# Patient Record
Sex: FEMALE | Race: WHITE | NOT HISPANIC OR LATINO | Employment: OTHER | URBAN - METROPOLITAN AREA
[De-identification: names, ages, dates, MRNs, and addresses within clinical notes are randomized per-mention and may not be internally consistent; named-entity substitution may affect disease eponyms.]

---

## 2017-05-18 ENCOUNTER — GENERIC CONVERSION - ENCOUNTER (OUTPATIENT)
Dept: OTHER | Facility: OTHER | Age: 67
End: 2017-05-18

## 2017-05-19 ENCOUNTER — TRANSCRIBE ORDERS (OUTPATIENT)
Dept: ADMINISTRATIVE | Facility: HOSPITAL | Age: 67
End: 2017-05-19

## 2017-05-19 ENCOUNTER — HOSPITAL ENCOUNTER (OUTPATIENT)
Dept: RADIOLOGY | Facility: HOSPITAL | Age: 67
Discharge: HOME | End: 2017-05-19
Attending: INTERNAL MEDICINE
Payer: MEDICARE

## 2017-05-19 ENCOUNTER — ALLSCRIPTS OFFICE VISIT (OUTPATIENT)
Dept: OTHER | Facility: OTHER | Age: 67
End: 2017-05-19

## 2017-05-19 DIAGNOSIS — J45.20 MILD INTERMITTENT ASTHMA, UNCOMPLICATED: ICD-10-CM

## 2017-05-19 PROCEDURE — 71020 HB CHEST X-RAY 2VW FRONTAL&LATL: CPT

## 2017-05-22 ENCOUNTER — GENERIC CONVERSION - ENCOUNTER (OUTPATIENT)
Dept: OTHER | Facility: OTHER | Age: 67
End: 2017-05-22

## 2017-05-26 ENCOUNTER — TRANSCRIBE ORDERS (OUTPATIENT)
Dept: ADMINISTRATIVE | Facility: HOSPITAL | Age: 67
End: 2017-05-26

## 2017-05-26 DIAGNOSIS — R06.02 SOB (SHORTNESS OF BREATH): Primary | ICD-10-CM

## 2017-06-01 ENCOUNTER — GENERIC CONVERSION - ENCOUNTER (OUTPATIENT)
Dept: OTHER | Facility: OTHER | Age: 67
End: 2017-06-01

## 2017-06-06 ENCOUNTER — GENERIC CONVERSION - ENCOUNTER (OUTPATIENT)
Dept: OTHER | Facility: OTHER | Age: 67
End: 2017-06-06

## 2017-06-06 ENCOUNTER — HOSPITAL ENCOUNTER (OUTPATIENT)
Dept: PULMONOLOGY | Facility: HOSPITAL | Age: 67
Discharge: HOME | End: 2017-06-06
Attending: INTERNAL MEDICINE
Payer: MEDICARE

## 2017-06-06 DIAGNOSIS — R06.02 SOB (SHORTNESS OF BREATH): ICD-10-CM

## 2017-06-06 PROCEDURE — 94070 EVALUATION OF WHEEZING: CPT

## 2017-06-06 PROCEDURE — 94726 PLETHYSMOGRAPHY LUNG VOLUMES: CPT

## 2017-06-06 PROCEDURE — 94729 DIFFUSING CAPACITY: CPT

## 2017-06-06 PROCEDURE — 94760 N-INVAS EAR/PLS OXIMETRY 1: CPT

## 2017-06-06 RX ORDER — SODIUM CHLORIDE FOR INHALATION 0.9 %
3 VIAL, NEBULIZER (ML) INHALATION ONCE AS NEEDED
Status: DISCONTINUED | OUTPATIENT
Start: 2017-06-06 | End: 2017-06-10 | Stop reason: HOSPADM

## 2017-06-06 RX ORDER — ALBUTEROL SULFATE 2.5 MG/3ML
2.5 SOLUTION RESPIRATORY (INHALATION) ONCE AS NEEDED
Status: DISCONTINUED | OUTPATIENT
Start: 2017-06-06 | End: 2017-06-10 | Stop reason: HOSPADM

## 2017-06-22 ENCOUNTER — ALLSCRIPTS OFFICE VISIT (OUTPATIENT)
Dept: OTHER | Facility: OTHER | Age: 67
End: 2017-06-22

## 2017-11-16 ENCOUNTER — TRANSCRIBE ORDERS (OUTPATIENT)
Dept: ADMINISTRATIVE | Facility: HOSPITAL | Age: 67
End: 2017-11-16

## 2017-11-16 ENCOUNTER — TRANSCRIBE ORDERS (OUTPATIENT)
Dept: LAB | Facility: HOSPITAL | Age: 67
End: 2017-11-16

## 2017-11-16 DIAGNOSIS — M25.511 RIGHT SHOULDER PAIN, UNSPECIFIED CHRONICITY: Primary | ICD-10-CM

## 2017-11-25 ENCOUNTER — TRANSCRIBE ORDERS (OUTPATIENT)
Dept: ADMINISTRATIVE | Facility: HOSPITAL | Age: 67
End: 2017-11-25

## 2017-11-26 ENCOUNTER — HOSPITAL ENCOUNTER (OUTPATIENT)
Dept: MRI IMAGING | Facility: HOSPITAL | Age: 67
Discharge: HOME | End: 2017-11-26
Payer: MEDICARE

## 2017-11-26 DIAGNOSIS — M25.511 RIGHT SHOULDER PAIN, UNSPECIFIED CHRONICITY: ICD-10-CM

## 2017-11-26 PROCEDURE — 73221 MRI JOINT UPR EXTREM W/O DYE: CPT

## 2018-01-14 VITALS
RESPIRATION RATE: 12 BRPM | DIASTOLIC BLOOD PRESSURE: 84 MMHG | BODY MASS INDEX: 39.44 KG/M2 | TEMPERATURE: 97.8 F | SYSTOLIC BLOOD PRESSURE: 136 MMHG | HEIGHT: 64 IN | WEIGHT: 231 LBS | HEART RATE: 78 BPM | OXYGEN SATURATION: 75 %

## 2018-01-14 VITALS
WEIGHT: 233 LBS | TEMPERATURE: 97.7 F | HEART RATE: 81 BPM | DIASTOLIC BLOOD PRESSURE: 86 MMHG | SYSTOLIC BLOOD PRESSURE: 142 MMHG | HEIGHT: 64 IN | BODY MASS INDEX: 39.78 KG/M2 | OXYGEN SATURATION: 94 % | RESPIRATION RATE: 12 BRPM

## 2018-01-15 NOTE — RESULT NOTES
Message  patient called and notified of chest x-rays results        Signatures   Electronically signed by : LILY Guzmán ; May 22 2017  2:29PM EST                       (Author)

## 2018-01-17 NOTE — MISCELLANEOUS
Message  Feels like the Janelle James is not working for her  She feels like her breathing is worse and she is unable to sleep at night  She is asked to stop the Janelle James  She will continue with her rescue inhaler  She will get her PFT done on tuesday  we will regroup after her PFTs done  If she does not feel better she is instructed to call the office or go to the emergency room        Plan  Mild intermittent reactive airway disease without complication    · Ventolin  (90 Base) MCG/ACT Inhalation Aerosol Solution; INHALE 1  PUFF EVERY 4 HOURS AS NEEDED    Signatures   Electronically signed by : LILY Maldonado ; Jun 1 2017  2:07PM EST                       (Author)

## 2018-06-11 ENCOUNTER — HOSPITAL ENCOUNTER (EMERGENCY)
Facility: HOSPITAL | Age: 68
Discharge: HOME | End: 2018-06-11
Attending: EMERGENCY MEDICINE | Admitting: EMERGENCY MEDICINE
Payer: MEDICARE

## 2018-06-11 ENCOUNTER — APPOINTMENT (EMERGENCY)
Dept: RADIOLOGY | Facility: HOSPITAL | Age: 68
End: 2018-06-11
Payer: MEDICARE

## 2018-06-11 VITALS
BODY MASS INDEX: 39.48 KG/M2 | HEART RATE: 69 BPM | SYSTOLIC BLOOD PRESSURE: 218 MMHG | RESPIRATION RATE: 20 BRPM | WEIGHT: 230 LBS | DIASTOLIC BLOOD PRESSURE: 85 MMHG | OXYGEN SATURATION: 96 % | TEMPERATURE: 97.9 F

## 2018-06-11 DIAGNOSIS — N20.0 KIDNEY STONE ON LEFT SIDE: Primary | ICD-10-CM

## 2018-06-11 LAB
ALBUMIN SERPL BCP-MCNC: 4.1 G/DL (ref 3.5–5)
ALP SERPL-CCNC: 57 U/L (ref 46–116)
ALT SERPL W P-5'-P-CCNC: 41 U/L (ref 12–78)
ANION GAP SERPL CALCULATED.3IONS-SCNC: 11 MMOL/L (ref 4–13)
AST SERPL W P-5'-P-CCNC: 17 U/L (ref 5–45)
BACTERIA UR QL AUTO: ABNORMAL /HPF
BASOPHILS # BLD AUTO: 0.05 THOUSANDS/ΜL (ref 0–0.1)
BASOPHILS NFR BLD AUTO: 1 % (ref 0–1)
BILIRUB SERPL-MCNC: 0.4 MG/DL (ref 0.2–1)
BILIRUB UR QL STRIP: ABNORMAL
BUN SERPL-MCNC: 22 MG/DL (ref 5–25)
CALCIUM SERPL-MCNC: 9.2 MG/DL (ref 8.3–10.1)
CAOX CRY URNS QL MICRO: ABNORMAL /HPF
CHLORIDE SERPL-SCNC: 102 MMOL/L (ref 100–108)
CLARITY UR: ABNORMAL
CO2 SERPL-SCNC: 28 MMOL/L (ref 21–32)
COLOR UR: YELLOW
CREAT SERPL-MCNC: 0.82 MG/DL (ref 0.6–1.3)
EOSINOPHIL # BLD AUTO: 0.21 THOUSAND/ΜL (ref 0–0.61)
EOSINOPHIL NFR BLD AUTO: 2 % (ref 0–6)
ERYTHROCYTE [DISTWIDTH] IN BLOOD BY AUTOMATED COUNT: 13.1 % (ref 11.6–15.1)
FINE GRAN CASTS URNS QL MICRO: ABNORMAL /LPF
GFR SERPL CREATININE-BSD FRML MDRD: 74 ML/MIN/1.73SQ M
GLUCOSE SERPL-MCNC: 161 MG/DL (ref 65–140)
GLUCOSE UR STRIP-MCNC: NEGATIVE MG/DL
HCT VFR BLD AUTO: 42.7 % (ref 34.8–46.1)
HGB BLD-MCNC: 13.5 G/DL (ref 11.5–15.4)
HGB UR QL STRIP.AUTO: ABNORMAL
IMM GRANULOCYTES # BLD AUTO: 0.04 THOUSAND/UL (ref 0–0.2)
IMM GRANULOCYTES NFR BLD AUTO: 0 % (ref 0–2)
KETONES UR STRIP-MCNC: ABNORMAL MG/DL
LEUKOCYTE ESTERASE UR QL STRIP: NEGATIVE
LYMPHOCYTES # BLD AUTO: 2.13 THOUSANDS/ΜL (ref 0.6–4.47)
LYMPHOCYTES NFR BLD AUTO: 23 % (ref 14–44)
MCH RBC QN AUTO: 28.9 PG (ref 26.8–34.3)
MCHC RBC AUTO-ENTMCNC: 31.6 G/DL (ref 31.4–37.4)
MCV RBC AUTO: 91 FL (ref 82–98)
MONOCYTES # BLD AUTO: 0.87 THOUSAND/ΜL (ref 0.17–1.22)
MONOCYTES NFR BLD AUTO: 10 % (ref 4–12)
MUCOUS THREADS UR QL AUTO: ABNORMAL
NEUTROPHILS # BLD AUTO: 5.79 THOUSANDS/ΜL (ref 1.85–7.62)
NEUTS SEG NFR BLD AUTO: 64 % (ref 43–75)
NITRITE UR QL STRIP: NEGATIVE
NON-SQ EPI CELLS URNS QL MICRO: ABNORMAL /HPF
NRBC BLD AUTO-RTO: 0 /100 WBCS
PH UR STRIP.AUTO: 5 [PH] (ref 5–9)
PLATELET # BLD AUTO: 314 THOUSANDS/UL (ref 149–390)
PMV BLD AUTO: 10.7 FL (ref 8.9–12.7)
POTASSIUM SERPL-SCNC: 3.9 MMOL/L (ref 3.5–5.3)
PROT SERPL-MCNC: 7.3 G/DL (ref 6.4–8.2)
PROT UR STRIP-MCNC: ABNORMAL MG/DL
RBC # BLD AUTO: 4.67 MILLION/UL (ref 3.81–5.12)
RBC #/AREA URNS AUTO: ABNORMAL /HPF
SODIUM SERPL-SCNC: 141 MMOL/L (ref 136–145)
SP GR UR STRIP.AUTO: >=1.03 (ref 1–1.03)
UROBILINOGEN UR QL STRIP.AUTO: 1 E.U./DL
WBC # BLD AUTO: 9.09 THOUSAND/UL (ref 4.31–10.16)
WBC #/AREA URNS AUTO: ABNORMAL /HPF

## 2018-06-11 PROCEDURE — 74177 CT ABD & PELVIS W/CONTRAST: CPT

## 2018-06-11 PROCEDURE — 85025 COMPLETE CBC W/AUTO DIFF WBC: CPT | Performed by: EMERGENCY MEDICINE

## 2018-06-11 PROCEDURE — 96376 TX/PRO/DX INJ SAME DRUG ADON: CPT

## 2018-06-11 PROCEDURE — 99284 EMERGENCY DEPT VISIT MOD MDM: CPT

## 2018-06-11 PROCEDURE — 96375 TX/PRO/DX INJ NEW DRUG ADDON: CPT

## 2018-06-11 PROCEDURE — 80053 COMPREHEN METABOLIC PANEL: CPT | Performed by: EMERGENCY MEDICINE

## 2018-06-11 PROCEDURE — 96361 HYDRATE IV INFUSION ADD-ON: CPT

## 2018-06-11 PROCEDURE — 81001 URINALYSIS AUTO W/SCOPE: CPT | Performed by: EMERGENCY MEDICINE

## 2018-06-11 PROCEDURE — 36415 COLL VENOUS BLD VENIPUNCTURE: CPT | Performed by: EMERGENCY MEDICINE

## 2018-06-11 PROCEDURE — 96374 THER/PROPH/DIAG INJ IV PUSH: CPT

## 2018-06-11 RX ORDER — MORPHINE SULFATE 4 MG/ML
4 INJECTION, SOLUTION INTRAMUSCULAR; INTRAVENOUS ONCE
Status: COMPLETED | OUTPATIENT
Start: 2018-06-11 | End: 2018-06-11

## 2018-06-11 RX ORDER — AMIODARONE HYDROCHLORIDE 100 MG/1
1 TABLET ORAL DAILY
COMMUNITY
End: 2019-05-21 | Stop reason: ALTCHOICE

## 2018-06-11 RX ORDER — ONDANSETRON 2 MG/ML
4 INJECTION INTRAMUSCULAR; INTRAVENOUS ONCE
Status: COMPLETED | OUTPATIENT
Start: 2018-06-11 | End: 2018-06-11

## 2018-06-11 RX ORDER — ONDANSETRON 4 MG/1
4 TABLET, ORALLY DISINTEGRATING ORAL EVERY 8 HOURS PRN
Qty: 10 TABLET | Refills: 0 | Status: SHIPPED | OUTPATIENT
Start: 2018-06-11 | End: 2019-08-08 | Stop reason: ALTCHOICE

## 2018-06-11 RX ORDER — KETOROLAC TROMETHAMINE 30 MG/ML
30 INJECTION, SOLUTION INTRAMUSCULAR; INTRAVENOUS ONCE
Status: COMPLETED | OUTPATIENT
Start: 2018-06-11 | End: 2018-06-11

## 2018-06-11 RX ORDER — TAMSULOSIN HYDROCHLORIDE 0.4 MG/1
0.4 CAPSULE ORAL
Qty: 10 CAPSULE | Refills: 0 | Status: SHIPPED | OUTPATIENT
Start: 2018-06-11 | End: 2019-02-12

## 2018-06-11 RX ORDER — VITAMIN E 268 MG
400 CAPSULE ORAL 2 TIMES DAILY
COMMUNITY

## 2018-06-11 RX ORDER — NAPROXEN 500 MG/1
500 TABLET ORAL 2 TIMES DAILY WITH MEALS
Qty: 10 TABLET | Refills: 0 | Status: SHIPPED | OUTPATIENT
Start: 2018-06-11 | End: 2019-02-12

## 2018-06-11 RX ORDER — ASPIRIN 325 MG
325 TABLET ORAL DAILY
COMMUNITY
End: 2019-10-10 | Stop reason: ALTCHOICE

## 2018-06-11 RX ORDER — CHOLECALCIFEROL (VITAMIN D3) 25 MCG
5000 CAPSULE ORAL DAILY
COMMUNITY

## 2018-06-11 RX ORDER — OXYCODONE HYDROCHLORIDE AND ACETAMINOPHEN 5; 325 MG/1; MG/1
1 TABLET ORAL EVERY 6 HOURS PRN
Qty: 10 TABLET | Refills: 0 | Status: SHIPPED | OUTPATIENT
Start: 2018-06-11 | End: 2018-06-14

## 2018-06-11 RX ORDER — OLMESARTAN MEDOXOMIL 20 MG/1
1 TABLET ORAL DAILY
COMMUNITY
End: 2019-07-16

## 2018-06-11 RX ORDER — ALBUTEROL SULFATE 90 UG/1
1 AEROSOL, METERED RESPIRATORY (INHALATION) EVERY 4 HOURS PRN
COMMUNITY
Start: 2017-06-01 | End: 2019-05-21 | Stop reason: SDUPTHER

## 2018-06-11 RX ADMIN — KETOROLAC TROMETHAMINE 30 MG: 30 INJECTION, SOLUTION INTRAMUSCULAR at 12:13

## 2018-06-11 RX ADMIN — MORPHINE SULFATE 4 MG: 4 INJECTION INTRAVENOUS at 14:22

## 2018-06-11 RX ADMIN — SODIUM CHLORIDE 1000 ML: 0.9 INJECTION, SOLUTION INTRAVENOUS at 12:12

## 2018-06-11 RX ADMIN — MORPHINE SULFATE 4 MG: 4 INJECTION INTRAVENOUS at 12:19

## 2018-06-11 RX ADMIN — IOHEXOL 50 ML: 240 INJECTION, SOLUTION INTRATHECAL; INTRAVASCULAR; INTRAVENOUS; ORAL at 12:26

## 2018-06-11 RX ADMIN — ONDANSETRON 4 MG: 2 INJECTION INTRAMUSCULAR; INTRAVENOUS at 12:13

## 2018-06-11 RX ADMIN — IOHEXOL 100 ML: 350 INJECTION, SOLUTION INTRAVENOUS at 13:59

## 2018-06-11 NOTE — DISCHARGE INSTRUCTIONS
Kidney Stones   WHAT YOU NEED TO KNOW:   Kidney stones form in the urinary system when the water and waste in your urine are out of balance  When this happens, certain types of waste crystals separate from the urine  The crystals build up and form kidney stones  You may have 1 or more kidney stones  DISCHARGE INSTRUCTIONS:   Return to the emergency department if:   · You have vomiting that is not relieved by medicine  Contact your healthcare provider if:   · You have a fever  · You have trouble passing urine  · You see blood in your urine  · You have severe pain  · You have any questions or concerns about your condition or care  Medicines:   · NSAIDs , such as ibuprofen, help decrease swelling, pain, and fever  This medicine is available with or without a doctor's order  NSAIDs can cause stomach bleeding or kidney problems in certain people  If you take blood thinner medicine, always ask your healthcare provider if NSAIDs are safe for you  Always read the medicine label and follow directions  · Prescription medicine  may be given  Ask how to take this medicine safely  · Medicines  to balance your electrolytes may be needed  · Take your medicine as directed  Contact your healthcare provider if you think your medicine is not helping or if you have side effects  Tell him or her if you are allergic to any medicine  Keep a list of the medicines, vitamins, and herbs you take  Include the amounts, and when and why you take them  Bring the list or the pill bottles to follow-up visits  Carry your medicine list with you in case of an emergency  Follow up with your healthcare provider as directed: You may need to return for more tests  Write down your questions so you remember to ask them during your visits  Self-care:   · Drink plenty of liquids  Your healthcare provider may tell you to drink at least 8 to 12 (eight-ounce) cups of liquids each day   This helps flush out the kidney stones when you urinate  Water is the best liquid to drink  · Strain your urine every time you go to the bathroom  Urinate through a strainer or a piece of thin cloth to catch the stones  Take the stones to your healthcare provider so they can be sent to the lab for tests  This will help your healthcare providers plan the best treatment for you  · Eat a variety of healthy foods  Healthy foods include fruits, vegetables, whole-grain breads, low-fat dairy products, beans, and fish  You may need to limit how much sodium (salt) or protein you eat  Ask for information about the best foods for you  · Stay active  Your stones may pass more easily by if you stay active  Ask about the best activities for you  After you pass your kidney stones:  Once you have passed your kidney stones, your healthcare provider may  order a 24-hour urine test  Results from a 24-hour urine test will help your healthcare provider plan ways to prevent more stones from forming  If you are told to do a 24-hour test, your healthcare provider will give you more instructions  © 2017 [facility] Information is for End User's use only and may not be sold, redistributed or otherwise used for commercial purposes  All illustrations and images included in CareNotes® are the copyrighted property of A D [employer] M [employer]  or Jose Guadalupe Cantrell  The above information is an  only  It is not intended as medical advice for individual conditions or treatments  Talk to your doctor, nurse or pharmacist before following any medical regimen to see if it is safe and effective for you

## 2018-06-11 NOTE — ED PROVIDER NOTES
History  Chief Complaint   Patient presents with    Abdominal Pain     Pt reports LLQ pain since last night, burning pain  Pt denies vomiting/diarrhea  LLQ PAIN X 1 DAY  NO N/V/D  216/100        Abdominal Pain   Pain location:  LLQ  Pain quality: burning and sharp    Pain severity:  Moderate  Duration:  1 day  Timing:  Constant  Progression:  Worsening  Chronicity:  New  Relieved by:  None tried  Worsened by:  Nothing  Ineffective treatments:  None tried  Associated symptoms: no constipation, no diarrhea, no dysuria, no nausea, no vaginal bleeding and no vomiting        Prior to Admission Medications   Prescriptions Last Dose Informant Patient Reported? Taking? Cholecalciferol (VITAMIN D-3) 1000 units CAPS 6/11/2018 at Unknown time  Yes Yes   Sig: Take 5,000 Units by mouth daily   albuterol (VENTOLIN HFA) 90 mcg/act inhaler   Yes No   Sig: Inhale 1 puff every 4 (four) hours as needed   amiodarone 100 mg tablet 6/11/2018 at Unknown time  Yes Yes   Sig: Take 1 tablet by mouth daily   aspirin 325 mg tablet 6/11/2018 at Unknown time  Yes Yes   Sig: Take 325 mg by mouth daily   olmesartan (BENICAR) 20 mg tablet 6/11/2018 at Unknown time  Yes Yes   Sig: Take 1 tablet by mouth daily   vitamin E, tocopherol, 400 units capsule 6/11/2018 at Unknown time  Yes Yes   Sig: Take 400 Units by mouth 2 (two) times a day      Facility-Administered Medications: None       Past Medical History:   Diagnosis Date    Diverticulitis     Hypertension        Past Surgical History:   Procedure Laterality Date    CERVICAL FUSION         History reviewed  No pertinent family history  I have reviewed and agree with the history as documented  Social History   Substance Use Topics    Smoking status: Former Smoker     Quit date: 2006    Smokeless tobacco: Never Used    Alcohol use Yes      Comment: occasional        Review of Systems   Gastrointestinal: Positive for abdominal pain   Negative for constipation, diarrhea, nausea and vomiting  Genitourinary: Negative for dysuria and vaginal bleeding  All other systems reviewed and are negative  Physical Exam  Physical Exam   Constitutional: She is oriented to person, place, and time  She appears well-developed and well-nourished  No distress  HENT:   Head: Normocephalic and atraumatic  Eyes: Conjunctivae and EOM are normal  Pupils are equal, round, and reactive to light  Neck: Normal range of motion  Cardiovascular: Normal rate and regular rhythm  Pulmonary/Chest: Effort normal and breath sounds normal    Abdominal: Soft  There is tenderness  Musculoskeletal: Normal range of motion  She exhibits no edema  Neurological: She is alert and oriented to person, place, and time  Skin: Skin is warm and dry  No rash noted  She is not diaphoretic  Nursing note and vitals reviewed        Vital Signs  ED Triage Vitals   Temperature Pulse Respirations Blood Pressure SpO2   06/11/18 1142 06/11/18 1142 06/11/18 1142 06/11/18 1142 06/11/18 1421   97 9 °F (36 6 °C) 83 21 (!) 216/100 96 %      Temp Source Heart Rate Source Patient Position - Orthostatic VS BP Location FiO2 (%)   06/11/18 1142 06/11/18 1142 06/11/18 1142 06/11/18 1142 --   Oral Monitor Lying Left arm       Pain Score       06/11/18 1142       9           Vitals:    06/11/18 1142 06/11/18 1421   BP: (!) 216/100 (!) 218/85   Pulse: 83 69   Patient Position - Orthostatic VS: Lying Sitting       Visual Acuity      ED Medications  Medications   sodium chloride 0 9 % bolus 1,000 mL (1,000 mL Intravenous New Bag 6/11/18 1212)   ketorolac (TORADOL) injection 30 mg (30 mg Intravenous Given 6/11/18 1213)   morphine (PF) 4 mg/mL injection 4 mg (4 mg Intravenous Given 6/11/18 1219)   ondansetron (ZOFRAN) injection 4 mg (4 mg Intravenous Given 6/11/18 1213)   iohexol (OMNIPAQUE) 240 MG/ML solution 50 mL (50 mL Oral Given 6/11/18 1226)   iohexol (OMNIPAQUE) 350 MG/ML injection (MULTI-DOSE) 100 mL (100 mL Intravenous Given 6/11/18 1359)   morphine (PF) 4 mg/mL injection 4 mg (4 mg Intravenous Given 6/11/18 1422)       Diagnostic Studies  Results Reviewed     Procedure Component Value Units Date/Time    Comprehensive metabolic panel [[id number]]  (Abnormal) Collected:  06/11/18 1214    Lab Status:  Final result Specimen:  Blood from Arm, Left Updated:  06/11/18 1245     Sodium 141 mmol/L      Potassium 3 9 mmol/L      Chloride 102 mmol/L      CO2 28 mmol/L      Anion Gap 11 mmol/L      BUN 22 mg/dL      Creatinine 0 82 mg/dL      Glucose 161 (H) mg/dL      Calcium 9 2 mg/dL      AST 17 U/L      ALT 41 U/L      Alkaline Phosphatase 57 U/L      Total Protein 7 3 g/dL      Albumin 4 1 g/dL      Total Bilirubin 0 40 mg/dL      eGFR 74 ml/min/1 73sq m     Narrative:         National Kidney Disease Education Program recommendations are as follows:  GFR calculation is accurate only with a steady state creatinine  Chronic Kidney disease less than 60 ml/min/1 73 sq  meters  Kidney failure less than 15 ml/min/1 73 sq  meters      Urine Microscopic [[id number]]  (Abnormal) Collected:  06/11/18 1214    Lab Status:  Final result Specimen:  Urine from Urine, Clean Catch Updated:  06/11/18 1237     RBC, UA 10-20 (A) /hpf      WBC, UA 1-2 (A) /hpf      Epithelial Cells Occasional /hpf      Bacteria, UA None Seen /hpf      Fine granular casts 0-1 /lpf      Ca Oxalate Josie, UA Moderate (A) /hpf      MUCOUS THREADS Occasional (A)    Urinalysis with reflex to microscopic [[id number]]  (Abnormal) Collected:  06/11/18 1214    Lab Status:  Final result Specimen:  Urine from Urine, Clean Catch Updated:  06/11/18 1229     Color, UA Yellow     Clarity, UA Slightly Cloudy     Specific Gravity, UA >=1 030     pH, UA 5 0     Leukocytes, UA Negative     Nitrite, UA Negative     Protein,  (2+) (A) mg/dl      Glucose, UA Negative mg/dl      Ketones, UA Trace (A) mg/dl      Urobilinogen, UA 1 0 E U /dl      Bilirubin, UA Interference- unable to analyze (A)     Blood, UA Large (A)    CBC and differential [[id number]] Collected:  06/11/18 1214    Lab Status:  Final result Specimen:  Blood from Arm, Left Updated:  06/11/18 1228     WBC 9 09 Thousand/uL      RBC 4 67 Million/uL      Hemoglobin 13 5 g/dL      Hematocrit 42 7 %      MCV 91 fL      MCH 28 9 pg      MCHC 31 6 g/dL      RDW 13 1 %      MPV 10 7 fL      Platelets 960 Thousands/uL      nRBC 0 /100 WBCs      Neutrophils Relative 64 %      Immat GRANS % 0 %      Lymphocytes Relative 23 %      Monocytes Relative 10 %      Eosinophils Relative 2 %      Basophils Relative 1 %      Neutrophils Absolute 5 79 Thousands/µL      Immature Grans Absolute 0 04 Thousand/uL      Lymphocytes Absolute 2 13 Thousands/µL      Monocytes Absolute 0 87 Thousand/µL      Eosinophils Absolute 0 21 Thousand/µL      Basophils Absolute 0 05 Thousands/µL                  CT abdomen pelvis with contrast   Final Result by Lorraine Candelaria MD (06/11 3581)         1  Cholelithiasis  No evidence of acute cholecystitis  2   Mild left hydronephrosis and proximal left hydroureter due to a 4 mm calculus in the proximal left ureter  3   Low-density within the endometrium  Recommend elective pelvic ultrasound  4   Bilateral adrenal nodules  If there is no history of cancer, these are presumed to be benign and consider 12 month follow-up with CT or MRI  If there is a history of cancer, consider PET CT or CT or MRI with adrenal adenoma protocol        Workstation performed: [id number]                    Procedures  Procedures       Phone Contacts  ED Phone Contact    ED Course                               MDM  CritCare Time    Disposition  Final diagnoses:   Kidney stone on left side     Time reflects when diagnosis was documented in both MDM as applicable and the Disposition within this note     Time User Action Codes Description Comment    6/11/2018  3:29 PM Jared Perkins Add [N20 0] Kidney stone on left side       ED Disposition     ED Disposition Condition Comment    Discharge  Avery Prasad discharge to home/self care  Condition at discharge: Stable        Follow-up Information     Follow up With Specialties Details Why Nilesh Cerda MD Urology Schedule an appointment as soon as possible for a visit in 3 days  Trish [address] [address]  [address]  [phone]            Patient's Medications   Discharge Prescriptions    NAPROXEN (NAPROSYN) 500 MG TABLET    Take 1 tablet (500 mg total) by mouth 2 (two) times a day with meals for 5 days       Start Date: 6/11/2018 End Date: 6/16/2018       Order Dose: 500 mg       Quantity: 10 tablet    Refills: 0    ONDANSETRON (ZOFRAN-ODT) 4 MG DISINTEGRATING TABLET    Take 1 tablet (4 mg total) by mouth every 8 (eight) hours as needed for nausea or vomiting for up to 10 doses       Start Date: 6/11/2018 End Date: --       Order Dose: 4 mg       Quantity: 10 tablet    Refills: 0    OXYCODONE-ACETAMINOPHEN (PERCOCET) 5-325 MG PER TABLET    Take 1 tablet by mouth every 6 (six) hours as needed for moderate pain for up to 3 days Max Daily Amount: 4 tablets       Start Date: 6/11/2018 End Date: 6/14/2018       Order Dose: 1 tablet       Quantity: 10 tablet    Refills: 0    TAMSULOSIN (FLOMAX) 0 4 MG    Take 1 capsule (0 4 mg total) by mouth daily with dinner       Start Date: 6/11/2018 End Date: --       Order Dose: 0 4 mg       Quantity: 10 capsule    Refills: 0     No discharge procedures on file      ED Provider  Electronically Signed by           Jaspreet Daley MD  06/11/18 0671

## 2018-07-05 ENCOUNTER — TRANSCRIBE ORDERS (OUTPATIENT)
Dept: ADMINISTRATIVE | Facility: HOSPITAL | Age: 68
End: 2018-07-05

## 2018-07-05 ENCOUNTER — HOSPITAL ENCOUNTER (OUTPATIENT)
Dept: RADIOLOGY | Facility: HOSPITAL | Age: 68
Discharge: HOME | End: 2018-07-05
Attending: SPECIALIST
Payer: MEDICARE

## 2018-07-05 DIAGNOSIS — N20.0 CALCIUM OXALATE RENAL STONES: Primary | ICD-10-CM

## 2018-07-05 DIAGNOSIS — N20.0 KIDNEY STONE: ICD-10-CM

## 2018-07-05 DIAGNOSIS — N20.0 URIC ACID NEPHROLITHIASIS: ICD-10-CM

## 2018-07-05 DIAGNOSIS — N20.0 CALCIUM OXALATE RENAL STONES: ICD-10-CM

## 2018-07-05 PROCEDURE — 74018 RADEX ABDOMEN 1 VIEW: CPT

## 2018-07-18 ENCOUNTER — HOSPITAL ENCOUNTER (OUTPATIENT)
Dept: RADIOLOGY | Facility: HOSPITAL | Age: 68
Discharge: HOME | End: 2018-07-18
Attending: SPECIALIST
Payer: MEDICARE

## 2018-07-18 DIAGNOSIS — N20.0 URIC ACID NEPHROLITHIASIS: ICD-10-CM

## 2018-07-18 PROCEDURE — 76770 US EXAM ABDO BACK WALL COMP: CPT

## 2018-07-18 PROCEDURE — 74018 RADEX ABDOMEN 1 VIEW: CPT

## 2019-02-12 ENCOUNTER — HOSPITAL ENCOUNTER (INPATIENT)
Facility: HOSPITAL | Age: 69
LOS: 4 days | Discharge: HOME | DRG: 194 | End: 2019-02-16
Attending: EMERGENCY MEDICINE | Admitting: STUDENT IN AN ORGANIZED HEALTH CARE EDUCATION/TRAINING PROGRAM
Payer: MEDICARE

## 2019-02-12 ENCOUNTER — APPOINTMENT (EMERGENCY)
Dept: RADIOLOGY | Facility: HOSPITAL | Age: 69
DRG: 194 | End: 2019-02-12
Payer: MEDICARE

## 2019-02-12 DIAGNOSIS — J18.9 PNEUMONIA OF BOTH LUNGS DUE TO INFECTIOUS ORGANISM, UNSPECIFIED PART OF LUNG: Primary | ICD-10-CM

## 2019-02-12 DIAGNOSIS — J45.901 ASTHMA WITH ACUTE EXACERBATION: ICD-10-CM

## 2019-02-12 DIAGNOSIS — J10.1 INFLUENZA A: ICD-10-CM

## 2019-02-12 PROBLEM — I49.9 ABNORMAL HEART RHYTHM: Status: ACTIVE | Noted: 2019-02-12

## 2019-02-12 PROBLEM — I31.3 PERICARDIAL EFFUSION: Status: ACTIVE | Noted: 2019-02-12

## 2019-02-12 PROBLEM — I63.9 CVA (CEREBRAL VASCULAR ACCIDENT) (HCC): Status: ACTIVE | Noted: 2019-02-12

## 2019-02-12 PROBLEM — J45.909 ASTHMA: Status: ACTIVE | Noted: 2019-02-12

## 2019-02-12 PROBLEM — I31.39 PERICARDIAL EFFUSION: Status: ACTIVE | Noted: 2019-02-12

## 2019-02-12 PROBLEM — I10 HTN (HYPERTENSION): Status: ACTIVE | Noted: 2019-02-12

## 2019-02-12 PROBLEM — D35.00 ADRENAL ADENOMA: Status: ACTIVE | Noted: 2019-02-12

## 2019-02-12 LAB
ALBUMIN SERPL BCP-MCNC: 3.3 G/DL (ref 3.5–5)
ALP SERPL-CCNC: 72 U/L (ref 46–116)
ALT SERPL W P-5'-P-CCNC: 44 U/L (ref 12–78)
ANION GAP SERPL CALCULATED.3IONS-SCNC: 9 MMOL/L (ref 4–13)
APTT PPP: 29 SECONDS (ref 26–38)
AST SERPL W P-5'-P-CCNC: 41 U/L (ref 5–45)
BASOPHILS # BLD AUTO: 0.03 THOUSANDS/ΜL (ref 0–0.1)
BASOPHILS NFR BLD AUTO: 1 % (ref 0–1)
BILIRUB SERPL-MCNC: 0.4 MG/DL (ref 0.2–1)
BUN SERPL-MCNC: 15 MG/DL (ref 5–25)
CALCIUM SERPL-MCNC: 9.2 MG/DL (ref 8.3–10.1)
CHLORIDE SERPL-SCNC: 103 MMOL/L (ref 100–108)
CO2 SERPL-SCNC: 28 MMOL/L (ref 21–32)
CREAT SERPL-MCNC: 0.61 MG/DL (ref 0.6–1.3)
DEPRECATED D DIMER PPP: 1400 NG/ML (FEU) (ref 190–520)
EOSINOPHIL # BLD AUTO: 0 THOUSAND/ΜL (ref 0–0.61)
EOSINOPHIL NFR BLD AUTO: 0 % (ref 0–6)
ERYTHROCYTE [DISTWIDTH] IN BLOOD BY AUTOMATED COUNT: 13.3 % (ref 11.6–15.1)
FLUAV AG SPEC QL IA: POSITIVE
FLUBV AG SPEC QL IA: NEGATIVE
GFR SERPL CREATININE-BSD FRML MDRD: 93 ML/MIN/1.73SQ M
GLUCOSE SERPL-MCNC: 157 MG/DL (ref 65–140)
HCT VFR BLD AUTO: 42 % (ref 34.8–46.1)
HGB BLD-MCNC: 12.9 G/DL (ref 11.5–15.4)
IMM GRANULOCYTES # BLD AUTO: 0.02 THOUSAND/UL (ref 0–0.2)
IMM GRANULOCYTES NFR BLD AUTO: 0 % (ref 0–2)
INR PPP: 1.02 (ref 0.86–1.16)
LACTATE SERPL-SCNC: 1.1 MMOL/L (ref 0.5–2)
LYMPHOCYTES # BLD AUTO: 0.58 THOUSANDS/ΜL (ref 0.6–4.47)
LYMPHOCYTES NFR BLD AUTO: 9 % (ref 14–44)
MCH RBC QN AUTO: 28.7 PG (ref 26.8–34.3)
MCHC RBC AUTO-ENTMCNC: 30.7 G/DL (ref 31.4–37.4)
MCV RBC AUTO: 94 FL (ref 82–98)
MONOCYTES # BLD AUTO: 0.5 THOUSAND/ΜL (ref 0.17–1.22)
MONOCYTES NFR BLD AUTO: 8 % (ref 4–12)
NEUTROPHILS # BLD AUTO: 5.23 THOUSANDS/ΜL (ref 1.85–7.62)
NEUTS SEG NFR BLD AUTO: 82 % (ref 43–75)
NRBC BLD AUTO-RTO: 0 /100 WBCS
NT-PROBNP SERPL-MCNC: 1025 PG/ML
PLATELET # BLD AUTO: 241 THOUSANDS/UL (ref 149–390)
PMV BLD AUTO: 11.1 FL (ref 8.9–12.7)
POTASSIUM SERPL-SCNC: 4.5 MMOL/L (ref 3.5–5.3)
PROT SERPL-MCNC: 7 G/DL (ref 6.4–8.2)
PROTHROMBIN TIME: 10.7 SECONDS (ref 9.4–11.7)
RBC # BLD AUTO: 4.49 MILLION/UL (ref 3.81–5.12)
SODIUM SERPL-SCNC: 140 MMOL/L (ref 136–145)
TROPONIN I SERPL-MCNC: <0.02 NG/ML
WBC # BLD AUTO: 6.36 THOUSAND/UL (ref 4.31–10.16)

## 2019-02-12 PROCEDURE — 80053 COMPREHEN METABOLIC PANEL: CPT | Performed by: EMERGENCY MEDICINE

## 2019-02-12 PROCEDURE — 84484 ASSAY OF TROPONIN QUANT: CPT | Performed by: EMERGENCY MEDICINE

## 2019-02-12 PROCEDURE — 94640 AIRWAY INHALATION TREATMENT: CPT

## 2019-02-12 PROCEDURE — 85379 FIBRIN DEGRADATION QUANT: CPT | Performed by: EMERGENCY MEDICINE

## 2019-02-12 PROCEDURE — 83605 ASSAY OF LACTIC ACID: CPT | Performed by: EMERGENCY MEDICINE

## 2019-02-12 PROCEDURE — 71275 CT ANGIOGRAPHY CHEST: CPT

## 2019-02-12 PROCEDURE — 94668 MNPJ CHEST WALL SBSQ: CPT

## 2019-02-12 PROCEDURE — 85610 PROTHROMBIN TIME: CPT | Performed by: EMERGENCY MEDICINE

## 2019-02-12 PROCEDURE — 87147 CULTURE TYPE IMMUNOLOGIC: CPT | Performed by: EMERGENCY MEDICINE

## 2019-02-12 PROCEDURE — 93005 ELECTROCARDIOGRAM TRACING: CPT

## 2019-02-12 PROCEDURE — 1124F ACP DISCUSS-NO DSCNMKR DOCD: CPT | Performed by: FAMILY MEDICINE

## 2019-02-12 PROCEDURE — 87449 NOS EACH ORGANISM AG IA: CPT | Performed by: STUDENT IN AN ORGANIZED HEALTH CARE EDUCATION/TRAINING PROGRAM

## 2019-02-12 PROCEDURE — 83880 ASSAY OF NATRIURETIC PEPTIDE: CPT | Performed by: EMERGENCY MEDICINE

## 2019-02-12 PROCEDURE — 85730 THROMBOPLASTIN TIME PARTIAL: CPT | Performed by: EMERGENCY MEDICINE

## 2019-02-12 PROCEDURE — 87040 BLOOD CULTURE FOR BACTERIA: CPT | Performed by: EMERGENCY MEDICINE

## 2019-02-12 PROCEDURE — 99285 EMERGENCY DEPT VISIT HI MDM: CPT

## 2019-02-12 PROCEDURE — 36415 COLL VENOUS BLD VENIPUNCTURE: CPT | Performed by: EMERGENCY MEDICINE

## 2019-02-12 PROCEDURE — 87081 CULTURE SCREEN ONLY: CPT | Performed by: STUDENT IN AN ORGANIZED HEALTH CARE EDUCATION/TRAINING PROGRAM

## 2019-02-12 PROCEDURE — 87400 INFLUENZA A/B EACH AG IA: CPT | Performed by: STUDENT IN AN ORGANIZED HEALTH CARE EDUCATION/TRAINING PROGRAM

## 2019-02-12 PROCEDURE — 94760 N-INVAS EAR/PLS OXIMETRY 1: CPT

## 2019-02-12 PROCEDURE — 87633 RESP VIRUS 12-25 TARGETS: CPT | Performed by: STUDENT IN AN ORGANIZED HEALTH CARE EDUCATION/TRAINING PROGRAM

## 2019-02-12 PROCEDURE — 99222 1ST HOSP IP/OBS MODERATE 55: CPT | Performed by: STUDENT IN AN ORGANIZED HEALTH CARE EDUCATION/TRAINING PROGRAM

## 2019-02-12 PROCEDURE — 71046 X-RAY EXAM CHEST 2 VIEWS: CPT

## 2019-02-12 PROCEDURE — 94664 DEMO&/EVAL PT USE INHALER: CPT

## 2019-02-12 PROCEDURE — 85025 COMPLETE CBC W/AUTO DIFF WBC: CPT | Performed by: EMERGENCY MEDICINE

## 2019-02-12 RX ORDER — HEPARIN SODIUM 5000 [USP'U]/ML
5000 INJECTION, SOLUTION INTRAVENOUS; SUBCUTANEOUS EVERY 8 HOURS SCHEDULED
Status: DISCONTINUED | OUTPATIENT
Start: 2019-02-12 | End: 2019-02-16 | Stop reason: HOSPADM

## 2019-02-12 RX ORDER — IBUPROFEN 200 MG
200 TABLET ORAL ONCE AS NEEDED
Status: COMPLETED | OUTPATIENT
Start: 2019-02-12 | End: 2019-02-12

## 2019-02-12 RX ORDER — AZITHROMYCIN 250 MG/1
500 TABLET, FILM COATED ORAL EVERY 24 HOURS
Status: DISCONTINUED | OUTPATIENT
Start: 2019-02-13 | End: 2019-02-15

## 2019-02-12 RX ORDER — CEFTRIAXONE 1 G/50ML
1000 INJECTION, SOLUTION INTRAVENOUS ONCE
Status: COMPLETED | OUTPATIENT
Start: 2019-02-12 | End: 2019-02-12

## 2019-02-12 RX ORDER — OSELTAMIVIR PHOSPHATE 75 MG/1
75 CAPSULE ORAL EVERY 12 HOURS SCHEDULED
Status: DISCONTINUED | OUTPATIENT
Start: 2019-02-12 | End: 2019-02-16 | Stop reason: HOSPADM

## 2019-02-12 RX ORDER — ASPIRIN 325 MG
325 TABLET ORAL DAILY
Status: DISCONTINUED | OUTPATIENT
Start: 2019-02-13 | End: 2019-02-16 | Stop reason: HOSPADM

## 2019-02-12 RX ORDER — LOSARTAN POTASSIUM 50 MG/1
50 TABLET ORAL DAILY
Status: DISCONTINUED | OUTPATIENT
Start: 2019-02-13 | End: 2019-02-12

## 2019-02-12 RX ORDER — OLMESARTAN MEDOXOMIL 20 MG/1
20 TABLET ORAL DAILY
Status: DISCONTINUED | OUTPATIENT
Start: 2019-02-13 | End: 2019-02-16 | Stop reason: HOSPADM

## 2019-02-12 RX ORDER — ALBUTEROL SULFATE 90 UG/1
2 AEROSOL, METERED RESPIRATORY (INHALATION) EVERY 4 HOURS PRN
Status: DISCONTINUED | OUTPATIENT
Start: 2019-02-12 | End: 2019-02-16 | Stop reason: HOSPADM

## 2019-02-12 RX ORDER — IPRATROPIUM BROMIDE AND ALBUTEROL SULFATE 2.5; .5 MG/3ML; MG/3ML
3 SOLUTION RESPIRATORY (INHALATION)
Status: DISCONTINUED | OUTPATIENT
Start: 2019-02-12 | End: 2019-02-16 | Stop reason: HOSPADM

## 2019-02-12 RX ORDER — AMIODARONE HYDROCHLORIDE 200 MG/1
100 TABLET ORAL DAILY
Status: DISCONTINUED | OUTPATIENT
Start: 2019-02-13 | End: 2019-02-16 | Stop reason: HOSPADM

## 2019-02-12 RX ORDER — CEFTRIAXONE 1 G/50ML
1000 INJECTION, SOLUTION INTRAVENOUS EVERY 24 HOURS
Status: DISCONTINUED | OUTPATIENT
Start: 2019-02-13 | End: 2019-02-15

## 2019-02-12 RX ORDER — IBUPROFEN 200 MG
400 TABLET ORAL
COMMUNITY
End: 2019-02-16 | Stop reason: HOSPADM

## 2019-02-12 RX ORDER — IPRATROPIUM BROMIDE AND ALBUTEROL SULFATE 2.5; .5 MG/3ML; MG/3ML
3 SOLUTION RESPIRATORY (INHALATION) ONCE
Status: COMPLETED | OUTPATIENT
Start: 2019-02-12 | End: 2019-02-12

## 2019-02-12 RX ADMIN — IPRATROPIUM BROMIDE AND ALBUTEROL SULFATE 3 ML: 2.5; .5 SOLUTION RESPIRATORY (INHALATION) at 18:12

## 2019-02-12 RX ADMIN — AZITHROMYCIN MONOHYDRATE 500 MG: 500 INJECTION, POWDER, LYOPHILIZED, FOR SOLUTION INTRAVENOUS at 21:02

## 2019-02-12 RX ADMIN — IOHEXOL 85 ML: 350 INJECTION, SOLUTION INTRAVENOUS at 19:36

## 2019-02-12 RX ADMIN — OSELTAMIVIR PHOSPHATE 75 MG: 75 CAPSULE ORAL at 21:34

## 2019-02-12 RX ADMIN — CEFTRIAXONE 1000 MG: 1 INJECTION, SOLUTION INTRAVENOUS at 20:38

## 2019-02-12 RX ADMIN — IPRATROPIUM BROMIDE AND ALBUTEROL SULFATE 3 ML: 2.5; .5 SOLUTION RESPIRATORY (INHALATION) at 22:58

## 2019-02-12 RX ADMIN — HEPARIN SODIUM 5000 UNITS: 5000 INJECTION, SOLUTION INTRAVENOUS; SUBCUTANEOUS at 23:29

## 2019-02-12 RX ADMIN — IBUPROFEN 200 MG: 200 TABLET, FILM COATED ORAL at 23:28
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
## 2019-02-13 ENCOUNTER — APPOINTMENT (INPATIENT)
Dept: NON INVASIVE DIAGNOSTICS | Facility: HOSPITAL | Age: 69
DRG: 194 | End: 2019-02-13
Payer: MEDICARE

## 2019-02-13 PROBLEM — J10.1 INFLUENZA A: Status: ACTIVE | Noted: 2019-02-13

## 2019-02-13 LAB
ANION GAP SERPL CALCULATED.3IONS-SCNC: 9 MMOL/L (ref 4–13)
ATRIAL RATE: 82 BPM
BASOPHILS # BLD AUTO: 0.03 THOUSANDS/ΜL (ref 0–0.1)
BASOPHILS NFR BLD AUTO: 1 % (ref 0–1)
BUN SERPL-MCNC: 17 MG/DL (ref 5–25)
CALCIUM SERPL-MCNC: 9.2 MG/DL (ref 8.3–10.1)
CHLORIDE SERPL-SCNC: 104 MMOL/L (ref 100–108)
CO2 SERPL-SCNC: 29 MMOL/L (ref 21–32)
CREAT SERPL-MCNC: 0.68 MG/DL (ref 0.6–1.3)
EOSINOPHIL # BLD AUTO: 0 THOUSAND/ΜL (ref 0–0.61)
EOSINOPHIL NFR BLD AUTO: 0 % (ref 0–6)
ERYTHROCYTE [DISTWIDTH] IN BLOOD BY AUTOMATED COUNT: 13.3 % (ref 11.6–15.1)
GFR SERPL CREATININE-BSD FRML MDRD: 90 ML/MIN/1.73SQ M
GLUCOSE SERPL-MCNC: 129 MG/DL (ref 65–140)
HCT VFR BLD AUTO: 39.3 % (ref 34.8–46.1)
HGB BLD-MCNC: 12 G/DL (ref 11.5–15.4)
IMM GRANULOCYTES # BLD AUTO: 0.01 THOUSAND/UL (ref 0–0.2)
IMM GRANULOCYTES NFR BLD AUTO: 0 % (ref 0–2)
L PNEUMO1 AG UR QL IA.RAPID: NEGATIVE
LYMPHOCYTES # BLD AUTO: 1.11 THOUSANDS/ΜL (ref 0.6–4.47)
LYMPHOCYTES NFR BLD AUTO: 21 % (ref 14–44)
MCH RBC QN AUTO: 28.8 PG (ref 26.8–34.3)
MCHC RBC AUTO-ENTMCNC: 30.5 G/DL (ref 31.4–37.4)
MCV RBC AUTO: 94 FL (ref 82–98)
MONOCYTES # BLD AUTO: 0.63 THOUSAND/ΜL (ref 0.17–1.22)
MONOCYTES NFR BLD AUTO: 12 % (ref 4–12)
NEUTROPHILS # BLD AUTO: 3.56 THOUSANDS/ΜL (ref 1.85–7.62)
NEUTS SEG NFR BLD AUTO: 66 % (ref 43–75)
NRBC BLD AUTO-RTO: 0 /100 WBCS
P AXIS: 37 DEGREES
PLATELET # BLD AUTO: 227 THOUSANDS/UL (ref 149–390)
PMV BLD AUTO: 10.8 FL (ref 8.9–12.7)
POTASSIUM SERPL-SCNC: 3.7 MMOL/L (ref 3.5–5.3)
PR INTERVAL: 118 MS
QRS AXIS: -12 DEGREES
QRSD INTERVAL: 86 MS
QT INTERVAL: 416 MS
QTC INTERVAL: 486 MS
RBC # BLD AUTO: 4.17 MILLION/UL (ref 3.81–5.12)
S PNEUM AG UR QL: NEGATIVE
SODIUM SERPL-SCNC: 142 MMOL/L (ref 136–145)
T WAVE AXIS: 268 DEGREES
VENTRICULAR RATE: 82 BPM
WBC # BLD AUTO: 5.34 THOUSAND/UL (ref 4.31–10.16)

## 2019-02-13 PROCEDURE — 94640 AIRWAY INHALATION TREATMENT: CPT

## 2019-02-13 PROCEDURE — 93010 ELECTROCARDIOGRAM REPORT: CPT | Performed by: INTERNAL MEDICINE

## 2019-02-13 PROCEDURE — 94760 N-INVAS EAR/PLS OXIMETRY 1: CPT

## 2019-02-13 PROCEDURE — 93306 TTE W/DOPPLER COMPLETE: CPT | Performed by: INTERNAL MEDICINE

## 2019-02-13 PROCEDURE — 99232 SBSQ HOSP IP/OBS MODERATE 35: CPT | Performed by: FAMILY MEDICINE

## 2019-02-13 PROCEDURE — 94669 MECHANICAL CHEST WALL OSCILL: CPT

## 2019-02-13 PROCEDURE — 94668 MNPJ CHEST WALL SBSQ: CPT

## 2019-02-13 PROCEDURE — 85025 COMPLETE CBC W/AUTO DIFF WBC: CPT | Performed by: STUDENT IN AN ORGANIZED HEALTH CARE EDUCATION/TRAINING PROGRAM

## 2019-02-13 PROCEDURE — 93306 TTE W/DOPPLER COMPLETE: CPT

## 2019-02-13 PROCEDURE — 80048 BASIC METABOLIC PNL TOTAL CA: CPT | Performed by: STUDENT IN AN ORGANIZED HEALTH CARE EDUCATION/TRAINING PROGRAM

## 2019-02-13 RX ADMIN — CEFTRIAXONE 1000 MG: 1 INJECTION, SOLUTION INTRAVENOUS at 20:02

## 2019-02-13 RX ADMIN — IPRATROPIUM BROMIDE AND ALBUTEROL SULFATE 3 ML: 2.5; .5 SOLUTION RESPIRATORY (INHALATION) at 07:05

## 2019-02-13 RX ADMIN — HEPARIN SODIUM 5000 UNITS: 5000 INJECTION, SOLUTION INTRAVENOUS; SUBCUTANEOUS at 21:51

## 2019-02-13 RX ADMIN — ASPIRIN 325 MG: 325 TABLET ORAL at 09:14

## 2019-02-13 RX ADMIN — HEPARIN SODIUM 5000 UNITS: 5000 INJECTION, SOLUTION INTRAVENOUS; SUBCUTANEOUS at 14:14

## 2019-02-13 RX ADMIN — OSELTAMIVIR PHOSPHATE 75 MG: 75 CAPSULE ORAL at 20:02

## 2019-02-13 RX ADMIN — HEPARIN SODIUM 5000 UNITS: 5000 INJECTION, SOLUTION INTRAVENOUS; SUBCUTANEOUS at 06:13

## 2019-02-13 RX ADMIN — IPRATROPIUM BROMIDE AND ALBUTEROL SULFATE 3 ML: 2.5; .5 SOLUTION RESPIRATORY (INHALATION) at 02:15

## 2019-02-13 RX ADMIN — AZITHROMYCIN 500 MG: 250 TABLET, FILM COATED ORAL at 20:02

## 2019-02-13 RX ADMIN — IPRATROPIUM BROMIDE AND ALBUTEROL SULFATE 3 ML: 2.5; .5 SOLUTION RESPIRATORY (INHALATION) at 14:01

## 2019-02-13 RX ADMIN — IPRATROPIUM BROMIDE AND ALBUTEROL SULFATE 3 ML: 2.5; .5 SOLUTION RESPIRATORY (INHALATION) at 20:41

## 2019-02-13 RX ADMIN — AMIODARONE HYDROCHLORIDE 100 MG: 200 TABLET ORAL at 09:14

## 2019-02-13 RX ADMIN — OLMESARTAN MEDOXOMIL 20 MG: 20 TABLET, FILM COATED ORAL at 14:56

## 2019-02-13 RX ADMIN — OSELTAMIVIR PHOSPHATE 75 MG: 75 CAPSULE ORAL at 09:14

## 2019-02-13 NOTE — H&P
History and Physical - [facility] Internal Medicine    Patient Information: Galileo Borja 76 y o  female MRN: [id number]  Unit/Bed#: ED 08 Encounter: [id number]  Admitting Physician: Jazmine Bobby MD  PCP: Alvaro Guerrier MD  Date of Admission:  02/12/19    Chief Complaint:     Sore throat, cough, shortness of breath   of Present Illness:    Galileo Borja is a 76 y o  female with a PMH of HTN, CVA and Asthma who presents with cough, sore throat and shortness of breath  She states that she was in her usual state of health until 2 days ago when she noted a sore throat  The following day she had a headache, myalgias and productive cough  This progressed to shortness of breath  Today her shortness of breath significantly worsened which prompted her to come to the ED where she was found to have multifocal pneumonia  She was given duonebs and placed on O2 via NC  Currently she reports feeling slightly better however still reports feeling short of breath  She denies any chest pain  She denies any recent hospitalizations or pneumonia  No other complaints or concerns  Review of Systems:    Review of Systems   Constitutional: Negative for fever  HENT: Positive for sore throat  Respiratory: Positive for cough and shortness of breath  Cardiovascular: Negative for chest pain and leg swelling  Gastrointestinal: Negative for abdominal pain  Genitourinary: Negative for hematuria  Musculoskeletal: Positive for arthralgias  Neurological: Negative for syncope  Psychiatric/Behavioral: Negative for confusion  Past Medical and Surgical History:     Past Medical History:   Diagnosis Date    Asthma     Diverticulitis     Hypertension     Stroke [facility])        Past Surgical History:   Procedure Laterality Date    CERVICAL FUSION         Meds/Allergies:    PTA meds:   Prior to Admission Medications   Prescriptions Last Dose Informant Patient Reported? Taking?    Cholecalciferol (VITAMIN D-3) 1000 units CAPS   Yes Yes   Sig: Take 5,000 Units by mouth daily   albuterol (VENTOLIN HFA) 90 mcg/act inhaler   Yes Yes   Sig: Inhale 1 puff every 4 (four) hours as needed   amiodarone 100 mg tablet   Yes Yes   Sig: Take 1 tablet by mouth daily   aspirin 325 mg tablet   Yes Yes   Sig: Take 325 mg by mouth daily   olmesartan (BENICAR) 20 mg tablet   Yes Yes   Sig: Take 1 tablet by mouth daily   ondansetron (ZOFRAN-ODT) 4 mg disintegrating tablet   No Yes   Sig: Take 1 tablet (4 mg total) by mouth every 8 (eight) hours as needed for nausea or vomiting for up to 10 doses   vitamin E, tocopherol, 400 units capsule   Yes Yes   Sig: Take 400 Units by mouth 2 (two) times a day      Facility-Administered Medications: None       Allergies: Allergies   Allergen Reactions    Lisinopril Other (See Comments)     Dizziness    Doxycycline Rash     History:     Marital Status: /Civil Union   Social History     Substance and Sexual Activity   Alcohol Use Yes    Comment: occasional     Social History     Tobacco Use   Smoking Status Former Smoker    Last attempt to quit: 2006    Years since quittin 1   Smokeless Tobacco Never Used     Social History     Substance and Sexual Activity   Drug Use No       Family History:    History reviewed  No pertinent family history        Physical Exam:     Vitals:   Blood Pressure: 152/69 (19)  Pulse: 78 (19)  Temperature: 99 1 °F (37 3 °C) (19)  Respirations: 20 (19)  Height: 5' 4" (162 6 cm) (19)  Weight - Scale: 104 kg (229 lb 4 5 oz) (19)  SpO2: 96 % (19)    Physical Exam:   General: in no acute distress  HEENT: atraumatic, normocephalic  Skin: no jaundice  CVS: RRR, no murmurs appreciated  Lungs: rhonchi with scattered wheezing bilaterally  Abdomen: soft, nondistended, bowel sounds normal, nontender upon palpation  Extremities: no edema, no calf swelling or tenderness  Neuro: alert and oriented x3  Psych: calm, cooperative      Lab Results: I have personally reviewed pertinent reports  Results from last 7 days   Lab Units 02/12/19  1825   WBC Thousand/uL 6 36   HEMOGLOBIN g/dL 12 9   HEMATOCRIT % 42 0   PLATELETS Thousands/uL 241   NEUTROS PCT % 82*   LYMPHS PCT % 9*   MONOS PCT % 8   EOS PCT % 0     Results from last 7 days   Lab Units 02/12/19  1825   POTASSIUM mmol/L 4 5   CHLORIDE mmol/L 103   CO2 mmol/L 28   BUN mg/dL 15   CREATININE mg/dL 0 61   CALCIUM mg/dL 9 2   ALK PHOS U/L 72   ALT U/L 44   AST U/L 41     Results from last 7 days   Lab Units 02/12/19  1825   INR  1 02       Imaging:     Xr Chest 2 Views    Result Date: 2/12/2019  Narrative: CHEST INDICATION: Shortness of breath, chest pain COMPARISON:  None EXAM PERFORMED/VIEWS:  XR CHEST PA & LATERAL FINDINGS: Cardiomediastinal silhouette appears unremarkable  Multifocal lung opacities suggesting multifocal pneumonia  No pneumothorax or pleural effusion  Osseous structures appear within normal limits for patient age  Impression: Multifocal lung opacities suggesting multifocal pneumonia  Please refer to CT  Workstation performed: [id number]     Cta Ed Chest Pe Study    Result Date: 2/12/2019  Narrative: CTA - CHEST WITH IV CONTRAST - PULMONARY ANGIOGRAM INDICATION:   Chest pain, shortness of breath  COMPARISON: None  TECHNIQUE: CTA examination of the chest was performed using angiographic technique according to a protocol specifically tailored to evaluate for pulmonary embolism  Axial, sagittal, and coronal 2D reformatted images were created from the source data and  submitted for interpretation  In addition, coronal 3D MIP postprocessing was performed on the acquisition scanner  Radiation dose length product (DLP) for this visit:  767 mGy-cm     This examination, like all CT scans performed in the [facility], was performed utilizing techniques to minimize radiation dose exposure, including the use of iterative reconstruction and automated exposure control  IV Contrast:  85 mL of iohexol (OMNIPAQUE)  FINDINGS: PULMONARY ARTERIAL TREE:  No pulmonary embolus is seen  LUNGS:  Patchy lung opacities identified in the bilateral upper lobe, right middle lobe and right lower lobe and lingular and a nodular 1 cm opacity in the left upper lobe  These findings could relate to multifocal pneumonia  However, posttreatment follow-up to radiographic resolution with unenhanced chest CT is recommended in 2 months to ensure resolution and exclude other etiologies  PLEURA:  Unremarkable  HEART/GREAT VESSELS:  Small pericardial effusion  MEDIASTINUM AND FELIPE: Mediastinal and bilateral hilar adenopathy could be reactive to the pneumonia  CHEST WALL AND LOWER NECK:   Unremarkable  VISUALIZED STRUCTURES IN THE UPPER ABDOMEN:  Bilateral adrenal gland thickening /adrenal nodules  possibly adenomas OSSEOUS STRUCTURES:  No acute fracture or destructive osseous lesion  Impression: Multifocal lung opacities could represent multifocal pneumonia  However, posttreatment follow-up to radiographic resolution with unenhanced chest CT is recommended in 2 months to ensure resolution and exclude other etiologies  Mediastinal and hilar adenopathy could be reactive to the pneumonia  Small pericardial effusion  Bilateral adrenal lesions, possibly adenomas  Workstation performed: [id number]         Assessment/Plan    * Multifocal pneumonia  Assessment & Plan  CT notes multifocal pneumonia  No hx of recent hospitalization within 90 days so will treat as CAP  She was given a dose of Ceftriaxone and Azithromycin in the ED  Will resume for now, check urine strep/legionella, test for Influenza and start on Duonebs and Albuterol prn     Asthma  Assessment & Plan  Reports following with a Pulmonologist several years ago  Denies any hx of repeated exacerbations  Will resume management outlined above    Can consider outpt follow up with Pulmonology     Pericardial effusion  Assessment & Plan  Will order TTE for further assessment     CVA (cere[facility] vascular accident[facility])  Assessment & Plan  Continue Asprin     Adrenal adenoma  Assessment & Plan  Discussed finding with pt and family  Will require follow up imaging     History Abnormal heart rhythm  Assessment & Plan  Pt has been on Amiodarone for several years but denies any formal dx of Atrial Fibrillation  She was at one point being followed by a Cardiologist years ago and reports a hx of "abnormal heart rhythm"  Will monitor on telemetry and resume Amiodarone  CT also picked up a small pericardial effusion therefore will order TTE  Can consider inpatient Cardiology evaluation if any abnormality noted but certainly she should follow up upon discharge    HTN (hypertension)  Assessment & Plan  Continue [facility]      Hospital Problem List:     Principal Problem:    Multifocal pneumonia  Active Problems:    Asthma    Pericardial effusion    HTN (hypertension)    History Abnormal heart rhythm    Adrenal adenoma    CVA (cerebral vascular accident) ([facility] [address] )        VTE Prophylaxis: Heparin   Code Status: No Order    Anticipated Length of Stay:  Patient will be admitted on an Inpatient basis with an anticipated length of stay of atleast 2 midnights  Total Time for Visit, including Counseling / Coordination of Care: 30 minutes  Greater than 50% of this total time spent on direct patient counseling and coordination of care

## 2019-02-13 NOTE — ASSESSMENT & PLAN NOTE
CT showed multifocal pneumonia - likely viral     IV Ceftriaxone and Azithromycin was discontinued  urine strep/legionella neg     blood culture 1/2 growing coag-negative staph, likely contamination   procalcitonin level X 2 negative     She will need repeat CT chest in 2 months to make sure pneumonia has resolved

## 2019-02-13 NOTE — SOCIAL WORK
Met with pt to screen  Pt is independent, resides with   Explained role of cm, pt denies any d/c needs

## 2019-02-13 NOTE — ASSESSMENT & PLAN NOTE
Pt has been on Amiodarone for several years but denies any formal diagnosis of Atrial Fibrillation  She was following up with a Cardiologist years ago and reports history of "abnormal heart rhythm"      Continue Amiodarone  She will need follow up with Cardiology after discharge

## 2019-02-13 NOTE — PLAN OF CARE
Problem: Potential for Falls  Goal: Patient will remain free of falls  Description  INTERVENTIONS:  - Assess patient frequently for physical needs  -  Identify cognitive and physical deficits and behaviors that affect risk of falls  -  [place] fall precautions as indicated by assessment   - Educate patient/family on patient safety including physical limitations  - Instruct patient to call for assistance with activity based on assessment  - Modify environment to reduce risk of injury  - Consider OT/PT consult to assist with strengthening/mobility  Outcome: Progressing     Problem: INFECTION - ADULT  Goal: Absence or prevention of progression during hospitalization  Description  INTERVENTIONS:  - Assess and monitor for signs and symptoms of infection  - Monitor lab/diagnostic results  - Administer medications as ordered  - Instruct and encourage patient and family to use good hand hygiene technique  - Identify and instruct in appropriate isolation precautions for identified infection/condition   Outcome: Progressing     Problem: DISCHARGE PLANNING  Goal: Discharge to home or other facility with appropriate resources  Description  INTERVENTIONS:  - Identify barriers to discharge w/patient and caregiver  - Arrange for needed discharge resources and transportation as appropriate  - Identify discharge learning needs (meds)  - Refer to Case Management Department for coordinating discharge planning if the patient needs post-hospital services based on physician/advanced practitioner order or complex needs related to functional status, cognitive ability, or social support system   Outcome: Progressing     Problem: Knowledge Deficit  Goal: Patient/family/caregiver demonstrates understanding of disease process, treatment plan, medications, and discharge instructions  Description  Complete learning assessment and assess knowledge base    Interventions:  - Provide teaching at level of understanding  - Provide teaching via preferred learning methods  Outcome: Progressing     Problem: CARDIOVASCULAR - ADULT  Goal: Maintains optimal cardiac output and hemodynamic stability  Description  INTERVENTIONS:  - Monitor I/O, vital signs and rhythm  - Monitor for S/S and trends of decreased cardiac output i e  bleeding, hypotension  - Administer and titrate ordered vasoactive medications to optimize hemodynamic stability  - Assess quality of pulses, skin color and temperature  - Assess for signs of decreased coronary artery perfusion - ex   Angina  - Instruct patient to report change in severity of symptoms  Outcome: Progressing  Goal: Absence of cardiac dysrhythmias or at baseline rhythm  Description  INTERVENTIONS:  - Continuous cardiac monitoring, monitor vital signs, obtain 12 lead EKG if indicated  - Administer antiarrhythmic and heart rate control medications as ordered  - Monitor electrolytes and administer replacement therapy as ordered  Outcome: Progressing     Problem: PAIN - ADULT  Goal: Verbalizes/displays adequate comfort level or baseline comfort level  Description  Interventions:  - Encourage patient to monitor pain and request assistance  - Assess pain using appropriate pain scale 0-10  - Administer analgesics based on type and severity of pain and evaluate response  - Implement non-pharmacological measures as appropriate and evaluate response  - Consider cultural and social influences on pain and pain management  - Notify physician/advanced practitioner if interventions unsuccessful or patient reports new pain   Outcome: Progressing     Problem: RESPIRATORY - ADULT  Goal: Achieves optimal ventilation and oxygenation  Description  INTERVENTIONS:  - Assess for changes in respiratory status  - Assess for changes in mentation and behavior  - Position to facilitate oxygenation and minimize respiratory effort  - Oxygen administration by appropriate delivery method based on oxygen saturation (per order) or ABGs  - Initiate smoking cessation education as indicated  - Encourage broncho-pulmonary hygiene including cough, deep breathe, Incentive Spirometry  - Assess the need for suctioning and aspirate as needed  - Assess and instruct to report SOB or any respiratory difficulty  - Respiratory Therapy support as indicated  - Nebs Q6  - OPEP   Outcome: Progressing

## 2019-02-13 NOTE — ASSESSMENT & PLAN NOTE
TTE showed EF of 45% with grade 1 diastolic dysfunction    Small pericardial effusion was noted without any evidence of tamponade  Patient advised to follow up with Cardiology after discharge

## 2019-02-13 NOTE — PLAN OF CARE
Problem: Potential for Falls  Goal: Patient will remain free of falls  Description  INTERVENTIONS:  - Assess patient frequently for physical needs  -  Identify cognitive and physical deficits and behaviors that affect risk of falls  -  [place] fall precautions as indicated by assessment   - Educate patient/family on patient safety including physical limitations  - Instruct patient to call for assistance with activity based on assessment  - Modify environment to reduce risk of injury  - Consider OT/PT consult to assist with strengthening/mobility  Outcome: Progressing     Problem: INFECTION - ADULT  Goal: Absence or prevention of progression during hospitalization  Description  INTERVENTIONS:  - Assess and monitor for signs and symptoms of infection  - Monitor lab/diagnostic results  - Administer medications as ordered  - Instruct and encourage patient and family to use good hand hygiene technique  - Identify and instruct in appropriate isolation precautions for identified infection/condition   Outcome: Progressing     Problem: DISCHARGE PLANNING  Goal: Discharge to home or other facility with appropriate resources  Description  INTERVENTIONS:  - Identify barriers to discharge w/patient and caregiver  - Arrange for needed discharge resources and transportation as appropriate  - Identify discharge learning needs (meds)  - Refer to Case Management Department for coordinating discharge planning if the patient needs post-hospital services based on physician/advanced practitioner order or complex needs related to functional status, cognitive ability, or social support system   Outcome: Progressing     Problem: Knowledge Deficit  Goal: Patient/family/caregiver demonstrates understanding of disease process, treatment plan, medications, and discharge instructions  Description  Complete learning assessment and assess knowledge base    Interventions:  - Provide teaching at level of understanding  - Provide teaching via preferred learning methods  Outcome: Progressing     Problem: CARDIOVASCULAR - ADULT  Goal: Maintains optimal cardiac output and hemodynamic stability  Description  INTERVENTIONS:  - Monitor I/O, vital signs and rhythm  - Monitor for S/S and trends of decreased cardiac output i e  bleeding, hypotension  - Administer and titrate ordered vasoactive medications to optimize hemodynamic stability  - Assess quality of pulses, skin color and temperature  - Assess for signs of decreased coronary artery perfusion - ex   Angina  - Instruct patient to report change in severity of symptoms  Outcome: Progressing  Goal: Absence of cardiac dysrhythmias or at baseline rhythm  Description  INTERVENTIONS:  - Continuous cardiac monitoring, monitor vital signs, obtain 12 lead EKG if indicated  - Administer antiarrhythmic and heart rate control medications as ordered  - Monitor electrolytes and administer replacement therapy as ordered  Outcome: Progressing

## 2019-02-13 NOTE — ASSESSMENT & PLAN NOTE
Denies any hx of repeated exacerbations  She was on IV Solu-Medrol here and transitioned to prednisone taper on discharge  Continue with nebulizer treatments  Nebulizer machine provided on discharge    She Can follow-up outpt with Pulmonology
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
## 2019-02-14 LAB
ANION GAP SERPL CALCULATED.3IONS-SCNC: 5 MMOL/L (ref 4–13)
BUN SERPL-MCNC: 11 MG/DL (ref 5–25)
CALCIUM SERPL-MCNC: 9.4 MG/DL (ref 8.3–10.1)
CHLORIDE SERPL-SCNC: 104 MMOL/L (ref 100–108)
CO2 SERPL-SCNC: 33 MMOL/L (ref 21–32)
CREAT SERPL-MCNC: 0.53 MG/DL (ref 0.6–1.3)
ERYTHROCYTE [DISTWIDTH] IN BLOOD BY AUTOMATED COUNT: 13.1 % (ref 11.6–15.1)
GFR SERPL CREATININE-BSD FRML MDRD: 98 ML/MIN/1.73SQ M
GLUCOSE SERPL-MCNC: 135 MG/DL (ref 65–140)
HCT VFR BLD AUTO: 38.2 % (ref 34.8–46.1)
HGB BLD-MCNC: 11.7 G/DL (ref 11.5–15.4)
MCH RBC QN AUTO: 28.5 PG (ref 26.8–34.3)
MCHC RBC AUTO-ENTMCNC: 30.6 G/DL (ref 31.4–37.4)
MCV RBC AUTO: 93 FL (ref 82–98)
MRSA NOSE QL CULT: NORMAL
PLATELET # BLD AUTO: 246 THOUSANDS/UL (ref 149–390)
PMV BLD AUTO: 10.7 FL (ref 8.9–12.7)
POTASSIUM SERPL-SCNC: 3.4 MMOL/L (ref 3.5–5.3)
PROCALCITONIN SERPL-MCNC: <0.05 NG/ML
RBC # BLD AUTO: 4.1 MILLION/UL (ref 3.81–5.12)
SODIUM SERPL-SCNC: 142 MMOL/L (ref 136–145)
WBC # BLD AUTO: 4.19 THOUSAND/UL (ref 4.31–10.16)

## 2019-02-14 PROCEDURE — 80048 BASIC METABOLIC PNL TOTAL CA: CPT | Performed by: FAMILY MEDICINE

## 2019-02-14 PROCEDURE — 94760 N-INVAS EAR/PLS OXIMETRY 1: CPT

## 2019-02-14 PROCEDURE — 84145 PROCALCITONIN (PCT): CPT | Performed by: FAMILY MEDICINE

## 2019-02-14 PROCEDURE — 85027 COMPLETE CBC AUTOMATED: CPT | Performed by: FAMILY MEDICINE

## 2019-02-14 PROCEDURE — 94640 AIRWAY INHALATION TREATMENT: CPT

## 2019-02-14 PROCEDURE — 99232 SBSQ HOSP IP/OBS MODERATE 35: CPT | Performed by: FAMILY MEDICINE

## 2019-02-14 PROCEDURE — 94668 MNPJ CHEST WALL SBSQ: CPT

## 2019-02-14 RX ORDER — POTASSIUM CHLORIDE 20 MEQ/1
40 TABLET, EXTENDED RELEASE ORAL ONCE
Status: COMPLETED | OUTPATIENT
Start: 2019-02-14 | End: 2019-02-14

## 2019-02-14 RX ORDER — ACETAMINOPHEN 325 MG/1
650 TABLET ORAL EVERY 6 HOURS PRN
Status: DISCONTINUED | OUTPATIENT
Start: 2019-02-14 | End: 2019-02-16 | Stop reason: HOSPADM

## 2019-02-14 RX ORDER — METHYLPREDNISOLONE SODIUM SUCCINATE 40 MG/ML
40 INJECTION, POWDER, LYOPHILIZED, FOR SOLUTION INTRAMUSCULAR; INTRAVENOUS EVERY 12 HOURS SCHEDULED
Status: DISCONTINUED | OUTPATIENT
Start: 2019-02-14 | End: 2019-02-15

## 2019-02-14 RX ADMIN — CEFTRIAXONE 1000 MG: 1 INJECTION, SOLUTION INTRAVENOUS at 19:56

## 2019-02-14 RX ADMIN — METHYLPREDNISOLONE SODIUM SUCCINATE 40 MG: 40 INJECTION, POWDER, FOR SOLUTION INTRAMUSCULAR; INTRAVENOUS at 20:00

## 2019-02-14 RX ADMIN — HEPARIN SODIUM 5000 UNITS: 5000 INJECTION, SOLUTION INTRAVENOUS; SUBCUTANEOUS at 06:47

## 2019-02-14 RX ADMIN — METHYLPREDNISOLONE SODIUM SUCCINATE 40 MG: 40 INJECTION, POWDER, FOR SOLUTION INTRAMUSCULAR; INTRAVENOUS at 14:54

## 2019-02-14 RX ADMIN — OSELTAMIVIR PHOSPHATE 75 MG: 75 CAPSULE ORAL at 08:37

## 2019-02-14 RX ADMIN — IPRATROPIUM BROMIDE AND ALBUTEROL SULFATE 3 ML: 2.5; .5 SOLUTION RESPIRATORY (INHALATION) at 14:24

## 2019-02-14 RX ADMIN — POTASSIUM CHLORIDE 40 MEQ: 1500 TABLET, EXTENDED RELEASE ORAL at 10:58

## 2019-02-14 RX ADMIN — AMIODARONE HYDROCHLORIDE 100 MG: 200 TABLET ORAL at 08:37

## 2019-02-14 RX ADMIN — ASPIRIN 325 MG: 325 TABLET ORAL at 08:37

## 2019-02-14 RX ADMIN — HEPARIN SODIUM 5000 UNITS: 5000 INJECTION, SOLUTION INTRAVENOUS; SUBCUTANEOUS at 21:01

## 2019-02-14 RX ADMIN — AZITHROMYCIN 500 MG: 250 TABLET, FILM COATED ORAL at 19:56

## 2019-02-14 RX ADMIN — OSELTAMIVIR PHOSPHATE 75 MG: 75 CAPSULE ORAL at 20:00

## 2019-02-14 RX ADMIN — HEPARIN SODIUM 5000 UNITS: 5000 INJECTION, SOLUTION INTRAVENOUS; SUBCUTANEOUS at 15:00

## 2019-02-14 RX ADMIN — ACETAMINOPHEN 650 MG: 325 TABLET, FILM COATED ORAL at 21:19

## 2019-02-14 RX ADMIN — OLMESARTAN MEDOXOMIL 20 MG: 20 TABLET, FILM COATED ORAL at 08:37

## 2019-02-14 RX ADMIN — IPRATROPIUM BROMIDE AND ALBUTEROL SULFATE 3 ML: 2.5; .5 SOLUTION RESPIRATORY (INHALATION) at 07:16

## 2019-02-14 RX ADMIN — IPRATROPIUM BROMIDE AND ALBUTEROL SULFATE 3 ML: 2.5; .5 SOLUTION RESPIRATORY (INHALATION) at 19:12

## 2019-02-14 RX ADMIN — IPRATROPIUM BROMIDE AND ALBUTEROL SULFATE 3 ML: 2.5; .5 SOLUTION RESPIRATORY (INHALATION) at 02:29

## 2019-02-14 NOTE — PLAN OF CARE
Problem: Potential for Falls  Goal: Patient will remain free of falls  Description  INTERVENTIONS:  - Assess patient frequently for physical needs  -  Identify cognitive and physical deficits and behaviors that affect risk of falls  -  [place] fall precautions as indicated by assessment   - Educate patient/family on patient safety including physical limitations  - Instruct patient to call for assistance with activity based on assessment  - Modify environment to reduce risk of injury  - Consider OT/PT consult to assist with strengthening/mobility  Outcome: Progressing     Problem: INFECTION - ADULT  Goal: Absence or prevention of progression during hospitalization  Description  INTERVENTIONS:  - Assess and monitor for signs and symptoms of infection  - Monitor lab/diagnostic results  - Administer medications as ordered  - Instruct and encourage patient and family to use good hand hygiene technique  - Identify and instruct in appropriate isolation precautions for identified infection/condition   Outcome: Progressing     Problem: DISCHARGE PLANNING  Goal: Discharge to home or other facility with appropriate resources  Description  INTERVENTIONS:  - Identify barriers to discharge w/patient and caregiver  - Arrange for needed discharge resources and transportation as appropriate  - Identify discharge learning needs (meds)  - Refer to Case Management Department for coordinating discharge planning if the patient needs post-hospital services based on physician/advanced practitioner order or complex needs related to functional status, cognitive ability, or social support system   Outcome: Progressing     Problem: Knowledge Deficit  Goal: Patient/family/caregiver demonstrates understanding of disease process, treatment plan, medications, and discharge instructions  Description  Complete learning assessment and assess knowledge base    Interventions:  - Provide teaching at level of understanding  - Provide teaching via preferred learning methods  Outcome: Progressing     Problem: CARDIOVASCULAR - ADULT  Goal: Maintains optimal cardiac output and hemodynamic stability  Description  INTERVENTIONS:  - Monitor I/O, vital signs and rhythm  - Monitor for S/S and trends of decreased cardiac output i e  bleeding, hypotension  - Administer and titrate ordered vasoactive medications to optimize hemodynamic stability  - Assess quality of pulses, skin color and temperature  - Assess for signs of decreased coronary artery perfusion - ex   Angina  - Instruct patient to report change in severity of symptoms  Outcome: Progressing  Goal: Absence of cardiac dysrhythmias or at baseline rhythm  Description  INTERVENTIONS:  - Continuous cardiac monitoring, monitor vital signs, obtain 12 lead EKG if indicated  - Administer antiarrhythmic and heart rate control medications as ordered  - Monitor electrolytes and administer replacement therapy as ordered  Outcome: Progressing     Problem: PAIN - ADULT  Goal: Verbalizes/displays adequate comfort level or baseline comfort level  Description  Interventions:  - Encourage patient to monitor pain and request assistance  - Assess pain using appropriate pain scale 0-10  - Administer analgesics based on type and severity of pain and evaluate response  - Implement non-pharmacological measures as appropriate and evaluate response  - Consider cultural and social influences on pain and pain management  - Notify physician/advanced practitioner if interventions unsuccessful or patient reports new pain   Outcome: Progressing     Problem: RESPIRATORY - ADULT  Goal: Achieves optimal ventilation and oxygenation  Description  INTERVENTIONS:  - Assess for changes in respiratory status  - Assess for changes in mentation and behavior  - Position to facilitate oxygenation and minimize respiratory effort  - Oxygen administration by appropriate delivery method based on oxygen saturation (per order) or ABGs  - Initiate smoking cessation education as indicated  - Encourage broncho-pulmonary hygiene including cough, deep breathe, Incentive Spirometry  - Assess the need for suctioning and aspirate as needed  - Assess and instruct to report SOB or any respiratory difficulty  - Respiratory Therapy support as indicated  - Nebs Q6  - OPEP   Outcome: Progressing

## 2019-02-14 NOTE — PROGRESS NOTES
[facility] Progress Note  Patient: Lisset Luna 76 y o  female   MRN: [id number]  PCP: Shai Silva MD  Unit/Bed#: [address] [place] [address] Encounter: [id number]  Date Of Visit: 02/13/19    Problem List:    Principal Problem:    Multifocal pneumonia  Active Problems:    Influenza A    Essential hypertension    Asthma    History Abnormal heart rhythm    Adrenal adenoma    Pericardial effusion    CVA (cerebral vascular accident) [facility])      Assessment & Plan:    * Multifocal pneumonia  Assessment & Plan  CT showed multifocal pneumonia  No hx of recent hospitalization within 90 days so being treated at [facility]  She was given a dose of Ceftriaxone and Azithromycin which is being continued  urine strep/legionella neg  Follow-up on pending blood cultures  Check procalcitonin level in a m  Check sputum culture    Influenza A  Assessment & Plan  Started on Tamiflu    CVA (cerebral vascular accident) [facility])  Assessment & Plan  Continue Asprin  Pericardial effusion  Assessment & Plan  TTE showed EF of 45% with grade 1 diastolic dysfunction  Small pericardial effusion was noted without any evidence of tamponade    Adrenal adenoma  Assessment & Plan  Patient aware of findings  She will follow up with her PMD regarding this    History Abnormal heart rhythm  Assessment & Plan  Pt has been on Amiodarone for several years but denies any formal diagnosis of Atrial Fibrillation  She was following up with a Cardiologist years ago and reports history of "abnormal heart rhythm"  Continue Amiodarone  CT also picked up a small pericardial effusion   Echo showed EF of 45% with grade 1 diastolic dysfunction  Small pericardial effusion was noted without any evidence of tamponade  She will need follow up with Cardiology after discharge    Asthma  Assessment & Plan  Reports following with a Pulmonologist several years ago  Denies any hx of repeated exacerbations  Continue with nebulizer treatments    Can consider outpt follow up with Pulmonology     Essential hypertension  Assessment & Plan  On Benicar        VTE Pharmacologic Prophylaxis:   Pharmacologic: Heparin  Mechanical VTE Prophylaxis in Place: No    Patient Centered Rounds: I have performed bedside rounds with nursing staff today  Discussions with Specialists or Other Care Team Provider: Yes    Education and Discussions with Family / Patient:Yes    Time Spent for Care: 30 minutes  More than 50% of total time spent on counseling and coordination of care as described above  Current Length of Stay: 1 day(s)    Current Patient Status: Inpatient     Discharge Plan: home    Code Status: Level 1 - Full Code    Certification Statement: The patient will continue to require additional inpatient hospital stay due to Influenza a infection, pneumonia      Subjective:   States she still feels like "crap" but better compared to yesterday    Objective:     Vitals:   Temp (24hrs), Av 6 °F (37 °C), Min:98 4 °F (36 9 °C), Max:98 8 °F (37 1 °C)    Temp:  [98 4 °F (36 9 °C)-98 8 °F (37 1 °C)] 98 8 °F (37 1 °C)  HR:  [73-81] 73  Resp:  [18-20] 18  BP: (137-162)/(65-74) 160/70  SpO2:  [95 %-99 %] 96 %  Body mass index is 37 03 kg/m²  Input and Output Summary (last 24 hours): Intake/Output Summary (Last 24 hours) at 2019 2111  Last data filed at 2019 1433  Gross per 24 hour   Intake    Output 800 ml   Net -800 ml       Physical Exam:     Physical Exam   Constitutional: She is oriented to person, place, and time  She appears well-developed and well-nourished  No distress  HENT:   Head: Normocephalic and atraumatic  Eyes: EOM are normal  Right eye exhibits no discharge  Left eye exhibits no discharge  No scleral icterus  Neck: Neck supple  Cardiovascular: Normal rate and regular rhythm  Pulmonary/Chest: Effort normal  No respiratory distress  She has no wheezes  She has no rales  Decreased breath sounds bilaterally  Rhonchi noted   Abdominal: Soft  Bowel sounds are normal  She exhibits no distension  There is no tenderness  Musculoskeletal: She exhibits no edema  Neurological: She is alert and oriented to person, place, and time  No cranial nerve deficit  Skin: Skin is dry  She is not diaphoretic  Additional Data:     Labs:    Results from last 7 days   Lab Units 02/13/19  0616   WBC Thousand/uL 5 34   HEMOGLOBIN g/dL 12 0   HEMATOCRIT % 39 3   PLATELETS Thousands/uL 227   NEUTROS PCT % 66   LYMPHS PCT % 21   MONOS PCT % 12   EOS PCT % 0     Results from last 7 days   Lab Units 02/13/19  0616 02/12/19  1825   POTASSIUM mmol/L 3 7 4 5   CHLORIDE mmol/L 104 103   CO2 mmol/L 29 28   BUN mg/dL 17 15   CREATININE mg/dL 0 68 0 61   CALCIUM mg/dL 9 2 9 2   ALK PHOS U/L  --  72   ALT U/L  --  44   AST U/L  --  41     Results from last 7 days   Lab Units 02/12/19  1825   INR  1 02       * I Have Reviewed All Lab Data Listed Above  * Additional Pertinent Lab Tests Reviewed: All Labs For Current Hospital Admission Reviewed    Imaging:  Xr Chest 2 Views    Result Date: 2/12/2019  Narrative: CHEST INDICATION: Shortness of breath, chest pain COMPARISON:  None EXAM PERFORMED/VIEWS:  XR CHEST PA & LATERAL FINDINGS: Cardiomediastinal silhouette appears unremarkable  Multifocal lung opacities suggesting multifocal pneumonia  No pneumothorax or pleural effusion  Osseous structures appear within normal limits for patient age  Impression: Multifocal lung opacities suggesting multifocal pneumonia  Please refer to CT  Workstation performed: [id number]     Cta Ed Chest Pe Study    Result Date: 2/12/2019  Narrative: CTA - CHEST WITH IV CONTRAST - PULMONARY ANGIOGRAM INDICATION:   Chest pain, shortness of breath  COMPARISON: None  TECHNIQUE: CTA examination of the chest was performed using angiographic technique according to a protocol specifically tailored to evaluate for pulmonary embolism    Axial, sagittal, and coronal 2D reformatted images were created from the source data and  submitted for interpretation  In addition, coronal 3D MIP postprocessing was performed on the acquisition scanner  Radiation dose length product (DLP) for this visit:  767 mGy-cm   This examination, like all CT scans performed in the [facility], was performed utilizing techniques to minimize radiation dose exposure, including the use of iterative reconstruction and automated exposure control  IV Contrast:  85 mL of iohexol (OMNIPAQUE)  FINDINGS: PULMONARY ARTERIAL TREE:  No pulmonary embolus is seen  LUNGS:  Patchy lung opacities identified in the bilateral upper lobe, right middle lobe and right lower lobe and lingular and a nodular 1 cm opacity in the left upper lobe  These findings could relate to multifocal pneumonia  However, posttreatment follow-up to radiographic resolution with unenhanced chest CT is recommended in 2 months to ensure resolution and exclude other etiologies  PLEURA:  Unremarkable  HEART/GREAT VESSELS:  Small pericardial effusion  MEDIASTINUM AND FELIPE: Mediastinal and bilateral hilar adenopathy could be reactive to the pneumonia  CHEST WALL AND LOWER NECK:   Unremarkable  VISUALIZED STRUCTURES IN THE UPPER ABDOMEN:  Bilateral adrenal gland thickening /adrenal nodules  possibly adenomas OSSEOUS STRUCTURES:  No acute fracture or destructive osseous lesion  Impression: Multifocal lung opacities could represent multifocal pneumonia  However, posttreatment follow-up to radiographic resolution with unenhanced chest CT is recommended in 2 months to ensure resolution and exclude other etiologies  Mediastinal and hilar adenopathy could be reactive to the pneumonia  Small pericardial effusion  Bilateral adrenal lesions, possibly adenomas   Workstation performed: [id number]     Imaging Reports Reviewed by myself    Cultures:   Blood Culture: No results found for: BLOODCX  Urine Culture: No results found for: URINECX  Sputum Culture: No components found for: SPUTUMCX  Wound Culture: No results found for: WOUNDCULT    Last 24 Hours Medication List:     Current Facility-Administered Medications:  albuterol 2 puff Inhalation Q4H PRN Thierno Anaya MD    amiodarone 100 mg Oral Daily Thierno Anaya MD    aspirin 325 mg Oral Daily Thierno Anaya MD    azithromycin 500 mg Oral Q24H Thierno Anaya MD    cefTRIAXone 1,000 mg Intravenous Q24H Thierno Anaya MD Last Rate: 1,000 mg (02/13/19 2002)   heparin (porcine) 5,000 Units Subcutaneous Q8H Alexis Hare MD    ipratropium-albuterol 3 mL Nebulization Alexus Jimenez MD    olmesartan 20 mg Oral Daily Thierno Anaya MD    oseltamivir 75 mg Oral Q12H Alexis Hare MD         Today, Patient Was Seen By: Oral Knox DO    ** Please Note: "This note has been constructed using a voice recognition system  Therefore there may be syntax, spelling, and/or grammatical errors   Please call if you have any questions  "**

## 2019-02-15 PROBLEM — J45.901 ASTHMA WITH ACUTE EXACERBATION: Status: ACTIVE | Noted: 2019-02-12

## 2019-02-15 LAB
ADENOVIRUS: NOT DETECTED
ANION GAP SERPL CALCULATED.3IONS-SCNC: 4 MMOL/L (ref 4–13)
BUN SERPL-MCNC: 15 MG/DL (ref 5–25)
C PNEUM DNA SPEC QL NAA+PROBE: NOT DETECTED
CALCIUM SERPL-MCNC: 9.8 MG/DL (ref 8.3–10.1)
CHLORIDE SERPL-SCNC: 105 MMOL/L (ref 100–108)
CO2 SERPL-SCNC: 34 MMOL/L (ref 21–32)
CREAT SERPL-MCNC: 0.64 MG/DL (ref 0.6–1.3)
FLUAV H1 RNA SPEC QL NAA+PROBE: NOT DETECTED
FLUAV H3 RNA SPEC QL NAA+PROBE: DETECTED
FLUAV RNA SPEC QL NAA+PROBE: NOT DETECTED
FLUBV RNA SPEC QL NAA+PROBE: NOT DETECTED
GFR SERPL CREATININE-BSD FRML MDRD: 92 ML/MIN/1.73SQ M
GLUCOSE SERPL-MCNC: 189 MG/DL (ref 65–140)
HBOV DNA SPEC QL NAA+PROBE: NOT DETECTED
HCOV 229E RNA SPEC QL NAA+PROBE: NOT DETECTED
HCOV HKU1 RNA SPEC QL NAA+PROBE: NOT DETECTED
HCOV NL63 RNA SPEC QL NAA+PROBE: NOT DETECTED
HCOV OC43 RNA SPEC QL NAA+PROBE: NOT DETECTED
HPIV1 RNA SPEC QL NAA+PROBE: NOT DETECTED
HPIV2 RNA SPEC QL NAA+PROBE: NOT DETECTED
HPIV3 RNA SPEC QL NAA+PROBE: NOT DETECTED
HPIV4 RNA SPEC QL NAA+PROBE: NOT DETECTED
M PNEUMO DNA SPEC QL NAA+PROBE: NOT DETECTED
MAGNESIUM SERPL-MCNC: 1.7 MG/DL (ref 1.6–2.6)
METAPNEUMOVIRUS: NOT DETECTED
POTASSIUM SERPL-SCNC: 4.2 MMOL/L (ref 3.5–5.3)
PROCALCITONIN SERPL-MCNC: <0.05 NG/ML
RHINOVIRUS RNA SPEC QL NAA+PROBE: NOT DETECTED
RSV A RNA SPEC QL NAA+PROBE: NOT DETECTED
RSV B RNA SPEC QL NAA+PROBE: NOT DETECTED
SODIUM SERPL-SCNC: 143 MMOL/L (ref 136–145)

## 2019-02-15 PROCEDURE — 83735 ASSAY OF MAGNESIUM: CPT | Performed by: FAMILY MEDICINE

## 2019-02-15 PROCEDURE — 84145 PROCALCITONIN (PCT): CPT | Performed by: FAMILY MEDICINE

## 2019-02-15 PROCEDURE — 80048 BASIC METABOLIC PNL TOTAL CA: CPT | Performed by: FAMILY MEDICINE

## 2019-02-15 PROCEDURE — 94640 AIRWAY INHALATION TREATMENT: CPT

## 2019-02-15 PROCEDURE — 99232 SBSQ HOSP IP/OBS MODERATE 35: CPT | Performed by: FAMILY MEDICINE

## 2019-02-15 PROCEDURE — 94760 N-INVAS EAR/PLS OXIMETRY 1: CPT

## 2019-02-15 PROCEDURE — 94668 MNPJ CHEST WALL SBSQ: CPT

## 2019-02-15 RX ORDER — METHYLPREDNISOLONE SODIUM SUCCINATE 40 MG/ML
20 INJECTION, POWDER, LYOPHILIZED, FOR SOLUTION INTRAMUSCULAR; INTRAVENOUS EVERY 12 HOURS SCHEDULED
Status: DISCONTINUED | OUTPATIENT
Start: 2019-02-15 | End: 2019-02-16 | Stop reason: HOSPADM

## 2019-02-15 RX ADMIN — OSELTAMIVIR PHOSPHATE 75 MG: 75 CAPSULE ORAL at 21:17

## 2019-02-15 RX ADMIN — VANCOMYCIN HYDROCHLORIDE 1500 MG: 10 INJECTION, POWDER, LYOPHILIZED, FOR SOLUTION INTRAVENOUS at 21:17

## 2019-02-15 RX ADMIN — IPRATROPIUM BROMIDE AND ALBUTEROL SULFATE 3 ML: 2.5; .5 SOLUTION RESPIRATORY (INHALATION) at 02:45

## 2019-02-15 RX ADMIN — IPRATROPIUM BROMIDE AND ALBUTEROL SULFATE 3 ML: 2.5; .5 SOLUTION RESPIRATORY (INHALATION) at 20:28

## 2019-02-15 RX ADMIN — ALBUTEROL SULFATE 2 PUFF: 90 AEROSOL, METERED RESPIRATORY (INHALATION) at 09:16

## 2019-02-15 RX ADMIN — ASPIRIN 325 MG: 325 TABLET ORAL at 09:15

## 2019-02-15 RX ADMIN — IPRATROPIUM BROMIDE AND ALBUTEROL SULFATE 3 ML: 2.5; .5 SOLUTION RESPIRATORY (INHALATION) at 08:52

## 2019-02-15 RX ADMIN — METHYLPREDNISOLONE SODIUM SUCCINATE 40 MG: 40 INJECTION, POWDER, FOR SOLUTION INTRAMUSCULAR; INTRAVENOUS at 09:19

## 2019-02-15 RX ADMIN — ACETAMINOPHEN 650 MG: 325 TABLET, FILM COATED ORAL at 21:18

## 2019-02-15 RX ADMIN — HEPARIN SODIUM 5000 UNITS: 5000 INJECTION, SOLUTION INTRAVENOUS; SUBCUTANEOUS at 21:18

## 2019-02-15 RX ADMIN — OLMESARTAN MEDOXOMIL 20 MG: 20 TABLET, FILM COATED ORAL at 10:17

## 2019-02-15 RX ADMIN — METHYLPREDNISOLONE SODIUM SUCCINATE 20 MG: 40 INJECTION, POWDER, FOR SOLUTION INTRAMUSCULAR; INTRAVENOUS at 21:17

## 2019-02-15 RX ADMIN — HEPARIN SODIUM 5000 UNITS: 5000 INJECTION, SOLUTION INTRAVENOUS; SUBCUTANEOUS at 05:49

## 2019-02-15 RX ADMIN — HEPARIN SODIUM 5000 UNITS: 5000 INJECTION, SOLUTION INTRAVENOUS; SUBCUTANEOUS at 15:36

## 2019-02-15 RX ADMIN — AMIODARONE HYDROCHLORIDE 100 MG: 200 TABLET ORAL at 09:16

## 2019-02-15 RX ADMIN — OSELTAMIVIR PHOSPHATE 75 MG: 75 CAPSULE ORAL at 09:15

## 2019-02-15 RX ADMIN — IPRATROPIUM BROMIDE AND ALBUTEROL SULFATE 3 ML: 2.5; .5 SOLUTION RESPIRATORY (INHALATION) at 13:51

## 2019-02-15 NOTE — PLAN OF CARE
Problem: Potential for Falls  Goal: Patient will remain free of falls  Description  INTERVENTIONS:  - Assess patient frequently for physical needs  -  Identify cognitive and physical deficits and behaviors that affect risk of falls  -  Rand fall precautions as indicated by assessment   - Educate patient/family on patient safety including physical limitations  - Instruct patient to call for assistance with activity based on assessment  - Modify environment to reduce risk of injury  - Consider OT/PT consult to assist with strengthening/mobility  Outcome: Progressing     Problem: INFECTION - ADULT  Goal: Absence or prevention of progression during hospitalization  Description  INTERVENTIONS:  - Assess and monitor for signs and symptoms of infection  - Monitor lab/diagnostic results  - Administer medications as ordered  - Instruct and encourage patient and family to use good hand hygiene technique  - Identify and instruct in appropriate isolation precautions for identified infection/condition   Outcome: Progressing     Problem: DISCHARGE PLANNING  Goal: Discharge to home or other facility with appropriate resources  Description  INTERVENTIONS:  - Identify barriers to discharge w/patient and caregiver  - Arrange for needed discharge resources and transportation as appropriate  - Identify discharge learning needs (meds)  - Refer to Case Management Department for coordinating discharge planning if the patient needs post-hospital services based on physician/advanced practitioner order or complex needs related to functional status, cognitive ability, or social support system   Outcome: Progressing     Problem: Knowledge Deficit  Goal: Patient/family/caregiver demonstrates understanding of disease process, treatment plan, medications, and discharge instructions  Description  Complete learning assessment and assess knowledge base    Interventions:  - Provide teaching at level of understanding  - Provide teaching via preferred learning methods  Outcome: Progressing     Problem: CARDIOVASCULAR - ADULT  Goal: Maintains optimal cardiac output and hemodynamic stability  Description  INTERVENTIONS:  - Monitor I/O, vital signs and rhythm  - Monitor for S/S and trends of decreased cardiac output i e  bleeding, hypotension  - Administer and titrate ordered vasoactive medications to optimize hemodynamic stability  - Assess quality of pulses, skin color and temperature  - Assess for signs of decreased coronary artery perfusion - ex   Angina  - Instruct patient to report change in severity of symptoms  Outcome: Progressing  Goal: Absence of cardiac dysrhythmias or at baseline rhythm  Description  INTERVENTIONS:  - Continuous cardiac monitoring, monitor vital signs, obtain 12 lead EKG if indicated  - Administer antiarrhythmic and heart rate control medications as ordered  - Monitor electrolytes and administer replacement therapy as ordered  Outcome: Progressing     Problem: PAIN - ADULT  Goal: Verbalizes/displays adequate comfort level or baseline comfort level  Description  Interventions:  - Encourage patient to monitor pain and request assistance  - Assess pain using appropriate pain scale 0-10  - Administer analgesics based on type and severity of pain and evaluate response  - Implement non-pharmacological measures as appropriate and evaluate response  - Consider cultural and social influences on pain and pain management  - Notify physician/advanced practitioner if interventions unsuccessful or patient reports new pain   Outcome: Progressing     Problem: RESPIRATORY - ADULT  Goal: Achieves optimal ventilation and oxygenation  Description  INTERVENTIONS:  - Assess for changes in respiratory status  - Assess for changes in mentation and behavior  - Position to facilitate oxygenation and minimize respiratory effort  - Oxygen administration by appropriate delivery method based on oxygen saturation (per order) or ABGs  - Initiate smoking cessation education as indicated  - Encourage broncho-pulmonary hygiene including cough, deep breathe, Incentive Spirometry  - Assess the need for suctioning and aspirate as needed  - Assess and instruct to report SOB or any respiratory difficulty  - Respiratory Therapy support as indicated  - Nebs Q6  - OPEP   Outcome: Progressing

## 2019-02-15 NOTE — PROGRESS NOTES
[facility] Progress Note  Patient: Kwadwo Baird 76 y o  female   MRN: [id number]  PCP: Mckenna Lovett MD  Unit/Bed#: [address] Encounter: [id number]  Date Of Visit: 02/15/19    Problem List:    Principal Problem:    Multifocal pneumonia  Active Problems:    Influenza A    Essential hypertension    Asthma with acute exacerbation    History Abnormal heart rhythm    Adrenal adenoma    Pericardial effusion    CVA (cerebral vascular accident) [facility])      Assessment & Plan:    * Multifocal pneumonia  Assessment & Plan  CT showed multifocal pneumonia  No hx of recent hospitalization within 90 days so being treated at [facility]  Continue IV Ceftriaxone and Azithromycin   urine strep/legionella neg  Follow-up on pending blood cultures  Initial procalcitonin level negative  Recheck procalcitonin level in a m  Check sputum culture    Influenza A  Assessment & Plan  Continue Tamiflu    CVA (cerebral vascular accident) [facility])  Assessment & Plan  Continue Asprin    Pericardial effusion  Assessment & Plan  TTE showed EF of 45% with grade 1 diastolic dysfunction  Small pericardial effusion was noted without any evidence of tamponade  Adrenal adenoma  Assessment & Plan  Patient aware of findings  She will follow up with her PMD regarding this  History Abnormal heart rhythm  Assessment & Plan  Pt has been on Amiodarone for several years but denies any formal diagnosis of Atrial Fibrillation  She was following up with a Cardiologist years ago and reports history of "abnormal heart rhythm"  Continue Amiodarone  Echo showed EF of 45% with grade 1 diastolic dysfunction  Small pericardial effusion was noted without any evidence of tamponade  She will need follow up with Cardiology after discharge    Asthma with acute exacerbation  Assessment & Plan  Reports following with a Pulmonologist several years ago  Denies any hx of repeated exacerbations  Continue with nebulizer treatments    Started on IV Solu-Medrol  She  Can follow-up outpt with Pulmonology     Essential hypertension  Assessment & Plan  On Benicar  VTE Pharmacologic Prophylaxis:   Pharmacologic: Heparin  Mechanical VTE Prophylaxis in Place: No    Patient Centered Rounds: I have performed bedside rounds with nursing staff today  Discussions with Specialists or Other Care Team Provider: Yes    Education and Discussions with Family / Patient:Yes    Time Spent for Care: 30 minutes  More than 50% of total time spent on counseling and coordination of care as described above  Current Length of Stay: 3 day(s)    Current Patient Status: Inpatient     Discharge Plan: home    Code Status: Level 1 - Full Code    Certification Statement: The patient will continue to require additional inpatient hospital stay due to Influenza a infection, pneumonia      Subjective:   States breathing is improving  Objective:     Vitals:   Temp (24hrs), Av 6 °F (37 °C), Min:98 3 °F (36 8 °C), Max:98 9 °F (37 2 °C)    Temp:  [98 3 °F (36 8 °C)-98 9 °F (37 2 °C)] 98 3 °F (36 8 °C)  HR:  [77-80] 80  Resp:  [18] 18  BP: (142-158)/(60-63) 158/60  SpO2:  [83 %-96 %] 93 %  Body mass index is 37 03 kg/m²  Input and Output Summary (last 24 hours): Intake/Output Summary (Last 24 hours) at 2/15/2019 0034  Last data filed at 2019  Gross per 24 hour   Intake 50 ml   Output 300 ml   Net -250 ml       Physical Exam:     Physical Exam   Constitutional: She is oriented to person, place, and time  She appears well-developed and well-nourished  No distress  HENT:   Head: Normocephalic and atraumatic  Mouth/Throat: Oropharynx is clear and moist    Eyes: EOM are normal  Right eye exhibits no discharge  Left eye exhibits no discharge  No scleral icterus  Neck: Neck supple  Cardiovascular: Normal rate and regular rhythm  Pulmonary/Chest: Effort normal  No respiratory distress  She has wheezes  She has no rales     Decreased breath sounds bilaterally  Rhonchi noted   Abdominal: Soft  Bowel sounds are normal  She exhibits no distension  There is no tenderness  Musculoskeletal: She exhibits no edema  Neurological: She is alert and oriented to person, place, and time  No cranial nerve deficit  Skin: Skin is dry  She is not diaphoretic  Psychiatric: She has a normal mood and affect  Additional Data:     Labs:    Results from last 7 days   Lab Units 02/14/19  0651 02/13/19  0616   WBC Thousand/uL 4 19* 5 34   HEMOGLOBIN g/dL 11 7 12 0   HEMATOCRIT % 38 2 39 3   PLATELETS Thousands/uL 246 227   NEUTROS PCT %  --  66   LYMPHS PCT %  --  21   MONOS PCT %  --  12   EOS PCT %  --  0     Results from last 7 days   Lab Units 02/14/19  0651  02/12/19  1825   POTASSIUM mmol/L 3 4*   < > 4 5   CHLORIDE mmol/L 104   < > 103   CO2 mmol/L 33*   < > 28   BUN mg/dL 11   < > 15   CREATININE mg/dL 0 53*   < > 0 61   CALCIUM mg/dL 9 4   < > 9 2   ALK PHOS U/L  --   --  72   ALT U/L  --   --  44   AST U/L  --   --  41    < > = values in this interval not displayed  Results from last 7 days   Lab Units 02/12/19  1825   INR  1 02       * I Have Reviewed All Lab Data Listed Above  * Additional Pertinent Lab Tests Reviewed: All Labs For Current Hospital Admission Reviewed    Imaging:  Xr Chest 2 Views    Result Date: 2/12/2019  Narrative: CHEST INDICATION: Shortness of breath, chest pain COMPARISON:  None EXAM PERFORMED/VIEWS:  XR CHEST PA & LATERAL FINDINGS: Cardiomediastinal silhouette appears unremarkable  Multifocal lung opacities suggesting multifocal pneumonia  No pneumothorax or pleural effusion  Osseous structures appear within normal limits for patient age  Impression: Multifocal lung opacities suggesting multifocal pneumonia  Please refer to CT  Workstation performed: [id number]     Cta Ed Chest Pe Study    Result Date: 2/12/2019  Narrative: CTA - CHEST WITH IV CONTRAST - PULMONARY ANGIOGRAM INDICATION:   Chest pain, shortness of breath  COMPARISON: None  TECHNIQUE: CTA examination of the chest was performed using angiographic technique according to a protocol specifically tailored to evaluate for pulmonary embolism  Axial, sagittal, and coronal 2D reformatted images were created from the source data and  submitted for interpretation  In addition, coronal 3D MIP postprocessing was performed on the acquisition scanner  Radiation dose length product (DLP) for this visit:  767 mGy-cm   This examination, like all CT scans performed in the [facility], was performed utilizing techniques to minimize radiation dose exposure, including the use of iterative reconstruction and automated exposure control  IV Contrast:  85 mL of iohexol (OMNIPAQUE)  FINDINGS: PULMONARY ARTERIAL TREE:  No pulmonary embolus is seen  LUNGS:  Patchy lung opacities identified in the bilateral upper lobe, right middle lobe and right lower lobe and lingular and a nodular 1 cm opacity in the left upper lobe  These findings could relate to multifocal pneumonia  However, posttreatment follow-up to radiographic resolution with unenhanced chest CT is recommended in 2 months to ensure resolution and exclude other etiologies  PLEURA:  Unremarkable  HEART/GREAT VESSELS:  Small pericardial effusion  MEDIASTINUM AND FELIPE: Mediastinal and bilateral hilar adenopathy could be reactive to the pneumonia  CHEST WALL AND LOWER NECK:   Unremarkable  VISUALIZED STRUCTURES IN THE UPPER ABDOMEN:  Bilateral adrenal gland thickening /adrenal nodules  possibly adenomas OSSEOUS STRUCTURES:  No acute fracture or destructive osseous lesion  Impression: Multifocal lung opacities could represent multifocal pneumonia  However, posttreatment follow-up to radiographic resolution with unenhanced chest CT is recommended in 2 months to ensure resolution and exclude other etiologies  Mediastinal and hilar adenopathy could be reactive to the pneumonia  Small pericardial effusion   Bilateral adrenal lesions, possibly adenomas  Workstation performed: [id number]     Imaging Reports Reviewed by myself    Cultures:   Blood Culture:   Lab Results   Component Value Date    BLOODCX No Growth at 48 hrs  02/12/2019     Urine Culture: No results found for: URINECX  Sputum Culture: No components found for: SPUTUMCX  Wound Culture: No results found for: WOUNDCULT    Last 24 Hours Medication List:     Current Facility-Administered Medications:  acetaminophen 650 mg Oral Q6H PRN STEFFANY John    albuterol 2 puff Inhalation Q4H PRN Corey Avalos MD    amiodarone 100 mg Oral Daily Corey Avalos MD    aspirin 325 mg Oral Daily Corey Avalos MD    azithromycin 500 mg Oral Q24H Corey Avalos MD    cefTRIAXone 1,000 mg Intravenous Q24H Corey Avalos MD Last Rate: Stopped (02/14/19 2030)   heparin (porcine) 5,000 Units Subcutaneous Q8H Deirdre Galeas MD    ipratropium-albuterol 3 mL Nebulization Mayela Canada MD    methylPREDNISolone sodium succinate 40 mg Intravenous Q12H [address] Jovita Foster DO    olmesartan 20 mg Oral Daily Corey Avalos MD    oseltamivir 75 mg Oral Q12H Deirdre Galeas MD         Today, Patient Was Seen By: Brent Hardwick DO    ** Please Note: "This note has been constructed using a voice recognition system  Therefore there may be syntax, spelling, and/or grammatical errors   Please call if you have any questions  "**

## 2019-02-16 VITALS
RESPIRATION RATE: 17 BRPM | OXYGEN SATURATION: 96 % | BODY MASS INDEX: 37.55 KG/M2 | HEART RATE: 78 BPM | DIASTOLIC BLOOD PRESSURE: 89 MMHG | SYSTOLIC BLOOD PRESSURE: 180 MMHG | WEIGHT: 225.38 LBS | TEMPERATURE: 98.2 F | HEIGHT: 65 IN

## 2019-02-16 PROBLEM — J18.9 MULTIFOCAL PNEUMONIA: Status: RESOLVED | Noted: 2019-02-12 | Resolved: 2019-02-16

## 2019-02-16 LAB
BACTERIA BLD CULT: ABNORMAL
GRAM STN SPEC: ABNORMAL

## 2019-02-16 PROCEDURE — 94640 AIRWAY INHALATION TREATMENT: CPT

## 2019-02-16 PROCEDURE — 94760 N-INVAS EAR/PLS OXIMETRY 1: CPT

## 2019-02-16 PROCEDURE — 99239 HOSP IP/OBS DSCHRG MGMT >30: CPT | Performed by: FAMILY MEDICINE

## 2019-02-16 PROCEDURE — 94668 MNPJ CHEST WALL SBSQ: CPT

## 2019-02-16 RX ORDER — ALBUTEROL SULFATE 2.5 MG/3ML
2.5 SOLUTION RESPIRATORY (INHALATION) 4 TIMES DAILY
Qty: 360 ML | Refills: 0 | Status: SHIPPED | OUTPATIENT
Start: 2019-02-16 | End: 2019-03-18

## 2019-02-16 RX ORDER — PREDNISONE 10 MG/1
TABLET ORAL
Qty: 30 TABLET | Refills: 0 | Status: SHIPPED | OUTPATIENT
Start: 2019-02-16 | End: 2019-05-21 | Stop reason: ALTCHOICE

## 2019-02-16 RX ORDER — OSELTAMIVIR PHOSPHATE 75 MG/1
75 CAPSULE ORAL EVERY 12 HOURS SCHEDULED
Qty: 2 CAPSULE | Refills: 0 | Status: SHIPPED | OUTPATIENT
Start: 2019-02-16 | End: 2019-02-17

## 2019-02-16 RX ADMIN — METHYLPREDNISOLONE SODIUM SUCCINATE 20 MG: 40 INJECTION, POWDER, FOR SOLUTION INTRAMUSCULAR; INTRAVENOUS at 10:18

## 2019-02-16 RX ADMIN — OSELTAMIVIR PHOSPHATE 75 MG: 75 CAPSULE ORAL at 10:24

## 2019-02-16 RX ADMIN — OLMESARTAN MEDOXOMIL 20 MG: 20 TABLET, FILM COATED ORAL at 13:18

## 2019-02-16 RX ADMIN — IPRATROPIUM BROMIDE AND ALBUTEROL SULFATE 3 ML: 2.5; .5 SOLUTION RESPIRATORY (INHALATION) at 07:58

## 2019-02-16 RX ADMIN — Medication 400 MG: at 13:18

## 2019-02-16 RX ADMIN — AMIODARONE HYDROCHLORIDE 100 MG: 200 TABLET ORAL at 10:18

## 2019-02-16 RX ADMIN — ASPIRIN 325 MG: 325 TABLET ORAL at 10:17

## 2019-02-16 RX ADMIN — IPRATROPIUM BROMIDE AND ALBUTEROL SULFATE 3 ML: 2.5; .5 SOLUTION RESPIRATORY (INHALATION) at 02:09

## 2019-02-16 RX ADMIN — HEPARIN SODIUM 5000 UNITS: 5000 INJECTION, SOLUTION INTRAVENOUS; SUBCUTANEOUS at 06:01

## 2019-02-16 NOTE — PLAN OF CARE
Problem: Potential for Falls  Goal: Patient will remain free of falls  Description  INTERVENTIONS:  - Assess patient frequently for physical needs  -  Identify cognitive and physical deficits and behaviors that affect risk of falls  -  [place] fall precautions as indicated by assessment   - Educate patient/family on patient safety including physical limitations  - Instruct patient to call for assistance with activity based on assessment  - Modify environment to reduce risk of injury  - Consider OT/PT consult to assist with strengthening/mobility  Outcome: Progressing     Problem: INFECTION - ADULT  Goal: Absence or prevention of progression during hospitalization  Description  INTERVENTIONS:  - Assess and monitor for signs and symptoms of infection  - Monitor lab/diagnostic results  - Administer medications as ordered  - Instruct and encourage patient and family to use good hand hygiene technique  - Identify and instruct in appropriate isolation precautions for identified infection/condition   Outcome: Progressing     Problem: DISCHARGE PLANNING  Goal: Discharge to home or other facility with appropriate resources  Description  INTERVENTIONS:  - Identify barriers to discharge w/patient and caregiver  - Arrange for needed discharge resources and transportation as appropriate  - Identify discharge learning needs (meds)  - Refer to Case Management Department for coordinating discharge planning if the patient needs post-hospital services based on physician/advanced practitioner order or complex needs related to functional status, cognitive ability, or social support system   Outcome: Progressing     Problem: Knowledge Deficit  Goal: Patient/family/caregiver demonstrates understanding of disease process, treatment plan, medications, and discharge instructions  Description  Complete learning assessment and assess knowledge base    Interventions:  - Provide teaching at level of understanding  - Provide teaching via preferred learning methods  Outcome: Progressing     Problem: PAIN - ADULT  Goal: Verbalizes/displays adequate comfort level or baseline comfort level  Description  Interventions:  - Encourage patient to monitor pain and request assistance  - Assess pain using appropriate pain scale 0-10  - Administer analgesics based on type and severity of pain and evaluate response  - Implement non-pharmacological measures as appropriate and evaluate response  - Consider cultural and social influences on pain and pain management  - Notify physician/advanced practitioner if interventions unsuccessful or patient reports new pain   Outcome: Progressing     Problem: RESPIRATORY - ADULT  Goal: Achieves optimal ventilation and oxygenation  Description  INTERVENTIONS:  - Assess for changes in respiratory status  - Assess for changes in mentation and behavior  - Position to facilitate oxygenation and minimize respiratory effort  - Oxygen administration by appropriate delivery method based on oxygen saturation (per order) or ABGs  - Initiate smoking cessation education as indicated  - Encourage broncho-pulmonary hygiene including cough, deep breathe, Incentive Spirometry  - Assess the need for suctioning and aspirate as needed  - Assess and instruct to report SOB or any respiratory difficulty  - Respiratory Therapy support as indicated  - Nebs Q6  - OPEP   Outcome: Progressing     Problem: CARDIOVASCULAR - ADULT  Goal: Maintains optimal cardiac output and hemodynamic stability  Description  INTERVENTIONS:  - Monitor I/O, vital signs and rhythm  - Monitor for S/S and trends of decreased cardiac output i e  bleeding, hypotension  - Administer and titrate ordered vasoactive medications to optimize hemodynamic stability  - Assess quality of pulses, skin color and temperature  - Assess for signs of decreased coronary artery perfusion - ex   Angina  - Instruct patient to report change in severity of symptoms  Outcome: Completed  Goal: Absence of cardiac dysrhythmias or at baseline rhythm  Description  INTERVENTIONS:  - Continuous cardiac monitoring, monitor vital signs, obtain 12 lead EKG if indicated  - Administer antiarrhythmic and heart rate control medications as ordered  - Monitor electrolytes and administer replacement therapy as ordered  Outcome: Completed

## 2019-02-16 NOTE — SOCIAL WORK
Per PCP, pt stable for DC to home today  Will need a neb machine  Charly's referral placed and delivered to pt room  No further needs noted

## 2019-02-16 NOTE — NURSING NOTE
IV removed and all belongings accounted for  After visit summary and prescriptions reviewed and given to patient  Nebulizer machine given to patient for home neb treatments  Patient left unit by wheelchair at 1430, accompanied by  and PCA

## 2019-02-16 NOTE — DISCHARGE INSTRUCTIONS
You will need chest CT in 2 months to make sure pneumonia has resolved - this can be set up by Dr Maribel Olivera or Dr Snider Bones

## 2019-02-16 NOTE — PROGRESS NOTES
[address] Internal Medicine Progress Note  Patient: Marilu Degroot 76 y o  female   MRN: [id number]  PCP: Nick Don MD  Unit/Bed#: 3 [address]01 Encounter: [id number]  Date Of Visit: 02/15/19    Problem List:    Principal Problem:    Multifocal pneumonia  Active Problems:    Influenza A    Essential hypertension    Asthma with acute exacerbation    History Abnormal heart rhythm    Adrenal adenoma    Pericardial effusion    CVA (cerebral vascular accident) [facility])      Assessment & Plan:    * Multifocal pneumonia  Assessment & Plan  CT showed multifocal pneumonia - likely viral     Discontinued IV Ceftriaxone and Azithromycin   urine strep/legionella neg   blood culture growing Gram-positive cocci in clusters - likely contamination but start vancomycin till final culture result available  procalcitonin level X 2 negative  Check sputum culture    Influenza A  Assessment & Plan  Continue Tamiflu  CVA (cerebral vascular accident) [facility])  [id number]  Pericardial effusion  Assessment & Plan  TTE showed EF of 45% with grade 1 diastolic dysfunction  Small pericardial effusion was noted without any evidence of tamponade    Adrenal adenoma  Assessment & Plan  Patient aware of findings  She will follow up with her PMD regarding this    History Abnormal heart rhythm  Assessment & Plan  Pt has been on Amiodarone for several years but denies any formal diagnosis of Atrial Fibrillation  She was following up with a Cardiologist years ago and reports history of "abnormal heart rhythm"  Continue Amiodarone  Echo showed EF of 45% with grade 1 diastolic dysfunction  Small pericardial effusion was noted without any evidence of tamponade  She will need follow up with Cardiology after discharge    Asthma with acute exacerbation  Assessment & Plan  Reports following with a Pulmonologist several years ago  Denies any hx of repeated exacerbations  Symptoms improving    Decreased dose of IV Solu-Medrol  Continue with nebulizer treatments  She  Can follow-up outpt with Pulmonology     Essential hypertension  Assessment & Plan  Continue Benicar  VTE Pharmacologic Prophylaxis:   Pharmacologic: Heparin  Mechanical VTE Prophylaxis in Place: No    Patient Centered Rounds: I have performed bedside rounds with nursing staff today  Discussions with Specialists or Other Care Team Provider: Yes    Education and Discussions with Family / Patient:Yes    Time Spent for Care: 35 min  More than 50% of total time spent on counseling and coordination of care as described above  Current Length of Stay: 3 day(s)    Current Patient Status: Inpatient     Discharge Plan: home    Code Status: Level 1 - Full Code    Certification Statement: The patient will continue to require additional inpatient hospital stay due to Influenza a infection, bacteremia      Subjective:   Patient states he is feeling much better compared to day of admission    Objective:     Vitals:   Temp (24hrs), Av 5 °F (36 9 °C), Min:98 3 °F (36 8 °C), Max:98 8 °F (37 1 °C)    Temp:  [98 3 °F (36 8 °C)-98 8 °F (37 1 °C)] 98 3 °F (36 8 °C)  HR:  [71-80] 71  Resp:  [18] 18  BP: (154-168)/(60-76) 154/70  SpO2:  [93 %-97 %] 93 %  Body mass index is 37 07 kg/m²  Input and Output Summary (last 24 hours): Intake/Output Summary (Last 24 hours) at 2/15/2019 2026  Last data filed at 2/15/2019 0501  Gross per 24 hour   Intake 240 ml   Output    Net 240 ml       Physical Exam:     Physical Exam   Constitutional: She is oriented to person, place, and time  She appears well-developed and well-nourished  No distress  HENT:   Head: Normocephalic and atraumatic  Mouth/Throat: Oropharynx is clear and moist    Eyes: EOM are normal  Right eye exhibits no discharge  Left eye exhibits no discharge  No scleral icterus  Neck: Neck supple  Cardiovascular: Normal rate and regular rhythm  Pulmonary/Chest: Effort normal  No respiratory distress  She has wheezes (Scattered)  She has no rales  Improving air entry bilaterally   Abdominal: Soft  Bowel sounds are normal  She exhibits no distension  There is no tenderness  Musculoskeletal: She exhibits no edema  Neurological: She is alert and oriented to person, place, and time  No cranial nerve deficit  Skin: Skin is dry  She is not diaphoretic  Psychiatric: She has a normal mood and affect  Additional Data:     Labs:    Results from last 7 days   Lab Units 02/14/19  0651 02/13/19  0616   WBC Thousand/uL 4 19* 5 34   HEMOGLOBIN g/dL 11 7 12 0   HEMATOCRIT % 38 2 39 3   PLATELETS Thousands/uL 246 227   NEUTROS PCT %  --  66   LYMPHS PCT %  --  21   MONOS PCT %  --  12   EOS PCT %  --  0     Results from last 7 days   Lab Units 02/15/19  0555  02/12/19  1825   POTASSIUM mmol/L 4 2   < > 4 5   CHLORIDE mmol/L 105   < > 103   CO2 mmol/L 34*   < > 28   BUN mg/dL 15   < > 15   CREATININE mg/dL 0 64   < > 0 61   CALCIUM mg/dL 9 8   < > 9 2   ALK PHOS U/L  --   --  72   ALT U/L  --   --  44   AST U/L  --   --  41    < > = values in this interval not displayed  Results from last 7 days   Lab Units 02/12/19  1825   INR  1 02       * I Have Reviewed All Lab Data Listed Above  * Additional Pertinent Lab Tests Reviewed: All Labs For Current Hospital Admission Reviewed    Imaging:  Xr Chest 2 Views    Result Date: 2/12/2019  Narrative: CHEST INDICATION: Shortness of breath, chest pain COMPARISON:  None EXAM PERFORMED/VIEWS:  XR CHEST PA & LATERAL FINDINGS: Cardiomediastinal silhouette appears unremarkable  Multifocal lung opacities suggesting multifocal pneumonia  No pneumothorax or pleural effusion  Osseous structures appear within normal limits for patient age  Impression: Multifocal lung opacities suggesting multifocal pneumonia  Please refer to CT   Workstation performed: [id number]     Colan Bull Ed Chest Pe Study    Result Date: 2/12/2019  Narrative: CTA - CHEST WITH IV CONTRAST - PULMONARY ANGIOGRAM INDICATION:   Chest pain, shortness of breath  COMPARISON: None  TECHNIQUE: CTA examination of the chest was performed using angiographic technique according to a protocol specifically tailored to evaluate for pulmonary embolism  Axial, sagittal, and coronal 2D reformatted images were created from the source data and  submitted for interpretation  In addition, coronal 3D MIP postprocessing was performed on the acquisition scanner  Radiation dose length product (DLP) for this visit:  767 mGy-cm   This examination, like all CT scans performed in the [facility], was performed utilizing techniques to minimize radiation dose exposure, including the use of iterative reconstruction and automated exposure control  IV Contrast:  85 mL of iohexol (OMNIPAQUE)  FINDINGS: PULMONARY ARTERIAL TREE:  No pulmonary embolus is seen  LUNGS:  Patchy lung opacities identified in the bilateral upper lobe, right middle lobe and right lower lobe and lingular and a nodular 1 cm opacity in the left upper lobe  These findings could relate to multifocal pneumonia  However, posttreatment follow-up to radiographic resolution with unenhanced chest CT is recommended in 2 months to ensure resolution and exclude other etiologies  PLEURA:  Unremarkable  HEART/GREAT VESSELS:  Small pericardial effusion  MEDIASTINUM AND FELIPE: Mediastinal and bilateral hilar adenopathy could be reactive to the pneumonia  CHEST WALL AND LOWER NECK:   Unremarkable  VISUALIZED STRUCTURES IN THE UPPER ABDOMEN:  Bilateral adrenal gland thickening /adrenal nodules  possibly adenomas OSSEOUS STRUCTURES:  No acute fracture or destructive osseous lesion  Impression: Multifocal lung opacities could represent multifocal pneumonia  However, posttreatment follow-up to radiographic resolution with unenhanced chest CT is recommended in 2 months to ensure resolution and exclude other etiologies   Mediastinal and hilar adenopathy could be reactive to the pneumonia  Small pericardial effusion  Bilateral adrenal lesions, possibly adenomas  Workstation performed: [id number]     Imaging Reports Reviewed by myself    Cultures:   Blood Culture:   Lab Results   Component Value Date    BLOODCX No Growth at 48 hrs  02/12/2019     Urine Culture: No results found for: URINECX  Sputum Culture: No components found for: SPUTUMCX  Wound Culture: No results found for: WOUNDCULT    Last 24 Hours Medication List:     Current Facility-Administered Medications:  acetaminophen 650 mg Oral Q6H PRN STEFFANY Dewitt   albuterol 2 puff Inhalation Q4H PRN Aracelis Mohan MD   amiodarone 100 mg Oral Daily Aracelis Mohan MD   aspirin 325 mg Oral Daily Aracelis Mohan MD   heparin (porcine) 5,000 Units Subcutaneous Q8H [address] Aracelis Mohan MD   ipratropium-albuterol 3 mL Nebulization Q6H Aracelis Mohan MD   methylPREDNISolone sodium succinate 20 mg Intravenous Q12H [address] Jovita Foster DO   olmesartan 20 mg Oral Daily Aracelis Mohan MD   oseltamivir 75 mg Oral Q12H [address] Aracelis Mohan MD   vancomycin 15 mg/kg Intravenous Q12H Jovita Foster DO        Today, Patient Was Seen By: Nehemiah Aceves DO    ** Please Note: "This note has been constructed using a voice recognition system  Therefore there may be syntax, spelling, and/or grammatical errors   Please call if you have any questions  "**

## 2019-02-17 LAB — BACTERIA BLD CULT: NORMAL

## 2019-02-17 NOTE — PROGRESS NOTES
02/16/19 [address]  of Residence [place]    Patient Information   Mental Status Alert   Primary Caregiver Self   Activities of Daily Living Prior to Admission   Functional Status Independent   Assistive Device No device needed   Living Arrangement House   Ambulation Independent     Pt previously assessed as independent with needs, no DCP at that time  Per PCP, pt will need neb machine prior to DC  Referral out to Yaneth Izaguirre and neb delivered to pt room  Per Pt, no further DCP needs noted

## 2019-02-22 ENCOUNTER — OFFICE VISIT (OUTPATIENT)
Dept: PULMONOLOGY | Facility: MEDICAL CENTER | Age: 69
End: 2019-02-22
Payer: MEDICARE

## 2019-02-22 VITALS
WEIGHT: 224 LBS | TEMPERATURE: 97.7 F | SYSTOLIC BLOOD PRESSURE: 158 MMHG | HEART RATE: 74 BPM | RESPIRATION RATE: 12 BRPM | BODY MASS INDEX: 37.32 KG/M2 | HEIGHT: 65 IN | DIASTOLIC BLOOD PRESSURE: 72 MMHG | OXYGEN SATURATION: 96 %

## 2019-02-22 DIAGNOSIS — J12.9 VIRAL PNEUMONIA: ICD-10-CM

## 2019-02-22 DIAGNOSIS — R09.82 POST-NASAL DRIP: ICD-10-CM

## 2019-02-22 DIAGNOSIS — R06.02 SHORTNESS OF BREATH: Primary | ICD-10-CM

## 2019-02-22 PROCEDURE — 99213 OFFICE O/P EST LOW 20 MIN: CPT | Performed by: INTERNAL MEDICINE

## 2019-02-22 PROCEDURE — 94010 BREATHING CAPACITY TEST: CPT | Performed by: INTERNAL MEDICINE

## 2019-02-22 RX ORDER — AZELASTINE 1 MG/ML
1 SPRAY, METERED NASAL 2 TIMES DAILY
Qty: 1 BOTTLE | Refills: 0 | Status: SHIPPED | OUTPATIENT
Start: 2019-02-22 | End: 2019-05-21 | Stop reason: ALTCHOICE

## 2019-02-22 RX ORDER — BLOOD SUGAR DIAGNOSTIC
STRIP MISCELLANEOUS
COMMUNITY
Start: 2019-01-14 | End: 2021-10-21 | Stop reason: ALTCHOICE

## 2019-02-22 RX ORDER — IBUPROFEN 200 MG
TABLET ORAL AS NEEDED
COMMUNITY
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
## 2019-02-28 ENCOUNTER — OFFICE VISIT (OUTPATIENT)
Dept: CARDIOLOGY CLINIC | Facility: CLINIC | Age: 69
End: 2019-02-28
Payer: MEDICARE

## 2019-02-28 VITALS
WEIGHT: 220 LBS | DIASTOLIC BLOOD PRESSURE: 68 MMHG | SYSTOLIC BLOOD PRESSURE: 130 MMHG | HEART RATE: 72 BPM | BODY MASS INDEX: 37.56 KG/M2 | OXYGEN SATURATION: 97 % | HEIGHT: 64 IN

## 2019-02-28 DIAGNOSIS — I10 ESSENTIAL HYPERTENSION: ICD-10-CM

## 2019-02-28 DIAGNOSIS — I50.22 CHRONIC SYSTOLIC CONGESTIVE HEART FAILURE (HCC): Primary | ICD-10-CM

## 2019-02-28 DIAGNOSIS — I63.9 CEREBROVASCULAR ACCIDENT (CVA), UNSPECIFIED MECHANISM (HCC): ICD-10-CM

## 2019-02-28 DIAGNOSIS — I31.3 PERICARDIAL EFFUSION: ICD-10-CM

## 2019-02-28 PROCEDURE — 99205 OFFICE O/P NEW HI 60 MIN: CPT | Performed by: INTERNAL MEDICINE
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
## 2019-03-04 ENCOUNTER — TELEPHONE (OUTPATIENT)
Dept: INPATIENT UNIT | Facility: HOSPITAL | Age: 69
End: 2019-03-04

## 2019-03-04 NOTE — TELEPHONE ENCOUNTER
Spoke with patient  She denied shortness of breath or cough  Denied no decrease in energy level, states 90% recovered  Stated she followed up with PCP, pulmonology and cardiology  States she has outstanding tests CT of chest and echo  We discussed symptoms to watch out for, when to report symptoms or when to see the doctor immediately or to call 911

## 2019-03-11 ENCOUNTER — TELEPHONE (OUTPATIENT)
Dept: INPATIENT UNIT | Facility: HOSPITAL | Age: 69
End: 2019-03-11

## 2019-03-11 NOTE — TELEPHONE ENCOUNTER
Patient is in GREEN zone of Pneumonia zone tool  We reviewed yellow and red zone symptoms and what to do  Patient is headed for vacation next week therefore will not be available for her 4th week phone call

## 2019-05-15 ENCOUNTER — HOSPITAL ENCOUNTER (OUTPATIENT)
Dept: RADIOLOGY | Facility: HOSPITAL | Age: 69
Discharge: HOME | End: 2019-05-15
Attending: INTERNAL MEDICINE
Payer: MEDICARE

## 2019-05-15 DIAGNOSIS — J12.9 VIRAL PNEUMONIA: ICD-10-CM

## 2019-05-15 PROCEDURE — 71260 CT THORAX DX C+: CPT

## 2019-05-15 RX ADMIN — IOHEXOL 85 ML: 350 INJECTION, SOLUTION INTRAVENOUS at 10:01

## 2019-05-21 ENCOUNTER — OFFICE VISIT (OUTPATIENT)
Dept: PULMONOLOGY | Facility: MEDICAL CENTER | Age: 69
End: 2019-05-21
Payer: MEDICARE

## 2019-05-21 VITALS
SYSTOLIC BLOOD PRESSURE: 144 MMHG | HEART RATE: 78 BPM | BODY MASS INDEX: 39.78 KG/M2 | DIASTOLIC BLOOD PRESSURE: 82 MMHG | OXYGEN SATURATION: 95 % | WEIGHT: 233 LBS | HEIGHT: 64 IN | TEMPERATURE: 97.8 F | RESPIRATION RATE: 12 BRPM

## 2019-05-21 DIAGNOSIS — J45.20 MILD INTERMITTENT ASTHMA WITHOUT COMPLICATION: ICD-10-CM

## 2019-05-21 DIAGNOSIS — G47.10 HYPERSOMNIA: ICD-10-CM

## 2019-05-21 DIAGNOSIS — R06.00 DYSPNEA ON EXERTION: Primary | ICD-10-CM

## 2019-05-21 DIAGNOSIS — E66.2 MORBID OBESITY WITH ALVEOLAR HYPOVENTILATION (HCC): ICD-10-CM

## 2019-05-21 PROBLEM — J10.1 INFLUENZA A: Status: RESOLVED | Noted: 2019-02-13 | Resolved: 2019-05-21

## 2019-05-21 PROBLEM — R06.09 DYSPNEA ON EXERTION: Status: ACTIVE | Noted: 2019-05-21

## 2019-05-21 PROBLEM — J12.9 VIRAL PNEUMONIA: Status: RESOLVED | Noted: 2019-02-22 | Resolved: 2019-05-21

## 2019-05-21 PROBLEM — J45.901 ASTHMA WITH ACUTE EXACERBATION: Status: RESOLVED | Noted: 2019-02-12 | Resolved: 2019-05-21

## 2019-05-21 PROCEDURE — 94010 BREATHING CAPACITY TEST: CPT | Performed by: INTERNAL MEDICINE

## 2019-05-21 PROCEDURE — 99214 OFFICE O/P EST MOD 30 MIN: CPT | Performed by: INTERNAL MEDICINE

## 2019-05-21 RX ORDER — ALBUTEROL SULFATE 90 UG/1
2 AEROSOL, METERED RESPIRATORY (INHALATION) EVERY 4 HOURS PRN
Qty: 1 INHALER | Refills: 7 | Status: SHIPPED | OUTPATIENT
Start: 2019-05-21 | End: 2020-02-13 | Stop reason: SDUPTHER

## 2019-05-23 ENCOUNTER — HOSPITAL ENCOUNTER (OUTPATIENT)
Dept: SLEEP CENTER | Facility: CLINIC | Age: 69
Discharge: HOME | End: 2019-05-23
Payer: MEDICARE

## 2019-05-23 DIAGNOSIS — G47.33 OSA (OBSTRUCTIVE SLEEP APNEA): ICD-10-CM

## 2019-05-23 DIAGNOSIS — G47.10 HYPERSOMNIA: ICD-10-CM

## 2019-05-23 DIAGNOSIS — E66.2 MORBID OBESITY WITH ALVEOLAR HYPOVENTILATION (HCC): ICD-10-CM

## 2019-05-23 PROCEDURE — 95810 POLYSOM 6/> YRS 4/> PARAM: CPT | Performed by: INTERNAL MEDICINE

## 2019-05-23 PROCEDURE — 95810 POLYSOM 6/> YRS 4/> PARAM: CPT

## 2019-05-24 ENCOUNTER — TRANSCRIBE ORDERS (OUTPATIENT)
Dept: SLEEP CENTER | Facility: CLINIC | Age: 69
End: 2019-05-24

## 2019-05-24 DIAGNOSIS — G47.33 OSA (OBSTRUCTIVE SLEEP APNEA): Primary | ICD-10-CM

## 2019-05-28 ENCOUNTER — HOSPITAL ENCOUNTER (OUTPATIENT)
Dept: NON INVASIVE DIAGNOSTICS | Facility: HOSPITAL | Age: 69
Discharge: HOME | End: 2019-05-28
Attending: INTERNAL MEDICINE
Payer: MEDICARE

## 2019-05-28 DIAGNOSIS — I50.22 CHRONIC SYSTOLIC CONGESTIVE HEART FAILURE (HCC): ICD-10-CM

## 2019-05-28 PROCEDURE — 93306 TTE W/DOPPLER COMPLETE: CPT

## 2019-05-29 PROCEDURE — 93306 TTE W/DOPPLER COMPLETE: CPT | Performed by: INTERNAL MEDICINE

## 2019-05-30 ENCOUNTER — TELEPHONE (OUTPATIENT)
Dept: SLEEP CENTER | Facility: CLINIC | Age: 69
End: 2019-05-30

## 2019-06-03 ENCOUNTER — HOSPITAL ENCOUNTER (OUTPATIENT)
Dept: SLEEP CENTER | Facility: HOSPITAL | Age: 69
Discharge: HOME | End: 2019-06-03
Attending: INTERNAL MEDICINE
Payer: MEDICARE

## 2019-06-03 DIAGNOSIS — G47.33 OSA (OBSTRUCTIVE SLEEP APNEA): ICD-10-CM

## 2019-06-03 PROCEDURE — 95811 POLYSOM 6/>YRS CPAP 4/> PARM: CPT

## 2019-06-03 PROCEDURE — 95811 POLYSOM 6/>YRS CPAP 4/> PARM: CPT | Performed by: INTERNAL MEDICINE

## 2019-06-05 ENCOUNTER — OFFICE VISIT (OUTPATIENT)
Dept: PULMONOLOGY | Facility: MEDICAL CENTER | Age: 69
End: 2019-06-05
Payer: MEDICARE

## 2019-06-05 VITALS
HEIGHT: 64 IN | WEIGHT: 232 LBS | TEMPERATURE: 97.8 F | HEART RATE: 78 BPM | RESPIRATION RATE: 12 BRPM | BODY MASS INDEX: 39.61 KG/M2 | SYSTOLIC BLOOD PRESSURE: 140 MMHG | OXYGEN SATURATION: 95 % | DIASTOLIC BLOOD PRESSURE: 80 MMHG

## 2019-06-05 DIAGNOSIS — G47.33 OSA (OBSTRUCTIVE SLEEP APNEA): Primary | ICD-10-CM

## 2019-06-05 DIAGNOSIS — I42.0 DILATED CARDIOMYOPATHY (HCC): ICD-10-CM

## 2019-06-05 DIAGNOSIS — E66.2 MORBID (SEVERE) OBESITY WITH ALVEOLAR HYPOVENTILATION (HCC): ICD-10-CM

## 2019-06-05 DIAGNOSIS — J45.20 MILD INTERMITTENT ASTHMA WITHOUT COMPLICATION: ICD-10-CM

## 2019-06-05 PROCEDURE — 99214 OFFICE O/P EST MOD 30 MIN: CPT | Performed by: INTERNAL MEDICINE

## 2019-06-06 ENCOUNTER — OFFICE VISIT (OUTPATIENT)
Dept: CARDIOLOGY CLINIC | Facility: CLINIC | Age: 69
End: 2019-06-06
Payer: MEDICARE

## 2019-06-06 VITALS
OXYGEN SATURATION: 96 % | SYSTOLIC BLOOD PRESSURE: 136 MMHG | WEIGHT: 231 LBS | HEIGHT: 64 IN | HEART RATE: 85 BPM | BODY MASS INDEX: 39.44 KG/M2 | DIASTOLIC BLOOD PRESSURE: 82 MMHG

## 2019-06-06 DIAGNOSIS — D35.00 ADRENAL ADENOMA, UNSPECIFIED LATERALITY: ICD-10-CM

## 2019-06-06 DIAGNOSIS — I50.22 CHRONIC SYSTOLIC CONGESTIVE HEART FAILURE (HCC): Primary | ICD-10-CM

## 2019-06-06 PROCEDURE — 99214 OFFICE O/P EST MOD 30 MIN: CPT | Performed by: INTERNAL MEDICINE

## 2019-06-06 RX ORDER — CARVEDILOL 6.25 MG/1
6.25 TABLET ORAL 2 TIMES DAILY WITH MEALS
Qty: 60 TABLET | Refills: 5 | Status: SHIPPED | OUTPATIENT
Start: 2019-06-06 | End: 2019-07-16 | Stop reason: SINTOL

## 2019-06-11 ENCOUNTER — TRANSCRIBE ORDERS (OUTPATIENT)
Dept: ADMINISTRATIVE | Facility: HOSPITAL | Age: 69
End: 2019-06-11

## 2019-06-11 ENCOUNTER — APPOINTMENT (OUTPATIENT)
Dept: LAB | Facility: HOSPITAL | Age: 69
End: 2019-06-11
Attending: INTERNAL MEDICINE
Payer: MEDICARE

## 2019-06-11 DIAGNOSIS — I50.22 CHRONIC SYSTOLIC CONGESTIVE HEART FAILURE (HCC): Primary | ICD-10-CM

## 2019-06-11 DIAGNOSIS — I50.22 CHRONIC SYSTOLIC CONGESTIVE HEART FAILURE (HCC): ICD-10-CM

## 2019-06-11 LAB
ANION GAP SERPL CALCULATED.3IONS-SCNC: 7 MMOL/L (ref 4–13)
BUN SERPL-MCNC: 16 MG/DL (ref 5–25)
CALCIUM SERPL-MCNC: 8.1 MG/DL (ref 8.3–10.1)
CHLORIDE SERPL-SCNC: 107 MMOL/L (ref 100–108)
CHOLEST SERPL-MCNC: 156 MG/DL (ref 50–200)
CO2 SERPL-SCNC: 28 MMOL/L (ref 21–32)
CREAT SERPL-MCNC: 0.54 MG/DL (ref 0.6–1.3)
ERYTHROCYTE [DISTWIDTH] IN BLOOD BY AUTOMATED COUNT: 12.8 % (ref 11.6–15.1)
GFR SERPL CREATININE-BSD FRML MDRD: 97 ML/MIN/1.73SQ M
GLUCOSE P FAST SERPL-MCNC: 142 MG/DL (ref 65–99)
HCT VFR BLD AUTO: 38.6 % (ref 34.8–46.1)
HDLC SERPL-MCNC: 50 MG/DL (ref 40–60)
HGB BLD-MCNC: 12.2 G/DL (ref 11.5–15.4)
LDLC SERPL CALC-MCNC: 81 MG/DL (ref 0–100)
MCH RBC QN AUTO: 29.4 PG (ref 26.8–34.3)
MCHC RBC AUTO-ENTMCNC: 31.6 G/DL (ref 31.4–37.4)
MCV RBC AUTO: 93 FL (ref 82–98)
NONHDLC SERPL-MCNC: 106 MG/DL
PLATELET # BLD AUTO: 271 THOUSANDS/UL (ref 149–390)
PMV BLD AUTO: 11.1 FL (ref 8.9–12.7)
POTASSIUM SERPL-SCNC: 3.9 MMOL/L (ref 3.5–5.3)
RBC # BLD AUTO: 4.15 MILLION/UL (ref 3.81–5.12)
SODIUM SERPL-SCNC: 142 MMOL/L (ref 136–145)
TRIGL SERPL-MCNC: 127 MG/DL
TSH SERPL DL<=0.05 MIU/L-ACNC: 2.38 UIU/ML (ref 0.36–3.74)
WBC # BLD AUTO: 7.69 THOUSAND/UL (ref 4.31–10.16)

## 2019-06-11 PROCEDURE — 80048 BASIC METABOLIC PNL TOTAL CA: CPT | Performed by: INTERNAL MEDICINE

## 2019-06-11 PROCEDURE — 36415 COLL VENOUS BLD VENIPUNCTURE: CPT | Performed by: INTERNAL MEDICINE

## 2019-06-11 PROCEDURE — 80061 LIPID PANEL: CPT | Performed by: INTERNAL MEDICINE

## 2019-06-11 PROCEDURE — 84443 ASSAY THYROID STIM HORMONE: CPT | Performed by: INTERNAL MEDICINE

## 2019-06-11 PROCEDURE — 85027 COMPLETE CBC AUTOMATED: CPT | Performed by: INTERNAL MEDICINE

## 2019-06-12 ENCOUNTER — DOCUMENTATION (OUTPATIENT)
Dept: SLEEP CENTER | Facility: CLINIC | Age: 69
End: 2019-06-12

## 2019-06-13 ENCOUNTER — HOSPITAL ENCOUNTER (OUTPATIENT)
Dept: NON INVASIVE DIAGNOSTICS | Facility: HOSPITAL | Age: 69
Discharge: HOME | End: 2019-06-13
Attending: INTERNAL MEDICINE
Payer: MEDICARE

## 2019-06-13 ENCOUNTER — TELEPHONE (OUTPATIENT)
Dept: CARDIOLOGY CLINIC | Facility: CLINIC | Age: 69
End: 2019-06-13

## 2019-06-13 VITALS
DIASTOLIC BLOOD PRESSURE: 74 MMHG | RESPIRATION RATE: 18 BRPM | SYSTOLIC BLOOD PRESSURE: 169 MMHG | OXYGEN SATURATION: 98 % | HEART RATE: 66 BPM

## 2019-06-13 DIAGNOSIS — I50.22 CHRONIC SYSTOLIC CONGESTIVE HEART FAILURE (HCC): ICD-10-CM

## 2019-06-13 PROCEDURE — 99152 MOD SED SAME PHYS/QHP 5/>YRS: CPT | Performed by: INTERNAL MEDICINE

## 2019-06-13 PROCEDURE — 93458 L HRT ARTERY/VENTRICLE ANGIO: CPT

## 2019-06-13 PROCEDURE — C1894 INTRO/SHEATH, NON-LASER: HCPCS

## 2019-06-13 PROCEDURE — C1769 GUIDE WIRE: HCPCS

## 2019-06-13 PROCEDURE — 93458 L HRT ARTERY/VENTRICLE ANGIO: CPT | Performed by: INTERNAL MEDICINE

## 2019-06-13 RX ORDER — LIDOCAINE HYDROCHLORIDE 10 MG/ML
INJECTION, SOLUTION INFILTRATION; PERINEURAL CODE/TRAUMA/SEDATION MEDICATION
Status: COMPLETED | OUTPATIENT
Start: 2019-06-13 | End: 2019-06-13

## 2019-06-13 RX ORDER — ONDANSETRON 2 MG/ML
4 INJECTION INTRAMUSCULAR; INTRAVENOUS EVERY 6 HOURS PRN
Status: DISCONTINUED | OUTPATIENT
Start: 2019-06-13 | End: 2019-06-14 | Stop reason: HOSPADM

## 2019-06-13 RX ORDER — ASPIRIN 81 MG/1
162 TABLET, CHEWABLE ORAL DAILY
Status: DISCONTINUED | OUTPATIENT
Start: 2019-06-13 | End: 2019-06-14 | Stop reason: HOSPADM

## 2019-06-13 RX ORDER — FUROSEMIDE 10 MG/ML
10 INJECTION INTRAMUSCULAR; INTRAVENOUS ONCE
Status: COMPLETED | OUTPATIENT
Start: 2019-06-13 | End: 2019-06-13

## 2019-06-13 RX ORDER — SODIUM CHLORIDE 9 MG/ML
75 INJECTION, SOLUTION INTRAVENOUS CONTINUOUS
Status: DISPENSED | OUTPATIENT
Start: 2019-06-13 | End: 2019-06-13

## 2019-06-13 RX ORDER — SODIUM CHLORIDE 9 MG/ML
INJECTION, SOLUTION INTRAVENOUS
Status: COMPLETED | OUTPATIENT
Start: 2019-06-13 | End: 2019-06-13

## 2019-06-13 RX ORDER — MIDAZOLAM HYDROCHLORIDE 1 MG/ML
INJECTION INTRAMUSCULAR; INTRAVENOUS CODE/TRAUMA/SEDATION MEDICATION
Status: COMPLETED | OUTPATIENT
Start: 2019-06-13 | End: 2019-06-13

## 2019-06-13 RX ORDER — FENTANYL CITRATE 50 UG/ML
INJECTION, SOLUTION INTRAMUSCULAR; INTRAVENOUS CODE/TRAUMA/SEDATION MEDICATION
Status: COMPLETED | OUTPATIENT
Start: 2019-06-13 | End: 2019-06-13

## 2019-06-13 RX ADMIN — FUROSEMIDE 10 MG: 10 INJECTION, SOLUTION INTRAMUSCULAR; INTRAVENOUS at 11:21

## 2019-06-13 RX ADMIN — MIDAZOLAM HYDROCHLORIDE 1 MG: 1 INJECTION, SOLUTION INTRAMUSCULAR; INTRAVENOUS at 10:50

## 2019-06-13 RX ADMIN — IOHEXOL 90 ML: 350 INJECTION, SOLUTION INTRAVENOUS at 11:15

## 2019-06-13 RX ADMIN — FENTANYL CITRATE 25 MCG: 50 INJECTION, SOLUTION INTRAMUSCULAR; INTRAVENOUS at 10:50

## 2019-06-13 RX ADMIN — MIDAZOLAM HYDROCHLORIDE 1 MG: 1 INJECTION, SOLUTION INTRAMUSCULAR; INTRAVENOUS at 10:45

## 2019-06-13 RX ADMIN — ASPIRIN 81 MG 162 MG: 81 TABLET ORAL at 13:13

## 2019-06-13 RX ADMIN — SODIUM CHLORIDE 50 ML/HR: 0.9 INJECTION, SOLUTION INTRAVENOUS at 10:32

## 2019-06-13 RX ADMIN — FENTANYL CITRATE 25 MCG: 50 INJECTION, SOLUTION INTRAMUSCULAR; INTRAVENOUS at 10:45

## 2019-06-13 RX ADMIN — LIDOCAINE HYDROCHLORIDE 4 ML: 10 INJECTION, SOLUTION INFILTRATION; PERINEURAL at 10:56

## 2019-06-13 RX ADMIN — LIDOCAINE HYDROCHLORIDE 1 ML: 10 INJECTION, SOLUTION INFILTRATION; PERINEURAL at 10:46

## 2019-06-17 ENCOUNTER — HOSPITAL ENCOUNTER (OUTPATIENT)
Dept: RADIOLOGY | Facility: HOSPITAL | Age: 69
Discharge: HOME | End: 2019-06-17
Attending: INTERNAL MEDICINE
Payer: MEDICARE

## 2019-06-17 DIAGNOSIS — D35.00 ADRENAL ADENOMA, UNSPECIFIED LATERALITY: ICD-10-CM

## 2019-06-17 PROCEDURE — 74170 CT ABD WO CNTRST FLWD CNTRST: CPT

## 2019-06-17 RX ADMIN — IOHEXOL 100 ML: 350 INJECTION, SOLUTION INTRAVENOUS at 14:14

## 2019-06-18 ENCOUNTER — TELEPHONE (OUTPATIENT)
Dept: CARDIOLOGY CLINIC | Facility: CLINIC | Age: 69
End: 2019-06-18

## 2019-06-21 ENCOUNTER — TELEPHONE (OUTPATIENT)
Dept: CARDIOLOGY CLINIC | Facility: CLINIC | Age: 69
End: 2019-06-21

## 2019-06-24 ENCOUNTER — TELEPHONE (OUTPATIENT)
Dept: CARDIOLOGY CLINIC | Facility: CLINIC | Age: 69
End: 2019-06-24

## 2019-07-03 ENCOUNTER — HOSPITAL ENCOUNTER (OUTPATIENT)
Dept: NON INVASIVE DIAGNOSTICS | Facility: HOSPITAL | Age: 69
Discharge: HOME | End: 2019-07-03
Attending: INTERNAL MEDICINE
Payer: MEDICARE

## 2019-07-03 DIAGNOSIS — I50.22 CHRONIC SYSTOLIC CONGESTIVE HEART FAILURE (HCC): ICD-10-CM

## 2019-07-03 PROCEDURE — 93225 XTRNL ECG REC<48 HRS REC: CPT

## 2019-07-03 PROCEDURE — 93226 XTRNL ECG REC<48 HR SCAN A/R: CPT

## 2019-07-11 PROCEDURE — 93227 XTRNL ECG REC<48 HR R&I: CPT | Performed by: INTERNAL MEDICINE

## 2019-07-16 ENCOUNTER — OFFICE VISIT (OUTPATIENT)
Dept: CARDIOLOGY CLINIC | Facility: CLINIC | Age: 69
End: 2019-07-16
Payer: MEDICARE

## 2019-07-16 VITALS
BODY MASS INDEX: 38.93 KG/M2 | DIASTOLIC BLOOD PRESSURE: 88 MMHG | HEIGHT: 64 IN | WEIGHT: 228 LBS | OXYGEN SATURATION: 96 % | SYSTOLIC BLOOD PRESSURE: 154 MMHG | HEART RATE: 75 BPM

## 2019-07-16 DIAGNOSIS — R06.00 DYSPNEA ON EXERTION: ICD-10-CM

## 2019-07-16 DIAGNOSIS — I42.0 DILATED CARDIOMYOPATHY (HCC): ICD-10-CM

## 2019-07-16 DIAGNOSIS — D35.00 ADRENAL ADENOMA, UNSPECIFIED LATERALITY: ICD-10-CM

## 2019-07-16 DIAGNOSIS — I50.22 CHRONIC SYSTOLIC (CONGESTIVE) HEART FAILURE (HCC): Primary | ICD-10-CM

## 2019-07-16 DIAGNOSIS — I10 ESSENTIAL HYPERTENSION: ICD-10-CM

## 2019-07-16 DIAGNOSIS — I31.3 PERICARDIAL EFFUSION: ICD-10-CM

## 2019-07-16 PROCEDURE — 93000 ELECTROCARDIOGRAM COMPLETE: CPT | Performed by: INTERNAL MEDICINE

## 2019-07-16 PROCEDURE — 99214 OFFICE O/P EST MOD 30 MIN: CPT | Performed by: INTERNAL MEDICINE

## 2019-07-16 RX ORDER — OLMESARTAN MEDOXOMIL 40 MG/1
40 TABLET ORAL DAILY
Qty: 90 TABLET | Refills: 2 | Status: SHIPPED | OUTPATIENT
Start: 2019-07-16 | End: 2019-07-16 | Stop reason: ALTCHOICE

## 2019-07-16 RX ORDER — CHOLECALCIFEROL (VITAMIN D3) 125 MCG
3 CAPSULE ORAL DAILY
COMMUNITY
End: 2022-04-21

## 2019-07-16 RX ORDER — OLMESARTAN MEDOXOMIL 40 MG/1
40 TABLET, FILM COATED ORAL DAILY
Qty: 90 TABLET | Refills: 2 | Status: SHIPPED | OUTPATIENT
Start: 2019-07-16 | End: 2019-09-09 | Stop reason: ALTCHOICE

## 2019-07-16 RX ORDER — OLMESARTAN MEDOXOMIL 40 MG/1
40 TABLET ORAL DAILY
Qty: 90 TABLET | Refills: 2 | Status: SHIPPED | OUTPATIENT
Start: 2019-07-16 | End: 2019-07-16
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
## 2019-08-01 ENCOUNTER — HOSPITAL ENCOUNTER (OUTPATIENT)
Dept: RADIOLOGY | Facility: HOSPITAL | Age: 69
Discharge: HOME | End: 2019-08-01
Payer: MEDICARE

## 2019-08-01 DIAGNOSIS — I50.22 CHRONIC SYSTOLIC (CONGESTIVE) HEART FAILURE (HCC): ICD-10-CM

## 2019-08-01 PROCEDURE — 75561 CARDIAC MRI FOR MORPH W/DYE: CPT

## 2019-08-01 PROCEDURE — A9585 GADOBUTROL INJECTION: HCPCS | Performed by: NEUROLOGICAL SURGERY

## 2019-08-01 RX ADMIN — GADOBUTROL 20 ML: 604.72 INJECTION INTRAVENOUS at 14:54

## 2019-08-08 ENCOUNTER — OFFICE VISIT (OUTPATIENT)
Dept: CARDIOLOGY CLINIC | Facility: CLINIC | Age: 69
End: 2019-08-08
Payer: MEDICARE

## 2019-08-08 VITALS
DIASTOLIC BLOOD PRESSURE: 78 MMHG | OXYGEN SATURATION: 96 % | SYSTOLIC BLOOD PRESSURE: 156 MMHG | HEIGHT: 64 IN | WEIGHT: 233 LBS | HEART RATE: 86 BPM | BODY MASS INDEX: 39.78 KG/M2

## 2019-08-08 DIAGNOSIS — I31.3 PERICARDIAL EFFUSION: ICD-10-CM

## 2019-08-08 DIAGNOSIS — I50.22 CHRONIC SYSTOLIC (CONGESTIVE) HEART FAILURE (HCC): Primary | ICD-10-CM

## 2019-08-08 DIAGNOSIS — I63.9 CEREBROVASCULAR ACCIDENT (CVA), UNSPECIFIED MECHANISM (HCC): ICD-10-CM

## 2019-08-08 DIAGNOSIS — E66.2 MORBID (SEVERE) OBESITY WITH ALVEOLAR HYPOVENTILATION (HCC): ICD-10-CM

## 2019-08-08 DIAGNOSIS — I10 ESSENTIAL HYPERTENSION: ICD-10-CM

## 2019-08-08 PROCEDURE — 99214 OFFICE O/P EST MOD 30 MIN: CPT | Performed by: INTERNAL MEDICINE

## 2019-08-08 RX ORDER — TORSEMIDE 10 MG/1
10 TABLET ORAL DAILY
Qty: 30 TABLET | Refills: 2 | Status: SHIPPED | OUTPATIENT
Start: 2019-08-08 | End: 2019-12-18 | Stop reason: SDUPTHER

## 2019-08-08 RX ORDER — SPIRONOLACTONE 25 MG/1
25 TABLET ORAL DAILY
Qty: 30 TABLET | Refills: 5 | Status: SHIPPED | OUTPATIENT
Start: 2019-08-08 | End: 2020-01-03 | Stop reason: SDUPTHER
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
## 2019-08-12 NOTE — PROGRESS NOTES
Subjective:      Patient ID: Mahesh Gill is a 71 y o  female  Chief Complaint   Patient presents with   [facility] Establish Care     needs PCP  edgar       Np, here to establish  In February woke and had very loud breathing,  Went to the hospital and diagnosed with flu, pneumonia, and CHF  Now seeing Dr Anthony Christy and Dr Jeanine Sahni chronically  Was diagnosed with viral cardiomyopathy and LUCIA  She does feel better but will still get winded with exertion; however, her recovery time is improving as time goes on  She has a history of multiple disc herniations and can't really go far without pain so does not exercise much  Dr Antione Agustin started 2 diuretics at last visit early August and she is feeling better  She has dropped 7 pounds in water weight since starting these 8/9  Takes bp at home and numbers are up and down but generally a little high  Has not had her monitor checked against bp cuff in the doctor's office  The following portions of the patient's history were reviewed and updated as appropriate: allergies, current medications, past family history, past medical history, past social history, past surgical history and problem list     Review of Systems   Constitutional: Negative  Respiratory: Positive for shortness of breath  Negative for apnea, cough, chest tightness and wheezing  Cardiovascular: Negative            Current Outpatient Medications   Medication Sig Dispense Refill    albuterol (VENTOLIN HFA) 90 mcg/act inhaler Inhale 2 puffs every 4 (four) hours as needed for wheezing or shortness of breath 1 Inhaler 7    aspirin 325 mg tablet Take 325 mg by mouth daily      BENICAR 40 MG tablet Take 1 tablet (40 mg total) by mouth daily 90 tablet 2    Cholecalciferol (VITAMIN D-3) 1000 units CAPS Take 5,000 Units by mouth daily      ibuprofen (ADVIL) 200 mg tablet Take by mouth      Melatonin 5 MG TABS Take 3 tablets by mouth daily       spironolactone (ALDACTONE) 25 mg tablet Take 1 tablet (25 mg total) by mouth daily 30 tablet 5    torsemide (DEMADEX) 10 mg tablet Take 1 tablet (10 mg total) by mouth daily 30 tablet 2    vitamin E, tocopherol, 400 units capsule Take 400 Units by mouth 2 (two) times a day      ACCU-CHEK LASHON PLUS test strip        Current Facility-Administered Medications   Medication Dose Route Frequency Provider Last Rate Last Dose    cyanocobalamin injection 1,000 mcg  1,000 mcg Intramuscular Once Zuhair Harris MD           Objective:    /60   Pulse 70   Temp (!) 97 2 °F (36 2 °C)   Resp 20   Ht 5' 4" (1 626 m)   Wt 103 kg (226 lb)   BMI 38 79 kg/m²        Physical Exam   Constitutional: She appears well-developed and well-nourished  Eyes: Conjunctivae are normal    Neck: Neck supple  No JVD present  No thyromegaly present  Cardiovascular: Normal rate, regular rhythm, normal heart sounds and intact distal pulses  Exam reveals no gallop and no friction rub  No murmur heard  Pulmonary/Chest: Effort normal and breath sounds normal  She has no wheezes  She has no rales  Abdominal: Soft  Bowel sounds are normal  She exhibits no distension  There is no tenderness  Musculoskeletal: She exhibits no edema  Skin: Skin is warm and dry  No rash noted  Assessment/Plan:    Essential hypertension  BP readings are controlled here  Will continue medications as ordered  These are managed by her cardiologist   Hugo Jarquin on low salt diet, exercise, weight loss  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)  Discussed need for exercise, weight loss  LUCIA (obstructive sleep apnea)  She continues to follow with pulmonology and continues to use her CPAP machine  Dilated cardiomyopathy ([address] )  She is symptomatically improved since the addition of torsemide and spironolactone  She will continue to follow with her cardiologist     BMI Counseling: Body mass index is 38 79 kg/m²  Discussed the patient's BMI with her  The BMI is above average   BMI counseling and education was provided to the patient  Nutrition recommendations include reducing portion sizes and decreasing overall calorie intake  Exercise recommendations include moderate aerobic physical activity for 150 minutes/week  Diagnoses and all orders for this visit:    Essential hypertension    Dilated cardiomyopathy (HCC)    LUCIA (obstructive sleep apnea)    Colon cancer screening  -     Cologuard; Future    B12 deficiency  -     cyanocobalamin injection 1,000 mcg    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)    BMI 38 0-38 9,adult          Return in about 3 months (around 11/15/2019) for 1/2 hour AWV and follow up         Cam Lyle MD

## 2019-08-14 ENCOUNTER — OFFICE VISIT (OUTPATIENT)
Dept: PULMONOLOGY | Facility: MEDICAL CENTER | Age: 69
End: 2019-08-14
Payer: MEDICARE

## 2019-08-14 VITALS
SYSTOLIC BLOOD PRESSURE: 122 MMHG | WEIGHT: 228 LBS | HEIGHT: 64 IN | HEART RATE: 72 BPM | RESPIRATION RATE: 12 BRPM | TEMPERATURE: 97.5 F | DIASTOLIC BLOOD PRESSURE: 64 MMHG | BODY MASS INDEX: 38.93 KG/M2 | OXYGEN SATURATION: 94 %

## 2019-08-14 DIAGNOSIS — E66.2 MORBID (SEVERE) OBESITY WITH ALVEOLAR HYPOVENTILATION (HCC): ICD-10-CM

## 2019-08-14 DIAGNOSIS — G47.33 OSA (OBSTRUCTIVE SLEEP APNEA): Primary | ICD-10-CM

## 2019-08-14 DIAGNOSIS — I42.0 DILATED CARDIOMYOPATHY (HCC): ICD-10-CM

## 2019-08-14 PROCEDURE — 99214 OFFICE O/P EST MOD 30 MIN: CPT | Performed by: INTERNAL MEDICINE

## 2019-08-14 NOTE — ASSESSMENT & PLAN NOTE
I reviewed compliance data from period of July 14 to August 12, 2019 for her CPAP machine  Maryruth Pallas has moderate LUCIA and is on auto CPAP with pressure range of 7-20 cm water  Her medical supply company is [employer]  She does use an Airfit Full-face mask with silicon liner and would prefer some other type of liner other than the silicon which she says sometimes bothers her face  She sometimes also uses nasal pillows  I did show her an air Touch F 20 full-face mask with a foam liner and she liked this,  While in office her  did order a Air Touch F20 foam Full-face mask from [employer] and this will be delivered tomorrow  She just had a recent mass delivered her and will not be eligible for this new mask for few months so she ordered her own  If she tolerates this mask and it continues to do well with her CPAP she will call our office and then I can order a nocturnal pulse oximetry test on her to see if she has any hypoxemia with using the CPAP  On prior diagnostic study showed hypoxemia likely due to alveolar hypoventilation from obesity and also from her LUCIA  Compliance data from July to August 12, 2019 showed overall AHI of to be 1 1 which is excellent  Her average pressure was about Davis cm per water  Will continue with auto CPAP with pressure range of 7-20 cm water

## 2019-08-14 NOTE — ASSESSMENT & PLAN NOTE
Recent MRI done of her heart showed left ventricular ejection fraction be 41% which is better than last echo which showed EF of 35%  There is suggested she may have viral cardiomyopathy with these results  Her cardiologist Dr Felix Begum did add Aldactone 25 mg daily and torsemide 10 mg per day    Her Benicar dose was increased to 40 mg per day
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
## 2019-08-14 NOTE — ASSESSMENT & PLAN NOTE
I did encourage her to lose weight  Also once [facility] on the best mask interface for her CPAP unit she will contact our office and I will order nocturnal pulse oximetry recording through [employer] her medical supply company

## 2019-08-15 ENCOUNTER — OFFICE VISIT (OUTPATIENT)
Dept: FAMILY MEDICINE CLINIC | Facility: CLINIC | Age: 69
End: 2019-08-15
Payer: MEDICARE

## 2019-08-15 VITALS
HEART RATE: 70 BPM | DIASTOLIC BLOOD PRESSURE: 60 MMHG | TEMPERATURE: 97.2 F | SYSTOLIC BLOOD PRESSURE: 138 MMHG | HEIGHT: 64 IN | RESPIRATION RATE: 20 BRPM | WEIGHT: 226 LBS | BODY MASS INDEX: 38.58 KG/M2

## 2019-08-15 DIAGNOSIS — I10 ESSENTIAL HYPERTENSION: Primary | ICD-10-CM

## 2019-08-15 DIAGNOSIS — G47.33 OSA (OBSTRUCTIVE SLEEP APNEA): ICD-10-CM

## 2019-08-15 DIAGNOSIS — I42.0 DILATED CARDIOMYOPATHY (HCC): ICD-10-CM

## 2019-08-15 DIAGNOSIS — Z12.11 COLON CANCER SCREENING: ICD-10-CM

## 2019-08-15 DIAGNOSIS — E53.8 B12 DEFICIENCY: ICD-10-CM

## 2019-08-15 DIAGNOSIS — E66.2 MORBID (SEVERE) OBESITY WITH ALVEOLAR HYPOVENTILATION (HCC): ICD-10-CM

## 2019-08-15 PROBLEM — E55.9 VITAMIN D DEFICIENCY: Status: ACTIVE | Noted: 2019-08-15

## 2019-08-15 PROBLEM — M51.26 LUMBAR HERNIATED DISC: Status: ACTIVE | Noted: 2019-08-15

## 2019-08-15 PROCEDURE — 96372 THER/PROPH/DIAG INJ SC/IM: CPT

## 2019-08-15 PROCEDURE — 99203 OFFICE O/P NEW LOW 30 MIN: CPT | Performed by: INTERNAL MEDICINE

## 2019-08-15 RX ORDER — CYANOCOBALAMIN 1000 UG/ML
1000 INJECTION INTRAMUSCULAR; SUBCUTANEOUS ONCE
Status: COMPLETED | OUTPATIENT
Start: 2019-08-15 | End: 2019-08-15

## 2019-08-15 RX ADMIN — CYANOCOBALAMIN 1000 MCG: 1000 INJECTION INTRAMUSCULAR; SUBCUTANEOUS at 15:59

## 2019-08-15 NOTE — ASSESSMENT & PLAN NOTE
BP readings are controlled here  Will continue medications as ordered  These are managed by her cardiologist   Antonio Bean on low salt diet, exercise, weight loss

## 2019-08-15 NOTE — ASSESSMENT & PLAN NOTE
She is symptomatically improved since the addition of torsemide and spironolactone   She will continue to follow with her cardiologist

## 2019-09-03 ENCOUNTER — TRANSCRIBE ORDERS (OUTPATIENT)
Dept: ADMINISTRATIVE | Facility: HOSPITAL | Age: 69
End: 2019-09-03

## 2019-09-03 ENCOUNTER — APPOINTMENT (OUTPATIENT)
Dept: LAB | Facility: HOSPITAL | Age: 69
End: 2019-09-03
Attending: INTERNAL MEDICINE
Payer: MEDICARE

## 2019-09-03 ENCOUNTER — TELEPHONE (OUTPATIENT)
Dept: CARDIOLOGY CLINIC | Facility: CLINIC | Age: 69
End: 2019-09-03

## 2019-09-03 DIAGNOSIS — I50.22 CHRONIC SYSTOLIC (CONGESTIVE) HEART FAILURE (HCC): ICD-10-CM

## 2019-09-03 LAB
ANION GAP SERPL CALCULATED.3IONS-SCNC: 7 MMOL/L (ref 4–13)
BUN SERPL-MCNC: 29 MG/DL (ref 5–25)
CALCIUM SERPL-MCNC: 9.8 MG/DL (ref 8.3–10.1)
CHLORIDE SERPL-SCNC: 104 MMOL/L (ref 100–108)
CO2 SERPL-SCNC: 30 MMOL/L (ref 21–32)
CREAT SERPL-MCNC: 0.74 MG/DL (ref 0.6–1.3)
GFR SERPL CREATININE-BSD FRML MDRD: 83 ML/MIN/1.73SQ M
GLUCOSE P FAST SERPL-MCNC: 150 MG/DL (ref 65–99)
POTASSIUM SERPL-SCNC: 5 MMOL/L (ref 3.5–5.3)
SODIUM SERPL-SCNC: 141 MMOL/L (ref 136–145)

## 2019-09-03 PROCEDURE — 36415 COLL VENOUS BLD VENIPUNCTURE: CPT

## 2019-09-03 PROCEDURE — 80048 BASIC METABOLIC PNL TOTAL CA: CPT

## 2019-09-03 NOTE — TELEPHONE ENCOUNTER
----- Message from Rossy Harrison DO sent at 9/3/2019  3:35 PM EDT -----  Can you please let the patient know blood work was normal

## 2019-09-09 ENCOUNTER — OFFICE VISIT (OUTPATIENT)
Dept: CARDIOLOGY CLINIC | Facility: CLINIC | Age: 69
End: 2019-09-09
Payer: MEDICARE

## 2019-09-09 VITALS
WEIGHT: 225 LBS | HEART RATE: 70 BPM | SYSTOLIC BLOOD PRESSURE: 138 MMHG | OXYGEN SATURATION: 97 % | BODY MASS INDEX: 38.41 KG/M2 | DIASTOLIC BLOOD PRESSURE: 62 MMHG | HEIGHT: 64 IN

## 2019-09-09 DIAGNOSIS — I50.22 CHRONIC SYSTOLIC CONGESTIVE HEART FAILURE (HCC): Primary | ICD-10-CM

## 2019-09-09 DIAGNOSIS — I10 ESSENTIAL HYPERTENSION: ICD-10-CM

## 2019-09-09 DIAGNOSIS — I42.0 DILATED CARDIOMYOPATHY (HCC): ICD-10-CM

## 2019-09-09 DIAGNOSIS — I31.3 PERICARDIAL EFFUSION: ICD-10-CM

## 2019-09-09 DIAGNOSIS — I63.9 CEREBROVASCULAR ACCIDENT (CVA), UNSPECIFIED MECHANISM (HCC): ICD-10-CM

## 2019-09-09 PROCEDURE — 99214 OFFICE O/P EST MOD 30 MIN: CPT | Performed by: INTERNAL MEDICINE

## 2019-09-09 PROCEDURE — 1124F ACP DISCUSS-NO DSCNMKR DOCD: CPT | Performed by: INTERNAL MEDICINE
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
## 2019-09-12 ENCOUNTER — TELEPHONE (OUTPATIENT)
Dept: PULMONOLOGY | Facility: MEDICAL CENTER | Age: 69
End: 2019-09-12

## 2019-09-12 NOTE — TELEPHONE ENCOUNTER
Please write a script for a new mask   airtouch F20 medium     Also would like a script sent to pharmacy for a pulse ox

## 2019-10-09 ENCOUNTER — TRANSCRIBE ORDERS (OUTPATIENT)
Dept: ADMINISTRATIVE | Facility: HOSPITAL | Age: 69
End: 2019-10-09

## 2019-10-09 ENCOUNTER — APPOINTMENT (OUTPATIENT)
Dept: LAB | Facility: HOSPITAL | Age: 69
End: 2019-10-09
Attending: INTERNAL MEDICINE
Payer: MEDICARE

## 2019-10-09 ENCOUNTER — TELEPHONE (OUTPATIENT)
Dept: CARDIOLOGY CLINIC | Facility: CLINIC | Age: 69
End: 2019-10-09

## 2019-10-09 DIAGNOSIS — I50.22 CHRONIC SYSTOLIC CONGESTIVE HEART FAILURE (HCC): ICD-10-CM

## 2019-10-09 LAB
ANION GAP SERPL CALCULATED.3IONS-SCNC: 8 MMOL/L (ref 4–13)
BUN SERPL-MCNC: 21 MG/DL (ref 5–25)
CALCIUM SERPL-MCNC: 9.8 MG/DL (ref 8.3–10.1)
CHLORIDE SERPL-SCNC: 105 MMOL/L (ref 100–108)
CO2 SERPL-SCNC: 26 MMOL/L (ref 21–32)
CREAT SERPL-MCNC: 0.61 MG/DL (ref 0.6–1.3)
GFR SERPL CREATININE-BSD FRML MDRD: 93 ML/MIN/1.73SQ M
GLUCOSE P FAST SERPL-MCNC: 180 MG/DL (ref 65–99)
POTASSIUM SERPL-SCNC: 4.1 MMOL/L (ref 3.5–5.3)
SODIUM SERPL-SCNC: 139 MMOL/L (ref 136–145)

## 2019-10-09 PROCEDURE — 36415 COLL VENOUS BLD VENIPUNCTURE: CPT

## 2019-10-09 PROCEDURE — 80048 BASIC METABOLIC PNL TOTAL CA: CPT

## 2019-10-09 NOTE — TELEPHONE ENCOUNTER
----- Message from Samina Celis DO sent at 10/9/2019 11:56 AM EDT -----  Can you please let the patient know her blood work came back normal (Except her sugar was a little high)

## 2019-10-10 ENCOUNTER — OFFICE VISIT (OUTPATIENT)
Dept: CARDIOLOGY CLINIC | Facility: CLINIC | Age: 69
End: 2019-10-10
Payer: MEDICARE

## 2019-10-10 VITALS
HEIGHT: 64 IN | SYSTOLIC BLOOD PRESSURE: 122 MMHG | DIASTOLIC BLOOD PRESSURE: 72 MMHG | OXYGEN SATURATION: 96 % | HEART RATE: 72 BPM | BODY MASS INDEX: 39.01 KG/M2 | WEIGHT: 228.5 LBS

## 2019-10-10 DIAGNOSIS — I42.0 DILATED CARDIOMYOPATHY (HCC): ICD-10-CM

## 2019-10-10 DIAGNOSIS — I63.9 CEREBROVASCULAR ACCIDENT (CVA), UNSPECIFIED MECHANISM (HCC): ICD-10-CM

## 2019-10-10 DIAGNOSIS — I10 ESSENTIAL HYPERTENSION: ICD-10-CM

## 2019-10-10 DIAGNOSIS — G47.33 OSA (OBSTRUCTIVE SLEEP APNEA): ICD-10-CM

## 2019-10-10 DIAGNOSIS — I50.22 CHRONIC SYSTOLIC CONGESTIVE HEART FAILURE (HCC): Primary | ICD-10-CM

## 2019-10-10 PROCEDURE — 99214 OFFICE O/P EST MOD 30 MIN: CPT | Performed by: INTERNAL MEDICINE

## 2019-10-10 RX ORDER — ASPIRIN 81 MG/1
81 TABLET ORAL DAILY
Qty: 30 TABLET | Refills: 5
Start: 2019-10-10 | End: 2019-11-21 | Stop reason: SDUPTHER
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
## 2019-10-23 ENCOUNTER — TELEPHONE (OUTPATIENT)
Dept: PULMONOLOGY | Facility: MEDICAL CENTER | Age: 69
End: 2019-10-23

## 2019-10-23 DIAGNOSIS — G47.33 OSA (OBSTRUCTIVE SLEEP APNEA): Primary | ICD-10-CM
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
## 2019-11-21 ENCOUNTER — HOSPITAL ENCOUNTER (OUTPATIENT)
Dept: RADIOLOGY | Facility: HOSPITAL | Age: 69
Discharge: HOME | End: 2019-11-21
Attending: INTERNAL MEDICINE
Payer: MEDICARE

## 2019-11-21 ENCOUNTER — TELEPHONE (OUTPATIENT)
Dept: FAMILY MEDICINE CLINIC | Facility: CLINIC | Age: 69
End: 2019-11-21

## 2019-11-21 ENCOUNTER — TRANSCRIBE ORDERS (OUTPATIENT)
Dept: ADMINISTRATIVE | Facility: HOSPITAL | Age: 69
End: 2019-11-21

## 2019-11-21 ENCOUNTER — OFFICE VISIT (OUTPATIENT)
Dept: FAMILY MEDICINE CLINIC | Facility: CLINIC | Age: 69
End: 2019-11-21
Payer: MEDICARE

## 2019-11-21 VITALS
TEMPERATURE: 97.6 F | OXYGEN SATURATION: 95 % | BODY MASS INDEX: 40.12 KG/M2 | RESPIRATION RATE: 16 BRPM | SYSTOLIC BLOOD PRESSURE: 100 MMHG | WEIGHT: 235 LBS | HEIGHT: 64 IN | DIASTOLIC BLOOD PRESSURE: 60 MMHG | HEART RATE: 80 BPM

## 2019-11-21 DIAGNOSIS — M79.645 PAIN OF FINGER OF LEFT HAND: ICD-10-CM

## 2019-11-21 DIAGNOSIS — Z00.00 MEDICARE ANNUAL WELLNESS VISIT, SUBSEQUENT: Primary | ICD-10-CM

## 2019-11-21 DIAGNOSIS — M54.9 CHRONIC BACK PAIN, UNSPECIFIED BACK LOCATION, UNSPECIFIED BACK PAIN LATERALITY: ICD-10-CM

## 2019-11-21 DIAGNOSIS — G89.29 CHRONIC PAIN OF LEFT KNEE: ICD-10-CM

## 2019-11-21 DIAGNOSIS — G89.29 CHRONIC BACK PAIN, UNSPECIFIED BACK LOCATION, UNSPECIFIED BACK PAIN LATERALITY: ICD-10-CM

## 2019-11-21 DIAGNOSIS — I10 ESSENTIAL HYPERTENSION: ICD-10-CM

## 2019-11-21 DIAGNOSIS — I50.22 CHRONIC SYSTOLIC CONGESTIVE HEART FAILURE (HCC): ICD-10-CM

## 2019-11-21 DIAGNOSIS — M25.562 CHRONIC PAIN OF LEFT KNEE: ICD-10-CM

## 2019-11-21 DIAGNOSIS — J45.20 MILD INTERMITTENT ASTHMA WITHOUT COMPLICATION: ICD-10-CM

## 2019-11-21 DIAGNOSIS — I63.9 CEREBROVASCULAR ACCIDENT (CVA), UNSPECIFIED MECHANISM (HCC): ICD-10-CM

## 2019-11-21 PROBLEM — G45.9 TIA (TRANSIENT ISCHEMIC ATTACK): Status: ACTIVE | Noted: 2019-11-21

## 2019-11-21 PROCEDURE — G0439 PPPS, SUBSEQ VISIT: HCPCS | Performed by: INTERNAL MEDICINE

## 2019-11-21 PROCEDURE — 99214 OFFICE O/P EST MOD 30 MIN: CPT | Performed by: INTERNAL MEDICINE

## 2019-11-21 PROCEDURE — 73562 X-RAY EXAM OF KNEE 3: CPT

## 2019-11-21 PROCEDURE — 72072 X-RAY EXAM THORAC SPINE 3VWS: CPT

## 2019-11-21 PROCEDURE — 72110 X-RAY EXAM L-2 SPINE 4/>VWS: CPT

## 2019-11-21 RX ORDER — ASPIRIN 81 MG/1
324 TABLET ORAL DAILY
Qty: 30 TABLET | Refills: 5
Start: 2019-11-21 | End: 2021-02-25

## 2019-11-21 NOTE — TELEPHONE ENCOUNTER
DR GIBBONS [facility]    Patient is calling because she cannot get in to see Dr Neetu Milian until Jan or Feb      Please advise

## 2019-11-21 NOTE — TELEPHONE ENCOUNTER
Please call Dr Hawkins Serve office to see if she can be seen sooner  Had TIA by history and has MRI pending
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
## 2019-11-21 NOTE — ASSESSMENT & PLAN NOTE
Wt Readings from Last 3 Encounters:   11/21/19 107 kg (235 lb)   10/10/19 104 kg (228 lb 8 oz)   09/09/19 102 kg (225 lb)         She follows regularly with cardiology and they manage

## 2019-11-21 NOTE — ASSESSMENT & PLAN NOTE
Has history of CVA and new symptoms consistent with TIA  Will increase ASA to 325 mg daily  Advised to go to ER for any recurrent symptoms  Will check MRI and referred for neurology evaluation

## 2019-11-21 NOTE — PATIENT INSTRUCTIONS
Medicare Preventive Visit Patient Instructions  Thank you for completing your Welcome to Medicare Visit or Medicare Annual Wellness Visit today  Your next wellness visit will be due in one year (11/21/2020)  The screening/preventive services that you may require over the next 5-10 years are detailed below  Some tests may not apply to you based off risk factors and/or age  Screening tests ordered at today's visit but not completed yet may show as past due  Also, please note that scanned in results may not display below  Preventive Screenings:  Service Recommendations Previous Testing/Comments   Colorectal Cancer Screening  * Colonoscopy    * Fecal Occult Blood Test (FOBT)/Fecal Immunochemical Test (FIT)  * Fecal DNA/Cologuard Test  * Flexible Sigmoidoscopy Age: 54-65 years old   Colonoscopy: every 10 years (may be performed more frequently if at higher risk)  OR  FOBT/FIT: every 1 year  OR  Cologuard: every 3 years  OR  Sigmoidoscopy: every 5 years  Screening may be recommended earlier than age 48 if at higher risk for colorectal cancer  Also, an individualized decision between you and your healthcare provider will decide whether screening between the ages of 74-80 would be appropriate  Colonoscopy: Not on file  FOBT/FIT: Not on file  Cologuard: 10/14/2019  Sigmoidoscopy: Not on file    Screening Current     Breast Cancer Screening Age: 36 years old  Frequency: every 1-2 years  Not required if history of left and right mastectomy Mammogram: Not on file       Cervical Cancer Screening Between the ages of 21-29, pap smear recommended once every 3 years  Between the ages of 33-67, can perform pap smear with HPV co-testing every 5 years     Recommendations may differ for women with a history of total hysterectomy, cervical cancer, or abnormal pap smears in past  Pap Smear: Not on file    Screening Not Indicated   Hepatitis C Screening Once for adults born between 1945 and 1965  More frequently in patients at high risk for Hepatitis C Hep C Antibody: Not on file       Diabetes Screening 1-2 times per year if you're at risk for diabetes or have pre-diabetes Fasting glucose: 180 mg/dL   A1C: No results in last 5 years    Screening Current   Cholesterol Screening Once every 5 years if you don't have a lipid disorder  May order more often based on risk factors  Lipid panel: 06/11/2019    Screening Current     Other Preventive Screenings Covered by Medicare:  1  Abdominal Aortic Aneurysm (AAA) Screening: covered once if your at risk  You're considered to be at risk if you have a family history of AAA  2  Lung Cancer Screening: covers low dose CT scan once per year if you meet all of the following conditions: (1) Age 50-69; (2) No signs or symptoms of lung cancer; (3) Current smoker or have quit smoking within the last 15 years; (4) You have a tobacco smoking history of at least 30 pack years (packs per day multiplied by number of years you smoked); (5) You get a written order from a healthcare provider  3  Glaucoma Screening: covered annually if you're considered high risk: (1) You have diabetes OR (2) Family history of glaucoma OR (3)  aged 48 and older OR (3)  American aged 72 and older  3  Osteoporosis Screening: covered every 2 years if you meet one of the following conditions: (1) You're estrogen deficient and at risk for osteoporosis based off medical history and other findings; (2) Have a vertebral abnormality; (3) On glucocorticoid therapy for more than 3 months; (4) Have primary hyperparathyroidism; (5) On osteoporosis medications and need to assess response to drug therapy  · Last bone density test (DXA Scan): Not on file  5  HIV Screening: covered annually if you're between the age of 12-76  Also covered annually if you are younger than 13 and older than 72 with risk factors for HIV infection  For pregnant patients, it is covered up to 3 times per pregnancy      Immunizations:  Immunization Recommendations   Influenza Vaccine Annual influenza vaccination during flu season is recommended for all persons aged >= 6 months who do not have contraindications   Pneumococcal Vaccine (Prevnar and Pneumovax)  * Prevnar = PCV13  * Pneumovax = PPSV23   Adults 25-60 years old: 1-3 doses may be recommended based on certain risk factors  Adults 72 years old: Prevnar (PCV13) vaccine recommended followed by Pneumovax (PPSV23) vaccine  If already received PPSV23 since turning 65, then PCV13 recommended at least one year after PPSV23 dose  Hepatitis B Vaccine 3 dose series if at intermediate or high risk (ex: diabetes, end stage renal disease, liver disease)   Tetanus (Td) Vaccine - COST NOT COVERED BY MEDICARE PART B Following completion of primary series, a booster dose should be given every 10 years to maintain immunity against tetanus  Td may also be given as tetanus wound prophylaxis  Tdap Vaccine - COST NOT COVERED BY MEDICARE PART B Recommended at least once for all adults  For pregnant patients, recommended with each pregnancy  Shingles Vaccine (Shingrix) - COST NOT COVERED BY MEDICARE PART B  2 shot series recommended in those aged 48 and above     Health Maintenance Due:      Topic Date Due    Hepatitis C Screening  1950    MAMMOGRAM  1950    CRC Screening: Cologuard  10/14/2022     Immunizations Due:      Topic Date Due    DTaP,Tdap,and Td Vaccines (1 - Tdap) 03/15/1961    Pneumococcal Vaccine: 65+ Years (1 of 2 - PCV13) 03/15/2015     Advance Directives   What are advance directives? Advance directives are legal documents that state your wishes and plans for medical care  These plans are made ahead of time in case you lose your ability to make decisions for yourself  Advance directives can apply to any medical decision, such as the treatments you want, and if you want to donate organs  What are the types of advance directives?   There are many types of advance directives, and each state has rules about how to use them  You may choose a combination of any of the following:  · Living will: This is a written record of the treatment you want  You can also choose which treatments you do not want, which to limit, and which to stop at a certain time  This includes surgery, medicine, IV fluid, and tube feedings  · Durable power of  for healthcare [facility]): This is a written record that states who you want to make healthcare choices for you when you are unable to make them for yourself  This person, called a proxy, is usually a family member or a friend  You may choose more than 1 proxy  · Do not resuscitate (DNR) order:  A DNR order is used in case your heart stops beating or you stop breathing  It is a request not to have certain forms of treatment, such as CPR  A DNR order may be included in other types of advance directives  · Medical directive: This covers the care that you want if you are in a coma, near death, or unable to make decisions for yourself  You can list the treatments you want for each condition  Treatment may include pain medicine, surgery, blood transfusions, dialysis, IV or tube feedings, and a ventilator (breathing machine)  · Values history: This document has questions about your views, beliefs, and how you feel and think about life  This information can help others choose the care that you would choose  Why are advance directives important? An advance directive helps you control your care  Although spoken wishes may be used, it is better to have your wishes written down  Spoken wishes can be misunderstood, or not followed  Treatments may be given even if you do not want them  An advance directive may make it easier for your family to make difficult choices about your care  Urinary Incontinence   Urinary incontinence (UI)  is when you lose control of your bladder  UI develops because your bladder cannot store or empty urine properly   The 3 most common types of UI are stress incontinence, urge incontinence, or both  Medicines:   · May be given to help strengthen your bladder control  Report any side effects of medication to your healthcare provider  Do pelvic muscle exercises often:  Your pelvic muscles help you stop urinating  Squeeze these muscles tight for 5 seconds, then relax for 5 seconds  Gradually work up to squeezing for 10 seconds  Do 3 sets of 15 repetitions a day, or as directed  This will help strengthen your pelvic muscles and improve bladder control  Train your bladder:  Go to the bathroom at set times, such as every 2 hours, even if you do not feel the urge to go  You can also try to hold your urine when you feel the urge to go  For example, hold your urine for 5 minutes when you feel the urge to go  As that becomes easier, hold your urine for 10 minutes  Self-care:   · Keep a UI record  Write down how often you leak urine and how much you leak  Make a note of what you were doing when you leaked urine  · Drink liquids as directed  You may need to limit the amount of liquid you drink to help control your urine leakage  Do not drink any liquid right before you go to bed  Limit or do not have drinks that contain caffeine or alcohol  · Prevent constipation  Eat a variety of high-fiber foods  Good examples are high-fiber cereals, beans, vegetables, and whole-grain breads  Walking is the best way to trigger your intestines to have a bowel movement  · Exercise regularly and maintain a healthy weight  Weight loss and exercise will decrease pressure on your bladder and help you control your leakage  · Use a catheter as directed  to help empty your bladder  A catheter is a tiny, plastic tube that is put into your bladder to drain your urine  · Go to behavior therapy as directed  Behavior therapy may be used to help you learn to control your urge to urinate      Weight Management   Why it is important to manage your weight:  Being overweight increases your risk of health conditions such as heart disease, high blood pressure, type 2 diabetes, and certain types of cancer  It can also increase your risk for osteoarthritis, sleep apnea, and other respiratory problems  Aim for a slow, steady weight loss  Even a small amount of weight loss can lower your risk of health problems  How to lose weight safely:  A safe and healthy way to lose weight is to eat fewer calories and get regular exercise  You can lose up about 1 pound a week by decreasing the number of calories you eat by 500 calories each day  Healthy meal plan for weight management:  A healthy meal plan includes a variety of foods, contains fewer calories, and helps you stay healthy  A healthy meal plan includes the following:  · Eat whole-grain foods more often  A healthy meal plan should contain fiber  Fiber is the part of grains, fruits, and vegetables that is not broken down by your body  Whole-grain foods are healthy and provide extra fiber in your diet  Some examples of whole-grain foods are whole-wheat breads and pastas, oatmeal, brown rice, and bulgur  · Eat a variety of vegetables every day  Include dark, leafy greens such as spinach, kale, felecia greens, and mustard greens  Eat yellow and orange vegetables such as carrots, sweet potatoes, and winter squash  · Eat a variety of fruits every day  Choose fresh or canned fruit (canned in its own juice or light syrup) instead of juice  Fruit juice has very little or no fiber  · Eat low-fat dairy foods  Drink fat-free (skim) milk or 1% milk  Eat fat-free yogurt and low-fat cottage cheese  Try low-fat cheeses such as mozzarella and other reduced-fat cheeses  · Choose meat and other protein foods that are low in fat  Choose beans or other legumes such as split peas or lentils  Choose fish, skinless poultry (chicken or turkey), or lean cuts of red meat (beef or pork)  Before you cook meat or poultry, cut off any visible fat     · Use less fat and oil  Try baking foods instead of frying them  Add less fat, such as margarine, sour cream, regular salad dressing and mayonnaise to foods  Eat fewer high-fat foods  Some examples of high-fat foods include french fries, doughnuts, ice cream, and cakes  · Eat fewer sweets  Limit foods and drinks that are high in sugar  This includes candy, cookies, regular soda, and sweetened drinks  Exercise:  Exercise at least 30 minutes per day on most days of the week  Some examples of exercise include walking, biking, dancing, and swimming  You can also fit in more physical activity by taking the stairs instead of the elevator or parking farther away from stores  Ask your healthcare provider about the best exercise plan for you  © Copyright [employer] 2018 Information is for End User's use only and may not be sold, redistributed or otherwise used for commercial purposes   All illustrations and images included in CareNotes® are the copyrighted property of [employer]  or [address] [place] [address]

## 2019-11-22 ENCOUNTER — TELEPHONE (OUTPATIENT)
Dept: CARDIOLOGY CLINIC | Facility: CLINIC | Age: 69
End: 2019-11-22

## 2019-11-22 ENCOUNTER — TELEPHONE (OUTPATIENT)
Dept: NEUROLOGY | Facility: CLINIC | Age: 69
End: 2019-11-22

## 2019-11-22 NOTE — TELEPHONE ENCOUNTER
----- Message from Mayelin Hanks sent at 11/22/2019  9:35 AM EST -----  Regarding: RE:Your Recent Visit  Contact: [phone]  Dr Prerna Castanon had an appointment with Dr Quique Mares yesterday  At that time I related to her an experience I had two weeks ago  While attempting to have a conversation with my , all of a sudden my words came out garbled, what I can only refer to as work salad  I showed no signs of a stroke and it passed in 10 minutes and I was fine  Dr Quique Mares says it may have been a  TIA  She is sending me to a neurologist and for a brain MRI and wants me back on a full strength aspirin  Is that ok? Thank you,  Mayelin Hanks  ----- Message -----  From: Ida Ureña DO  Sent: 9/13/2019  6:46 PM EDT  To: Mayelin Hanks  Subject: RE:Your Recent Visit  Your message made my day  Thank you       ----- Message -----     From: Mayelin Hanks     Sent: 9/12/2019 11:29 AM EDT       To: Ida Ureña DO  Subject: RE:Your Recent Visit    Dear Dr Marco Antonio Cox,    I have started taking the [facility] and I am happy to report No side effect, I have taken 5 doses so far  I have discontinued the Benicar  My blood pressure seems to be a bit lower  Also, I just wanted to say that I appreciate your care and I feel very comfortable with you as my cardiologist     Thank you,  Ashly Huffman  ----- Message -----  From: Ida Ureña DO  Sent: 9/9/2019  7:01 PM EDT  To: Mayelin Hanks  Subject: Your Recent Visit  Mayelin Hanks, you have a new After Visit Summary in [address]  Please click on visits and then your most recent appointment, to view your After Visit Summary  If you are experiencing any technical issues please call our patient service desk at [phone] ([phone]) option [phone]

## 2019-11-22 NOTE — TELEPHONE ENCOUNTER
Called office   First available they offered me was clarence office not with dr Shlomo Quevedo on 1/28 @8am  [facility] lpn

## 2019-11-25 ENCOUNTER — TELEPHONE (OUTPATIENT)
Dept: FAMILY MEDICINE CLINIC | Facility: CLINIC | Age: 69
End: 2019-11-25

## 2019-11-25 NOTE — TELEPHONE ENCOUNTER
Brandt Najera called Dr Blayne Bello office to make an apt and they do not have anything until March  Dr Nickolas Saenz told Brandt Najera to call office if she could not get in within two weeks  BRAIN MRI scheduled for Dec 9th FYI      Can we see if we can get patient in sooner or refer to another neurologist     Thank you

## 2019-12-09 ENCOUNTER — HOSPITAL ENCOUNTER (OUTPATIENT)
Dept: RADIOLOGY | Facility: HOSPITAL | Age: 69
Discharge: HOME | End: 2019-12-09
Attending: INTERNAL MEDICINE
Payer: MEDICARE

## 2019-12-09 DIAGNOSIS — I63.9 CEREBROVASCULAR ACCIDENT (CVA), UNSPECIFIED MECHANISM (HCC): ICD-10-CM

## 2019-12-09 PROCEDURE — 70551 MRI BRAIN STEM W/O DYE: CPT

## 2019-12-09 NOTE — PROGRESS NOTES
Assessment/Plan:    1  Need for vaccination  -     influenza vaccine, [id number], high-dose, PF 0 5 mL (FLUZONE HIGH-DOSE)    2  Chronic back pain, unspecified back location, unspecified back pain laterality  -     Ambulatory referral to Orthopedic Surgery; Future    3  TIA (transient ischemic attack)  Assessment & Plan:  She will continue her aspirin and was given an alternate neurology number to follow up with  Advised ER for any recurrent neurologic symptoms  4  Chronic low back pain without sciatica, unspecified back pain laterality  Assessment & Plan:  She would like to see an orthopedist   She refuses PT  Patient Instructions   Please call Dr Breezy Graff at [phone] to see if he can get you in any sooner  No follow-ups on file  Subjective:      Patient ID: Gurwinder Olmedo is a 71 y o  female  Chief Complaint   Patient presents with    Follow-up     wants to discus MRI/XRAYS jlopezcma        She is here to follow up on MRI and xrays  She has appointment with Dr Jayne Patiño (neuro) 2/7 @ 9 am  She has had no further neurologic symptoms  Her MRI showed her previous lacunar pontine stroke but no new changes  Xray of back and L knee showed arthritic changes  She does not feel her knee is the problem, she is upset about her back pain and does not want to see PT because she feels it never helps her  She wants to see an orthpedist   Has to take 2 advil every night for sleep but states they do not help her  The following portions of the patient's history were reviewed and updated as appropriate: allergies, current medications, past family history, past medical history, past social history, past surgical history and problem list     Review of Systems   Constitutional: Negative  Respiratory: Negative  Cardiovascular: Negative  Musculoskeletal: Positive for back pain  Neurological: Negative            Current Outpatient Medications   Medication Sig Dispense Refill    ACCU-CHEK LASHON PLUS test strip       albuterol (VENTOLIN HFA) 90 mcg/act inhaler Inhale 2 puffs every 4 (four) hours as needed for wheezing or shortness of breath 1 Inhaler 7    aspirin (ECOTRIN LOW STRENGTH) 81 mg EC tablet Take 4 tablets (324 mg total) by mouth daily 30 tablet 5    Cholecalciferol (VITAMIN D-3) 1000 units CAPS Take 5,000 Units by mouth daily      ibuprofen (ADVIL) 200 mg tablet Take by mouth      Melatonin 5 MG TABS Take 3 tablets by mouth daily       sacubitril-valsartan (ENTRESTO) 49-51 MG TABS Take 1 tablet by mouth 2 (two) times a day 180 tablet 3    spironolactone (ALDACTONE) 25 mg tablet Take 1 tablet (25 mg total) by mouth daily 30 tablet 5    torsemide (DEMADEX) 10 mg tablet Take 1 tablet (10 mg total) by mouth daily 30 tablet 2    vitamin E, tocopherol, 400 units capsule Take 400 Units by mouth 2 (two) times a day       No current facility-administered medications for this visit  Objective:    /70   Pulse 71   Temp (!) 97 1 °F (36 2 °C)   Resp 16   Ht 5' 4" (1 626 m)   Wt 106 kg (233 lb)   SpO2 96%   BMI 39 99 kg/m²        Physical Exam   Constitutional: She appears well-developed and well-nourished  Eyes: Conjunctivae are normal    Cardiovascular: Normal rate, regular rhythm, normal heart sounds and intact distal pulses  Pulmonary/Chest: Effort normal and breath sounds normal    Musculoskeletal: She exhibits no edema                Marisol Norman MD

## 2019-12-11 ENCOUNTER — OFFICE VISIT (OUTPATIENT)
Dept: FAMILY MEDICINE CLINIC | Facility: CLINIC | Age: 69
End: 2019-12-11
Payer: MEDICARE

## 2019-12-11 ENCOUNTER — TELEPHONE (OUTPATIENT)
Dept: FAMILY MEDICINE CLINIC | Facility: CLINIC | Age: 69
End: 2019-12-11

## 2019-12-11 VITALS
SYSTOLIC BLOOD PRESSURE: 140 MMHG | HEART RATE: 71 BPM | BODY MASS INDEX: 39.78 KG/M2 | HEIGHT: 64 IN | DIASTOLIC BLOOD PRESSURE: 70 MMHG | WEIGHT: 233 LBS | TEMPERATURE: 97.1 F | RESPIRATION RATE: 16 BRPM | OXYGEN SATURATION: 96 %

## 2019-12-11 DIAGNOSIS — G89.29 CHRONIC LOW BACK PAIN WITHOUT SCIATICA, UNSPECIFIED BACK PAIN LATERALITY: ICD-10-CM

## 2019-12-11 DIAGNOSIS — G45.9 TIA (TRANSIENT ISCHEMIC ATTACK): ICD-10-CM

## 2019-12-11 DIAGNOSIS — Z23 NEED FOR VACCINATION: Primary | ICD-10-CM

## 2019-12-11 DIAGNOSIS — G89.29 CHRONIC BACK PAIN, UNSPECIFIED BACK LOCATION, UNSPECIFIED BACK PAIN LATERALITY: ICD-10-CM

## 2019-12-11 DIAGNOSIS — M54.9 CHRONIC BACK PAIN, UNSPECIFIED BACK LOCATION, UNSPECIFIED BACK PAIN LATERALITY: ICD-10-CM

## 2019-12-11 DIAGNOSIS — M54.50 CHRONIC LOW BACK PAIN WITHOUT SCIATICA, UNSPECIFIED BACK PAIN LATERALITY: ICD-10-CM

## 2019-12-11 PROCEDURE — 99213 OFFICE O/P EST LOW 20 MIN: CPT | Performed by: INTERNAL MEDICINE

## 2019-12-11 PROCEDURE — 90662 IIV NO PRSV INCREASED AG IM: CPT

## 2019-12-11 PROCEDURE — G0008 ADMIN INFLUENZA VIRUS VAC: HCPCS

## 2019-12-17 ENCOUNTER — OFFICE VISIT (OUTPATIENT)
Dept: OBGYN CLINIC | Facility: CLINIC | Age: 69
End: 2019-12-17
Payer: MEDICARE

## 2019-12-17 ENCOUNTER — TELEPHONE (OUTPATIENT)
Dept: OBGYN CLINIC | Facility: CLINIC | Age: 69
End: 2019-12-17

## 2019-12-17 VITALS — HEIGHT: 65 IN | BODY MASS INDEX: 38.49 KG/M2 | WEIGHT: 231 LBS

## 2019-12-17 DIAGNOSIS — M54.50 CHRONIC LOW BACK PAIN WITHOUT SCIATICA, UNSPECIFIED BACK PAIN LATERALITY: ICD-10-CM

## 2019-12-17 DIAGNOSIS — G89.29 CHRONIC THORACIC BACK PAIN, UNSPECIFIED BACK PAIN LATERALITY: ICD-10-CM

## 2019-12-17 DIAGNOSIS — M54.6 CHRONIC THORACIC BACK PAIN, UNSPECIFIED BACK PAIN LATERALITY: ICD-10-CM

## 2019-12-17 DIAGNOSIS — M47.816 SPONDYLOSIS OF LUMBAR REGION WITHOUT MYELOPATHY OR RADICULOPATHY: Primary | ICD-10-CM

## 2019-12-17 DIAGNOSIS — G89.29 CHRONIC LOW BACK PAIN WITHOUT SCIATICA, UNSPECIFIED BACK PAIN LATERALITY: ICD-10-CM

## 2019-12-17 PROCEDURE — 99204 OFFICE O/P NEW MOD 45 MIN: CPT | Performed by: ORTHOPAEDIC SURGERY

## 2019-12-17 RX ORDER — CYCLOBENZAPRINE HCL 10 MG
10 TABLET ORAL 3 TIMES DAILY PRN
Qty: 20 TABLET | Refills: 0 | Status: CANCELLED | OUTPATIENT
Start: 2019-12-17

## 2019-12-17 NOTE — TELEPHONE ENCOUNTER
Patient called, and was advised that we are unable to prescribe a muscle relaxer due to her heart condition  I advised to continue otc tylenol heat/ice the area  She would like to know what else she can do, she has been using heat/ice for awhile and no relief

## 2019-12-18 DIAGNOSIS — G89.29 CHRONIC LOW BACK PAIN WITHOUT SCIATICA, UNSPECIFIED BACK PAIN LATERALITY: Primary | ICD-10-CM

## 2019-12-18 DIAGNOSIS — I50.22 CHRONIC SYSTOLIC (CONGESTIVE) HEART FAILURE (HCC): ICD-10-CM

## 2019-12-18 DIAGNOSIS — M54.50 CHRONIC LOW BACK PAIN WITHOUT SCIATICA, UNSPECIFIED BACK PAIN LATERALITY: Primary | ICD-10-CM

## 2019-12-18 RX ORDER — TORSEMIDE 10 MG/1
10 TABLET ORAL DAILY
Qty: 90 TABLET | Refills: 1 | Status: SHIPPED | OUTPATIENT
Start: 2019-12-18 | End: 2021-02-25

## 2019-12-18 RX ORDER — LIDOCAINE 50 MG/G
1 PATCH TOPICAL DAILY
Qty: 15 PATCH | Refills: 1 | Status: SHIPPED | OUTPATIENT
Start: 2019-12-18 | End: 2020-05-11

## 2019-12-18 NOTE — TELEPHONE ENCOUNTER
PT is the best and safest treatment for her  She may continue tylenol and ice  We could try a topcial anti inflammatory cream if she would like but that can sometimes be denied by insurance   Let me know and ill send it

## 2019-12-18 NOTE — TELEPHONE ENCOUNTER
Spoke to patient and advised  She states that shes "screwed" then, since she has tried otc options and nothing has helped

## 2019-12-19 ENCOUNTER — TELEPHONE (OUTPATIENT)
Dept: OBGYN CLINIC | Facility: CLINIC | Age: 69
End: 2019-12-19

## 2019-12-19 DIAGNOSIS — G89.29 CHRONIC LOW BACK PAIN WITHOUT SCIATICA, UNSPECIFIED BACK PAIN LATERALITY: Primary | ICD-10-CM

## 2019-12-19 DIAGNOSIS — M54.50 CHRONIC LOW BACK PAIN WITHOUT SCIATICA, UNSPECIFIED BACK PAIN LATERALITY: Primary | ICD-10-CM

## 2019-12-19 RX ORDER — CYCLOBENZAPRINE HCL 10 MG
10 TABLET ORAL
Qty: 20 TABLET | Refills: 0 | Status: SHIPPED | OUTPATIENT
Start: 2019-12-19 | End: 2020-06-08 | Stop reason: ALTCHOICE

## 2019-12-19 NOTE — TELEPHONE ENCOUNTER
Called patient and advised  She states that she has been taking advil  I told her to stop the advil

## 2019-12-19 NOTE — TELEPHONE ENCOUNTER
----- Message from Shakira Bedolla DO sent at 12/19/2019 12:19 PM EST -----  Can you please call patient and let her know that I called in a muscle relaxer to her pharmacy  Dr Carlos Manuel Ibrahim said it was okay  He said that she should not take anti-inflammatories like Motrin or Advil though

## 2019-12-23 ENCOUNTER — EVALUATION (OUTPATIENT)
Dept: PHYSICAL THERAPY | Facility: CLINIC | Age: 69
End: 2019-12-23
Payer: MEDICARE

## 2019-12-23 DIAGNOSIS — G89.29 CHRONIC LOW BACK PAIN WITHOUT SCIATICA, UNSPECIFIED BACK PAIN LATERALITY: ICD-10-CM

## 2019-12-23 DIAGNOSIS — M47.816 SPONDYLOSIS OF LUMBAR REGION WITHOUT MYELOPATHY OR RADICULOPATHY: Primary | ICD-10-CM

## 2019-12-23 DIAGNOSIS — M54.6 CHRONIC THORACIC BACK PAIN, UNSPECIFIED BACK PAIN LATERALITY: ICD-10-CM

## 2019-12-23 DIAGNOSIS — G89.29 CHRONIC THORACIC BACK PAIN, UNSPECIFIED BACK PAIN LATERALITY: ICD-10-CM

## 2019-12-23 DIAGNOSIS — M54.50 CHRONIC LOW BACK PAIN WITHOUT SCIATICA, UNSPECIFIED BACK PAIN LATERALITY: ICD-10-CM

## 2019-12-23 PROCEDURE — 97162 PT EVAL MOD COMPLEX 30 MIN: CPT | Performed by: PHYSICAL THERAPIST

## 2019-12-23 PROCEDURE — G8979 MOBILITY GOAL STATUS: HCPCS | Performed by: PHYSICAL THERAPIST

## 2019-12-23 PROCEDURE — G8978 MOBILITY CURRENT STATUS: HCPCS | Performed by: PHYSICAL THERAPIST
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
## 2020-01-03 ENCOUNTER — OFFICE VISIT (OUTPATIENT)
Dept: CARDIOLOGY CLINIC | Facility: CLINIC | Age: 70
End: 2020-01-03
Payer: MEDICARE

## 2020-01-03 VITALS
SYSTOLIC BLOOD PRESSURE: 142 MMHG | BODY MASS INDEX: 38.27 KG/M2 | DIASTOLIC BLOOD PRESSURE: 70 MMHG | WEIGHT: 230 LBS | OXYGEN SATURATION: 97 % | HEART RATE: 80 BPM

## 2020-01-03 DIAGNOSIS — I63.9 CEREBROVASCULAR ACCIDENT (CVA), UNSPECIFIED MECHANISM (HCC): ICD-10-CM

## 2020-01-03 DIAGNOSIS — D35.00 ADRENAL ADENOMA, UNSPECIFIED LATERALITY: ICD-10-CM

## 2020-01-03 DIAGNOSIS — I10 ESSENTIAL HYPERTENSION: ICD-10-CM

## 2020-01-03 DIAGNOSIS — I50.22 CHRONIC SYSTOLIC CONGESTIVE HEART FAILURE (HCC): ICD-10-CM

## 2020-01-03 DIAGNOSIS — I50.22 CHRONIC SYSTOLIC (CONGESTIVE) HEART FAILURE (HCC): Primary | ICD-10-CM

## 2020-01-03 DIAGNOSIS — G47.33 OSA (OBSTRUCTIVE SLEEP APNEA): ICD-10-CM

## 2020-01-03 DIAGNOSIS — R06.00 DYSPNEA ON EXERTION: ICD-10-CM

## 2020-01-03 DIAGNOSIS — I31.3 PERICARDIAL EFFUSION: ICD-10-CM

## 2020-01-03 DIAGNOSIS — I42.0 DILATED CARDIOMYOPATHY (HCC): ICD-10-CM

## 2020-01-03 DIAGNOSIS — E66.2 MORBID (SEVERE) OBESITY WITH ALVEOLAR HYPOVENTILATION (HCC): ICD-10-CM

## 2020-01-03 PROCEDURE — 99214 OFFICE O/P EST MOD 30 MIN: CPT | Performed by: INTERNAL MEDICINE

## 2020-01-03 PROCEDURE — 93000 ELECTROCARDIOGRAM COMPLETE: CPT | Performed by: INTERNAL MEDICINE

## 2020-01-03 RX ORDER — SPIRONOLACTONE 25 MG/1
25 TABLET ORAL DAILY
Qty: 90 TABLET | Refills: 4 | Status: SHIPPED | OUTPATIENT
Start: 2020-01-03 | End: 2020-09-04 | Stop reason: SDUPTHER
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
## 2020-01-06 ENCOUNTER — APPOINTMENT (OUTPATIENT)
Dept: PHYSICAL THERAPY | Facility: CLINIC | Age: 70
End: 2020-01-06
Payer: MEDICARE

## 2020-01-06 ENCOUNTER — TELEPHONE (OUTPATIENT)
Dept: PHYSICAL THERAPY | Facility: CLINIC | Age: 70
End: 2020-01-06

## 2020-01-10 ENCOUNTER — TELEPHONE (OUTPATIENT)
Dept: PHYSICAL THERAPY | Facility: CLINIC | Age: 70
End: 2020-01-10

## 2020-01-10 ENCOUNTER — APPOINTMENT (OUTPATIENT)
Dept: PHYSICAL THERAPY | Facility: CLINIC | Age: 70
End: 2020-01-10
Payer: MEDICARE

## 2020-01-13 ENCOUNTER — OFFICE VISIT (OUTPATIENT)
Dept: PHYSICAL THERAPY | Facility: CLINIC | Age: 70
End: 2020-01-13
Payer: MEDICARE

## 2020-01-13 DIAGNOSIS — G89.29 CHRONIC THORACIC BACK PAIN, UNSPECIFIED BACK PAIN LATERALITY: ICD-10-CM

## 2020-01-13 DIAGNOSIS — M54.6 CHRONIC THORACIC BACK PAIN, UNSPECIFIED BACK PAIN LATERALITY: ICD-10-CM

## 2020-01-13 DIAGNOSIS — G89.29 CHRONIC LOW BACK PAIN WITHOUT SCIATICA, UNSPECIFIED BACK PAIN LATERALITY: ICD-10-CM

## 2020-01-13 DIAGNOSIS — M54.50 CHRONIC LOW BACK PAIN WITHOUT SCIATICA, UNSPECIFIED BACK PAIN LATERALITY: ICD-10-CM

## 2020-01-13 DIAGNOSIS — M47.816 SPONDYLOSIS OF LUMBAR REGION WITHOUT MYELOPATHY OR RADICULOPATHY: Primary | ICD-10-CM

## 2020-01-13 PROCEDURE — 97014 ELECTRIC STIMULATION THERAPY: CPT | Performed by: PHYSICAL THERAPIST

## 2020-01-13 PROCEDURE — 97110 THERAPEUTIC EXERCISES: CPT | Performed by: PHYSICAL THERAPIST
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
## 2020-01-15 ENCOUNTER — OFFICE VISIT (OUTPATIENT)
Dept: PHYSICAL THERAPY | Facility: CLINIC | Age: 70
End: 2020-01-15
Payer: MEDICARE

## 2020-01-15 ENCOUNTER — HOSPITAL ENCOUNTER (OUTPATIENT)
Dept: NON INVASIVE DIAGNOSTICS | Facility: HOSPITAL | Age: 70
Discharge: HOME | End: 2020-01-15
Attending: INTERNAL MEDICINE
Payer: MEDICARE

## 2020-01-15 DIAGNOSIS — I50.22 CHRONIC SYSTOLIC (CONGESTIVE) HEART FAILURE (HCC): ICD-10-CM

## 2020-01-15 DIAGNOSIS — M54.6 CHRONIC THORACIC BACK PAIN, UNSPECIFIED BACK PAIN LATERALITY: ICD-10-CM

## 2020-01-15 DIAGNOSIS — G89.29 CHRONIC THORACIC BACK PAIN, UNSPECIFIED BACK PAIN LATERALITY: ICD-10-CM

## 2020-01-15 DIAGNOSIS — M47.816 SPONDYLOSIS OF LUMBAR REGION WITHOUT MYELOPATHY OR RADICULOPATHY: Primary | ICD-10-CM

## 2020-01-15 DIAGNOSIS — M54.50 CHRONIC LOW BACK PAIN WITHOUT SCIATICA, UNSPECIFIED BACK PAIN LATERALITY: ICD-10-CM

## 2020-01-15 DIAGNOSIS — G89.29 CHRONIC LOW BACK PAIN WITHOUT SCIATICA, UNSPECIFIED BACK PAIN LATERALITY: ICD-10-CM

## 2020-01-15 PROCEDURE — 93306 TTE W/DOPPLER COMPLETE: CPT

## 2020-01-15 PROCEDURE — 97112 NEUROMUSCULAR REEDUCATION: CPT | Performed by: PHYSICAL THERAPIST

## 2020-01-15 PROCEDURE — 93306 TTE W/DOPPLER COMPLETE: CPT | Performed by: INTERNAL MEDICINE

## 2020-01-15 PROCEDURE — 97110 THERAPEUTIC EXERCISES: CPT | Performed by: PHYSICAL THERAPIST
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
## 2020-01-20 ENCOUNTER — OFFICE VISIT (OUTPATIENT)
Dept: PHYSICAL THERAPY | Facility: CLINIC | Age: 70
End: 2020-01-20
Payer: MEDICARE

## 2020-01-20 ENCOUNTER — TELEPHONE (OUTPATIENT)
Dept: CARDIOLOGY CLINIC | Facility: CLINIC | Age: 70
End: 2020-01-20

## 2020-01-20 DIAGNOSIS — M54.6 CHRONIC THORACIC BACK PAIN, UNSPECIFIED BACK PAIN LATERALITY: ICD-10-CM

## 2020-01-20 DIAGNOSIS — G89.29 CHRONIC LOW BACK PAIN WITHOUT SCIATICA, UNSPECIFIED BACK PAIN LATERALITY: ICD-10-CM

## 2020-01-20 DIAGNOSIS — M54.50 CHRONIC LOW BACK PAIN WITHOUT SCIATICA, UNSPECIFIED BACK PAIN LATERALITY: ICD-10-CM

## 2020-01-20 DIAGNOSIS — G89.29 CHRONIC THORACIC BACK PAIN, UNSPECIFIED BACK PAIN LATERALITY: ICD-10-CM

## 2020-01-20 DIAGNOSIS — M47.816 SPONDYLOSIS OF LUMBAR REGION WITHOUT MYELOPATHY OR RADICULOPATHY: Primary | ICD-10-CM

## 2020-01-20 PROCEDURE — 97110 THERAPEUTIC EXERCISES: CPT | Performed by: PHYSICAL THERAPIST

## 2020-01-20 PROCEDURE — 97112 NEUROMUSCULAR REEDUCATION: CPT | Performed by: PHYSICAL THERAPIST
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
## 2020-01-20 NOTE — TELEPHONE ENCOUNTER
----- Message from Mira Schumacher DO sent at 1/20/2020  9:35 AM EST -----  Can you please let the patient know echocardiogram was unchanged from last one  Ejection fraction is 40%

## 2020-01-21 NOTE — TELEPHONE ENCOUNTER
Spoke with patient, she is concerned regarding an infection in her heart  She'd like a call from you directly

## 2020-01-23 ENCOUNTER — EVALUATION (OUTPATIENT)
Dept: PHYSICAL THERAPY | Facility: CLINIC | Age: 70
End: 2020-01-23
Payer: MEDICARE

## 2020-01-23 DIAGNOSIS — M54.50 CHRONIC LOW BACK PAIN WITHOUT SCIATICA, UNSPECIFIED BACK PAIN LATERALITY: ICD-10-CM

## 2020-01-23 DIAGNOSIS — G89.29 CHRONIC THORACIC BACK PAIN, UNSPECIFIED BACK PAIN LATERALITY: ICD-10-CM

## 2020-01-23 DIAGNOSIS — G89.29 CHRONIC LOW BACK PAIN WITHOUT SCIATICA, UNSPECIFIED BACK PAIN LATERALITY: ICD-10-CM

## 2020-01-23 DIAGNOSIS — M47.816 SPONDYLOSIS OF LUMBAR REGION WITHOUT MYELOPATHY OR RADICULOPATHY: Primary | ICD-10-CM

## 2020-01-23 DIAGNOSIS — M54.6 CHRONIC THORACIC BACK PAIN, UNSPECIFIED BACK PAIN LATERALITY: ICD-10-CM

## 2020-01-23 PROCEDURE — 97110 THERAPEUTIC EXERCISES: CPT | Performed by: PHYSICAL THERAPIST

## 2020-01-23 PROCEDURE — 97112 NEUROMUSCULAR REEDUCATION: CPT | Performed by: PHYSICAL THERAPIST
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
## 2020-01-27 ENCOUNTER — APPOINTMENT (OUTPATIENT)
Dept: PHYSICAL THERAPY | Facility: CLINIC | Age: 70
End: 2020-01-27
Payer: MEDICARE

## 2020-01-28 ENCOUNTER — OFFICE VISIT (OUTPATIENT)
Dept: OBGYN CLINIC | Facility: CLINIC | Age: 70
End: 2020-01-28
Payer: MEDICARE

## 2020-01-28 ENCOUNTER — APPOINTMENT (OUTPATIENT)
Dept: PHYSICAL THERAPY | Facility: CLINIC | Age: 70
End: 2020-01-28
Payer: MEDICARE

## 2020-01-28 VITALS
BODY MASS INDEX: 38.49 KG/M2 | SYSTOLIC BLOOD PRESSURE: 144 MMHG | DIASTOLIC BLOOD PRESSURE: 67 MMHG | HEART RATE: 75 BPM | WEIGHT: 231 LBS | HEIGHT: 65 IN

## 2020-01-28 DIAGNOSIS — M65.342 TRIGGER RING FINGER OF LEFT HAND: ICD-10-CM

## 2020-01-28 DIAGNOSIS — M47.26 OSTEOARTHRITIS OF SPINE WITH RADICULOPATHY, LUMBAR REGION: Primary | ICD-10-CM

## 2020-01-28 DIAGNOSIS — M17.12 PRIMARY OSTEOARTHRITIS OF LEFT KNEE: ICD-10-CM

## 2020-01-28 PROCEDURE — 20550 NJX 1 TENDON SHEATH/LIGAMENT: CPT | Performed by: ORTHOPAEDIC SURGERY

## 2020-01-28 PROCEDURE — 20610 DRAIN/INJ JOINT/BURSA W/O US: CPT | Performed by: ORTHOPAEDIC SURGERY

## 2020-01-28 PROCEDURE — 99215 OFFICE O/P EST HI 40 MIN: CPT | Performed by: ORTHOPAEDIC SURGERY

## 2020-01-28 RX ORDER — LIDOCAINE HYDROCHLORIDE 10 MG/ML
4 INJECTION, SOLUTION INFILTRATION; PERINEURAL
Status: COMPLETED | OUTPATIENT
Start: 2020-01-28 | End: 2020-01-28

## 2020-01-28 RX ORDER — TRIAMCINOLONE ACETONIDE 40 MG/ML
20 INJECTION, SUSPENSION INTRA-ARTICULAR; INTRAMUSCULAR
Status: COMPLETED | OUTPATIENT
Start: 2020-01-28 | End: 2020-01-28

## 2020-01-28 RX ORDER — LIDOCAINE HYDROCHLORIDE 10 MG/ML
0.5 INJECTION, SOLUTION INFILTRATION; PERINEURAL
Status: COMPLETED | OUTPATIENT
Start: 2020-01-28 | End: 2020-01-28

## 2020-01-28 RX ORDER — DEXAMETHASONE SODIUM PHOSPHATE 100 MG/10ML
40 INJECTION INTRAMUSCULAR; INTRAVENOUS
Status: COMPLETED | OUTPATIENT
Start: 2020-01-28 | End: 2020-01-28

## 2020-01-28 RX ADMIN — DEXAMETHASONE SODIUM PHOSPHATE 40 MG: 100 INJECTION INTRAMUSCULAR; INTRAVENOUS at 16:55

## 2020-01-28 RX ADMIN — LIDOCAINE HYDROCHLORIDE 0.5 ML: 10 INJECTION, SOLUTION INFILTRATION; PERINEURAL at 16:55

## 2020-01-28 RX ADMIN — TRIAMCINOLONE ACETONIDE 20 MG: 40 INJECTION, SUSPENSION INTRA-ARTICULAR; INTRAMUSCULAR at 16:55

## 2020-01-28 RX ADMIN — LIDOCAINE HYDROCHLORIDE 4 ML: 10 INJECTION, SOLUTION INFILTRATION; PERINEURAL at 16:55
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
## 2020-01-29 ENCOUNTER — APPOINTMENT (OUTPATIENT)
Dept: PHYSICAL THERAPY | Facility: CLINIC | Age: 70
End: 2020-01-29
Payer: MEDICARE

## 2020-02-05 ENCOUNTER — TELEPHONE (OUTPATIENT)
Dept: NEUROLOGY | Facility: CLINIC | Age: 70
End: 2020-02-05

## 2020-02-13 ENCOUNTER — OFFICE VISIT (OUTPATIENT)
Dept: PULMONOLOGY | Facility: MEDICAL CENTER | Age: 70
End: 2020-02-13
Payer: MEDICARE

## 2020-02-13 VITALS
BODY MASS INDEX: 38.44 KG/M2 | SYSTOLIC BLOOD PRESSURE: 138 MMHG | TEMPERATURE: 97.1 F | HEIGHT: 65 IN | DIASTOLIC BLOOD PRESSURE: 82 MMHG | OXYGEN SATURATION: 96 % | HEART RATE: 80 BPM | RESPIRATION RATE: 12 BRPM

## 2020-02-13 DIAGNOSIS — J45.20 MILD INTERMITTENT ASTHMA WITHOUT COMPLICATION: ICD-10-CM

## 2020-02-13 DIAGNOSIS — G47.33 OSA (OBSTRUCTIVE SLEEP APNEA): Primary | ICD-10-CM

## 2020-02-13 DIAGNOSIS — E66.2 MORBID (SEVERE) OBESITY WITH ALVEOLAR HYPOVENTILATION (HCC): ICD-10-CM

## 2020-02-13 PROCEDURE — 3075F SYST BP GE 130 - 139MM HG: CPT | Performed by: INTERNAL MEDICINE

## 2020-02-13 PROCEDURE — 1036F TOBACCO NON-USER: CPT | Performed by: INTERNAL MEDICINE

## 2020-02-13 PROCEDURE — 3079F DIAST BP 80-89 MM HG: CPT | Performed by: INTERNAL MEDICINE

## 2020-02-13 PROCEDURE — 99214 OFFICE O/P EST MOD 30 MIN: CPT | Performed by: INTERNAL MEDICINE

## 2020-02-13 PROCEDURE — 1160F RVW MEDS BY RX/DR IN RCRD: CPT | Performed by: INTERNAL MEDICINE

## 2020-02-13 RX ORDER — ALBUTEROL SULFATE 90 UG/1
2 AEROSOL, METERED RESPIRATORY (INHALATION) EVERY 4 HOURS PRN
Qty: 1 INHALER | Refills: 7 | Status: SHIPPED | OUTPATIENT
Start: 2020-02-13 | End: 2020-03-11 | Stop reason: SDUPTHER
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
## 2020-02-15 ENCOUNTER — HOSPITAL ENCOUNTER (OUTPATIENT)
Dept: RADIOLOGY | Facility: HOSPITAL | Age: 70
Discharge: HOME | End: 2020-02-15
Attending: ORTHOPAEDIC SURGERY
Payer: MEDICARE

## 2020-02-15 DIAGNOSIS — M47.26 OSTEOARTHRITIS OF SPINE WITH RADICULOPATHY, LUMBAR REGION: ICD-10-CM

## 2020-02-15 PROCEDURE — 72148 MRI LUMBAR SPINE W/O DYE: CPT

## 2020-02-17 NOTE — ASSESSMENT & PLAN NOTE
I did encourage her to lose weight  I told she could lose weight she likely would not need oxygen at bedtime with her CPAP machine  Prior spirometry in May 2018 showed moderate restrictive impairment likely due to her obesity    She is only needing to use her albuterol inhaler occasionally

## 2020-02-17 NOTE — ASSESSMENT & PLAN NOTE
Moderate obstructive sleep apnea with benefited to using CPAP therapy  I did review compliance data for the past 1 month  She used to CPAP every night and average almost 4 hours per night she is on auto CPAP with pressure range of 7-20 cm water  Average CPAP pressure is 19 4 maximal 19 7 cm  This results did and AHI of 0 5 which is satisfactory  She does use 2 L of oxygen with her CPAP at night  This likely due to alveolar hypoventilation from obesity on superimposed on her CPAP  She is using the air touch at 20 foam fullface mask and does like this  I encouraged her to use his CPAP as much as possible  I will increase the minimal expiratory CPAP pressure on auto CPAP unchanged from 7 to 20 to 14 to 2o cm H2O    I did encourage her to lose weight  She will continue with 2 L of oxygen with her CPAP machine  She states she has her on nocturnal pulse oximetry recording and with the T L attached her CPAP machine O2 saturations have always been over 90%    No nocturnal dyspnea    Her medical supply company is [employer]

## 2020-02-18 ENCOUNTER — OFFICE VISIT (OUTPATIENT)
Dept: OBGYN CLINIC | Facility: CLINIC | Age: 70
End: 2020-02-18
Payer: MEDICARE

## 2020-02-18 VITALS
DIASTOLIC BLOOD PRESSURE: 79 MMHG | HEIGHT: 65 IN | BODY MASS INDEX: 38.32 KG/M2 | WEIGHT: 230 LBS | SYSTOLIC BLOOD PRESSURE: 154 MMHG | HEART RATE: 74 BPM

## 2020-02-18 DIAGNOSIS — M54.16 LEFT LUMBAR RADICULOPATHY: Primary | ICD-10-CM

## 2020-02-18 PROCEDURE — 1160F RVW MEDS BY RX/DR IN RCRD: CPT | Performed by: ORTHOPAEDIC SURGERY

## 2020-02-18 PROCEDURE — 3077F SYST BP >= 140 MM HG: CPT | Performed by: ORTHOPAEDIC SURGERY

## 2020-02-18 PROCEDURE — 3078F DIAST BP <80 MM HG: CPT | Performed by: ORTHOPAEDIC SURGERY

## 2020-02-18 PROCEDURE — 3008F BODY MASS INDEX DOCD: CPT | Performed by: ORTHOPAEDIC SURGERY

## 2020-02-18 PROCEDURE — 1036F TOBACCO NON-USER: CPT | Performed by: ORTHOPAEDIC SURGERY

## 2020-02-18 PROCEDURE — 99213 OFFICE O/P EST LOW 20 MIN: CPT | Performed by: ORTHOPAEDIC SURGERY
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
## 2020-02-19 ENCOUNTER — TELEPHONE (OUTPATIENT)
Dept: CARDIOLOGY CLINIC | Facility: CLINIC | Age: 70
End: 2020-02-19

## 2020-02-19 NOTE — TELEPHONE ENCOUNTER
I made her an appt for April to come in (6m f/u), but she is asking if the echo she had done shows fluid around her heart  We did call her with the echo results but she came up with another question

## 2020-02-24 ENCOUNTER — CONSULT (OUTPATIENT)
Dept: PAIN MEDICINE | Facility: CLINIC | Age: 70
End: 2020-02-24
Payer: MEDICARE

## 2020-02-24 VITALS
HEIGHT: 65 IN | BODY MASS INDEX: 38.99 KG/M2 | HEART RATE: 80 BPM | DIASTOLIC BLOOD PRESSURE: 66 MMHG | SYSTOLIC BLOOD PRESSURE: 151 MMHG | WEIGHT: 234 LBS

## 2020-02-24 DIAGNOSIS — M96.1 LUMBAR POST-LAMINECTOMY SYNDROME: ICD-10-CM

## 2020-02-24 DIAGNOSIS — M54.42 CHRONIC BILATERAL LOW BACK PAIN WITH BILATERAL SCIATICA: ICD-10-CM

## 2020-02-24 DIAGNOSIS — M54.41 CHRONIC BILATERAL LOW BACK PAIN WITH BILATERAL SCIATICA: ICD-10-CM

## 2020-02-24 DIAGNOSIS — G89.4 CHRONIC PAIN SYNDROME: Primary | ICD-10-CM

## 2020-02-24 DIAGNOSIS — M54.16 LUMBAR RADICULOPATHY: ICD-10-CM

## 2020-02-24 DIAGNOSIS — G89.29 CHRONIC BILATERAL LOW BACK PAIN WITH BILATERAL SCIATICA: ICD-10-CM

## 2020-02-24 PROCEDURE — 1160F RVW MEDS BY RX/DR IN RCRD: CPT | Performed by: ANESTHESIOLOGY

## 2020-02-24 PROCEDURE — 3078F DIAST BP <80 MM HG: CPT | Performed by: ANESTHESIOLOGY

## 2020-02-24 PROCEDURE — 3008F BODY MASS INDEX DOCD: CPT | Performed by: ANESTHESIOLOGY

## 2020-02-24 PROCEDURE — 1036F TOBACCO NON-USER: CPT | Performed by: ANESTHESIOLOGY

## 2020-02-24 PROCEDURE — 99204 OFFICE O/P NEW MOD 45 MIN: CPT | Performed by: ANESTHESIOLOGY

## 2020-02-24 PROCEDURE — 3077F SYST BP >= 140 MM HG: CPT | Performed by: ANESTHESIOLOGY
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
## 2020-02-25 PROBLEM — M48.062 NEUROGENIC CLAUDICATION DUE TO LUMBAR SPINAL STENOSIS: Status: ACTIVE | Noted: 2020-02-25

## 2020-02-25 PROBLEM — M54.16 LUMBAR RADICULOPATHY: Status: ACTIVE | Noted: 2020-02-25

## 2020-02-25 PROBLEM — M96.1 POSTLAMINECTOMY SYNDROME, LUMBAR REGION: Status: ACTIVE | Noted: 2020-02-25

## 2020-02-25 PROBLEM — M96.1 LUMBAR POST-LAMINECTOMY SYNDROME: Status: ACTIVE | Noted: 2020-02-25

## 2020-02-25 PROBLEM — M54.50 LOW BACK PAIN: Status: ACTIVE | Noted: 2020-02-25

## 2020-02-25 PROBLEM — G89.4 CHRONIC PAIN SYNDROME: Status: ACTIVE | Noted: 2020-02-25

## 2020-02-25 NOTE — PROGRESS NOTES
2/25/20: Pt has not returned to PT and has been following up with ortho and pain management   Formal DC not performed as pt self-DC

## 2020-02-29 ENCOUNTER — OFFICE VISIT (OUTPATIENT)
Dept: URGENT CARE | Facility: CLINIC | Age: 70
End: 2020-02-29
Payer: MEDICARE

## 2020-02-29 VITALS
DIASTOLIC BLOOD PRESSURE: 96 MMHG | TEMPERATURE: 97.8 F | SYSTOLIC BLOOD PRESSURE: 149 MMHG | WEIGHT: 233 LBS | HEIGHT: 65 IN | OXYGEN SATURATION: 98 % | RESPIRATION RATE: 18 BRPM | HEART RATE: 76 BPM | BODY MASS INDEX: 38.82 KG/M2

## 2020-02-29 DIAGNOSIS — B02.9 VARICELLA ZOSTER: Primary | ICD-10-CM

## 2020-02-29 DIAGNOSIS — B01.9 VARICELLA ZOSTER: Primary | ICD-10-CM

## 2020-02-29 PROCEDURE — 3077F SYST BP >= 140 MM HG: CPT | Performed by: PHYSICIAN ASSISTANT

## 2020-02-29 PROCEDURE — 99213 OFFICE O/P EST LOW 20 MIN: CPT | Performed by: PHYSICIAN ASSISTANT

## 2020-02-29 PROCEDURE — 3008F BODY MASS INDEX DOCD: CPT | Performed by: PHYSICIAN ASSISTANT

## 2020-02-29 PROCEDURE — 3080F DIAST BP >= 90 MM HG: CPT | Performed by: PHYSICIAN ASSISTANT

## 2020-02-29 PROCEDURE — 1160F RVW MEDS BY RX/DR IN RCRD: CPT | Performed by: PHYSICIAN ASSISTANT

## 2020-02-29 PROCEDURE — 1036F TOBACCO NON-USER: CPT | Performed by: PHYSICIAN ASSISTANT

## 2020-02-29 RX ORDER — ACYCLOVIR 800 MG/1
800 TABLET ORAL
Qty: 35 TABLET | Refills: 0 | Status: SHIPPED | OUTPATIENT
Start: 2020-02-29 | End: 2020-03-11
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
## 2020-02-29 NOTE — PATIENT INSTRUCTIONS
Pharmacy for acyclovir  Paper prescription administered for tramadol  Use as directed  Follow-up with an optometrist     Follow up with PCP in 3-5 days  Proceed to  ER if symptoms worsen  Shingles   AMBULATORY CARE:   Shingles  is a painful rash  Shingles is caused by the same virus that causes chickenpox (varicella-zoster virus)  After you get chickenpox, the virus stays in your body for several years without causing any symptoms  Shingles occurs when the virus becomes active again  Once active, the virus will travel along a nerve to your skin and cause a rash  Common signs and symptoms include the following:  Shingles often starts with pain in the back, chest, neck, or face  A rash then develops in the same area  The rash is usually found on only one side of the body  The rash may feel itchy or painful  It starts as red dots that become blisters filled with fluid  The blisters usually grow bigger, become filled with pus, and then crust over after a few days  You may also have any of the following:  · Fatigue and muscle weakness    · Pain when your skin is lightly touched    · Headache    · Fever    · Eye pain when exposed to light  Seek care immediately if:   · You have painful, red, warm skin around the blisters, or the blisters drain pus  · Your neck is stiff or you have trouble moving it  · You have trouble moving your arms, legs, or face  · You have a seizure  · You have weakness in an arm or leg  · You become confused, or have difficulty speaking  · You have dizziness, a severe headache, or hearing or vision loss  Contact your healthcare provider if:   · You feel weak or have a headache  · You have a cough, chills, or a fever  · You have abdominal pain or nausea, or you are vomiting  · Your rash becomes more itchy or painful  · Your rash spreads to other parts of your body  · Your pain worsens and does not go away even after you take medicine      · You have questions or concerns about your condition or care  Medicines:   · Antiviral medicine  helps decrease symptoms and healing time  They may also decrease your risk of developing nerve pain  You will need to start taking them within 3 days of the start of symptoms to prevent nerve pain  · Pain medicine  may be prescribed or suggested by your healthcare provider  You may need NSAIDs, acetaminophen, or opioid medicine depending on how much pain you are in  Do not wait until the pain is severe before you take more pain medicine  · Topical anesthetics  are used to numb the skin and decrease pain  They can be a cream, gel, spray, or patch  · Anticonvulsants  decrease nerve pain and may help you sleep at night  · Antidepressants  may be used to decrease nerve pain  Follow up with your healthcare provider as directed:  Write down your questions so you remember to ask them during your visits  Self-care:  Keep your rash clean and dry  Cover your rash with a bandage or clothing  Do not use bandages that stick to your skin  The sticky part may irritate your skin and make your rash last longer  Prevent the spread of shingles: The virus can be passed to a person who has never had chickenpox  This person may get chickenpox, but not shingles  You may pass the virus to others as long as you have a rash  The virus is spread by direct contact with the fluid from the blisters  Usually, you cannot spread the virus once the blisters dry up  Prevent shingles or another shingles outbreak:  A vaccine may be given to help prevent shingles  Ask for more information about this vaccine  © 2017 [employer] Information is for End User's use only and may not be sold, redistributed or otherwise used for commercial purposes  All illustrations and images included in CareNotes® are the copyrighted property of [employer]  or [employer]  The above information is an  only   It is not intended as medical advice for individual conditions or treatments  Talk to your doctor, nurse or pharmacist before following any medical regimen to see if it is safe and effective for you

## 2020-03-02 ENCOUNTER — TELEPHONE (OUTPATIENT)
Dept: PAIN MEDICINE | Facility: CLINIC | Age: 70
End: 2020-03-02

## 2020-03-02 ENCOUNTER — OFFICE VISIT (OUTPATIENT)
Dept: FAMILY MEDICINE CLINIC | Facility: CLINIC | Age: 70
End: 2020-03-02
Payer: MEDICARE

## 2020-03-02 VITALS
DIASTOLIC BLOOD PRESSURE: 70 MMHG | HEIGHT: 65 IN | SYSTOLIC BLOOD PRESSURE: 150 MMHG | HEART RATE: 72 BPM | WEIGHT: 236 LBS | BODY MASS INDEX: 39.32 KG/M2 | TEMPERATURE: 97.9 F | RESPIRATION RATE: 20 BRPM

## 2020-03-02 DIAGNOSIS — R73.01 IMPAIRED FASTING GLUCOSE: ICD-10-CM

## 2020-03-02 DIAGNOSIS — B02.9 HERPES ZOSTER WITHOUT COMPLICATION: Primary | ICD-10-CM

## 2020-03-02 DIAGNOSIS — I10 ESSENTIAL HYPERTENSION: ICD-10-CM

## 2020-03-02 DIAGNOSIS — I42.0 DILATED CARDIOMYOPATHY (HCC): ICD-10-CM

## 2020-03-02 PROCEDURE — 3077F SYST BP >= 140 MM HG: CPT | Performed by: NURSE PRACTITIONER

## 2020-03-02 PROCEDURE — 3008F BODY MASS INDEX DOCD: CPT | Performed by: NURSE PRACTITIONER

## 2020-03-02 PROCEDURE — 3078F DIAST BP <80 MM HG: CPT | Performed by: NURSE PRACTITIONER

## 2020-03-02 PROCEDURE — 1160F RVW MEDS BY RX/DR IN RCRD: CPT | Performed by: NURSE PRACTITIONER

## 2020-03-02 PROCEDURE — 99214 OFFICE O/P EST MOD 30 MIN: CPT | Performed by: NURSE PRACTITIONER

## 2020-03-02 PROCEDURE — 1036F TOBACCO NON-USER: CPT | Performed by: NURSE PRACTITIONER

## 2020-03-02 RX ORDER — GABAPENTIN 300 MG/1
300 CAPSULE ORAL DAILY
Qty: 30 CAPSULE | Refills: 0 | Status: SHIPPED | OUTPATIENT
Start: 2020-03-02 | End: 2020-03-11 | Stop reason: SDUPTHER

## 2020-03-02 NOTE — PROGRESS NOTES
Subjective:      Patient ID: Hyacinth Galvez is a 71 y o  female  Chief Complaint   Patient presents with    Rash     on face  prcma         Vitals:  /70   Pulse 72   Temp 97 9 °F (36 6 °C)   Resp 20   Ht 5' 5" (1 651 m)   Wt 107 kg (236 lb)   BMI 39 27 kg/m²     HPI  Patient is here to follow up on her shingles  Went to urgent care on 2020 for it and started on acyclovir and stated that went to eye doctor this morning some body new and ordered bacitracin appt and tobradex for pain relief and to prevent any secondary infection and stated that currently her eye exam was normal   Patient stated that was having blurry vision this morning as her eye was swollen but other than that no issues in eyes  Stated that having lot of pain in right side of face and tried tramadol which did not help and stated that usually can handle lot of pain but wants something to take at night time  Denies any weakness of any extremities  Complaint with her medications and stated that her FBS been running angel and advised to get her HbA1c checked as per cardiologist                The following portions of the patient's history were reviewed and updated as appropriate: allergies, current medications, past family history, past medical history, past social history, past surgical history and problem list       Review of Systems   Constitutional: Negative  Eyes: Positive for pain, discharge and redness  Negative for photophobia, itching and visual disturbance  Respiratory: Negative  Cardiovascular: Negative  Gastrointestinal: Negative  Genitourinary: Negative  Musculoskeletal: Negative  Skin: Positive for rash  Neurological: Negative  Psychiatric/Behavioral: Negative            Objective:    Social History     Tobacco Use   Smoking Status Former Smoker    Packs/day: 1 50    Years: 30 00    Pack years: 37 1    Last attempt to quit:     Years since quittin 1   Smokeless Tobacco Never Used       Allergies: Allergies   Allergen Reactions    Carvedilol Chest Pain and Hypertension    Lisinopril Other (See Comments)     Dizziness, cough    Olmesartan Medoxomil-Hctz      Category: Adverse Reaction; [facility] - [id number]: dizzy    Doxycycline Rash         Current Outpatient Medications   Medication Sig Dispense Refill    ACCU-CHEK LASHON PLUS test strip       acyclovir (ZOVIRAX) 800 mg tablet Take 1 tablet (800 mg total) by mouth 5 (five) times a day for 7 days 35 tablet 0    albuterol (VENTOLIN HFA) 90 mcg/act inhaler Inhale 2 puffs every 4 (four) hours as needed for wheezing or shortness of breath 1 Inhaler 7    aspirin (ECOTRIN LOW STRENGTH) 81 mg EC tablet Take 4 tablets (324 mg total) by mouth daily 30 tablet 5    Cholecalciferol (VITAMIN D-3) 1000 units CAPS Take 5,000 Units by mouth daily      cyclobenzaprine (FLEXERIL) 10 mg tablet Take 1 tablet (10 mg total) by mouth daily at bedtime 20 tablet 0    ibuprofen (ADVIL) 200 mg tablet Take by mouth      lidocaine (LIDODERM) 5 % Apply 1 patch topically daily Remove & Discard patch within 12 hours or as directed by MD 15 patch 1    Melatonin 5 MG TABS Take 3 tablets by mouth daily       sacubitril-valsartan (ENTRESTO) 49-51 MG TABS Take 1 tablet by mouth 2 (two) times a day 180 tablet 3    spironolactone (ALDACTONE) 25 mg tablet Take 1 tablet (25 mg total) by mouth daily 90 tablet 4    torsemide (DEMADEX) 10 mg tablet Take 1 tablet (10 mg total) by mouth daily 90 tablet 1    vitamin E, tocopherol, 400 units capsule Take 400 Units by mouth 2 (two) times a day      gabapentin (NEURONTIN) 300 mg capsule Take 1 capsule (300 mg total) by mouth daily 30 capsule 0     No current facility-administered medications for this visit  Physical Exam   Constitutional: She is oriented to person, place, and time  She appears well-developed and well-nourished     Eyes: Lids are normal  Right eye exhibits discharge (scant yellowish discharge noted at the corner of eye)  Right conjunctiva is injected  Left conjunctiva is not injected  Cardiovascular: Normal rate, regular rhythm and normal heart sounds  Pulmonary/Chest: Effort normal and breath sounds normal    Neurological: She is alert and oriented to person, place, and time  Skin: Skin is warm and dry  Psychiatric: She has a normal mood and affect   Her behavior is normal  Judgment and thought content normal                    STEFFANY Fu

## 2020-03-02 NOTE — PATIENT INSTRUCTIONS
Do not drive and operate any machinery after taking gabapentin  Supportive care discussed and advised  Advised to RTO for any worsening and no improvement  Follow up for no improvement and worsening of conditions  Patient advised and educated when to see immediate medical care  Gabapentin (By mouth)   Gabapentin (matthew-a-PEN-tin)  Treats seizures and pain caused by shingles  Brand Name(s): ACTIVE-PAC with Gabapentin, Convenience Kj, Cyclo/Veena 10/300 Pack, FusePaq Fanatrex, Veena-V, Gralise, [address] Pack, Neurontin, SmartRx Veena Kit   There may be other brand names for this medicine  When This Medicine Should Not Be Used: This medicine is not right for everyone  Do not use it if you had an allergic reaction to gabapentin  How to Use This Medicine:   Capsule, Liquid, Tablet  · Take your medicine as directed  Your dose may need to be changed several times to find what works best for you  If you have epilepsy, do not allow more than 12 hours to pass between doses  · Capsule: Swallow the capsule whole with plenty of water  Do not open, crush, or chew it  · Gralise® tablet: Swallow the tablet whole   Do not crush, break, or chew it  · Neurontin® tablet: If you break a tablet into 2 pieces, use the second half as your next dose  If you don't use it within 28 days, throw it away  · Measure the oral liquid medicine with a marked measuring spoon, oral syringe, or medicine cup  · This medicine should come with a Medication Guide  Ask your pharmacist for a copy if you do not have one  · Missed dose: Take a dose as soon as you remember  If it is almost time for your next dose, wait until then and take a regular dose  Do not take extra medicine to make up for a missed dose  · Store the medicine in a closed container at room temperature, away from heat, moisture, and direct light  Store the Neurontin® oral liquid in the refrigerator  Do not freeze    Drugs and Foods to Avoid:   Ask your doctor or pharmacist before using any other medicine, including over-the-counter medicines, vitamins, and herbal products  · Some medicines can affect how gabapentin works  Tell your doctor if you also use any of the following:   ¨ Hydrocodone  ¨ Morphine  · If you take an antacid, wait at least 2 hours before you take gabapentin  · Tell your doctor if you use anything else that makes you sleepy  Some examples are allergy medicine, narcotic pain medicine, and alcohol  Warnings While Using This Medicine:   · Tell your doctor if you are pregnant or breastfeeding, or if you have kidney problems or are receiving dialysis  Tell your doctor if you have a history of depression or mental health problems  · This medicine may increase depression or thoughts of suicide  Tell your doctor right away if you start to feel more depressed or think about hurting yourself  · This medicine may cause a serious allergic reaction called multiorgan hypersensitivity, which can damage organs and be life-threatening  · Do not stop using this medicine suddenly  Your doctor will need to slowly decrease your dose before you stop it completely  If you take this medicine to prevent seizures, your seizures may return or occur more often if you stop this medicine suddenly  · This medicine may make you dizzy or drowsy  Do not drive or do anything else that could be dangerous until you know how this medicine affects you  · Tell any doctor or dentist who treats you that you are using this medicine  This medicine may affect certain medical test results  · Your doctor will check your progress and the effects of this medicine at regular visits  Keep all appointments  · Keep all medicine out of the reach of children  Never share your medicine with anyone    Possible Side Effects While Using This Medicine:   Call your doctor right away if you notice any of these side effects:  · Allergic reaction: Itching or hives, swelling in your face or hands, swelling or tingling in your mouth or throat, chest tightness, trouble breathing  · Behavior problems, aggression, restlessness, trouble concentrating, moodiness (especially in children)  · Blistering, peeling, red skin rash  · Change in how much or how often you urinate, bloody or cloudy urine,  · Chest pain, fast heartbeat, trouble breathing  · Dark urine or pale stools, nausea, vomiting, loss of appetite, stomach pain, yellow skin or eyes  · Fever, rash, swollen or tender glands in the neck, armpit, or groin  · Problems with coordination, shakiness, unsteadiness  · Rapid weight gain, swelling in your hands, ankles, or feet  · Unusual moods or behaviors, thoughts of hurting yourself, feeling depressed  If you notice these less serious side effects, talk with your doctor:   · Dizziness, drowsiness, sleepiness, tiredness  If you notice other side effects that you think are caused by this medicine, tell your doctor  Call your doctor for medical advice about side effects  You may report side effects to FDA at 2-092-FDA-0476  © 2017 [address] [employer] Information is for End User's use only and may not be sold, redistributed or otherwise used for commercial purposes  The above information is an  only  It is not intended as medical advice for individual conditions or treatments  Talk to your doctor, nurse or pharmacist before following any medical regimen to see if it is safe and effective for you

## 2020-03-04 ENCOUNTER — TELEPHONE (OUTPATIENT)
Dept: FAMILY MEDICINE CLINIC | Facility: CLINIC | Age: 70
End: 2020-03-04

## 2020-03-04 DIAGNOSIS — B02.9 HERPES ZOSTER WITHOUT COMPLICATION: ICD-10-CM

## 2020-03-04 RX ORDER — GABAPENTIN 300 MG/1
300 CAPSULE ORAL DAILY
Qty: 30 CAPSULE | Refills: 0 | Status: CANCELLED | OUTPATIENT
Start: 2020-03-04

## 2020-03-04 NOTE — TELEPHONE ENCOUNTER
Patient stated that pain was severe and increased gabapentin to 2 times a day yesterday but dose lasts only for 3 hours and advised to increase to one tablet every 3 times a day  Will send refill on 03/10/2020 and then follow up with Dr Bart Greer on 3/23/2020 and will do further refills   STEFFANY Evangelista

## 2020-03-04 NOTE — TELEPHONE ENCOUNTER
Patient called and stated that Tasneem Hernandez prescribed her GABAPENTIN anf to take one per day  She said she was told she could take more  Patient is still in a ton of pain    Can more GABAPENTIN be sent to   [facility]    Please call patient   [phone]

## 2020-03-04 NOTE — TELEPHONE ENCOUNTER
Pt has been taking 2 per day    I tried to send this message to clinical to get the protocols to run and it would not    [facility]

## 2020-03-06 ENCOUNTER — TELEPHONE (OUTPATIENT)
Dept: FAMILY MEDICINE CLINIC | Facility: CLINIC | Age: 70
End: 2020-03-06

## 2020-03-06 NOTE — TELEPHONE ENCOUNTER
Sherrie Sotomayor saw Alexandre Asencio for shingles and on her last day of medication  She stated her rash is getting better and she does not have any new lesions  She still feels lousy and in pain  She is f/u with Alexandre Asencio to see what she thinks she should do next      Please call patient back

## 2020-03-06 NOTE — TELEPHONE ENCOUNTER
Patient called and stated that finished the acyclovir today and no new lesions developing and old lesions are drying  Denies any vision changes, hearing issues, dizziness, fever, and weakness of any extremities  Stated that feeling fatigue and having periods of worse pain at rash area and forntal head  Currently taking gabapentin 300 mg TID and advised that is pain is unmanageable can increase gabapentin but can make you more tired and stated that does not want to increase gabapentin at this time and has tramadol for prn use  Advised to go to ER for any worsening and fever, will follow up with PCP on 3/11   STEFFANY Yu

## 2020-03-10 NOTE — PROGRESS NOTES
Assessment/Plan:    1  Essential hypertension  Assessment & Plan:  Well controlled on current medications and will continue  2  Chronic systolic congestive heart failure (HCC)  Assessment & Plan:  Wt Readings from Last 3 Encounters:   03/02/20 107 kg (236 lb)   02/29/20 106 kg (233 lb)   02/24/20 106 kg (234 lb)         She appears well compensated and continues to follow with cardiology  3  LUCIA (obstructive sleep apnea)  Assessment & Plan:  Continues to use her CPAP  4  Herpes zoster with other complication  -     traMADol (ULTRAM) 50 mg tablet; Take 1 tablet (50 mg total) by mouth every 6 (six) hours as needed for moderate pain  -     gabapentin (NEURONTIN) 300 mg capsule; Take 1 capsule (300 mg total) by mouth 3 (three) times a day    5  Mild intermittent asthma without complication  Assessment & Plan:  She continues to use her rescue inhaler infrequently  Orders:  -     VENTOLIN  (90 Base) MCG/ACT inhaler; Inhale 2 puffs every 4 (four) hours as needed for wheezing or shortness of breath    6  Chronic rhinitis  -     fluticasone (FLONASE) 50 mcg/act nasal spray; 1 spray into each nostril daily          There are no Patient Instructions on file for this visit  Return in about 4 months (around 7/11/2020)  Subjective:      Patient ID: Good Lantigua is a 71 y o  female  Chief Complaint   Patient presents with    Follow-up     shingles  vfrma       Here for follow up of chronic conditions as well as follow up from recent zoster around R eye  She continues to have a lot of pain and itching  She does have another follow up appointment with ophtho and feels her pain is adequately controlled with the gabapentin and occasional tramadol  She otherwise is well, follows regularly with cardiology  Weights have been stable  There is no chest pain, dyspnea, edema  Her nose has been running, clear discharge  No other URI symptoms        The following portions of the patient's history were reviewed and updated as appropriate: allergies, current medications, past family history, past medical history, past social history, past surgical history and problem list     Review of Systems   Constitutional: Negative  HENT: Positive for rhinorrhea  Respiratory: Negative  Cardiovascular: Negative  Skin: Positive for rash  Current Outpatient Medications   Medication Sig Dispense Refill    ACCU-CHEK LASHON PLUS test strip       aspirin (ECOTRIN LOW STRENGTH) 81 mg EC tablet Take 4 tablets (324 mg total) by mouth daily 30 tablet 5    Cholecalciferol (VITAMIN D-3) 1000 units CAPS Take 5,000 Units by mouth daily      cyclobenzaprine (FLEXERIL) 10 mg tablet Take 1 tablet (10 mg total) by mouth daily at bedtime 20 tablet 0    gabapentin (NEURONTIN) 300 mg capsule Take 1 capsule (300 mg total) by mouth 3 (three) times a day 90 capsule 1    ibuprofen (ADVIL) 200 mg tablet Take by mouth      lidocaine (LIDODERM) 5 % Apply 1 patch topically daily Remove & Discard patch within 12 hours or as directed by MD 15 patch 1    Melatonin 5 MG TABS Take 3 tablets by mouth daily       sacubitril-valsartan (ENTRESTO) 49-51 MG TABS Take 1 tablet by mouth 2 (two) times a day 180 tablet 3    spironolactone (ALDACTONE) 25 mg tablet Take 1 tablet (25 mg total) by mouth daily 90 tablet 4    torsemide (DEMADEX) 10 mg tablet Take 1 tablet (10 mg total) by mouth daily 90 tablet 1    VENTOLIN  (90 Base) MCG/ACT inhaler Inhale 2 puffs every 4 (four) hours as needed for wheezing or shortness of breath 1 Inhaler 7    vitamin E, tocopherol, 400 units capsule Take 400 Units by mouth 2 (two) times a day      fluticasone (FLONASE) 50 mcg/act nasal spray 1 spray into each nostril daily 1 Bottle 5    traMADol (ULTRAM) 50 mg tablet Take 1 tablet (50 mg total) by mouth every 6 (six) hours as needed for moderate pain 30 tablet 0     No current facility-administered medications for this visit          Objective:    BP 122/68   Pulse 78   Temp 98 1 °F (36 7 °C)   Resp 18   SpO2 95%        Physical Exam   Constitutional: She appears well-developed and well-nourished  Eyes: Conjunctivae are normal    Neck: Neck supple  No JVD present  No thyromegaly present  Cardiovascular: Normal rate, regular rhythm, normal heart sounds and intact distal pulses  Exam reveals no gallop and no friction rub  No murmur heard  Pulmonary/Chest: Effort normal and breath sounds normal  She has no wheezes  She has no rales  Abdominal: Soft  Bowel sounds are normal  She exhibits no distension  There is no tenderness  Musculoskeletal: She exhibits no edema     Skin:   Right forehead and upper eyelid with few crusting lesions and scarring              Sanchez Cobian MD

## 2020-03-11 ENCOUNTER — OFFICE VISIT (OUTPATIENT)
Dept: FAMILY MEDICINE CLINIC | Facility: CLINIC | Age: 70
End: 2020-03-11
Payer: MEDICARE

## 2020-03-11 VITALS
HEART RATE: 78 BPM | RESPIRATION RATE: 18 BRPM | DIASTOLIC BLOOD PRESSURE: 68 MMHG | TEMPERATURE: 98.1 F | OXYGEN SATURATION: 95 % | SYSTOLIC BLOOD PRESSURE: 122 MMHG

## 2020-03-11 DIAGNOSIS — I50.22 CHRONIC SYSTOLIC CONGESTIVE HEART FAILURE (HCC): ICD-10-CM

## 2020-03-11 DIAGNOSIS — G47.33 OSA (OBSTRUCTIVE SLEEP APNEA): ICD-10-CM

## 2020-03-11 DIAGNOSIS — B02.8 HERPES ZOSTER WITH OTHER COMPLICATION: ICD-10-CM

## 2020-03-11 DIAGNOSIS — I10 ESSENTIAL HYPERTENSION: Primary | ICD-10-CM

## 2020-03-11 DIAGNOSIS — J31.0 CHRONIC RHINITIS: ICD-10-CM

## 2020-03-11 DIAGNOSIS — J45.20 MILD INTERMITTENT ASTHMA WITHOUT COMPLICATION: ICD-10-CM

## 2020-03-11 PROCEDURE — 99214 OFFICE O/P EST MOD 30 MIN: CPT | Performed by: INTERNAL MEDICINE

## 2020-03-11 PROCEDURE — 1036F TOBACCO NON-USER: CPT | Performed by: INTERNAL MEDICINE

## 2020-03-11 PROCEDURE — 3078F DIAST BP <80 MM HG: CPT | Performed by: INTERNAL MEDICINE

## 2020-03-11 PROCEDURE — 1160F RVW MEDS BY RX/DR IN RCRD: CPT | Performed by: INTERNAL MEDICINE

## 2020-03-11 PROCEDURE — 3074F SYST BP LT 130 MM HG: CPT | Performed by: INTERNAL MEDICINE

## 2020-03-11 RX ORDER — TRAMADOL HYDROCHLORIDE 50 MG/1
50 TABLET ORAL EVERY 6 HOURS PRN
COMMUNITY
Start: 2020-02-29 | End: 2020-03-11 | Stop reason: SDUPTHER

## 2020-03-11 RX ORDER — GABAPENTIN 300 MG/1
300 CAPSULE ORAL 3 TIMES DAILY
Qty: 90 CAPSULE | Refills: 1 | Status: SHIPPED | OUTPATIENT
Start: 2020-03-11 | End: 2020-08-06 | Stop reason: ALTCHOICE

## 2020-03-11 RX ORDER — FLUTICASONE PROPIONATE 50 MCG
1 SPRAY, SUSPENSION (ML) NASAL DAILY
Qty: 1 BOTTLE | Refills: 5 | Status: SHIPPED | OUTPATIENT
Start: 2020-03-11 | End: 2020-08-06 | Stop reason: ALTCHOICE

## 2020-03-11 RX ORDER — GABAPENTIN 300 MG/1
300 CAPSULE ORAL DAILY
Qty: 30 CAPSULE | Refills: 0 | Status: SHIPPED | OUTPATIENT
Start: 2020-03-11 | End: 2020-03-11 | Stop reason: SDUPTHER

## 2020-03-11 RX ORDER — TRAMADOL HYDROCHLORIDE 50 MG/1
50 TABLET ORAL EVERY 6 HOURS PRN
Qty: 30 TABLET | Refills: 0 | Status: SHIPPED | OUTPATIENT
Start: 2020-03-11 | End: 2020-06-08 | Stop reason: ALTCHOICE

## 2020-03-11 NOTE — ASSESSMENT & PLAN NOTE
Wt Readings from Last 3 Encounters:   03/02/20 107 kg (236 lb)   02/29/20 106 kg (233 lb)   02/24/20 106 kg (234 lb)         She appears well compensated and continues to follow with cardiology

## 2020-05-11 DIAGNOSIS — G89.29 CHRONIC LOW BACK PAIN WITHOUT SCIATICA, UNSPECIFIED BACK PAIN LATERALITY: ICD-10-CM

## 2020-05-11 DIAGNOSIS — M54.50 CHRONIC LOW BACK PAIN WITHOUT SCIATICA, UNSPECIFIED BACK PAIN LATERALITY: ICD-10-CM

## 2020-05-11 RX ORDER — LIDOCAINE 50 MG/G
1 PATCH TOPICAL DAILY
Qty: 15 PATCH | Refills: 1 | Status: SHIPPED | OUTPATIENT
Start: 2020-05-11 | End: 2022-04-21 | Stop reason: SDUPTHER

## 2020-06-08 ENCOUNTER — TELEPHONE (OUTPATIENT)
Dept: CARDIOLOGY CLINIC | Facility: CLINIC | Age: 70
End: 2020-06-08

## 2020-06-08 ENCOUNTER — OFFICE VISIT (OUTPATIENT)
Dept: CARDIOLOGY CLINIC | Facility: CLINIC | Age: 70
End: 2020-06-08
Payer: MEDICARE

## 2020-06-08 VITALS
HEART RATE: 81 BPM | OXYGEN SATURATION: 98 % | SYSTOLIC BLOOD PRESSURE: 124 MMHG | TEMPERATURE: 98.1 F | WEIGHT: 237 LBS | DIASTOLIC BLOOD PRESSURE: 80 MMHG | HEIGHT: 65 IN | BODY MASS INDEX: 39.49 KG/M2

## 2020-06-08 DIAGNOSIS — E66.2 MORBID (SEVERE) OBESITY WITH ALVEOLAR HYPOVENTILATION (HCC): ICD-10-CM

## 2020-06-08 DIAGNOSIS — R05.9 COUGH: ICD-10-CM

## 2020-06-08 DIAGNOSIS — I10 ESSENTIAL HYPERTENSION: ICD-10-CM

## 2020-06-08 DIAGNOSIS — I50.22 CHRONIC SYSTOLIC CONGESTIVE HEART FAILURE (HCC): Primary | ICD-10-CM

## 2020-06-08 PROCEDURE — 99214 OFFICE O/P EST MOD 30 MIN: CPT | Performed by: INTERNAL MEDICINE

## 2020-06-08 PROCEDURE — 3008F BODY MASS INDEX DOCD: CPT | Performed by: INTERNAL MEDICINE

## 2020-06-08 PROCEDURE — 1036F TOBACCO NON-USER: CPT | Performed by: INTERNAL MEDICINE

## 2020-06-08 PROCEDURE — 1160F RVW MEDS BY RX/DR IN RCRD: CPT | Performed by: INTERNAL MEDICINE

## 2020-06-08 PROCEDURE — 3074F SYST BP LT 130 MM HG: CPT | Performed by: INTERNAL MEDICINE

## 2020-06-08 PROCEDURE — 3079F DIAST BP 80-89 MM HG: CPT | Performed by: INTERNAL MEDICINE

## 2020-06-16 ENCOUNTER — OFFICE VISIT (OUTPATIENT)
Dept: OBGYN CLINIC | Facility: CLINIC | Age: 70
End: 2020-06-16
Payer: MEDICARE

## 2020-06-16 VITALS
SYSTOLIC BLOOD PRESSURE: 138 MMHG | HEART RATE: 77 BPM | DIASTOLIC BLOOD PRESSURE: 69 MMHG | BODY MASS INDEX: 39.49 KG/M2 | WEIGHT: 237 LBS | TEMPERATURE: 97.8 F | HEIGHT: 65 IN

## 2020-06-16 DIAGNOSIS — M65.341 TRIGGER RING FINGER OF RIGHT HAND: ICD-10-CM

## 2020-06-16 DIAGNOSIS — M17.12 PRIMARY OSTEOARTHRITIS OF LEFT KNEE: Primary | ICD-10-CM

## 2020-06-16 PROCEDURE — 1036F TOBACCO NON-USER: CPT | Performed by: ORTHOPAEDIC SURGERY

## 2020-06-16 PROCEDURE — 99214 OFFICE O/P EST MOD 30 MIN: CPT | Performed by: ORTHOPAEDIC SURGERY

## 2020-06-16 PROCEDURE — 20550 NJX 1 TENDON SHEATH/LIGAMENT: CPT | Performed by: ORTHOPAEDIC SURGERY

## 2020-06-16 PROCEDURE — 3075F SYST BP GE 130 - 139MM HG: CPT | Performed by: ORTHOPAEDIC SURGERY

## 2020-06-16 PROCEDURE — 3078F DIAST BP <80 MM HG: CPT | Performed by: ORTHOPAEDIC SURGERY

## 2020-06-16 PROCEDURE — 1160F RVW MEDS BY RX/DR IN RCRD: CPT | Performed by: ORTHOPAEDIC SURGERY

## 2020-06-16 PROCEDURE — 3008F BODY MASS INDEX DOCD: CPT | Performed by: ORTHOPAEDIC SURGERY

## 2020-06-16 PROCEDURE — 20610 DRAIN/INJ JOINT/BURSA W/O US: CPT | Performed by: ORTHOPAEDIC SURGERY

## 2020-06-16 RX ADMIN — TRIAMCINOLONE ACETONIDE 20 MG: 40 INJECTION, SUSPENSION INTRA-ARTICULAR; INTRAMUSCULAR at 16:31

## 2020-06-16 RX ADMIN — DEXAMETHASONE SODIUM PHOSPHATE 40 MG: 100 INJECTION INTRAMUSCULAR; INTRAVENOUS at 16:31

## 2020-06-16 RX ADMIN — LIDOCAINE HYDROCHLORIDE 0.5 ML: 10 INJECTION, SOLUTION INFILTRATION; PERINEURAL at 16:31

## 2020-06-16 RX ADMIN — LIDOCAINE HYDROCHLORIDE 4 ML: 10 INJECTION, SOLUTION INFILTRATION; PERINEURAL at 16:31

## 2020-06-17 ENCOUNTER — TELEPHONE (OUTPATIENT)
Dept: OTOLARYNGOLOGY | Facility: CLINIC | Age: 70
End: 2020-06-17

## 2020-06-17 RX ORDER — DEXAMETHASONE SODIUM PHOSPHATE 100 MG/10ML
40 INJECTION INTRAMUSCULAR; INTRAVENOUS
Status: COMPLETED | OUTPATIENT
Start: 2020-06-16 | End: 2020-06-16

## 2020-06-17 RX ORDER — LIDOCAINE HYDROCHLORIDE 10 MG/ML
0.5 INJECTION, SOLUTION INFILTRATION; PERINEURAL
Status: COMPLETED | OUTPATIENT
Start: 2020-06-16 | End: 2020-06-16

## 2020-06-17 RX ORDER — TRIAMCINOLONE ACETONIDE 40 MG/ML
20 INJECTION, SUSPENSION INTRA-ARTICULAR; INTRAMUSCULAR
Status: COMPLETED | OUTPATIENT
Start: 2020-06-16 | End: 2020-06-16

## 2020-06-17 RX ORDER — LIDOCAINE HYDROCHLORIDE 10 MG/ML
4 INJECTION, SOLUTION INFILTRATION; PERINEURAL
Status: COMPLETED | OUTPATIENT
Start: 2020-06-16 | End: 2020-06-16

## 2020-06-18 ENCOUNTER — OFFICE VISIT (OUTPATIENT)
Dept: OTOLARYNGOLOGY | Facility: CLINIC | Age: 70
End: 2020-06-18
Payer: MEDICARE

## 2020-06-18 VITALS — HEIGHT: 65 IN | TEMPERATURE: 97.8 F | BODY MASS INDEX: 39.49 KG/M2 | WEIGHT: 237 LBS

## 2020-06-18 DIAGNOSIS — K21.9 LARYNGOPHARYNGEAL REFLUX (LPR): ICD-10-CM

## 2020-06-18 DIAGNOSIS — J31.0 NONALLERGIC RHINITIS: Primary | ICD-10-CM

## 2020-06-18 DIAGNOSIS — R05.9 COUGH: ICD-10-CM

## 2020-06-18 PROCEDURE — 3008F BODY MASS INDEX DOCD: CPT | Performed by: OTOLARYNGOLOGY

## 2020-06-18 PROCEDURE — 99203 OFFICE O/P NEW LOW 30 MIN: CPT | Performed by: OTOLARYNGOLOGY

## 2020-06-18 PROCEDURE — 3075F SYST BP GE 130 - 139MM HG: CPT | Performed by: OTOLARYNGOLOGY

## 2020-06-18 PROCEDURE — 1036F TOBACCO NON-USER: CPT | Performed by: OTOLARYNGOLOGY

## 2020-06-18 PROCEDURE — 31575 DIAGNOSTIC LARYNGOSCOPY: CPT | Performed by: OTOLARYNGOLOGY

## 2020-06-18 PROCEDURE — 3078F DIAST BP <80 MM HG: CPT | Performed by: OTOLARYNGOLOGY

## 2020-06-18 PROCEDURE — 1160F RVW MEDS BY RX/DR IN RCRD: CPT | Performed by: OTOLARYNGOLOGY

## 2020-06-18 RX ORDER — FAMOTIDINE 40 MG/1
40 TABLET, FILM COATED ORAL
Qty: 45 TABLET | Refills: 1 | Status: SHIPPED | OUTPATIENT
Start: 2020-06-18 | End: 2021-10-21 | Stop reason: ALTCHOICE

## 2020-06-18 RX ORDER — IPRATROPIUM BROMIDE 21 UG/1
2 SPRAY, METERED NASAL EVERY 12 HOURS
Qty: 30 ML | Refills: 3 | Status: SHIPPED | OUTPATIENT
Start: 2020-06-18 | End: 2021-07-08

## 2020-06-18 RX ORDER — FAMOTIDINE 40 MG/1
40 TABLET, FILM COATED ORAL DAILY
Qty: 45 TABLET | Refills: 1 | Status: SHIPPED | OUTPATIENT
Start: 2020-06-18 | End: 2020-06-18

## 2020-07-06 ENCOUNTER — APPOINTMENT (OUTPATIENT)
Dept: LAB | Facility: HOSPITAL | Age: 70
End: 2020-07-06
Payer: MEDICARE

## 2020-07-06 ENCOUNTER — TRANSCRIBE ORDERS (OUTPATIENT)
Dept: ADMINISTRATIVE | Facility: HOSPITAL | Age: 70
End: 2020-07-06

## 2020-07-06 DIAGNOSIS — B35.1 DERMATOPHYTOSIS OF NAIL: Primary | ICD-10-CM

## 2020-07-06 DIAGNOSIS — B35.1 DERMATOPHYTOSIS OF NAIL: ICD-10-CM

## 2020-07-06 LAB
ALBUMIN SERPL BCP-MCNC: 3.5 G/DL (ref 3.5–5)
ALP SERPL-CCNC: 46 U/L (ref 46–116)
ALT SERPL W P-5'-P-CCNC: 33 U/L (ref 12–78)
AST SERPL W P-5'-P-CCNC: 13 U/L (ref 5–45)
BILIRUB DIRECT SERPL-MCNC: 0.07 MG/DL (ref 0–0.2)
BILIRUB SERPL-MCNC: 0.2 MG/DL (ref 0.2–1)
PROT SERPL-MCNC: 6.7 G/DL (ref 6.4–8.2)

## 2020-07-06 PROCEDURE — 36415 COLL VENOUS BLD VENIPUNCTURE: CPT

## 2020-07-06 PROCEDURE — 80076 HEPATIC FUNCTION PANEL: CPT

## 2020-07-18 NOTE — DISCHARGE SUMMARY
Discharge Summary - [facility]    Patient Information: Cecil Horn 76 y o  female MRN: [id number]  Unit/Bed#: [address] [place] [address] Encounter: [id number]    Discharging Physician / Practitioner: Abdelrahman Blackwell DO  PCP: Naomie Zamorano MD  Admission Date: 2/12/2019  Discharge Date: 02/16/19    Reason for Admission: Shortness of Breath (Patient complains of SOB that started yesterday afternoon)      Discharge Diagnoses:     Principal Problem (Resolved):    Multifocal pneumonia  Active Problems:    Influenza A    Asthma with acute exacerbation    Essential hypertension    History Abnormal heart rhythm    Adrenal adenoma    Pericardial effusion    CVA (cerebral vascular accident) ([facility] [address] )        * Multifocal pneumoniaresolved as of 2/16/2019  Assessment & Plan  CT showed multifocal pneumonia - likely viral     IV Ceftriaxone and Azithromycin was discontinued  urine strep/legionella neg     blood culture 1/2 growing coag-negative staph, likely contamination   procalcitonin level X 2 negative  She will need repeat CT chest in 2 months to make sure pneumonia has resolved    Influenza A  Assessment & Plan  Continue Tamiflu to finish 5 day course    Asthma with acute exacerbation  Assessment & Plan  Denies any hx of repeated exacerbations  She was on IV Solu-Medrol here and transitioned to prednisone taper on discharge  Continue with nebulizer treatments  Nebulizer machine provided on discharge  She Can follow-up outpt with Pulmonology     CVA (cerebral vascular accident) [facility])  Assessment & Plan  Continue Asprin    Pericardial effusion  Assessment & Plan  TTE showed EF of 45% with grade 1 diastolic dysfunction  Small pericardial effusion was noted without any evidence of tamponade  Patient advised to follow up with Cardiology after discharge    Adrenal adenoma  Assessment & Plan  Patient aware of findings  She will follow up with her PMD regarding this      History Abnormal heart rhythm  Assessment & Plan  Pt has been on Amiodarone for several years but denies any formal diagnosis of Atrial Fibrillation  She was following up with a Cardiologist years ago and reports history of "abnormal heart rhythm"  Continue Amiodarone  She will need follow up with Cardiology after discharge    Essential hypertension  [address] Stay:  IP CONSULT TO CASE MANAGEMENT    Procedures Performed:     · none    Significant Findings:     · See hospital course and above    Imaging while in hospital:    Xr Chest 2 Views    Result Date: 2/12/2019  Narrative: CHEST INDICATION: Shortness of breath, chest pain COMPARISON:  None EXAM PERFORMED/VIEWS:  XR CHEST PA & LATERAL FINDINGS: Cardiomediastinal silhouette appears unremarkable  Multifocal lung opacities suggesting multifocal pneumonia  No pneumothorax or pleural effusion  Osseous structures appear within normal limits for patient age  Impression: Multifocal lung opacities suggesting multifocal pneumonia  Please refer to CT  Workstation performed: [id number]     Cta Ed Chest Pe Study    Result Date: 2/12/2019  Narrative: CTA - CHEST WITH IV CONTRAST - PULMONARY ANGIOGRAM INDICATION:   Chest pain, shortness of breath  COMPARISON: None  TECHNIQUE: CTA examination of the chest was performed using angiographic technique according to a protocol specifically tailored to evaluate for pulmonary embolism  Axial, sagittal, and coronal 2D reformatted images were created from the source data and  submitted for interpretation  In addition, coronal 3D MIP postprocessing was performed on the acquisition scanner  Radiation dose length product (DLP) for this visit:  767 mGy-cm   This examination, like all CT scans performed in the [facility], was performed utilizing techniques to minimize radiation dose exposure, including the use of iterative reconstruction and automated exposure control   IV Contrast:  85 mL of iohexol (OMNIPAQUE)  FINDINGS: PULMONARY ARTERIAL TREE:  No pulmonary embolus is seen  LUNGS:  Patchy lung opacities identified in the bilateral upper lobe, right middle lobe and right lower lobe and lingular and a nodular 1 cm opacity in the left upper lobe  These findings could relate to multifocal pneumonia  However, posttreatment follow-up to radiographic resolution with unenhanced chest CT is recommended in 2 months to ensure resolution and exclude other etiologies  PLEURA:  Unremarkable  HEART/GREAT VESSELS:  Small pericardial effusion  MEDIASTINUM AND FELIPE: Mediastinal and bilateral hilar adenopathy could be reactive to the pneumonia  CHEST WALL AND LOWER NECK:   Unremarkable  VISUALIZED STRUCTURES IN THE UPPER ABDOMEN:  Bilateral adrenal gland thickening /adrenal nodules  possibly adenomas OSSEOUS STRUCTURES:  No acute fracture or destructive osseous lesion  Impression: Multifocal lung opacities could represent multifocal pneumonia  However, posttreatment follow-up to radiographic resolution with unenhanced chest CT is recommended in 2 months to ensure resolution and exclude other etiologies  Mediastinal and hilar adenopathy could be reactive to the pneumonia  Small pericardial effusion  Bilateral adrenal lesions, possibly adenomas  Workstation performed: [id number]       Incidental Findings:   · Bilateral adrenal lesions  · Small pericardial effusion  · MEdiastinal and hilar adenopathy     Test Results Pending at Discharge (will require follow up):   · As per After Visit Summary     Outpatient Tests Requested:  · none    Complications:  See hospital course and above    Hospital Course:     Arminda Og is a 76 y o  female patient who originally presented to the hospital on 2/12/2019 due to cough, shortness of breath, sore throat, myalgias  In the ER she was found to have multifocal pneumonia and placed on oxygen    She was started on IV antibiotics for multifocal pneumonia and was found to have influenza A infection for which she was started on Tamiflu  She was also started on IV steroids for asthma exacerbation  Noted to have small pericardial effusion  She will follow up with Pulmonary and Cardiology after discharge  By day of discharge she was feeling significantly better and not requiring any oxygen    Please see above list of diagnoses and related plan for additional information  Condition at Discharge: stable     Discharge Day Visit / Exam:     Subjective:  States she feels 99 9% better and wants to go home    Vitals: Blood Pressure: (!) 180/89 (02/16/19 0730)  Pulse: 78 (02/16/19 0730)  Temperature: 98 2 °F (36 8 °C) (02/16/19 0730)  Temp Source: Oral (02/16/19 0730)  Respirations: 17 (02/16/19 0730)  Height: 5' 5 34" (166 cm) (02/12/19 2147)  Weight - Scale: 102 kg (225 lb 6 oz) (02/16/19 0600)  SpO2: 96 % (02/16/19 0758)  Exam:   Physical Exam   Constitutional: She is oriented to person, place, and time  She appears well-developed and well-nourished  No distress  HENT:   Head: Normocephalic and atraumatic  Mouth/Throat: Oropharynx is clear and moist    Eyes: EOM are normal  Right eye exhibits no discharge  Left eye exhibits no discharge  No scleral icterus  Neck: Neck supple  No tracheal deviation present  Cardiovascular: Normal rate and regular rhythm  Pulmonary/Chest: Effort normal and breath sounds normal  No respiratory distress  She has no wheezes  She has no rales  Abdominal: Soft  Bowel sounds are normal  She exhibits no distension  There is no tenderness  Musculoskeletal: She exhibits no edema  Neurological: She is alert and oriented to person, place, and time  No cranial nerve deficit  Skin: Skin is dry  She is not diaphoretic  Psychiatric: She has a normal mood and affect  Discharge instructions/Information to patient and family:(Discharge Medications and Follow up):   See after visit summary for information provided to patient and family  Provisions for Follow-Up Care:  See after visit summary for information related to follow-up care and any pertinent home health orders  Disposition: Home    Planned Readmission:  No     Discharge Statement:  I spent > 30 minutes discharging the patient  This time was spent on the day of discharge  I had direct contact with the patient on the day of discharge  Greater than 50% of the total time was spent examining patient, answering all patient questions, arranging and discussing plan of care with patient as well as directly providing post-discharge instructions  Additional time then spent on discharge activities  Discharge Medications:  See after visit summary for reconciled discharge medications provided to patient and family  ** Please Note: "This note has been constructed using a voice recognition system  Therefore there may be syntax, spelling, and/or grammatical errors   Please call if you have any questions  "** Principal Discharge DX:	Postpartum state  Goal:	Happy mother and baby  Assessment and plan of treatment:	Please follow-up with your OB doctor within 4 weeks. You can resume a regular diet at home and you should continue your prenatal vitamins as directed. Please place nothing in the vagina for 6 weeks (no tampons, no sex, no douching, no tub baths, no swimming pools, etc). If you have severe headaches and/or vision changes, heavy bleeding, chest pain, please call your provider or go to the nearest ED. Please call your OB with any signs of symptoms of infection including fever > 100.4 degrees, severe pain, malodorous vaginal discharge or heavy bleeding requiring more than 1-2 pads/hour. You can take Motrin 600mg orally every 6 hours for pain as needed.

## 2020-08-05 ENCOUNTER — TELEPHONE (OUTPATIENT)
Dept: OTOLARYNGOLOGY | Facility: CLINIC | Age: 70
End: 2020-08-05

## 2020-08-05 NOTE — TELEPHONE ENCOUNTER
COVID Pre-Visit Screening     1  Is this a family member screening? no  2  Have you traveled outside of your state in the past 2 weeks? no  3  Do you presently have a fever or flu-like symptoms? no  4  Do you have symptoms of an upper respiratory infection like runny nose, sore throat, or cough? no  5  Are you suffering from new headache that you have not had in the past?  no  6  Do you have/have you experienced any new shortness of breath recently? no  7  Do you have any new diarrhea, nausea or vomiting? no  8  Have you been in contact with anyone who has been sick or diagnosed with COVID-19?no  9  Do you have any new loss of taste or smell? no  10  Are you able to wear a mask without a valve for the entire visit?  yes

## 2020-08-06 ENCOUNTER — OFFICE VISIT (OUTPATIENT)
Dept: OTOLARYNGOLOGY | Facility: CLINIC | Age: 70
End: 2020-08-06
Payer: MEDICARE

## 2020-08-06 VITALS — BODY MASS INDEX: 39.49 KG/M2 | WEIGHT: 237 LBS | HEIGHT: 65 IN | TEMPERATURE: 97.4 F

## 2020-08-06 DIAGNOSIS — R05.3 CHRONIC COUGH: Primary | ICD-10-CM

## 2020-08-06 PROCEDURE — 3008F BODY MASS INDEX DOCD: CPT | Performed by: OTOLARYNGOLOGY

## 2020-08-06 PROCEDURE — 3078F DIAST BP <80 MM HG: CPT | Performed by: OTOLARYNGOLOGY

## 2020-08-06 PROCEDURE — 1160F RVW MEDS BY RX/DR IN RCRD: CPT | Performed by: OTOLARYNGOLOGY

## 2020-08-06 PROCEDURE — 1036F TOBACCO NON-USER: CPT | Performed by: OTOLARYNGOLOGY

## 2020-08-06 PROCEDURE — 3075F SYST BP GE 130 - 139MM HG: CPT | Performed by: OTOLARYNGOLOGY

## 2020-08-06 PROCEDURE — 99212 OFFICE O/P EST SF 10 MIN: CPT | Performed by: OTOLARYNGOLOGY

## 2020-08-06 RX ORDER — TERBINAFINE HYDROCHLORIDE 250 MG/1
250 TABLET ORAL DAILY
COMMUNITY
Start: 2020-07-09 | End: 2021-02-25

## 2020-08-06 NOTE — PROGRESS NOTES
Otolaryngology-- Head and Neck Surgery Follow up visit      CC: cough      Time interval of problem since last visit:  6 weeks    Pertinent interval elements of the history:  She returns after 6 weeks  She reports a persistent cough  She does not has an increase in post prandial coughing  Coughing is increased when lying supine  She does sleep on an incline and wears CPAP at night  She has an autopap machine (7-20 cm water)  She cleans her machine regularly  Cough is a dry cough  She used ATrovent BID with no improvement  She was also taking the Pepcid at bedtime with no improvement      Review of any relevant imaging:      Interval Review of systems:  General: no weight change, normal energy  CV: no palpitations or chest pain  Pulm: no shortness of breath    Interval Social History:  Social History     Socioeconomic History    Marital status: /Civil Union     Spouse name: Not on file    Number of children: Not on file    Years of education: Not on file    Highest education level: Not on file   Occupational History    Not on file   Social Needs    Financial resource strain: Not on file    Food insecurity     Worry: Not on file     Inability: Not on file    Transportation needs     Medical: Not on file     Non-medical: Not on file   Tobacco Use    Smoking status: Former Smoker     Packs/day: 1 50     Years: 30 00     Pack years: 45 00     Last attempt to quit:      Years since quittin 6    Smokeless tobacco: Never Used   Substance and Sexual Activity    Alcohol use: Yes     Frequency: 2-4 times a month     Drinks per session: 1 or 2     Binge frequency: Never     Comment: occasional    Drug use: No    Sexual activity: Yes     Birth control/protection: Post-menopausal   Lifestyle    Physical activity     Days per week: Not on file     Minutes per session: Not on file    Stress: Not on file   Relationships    Social connections     Talks on phone: Not on file     Gets together: Not on file     Attends Nondenominational service: Not on file     Active member of club or organization: Not on file     Attends meetings of clubs or organizations: Not on file     Relationship status: Not on file    Intimate partner violence     Fear of current or ex partner: Not on file     Emotionally abused: Not on file     Physically abused: Not on file     Forced sexual activity: Not on file   Other Topics Concern    Not on file   Social History Narrative    Exposure to secondhand smoke    Denied: History of pets/animals    Denied: History of travel history        Interval Physical Examination:  Temp (!) 97 4 °F (36 3 °C) (Temporal)   Ht 5' 5" (1 651 m)   Wt 108 kg (237 lb)   BMI 39 44 kg/m²   NAD  Not examined  Procedure:      Assessment:  1  Chronic cough         Plan:  1  She reports 20% improvement in her cough on Atrovent and Pepcid  I recommended Pepcid BID and also referred her for follow up with our laryngologist, Dr Stephan Rodriguez, at our office in [place]  She will see me as needed  This visit required 10 minutes, of which more than 50% was spent counseling and coordinating care

## 2020-08-10 ENCOUNTER — TRANSCRIBE ORDERS (OUTPATIENT)
Dept: ADMINISTRATIVE | Facility: HOSPITAL | Age: 70
End: 2020-08-10

## 2020-08-10 ENCOUNTER — APPOINTMENT (OUTPATIENT)
Dept: LAB | Facility: HOSPITAL | Age: 70
End: 2020-08-10
Attending: INTERNAL MEDICINE
Payer: MEDICARE

## 2020-08-10 DIAGNOSIS — I50.22 CHRONIC SYSTOLIC CONGESTIVE HEART FAILURE (HCC): ICD-10-CM

## 2020-08-10 DIAGNOSIS — Z79.899 ENCOUNTER FOR LONG-TERM (CURRENT) USE OF OTHER MEDICATIONS: Primary | ICD-10-CM

## 2020-08-10 DIAGNOSIS — R73.01 IMPAIRED FASTING GLUCOSE: Primary | ICD-10-CM

## 2020-08-10 DIAGNOSIS — Z79.899 ENCOUNTER FOR LONG-TERM (CURRENT) USE OF OTHER MEDICATIONS: ICD-10-CM

## 2020-08-10 DIAGNOSIS — R73.01 IMPAIRED FASTING GLUCOSE: ICD-10-CM

## 2020-08-10 DIAGNOSIS — I10 ESSENTIAL HYPERTENSION: ICD-10-CM

## 2020-08-10 LAB
ALBUMIN SERPL BCP-MCNC: 3.7 G/DL (ref 3.5–5)
ALP SERPL-CCNC: 50 U/L (ref 46–116)
ALT SERPL W P-5'-P-CCNC: 26 U/L (ref 12–78)
ANION GAP SERPL CALCULATED.3IONS-SCNC: 4 MMOL/L (ref 4–13)
AST SERPL W P-5'-P-CCNC: 14 U/L (ref 5–45)
BASOPHILS # BLD AUTO: 0.05 THOUSANDS/ΜL (ref 0–0.1)
BASOPHILS NFR BLD AUTO: 1 % (ref 0–1)
BILIRUB DIRECT SERPL-MCNC: 0.1 MG/DL (ref 0–0.2)
BILIRUB SERPL-MCNC: 0.4 MG/DL (ref 0.2–1)
BUN SERPL-MCNC: 22 MG/DL (ref 5–25)
CALCIUM SERPL-MCNC: 9.2 MG/DL (ref 8.3–10.1)
CHLORIDE SERPL-SCNC: 103 MMOL/L (ref 100–108)
CHOLEST SERPL-MCNC: 191 MG/DL (ref 50–200)
CO2 SERPL-SCNC: 31 MMOL/L (ref 21–32)
CREAT SERPL-MCNC: 0.76 MG/DL (ref 0.6–1.3)
EOSINOPHIL # BLD AUTO: 0.33 THOUSAND/ΜL (ref 0–0.61)
EOSINOPHIL NFR BLD AUTO: 4 % (ref 0–6)
ERYTHROCYTE [DISTWIDTH] IN BLOOD BY AUTOMATED COUNT: 12.7 % (ref 11.6–15.1)
EST. AVERAGE GLUCOSE BLD GHB EST-MCNC: 157 MG/DL
GFR SERPL CREATININE-BSD FRML MDRD: 80 ML/MIN/1.73SQ M
GLUCOSE P FAST SERPL-MCNC: 164 MG/DL (ref 65–99)
HBA1C MFR BLD: 7.1 %
HCT VFR BLD AUTO: 38.7 % (ref 34.8–46.1)
HDLC SERPL-MCNC: 46 MG/DL
HGB BLD-MCNC: 12.5 G/DL (ref 11.5–15.4)
IMM GRANULOCYTES # BLD AUTO: 0.04 THOUSAND/UL (ref 0–0.2)
IMM GRANULOCYTES NFR BLD AUTO: 0 % (ref 0–2)
LDLC SERPL CALC-MCNC: 102 MG/DL (ref 0–100)
LYMPHOCYTES # BLD AUTO: 2.58 THOUSANDS/ΜL (ref 0.6–4.47)
LYMPHOCYTES NFR BLD AUTO: 28 % (ref 14–44)
MCH RBC QN AUTO: 30.6 PG (ref 26.8–34.3)
MCHC RBC AUTO-ENTMCNC: 32.3 G/DL (ref 31.4–37.4)
MCV RBC AUTO: 95 FL (ref 82–98)
MONOCYTES # BLD AUTO: 0.87 THOUSAND/ΜL (ref 0.17–1.22)
MONOCYTES NFR BLD AUTO: 10 % (ref 4–12)
NEUTROPHILS # BLD AUTO: 5.23 THOUSANDS/ΜL (ref 1.85–7.62)
NEUTS SEG NFR BLD AUTO: 57 % (ref 43–75)
NONHDLC SERPL-MCNC: 145 MG/DL
NRBC BLD AUTO-RTO: 0 /100 WBCS
PLATELET # BLD AUTO: 321 THOUSANDS/UL (ref 149–390)
PMV BLD AUTO: 10.6 FL (ref 8.9–12.7)
POTASSIUM SERPL-SCNC: 4.2 MMOL/L (ref 3.5–5.3)
PROT SERPL-MCNC: 6.7 G/DL (ref 6.4–8.2)
RBC # BLD AUTO: 4.09 MILLION/UL (ref 3.81–5.12)
SODIUM SERPL-SCNC: 138 MMOL/L (ref 136–145)
TRIGL SERPL-MCNC: 214 MG/DL
WBC # BLD AUTO: 9.1 THOUSAND/UL (ref 4.31–10.16)

## 2020-08-10 PROCEDURE — 36415 COLL VENOUS BLD VENIPUNCTURE: CPT

## 2020-08-10 PROCEDURE — 80053 COMPREHEN METABOLIC PANEL: CPT

## 2020-08-10 PROCEDURE — 80061 LIPID PANEL: CPT

## 2020-08-10 PROCEDURE — 85025 COMPLETE CBC W/AUTO DIFF WBC: CPT

## 2020-08-10 PROCEDURE — 82248 BILIRUBIN DIRECT: CPT

## 2020-08-10 PROCEDURE — 83036 HEMOGLOBIN GLYCOSYLATED A1C: CPT

## 2020-08-12 ENCOUNTER — TELEPHONE (OUTPATIENT)
Dept: PULMONOLOGY | Facility: MEDICAL CENTER | Age: 70
End: 2020-08-12

## 2020-08-12 NOTE — PROGRESS NOTES
Assessment/Plan:    1  Essential hypertension  Assessment & Plan: This is well controlled on current medications  Orders:  -     CBC and differential; Future; Expected date: 02/17/2021  -     Comprehensive metabolic panel; Future; Expected date: 02/17/2021  -     Lipid panel; Future; Expected date: 02/17/2021  -     HEMOGLOBIN A1C W/ EAG ESTIMATION; Future; Expected date: 02/17/2021    2  Type 2 diabetes mellitus without complication, without long-term current use of insulin [facility])  Assessment & Plan:    Lab Results   Component Value Date    HGBA1C 7 1 (H) 08/10/2020     This is a new diagnosis  She refuses medication at this time and prefers to work on diet, exercise, weight loss  Defers diabetic education  Discussed need for good foot and eye care and she will have eye exam results forwarded to us next week  Orders:  -     CBC and differential; Future; Expected date: 02/17/2021  -     Comprehensive metabolic panel; Future; Expected date: 02/17/2021  -     Lipid panel; Future; Expected date: 02/17/2021  -     HEMOGLOBIN A1C W/ EAG ESTIMATION; Future; Expected date: 02/17/2021    3  Dilated cardiomyopathy [facility])  Assessment & Plan:  Managed by cardiology  Continue current diuretics  4  Cough  Assessment & Plan:  She saw Dr Vic Leon for this and this is improved with tessalon perles  There are no Patient Instructions on file for this visit  No follow-ups on file  Subjective:      Patient ID: Saray Dyer is a 79 y o  female  Chief Complaint   Patient presents with    Follow-up     6 month [id number]       Here for review of bloodwork and follow up  She is feeling well  Her bloodwork is now in diabetic range  We discussed possible medications vs  Dietary and weight loss efforts, referral to diabetic education, need for exercise  Also discussed need for eye exam and she has optho appointment next week  Denies chest pain, dyspnea, cough, fever         The following portions of the patient's history were reviewed and updated as appropriate: allergies, current medications, past family history, past medical history, past social history, past surgical history and problem list     Review of Systems   Constitutional: Negative  Respiratory: Negative  Cardiovascular: Negative  Endocrine: Negative  Current Outpatient Medications   Medication Sig Dispense Refill    ACCU-CHEK LASHON PLUS test strip       aspirin (ECOTRIN LOW STRENGTH) 81 mg EC tablet Take 4 tablets (324 mg total) by mouth daily 30 tablet 5    benzonatate (TESSALON PERLES) 100 mg capsule Take 1 capsule (100 mg total) by mouth 2 (two) times a day as needed for cough (coughing) 30 capsule 3    Cholecalciferol (VITAMIN D-3) 1000 units CAPS Take 5,000 Units by mouth daily      famotidine (PEPCID) 40 MG tablet Take 1 tablet (40 mg total) by mouth daily at bedtime 45 tablet 1    ibuprofen (ADVIL) 200 mg tablet Take by mouth      ipratropium (ATROVENT) 0 03 % nasal spray 2 sprays into each nostril every 12 (twelve) hours 30 mL 3    lidocaine (LIDODERM) 5 % APPLY 1 PATCH TOPICALLY DAILY REMOVE & DISCARD PATCH WITHIN 12 HOURS OR AS DIRECTED BY MD 15 patch 1    Melatonin 5 MG TABS Take 3 tablets by mouth daily       sacubitril-valsartan (ENTRESTO) 49-51 MG TABS Take 1 tablet by mouth 2 (two) times a day 180 tablet 3    spironolactone (ALDACTONE) 25 mg tablet Take 1 tablet (25 mg total) by mouth daily 90 tablet 4    terbinafine (LamISIL) 250 mg tablet Take 250 mg by mouth daily      torsemide (DEMADEX) 10 mg tablet Take 1 tablet (10 mg total) by mouth daily 90 tablet 1    VENTOLIN  (90 Base) MCG/ACT inhaler Inhale 2 puffs every 4 (four) hours as needed for wheezing or shortness of breath 1 Inhaler 7    vitamin E, tocopherol, 400 units capsule Take 400 Units by mouth 2 (two) times a day       No current facility-administered medications for this visit          Objective:    /60   Pulse 71   Temp 97 5 °F (36 4 °C)   Resp 16   Ht 5' 5" (1 651 m)   Wt 108 kg (239 lb)   SpO2 96%   BMI 39 77 kg/m²        Physical Exam  Constitutional:       Appearance: She is well-developed  She is obese  Eyes:      Conjunctiva/sclera: Conjunctivae normal    Neck:      Musculoskeletal: Neck supple  Thyroid: No thyromegaly  Vascular: No JVD  Cardiovascular:      Rate and Rhythm: Normal rate and regular rhythm  Pulses: no weak pulses          Dorsalis pedis pulses are 2+ on the right side and 2+ on the left side  Heart sounds: Normal heart sounds  No murmur  No friction rub  No gallop  Pulmonary:      Effort: Pulmonary effort is normal       Breath sounds: Normal breath sounds  No wheezing or rales  Abdominal:      General: Bowel sounds are normal  There is no distension  Palpations: Abdomen is soft  Tenderness: There is no abdominal tenderness  Feet:      Right foot:      Skin integrity: No ulcer, skin breakdown, erythema, warmth, callus or dry skin  Left foot:      Skin integrity: No ulcer, skin breakdown, erythema, warmth, callus or dry skin  Patient's shoes and socks removed  Right Foot/Ankle   Right Foot Inspection  Skin Exam: skin normal and skin intact no dry skin, no warmth, no callus, no erythema, no maceration, no abnormal color, no pre-ulcer, no ulcer and no callus                          Toe Exam: ROM and strength within normal limits  Sensory       Monofilament testing: intact  Vascular  Capillary refills: < 3 seconds  The right DP pulse is 2+  Left Foot/Ankle  Left Foot Inspection  Skin Exam: skin normal and skin intactno dry skin, no warmth, no erythema, no maceration, normal color, no pre-ulcer, no ulcer and no callus                         Toe Exam: ROM and strength within normal limits                   Sensory       Monofilament: intact  Vascular  Capillary refills: < 3 seconds  The left DP pulse is 2+  Assign Risk Category:  No deformity present;  No loss of protective sensation;  No weak pulses       Risk: 0           Willow Blackwlel MD

## 2020-08-13 ENCOUNTER — OFFICE VISIT (OUTPATIENT)
Dept: PULMONOLOGY | Facility: MEDICAL CENTER | Age: 70
End: 2020-08-13
Payer: MEDICARE

## 2020-08-13 VITALS
HEART RATE: 60 BPM | WEIGHT: 238 LBS | HEIGHT: 65 IN | DIASTOLIC BLOOD PRESSURE: 64 MMHG | OXYGEN SATURATION: 97 % | RESPIRATION RATE: 16 BRPM | BODY MASS INDEX: 39.65 KG/M2 | SYSTOLIC BLOOD PRESSURE: 138 MMHG | TEMPERATURE: 98.2 F

## 2020-08-13 DIAGNOSIS — J45.20 MILD INTERMITTENT ASTHMA WITHOUT COMPLICATION: ICD-10-CM

## 2020-08-13 DIAGNOSIS — E66.09 CLASS 2 OBESITY DUE TO EXCESS CALORIES WITHOUT SERIOUS COMORBIDITY WITH BODY MASS INDEX (BMI) OF 39.0 TO 39.9 IN ADULT: ICD-10-CM

## 2020-08-13 DIAGNOSIS — R05.9 COUGH: ICD-10-CM

## 2020-08-13 DIAGNOSIS — G47.33 OSA (OBSTRUCTIVE SLEEP APNEA): Primary | ICD-10-CM

## 2020-08-13 PROCEDURE — 1036F TOBACCO NON-USER: CPT | Performed by: INTERNAL MEDICINE

## 2020-08-13 PROCEDURE — 3075F SYST BP GE 130 - 139MM HG: CPT | Performed by: INTERNAL MEDICINE

## 2020-08-13 PROCEDURE — 3078F DIAST BP <80 MM HG: CPT | Performed by: INTERNAL MEDICINE

## 2020-08-13 PROCEDURE — 99214 OFFICE O/P EST MOD 30 MIN: CPT | Performed by: INTERNAL MEDICINE

## 2020-08-13 PROCEDURE — 1160F RVW MEDS BY RX/DR IN RCRD: CPT | Performed by: INTERNAL MEDICINE

## 2020-08-13 RX ORDER — BENZONATATE 100 MG/1
100 CAPSULE ORAL 2 TIMES DAILY PRN
Qty: 20 CAPSULE | Refills: 3 | Status: SHIPPED | OUTPATIENT
Start: 2020-08-13 | End: 2020-08-13

## 2020-08-13 RX ORDER — BENZONATATE 100 MG/1
100 CAPSULE ORAL 2 TIMES DAILY PRN
Qty: 30 CAPSULE | Refills: 3 | Status: SHIPPED | OUTPATIENT
Start: 2020-08-13 | End: 2020-11-04 | Stop reason: SDUPTHER
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
## 2020-08-13 NOTE — PATIENT INSTRUCTIONS
Take Tessalon 1-2 pills at night as needed for coughing  Try takign 1 pill and if it does not relieve your symptoms, take 2  If above does not help, we can try prednisone 30 mg daily for 5 days  Call me if the cough does not get better  You can try Wrist-worn pulse oximeter by [facility] at night

## 2020-08-16 PROBLEM — R05.9 COUGH: Status: ACTIVE | Noted: 2020-08-16

## 2020-08-16 PROBLEM — E66.812 CLASS 2 OBESITY DUE TO EXCESS CALORIES WITHOUT SERIOUS COMORBIDITY WITH BODY MASS INDEX (BMI) OF 39.0 TO 39.9 IN ADULT: Status: ACTIVE | Noted: 2020-08-16

## 2020-08-16 PROBLEM — E66.09 CLASS 2 OBESITY DUE TO EXCESS CALORIES WITHOUT SERIOUS COMORBIDITY WITH BODY MASS INDEX (BMI) OF 39.0 TO 39.9 IN ADULT: Status: ACTIVE | Noted: 2020-08-16

## 2020-08-16 NOTE — ASSESSMENT & PLAN NOTE
Moderate obstructive sleep apnea with good compliance to CPAP therapy  She is not have any excessive daytime somnolence  She is on auto CPAPset at range of 7-20 cm H2O  I did review compliance data for the past 1 month and she has been compliant with using the CPAP and resultant AHI was satisfactory at 1 0  Her average CPAP pressure was 19 cm water  She also uses oxygen 2 liters/minute at bedtime with the CPAP she has some alveolar hypoventilation from obesity  She will continue with her CPAP auto range of 7-20 cm water  Did encourage her to lose weight    She does have a pulse oximeter with recording capability and her average room air O2 saturation has been 93%      Her medical supply company is [employer]

## 2020-08-16 NOTE — ASSESSMENT & PLAN NOTE
I did encourage her to lose weight  Were last spirometry done May 2018 showed moderate restrictive pair which I think is due to her being overweight  [facility] imaging has shown no evidence of interstitial lung disease

## 2020-08-16 NOTE — ASSESSMENT & PLAN NOTE
She does have mild intermittent asthma  She is not have any wheezing and is not needing to use her Ventolin inhaler much  I suspect her cough is not due to her asthma

## 2020-08-16 NOTE — ASSESSMENT & PLAN NOTE
His has had a chronic cough since November of last year  Not having any improvement with nasal sprays  Also no improvement Pepcid  Cough is better than November  I did give her prescription for benzonatate 100 mg she take 1-2 tablets twice a day as needed  She does have a follow-up appoint with ENT  If the benzonatate does not help her cough I did tell her to call my office we can consider trial prednisone 30 mg daily for 5 days or more

## 2020-08-17 ENCOUNTER — OFFICE VISIT (OUTPATIENT)
Dept: FAMILY MEDICINE CLINIC | Facility: CLINIC | Age: 70
End: 2020-08-17
Payer: MEDICARE

## 2020-08-17 VITALS
TEMPERATURE: 97.5 F | WEIGHT: 239 LBS | RESPIRATION RATE: 16 BRPM | SYSTOLIC BLOOD PRESSURE: 140 MMHG | DIASTOLIC BLOOD PRESSURE: 60 MMHG | HEART RATE: 71 BPM | BODY MASS INDEX: 39.82 KG/M2 | HEIGHT: 65 IN | OXYGEN SATURATION: 96 %

## 2020-08-17 DIAGNOSIS — E11.9 TYPE 2 DIABETES MELLITUS WITHOUT COMPLICATION, WITHOUT LONG-TERM CURRENT USE OF INSULIN (HCC): ICD-10-CM

## 2020-08-17 DIAGNOSIS — I10 ESSENTIAL HYPERTENSION: Primary | ICD-10-CM

## 2020-08-17 DIAGNOSIS — R05.9 COUGH: ICD-10-CM

## 2020-08-17 DIAGNOSIS — I42.0 DILATED CARDIOMYOPATHY (HCC): ICD-10-CM

## 2020-08-17 PROCEDURE — 3051F HG A1C>EQUAL 7.0%<8.0%: CPT | Performed by: INTERNAL MEDICINE

## 2020-08-17 PROCEDURE — 1160F RVW MEDS BY RX/DR IN RCRD: CPT | Performed by: INTERNAL MEDICINE

## 2020-08-17 PROCEDURE — 3008F BODY MASS INDEX DOCD: CPT | Performed by: INTERNAL MEDICINE

## 2020-08-17 PROCEDURE — 3078F DIAST BP <80 MM HG: CPT | Performed by: INTERNAL MEDICINE

## 2020-08-17 PROCEDURE — 99214 OFFICE O/P EST MOD 30 MIN: CPT | Performed by: INTERNAL MEDICINE

## 2020-08-17 PROCEDURE — 3077F SYST BP >= 140 MM HG: CPT | Performed by: INTERNAL MEDICINE

## 2020-08-17 PROCEDURE — 1036F TOBACCO NON-USER: CPT | Performed by: INTERNAL MEDICINE

## 2020-08-17 NOTE — ASSESSMENT & PLAN NOTE
Lab Results   Component Value Date    HGBA1C 7 1 (H) 08/10/2020     This is a new diagnosis  She refuses medication at this time and prefers to work on diet, exercise, weight loss  Defers diabetic education  Discussed need for good foot and eye care and she will have eye exam results forwarded to us next week

## 2020-08-20 LAB
LEFT EYE DIABETIC RETINOPATHY: NORMAL
RIGHT EYE DIABETIC RETINOPATHY: NORMAL

## 2020-09-04 ENCOUNTER — OFFICE VISIT (OUTPATIENT)
Dept: CARDIOLOGY CLINIC | Facility: CLINIC | Age: 70
End: 2020-09-04
Payer: MEDICARE

## 2020-09-04 VITALS
OXYGEN SATURATION: 96 % | WEIGHT: 236 LBS | SYSTOLIC BLOOD PRESSURE: 144 MMHG | DIASTOLIC BLOOD PRESSURE: 68 MMHG | HEIGHT: 65 IN | BODY MASS INDEX: 39.32 KG/M2 | TEMPERATURE: 98.1 F | HEART RATE: 65 BPM

## 2020-09-04 DIAGNOSIS — R06.00 DYSPNEA ON EXERTION: ICD-10-CM

## 2020-09-04 DIAGNOSIS — I50.22 CHRONIC SYSTOLIC CONGESTIVE HEART FAILURE (HCC): Primary | ICD-10-CM

## 2020-09-04 DIAGNOSIS — I31.3 PERICARDIAL EFFUSION: ICD-10-CM

## 2020-09-04 DIAGNOSIS — I42.0 DILATED CARDIOMYOPATHY (HCC): ICD-10-CM

## 2020-09-04 DIAGNOSIS — I63.9 CEREBROVASCULAR ACCIDENT (CVA), UNSPECIFIED MECHANISM (HCC): ICD-10-CM

## 2020-09-04 DIAGNOSIS — I10 ESSENTIAL HYPERTENSION: ICD-10-CM

## 2020-09-04 DIAGNOSIS — G47.33 OSA (OBSTRUCTIVE SLEEP APNEA): ICD-10-CM

## 2020-09-04 DIAGNOSIS — I50.22 CHRONIC SYSTOLIC (CONGESTIVE) HEART FAILURE (HCC): ICD-10-CM

## 2020-09-04 PROCEDURE — 93000 ELECTROCARDIOGRAM COMPLETE: CPT | Performed by: INTERNAL MEDICINE

## 2020-09-04 PROCEDURE — 99214 OFFICE O/P EST MOD 30 MIN: CPT | Performed by: INTERNAL MEDICINE

## 2020-09-04 RX ORDER — SPIRONOLACTONE 25 MG/1
25 TABLET ORAL DAILY
Qty: 90 TABLET | Refills: 4 | Status: SHIPPED | OUTPATIENT
Start: 2020-09-04 | End: 2021-11-29
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
## 2020-10-30 DIAGNOSIS — R05.9 COUGH: ICD-10-CM

## 2020-11-04 DIAGNOSIS — R05.9 COUGH: ICD-10-CM

## 2020-12-23 RX ORDER — BENZONATATE 100 MG/1
100 CAPSULE ORAL 2 TIMES DAILY PRN
Qty: 30 CAPSULE | Refills: 0 | Status: SHIPPED | OUTPATIENT
Start: 2020-12-23 | End: 2021-10-21 | Stop reason: SDUPTHER

## 2021-01-05 RX ORDER — BENZONATATE 100 MG/1
100 CAPSULE ORAL 2 TIMES DAILY PRN
Qty: 30 CAPSULE | Refills: 0 | OUTPATIENT
Start: 2021-01-05

## 2021-01-14 ENCOUNTER — LAB (OUTPATIENT)
Dept: LAB | Facility: HOSPITAL | Age: 71
End: 2021-01-14
Payer: MEDICARE

## 2021-01-14 ENCOUNTER — OFFICE VISIT (OUTPATIENT)
Dept: CARDIOLOGY CLINIC | Facility: CLINIC | Age: 71
End: 2021-01-14
Payer: MEDICARE

## 2021-01-14 ENCOUNTER — TRANSCRIBE ORDERS (OUTPATIENT)
Dept: ADMINISTRATIVE | Facility: HOSPITAL | Age: 71
End: 2021-01-14

## 2021-01-14 VITALS
HEIGHT: 65 IN | BODY MASS INDEX: 39.27 KG/M2 | HEART RATE: 71 BPM | OXYGEN SATURATION: 94 % | SYSTOLIC BLOOD PRESSURE: 136 MMHG | DIASTOLIC BLOOD PRESSURE: 80 MMHG | TEMPERATURE: 97.1 F

## 2021-01-14 DIAGNOSIS — Z79.890 NEED FOR PROPHYLACTIC HORMONE REPLACEMENT THERAPY (POSTMENOPAUSAL): Primary | ICD-10-CM

## 2021-01-14 DIAGNOSIS — I63.9 CEREBROVASCULAR ACCIDENT (CVA), UNSPECIFIED MECHANISM (HCC): ICD-10-CM

## 2021-01-14 DIAGNOSIS — I42.0 DILATED CARDIOMYOPATHY (HCC): ICD-10-CM

## 2021-01-14 DIAGNOSIS — I50.22 CHRONIC SYSTOLIC CONGESTIVE HEART FAILURE (HCC): ICD-10-CM

## 2021-01-14 DIAGNOSIS — E66.2 MORBID (SEVERE) OBESITY WITH ALVEOLAR HYPOVENTILATION (HCC): ICD-10-CM

## 2021-01-14 DIAGNOSIS — Z79.890 NEED FOR PROPHYLACTIC HORMONE REPLACEMENT THERAPY (POSTMENOPAUSAL): ICD-10-CM

## 2021-01-14 DIAGNOSIS — I10 ESSENTIAL HYPERTENSION: Primary | ICD-10-CM

## 2021-01-14 LAB
ALBUMIN SERPL BCP-MCNC: 3.7 G/DL (ref 3.5–5)
ALP SERPL-CCNC: 43 U/L (ref 46–116)
ALT SERPL W P-5'-P-CCNC: 42 U/L (ref 12–78)
AST SERPL W P-5'-P-CCNC: 18 U/L (ref 5–45)
BILIRUB DIRECT SERPL-MCNC: 0.07 MG/DL (ref 0–0.2)
BILIRUB SERPL-MCNC: 0.2 MG/DL (ref 0.2–1)
PROT SERPL-MCNC: 6.9 G/DL (ref 6.4–8.2)

## 2021-01-14 PROCEDURE — 80076 HEPATIC FUNCTION PANEL: CPT

## 2021-01-14 PROCEDURE — 93000 ELECTROCARDIOGRAM COMPLETE: CPT | Performed by: INTERNAL MEDICINE

## 2021-01-14 PROCEDURE — 36415 COLL VENOUS BLD VENIPUNCTURE: CPT

## 2021-01-14 PROCEDURE — 99214 OFFICE O/P EST MOD 30 MIN: CPT | Performed by: INTERNAL MEDICINE

## 2021-01-14 RX ORDER — FLUCONAZOLE 150 MG/1
TABLET ORAL
COMMUNITY
Start: 2021-01-11 | End: 2021-10-21 | Stop reason: ALTCHOICE
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
## 2021-01-22 ENCOUNTER — HOSPITAL ENCOUNTER (OUTPATIENT)
Dept: NON INVASIVE DIAGNOSTICS | Facility: HOSPITAL | Age: 71
Discharge: HOME | End: 2021-01-22
Attending: INTERNAL MEDICINE
Payer: MEDICARE

## 2021-01-22 DIAGNOSIS — I42.0 DILATED CARDIOMYOPATHY (HCC): ICD-10-CM

## 2021-01-22 PROCEDURE — 93306 TTE W/DOPPLER COMPLETE: CPT

## 2021-01-22 PROCEDURE — 93306 TTE W/DOPPLER COMPLETE: CPT | Performed by: INTERNAL MEDICINE

## 2021-01-26 ENCOUNTER — APPOINTMENT (OUTPATIENT)
Dept: RADIOLOGY | Facility: CLINIC | Age: 71
End: 2021-01-26
Payer: MEDICARE

## 2021-01-26 ENCOUNTER — OFFICE VISIT (OUTPATIENT)
Dept: OBGYN CLINIC | Facility: CLINIC | Age: 71
End: 2021-01-26
Payer: MEDICARE

## 2021-01-26 ENCOUNTER — TELEPHONE (OUTPATIENT)
Dept: CARDIOLOGY CLINIC | Facility: CLINIC | Age: 71
End: 2021-01-26

## 2021-01-26 VITALS
HEART RATE: 78 BPM | WEIGHT: 235 LBS | HEIGHT: 65 IN | DIASTOLIC BLOOD PRESSURE: 80 MMHG | SYSTOLIC BLOOD PRESSURE: 160 MMHG | BODY MASS INDEX: 39.15 KG/M2

## 2021-01-26 DIAGNOSIS — M65.342 TRIGGER RING FINGER OF LEFT HAND: ICD-10-CM

## 2021-01-26 DIAGNOSIS — M17.11 PRIMARY OSTEOARTHRITIS OF RIGHT KNEE: ICD-10-CM

## 2021-01-26 DIAGNOSIS — M25.561 ACUTE PAIN OF RIGHT KNEE: ICD-10-CM

## 2021-01-26 DIAGNOSIS — M25.511 RIGHT SHOULDER PAIN, UNSPECIFIED CHRONICITY: ICD-10-CM

## 2021-01-26 DIAGNOSIS — M19.011 OSTEOARTHRITIS OF RIGHT ACROMIOCLAVICULAR JOINT: Primary | ICD-10-CM

## 2021-01-26 DIAGNOSIS — M17.12 PRIMARY OSTEOARTHRITIS OF LEFT KNEE: ICD-10-CM

## 2021-01-26 PROCEDURE — 73562 X-RAY EXAM OF KNEE 3: CPT

## 2021-01-26 PROCEDURE — 99214 OFFICE O/P EST MOD 30 MIN: CPT | Performed by: ORTHOPAEDIC SURGERY

## 2021-01-26 PROCEDURE — 20550 NJX 1 TENDON SHEATH/LIGAMENT: CPT | Performed by: ORTHOPAEDIC SURGERY

## 2021-01-26 PROCEDURE — 73030 X-RAY EXAM OF SHOULDER: CPT

## 2021-01-26 RX ORDER — TRIAMCINOLONE ACETONIDE 40 MG/ML
20 INJECTION, SUSPENSION INTRA-ARTICULAR; INTRAMUSCULAR
Status: COMPLETED | OUTPATIENT
Start: 2021-01-26 | End: 2021-01-26

## 2021-01-26 RX ORDER — LIDOCAINE HYDROCHLORIDE 10 MG/ML
0.5 INJECTION, SOLUTION INFILTRATION; PERINEURAL
Status: COMPLETED | OUTPATIENT
Start: 2021-01-26 | End: 2021-01-26

## 2021-01-26 RX ADMIN — LIDOCAINE HYDROCHLORIDE 0.5 ML: 10 INJECTION, SOLUTION INFILTRATION; PERINEURAL at 12:14

## 2021-01-26 RX ADMIN — TRIAMCINOLONE ACETONIDE 20 MG: 40 INJECTION, SUSPENSION INTRA-ARTICULAR; INTRAMUSCULAR at 12:14
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
## 2021-01-26 NOTE — TELEPHONE ENCOUNTER
----- Message from Andre Benitez DO sent at 1/25/2021 11:37 PM EST -----  Can you please let the patient know echo was unchanged from before - Ejection fraction was in the range of 40-45%  No changes to medications at this time

## 2021-01-28 ENCOUNTER — OFFICE VISIT (OUTPATIENT)
Dept: OBGYN CLINIC | Facility: CLINIC | Age: 71
End: 2021-01-28
Payer: MEDICARE

## 2021-01-28 VITALS — HEART RATE: 70 BPM | SYSTOLIC BLOOD PRESSURE: 112 MMHG | DIASTOLIC BLOOD PRESSURE: 73 MMHG

## 2021-01-28 DIAGNOSIS — M17.11 PRIMARY OSTEOARTHRITIS OF RIGHT KNEE: ICD-10-CM

## 2021-01-28 DIAGNOSIS — M17.12 PRIMARY OSTEOARTHRITIS OF LEFT KNEE: Primary | ICD-10-CM

## 2021-01-28 PROCEDURE — 20610 DRAIN/INJ JOINT/BURSA W/O US: CPT | Performed by: ORTHOPAEDIC SURGERY

## 2021-01-28 PROCEDURE — 99213 OFFICE O/P EST LOW 20 MIN: CPT | Performed by: ORTHOPAEDIC SURGERY

## 2021-01-28 RX ORDER — DEXAMETHASONE SODIUM PHOSPHATE 100 MG/10ML
40 INJECTION INTRAMUSCULAR; INTRAVENOUS
Status: COMPLETED | OUTPATIENT
Start: 2021-01-28 | End: 2021-01-28

## 2021-01-28 RX ORDER — LIDOCAINE HYDROCHLORIDE 10 MG/ML
4 INJECTION, SOLUTION INFILTRATION; PERINEURAL
Status: COMPLETED | OUTPATIENT
Start: 2021-01-28 | End: 2021-01-28

## 2021-01-28 RX ADMIN — LIDOCAINE HYDROCHLORIDE 4 ML: 10 INJECTION, SOLUTION INFILTRATION; PERINEURAL at 14:56

## 2021-01-28 RX ADMIN — DEXAMETHASONE SODIUM PHOSPHATE 40 MG: 100 INJECTION INTRAMUSCULAR; INTRAVENOUS at 14:56
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
## 2021-02-01 ENCOUNTER — TELEPHONE (OUTPATIENT)
Dept: OBGYN CLINIC | Facility: HOSPITAL | Age: 71
End: 2021-02-01

## 2021-02-01 NOTE — TELEPHONE ENCOUNTER
DR Jade Baca  RE: US guided    Patient called to reschedule her appt with Dr Jade Baca on 02 02   Patient states appt was for "us guided injection"     Can clinical team assist with rescheduling US guided injection

## 2021-02-09 ENCOUNTER — OFFICE VISIT (OUTPATIENT)
Dept: OBGYN CLINIC | Facility: CLINIC | Age: 71
End: 2021-02-09
Payer: MEDICARE

## 2021-02-09 DIAGNOSIS — M19.011 OSTEOARTHRITIS OF RIGHT ACROMIOCLAVICULAR JOINT: Primary | ICD-10-CM

## 2021-02-09 PROCEDURE — 99214 OFFICE O/P EST MOD 30 MIN: CPT | Performed by: ORTHOPAEDIC SURGERY

## 2021-02-09 PROCEDURE — 20606 DRAIN/INJ JOINT/BURSA W/US: CPT | Performed by: ORTHOPAEDIC SURGERY

## 2021-02-09 RX ORDER — TRIAMCINOLONE ACETONIDE 40 MG/ML
40 INJECTION, SUSPENSION INTRA-ARTICULAR; INTRAMUSCULAR
Status: COMPLETED | OUTPATIENT
Start: 2021-02-09 | End: 2021-02-09

## 2021-02-09 RX ORDER — LIDOCAINE HYDROCHLORIDE 10 MG/ML
1 INJECTION, SOLUTION INFILTRATION; PERINEURAL
Status: COMPLETED | OUTPATIENT
Start: 2021-02-09 | End: 2021-02-09

## 2021-02-09 RX ADMIN — TRIAMCINOLONE ACETONIDE 40 MG: 40 INJECTION, SUSPENSION INTRA-ARTICULAR; INTRAMUSCULAR at 13:16

## 2021-02-09 RX ADMIN — LIDOCAINE HYDROCHLORIDE 1 ML: 10 INJECTION, SOLUTION INFILTRATION; PERINEURAL at 13:16
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
## 2021-02-11 ENCOUNTER — OFFICE VISIT (OUTPATIENT)
Dept: PULMONOLOGY | Facility: MEDICAL CENTER | Age: 71
End: 2021-02-11
Payer: MEDICARE

## 2021-02-11 VITALS
DIASTOLIC BLOOD PRESSURE: 82 MMHG | RESPIRATION RATE: 12 BRPM | HEIGHT: 65 IN | WEIGHT: 235 LBS | SYSTOLIC BLOOD PRESSURE: 142 MMHG | BODY MASS INDEX: 39.15 KG/M2 | TEMPERATURE: 97 F | OXYGEN SATURATION: 96 % | HEART RATE: 89 BPM

## 2021-02-11 DIAGNOSIS — J45.20 MILD INTERMITTENT ASTHMA WITHOUT COMPLICATION: ICD-10-CM

## 2021-02-11 DIAGNOSIS — G47.33 OSA (OBSTRUCTIVE SLEEP APNEA): Primary | ICD-10-CM

## 2021-02-11 DIAGNOSIS — E66.2 MORBID (SEVERE) OBESITY WITH ALVEOLAR HYPOVENTILATION (HCC): ICD-10-CM

## 2021-02-11 PROCEDURE — 99214 OFFICE O/P EST MOD 30 MIN: CPT | Performed by: INTERNAL MEDICINE
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
## 2021-02-11 NOTE — PATIENT INSTRUCTIONS
[id number]   Option 7    Check pulse oximeter recording one night without oxygen    Back line  [phone]

## 2021-02-13 DIAGNOSIS — Z23 ENCOUNTER FOR IMMUNIZATION: ICD-10-CM

## 2021-02-14 NOTE — ASSESSMENT & PLAN NOTE
Moderate LUCIA with good compliance to CPAP therapy  I reviewed compliance data from past 1 month  She set on auto CPAP with pressure range of 7-20 cm water  She uses an air fit F20 foam full-face mask  Her average AHI was 1 1 she used to CPAP for about 4 hours per night  Average CPAP pressure was 19 cm water  Medical supply company is [employer]  I advised her to continue use CPAP as much as possible  She has not been using her oxygen on regular basis at night with her CPAP machine  She is post use 2 liters/minute of oxygen nighttime  She and her  do have a pulse oximeter that is capable of recording oxygen I told to check her oxygen level a couple nights with recorder and know if her oxygen saturation dropped below 90% and how long    She is having significant drops in her oxygen level at bedtime below 90% she should use  2 L of oxygen with her CPAP machine

## 2021-02-14 NOTE — ASSESSMENT & PLAN NOTE
She does have oxygen she is post use at nighttime with her CPAP machine she has some mild hypoxemia related to alveolar hypoventilation due to obesity even with CPAP use  I did encourage her to lose weight

## 2021-02-23 ENCOUNTER — TRANSCRIBE ORDERS (OUTPATIENT)
Dept: ADMINISTRATIVE | Facility: HOSPITAL | Age: 71
End: 2021-02-23

## 2021-02-23 ENCOUNTER — LAB (OUTPATIENT)
Dept: LAB | Facility: HOSPITAL | Age: 71
End: 2021-02-23
Attending: INTERNAL MEDICINE
Payer: MEDICARE

## 2021-02-23 DIAGNOSIS — I10 ESSENTIAL HYPERTENSION: ICD-10-CM

## 2021-02-23 DIAGNOSIS — E11.69 TYPE 2 DIABETES MELLITUS WITH OTHER SPECIFIED COMPLICATION, WITHOUT LONG-TERM CURRENT USE OF INSULIN (HCC): Primary | ICD-10-CM

## 2021-02-23 DIAGNOSIS — E11.9 TYPE 2 DIABETES MELLITUS WITHOUT COMPLICATION, WITHOUT LONG-TERM CURRENT USE OF INSULIN (HCC): ICD-10-CM

## 2021-02-23 LAB
ALBUMIN SERPL BCP-MCNC: 3.6 G/DL (ref 3.5–5)
ALP SERPL-CCNC: 42 U/L (ref 46–116)
ALT SERPL W P-5'-P-CCNC: 26 U/L (ref 12–78)
ANION GAP SERPL CALCULATED.3IONS-SCNC: 8 MMOL/L (ref 4–13)
AST SERPL W P-5'-P-CCNC: 12 U/L (ref 5–45)
BASOPHILS # BLD AUTO: 0.05 THOUSANDS/ΜL (ref 0–0.1)
BASOPHILS NFR BLD AUTO: 1 % (ref 0–1)
BILIRUB SERPL-MCNC: 0.3 MG/DL (ref 0.2–1)
BUN SERPL-MCNC: 18 MG/DL (ref 5–25)
CALCIUM SERPL-MCNC: 9.9 MG/DL (ref 8.3–10.1)
CHLORIDE SERPL-SCNC: 104 MMOL/L (ref 100–108)
CHOLEST SERPL-MCNC: 184 MG/DL (ref 50–200)
CO2 SERPL-SCNC: 27 MMOL/L (ref 21–32)
CREAT SERPL-MCNC: 0.68 MG/DL (ref 0.6–1.3)
EOSINOPHIL # BLD AUTO: 0.16 THOUSAND/ΜL (ref 0–0.61)
EOSINOPHIL NFR BLD AUTO: 2 % (ref 0–6)
ERYTHROCYTE [DISTWIDTH] IN BLOOD BY AUTOMATED COUNT: 12.7 % (ref 11.6–15.1)
EST. AVERAGE GLUCOSE BLD GHB EST-MCNC: 157 MG/DL
GFR SERPL CREATININE-BSD FRML MDRD: 89 ML/MIN/1.73SQ M
GLUCOSE P FAST SERPL-MCNC: 172 MG/DL (ref 65–99)
HBA1C MFR BLD: 7.1 %
HCT VFR BLD AUTO: 41 % (ref 34.8–46.1)
HDLC SERPL-MCNC: 63 MG/DL
HGB BLD-MCNC: 12.4 G/DL (ref 11.5–15.4)
IMM GRANULOCYTES # BLD AUTO: 0.03 THOUSAND/UL (ref 0–0.2)
IMM GRANULOCYTES NFR BLD AUTO: 0 % (ref 0–2)
LDLC SERPL CALC-MCNC: 102 MG/DL (ref 0–100)
LYMPHOCYTES # BLD AUTO: 2.23 THOUSANDS/ΜL (ref 0.6–4.47)
LYMPHOCYTES NFR BLD AUTO: 26 % (ref 14–44)
MCH RBC QN AUTO: 28.7 PG (ref 26.8–34.3)
MCHC RBC AUTO-ENTMCNC: 30.2 G/DL (ref 31.4–37.4)
MCV RBC AUTO: 95 FL (ref 82–98)
MONOCYTES # BLD AUTO: 0.74 THOUSAND/ΜL (ref 0.17–1.22)
MONOCYTES NFR BLD AUTO: 9 % (ref 4–12)
NEUTROPHILS # BLD AUTO: 5.47 THOUSANDS/ΜL (ref 1.85–7.62)
NEUTS SEG NFR BLD AUTO: 62 % (ref 43–75)
NONHDLC SERPL-MCNC: 121 MG/DL
NRBC BLD AUTO-RTO: 0 /100 WBCS
PLATELET # BLD AUTO: 359 THOUSANDS/UL (ref 149–390)
PMV BLD AUTO: 10.2 FL (ref 8.9–12.7)
POTASSIUM SERPL-SCNC: 4.5 MMOL/L (ref 3.5–5.3)
PROT SERPL-MCNC: 6.9 G/DL (ref 6.4–8.2)
RBC # BLD AUTO: 4.32 MILLION/UL (ref 3.81–5.12)
SODIUM SERPL-SCNC: 139 MMOL/L (ref 136–145)
TRIGL SERPL-MCNC: 96 MG/DL
WBC # BLD AUTO: 8.68 THOUSAND/UL (ref 4.31–10.16)

## 2021-02-23 PROCEDURE — 80053 COMPREHEN METABOLIC PANEL: CPT

## 2021-02-23 PROCEDURE — 83036 HEMOGLOBIN GLYCOSYLATED A1C: CPT

## 2021-02-23 PROCEDURE — 36415 COLL VENOUS BLD VENIPUNCTURE: CPT

## 2021-02-23 PROCEDURE — 85025 COMPLETE CBC W/AUTO DIFF WBC: CPT

## 2021-02-23 PROCEDURE — 80061 LIPID PANEL: CPT

## 2021-02-24 NOTE — PROGRESS NOTES
Assessment/Plan:    1  Type 2 diabetes mellitus without complication, without long-term current use of insulin (HCC)  Assessment & Plan:    Lab Results   Component Value Date    HGBA1C 7 1 (H) 02/23/2021     Glucose control is stable and she will continue with current treatment, I e  diet, exercise  Advised on need for weight loss and exercise  Discussed need for good foot and eye care  Eye exam is up to date  Orders:  -     CBC and differential; Future; Expected date: 06/25/2021  -     Comprehensive metabolic panel; Future; Expected date: 06/25/2021  -     Lipid panel; Future; Expected date: 06/25/2021  -     HEMOGLOBIN A1C W/ EAG ESTIMATION; Future; Expected date: 06/25/2021    2  Mild intermittent asthma without complication  Assessment & Plan:  She feels her breathing is stable and continues to follow with pulmonology, Dr Kalin Fatima  Continue inhalers as needed  3  Chronic systolic congestive heart failure (HCC)  Assessment & Plan:  Wt Readings from Last 3 Encounters:   02/25/21 107 kg (236 lb)   02/11/21 107 kg (235 lb)   01/26/21 107 kg (235 lb)       She denies current symptoms and appears to be euvolemic  Continue entresto and other medications per cardiology  4  Essential hypertension  Assessment & Plan:  Well controlled and will continue current medications as ordered  Orders:  -     CBC and differential; Future; Expected date: 06/25/2021  -     Comprehensive metabolic panel; Future; Expected date: 06/25/2021  -     Lipid panel; Future; Expected date: 06/25/2021  -     HEMOGLOBIN A1C W/ EAG ESTIMATION; Future; Expected date: 06/25/2021    5  Medicare annual wellness visit, subsequent    6  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)  Assessment & Plan:  Advised on need for exercise and weight loss               Patient Instructions       Medicare Preventive Visit Patient Instructions  Thank you for completing your Welcome to Medicare Visit or Medicare Annual Wellness Visit today  Your next wellness visit will be due in one year (2/25/2022)  The screening/preventive services that you may require over the next 5-10 years are detailed below  Some tests may not apply to you based off risk factors and/or age  Screening tests ordered at today's visit but not completed yet may show as past due  Also, please note that scanned in results may not display below  Preventive Screenings:  Service Recommendations Previous Testing/Comments   Colorectal Cancer Screening  * Colonoscopy    * Fecal Occult Blood Test (FOBT)/Fecal Immunochemical Test (FIT)  * Fecal DNA/Cologuard Test  * Flexible Sigmoidoscopy Age: 54-65 years old   Colonoscopy: every 10 years (may be performed more frequently if at higher risk)  OR  FOBT/FIT: every 1 year  OR  Cologuard: every 3 years  OR  Sigmoidoscopy: every 5 years  Screening may be recommended earlier than age 48 if at higher risk for colorectal cancer  Also, an individualized decision between you and your healthcare provider will decide whether screening between the ages of 74-80 would be appropriate  Colonoscopy: Not on file  FOBT/FIT: Not on file  Cologuard: 10/14/2019  Sigmoidoscopy: Not on file    Screening Current     Breast Cancer Screening Age: 36 years old  Frequency: every 1-2 years  Not required if history of left and right mastectomy Mammogram: Not on file       Cervical Cancer Screening Between the ages of 21-29, pap smear recommended once every 3 years  Between the ages of 33-67, can perform pap smear with HPV co-testing every 5 years     Recommendations may differ for women with a history of total hysterectomy, cervical cancer, or abnormal pap smears in past  Pap Smear: Not on file    Screening Not Indicated   Hepatitis C Screening Once for adults born between [facility]  More frequently in patients at high risk for Hepatitis C Hep C Antibody: Not on file       Diabetes Screening 1-2 times per year if you're at risk for diabetes or have pre-diabetes Fasting glucose: 172 mg/dL   A1C: 7 1 %    Screening Not Indicated  History Diabetes   Cholesterol Screening Once every 5 years if you don't have a lipid disorder  May order more often based on risk factors  Lipid panel: 02/23/2021    Screening Current     Other Preventive Screenings Covered by Medicare:  1  Abdominal Aortic Aneurysm (AAA) Screening: covered once if your at risk  You're considered to be at risk if you have a family history of AAA  2  Lung Cancer Screening: covers low dose CT scan once per year if you meet all of the following conditions: (1) Age 50-69; (2) No signs or symptoms of lung cancer; (3) Current smoker or have quit smoking within the last 15 years; (4) You have a tobacco smoking history of at least 30 pack years (packs per day multiplied by number of years you smoked); (5) You get a written order from a healthcare provider  3  Glaucoma Screening: covered annually if you're considered high risk: (1) You have diabetes OR (2) Family history of glaucoma OR (3)  aged 48 and older OR (3)  American aged 72 and older  3  Osteoporosis Screening: covered every 2 years if you meet one of the following conditions: (1) You're estrogen deficient and at risk for osteoporosis based off medical history and other findings; (2) Have a vertebral abnormality; (3) On glucocorticoid therapy for more than 3 months; (4) Have primary hyperparathyroidism; (5) On osteoporosis medications and need to assess response to drug therapy  · Last bone density test (DXA Scan): Not on file  5  HIV Screening: covered annually if you're between the age of 12-76  Also covered annually if you are younger than 13 and older than 72 with risk factors for HIV infection  For pregnant patients, it is covered up to 3 times per pregnancy      Immunizations:  Immunization Recommendations   Influenza Vaccine Annual influenza vaccination during flu season is recommended for all persons aged >= 6 months who do not have contraindications   Pneumococcal Vaccine (Prevnar and Pneumovax)  * Prevnar = PCV13  * Pneumovax = PPSV23   Adults 25-60 years old: 1-3 doses may be recommended based on certain risk factors  Adults 72 years old: Prevnar (PCV13) vaccine recommended followed by Pneumovax (PPSV23) vaccine  If already received PPSV23 since turning 65, then PCV13 recommended at least one year after PPSV23 dose  Hepatitis B Vaccine 3 dose series if at intermediate or high risk (ex: diabetes, end stage renal disease, liver disease)   Tetanus (Td) Vaccine - COST NOT COVERED BY MEDICARE PART B Following completion of primary series, a booster dose should be given every 10 years to maintain immunity against tetanus  Td may also be given as tetanus wound prophylaxis  Tdap Vaccine - COST NOT COVERED BY MEDICARE PART B Recommended at least once for all adults  For pregnant patients, recommended with each pregnancy  Shingles Vaccine (Shingrix) - COST NOT COVERED BY MEDICARE PART B  2 shot series recommended in those aged 48 and above     Health Maintenance Due:      Topic Date Due    Hepatitis C Screening  1950    MAMMOGRAM  03/11/2021 (Originally 1950)    Colorectal Cancer Screening  10/14/2022     Immunizations Due:      Topic Date Due    DTaP,Tdap,and Td Vaccines (1 - Tdap) 03/15/1971    Pneumococcal Vaccine: 65+ Years (1 of 1 - PPSV23) 03/15/2015    Influenza Vaccine (1) 09/01/2020     Advance Directives   What are advance directives? Advance directives are legal documents that state your wishes and plans for medical care  These plans are made ahead of time in case you lose your ability to make decisions for yourself  Advance directives can apply to any medical decision, such as the treatments you want, and if you want to donate organs  What are the types of advance directives? There are many types of advance directives, and each state has rules about how to use them   You may choose a combination of any of the following:  · Living will: This is a written record of the treatment you want  You can also choose which treatments you do not want, which to limit, and which to stop at a certain time  This includes surgery, medicine, IV fluid, and tube feedings  · Durable power of  for healthcare [facility]): This is a written record that states who you want to make healthcare choices for you when you are unable to make them for yourself  This person, called a proxy, is usually a family member or a friend  You may choose more than 1 proxy  · Do not resuscitate (DNR) order:  A DNR order is used in case your heart stops beating or you stop breathing  It is a request not to have certain forms of treatment, such as CPR  A DNR order may be included in other types of advance directives  · Medical directive: This covers the care that you want if you are in a coma, near death, or unable to make decisions for yourself  You can list the treatments you want for each condition  Treatment may include pain medicine, surgery, blood transfusions, dialysis, IV or tube feedings, and a ventilator (breathing machine)  · Values history: This document has questions about your views, beliefs, and how you feel and think about life  This information can help others choose the care that you would choose  Why are advance directives important? An advance directive helps you control your care  Although spoken wishes may be used, it is better to have your wishes written down  Spoken wishes can be misunderstood, or not followed  Treatments may be given even if you do not want them  An advance directive may make it easier for your family to make difficult choices about your care  Weight Management   Why it is important to manage your weight:  Being overweight increases your risk of health conditions such as heart disease, high blood pressure, type 2 diabetes, and certain types of cancer   It can also increase your risk for osteoarthritis, sleep apnea, and other respiratory problems  Aim for a slow, steady weight loss  Even a small amount of weight loss can lower your risk of health problems  How to lose weight safely:  A safe and healthy way to lose weight is to eat fewer calories and get regular exercise  You can lose up about 1 pound a week by decreasing the number of calories you eat by 500 calories each day  Healthy meal plan for weight management:  A healthy meal plan includes a variety of foods, contains fewer calories, and helps you stay healthy  A healthy meal plan includes the following:  · Eat whole-grain foods more often  A healthy meal plan should contain fiber  Fiber is the part of grains, fruits, and vegetables that is not broken down by your body  Whole-grain foods are healthy and provide extra fiber in your diet  Some examples of whole-grain foods are whole-wheat breads and pastas, oatmeal, brown rice, and bulgur  · Eat a variety of vegetables every day  Include dark, leafy greens such as spinach, kale, felecia greens, and mustard greens  Eat yellow and orange vegetables such as carrots, sweet potatoes, and winter squash  · Eat a variety of fruits every day  Choose fresh or canned fruit (canned in its own juice or light syrup) instead of juice  Fruit juice has very little or no fiber  · Eat low-fat dairy foods  Drink fat-free (skim) milk or 1% milk  Eat fat-free yogurt and low-fat cottage cheese  Try low-fat cheeses such as mozzarella and other reduced-fat cheeses  · Choose meat and other protein foods that are low in fat  Choose beans or other legumes such as split peas or lentils  Choose fish, skinless poultry (chicken or turkey), or lean cuts of red meat (beef or pork)  Before you cook meat or poultry, cut off any visible fat  · Use less fat and oil  Try baking foods instead of frying them  Add less fat, such as margarine, sour cream, regular salad dressing and mayonnaise to foods   Eat fewer high-fat foods  Some examples of high-fat foods include french fries, doughnuts, ice cream, and cakes  · Eat fewer sweets  Limit foods and drinks that are high in sugar  This includes candy, cookies, regular soda, and sweetened drinks  Exercise:  Exercise at least 30 minutes per day on most days of the week  Some examples of exercise include walking, biking, dancing, and swimming  You can also fit in more physical activity by taking the stairs instead of the elevator or parking farther away from stores  Ask your healthcare provider about the best exercise plan for you  © Copyright [employer] 2018 Information is for End User's use only and may not be sold, redistributed or otherwise used for commercial purposes  All illustrations and images included in CareNotes® are the copyrighted property of [employer]  or [employer]           Return in about 6 months (around 8/25/2021)  Subjective:      Patient ID: Jena Dial is a 79 y o  female  Chief Complaint   Patient presents with    Follow-up     6 month follow up ss,lpn       Here for AWV and follow up  She is feeling well  Continues to follow with her multiple specialists  Denies chest pain, dyspnea, cough, fever  There are no hypoglycemic episodes  Discussed need for exercise and weight loss  Eye exam is up to date and denies foot issues  The following portions of the patient's history were reviewed and updated as appropriate: allergies, current medications, past family history, past medical history, past social history, past surgical history and problem list     Review of Systems   Constitutional: Negative  Respiratory: Negative  Cardiovascular: Negative  Endocrine: Negative            Current Outpatient Medications   Medication Sig Dispense Refill    ACCU-CHEK LASHON PLUS test strip       aspirin 325 mg tablet Take 325 mg by mouth daily      benzonatate (TESSALON PERLES) 100 mg capsule Take 1 capsule (100 mg total) by mouth 2 (two) times a day as needed for cough (coughing) 30 capsule 0    Cholecalciferol (VITAMIN D-3) 1000 units CAPS Take 5,000 Units by mouth daily      famotidine (PEPCID) 40 MG tablet Take 1 tablet (40 mg total) by mouth daily at bedtime 45 tablet 1    fluconazole (DIFLUCAN) 150 mg tablet       ibuprofen (ADVIL) 200 mg tablet Take by mouth      ipratropium (ATROVENT) 0 03 % nasal spray 2 sprays into each nostril every 12 (twelve) hours 30 mL 3    lidocaine (LIDODERM) 5 % APPLY 1 PATCH TOPICALLY DAILY REMOVE & DISCARD PATCH WITHIN 12 HOURS OR AS DIRECTED BY MD 15 patch 1    Melatonin 5 MG TABS Take 3 tablets by mouth daily       sacubitril-valsartan (ENTRESTO)  MG TABS Take 1 tablet by mouth 2 (two) times a day 180 tablet 2    spironolactone (ALDACTONE) 25 mg tablet Take 1 tablet (25 mg total) by mouth daily 90 tablet 4    VENTOLIN  (90 Base) MCG/ACT inhaler Inhale 2 puffs every 4 (four) hours as needed for wheezing or shortness of breath 1 Inhaler 7    vitamin E, tocopherol, 400 units capsule Take 400 Units by mouth 2 (two) times a day       No current facility-administered medications for this visit  Objective:    /60   Pulse 76   Temp (!) 97 °F (36 1 °C)   Resp 16   Ht 5' 5" (1 651 m)   Wt 107 kg (236 lb)   BMI 39 27 kg/m²        Physical Exam  Constitutional:       Appearance: She is well-developed  She is obese  Eyes:      Conjunctiva/sclera: Conjunctivae normal    Neck:      Musculoskeletal: Neck supple  Thyroid: No thyromegaly  Vascular: No hepatojugular reflux or JVD  Cardiovascular:      Rate and Rhythm: Normal rate and regular rhythm  Heart sounds: Normal heart sounds  No murmur  No friction rub  No gallop  Pulmonary:      Effort: Pulmonary effort is normal       Breath sounds: Normal breath sounds  No wheezing or rales  Abdominal:      General: Bowel sounds are normal  There is no distension  Palpations: Abdomen is soft  Tenderness: There is no abdominal tenderness  Neurological:      Mental Status: She is alert                  Bartolo Trujillo MD

## 2021-02-25 ENCOUNTER — OFFICE VISIT (OUTPATIENT)
Dept: FAMILY MEDICINE CLINIC | Facility: CLINIC | Age: 71
End: 2021-02-25
Payer: MEDICARE

## 2021-02-25 VITALS
DIASTOLIC BLOOD PRESSURE: 60 MMHG | SYSTOLIC BLOOD PRESSURE: 132 MMHG | BODY MASS INDEX: 39.32 KG/M2 | RESPIRATION RATE: 16 BRPM | HEIGHT: 65 IN | TEMPERATURE: 97 F | WEIGHT: 236 LBS | HEART RATE: 76 BPM

## 2021-02-25 DIAGNOSIS — E66.2 MORBID (SEVERE) OBESITY WITH ALVEOLAR HYPOVENTILATION (HCC): ICD-10-CM

## 2021-02-25 DIAGNOSIS — I50.22 CHRONIC SYSTOLIC CONGESTIVE HEART FAILURE (HCC): ICD-10-CM

## 2021-02-25 DIAGNOSIS — Z00.00 MEDICARE ANNUAL WELLNESS VISIT, SUBSEQUENT: ICD-10-CM

## 2021-02-25 DIAGNOSIS — I10 ESSENTIAL HYPERTENSION: ICD-10-CM

## 2021-02-25 DIAGNOSIS — E11.9 TYPE 2 DIABETES MELLITUS WITHOUT COMPLICATION, WITHOUT LONG-TERM CURRENT USE OF INSULIN (HCC): Primary | ICD-10-CM

## 2021-02-25 DIAGNOSIS — J45.20 MILD INTERMITTENT ASTHMA WITHOUT COMPLICATION: ICD-10-CM

## 2021-02-25 PROCEDURE — 1123F ACP DISCUSS/DSCN MKR DOCD: CPT | Performed by: INTERNAL MEDICINE

## 2021-02-25 PROCEDURE — G0439 PPPS, SUBSEQ VISIT: HCPCS | Performed by: INTERNAL MEDICINE

## 2021-02-25 PROCEDURE — 99214 OFFICE O/P EST MOD 30 MIN: CPT | Performed by: INTERNAL MEDICINE

## 2021-02-25 RX ORDER — ASPIRIN 325 MG
325 TABLET ORAL DAILY
COMMUNITY

## 2021-02-25 NOTE — ASSESSMENT & PLAN NOTE
Lab Results   Component Value Date    HGBA1C 7 1 (H) 02/23/2021     Glucose control is stable and she will continue with current treatment, I e  diet, exercise  Advised on need for weight loss and exercise  Discussed need for good foot and eye care  Eye exam is up to date

## 2021-02-25 NOTE — ASSESSMENT & PLAN NOTE
She feels her breathing is stable and continues to follow with pulmonology, Dr Espino Sick  Continue inhalers as needed
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
## 2021-02-25 NOTE — PATIENT INSTRUCTIONS
Medicare Preventive Visit Patient Instructions  Thank you for completing your Welcome to Medicare Visit or Medicare Annual Wellness Visit today  Your next wellness visit will be due in one year (2/25/2022)  The screening/preventive services that you may require over the next 5-10 years are detailed below  Some tests may not apply to you based off risk factors and/or age  Screening tests ordered at today's visit but not completed yet may show as past due  Also, please note that scanned in results may not display below  Preventive Screenings:  Service Recommendations Previous Testing/Comments   Colorectal Cancer Screening  * Colonoscopy    * Fecal Occult Blood Test (FOBT)/Fecal Immunochemical Test (FIT)  * Fecal DNA/Cologuard Test  * Flexible Sigmoidoscopy Age: 54-65 years old   Colonoscopy: every 10 years (may be performed more frequently if at higher risk)  OR  FOBT/FIT: every 1 year  OR  Cologuard: every 3 years  OR  Sigmoidoscopy: every 5 years  Screening may be recommended earlier than age 48 if at higher risk for colorectal cancer  Also, an individualized decision between you and your healthcare provider will decide whether screening between the ages of 74-80 would be appropriate  Colonoscopy: Not on file  FOBT/FIT: Not on file  Cologuard: 10/14/2019  Sigmoidoscopy: Not on file    Screening Current     Breast Cancer Screening Age: 36 years old  Frequency: every 1-2 years  Not required if history of left and right mastectomy Mammogram: Not on file       Cervical Cancer Screening Between the ages of 21-29, pap smear recommended once every 3 years  Between the ages of 33-67, can perform pap smear with HPV co-testing every 5 years     Recommendations may differ for women with a history of total hysterectomy, cervical cancer, or abnormal pap smears in past  Pap Smear: Not on file    Screening Not Indicated   Hepatitis C Screening Once for adults born between 1945 and 1965  More frequently in patients at high risk for Hepatitis C Hep C Antibody: Not on file       Diabetes Screening 1-2 times per year if you're at risk for diabetes or have pre-diabetes Fasting glucose: 172 mg/dL   A1C: 7 1 %    Screening Not Indicated  History Diabetes   Cholesterol Screening Once every 5 years if you don't have a lipid disorder  May order more often based on risk factors  Lipid panel: 02/23/2021    Screening Current     Other Preventive Screenings Covered by Medicare:  1  Abdominal Aortic Aneurysm (AAA) Screening: covered once if your at risk  You're considered to be at risk if you have a family history of AAA  2  Lung Cancer Screening: covers low dose CT scan once per year if you meet all of the following conditions: (1) Age 50-69; (2) No signs or symptoms of lung cancer; (3) Current smoker or have quit smoking within the last 15 years; (4) You have a tobacco smoking history of at least 30 pack years (packs per day multiplied by number of years you smoked); (5) You get a written order from a healthcare provider  3  Glaucoma Screening: covered annually if you're considered high risk: (1) You have diabetes OR (2) Family history of glaucoma OR (3)  aged 48 and older OR (3)  American aged 72 and older  3  Osteoporosis Screening: covered every 2 years if you meet one of the following conditions: (1) You're estrogen deficient and at risk for osteoporosis based off medical history and other findings; (2) Have a vertebral abnormality; (3) On glucocorticoid therapy for more than 3 months; (4) Have primary hyperparathyroidism; (5) On osteoporosis medications and need to assess response to drug therapy  · Last bone density test (DXA Scan): Not on file  5  HIV Screening: covered annually if you're between the age of 12-76  Also covered annually if you are younger than 13 and older than 72 with risk factors for HIV infection   For pregnant patients, it is covered up to 3 times per pregnancy  Immunizations:  Immunization Recommendations   Influenza Vaccine Annual influenza vaccination during flu season is recommended for all persons aged >= 6 months who do not have contraindications   Pneumococcal Vaccine (Prevnar and Pneumovax)  * Prevnar = PCV13  * Pneumovax = PPSV23   Adults 25-60 years old: 1-3 doses may be recommended based on certain risk factors  Adults 72 years old: Prevnar (PCV13) vaccine recommended followed by Pneumovax (PPSV23) vaccine  If already received PPSV23 since turning 65, then PCV13 recommended at least one year after PPSV23 dose  Hepatitis B Vaccine 3 dose series if at intermediate or high risk (ex: diabetes, end stage renal disease, liver disease)   Tetanus (Td) Vaccine - COST NOT COVERED BY MEDICARE PART B Following completion of primary series, a booster dose should be given every 10 years to maintain immunity against tetanus  Td may also be given as tetanus wound prophylaxis  Tdap Vaccine - COST NOT COVERED BY MEDICARE PART B Recommended at least once for all adults  For pregnant patients, recommended with each pregnancy  Shingles Vaccine (Shingrix) - COST NOT COVERED BY MEDICARE PART B  2 shot series recommended in those aged 48 and above     Health Maintenance Due:      Topic Date Due    Hepatitis C Screening  1950    MAMMOGRAM  03/11/2021 (Originally 1950)    Colorectal Cancer Screening  10/14/2022     Immunizations Due:      Topic Date Due    DTaP,Tdap,and Td Vaccines (1 - Tdap) 03/15/1971    Pneumococcal Vaccine: 65+ Years (1 of 1 - PPSV23) 03/15/2015    Influenza Vaccine (1) 09/01/2020     Advance Directives   What are advance directives? Advance directives are legal documents that state your wishes and plans for medical care  These plans are made ahead of time in case you lose your ability to make decisions for yourself   Advance directives can apply to any medical decision, such as the treatments you want, and if you want to donate organs  What are the types of advance directives? There are many types of advance directives, and each state has rules about how to use them  You may choose a combination of any of the following:  · Living will: This is a written record of the treatment you want  You can also choose which treatments you do not want, which to limit, and which to stop at a certain time  This includes surgery, medicine, IV fluid, and tube feedings  · Durable power of  for healthcare [facility]): This is a written record that states who you want to make healthcare choices for you when you are unable to make them for yourself  This person, called a proxy, is usually a family member or a friend  You may choose more than 1 proxy  · Do not resuscitate (DNR) order:  A DNR order is used in case your heart stops beating or you stop breathing  It is a request not to have certain forms of treatment, such as CPR  A DNR order may be included in other types of advance directives  · Medical directive: This covers the care that you want if you are in a coma, near death, or unable to make decisions for yourself  You can list the treatments you want for each condition  Treatment may include pain medicine, surgery, blood transfusions, dialysis, IV or tube feedings, and a ventilator (breathing machine)  · Values history: This document has questions about your views, beliefs, and how you feel and think about life  This information can help others choose the care that you would choose  Why are advance directives important? An advance directive helps you control your care  Although spoken wishes may be used, it is better to have your wishes written down  Spoken wishes can be misunderstood, or not followed  Treatments may be given even if you do not want them  An advance directive may make it easier for your family to make difficult choices about your care     Weight Management   Why it is important to manage your weight:  Being overweight increases your risk of health conditions such as heart disease, high blood pressure, type 2 diabetes, and certain types of cancer  It can also increase your risk for osteoarthritis, sleep apnea, and other respiratory problems  Aim for a slow, steady weight loss  Even a small amount of weight loss can lower your risk of health problems  How to lose weight safely:  A safe and healthy way to lose weight is to eat fewer calories and get regular exercise  You can lose up about 1 pound a week by decreasing the number of calories you eat by 500 calories each day  Healthy meal plan for weight management:  A healthy meal plan includes a variety of foods, contains fewer calories, and helps you stay healthy  A healthy meal plan includes the following:  · Eat whole-grain foods more often  A healthy meal plan should contain fiber  Fiber is the part of grains, fruits, and vegetables that is not broken down by your body  Whole-grain foods are healthy and provide extra fiber in your diet  Some examples of whole-grain foods are whole-wheat breads and pastas, oatmeal, brown rice, and bulgur  · Eat a variety of vegetables every day  Include dark, leafy greens such as spinach, kale, felecia greens, and mustard greens  Eat yellow and orange vegetables such as carrots, sweet potatoes, and winter squash  · Eat a variety of fruits every day  Choose fresh or canned fruit (canned in its own juice or light syrup) instead of juice  Fruit juice has very little or no fiber  · Eat low-fat dairy foods  Drink fat-free (skim) milk or 1% milk  Eat fat-free yogurt and low-fat cottage cheese  Try low-fat cheeses such as mozzarella and other reduced-fat cheeses  · Choose meat and other protein foods that are low in fat  Choose beans or other legumes such as split peas or lentils  Choose fish, skinless poultry (chicken or turkey), or lean cuts of red meat (beef or pork)  Before you cook meat or poultry, cut off any visible fat     · Use less fat and oil  Try baking foods instead of frying them  Add less fat, such as margarine, sour cream, regular salad dressing and mayonnaise to foods  Eat fewer high-fat foods  Some examples of high-fat foods include french fries, doughnuts, ice cream, and cakes  · Eat fewer sweets  Limit foods and drinks that are high in sugar  This includes candy, cookies, regular soda, and sweetened drinks  Exercise:  Exercise at least 30 minutes per day on most days of the week  Some examples of exercise include walking, biking, dancing, and swimming  You can also fit in more physical activity by taking the stairs instead of the elevator or parking farther away from stores  Ask your healthcare provider about the best exercise plan for you  © Copyright [employer] 2018 Information is for End User's use only and may not be sold, redistributed or otherwise used for commercial purposes   All illustrations and images included in CareNotes® are the copyrighted property of [employer] , Inc  or [address] [place] [address]

## 2021-02-25 NOTE — ASSESSMENT & PLAN NOTE
Wt Readings from Last 3 Encounters:   02/25/21 107 kg (236 lb)   02/11/21 107 kg (235 lb)   01/26/21 107 kg (235 lb)       She denies current symptoms and appears to be euvolemic  Continue entresto and other medications per cardiology

## 2021-02-26 ENCOUNTER — TELEPHONE (OUTPATIENT)
Dept: ADMINISTRATIVE | Facility: OTHER | Age: 71
End: 2021-02-26

## 2021-02-26 NOTE — TELEPHONE ENCOUNTER
Upon review of the In Basket request and the patient's chart, initial outreach has been made via fax, please see Contacts section for details       Thank you  Josh Smith

## 2021-02-26 NOTE — LETTER
Diabetic Eye Exam Form    Date Requested: 21  Patient: Margareth Garcia  Patient : 1950   Referring Provider: Kevon Oropeza MD    Dilated Retinal Exam, Optomap-Iris Exam, or Fundus Photography Done         Yes ([place] one above)         No     Date of Diabetic Eye Exam ______________________________  Left Eye      Exam did show retinopathy    Exam did not show retinopathy         Mild       Moderate       None       Proliferative       Severe     Right Eye     Exam did show retinopathy    Exam did not show retinopathy         Mild       Moderate       None       Proliferative       Severe     Comments __________________________________________________________    Practice Providing Exam ______________________________________________    Exam Performed By (print name) _______________________________________      Provider Signature ___________________________________________________      These reports are needed for  compliance    Please fax this completed form and a copy of the Diabetic Eye Exam report to our office located at [place] [address] as soon as possible to [phone] [facility]: Phone [phone]    We thank you for your assistance in treating our mutual patient

## 2021-02-26 NOTE — TELEPHONE ENCOUNTER
----- Message from Fady Hargrove MD sent at 2/25/2021  1:27 PM EST -----  Please call Dr Sukumar Owens in [place] for eye exam done last fall

## 2021-03-02 NOTE — TELEPHONE ENCOUNTER
Upon review of the In Basket request we were able to locate, review, and update the patient chart as requested for Diabetic Eye Exam     Any additional questions or concerns should be emailed to the Practice Liaisons via [email or web address] email, please do not reply via In Basket      Thank you  Anuj Oreilly

## 2021-03-31 ENCOUNTER — IMMUNIZATIONS (OUTPATIENT)
Dept: FAMILY MEDICINE CLINIC | Facility: HOSPITAL | Age: 71
End: 2021-03-31

## 2021-03-31 DIAGNOSIS — Z23 ENCOUNTER FOR IMMUNIZATION: Primary | ICD-10-CM

## 2021-03-31 PROCEDURE — 91300 SARS-COV-2 / COVID-19 MRNA VACCINE (PFIZER-BIONTECH) 30 MCG: CPT

## 2021-03-31 PROCEDURE — 0001A SARS-COV-2 / COVID-19 MRNA VACCINE (PFIZER-BIONTECH) 30 MCG: CPT

## 2021-04-15 DIAGNOSIS — I50.22 CHRONIC SYSTOLIC CONGESTIVE HEART FAILURE (HCC): ICD-10-CM

## 2021-04-19 RX ORDER — SACUBITRIL AND VALSARTAN 97; 103 MG/1; MG/1
TABLET, FILM COATED ORAL
Qty: 180 TABLET | Refills: 3 | Status: SHIPPED | OUTPATIENT
Start: 2021-04-19 | End: 2022-02-08

## 2021-04-22 ENCOUNTER — IMMUNIZATIONS (OUTPATIENT)
Dept: FAMILY MEDICINE CLINIC | Facility: HOSPITAL | Age: 71
End: 2021-04-22

## 2021-04-22 DIAGNOSIS — Z23 ENCOUNTER FOR IMMUNIZATION: Primary | ICD-10-CM

## 2021-04-22 PROCEDURE — 0002A SARS-COV-2 / COVID-19 MRNA VACCINE (PFIZER-BIONTECH) 30 MCG: CPT

## 2021-04-22 PROCEDURE — 91300 SARS-COV-2 / COVID-19 MRNA VACCINE (PFIZER-BIONTECH) 30 MCG: CPT

## 2021-06-17 ENCOUNTER — OFFICE VISIT (OUTPATIENT)
Dept: CARDIOLOGY CLINIC | Facility: CLINIC | Age: 71
End: 2021-06-17
Payer: MEDICARE

## 2021-06-17 VITALS
TEMPERATURE: 96.3 F | BODY MASS INDEX: 38.27 KG/M2 | HEIGHT: 65 IN | SYSTOLIC BLOOD PRESSURE: 128 MMHG | OXYGEN SATURATION: 95 % | HEART RATE: 75 BPM | WEIGHT: 229.7 LBS | DIASTOLIC BLOOD PRESSURE: 68 MMHG | RESPIRATION RATE: 16 BRPM

## 2021-06-17 DIAGNOSIS — I50.22 CHRONIC SYSTOLIC CONGESTIVE HEART FAILURE (HCC): ICD-10-CM

## 2021-06-17 DIAGNOSIS — E66.2 MORBID (SEVERE) OBESITY WITH ALVEOLAR HYPOVENTILATION (HCC): ICD-10-CM

## 2021-06-17 DIAGNOSIS — I42.0 DILATED CARDIOMYOPATHY (HCC): ICD-10-CM

## 2021-06-17 DIAGNOSIS — I10 ESSENTIAL HYPERTENSION: Primary | ICD-10-CM

## 2021-06-17 DIAGNOSIS — G47.33 OSA (OBSTRUCTIVE SLEEP APNEA): ICD-10-CM

## 2021-06-17 PROCEDURE — 99214 OFFICE O/P EST MOD 30 MIN: CPT | Performed by: INTERNAL MEDICINE

## 2021-06-17 PROCEDURE — 93000 ELECTROCARDIOGRAM COMPLETE: CPT | Performed by: INTERNAL MEDICINE

## 2021-06-17 NOTE — PROGRESS NOTES
Cardiology Followup    Lucia Guerrero  [id number]  1950  [address] [facility] [address]  [facility] [address] [address]    Consult for: CHF    HPI: Lucia Guerrero is a 70y o  year old female who is here for followup of CHF  since her last visit, she denies any chest pain  She has shortness of breath as she goes up stairs  She denies any lower extremity edema, orthopnea or paroxysmal nocturnal dyspnea  She has chronic back pain and shoulder pain  Home systolic blood pressure has been ranging from 125-145 mm Hg with average SBP < 130 mmHg  Past Cardiac History: In August 2019, She had a cardiac MRI done for the evaluation of cardiomyopathy  MRI showed EF of 41% with a thin strip of intramyocardial fibrosis consistent with non-ischemic cardiomyopathy  In January 2019, she had an echocardiogram done during hospitalization for influenza  Echocardiogram showed EF of 45% with small pericardial effusion and mild pulmonary hypertension  She has no history of CHF or CAD  History of CVA in 2014 with left arm weakness  Was treated with TPA  2 years prior to that she had workup by Dr Silvino Estrada including catheterization which was normal   Patient had been on amiodarone since her CVA but denies history of atrial fibrillation  Amiodarone was stopped in 2019  Repeat 2D echocardiogram was done in June which showed an ejection fraction of 35%  She has a family history of CHF with 2 brothers who have pacemakers (one after a MI) both parents had CHF as well  Cardiac catheterization showed nonobstructive CAD  EF was 30-35%  Holter monitor was done which showed 5 episodes of VT with longest lasting 12 beats at 111 bpm   She had no symptoms at the time       The following portions of the patient's history were reviewed and updated as appropriate: allergies, current medications, past family history, past medical history, past social history, past surgical history and problem list        Current Outpatient Medications:     aspirin 325 mg tablet, Take 325 mg by mouth daily, Disp: , Rfl:     benzonatate (TESSALON PERLES) 100 mg capsule, Take 1 capsule (100 mg total) by mouth 2 (two) times a day as needed for cough (coughing), Disp: 30 capsule, Rfl: 0    Cholecalciferol (VITAMIN D-3) 1000 units CAPS, Take 5,000 Units by mouth daily, Disp: , Rfl:     Entresto  MG TABS, TAKE 1 TABLET BY MOUTH  TWICE DAILY, Disp: 180 tablet, Rfl: 3    ibuprofen (ADVIL) 200 mg tablet, Take by mouth, Disp: , Rfl:     ipratropium (ATROVENT) 0 03 % nasal spray, 2 sprays into each nostril every 12 (twelve) hours, Disp: 30 mL, Rfl: 3    lidocaine (LIDODERM) 5 %, APPLY 1 PATCH TOPICALLY DAILY REMOVE & DISCARD PATCH WITHIN 12 HOURS OR AS DIRECTED BY MD, Disp: 15 patch, Rfl: 1    Melatonin 5 MG TABS, Take 3 tablets by mouth daily , Disp: , Rfl:     spironolactone (ALDACTONE) 25 mg tablet, Take 1 tablet (25 mg total) by mouth daily, Disp: 90 tablet, Rfl: 4    VENTOLIN  (90 Base) MCG/ACT inhaler, Inhale 2 puffs every 4 (four) hours as needed for wheezing or shortness of breath, Disp: 1 Inhaler, Rfl: 7    vitamin E, tocopherol, 400 units capsule, Take 400 Units by mouth 2 (two) times a day, Disp: , Rfl:     ACCU-CHEK LASHON PLUS test strip, , Disp: , Rfl:     famotidine (PEPCID) 40 MG tablet, Take 1 tablet (40 mg total) by mouth daily at bedtime (Patient not taking: Reported on 6/17/2021), Disp: 45 tablet, Rfl: 1    fluconazole (DIFLUCAN) 150 mg tablet, , Disp: , Rfl:   Allergies   Allergen Reactions    Other Anaphylaxis     Inhaler breax    Carvedilol Chest Pain and Hypertension    Lisinopril Other (See Comments)     Dizziness, cough    Olmesartan Medoxomil-Hctz      Category: Adverse Reaction; Annotation - [id number]: dizzy    Doxycycline Rash         Review of Systems:  Review of Systems   Constitutional: Positive for fatigue  Respiratory: Positive for cough and shortness of breath  Musculoskeletal: Positive for arthralgias, back pain, gait problem and myalgias  All other systems reviewed and are negative  Physical Exam:  Vitals:    06/17/21 1314   BP: 128/68   BP Location: Right arm   Patient Position: Sitting   Cuff Size: Large   Pulse: 75   Resp: 16   Temp: (!) 96 3 °F (35 7 °C)   TempSrc: Temporal   SpO2: 95%   Weight: 104 kg (229 lb 11 2 oz)   Height: 5' 5" (1 651 m)     Physical Exam  Constitutional:       General: She is not in acute distress  Appearance: She is well-developed  She is not diaphoretic  HENT:      Head: Normocephalic and atraumatic  Eyes:      Conjunctiva/sclera: Conjunctivae normal       Pupils: Pupils are equal, round, and reactive to light  Neck:      Thyroid: No thyromegaly  Vascular: No JVD  Cardiovascular:      Rate and Rhythm: Normal rate and regular rhythm  Heart sounds: Normal heart sounds  No murmur heard  No friction rub  No gallop  Pulmonary:      Effort: Pulmonary effort is normal       Breath sounds: Normal breath sounds  Musculoskeletal:      Cervical back: Neck supple  Skin:     General: Skin is warm and dry  Findings: No erythema or rash  Neurological:      General: No focal deficit present  Mental Status: She is alert and oriented to person, place, and time  Cranial Nerves: No cranial nerve deficit  Psychiatric:         Mood and Affect: Mood normal          Behavior: Behavior normal          Thought Content:  Thought content normal          Judgment: Judgment normal          Labs:  Lab Results   Component Value Date    K 4 5 02/23/2021     02/23/2021    CO2 27 02/23/2021    BUN 18 02/23/2021    CREATININE 0 68 02/23/2021    CALCIUM 9 9 02/23/2021     Lab Results   Component Value Date    WBC 8 68 02/23/2021    HGB 12 4 02/23/2021    HCT 41 0 02/23/2021    MCV 95 02/23/2021     02/23/2021     Lab Results   Component Value Date TRIG 96 02/23/2021    HDL 63 02/23/2021     EKG (independently reviewed): NSR with LVH and nonspecific ST abnormalities    Discussion/Summary:  1  Essential hypertension    2  Dilated cardiomyopathy ([facility] [address] )    3  Chronic systolic congestive heart failure ([facility] [address] )    4  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)    5  LUCIA (obstructive sleep apnea)      - patient's EF was 41% on cardiac MRI with similar EF on most recent echocardiogram   Study consistent with a nonischemic cardiomyopathy  She had nonobstructive CAD on cath  Holter monitor did not show frequent PVCs but episodes of VT was seen  TSH was normal  She does not drink    - Continue Entresto and spironolactone  She cannot tolerate beta blocker (has tried metoprolol and carvedilol)  Tolerating Entresto   mg daily  - most recent renal function was normal   Potassium level was also normal   - discussed diet and weight loss  - torsemide 10 mg  She may use as needed  Monitor daily weights and if increase in > 3 pounds, she should take dose  - will repeat 2D echocardiogram      - Followup with pulmonologist for CPAP adjustment as needed

## 2021-07-08 DIAGNOSIS — J45.20 MILD INTERMITTENT ASTHMA WITHOUT COMPLICATION: ICD-10-CM

## 2021-07-08 DIAGNOSIS — J31.0 NONALLERGIC RHINITIS: ICD-10-CM

## 2021-07-08 RX ORDER — IPRATROPIUM BROMIDE 21 UG/1
2 SPRAY, METERED NASAL EVERY 12 HOURS
Qty: 30 ML | Refills: 3 | Status: SHIPPED | OUTPATIENT
Start: 2021-07-08 | End: 2021-10-21 | Stop reason: SDUPTHER

## 2021-07-28 DIAGNOSIS — Z12.31 ENCOUNTER FOR SCREENING MAMMOGRAM FOR MALIGNANT NEOPLASM OF BREAST: Primary | ICD-10-CM

## 2021-10-19 ENCOUNTER — APPOINTMENT (OUTPATIENT)
Dept: LAB | Facility: HOSPITAL | Age: 71
End: 2021-10-19
Attending: INTERNAL MEDICINE
Payer: MEDICARE

## 2021-10-19 DIAGNOSIS — I10 ESSENTIAL HYPERTENSION: ICD-10-CM

## 2021-10-19 DIAGNOSIS — E11.9 TYPE 2 DIABETES MELLITUS WITHOUT COMPLICATION, WITHOUT LONG-TERM CURRENT USE OF INSULIN (HCC): ICD-10-CM

## 2021-10-19 LAB
ALBUMIN SERPL BCP-MCNC: 3.7 G/DL (ref 3.5–5)
ALP SERPL-CCNC: 48 U/L (ref 46–116)
ALT SERPL W P-5'-P-CCNC: 33 U/L (ref 12–78)
ANION GAP SERPL CALCULATED.3IONS-SCNC: 9 MMOL/L (ref 4–13)
AST SERPL W P-5'-P-CCNC: 11 U/L (ref 5–45)
BASOPHILS # BLD AUTO: 0.04 THOUSANDS/ΜL (ref 0–0.1)
BASOPHILS NFR BLD AUTO: 1 % (ref 0–1)
BILIRUB SERPL-MCNC: 0.33 MG/DL (ref 0.2–1)
BUN SERPL-MCNC: 19 MG/DL (ref 5–25)
CALCIUM SERPL-MCNC: 9.2 MG/DL (ref 8.3–10.1)
CHLORIDE SERPL-SCNC: 107 MMOL/L (ref 100–108)
CHOLEST SERPL-MCNC: 208 MG/DL (ref 50–200)
CO2 SERPL-SCNC: 27 MMOL/L (ref 21–32)
CREAT SERPL-MCNC: 0.63 MG/DL (ref 0.6–1.3)
EOSINOPHIL # BLD AUTO: 0.2 THOUSAND/ΜL (ref 0–0.61)
EOSINOPHIL NFR BLD AUTO: 3 % (ref 0–6)
ERYTHROCYTE [DISTWIDTH] IN BLOOD BY AUTOMATED COUNT: 12.6 % (ref 11.6–15.1)
GFR SERPL CREATININE-BSD FRML MDRD: 91 ML/MIN/1.73SQ M
GLUCOSE P FAST SERPL-MCNC: 172 MG/DL (ref 65–99)
HCT VFR BLD AUTO: 37.5 % (ref 34.8–46.1)
HDLC SERPL-MCNC: 55 MG/DL
HGB BLD-MCNC: 11.6 G/DL (ref 11.5–15.4)
IMM GRANULOCYTES # BLD AUTO: 0.02 THOUSAND/UL (ref 0–0.2)
IMM GRANULOCYTES NFR BLD AUTO: 0 % (ref 0–2)
LDLC SERPL CALC-MCNC: 111 MG/DL (ref 0–100)
LYMPHOCYTES # BLD AUTO: 2.21 THOUSANDS/ΜL (ref 0.6–4.47)
LYMPHOCYTES NFR BLD AUTO: 34 % (ref 14–44)
MCH RBC QN AUTO: 29.4 PG (ref 26.8–34.3)
MCHC RBC AUTO-ENTMCNC: 30.9 G/DL (ref 31.4–37.4)
MCV RBC AUTO: 95 FL (ref 82–98)
MONOCYTES # BLD AUTO: 0.58 THOUSAND/ΜL (ref 0.17–1.22)
MONOCYTES NFR BLD AUTO: 9 % (ref 4–12)
NEUTROPHILS # BLD AUTO: 3.41 THOUSANDS/ΜL (ref 1.85–7.62)
NEUTS SEG NFR BLD AUTO: 53 % (ref 43–75)
NONHDLC SERPL-MCNC: 153 MG/DL
NRBC BLD AUTO-RTO: 0 /100 WBCS
PLATELET # BLD AUTO: 299 THOUSANDS/UL (ref 149–390)
PMV BLD AUTO: 10.4 FL (ref 8.9–12.7)
POTASSIUM SERPL-SCNC: 4.2 MMOL/L (ref 3.5–5.3)
PROT SERPL-MCNC: 6.7 G/DL (ref 6.4–8.2)
RBC # BLD AUTO: 3.95 MILLION/UL (ref 3.81–5.12)
SODIUM SERPL-SCNC: 143 MMOL/L (ref 136–145)
TRIGL SERPL-MCNC: 208 MG/DL
WBC # BLD AUTO: 6.46 THOUSAND/UL (ref 4.31–10.16)

## 2021-10-19 PROCEDURE — 85025 COMPLETE CBC W/AUTO DIFF WBC: CPT

## 2021-10-19 PROCEDURE — 80053 COMPREHEN METABOLIC PANEL: CPT

## 2021-10-19 PROCEDURE — 80061 LIPID PANEL: CPT

## 2021-10-21 ENCOUNTER — OFFICE VISIT (OUTPATIENT)
Dept: FAMILY MEDICINE CLINIC | Facility: CLINIC | Age: 71
End: 2021-10-21
Payer: MEDICARE

## 2021-10-21 VITALS
OXYGEN SATURATION: 93 % | SYSTOLIC BLOOD PRESSURE: 120 MMHG | HEIGHT: 65 IN | WEIGHT: 230 LBS | RESPIRATION RATE: 16 BRPM | BODY MASS INDEX: 38.32 KG/M2 | DIASTOLIC BLOOD PRESSURE: 60 MMHG | TEMPERATURE: 97.6 F | HEART RATE: 78 BPM

## 2021-10-21 DIAGNOSIS — I10 ESSENTIAL HYPERTENSION: ICD-10-CM

## 2021-10-21 DIAGNOSIS — J31.0 NONALLERGIC RHINITIS: ICD-10-CM

## 2021-10-21 DIAGNOSIS — E11.9 TYPE 2 DIABETES MELLITUS WITHOUT COMPLICATION, WITHOUT LONG-TERM CURRENT USE OF INSULIN (HCC): Primary | ICD-10-CM

## 2021-10-21 DIAGNOSIS — R05.9 COUGH: ICD-10-CM

## 2021-10-21 DIAGNOSIS — E66.2 MORBID (SEVERE) OBESITY WITH ALVEOLAR HYPOVENTILATION (HCC): ICD-10-CM

## 2021-10-21 DIAGNOSIS — I50.22 CHRONIC SYSTOLIC CONGESTIVE HEART FAILURE (HCC): ICD-10-CM

## 2021-10-21 PROCEDURE — 99214 OFFICE O/P EST MOD 30 MIN: CPT | Performed by: INTERNAL MEDICINE

## 2021-10-21 RX ORDER — BENZONATATE 100 MG/1
100 CAPSULE ORAL 2 TIMES DAILY PRN
Qty: 90 CAPSULE | Refills: 0 | Status: SHIPPED | OUTPATIENT
Start: 2021-10-21 | End: 2021-12-01

## 2021-10-21 RX ORDER — IPRATROPIUM BROMIDE 21 UG/1
2 SPRAY, METERED NASAL EVERY 12 HOURS
Qty: 30 ML | Refills: 0 | Status: SHIPPED | OUTPATIENT
Start: 2021-10-21 | End: 2021-11-22

## 2021-11-28 DIAGNOSIS — I50.22 CHRONIC SYSTOLIC (CONGESTIVE) HEART FAILURE (HCC): ICD-10-CM

## 2021-11-28 DIAGNOSIS — R05.9 COUGH: ICD-10-CM

## 2021-11-29 RX ORDER — SPIRONOLACTONE 25 MG/1
TABLET ORAL
Qty: 90 TABLET | Refills: 3 | Status: SHIPPED | OUTPATIENT
Start: 2021-11-29

## 2021-12-01 RX ORDER — BENZONATATE 100 MG/1
100 CAPSULE ORAL 3 TIMES DAILY PRN
Qty: 270 CAPSULE | Refills: 0 | Status: SHIPPED | OUTPATIENT
Start: 2021-12-01

## 2021-12-10 ENCOUNTER — APPOINTMENT (OUTPATIENT)
Dept: RADIOLOGY | Facility: CLINIC | Age: 71
End: 2021-12-10
Payer: MEDICARE

## 2021-12-10 ENCOUNTER — OFFICE VISIT (OUTPATIENT)
Dept: OBGYN CLINIC | Facility: CLINIC | Age: 71
End: 2021-12-10
Payer: MEDICARE

## 2021-12-10 VITALS
HEIGHT: 65 IN | HEART RATE: 68 BPM | WEIGHT: 230 LBS | BODY MASS INDEX: 38.32 KG/M2 | DIASTOLIC BLOOD PRESSURE: 73 MMHG | TEMPERATURE: 97.8 F | SYSTOLIC BLOOD PRESSURE: 153 MMHG

## 2021-12-10 DIAGNOSIS — M19.012 OSTEOARTHRITIS OF LEFT ACROMIOCLAVICULAR JOINT: ICD-10-CM

## 2021-12-10 DIAGNOSIS — M65.342 TRIGGER RING FINGER OF LEFT HAND: Primary | ICD-10-CM

## 2021-12-10 DIAGNOSIS — M25.511 CHRONIC PAIN IN RIGHT SHOULDER: ICD-10-CM

## 2021-12-10 DIAGNOSIS — M19.011 OSTEOARTHRITIS OF RIGHT ACROMIOCLAVICULAR JOINT: ICD-10-CM

## 2021-12-10 DIAGNOSIS — G89.29 CHRONIC LEFT SHOULDER PAIN: ICD-10-CM

## 2021-12-10 DIAGNOSIS — M25.512 CHRONIC LEFT SHOULDER PAIN: ICD-10-CM

## 2021-12-10 DIAGNOSIS — G89.29 CHRONIC PAIN IN RIGHT SHOULDER: ICD-10-CM

## 2021-12-10 PROCEDURE — 20550 NJX 1 TENDON SHEATH/LIGAMENT: CPT | Performed by: ORTHOPAEDIC SURGERY

## 2021-12-10 PROCEDURE — 99214 OFFICE O/P EST MOD 30 MIN: CPT | Performed by: ORTHOPAEDIC SURGERY

## 2021-12-10 PROCEDURE — 73030 X-RAY EXAM OF SHOULDER: CPT

## 2021-12-10 RX ORDER — LIDOCAINE HYDROCHLORIDE 10 MG/ML
0.5 INJECTION, SOLUTION INFILTRATION; PERINEURAL
Status: COMPLETED | OUTPATIENT
Start: 2021-12-10 | End: 2021-12-10

## 2021-12-10 RX ORDER — TRIAMCINOLONE ACETONIDE 40 MG/ML
20 INJECTION, SUSPENSION INTRA-ARTICULAR; INTRAMUSCULAR
Status: COMPLETED | OUTPATIENT
Start: 2021-12-10 | End: 2021-12-10

## 2021-12-10 RX ORDER — SENNOSIDES 8.6 MG
650 CAPSULE ORAL EVERY 8 HOURS PRN
COMMUNITY
End: 2022-04-21

## 2021-12-10 RX ADMIN — LIDOCAINE HYDROCHLORIDE 0.5 ML: 10 INJECTION, SOLUTION INFILTRATION; PERINEURAL at 13:53

## 2021-12-10 RX ADMIN — TRIAMCINOLONE ACETONIDE 20 MG: 40 INJECTION, SUSPENSION INTRA-ARTICULAR; INTRAMUSCULAR at 13:53

## 2021-12-11 ENCOUNTER — IMMUNIZATIONS (OUTPATIENT)
Dept: FAMILY MEDICINE CLINIC | Facility: HOSPITAL | Age: 71
End: 2021-12-11

## 2021-12-11 DIAGNOSIS — Z23 ENCOUNTER FOR IMMUNIZATION: Primary | ICD-10-CM

## 2021-12-11 PROCEDURE — 91300 COVID-19 PFIZER VACC 0.3 ML: CPT

## 2021-12-11 PROCEDURE — 0001A COVID-19 PFIZER VACC 0.3 ML: CPT

## 2021-12-14 ENCOUNTER — OFFICE VISIT (OUTPATIENT)
Dept: CARDIOLOGY CLINIC | Facility: CLINIC | Age: 71
End: 2021-12-14
Payer: MEDICARE

## 2021-12-14 VITALS
RESPIRATION RATE: 16 BRPM | TEMPERATURE: 97.7 F | DIASTOLIC BLOOD PRESSURE: 66 MMHG | WEIGHT: 228.4 LBS | SYSTOLIC BLOOD PRESSURE: 130 MMHG | BODY MASS INDEX: 38.05 KG/M2 | OXYGEN SATURATION: 96 % | HEART RATE: 74 BPM | HEIGHT: 65 IN

## 2021-12-14 DIAGNOSIS — I10 ESSENTIAL HYPERTENSION: ICD-10-CM

## 2021-12-14 DIAGNOSIS — I31.3 PERICARDIAL EFFUSION: ICD-10-CM

## 2021-12-14 DIAGNOSIS — G47.33 OSA (OBSTRUCTIVE SLEEP APNEA): ICD-10-CM

## 2021-12-14 DIAGNOSIS — I42.0 DILATED CARDIOMYOPATHY (HCC): ICD-10-CM

## 2021-12-14 DIAGNOSIS — I50.22 CHRONIC SYSTOLIC (CONGESTIVE) HEART FAILURE (HCC): Primary | ICD-10-CM

## 2021-12-14 DIAGNOSIS — E66.2 MORBID (SEVERE) OBESITY WITH ALVEOLAR HYPOVENTILATION (HCC): ICD-10-CM

## 2021-12-14 PROCEDURE — 99214 OFFICE O/P EST MOD 30 MIN: CPT | Performed by: INTERNAL MEDICINE

## 2022-01-14 ENCOUNTER — HOSPITAL ENCOUNTER (OUTPATIENT)
Dept: RADIOLOGY | Facility: HOSPITAL | Age: 72
Discharge: HOME | End: 2022-01-14
Attending: ORTHOPAEDIC SURGERY
Payer: MEDICARE

## 2022-01-14 DIAGNOSIS — M19.011 OSTEOARTHRITIS OF RIGHT ACROMIOCLAVICULAR JOINT: ICD-10-CM

## 2022-01-14 DIAGNOSIS — G89.29 CHRONIC LEFT SHOULDER PAIN: ICD-10-CM

## 2022-01-14 DIAGNOSIS — M25.511 CHRONIC PAIN IN RIGHT SHOULDER: ICD-10-CM

## 2022-01-14 DIAGNOSIS — M25.512 CHRONIC LEFT SHOULDER PAIN: ICD-10-CM

## 2022-01-14 DIAGNOSIS — G89.29 CHRONIC PAIN IN RIGHT SHOULDER: ICD-10-CM

## 2022-01-14 DIAGNOSIS — M19.012 OSTEOARTHRITIS OF LEFT ACROMIOCLAVICULAR JOINT: ICD-10-CM

## 2022-01-14 PROCEDURE — 73221 MRI JOINT UPR EXTREM W/O DYE: CPT

## 2022-01-14 PROCEDURE — G1004 CDSM NDSC: HCPCS

## 2022-01-18 ENCOUNTER — OFFICE VISIT (OUTPATIENT)
Dept: OBGYN CLINIC | Facility: CLINIC | Age: 72
End: 2022-01-18
Payer: MEDICARE

## 2022-01-18 VITALS
DIASTOLIC BLOOD PRESSURE: 77 MMHG | TEMPERATURE: 97.7 F | SYSTOLIC BLOOD PRESSURE: 157 MMHG | WEIGHT: 228 LBS | BODY MASS INDEX: 37.99 KG/M2 | HEART RATE: 76 BPM | HEIGHT: 65 IN

## 2022-01-18 DIAGNOSIS — M19.012 OSTEOARTHRITIS OF BOTH ACROMIOCLAVICULAR JOINTS: ICD-10-CM

## 2022-01-18 DIAGNOSIS — M75.122 NONTRAUMATIC COMPLETE TEAR OF LEFT ROTATOR CUFF: ICD-10-CM

## 2022-01-18 DIAGNOSIS — M19.012 OSTEOARTHRITIS OF BOTH GLENOHUMERAL JOINTS: ICD-10-CM

## 2022-01-18 DIAGNOSIS — M19.011 OSTEOARTHRITIS OF BOTH GLENOHUMERAL JOINTS: ICD-10-CM

## 2022-01-18 DIAGNOSIS — M75.121 NONTRAUMATIC COMPLETE TEAR OF RIGHT ROTATOR CUFF: Primary | ICD-10-CM

## 2022-01-18 DIAGNOSIS — M19.011 OSTEOARTHRITIS OF BOTH ACROMIOCLAVICULAR JOINTS: ICD-10-CM

## 2022-01-18 PROCEDURE — 99214 OFFICE O/P EST MOD 30 MIN: CPT | Performed by: ORTHOPAEDIC SURGERY
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
## 2022-01-26 ENCOUNTER — OFFICE VISIT (OUTPATIENT)
Dept: PULMONOLOGY | Facility: MEDICAL CENTER | Age: 72
End: 2022-01-26
Payer: MEDICARE

## 2022-01-26 VITALS
TEMPERATURE: 98.4 F | HEART RATE: 76 BPM | SYSTOLIC BLOOD PRESSURE: 132 MMHG | WEIGHT: 225 LBS | OXYGEN SATURATION: 96 % | HEIGHT: 65 IN | DIASTOLIC BLOOD PRESSURE: 66 MMHG | RESPIRATION RATE: 12 BRPM | BODY MASS INDEX: 37.49 KG/M2

## 2022-01-26 DIAGNOSIS — G47.33 OSA (OBSTRUCTIVE SLEEP APNEA): Primary | ICD-10-CM

## 2022-01-26 DIAGNOSIS — E66.2 MORBID (SEVERE) OBESITY WITH ALVEOLAR HYPOVENTILATION (HCC): ICD-10-CM

## 2022-01-26 PROCEDURE — 99214 OFFICE O/P EST MOD 30 MIN: CPT | Performed by: INTERNAL MEDICINE

## 2022-01-26 NOTE — PATIENT INSTRUCTIONS
[phone]    Call us when she will have oxygen test done at home by [employer]    I ordered new Resmed AirFit F30 face mask from [employer]
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
## 2022-01-27 NOTE — ASSESSMENT & PLAN NOTE
She does use oxygen 2 liters/minute at bedtime with her CPAP  I did order nocturnal pulse oximetry to be done with her using CPAP and 2 L of oxygen    She bought her own oxygen concentrator which he uses at night

## 2022-01-27 NOTE — ASSESSMENT & PLAN NOTE
Moderate LUCIA with good compliance to CPAP therapy  l   She is on auto range or pressure of 7-20 cm water  I would like to compliance data for past 1 month she use it on regular basis for 4 5 hours per night  There is minimal mask leak  Resultant AHI was 0 8  She has a fullface mask but did not care for the when she had at present  I showed her the AirFit F 30 fullface mass which she liked  I told she would need small size one    She has a Resmed CPAP machine  I told her to continue with auto CPAP at pressure range of 7 to 20 cm water pressure    Her average CPAP pressure was 19 and this resulted in AHI of 0 8 which is good

## 2022-01-28 ENCOUNTER — OFFICE VISIT (OUTPATIENT)
Dept: OBGYN CLINIC | Facility: CLINIC | Age: 72
End: 2022-01-28
Payer: MEDICARE

## 2022-01-28 VITALS
HEART RATE: 71 BPM | HEIGHT: 65 IN | DIASTOLIC BLOOD PRESSURE: 63 MMHG | BODY MASS INDEX: 37.49 KG/M2 | WEIGHT: 225 LBS | SYSTOLIC BLOOD PRESSURE: 171 MMHG

## 2022-01-28 DIAGNOSIS — M75.41 IMPINGEMENT SYNDROME OF RIGHT SHOULDER: ICD-10-CM

## 2022-01-28 DIAGNOSIS — M25.512 CHRONIC PAIN OF BOTH SHOULDERS: ICD-10-CM

## 2022-01-28 DIAGNOSIS — M75.102 NONTRAUMATIC TEAR OF LEFT SUPRASPINATUS TENDON: Primary | ICD-10-CM

## 2022-01-28 DIAGNOSIS — M25.511 CHRONIC PAIN OF BOTH SHOULDERS: ICD-10-CM

## 2022-01-28 DIAGNOSIS — G89.29 CHRONIC PAIN OF BOTH SHOULDERS: ICD-10-CM

## 2022-01-28 DIAGNOSIS — M75.101 NONTRAUMATIC TEAR OF RIGHT SUPRASPINATUS TENDON: ICD-10-CM

## 2022-01-28 DIAGNOSIS — M75.42 IMPINGEMENT SYNDROME OF LEFT SHOULDER: ICD-10-CM

## 2022-01-28 PROCEDURE — 99214 OFFICE O/P EST MOD 30 MIN: CPT | Performed by: ORTHOPAEDIC SURGERY
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
## 2022-02-04 DIAGNOSIS — I50.22 CHRONIC SYSTOLIC CONGESTIVE HEART FAILURE (HCC): ICD-10-CM

## 2022-02-08 RX ORDER — SACUBITRIL AND VALSARTAN 97; 103 MG/1; MG/1
TABLET, FILM COATED ORAL
Qty: 180 TABLET | Refills: 3 | Status: SHIPPED | OUTPATIENT
Start: 2022-02-08

## 2022-02-17 ENCOUNTER — EVALUATION (OUTPATIENT)
Dept: PHYSICAL THERAPY | Facility: REHABILITATION | Age: 72
End: 2022-02-17
Payer: MEDICARE

## 2022-02-17 DIAGNOSIS — M25.511 CHRONIC PAIN OF BOTH SHOULDERS: Primary | ICD-10-CM

## 2022-02-17 DIAGNOSIS — M75.102 NONTRAUMATIC TEAR OF LEFT SUPRASPINATUS TENDON: ICD-10-CM

## 2022-02-17 DIAGNOSIS — M25.512 CHRONIC PAIN OF BOTH SHOULDERS: Primary | ICD-10-CM

## 2022-02-17 DIAGNOSIS — M75.41 IMPINGEMENT SYNDROME OF RIGHT SHOULDER: ICD-10-CM

## 2022-02-17 DIAGNOSIS — G89.29 CHRONIC PAIN OF BOTH SHOULDERS: Primary | ICD-10-CM

## 2022-02-17 DIAGNOSIS — M75.101 NONTRAUMATIC TEAR OF RIGHT SUPRASPINATUS TENDON: ICD-10-CM

## 2022-02-17 DIAGNOSIS — M75.42 IMPINGEMENT SYNDROME OF LEFT SHOULDER: ICD-10-CM

## 2022-02-17 PROCEDURE — 97112 NEUROMUSCULAR REEDUCATION: CPT | Performed by: PHYSICAL THERAPIST

## 2022-02-17 PROCEDURE — 97162 PT EVAL MOD COMPLEX 30 MIN: CPT | Performed by: PHYSICAL THERAPIST

## 2022-02-17 NOTE — PROGRESS NOTES
PT Evaluation     Today's date: 2022  Patient name: Cecil Horn  : 1950  MRN: [id number]  Referring provider: Ghanshyam Feliciano*  Dx:   Encounter Diagnosis     ICD-10-CM    1  Chronic pain of both shoulders  M25 511     G89 29     M25 512    2  Nontraumatic tear of left supraspinatus tendon  M75 102    3  Nontraumatic tear of right supraspinatus tendon  M75 101    4  Impingement syndrome of left shoulder  M75 42    5  Impingement syndrome of right shoulder  M75 41        Start Time: 1405  Stop Time: 1500  Total time in clinic (min): 55 minutes    Assessment  Assessment details: Cecil Horn is a 70y o  year old female who presents to [facility] with Chronic pain of both shoulders   Nontraumatic tear of left supraspinatus tendon  Nontraumatic tear of right supraspinatus tendon  Impingement syndrome of left shoulder  Impingement syndrome of right shoulder  Patient demonstrates overall adequate bilateral upper extremity/shoulder range of motion  She demonstrates BUE strength deficits L>R, bilateral cervical range of motion deficits and tenderness/tightness to cervical musculature  These impairments are limiting her from functional activities such as washing her hair, reaching and lifting  Marguerite Johnson presents with the impairments as listed above and would benefit from Physical Therapy to address these impairments, improved quality of life and to maximize function  Impairments: abnormal or restricted ROM, activity intolerance, impaired physical strength, lacks appropriate home exercise program, pain with function and poor posture   Understanding of Dx/Px/POC: good   Prognosis: good    Goals  ST  Patient will report <5/10 pain with shoulder motion  2  Patient will demonstrate improvement in bilateral shoulder strength by at least 1-2 grades  LT  Patient will be independent with HEP upon discharge    2  Patient will report no pain while sleeping at night to improve rest and overall quality of life upon discharge  3  Patient will be able to reach overhead for at least 2 minutes to assist in washing her hair with minimal limitation  Plan  Plan details: Thank you for opportunity to participate in the care of Montserrat Delong  Patient would benefit from: PT eval and skilled physical therapy  Planned modality interventions: cryotherapy, unattended electrical stimulation, TENS and thermotherapy: hydrocollator packs  Planned therapy interventions: activity modification, flexibility, home exercise program, therapeutic exercise, therapeutic activities, stretching, strengthening, postural training, patient education, neuromuscular re-education, manual therapy and joint mobilization  Frequency: 2x week  Duration in visits: 10  Plan of Care beginning date: 2022  Treatment plan discussed with: patient        Subjective Evaluation    History of Present Illness  Mechanism of injury: Patient is a 70 y o  presenting to physical therapy with complaints of bilateral shoulder pain beginning at least 8 years ago  As per Dr Morris Hendrix note: right shoulder large full-thickness tear of the supraspinatus tendon and left shoulder small full-thickness tear of the supraspinatus and bilateral impingement syndrome  She tried PT in the past for this pain and feels PT worsened her pain  She had short term relief of pain with use of TENS and heat  Her right shoulder is worse than the left  She reports not having much pain during activity but it is a delayed onset       Functional limitations: blow drying hair, washing hair  N/T/Radicular pain: None, pain can radiate to the elbow  Sleeping: side sleeping has to change positions every 2 hours  PT goals: "to be pain free"      Next F/U with MD 3/18/2022    Pain  Current pain ratin  At best pain ratin  At worst pain ratin  Location: lateral upper arm and tops of shoulders  Quality: dull ache and burning  Aggravating factors: overhead activity and lifting    Social Support  Lives with: spouse    Employment status: not working (retired)  Hand dominance: right    Treatments  Previous treatment: injection treatment, massage, physical therapy and medication  Current treatment: physical therapy  Patient Goals  Patient goals for therapy: decreased pain, increased motion, increased strength and independence with ADLs/IADLs          Objective     Postural Observations  Seated posture: poor  Standing posture: poor        Palpation   Left   Hypertonic in the upper trapezius  Tenderness of the infraspinatus, rhomboids, supraspinatus and upper trapezius  Trigger point to rhomboids  Right   Hypertonic in the upper trapezius  Tenderness of the infraspinatus, rhomboids, supraspinatus and upper trapezius  Trigger point to rhomboids  Additional Palpation Details  Significant tenderness to light touch palpation of bilateral upper traps  Significant hypertonicity to bilateral upper traps   Kyphotic posture     Active Range of Motion   Cervical/Thoracic Spine       Cervical    Flexion: 45 degrees   Extension: 30 degrees      Left lateral flexion: 20 degrees     with pain  Right lateral flexion: 25 degrees     with pain  Left Shoulder   Flexion: 160 degrees WFL  Abduction: 160 degrees WFL  External rotation 45°: 70 degrees   External rotation BTH: T2 WFL  Internal rotation BTB: T12 WFL    Right Shoulder   Flexion: 160 degrees WFL  Abduction: 160 degrees WFL  External rotation 45°: 70 degrees   External rotation BTH: T2 WFL  Internal rotation BTB: T12 WFL    Strength/Myotome Testing     Left Shoulder     Planes of Motion   Flexion: 4-   Abduction: 4-   External rotation at 0°: 3+ (pain)   Internal rotation at 0°: 3+     Isolated Muscles   Biceps: 4-   Triceps: 4-     Right Shoulder     Planes of Motion   Flexion: 4   Abduction: 4   External rotation at 0°: 4   Internal rotation at 0°: 4     Isolated Muscles   Biceps: 4   Triceps: 4     Tests     Left Shoulder   Positive empty can  Right Shoulder   Positive empty can                Precautions: Stroke 2015, chronic low back pain, HTN, Vertigo, (poor tolerance to supine, if supine put wedge under legs)        Daily Treatment Diary:    Manuals 2/17/2022                                               Neuromuscular Re-education            Pulleys flx/scap            Scap retractions* 10x5"           RTC IR/ER iso nv           Ant/post deltoid iso - bilateral* 10x5"                       TB ER/IR step outs                                                                                    Therapeutic Exercise            UBE                         TB low rows  nv           TB mid rows nv           TB ER/IR            TB bilateral ER w retraction            TB horizontal abduction                                    SL ER            SL abduction            SL flexion            Therapeutic Activity            Overhead press            Standing Scaption            Ball on wall OH                        * = on HEP

## 2022-02-22 ENCOUNTER — OFFICE VISIT (OUTPATIENT)
Dept: PHYSICAL THERAPY | Facility: REHABILITATION | Age: 72
End: 2022-02-22
Payer: MEDICARE

## 2022-02-22 DIAGNOSIS — M75.101 NONTRAUMATIC TEAR OF RIGHT SUPRASPINATUS TENDON: ICD-10-CM

## 2022-02-22 DIAGNOSIS — M75.42 IMPINGEMENT SYNDROME OF LEFT SHOULDER: ICD-10-CM

## 2022-02-22 DIAGNOSIS — M25.512 CHRONIC PAIN OF BOTH SHOULDERS: Primary | ICD-10-CM

## 2022-02-22 DIAGNOSIS — M25.511 CHRONIC PAIN OF BOTH SHOULDERS: Primary | ICD-10-CM

## 2022-02-22 DIAGNOSIS — M75.102 NONTRAUMATIC TEAR OF LEFT SUPRASPINATUS TENDON: ICD-10-CM

## 2022-02-22 DIAGNOSIS — M75.41 IMPINGEMENT SYNDROME OF RIGHT SHOULDER: ICD-10-CM

## 2022-02-22 DIAGNOSIS — G89.29 CHRONIC PAIN OF BOTH SHOULDERS: Primary | ICD-10-CM

## 2022-02-22 PROCEDURE — 97110 THERAPEUTIC EXERCISES: CPT

## 2022-02-22 PROCEDURE — 97112 NEUROMUSCULAR REEDUCATION: CPT

## 2022-02-22 NOTE — PROGRESS NOTES
Daily Note     Today's date: 2022  Patient name: Saray Dyer  : 1950  MRN: [id number]  Referring provider: Vicente Epps*  Dx:   Encounter Diagnosis     ICD-10-CM    1  Chronic pain of both shoulders  M25 511     G89 29     M25 512    2  Nontraumatic tear of left supraspinatus tendon  M75 102    3  Nontraumatic tear of right supraspinatus tendon  M75 101    4  Impingement syndrome of left shoulder  M75 42    5  Impingement syndrome of right shoulder  M75 41        Start Time: 1615  Stop Time: 1700  Total time in clinic (min): 45 minutes    Subjective: Patient reporting shoulder soreness at start of session, reporting right worse than left  Patient reporting pain radiates down right arm, pointing to right bicep area  She states pain is not constant, it occurs after she uses her arm  Objective: See treatment diary below       Assessment: Tolerated treatment well  Patient demonstrated fatigue post treatment, exhibited good technique with therapeutic exercises and would benefit from continued PT Initiated POC this session with fair tolerance  Patient was seated for all exercise performed secondary to back pain  Cues required for "gentle pain free ranges" with completion of isometrics  Patient had reports of left shoulder pain with completion of TB TE        Plan: Continue per plan of care  Progress treatment as tolerated         Precautions: Stroke 2015, chronic low back pain, HTN, Vertigo, (poor tolerance to supine, if supine put wedge under legs)        Daily Treatment Diary:    Manuals 2022                                              Neuromuscular Re-education            Pulleys flx/scap            Scap retractions* 10x5" 20x5''          RTC IR/ER iso nv 10x5'' ea          Ant/post deltoid iso - bilateral* 10x5" 10x5'' ea                      TB ER/IR step outs                                                                                    Therapeutic Exercise UBE                         TB low rows  nv PTB 2x10 discomfort left           TB mid rows nv OTB x10 pain  PTB x10          TB ER/IR            TB bilateral ER w retraction            TB horizontal abduction                                    SL ER            SL abduction            SL flexion            Therapeutic Activity            Overhead press            Standing Scaption            Ball on wall OH                        * = on HEP

## 2022-02-25 ENCOUNTER — APPOINTMENT (OUTPATIENT)
Dept: PHYSICAL THERAPY | Facility: REHABILITATION | Age: 72
End: 2022-02-25
Payer: MEDICARE

## 2022-03-02 ENCOUNTER — OFFICE VISIT (OUTPATIENT)
Dept: PHYSICAL THERAPY | Facility: REHABILITATION | Age: 72
End: 2022-03-02
Payer: MEDICARE

## 2022-03-02 DIAGNOSIS — M25.511 CHRONIC PAIN OF BOTH SHOULDERS: Primary | ICD-10-CM

## 2022-03-02 DIAGNOSIS — G89.29 CHRONIC PAIN OF BOTH SHOULDERS: Primary | ICD-10-CM

## 2022-03-02 DIAGNOSIS — M75.101 NONTRAUMATIC TEAR OF RIGHT SUPRASPINATUS TENDON: ICD-10-CM

## 2022-03-02 DIAGNOSIS — M25.512 CHRONIC PAIN OF BOTH SHOULDERS: Primary | ICD-10-CM

## 2022-03-02 DIAGNOSIS — M75.41 IMPINGEMENT SYNDROME OF RIGHT SHOULDER: ICD-10-CM

## 2022-03-02 DIAGNOSIS — M75.102 NONTRAUMATIC TEAR OF LEFT SUPRASPINATUS TENDON: ICD-10-CM

## 2022-03-02 DIAGNOSIS — M75.42 IMPINGEMENT SYNDROME OF LEFT SHOULDER: ICD-10-CM

## 2022-03-02 PROCEDURE — 97112 NEUROMUSCULAR REEDUCATION: CPT | Performed by: PHYSICAL THERAPIST

## 2022-03-02 PROCEDURE — 97140 MANUAL THERAPY 1/> REGIONS: CPT | Performed by: PHYSICAL THERAPIST

## 2022-03-02 PROCEDURE — 97110 THERAPEUTIC EXERCISES: CPT | Performed by: PHYSICAL THERAPIST

## 2022-03-02 NOTE — PROGRESS NOTES
Daily Note     Today's date: 3/2/2022  Patient name: Marilu Degroot  : 1950  MRN: [id number]  Referring provider: Erlin Amaro*  Dx:   Encounter Diagnosis     ICD-10-CM    1  Chronic pain of both shoulders  M25 511     G89 29     M25 512    2  Nontraumatic tear of left supraspinatus tendon  M75 102    3  Nontraumatic tear of right supraspinatus tendon  M75 101    4  Impingement syndrome of left shoulder  M75 42    5  Impingement syndrome of right shoulder  M75 41        Start Time: 1402  Stop Time: 1455  Total time in clinic (min): 53 minutes    Subjective: Patient reports her shoulders feel "ok" at start of session  She reports they felt ok after last visit  Objective: See treatment diary below      Assessment: Tolerated treatment well  All TE completed seated secondary to LBP  Patient denies pain but report "joint soreness" throughout session, especially with TB strengthening  Pain management techniques utilized this visit include gentle superficial soft tissue mobilization, MPH and TENs to bilateral upper trap with patient reporting relief  Reviewed set up of TENs for home use  Patient demonstrated fatigue post treatment, exhibited good technique with therapeutic exercises and would benefit from continued PT  Plan: Continue per plan of care        Precautions: Stroke 2015, chronic low back pain, HTN, Vertigo, (poor tolerance to supine, if supine put wedge under legs)        Daily Treatment Diary:    Manuals 2022 2/22 3/2         Gentle superficial upper trap STM   8'                                 Neuromuscular Re-education            Pulleys flx/scap            Scap retractions* 10x5" 20x5'' 2x10x5"         RTC IR/ER iso nv 10x5'' ea 2x10x5" ER  10x5" IR         Ant/post deltoid iso - bilateral* 10x5" 10x5'' ea 2x10x5" ant  10x5" post                     TB ER/IR step outs                                                                                    Therapeutic Exercise UBE                         TB low rows  nv PTB 2x10 discomfort left  PTB 10         TB mid rows nv OTB x10 pain  PTB x10 PTB 2x10         TB ER/IR            TB bilateral ER w retraction            TB horizontal abduction                                    SL ER            SL abduction            SL flexion            Therapeutic Activity            Overhead press            Standing Scaption            Ball on wall OH            Modalities            MHP 10' w TENs                                   * = on HEP

## 2022-03-04 ENCOUNTER — OFFICE VISIT (OUTPATIENT)
Dept: PHYSICAL THERAPY | Facility: REHABILITATION | Age: 72
End: 2022-03-04
Payer: MEDICARE

## 2022-03-04 DIAGNOSIS — M25.512 CHRONIC PAIN OF BOTH SHOULDERS: Primary | ICD-10-CM

## 2022-03-04 DIAGNOSIS — M75.101 NONTRAUMATIC TEAR OF RIGHT SUPRASPINATUS TENDON: ICD-10-CM

## 2022-03-04 DIAGNOSIS — G89.29 CHRONIC PAIN OF BOTH SHOULDERS: Primary | ICD-10-CM

## 2022-03-04 DIAGNOSIS — M75.42 IMPINGEMENT SYNDROME OF LEFT SHOULDER: ICD-10-CM

## 2022-03-04 DIAGNOSIS — M75.41 IMPINGEMENT SYNDROME OF RIGHT SHOULDER: ICD-10-CM

## 2022-03-04 DIAGNOSIS — M75.102 NONTRAUMATIC TEAR OF LEFT SUPRASPINATUS TENDON: ICD-10-CM

## 2022-03-04 DIAGNOSIS — M25.511 CHRONIC PAIN OF BOTH SHOULDERS: Primary | ICD-10-CM

## 2022-03-04 PROCEDURE — 97112 NEUROMUSCULAR REEDUCATION: CPT | Performed by: PHYSICAL THERAPIST

## 2022-03-04 PROCEDURE — 97110 THERAPEUTIC EXERCISES: CPT | Performed by: PHYSICAL THERAPIST

## 2022-03-04 PROCEDURE — 97140 MANUAL THERAPY 1/> REGIONS: CPT | Performed by: PHYSICAL THERAPIST

## 2022-03-04 NOTE — PROGRESS NOTES
Daily Note     Today's date: 3/4/2022  Patient name: Magali Castaneda  : 1950  MRN: [id number]  Referring provider: [place]*  Dx:   Encounter Diagnosis     ICD-10-CM    1  Chronic pain of both shoulders  M25 511     G89 29     M25 512    2  Nontraumatic tear of left supraspinatus tendon  M75 102    3  Nontraumatic tear of right supraspinatus tendon  M75 101    4  Impingement syndrome of left shoulder  M75 42    5  Impingement syndrome of right shoulder  M75 41        Start Time: 1100  Stop Time: 1150  Total time in clinic (min): 50 minutes    Subjective: Patient reports her neck is very sore stating "it's always that way " She reports use of TENs majority of the day for pain relief  Objective: See treatment diary below      Assessment: Tolerated treatment well  Trialed upper trap stretch this visit with poor tolerance and complaints of aggravation of neck pain  Kept all other TE the same due to complaints of soreness  Patient with positive response to manual therapy and MHP/TENs for pain management  Patient demonstrated fatigue post treatment, exhibited good technique with therapeutic exercises and would benefit from continued PT  Plan: Continue per plan of care        Precautions: Stroke 2015, chronic low back pain, HTN, Vertigo, (poor tolerance to supine, if supine put wedge under legs)        Daily Treatment Diary:    Manuals 2022 2/22 3/2 3        Gentle superficial upper trap STM   8' 8'                                Neuromuscular Re-education            Pulleys flx/scap            Scap retractions* 10x5" 20x5'' 2x10x5" 2x10x5"        RTC IR/ER iso nv 10x5'' ea 2x10x5" ER  10x5" IR 2x10x5"        Ant/post deltoid iso - bilateral* 10x5" 10x5'' ea 2x10x5" ant  10x5" post 2x10x5"                    TB ER/IR step outs                                                                                    Therapeutic Exercise            UBE                         TB low rows  nv PTB 2x10 discomfort left  PTB 10 PTB 2x10        TB mid rows nv OTB x10 pain  PTB x10 PTB 2x10 PTB 2x10        TB ER/IR            TB bilateral ER w retraction            TB horizontal abduction                                    SL ER            SL abduction            SL flexion            Therapeutic Activity            Overhead press            Standing Scaption            Ball on wall OH            Modalities            MHP 10' w TENs   10' w TENs                                * = on HEP

## 2022-03-07 ENCOUNTER — OFFICE VISIT (OUTPATIENT)
Dept: PHYSICAL THERAPY | Facility: REHABILITATION | Age: 72
End: 2022-03-07
Payer: MEDICARE

## 2022-03-07 DIAGNOSIS — G89.29 CHRONIC PAIN OF BOTH SHOULDERS: Primary | ICD-10-CM

## 2022-03-07 DIAGNOSIS — M75.102 NONTRAUMATIC TEAR OF LEFT SUPRASPINATUS TENDON: ICD-10-CM

## 2022-03-07 DIAGNOSIS — M25.512 CHRONIC PAIN OF BOTH SHOULDERS: Primary | ICD-10-CM

## 2022-03-07 DIAGNOSIS — M25.511 CHRONIC PAIN OF BOTH SHOULDERS: Primary | ICD-10-CM

## 2022-03-07 DIAGNOSIS — M75.41 IMPINGEMENT SYNDROME OF RIGHT SHOULDER: ICD-10-CM

## 2022-03-07 DIAGNOSIS — M75.42 IMPINGEMENT SYNDROME OF LEFT SHOULDER: ICD-10-CM

## 2022-03-07 DIAGNOSIS — M75.101 NONTRAUMATIC TEAR OF RIGHT SUPRASPINATUS TENDON: ICD-10-CM

## 2022-03-07 PROCEDURE — 97110 THERAPEUTIC EXERCISES: CPT

## 2022-03-07 PROCEDURE — 97140 MANUAL THERAPY 1/> REGIONS: CPT

## 2022-03-07 PROCEDURE — 97112 NEUROMUSCULAR REEDUCATION: CPT

## 2022-03-07 NOTE — PROGRESS NOTES
Daily Note     Today's date: 3/7/2022  Patient name: Kwadwo Baird  : 1950  MRN: [id number]  Referring provider: Mckenzie Caceres*  Dx:   Encounter Diagnosis     ICD-10-CM    1  Chronic pain of both shoulders  M25 511     G89 29     M25 512    2  Nontraumatic tear of left supraspinatus tendon  M75 102    3  Nontraumatic tear of right supraspinatus tendon  M75 101    4  Impingement syndrome of left shoulder  M75 42    5  Impingement syndrome of right shoulder  M75 41        Start Time: 1400  Stop Time: 1443  Total time in clinic (min): 43 minutes    Subjective: Patient reports shoulders as "better" at start of session, reporting less "soreness" as well as decreased bilateral arm discomfort throughout the day  She reports neck discomfort "still about the same "      Objective: See treatment diary below      Assessment: Tolerated treatment well  Patient demonstrated fatigue post treatment, exhibited good technique with therapeutic exercises and would benefit from continued PT Some increased shoulder discomfort noted with completion of anterior isometric, cues required for "gentle" contractions  Tolerated all other TE performed today well  Patient reporting relief with manuals today with relief noted at end of session  Plan: Continue per plan of care  Progress treatment as tolerated         Precautions: Stroke 2015, chronic low back pain, HTN, Vertigo, (poor tolerance to supine, if supine put wedge under legs)        Daily Treatment Diary:     Manuals 2022 2/22 3/2 3/4 3/7       Gentle superficial upper trap STM   8' 8' 8'                               Neuromuscular Re-education            Pulleys flx/scap            Scap retractions* 10x5" 20x5'' 2x10x5" 2x10x5" 2x10x5''       RTC IR/ER iso nv 10x5'' ea 2x10x5" ER  10x5" IR 2x10x5" 2x10x5''        Ant/post deltoid iso - bilateral* 10x5" 10x5'' ea 2x10x5" ant  10x5" post 2x10x5" 2x10x5''                   TB ER/IR step outs Therapeutic Exercise            UBE                         TB low rows  nv PTB 2x10 discomfort left  PTB 10 PTB 2x10 PTB 2x10       TB mid rows nv OTB x10 pain  PTB x10 PTB 2x10 PTB 2x10 PTB 2x10       TB ER/IR            TB bilateral ER w retraction            TB horizontal abduction                                    SL ER            SL abduction            SL flexion            Therapeutic Activity            Overhead press            Standing Scaption            Ball on wall OH            Modalities            MHP 10' w TENs   10' w TENs                                * = on HEP

## 2022-03-09 ENCOUNTER — OFFICE VISIT (OUTPATIENT)
Dept: PHYSICAL THERAPY | Facility: REHABILITATION | Age: 72
End: 2022-03-09
Payer: MEDICARE

## 2022-03-09 DIAGNOSIS — M75.101 NONTRAUMATIC TEAR OF RIGHT SUPRASPINATUS TENDON: ICD-10-CM

## 2022-03-09 DIAGNOSIS — G89.29 CHRONIC PAIN OF BOTH SHOULDERS: Primary | ICD-10-CM

## 2022-03-09 DIAGNOSIS — M75.41 IMPINGEMENT SYNDROME OF RIGHT SHOULDER: ICD-10-CM

## 2022-03-09 DIAGNOSIS — M75.102 NONTRAUMATIC TEAR OF LEFT SUPRASPINATUS TENDON: ICD-10-CM

## 2022-03-09 DIAGNOSIS — M25.512 CHRONIC PAIN OF BOTH SHOULDERS: Primary | ICD-10-CM

## 2022-03-09 DIAGNOSIS — M25.511 CHRONIC PAIN OF BOTH SHOULDERS: Primary | ICD-10-CM

## 2022-03-09 DIAGNOSIS — M75.42 IMPINGEMENT SYNDROME OF LEFT SHOULDER: ICD-10-CM

## 2022-03-09 PROCEDURE — 97110 THERAPEUTIC EXERCISES: CPT

## 2022-03-09 PROCEDURE — 97140 MANUAL THERAPY 1/> REGIONS: CPT

## 2022-03-09 PROCEDURE — 97112 NEUROMUSCULAR REEDUCATION: CPT

## 2022-03-09 NOTE — PROGRESS NOTES
Daily Note     Today's date: 3/9/2022  Patient name: Dawn Morales  : 1950  MRN: [id number]  Referring provider: Cruz Fuller*  Dx:   Encounter Diagnosis     ICD-10-CM    1  Chronic pain of both shoulders  M25 511     G89 29     M25 512    2  Nontraumatic tear of left supraspinatus tendon  M75 102    3  Nontraumatic tear of right supraspinatus tendon  M75 101    4  Impingement syndrome of left shoulder  M75 42    5  Impingement syndrome of right shoulder  M75 41        Start Time: 1400  Stop Time: 1448  Total time in clinic (min): 48 minutes     Subjective: Patient reports neck as "about the same", reporting improvements with both shoulders stating "the shoulders are better " Patient reports no complaints after previous session  Objective: See treatment diary below      Assessment: Tolerated treatment well  Patient demonstrated fatigue post treatment, exhibited good technique with therapeutic exercises and would benefit from continued PT Improved tolerance to manuals this session with therapist able to apply more pressure with no discomfort reported by patient, patient reporting relief  Trialed seated scaption with patient tolerating well, cues required for pain free ranges  Patient reporting "joint pain" with completion of TB scapular retraction with external rotation, did not complete additional reps  Plan: Continue per plan of care  Progress treatment as tolerated         Precautions: Stroke 2015, chronic low back pain, HTN, Vertigo, (poor tolerance to supine, if supine put wedge under legs)        Daily Treatment Diary:     Manuals 2022 2/22 3/2 3/4 3/7 3/9    Gentle superficial upper trap STM   8' 8' 8' 10'                        Neuromuscular Re-education          Pulleys flx/scap          Scap retractions* 10x5" 20x5'' 2x10x5" 2x10x5" 2x10x5'' 3x10x5''    RTC IR/ER iso nv 10x5'' ea 2x10x5" ER  10x5" IR 2x10x5" 2x10x5''  2x10x5'' inc nv    Ant/post deltoid iso - bilateral* 10x5" 10x5'' ea 2x10x5" ant  10x5" post 2x10x5" 2x10x5'' 2x10x5'' inc nv              TB ER/IR step outs                                                                      Therapeutic Exercise          UBE                     TB low rows  nv PTB 2x10 discomfort left  PTB 10 PTB 2x10 PTB 2x10 PTB 3x10    TB mid rows nv OTB x10 pain  PTB x10 PTB 2x10 PTB 2x10 PTB 2x10 PTB 3x10    TB ER/IR          TB bilateral ER w retraction      PTB x10 hard pain    TB horizontal abduction                              SL ER          SL abduction          SL flexion          Therapeutic Activity          Overhead press          Standing Scaption      seated 2x10 pain free range    Ball on wall OH          Modalities          MHP 10' w TENs   10' w TENs                          * = on HEP

## 2022-03-14 ENCOUNTER — OFFICE VISIT (OUTPATIENT)
Dept: PHYSICAL THERAPY | Facility: REHABILITATION | Age: 72
End: 2022-03-14
Payer: MEDICARE

## 2022-03-14 DIAGNOSIS — M75.42 IMPINGEMENT SYNDROME OF LEFT SHOULDER: ICD-10-CM

## 2022-03-14 DIAGNOSIS — M75.41 IMPINGEMENT SYNDROME OF RIGHT SHOULDER: ICD-10-CM

## 2022-03-14 DIAGNOSIS — G89.29 CHRONIC PAIN OF BOTH SHOULDERS: Primary | ICD-10-CM

## 2022-03-14 DIAGNOSIS — M25.512 CHRONIC PAIN OF BOTH SHOULDERS: Primary | ICD-10-CM

## 2022-03-14 DIAGNOSIS — M75.101 NONTRAUMATIC TEAR OF RIGHT SUPRASPINATUS TENDON: ICD-10-CM

## 2022-03-14 DIAGNOSIS — M25.511 CHRONIC PAIN OF BOTH SHOULDERS: Primary | ICD-10-CM

## 2022-03-14 DIAGNOSIS — M75.102 NONTRAUMATIC TEAR OF LEFT SUPRASPINATUS TENDON: ICD-10-CM

## 2022-03-14 PROCEDURE — 97110 THERAPEUTIC EXERCISES: CPT | Performed by: PHYSICAL THERAPIST

## 2022-03-14 PROCEDURE — 97112 NEUROMUSCULAR REEDUCATION: CPT | Performed by: PHYSICAL THERAPIST

## 2022-03-14 PROCEDURE — 97140 MANUAL THERAPY 1/> REGIONS: CPT | Performed by: PHYSICAL THERAPIST

## 2022-03-14 NOTE — PROGRESS NOTES
Daily Note     Today's date: 3/14/2022  Patient name: Sonia Menezes  : 1950  MRN: [id number]  Referring provider: Ascencion Bowman*  Dx:   Encounter Diagnosis     ICD-10-CM    1  Chronic pain of both shoulders  M25 511     G89 29     M25 512    2  Nontraumatic tear of left supraspinatus tendon  M75 102    3  Nontraumatic tear of right supraspinatus tendon  M75 101    4  Impingement syndrome of left shoulder  M75 42    5  Impingement syndrome of right shoulder  M75 41        Start Time: 1400  Stop Time: 1448  Total time in clinic (min): 48 minutes    Subjective: Patient reports she is doing "awful" today reporting her neck is in a lot of pain beginning this morning when she woke up  Objective: See treatment diary below      Assessment: Tolerated treatment well  Patient has positive response to MHP and STM with patient reporting her neck feels a little better post-treatment  Patient demonstrated fatigue post treatment, exhibited good technique with therapeutic exercises and would benefit from continued PT  Plan: Continue per plan of care        Precautions: Stroke 2015, chronic low back pain, HTN, Vertigo, (poor tolerance to supine, if supine put wedge under legs)        Daily Treatment Diary:     Manuals 2022 2/22 3/2 3/4 3/7 3/9 3/14   Gentle superficial upper trap STM   8' 8' 8' 10' 10'                       Neuromuscular Re-education          Pulleys flx/scap          Scap retractions* 10x5" 20x5'' 2x10x5" 2x10x5" 2x10x5'' 3x10x5'' 3x10x5"   RTC IR/ER iso nv 10x5'' ea 2x10x5" ER  10x5" IR 2x10x5" 2x10x5''  2x10x5'' inc nv 3x8x5" ea   Ant/post deltoid iso - bilateral* 10x5" 10x5'' ea 2x10x5" ant  10x5" post 2x10x5" 2x10x5'' 2x10x5'' inc nv 3x10x5" ant              TB ER/IR step outs                                                                      Therapeutic Exercise          UBE                     TB low rows  nv PTB 2x10 discomfort left  PTB 10 PTB 2x10 PTB 2x10 PTB 3x10 PTB 3x10   TB mid rows nv OTB x10 pain  PTB x10 PTB 2x10 PTB 2x10 PTB 2x10 PTB 3x10 OTB 3x10   TB ER/IR          TB bilateral ER w retraction      PTB x10 hard pain    TB horizontal abduction                              SL ER          SL abduction          SL flexion          Therapeutic Activity          Overhead press          Standing Scaption      seated 2x10 pain free range Seated 2x10   Ball on [facility]          Modalities          MHP 10' w TENs   10' w TENs   10'                        * = on HEP

## 2022-03-16 ENCOUNTER — OFFICE VISIT (OUTPATIENT)
Dept: PHYSICAL THERAPY | Facility: REHABILITATION | Age: 72
End: 2022-03-16
Payer: MEDICARE

## 2022-03-16 DIAGNOSIS — M25.512 CHRONIC PAIN OF BOTH SHOULDERS: Primary | ICD-10-CM

## 2022-03-16 DIAGNOSIS — M75.42 IMPINGEMENT SYNDROME OF LEFT SHOULDER: ICD-10-CM

## 2022-03-16 DIAGNOSIS — M25.511 CHRONIC PAIN OF BOTH SHOULDERS: Primary | ICD-10-CM

## 2022-03-16 DIAGNOSIS — M75.41 IMPINGEMENT SYNDROME OF RIGHT SHOULDER: ICD-10-CM

## 2022-03-16 DIAGNOSIS — G89.29 CHRONIC PAIN OF BOTH SHOULDERS: Primary | ICD-10-CM

## 2022-03-16 DIAGNOSIS — M75.102 NONTRAUMATIC TEAR OF LEFT SUPRASPINATUS TENDON: ICD-10-CM

## 2022-03-16 DIAGNOSIS — M75.101 NONTRAUMATIC TEAR OF RIGHT SUPRASPINATUS TENDON: ICD-10-CM

## 2022-03-16 PROCEDURE — 97110 THERAPEUTIC EXERCISES: CPT | Performed by: PHYSICAL THERAPIST

## 2022-03-16 PROCEDURE — 97112 NEUROMUSCULAR REEDUCATION: CPT | Performed by: PHYSICAL THERAPIST

## 2022-03-16 NOTE — PROGRESS NOTES
PT Re-Evaluation  and PT Discharge    Today's date: 3/16/2022  Patient name: Montserrat Delong  : 1950  MRN: [id number]  Referring provider: Elina Hardin*  Dx:   Encounter Diagnosis     ICD-10-CM    1  Chronic pain of both shoulders  M25 511     G89 29     M25 512    2  Nontraumatic tear of left supraspinatus tendon  M75 102    3  Nontraumatic tear of right supraspinatus tendon  M75 101    4  Impingement syndrome of left shoulder  M75 42    5  Impingement syndrome of right shoulder  M75 41        Start Time: 1400  Stop Time: 1440  Total time in clinic (min): 40 minutes    Assessment  Assessment details: Hernan Ballesteros has made the following improvements since beginning PT: decreased pain, increased strength and increased tolerance to activities  Hernan Ballesteros and PT mutually agree to transition to HEP at this time secondary to gains made in P  She has been given updated HEP with verbalized understanding and compliance  Hernan Ballesteros is encouraged to contact PT with any questions or concerns in the future  Impairments: abnormal or restricted ROM, activity intolerance, impaired physical strength, lacks appropriate home exercise program, pain with function and poor posture   Understanding of Dx/Px/POC: good   Prognosis: good    Goals  ST  Patient will report <5/10 pain with shoulder motion  met  2  Patient will demonstrate improvement in bilateral shoulder strength by at least 1-2 grades  met    LT  Patient will be independent with HEP upon discharge  met  2  Patient will report no pain while sleeping at night to improve rest and overall quality of life upon discharge  Progressed 20% pain reduction  3  Patient will be able to reach overhead for at least 2 minutes to assist in washing her hair with minimal limitation  Progressed       Plan  Plan details: Plan to discharge with comprehensive HEP for continued and maintained gains made in PT        Patient would benefit from: PT eval and skilled physical therapy  Planned modality interventions: cryotherapy, unattended electrical stimulation, TENS and thermotherapy: hydrocollator packs  Planned therapy interventions: activity modification, flexibility, home exercise program, therapeutic exercise, therapeutic activities, stretching, strengthening, postural training, patient education, neuromuscular re-education, manual therapy and joint mobilization  Treatment plan discussed with: patient        Subjective Evaluation    History of Present Illness  Mechanism of injury: RE performed on 22  Calixto Roper has been seen for total of 8 visits for OP PT for bilateral chronic shoulder pain  Patient rates overall improvement since beginning PT 80%  Patient's global rating of change is " Quite a bit better (5) " Patient reports improvements with with washing her hair, blow drying her hair and 20% reduction in pain sleeping at night  Patient reports most difficulty with doing these activities despite improvements  PT goals: "to be pain free"      Next F/U with MD 3/18/2022    Pain  Current pain ratin  At best pain ratin  At worst pain ratin  Location: lateral upper arm and tops of shoulders  Quality: dull ache and burning  Aggravating factors: overhead activity and lifting    Social Support  Lives with: spouse    Employment status: not working (retired)  Hand dominance: right    Treatments  Previous treatment: injection treatment, massage, physical therapy and medication  Current treatment: physical therapy  Patient Goals  Patient goals for therapy: decreased pain, increased motion, increased strength and independence with ADLs/IADLs          Objective     Postural Observations  Seated posture: poor  Standing posture: poor        Palpation   Left   No palpable tenderness to the supraspinatus  Hypertonic in the upper trapezius  Tenderness of the infraspinatus, rhomboids and upper trapezius  Trigger point to rhomboids       Right   No palpable tenderness to the supraspinatus  Hypertonic in the upper trapezius  Tenderness of the infraspinatus, rhomboids and upper trapezius  Trigger point to rhomboids  Additional Palpation Details  Significant tenderness to light touch palpation of bilateral upper traps  Significant hypertonicity to bilateral upper traps   Kyphotic posture     Active Range of Motion   Cervical/Thoracic Spine       Cervical    Flexion: 45 degrees   Extension: 30 degrees      Left lateral flexion: 20 degrees     with pain  Right lateral flexion: 25 degrees     with pain  Left Shoulder   Flexion: 160 degrees WFL  Abduction: 160 degrees WFL  External rotation 45°: 70 degrees   External rotation BTH: T2 WFL  Internal rotation BTB: T12 WFL    Right Shoulder   Flexion: 160 degrees WFL  Abduction: 160 degrees WFL  External rotation 45°: 70 degrees   External rotation BTH: T2 WFL  Internal rotation BTB: T12 WFL    Strength/Myotome Testing     Left Shoulder     Planes of Motion   Flexion: 4   Abduction: 4   External rotation at 0°: 4 (no pain RE)   Internal rotation at 0°: 4     Isolated Muscles   Biceps: 4-   Triceps: 4-     Right Shoulder     Planes of Motion   Flexion: 4   Abduction: 4   External rotation at 0°: 4   Internal rotation at 0°: 4     Isolated Muscles   Biceps: 4   Triceps: 4     Tests     Left Shoulder   Negative empty can  Right Shoulder   Negative empty can                Precautions: Stroke 2015, chronic low back pain, HTN, Vertigo, (poor tolerance to supine, if supine put wedge under legs)        Daily Treatment Diary:     Manuals 3/16  3/2 3/4 3/7 3/9 3/14   Gentle superficial upper trap STM   8' 8' 8' 10' 10'                       Neuromuscular Re-education          Pulleys flx/scap          Scap retractions* 3x12x5"  2x10x5" 2x10x5" 2x10x5'' 3x10x5'' 3x10x5"   RTC IR/ER iso 3x8x5" ea  2x10x5" ER  10x5" IR 2x10x5" 2x10x5''  2x10x5'' inc nv 3x8x5" ea   Ant/post deltoid iso - bilateral* 3x10x5" ant  2x10x5" ant  10x5" post 2x10x5" 2x10x5'' 2x10x5'' inc nv 3x10x5" ant              TB ER/IR step outs                                                                      Therapeutic Exercise          UBE                     TB low rows  PTB 3x10  PTB 10 PTB 2x10 PTB 2x10 PTB 3x10 PTB 3x10   TB mid rows OTB 3x10  PTB 2x10 PTB 2x10 PTB 2x10 PTB 3x10 OTB 3x10   TB ER/IR          TB bilateral ER w retraction      PTB x10 hard pain    TB horizontal abduction                              SL ER          SL abduction          SL flexion          Therapeutic Activity          Overhead press          Standing Scaption Seated 3x12     seated 2x10 pain free range Seated 2x10   Ball on wall OH          Modalities          MHP np   10' w TENs   10'                        * = on HEP

## 2022-03-18 ENCOUNTER — OFFICE VISIT (OUTPATIENT)
Dept: OBGYN CLINIC | Facility: CLINIC | Age: 72
End: 2022-03-18
Payer: MEDICARE

## 2022-03-18 VITALS
DIASTOLIC BLOOD PRESSURE: 68 MMHG | HEART RATE: 71 BPM | WEIGHT: 225 LBS | SYSTOLIC BLOOD PRESSURE: 173 MMHG | HEIGHT: 65 IN | BODY MASS INDEX: 37.49 KG/M2

## 2022-03-18 DIAGNOSIS — M75.102 NONTRAUMATIC TEAR OF LEFT SUPRASPINATUS TENDON: Primary | ICD-10-CM

## 2022-03-18 DIAGNOSIS — M75.101 NONTRAUMATIC TEAR OF RIGHT SUPRASPINATUS TENDON: ICD-10-CM

## 2022-03-18 PROCEDURE — 99213 OFFICE O/P EST LOW 20 MIN: CPT | Performed by: ORTHOPAEDIC SURGERY
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
## 2022-04-18 NOTE — PROGRESS NOTES
Assessment/Plan:    1  Medicare annual wellness visit, subsequent    2  Type 2 diabetes mellitus without complication, without long-term current use of insulin ([facility] )  Assessment & Plan:  Labs were reviewed and control is adequate, recommended dietary and exercise efforts and will continue off medications  Discussed need for good foot and eye care  Discussed the benefit of weight loss on her glucose control  Lab Results   Component Value Date    HGBA1C 7 2 (H) 04/19/2022       Orders:  -     CBC and differential; Future; Expected date: 10/21/2022  -     Comprehensive metabolic panel; Future; Expected date: 10/21/2022  -     Lipid panel; Future; Expected date: 10/21/2022  -     HEMOGLOBIN A1C W/ EAG ESTIMATION; Future; Expected date: 10/21/2022    3  Essential hypertension  Assessment & Plan:  Well controlled and will continue medications as ordered  Orders:  -     CBC and differential; Future; Expected date: 10/21/2022  -     Comprehensive metabolic panel; Future; Expected date: 10/21/2022  -     Lipid panel; Future; Expected date: 10/21/2022  -     HEMOGLOBIN A1C W/ EAG ESTIMATION; Future; Expected date: 10/21/2022    4  Dilated cardiomyopathy ([facility] )  Assessment & Plan:  Continue medications per cardiology  5  Chronic low back pain without sciatica, unspecified back pain laterality  Comments:  Lidocaine renewal sent to the office  Continue home exercises from PT  Orders:  -     Cologuard; Future  -     lidocaine (LIDODERM) 5 %; Apply 1 patch topically daily Remove & Discard patch within 12 hours or as directed by MD            Patient Instructions     Medicare Preventive Visit Patient Instructions  Thank you for completing your Welcome to Medicare Visit or Medicare Annual Wellness Visit today  Your next wellness visit will be due in one year (4/22/2023)  The screening/preventive services that you may require over the next 5-10 years are detailed below   Some tests may not apply to you based off risk factors and/or age  Screening tests ordered at today's visit but not completed yet may show as past due  Also, please note that scanned in results may not display below  Preventive Screenings:  Service Recommendations Previous Testing/Comments   Colorectal Cancer Screening  * Colonoscopy    * Fecal Occult Blood Test (FOBT)/Fecal Immunochemical Test (FIT)  * Fecal DNA/Cologuard Test  * Flexible Sigmoidoscopy Age: 54-65 years old   Colonoscopy: every 10 years (may be performed more frequently if at higher risk)  OR  FOBT/FIT: every 1 year  OR  Cologuard: every 3 years  OR  Sigmoidoscopy: every 5 years  Screening may be recommended earlier than age 48 if at higher risk for colorectal cancer  Also, an individualized decision between you and your healthcare provider will decide whether screening between the ages of 74-80 would be appropriate  Colonoscopy: Not on file  FOBT/FIT: Not on file  Cologuard: 10/14/2019  Sigmoidoscopy: Not on file          Breast Cancer Screening Age: 36 years old  Frequency: every 1-2 years  Not required if history of left and right mastectomy Mammogram: 09/01/2010        Cervical Cancer Screening Between the ages of 21-29, pap smear recommended once every 3 years  Between the ages of 33-67, can perform pap smear with HPV co-testing every 5 years  Recommendations may differ for women with a history of total hysterectomy, cervical cancer, or abnormal pap smears in past  Pap Smear: Not on file    Screening Not Indicated   Hepatitis C Screening Once for adults born between 1945 and 1965  More frequently in patients at high risk for Hepatitis C Hep C Antibody: Not on file        Diabetes Screening 1-2 times per year if you're at risk for diabetes or have pre-diabetes Fasting glucose: 196 mg/dL   A1C: 7 2 %    Screening Not Indicated  History Diabetes   Cholesterol Screening Once every 5 years if you don't have a lipid disorder  May order more often based on risk factors   Lipid panel: 04/19/2022    Screening Current     Other Preventive Screenings Covered by Medicare:  Abdominal Aortic Aneurysm (AAA) Screening: covered once if your at risk  You're considered to be at risk if you have a family history of AAA  Lung Cancer Screening: covers low dose CT scan once per year if you meet all of the following conditions: (1) Age 50-69; (2) No signs or symptoms of lung cancer; (3) Current smoker or have quit smoking within the last 15 years; (4) You have a tobacco smoking history of at least 30 pack years (packs per day multiplied by number of years you smoked); (5) You get a written order from a healthcare provider  Glaucoma Screening: covered annually if you're considered high risk: (1) You have diabetes OR (2) Family history of glaucoma OR (3)  aged 48 and older OR (3)  American aged 72 and older  Osteoporosis Screening: covered every 2 years if you meet one of the following conditions: (1) You're estrogen deficient and at risk for osteoporosis based off medical history and other findings; (2) Have a vertebral abnormality; (3) On glucocorticoid therapy for more than 3 months; (4) Have primary hyperparathyroidism; (5) On osteoporosis medications and need to assess response to drug therapy  Last bone density test (DXA Scan): Not on file  HIV Screening: covered annually if you're between the age of 12-76  Also covered annually if you are younger than 13 and older than 72 with risk factors for HIV infection  For pregnant patients, it is covered up to 3 times per pregnancy      Immunizations:  Immunization Recommendations   Influenza Vaccine Annual influenza vaccination during flu season is recommended for all persons aged >= 6 months who do not have contraindications   Pneumococcal Vaccine (Prevnar and Pneumovax)  * Prevnar = PCV13  * Pneumovax = PPSV23   Adults 25-60 years old: 1-3 doses may be recommended based on certain risk factors  Adults 72 years old: Prevnar (PCV13) vaccine recommended followed by Pneumovax (PPSV23) vaccine  If already received PPSV23 since turning 65, then PCV13 recommended at least one year after PPSV23 dose  Hepatitis B Vaccine 3 dose series if at intermediate or high risk (ex: diabetes, end stage renal disease, liver disease)   Tetanus (Td) Vaccine - COST NOT COVERED BY MEDICARE PART B Following completion of primary series, a booster dose should be given every 10 years to maintain immunity against tetanus  Td may also be given as tetanus wound prophylaxis  Tdap Vaccine - COST NOT COVERED BY MEDICARE PART B Recommended at least once for all adults  For pregnant patients, recommended with each pregnancy  Shingles Vaccine (Shingrix) - COST NOT COVERED BY MEDICARE PART B  2 shot series recommended in those aged 48 and above     Health Maintenance Due:      Topic Date Due    Hepatitis C Screening  Never done    Breast Cancer Screening: Mammogram  09/01/2011    Colorectal Cancer Screening  10/14/2022     Immunizations Due:      Topic Date Due    Pneumococcal Vaccine: 65+ Years (1 of 2 - PPSV23) Never done    DTaP,Tdap,and Td Vaccines (1 - Tdap) Never done    Influenza Vaccine (1) 09/01/2021     Advance Directives   What are advance directives? Advance directives are legal documents that state your wishes and plans for medical care  These plans are made ahead of time in case you lose your ability to make decisions for yourself  Advance directives can apply to any medical decision, such as the treatments you want, and if you want to donate organs  What are the types of advance directives? There are many types of advance directives, and each state has rules about how to use them  You may choose a combination of any of the following:  Living will: This is a written record of the treatment you want  You can also choose which treatments you do not want, which to limit, and which to stop at a certain time   This includes surgery, medicine, IV fluid, and tube feedings  Durable power of  for healthcare [facility]): This is a written record that states who you want to make healthcare choices for you when you are unable to make them for yourself  This person, called a proxy, is usually a family member or a friend  You may choose more than 1 proxy  Do not resuscitate (DNR) order:  A DNR order is used in case your heart stops beating or you stop breathing  It is a request not to have certain forms of treatment, such as CPR  A DNR order may be included in other types of advance directives  Medical directive: This covers the care that you want if you are in a coma, near death, or unable to make decisions for yourself  You can list the treatments you want for each condition  Treatment may include pain medicine, surgery, blood transfusions, dialysis, IV or tube feedings, and a ventilator (breathing machine)  Values history: This document has questions about your views, beliefs, and how you feel and think about life  This information can help others choose the care that you would choose  Why are advance directives important? An advance directive helps you control your care  Although spoken wishes may be used, it is better to have your wishes written down  Spoken wishes can be misunderstood, or not followed  Treatments may be given even if you do not want them  An advance directive may make it easier for your family to make difficult choices about your care  Urinary Incontinence   Urinary incontinence (UI)  is when you lose control of your bladder  UI develops because your bladder cannot store or empty urine properly  The 3 most common types of UI are stress incontinence, urge incontinence, or both  Medicines:   May be given to help strengthen your bladder control  Report any side effects of medication to your healthcare provider  Do pelvic muscle exercises often:  Your pelvic muscles help you stop urinating   Squeeze these muscles tight for 5 seconds, then relax for 5 seconds  Gradually work up to squeezing for 10 seconds  Do 3 sets of 15 repetitions a day, or as directed  This will help strengthen your pelvic muscles and improve bladder control  Train your bladder:  Go to the bathroom at set times, such as every 2 hours, even if you do not feel the urge to go  You can also try to hold your urine when you feel the urge to go  For example, hold your urine for 5 minutes when you feel the urge to go  As that becomes easier, hold your urine for 10 minutes  Self-care:   Keep a UI record  Write down how often you leak urine and how much you leak  Make a note of what you were doing when you leaked urine  Drink liquids as directed  You may need to limit the amount of liquid you drink to help control your urine leakage  Do not drink any liquid right before you go to bed  Limit or do not have drinks that contain caffeine or alcohol  Prevent constipation  Eat a variety of high-fiber foods  Good examples are high-fiber cereals, beans, vegetables, and whole-grain breads  Walking is the best way to trigger your intestines to have a bowel movement  Exercise regularly and maintain a healthy weight  Weight loss and exercise will decrease pressure on your bladder and help you control your leakage  Use a catheter as directed  to help empty your bladder  A catheter is a tiny, plastic tube that is put into your bladder to drain your urine  Go to behavior therapy as directed  Behavior therapy may be used to help you learn to control your urge to urinate  Weight Management   Why it is important to manage your weight:  Being overweight increases your risk of health conditions such as heart disease, high blood pressure, type 2 diabetes, and certain types of cancer  It can also increase your risk for osteoarthritis, sleep apnea, and other respiratory problems  Aim for a slow, steady weight loss  Even a small amount of weight loss can lower your risk of health problems    How to lose weight safely:  A safe and healthy way to lose weight is to eat fewer calories and get regular exercise  You can lose up about 1 pound a week by decreasing the number of calories you eat by 500 calories each day  Healthy meal plan for weight management:  A healthy meal plan includes a variety of foods, contains fewer calories, and helps you stay healthy  A healthy meal plan includes the following:  Eat whole-grain foods more often  A healthy meal plan should contain fiber  Fiber is the part of grains, fruits, and vegetables that is not broken down by your body  Whole-grain foods are healthy and provide extra fiber in your diet  Some examples of whole-grain foods are whole-wheat breads and pastas, oatmeal, brown rice, and bulgur  Eat a variety of vegetables every day  Include dark, leafy greens such as spinach, kale, felecia greens, and mustard greens  Eat yellow and orange vegetables such as carrots, sweet potatoes, and winter squash  Eat a variety of fruits every day  Choose fresh or canned fruit (canned in its own juice or light syrup) instead of juice  Fruit juice has very little or no fiber  Eat low-fat dairy foods  Drink fat-free (skim) milk or 1% milk  Eat fat-free yogurt and low-fat cottage cheese  Try low-fat cheeses such as mozzarella and other reduced-fat cheeses  Choose meat and other protein foods that are low in fat  Choose beans or other legumes such as split peas or lentils  Choose fish, skinless poultry (chicken or turkey), or lean cuts of red meat (beef or pork)  Before you cook meat or poultry, cut off any visible fat  Use less fat and oil  Try baking foods instead of frying them  Add less fat, such as margarine, sour cream, regular salad dressing and mayonnaise to foods  Eat fewer high-fat foods  Some examples of high-fat foods include french fries, doughnuts, ice cream, and cakes  Eat fewer sweets  Limit foods and drinks that are high in sugar   This includes candy, cookies, regular soda, and sweetened drinks  Exercise:  Exercise at least 30 minutes per day on most days of the week  Some examples of exercise include walking, biking, dancing, and swimming  You can also fit in more physical activity by taking the stairs instead of the elevator or parking farther away from stores  Ask your healthcare provider about the best exercise plan for you  © Copyright [employer] 2018 Information is for End User's use only and may not be sold, redistributed or otherwise used for commercial purposes  All illustrations and images included in CareNotes® are the copyrighted property of A [employer]  or [employer] [address]      Return in about 6 months (around 10/21/2022)  Subjective:      Patient ID: Jane Varghese is a 67 y o  female  Chief Complaint   Patient presents with   [facility] [place] [facility] Wellness Visit     nm lpn       Here for follow up and AWV  She feels well  Denies chest pain or dyspnea  Continues to follow with cardiology and denies new cardiac complaints  Is trying to watch her diet  Denies hypoglycemia  Has upcoming eye exam and denies foot issues  Refuses mammogram         The following portions of the patient's history were reviewed and updated as appropriate: allergies, current medications, past family history, past medical history, past social history, past surgical history and problem list     Review of Systems   Constitutional: Negative  Respiratory: Negative  Cardiovascular: Negative  Endocrine: Negative            Current Outpatient Medications   Medication Sig Dispense Refill    aspirin 325 mg tablet Take 325 mg by mouth daily      benzonatate (TESSALON PERLES) 100 mg capsule Take 1 capsule (100 mg total) by mouth 3 (three) times a day as needed for cough 270 capsule 0    Cholecalciferol (VITAMIN D-3) 1000 units CAPS Take 5,000 Units by mouth daily      Entresto  MG TABS TAKE 1 TABLET BY MOUTH  TWICE DAILY 180 tablet 3    ibuprofen (ADVIL) 200 mg tablet Take by mouth if needed        ipratropium (ATROVENT) 0 03 % nasal spray 2 sprays into each nostril 3 (three) times a day as needed for rhinitis 90 mL 3    lidocaine (LIDODERM) 5 % Apply 1 patch topically daily Remove & Discard patch within 12 hours or as directed by MD 90 patch 3    Melatonin 10 MG TABS Take by mouth 2 (two) times a day      spironolactone (ALDACTONE) 25 mg tablet TAKE 1 TABLET BY MOUTH  DAILY 90 tablet 3    Ventolin  (90 Base) MCG/ACT inhaler INHALE 2 PUFFS EVERY 4 (FOUR) HOURS AS NEEDED FOR WHEEZING OR SHORTNESS OF BREATH  18 g 7    vitamin E, tocopherol, 400 units capsule Take 400 Units by mouth 2 (two) times a day       No current facility-administered medications for this visit  Objective:    /78   Pulse 71   Temp 97 6 °F (36 4 °C)   Resp 17   Ht 5' 5" (1 651 m)   Wt 104 kg (229 lb 12 8 oz)   SpO2 98%   BMI 38 24 kg/m²        Physical Exam  Constitutional:       Appearance: She is well-developed  She is obese  Eyes:      Conjunctiva/sclera: Conjunctivae normal    Neck:      Thyroid: No thyromegaly  Vascular: No JVD  Cardiovascular:      Rate and Rhythm: Normal rate and regular rhythm  Pulses: no weak pulses          Dorsalis pedis pulses are 2+ on the right side and 2+ on the left side  Heart sounds: Normal heart sounds  No murmur heard  No friction rub  No gallop  Pulmonary:      Effort: Pulmonary effort is normal       Breath sounds: Normal breath sounds  No wheezing or rales  Abdominal:      General: Bowel sounds are normal  There is no distension  Palpations: Abdomen is soft  Tenderness: There is no abdominal tenderness  Musculoskeletal:      Cervical back: Neck supple  Feet:      Right foot:      Skin integrity: No ulcer, skin breakdown, erythema, warmth, callus or dry skin  Left foot:      Skin integrity: No ulcer, skin breakdown, erythema, warmth, callus or dry skin     Neurological: Mental Status: She is alert  Patient's shoes and socks removed  Right Foot/Ankle   Right Foot Inspection  Skin Exam: skin normal and skin intact  No dry skin, no warmth, no callus, no erythema, no maceration, no abnormal color, no pre-ulcer, no ulcer and no callus  Toe Exam: ROM and strength within normal limits  Sensory   Monofilament testing: intact    Vascular  Capillary refills: < 3 seconds  The right DP pulse is 2+  Left Foot/Ankle  Left Foot Inspection  Skin Exam: skin normal and skin intact  No dry skin, no warmth, no erythema, no maceration, normal color, no pre-ulcer, no ulcer and no callus  Toe Exam: ROM and strength within normal limits  Sensory   Monofilament testing: intact    Vascular  Capillary refills: < 3 seconds  The left DP pulse is 2+  Assign Risk Category  No deformity present  No loss of protective sensation  No weak pulses  Risk: 0           Morales Brito MD  Answers for HPI/ROS submitted by the patient on 4/20/2022  How would you rate your overall health?: fair  Compared to last year, how is your physical health?: slightly worse  In general, how satisfied are you with your life?: dissatisfied  Compared to last year, how is your eyesight?: same  Compared to last year, how is your hearing?: same  Compared to last year, how is your emotional/mental health?: same  How often is anger a problem for you?: sometimes  How often do you feel unusually tired/fatigued?: sometimes  In the past 7 days, how much pain have you experienced?: a lot  If you answered "some" or "a lot", please rate the severity of your pain on a scale of 1 to 10 (1 being the least severe pain and 10 being the most intense pain)  : 8/10  In the past 6 months, have you lost or gained 10 pounds without trying?: No  Additional Comments: Pinched nerve in spine causes pain in walking  One or more falls in the last year: No  In the past 6 months, have you accidentally leaked urine?: Yes  Do you have trouble with the stairs inside or outside your home?: No  Does your home have working smoke alarms?: Yes  Does your home have a carbon monoxide monitor?: Yes  Which safety hazards (if any) have you experienced in your home? Please select all that apply : none  How would you describe your current diet? Please select all that apply : Limited junk food  In addition to prescription medications, are you taking any over-the-counter supplements?: Yes  If yes, what supplements are you taking?: Vitamin D3, E, Melatonin, Aspirin, NSAIDs, Lidocane    All are listed on chart  Can you manage your medications?: Yes  Are you currently taking any opioid medications?: No  Can you walk and transfer into and out of your bed and chair?: Yes  Can you dress and groom yourself?: Yes  Can you bathe or shower yourself?: Yes  Can you feed yourself?: Yes  Can you do your laundry/ housekeeping?: Yes  Can you manage your money, pay your bills, and track your expenses?: Yes  Can you make your own meals?: Yes  Can you do your own shopping?: Yes  Please list your DME (Durable Medical Equipment) supplier, if you use one : CPAP, [employer] for supplies  Within the last 12 months, have you had any hospitalizations or Emergency Department visits?: No  Do you have a living will?: No  Do you have a Durable POA (Power of ) for healthcare decisions?: No  Do you have an Advanced Directive for end of life decisions?: No  How often have you used an illegal drug (including marijuana) or a prescription medication for non-medical reasons in the past year?: never  What is the typical number of drinks you consume in a day?: 0  What is the typical number of drinks you consume in a week?: 0  How often did you have a drink containing alcohol in the past year?: 2 to 4 times a month  How many drinks did you have on a typical day  when you were drinking in the past year?: 0  How often did you have 6 or more drinks on one occasion in the past year?: never

## 2022-04-19 ENCOUNTER — APPOINTMENT (OUTPATIENT)
Dept: LAB | Facility: CLINIC | Age: 72
End: 2022-04-19
Payer: MEDICARE

## 2022-04-19 DIAGNOSIS — E11.9 TYPE 2 DIABETES MELLITUS WITHOUT COMPLICATION, WITHOUT LONG-TERM CURRENT USE OF INSULIN (HCC): ICD-10-CM

## 2022-04-19 DIAGNOSIS — I10 ESSENTIAL HYPERTENSION: ICD-10-CM

## 2022-04-19 LAB
ALBUMIN SERPL BCP-MCNC: 3.7 G/DL (ref 3.5–5)
ALP SERPL-CCNC: 45 U/L (ref 46–116)
ALT SERPL W P-5'-P-CCNC: 26 U/L (ref 12–78)
ANION GAP SERPL CALCULATED.3IONS-SCNC: 5 MMOL/L (ref 4–13)
AST SERPL W P-5'-P-CCNC: 12 U/L (ref 5–45)
BASOPHILS # BLD AUTO: 0.05 THOUSANDS/ΜL (ref 0–0.1)
BASOPHILS NFR BLD AUTO: 1 % (ref 0–1)
BILIRUB SERPL-MCNC: 0.56 MG/DL (ref 0.2–1)
BUN SERPL-MCNC: 24 MG/DL (ref 5–25)
CALCIUM SERPL-MCNC: 9.7 MG/DL (ref 8.3–10.1)
CHLORIDE SERPL-SCNC: 107 MMOL/L (ref 100–108)
CHOLEST SERPL-MCNC: 199 MG/DL
CO2 SERPL-SCNC: 25 MMOL/L (ref 21–32)
CREAT SERPL-MCNC: 0.69 MG/DL (ref 0.6–1.3)
EOSINOPHIL # BLD AUTO: 0.19 THOUSAND/ΜL (ref 0–0.61)
EOSINOPHIL NFR BLD AUTO: 3 % (ref 0–6)
ERYTHROCYTE [DISTWIDTH] IN BLOOD BY AUTOMATED COUNT: 12.6 % (ref 11.6–15.1)
EST. AVERAGE GLUCOSE BLD GHB EST-MCNC: 160 MG/DL
GFR SERPL CREATININE-BSD FRML MDRD: 87 ML/MIN/1.73SQ M
GLUCOSE P FAST SERPL-MCNC: 196 MG/DL (ref 65–99)
HBA1C MFR BLD: 7.2 %
HCT VFR BLD AUTO: 39.5 % (ref 34.8–46.1)
HDLC SERPL-MCNC: 47 MG/DL
HGB BLD-MCNC: 12.5 G/DL (ref 11.5–15.4)
IMM GRANULOCYTES # BLD AUTO: 0.02 THOUSAND/UL (ref 0–0.2)
IMM GRANULOCYTES NFR BLD AUTO: 0 % (ref 0–2)
LDLC SERPL CALC-MCNC: 100 MG/DL (ref 0–100)
LYMPHOCYTES # BLD AUTO: 2.39 THOUSANDS/ΜL (ref 0.6–4.47)
LYMPHOCYTES NFR BLD AUTO: 34 % (ref 14–44)
MCH RBC QN AUTO: 29.1 PG (ref 26.8–34.3)
MCHC RBC AUTO-ENTMCNC: 31.6 G/DL (ref 31.4–37.4)
MCV RBC AUTO: 92 FL (ref 82–98)
MONOCYTES # BLD AUTO: 0.64 THOUSAND/ΜL (ref 0.17–1.22)
MONOCYTES NFR BLD AUTO: 9 % (ref 4–12)
NEUTROPHILS # BLD AUTO: 3.8 THOUSANDS/ΜL (ref 1.85–7.62)
NEUTS SEG NFR BLD AUTO: 53 % (ref 43–75)
NONHDLC SERPL-MCNC: 152 MG/DL
NRBC BLD AUTO-RTO: 0 /100 WBCS
PLATELET # BLD AUTO: 315 THOUSANDS/UL (ref 149–390)
PMV BLD AUTO: 10.9 FL (ref 8.9–12.7)
POTASSIUM SERPL-SCNC: 4 MMOL/L (ref 3.5–5.3)
PROT SERPL-MCNC: 6.7 G/DL (ref 6.4–8.2)
RBC # BLD AUTO: 4.29 MILLION/UL (ref 3.81–5.12)
SODIUM SERPL-SCNC: 137 MMOL/L (ref 136–145)
TRIGL SERPL-MCNC: 259 MG/DL
WBC # BLD AUTO: 7.09 THOUSAND/UL (ref 4.31–10.16)

## 2022-04-19 PROCEDURE — 80053 COMPREHEN METABOLIC PANEL: CPT

## 2022-04-19 PROCEDURE — 80061 LIPID PANEL: CPT

## 2022-04-19 PROCEDURE — 83036 HEMOGLOBIN GLYCOSYLATED A1C: CPT

## 2022-04-19 PROCEDURE — 36415 COLL VENOUS BLD VENIPUNCTURE: CPT

## 2022-04-19 PROCEDURE — 85025 COMPLETE CBC W/AUTO DIFF WBC: CPT

## 2022-04-21 ENCOUNTER — OFFICE VISIT (OUTPATIENT)
Dept: FAMILY MEDICINE CLINIC | Facility: CLINIC | Age: 72
End: 2022-04-21
Payer: MEDICARE

## 2022-04-21 VITALS
TEMPERATURE: 97.6 F | OXYGEN SATURATION: 98 % | RESPIRATION RATE: 17 BRPM | WEIGHT: 229.8 LBS | DIASTOLIC BLOOD PRESSURE: 78 MMHG | HEIGHT: 65 IN | HEART RATE: 71 BPM | SYSTOLIC BLOOD PRESSURE: 148 MMHG | BODY MASS INDEX: 38.29 KG/M2

## 2022-04-21 DIAGNOSIS — M54.50 CHRONIC LOW BACK PAIN WITHOUT SCIATICA, UNSPECIFIED BACK PAIN LATERALITY: ICD-10-CM

## 2022-04-21 DIAGNOSIS — E11.9 TYPE 2 DIABETES MELLITUS WITHOUT COMPLICATION, WITHOUT LONG-TERM CURRENT USE OF INSULIN (HCC): ICD-10-CM

## 2022-04-21 DIAGNOSIS — I10 ESSENTIAL HYPERTENSION: ICD-10-CM

## 2022-04-21 DIAGNOSIS — I42.0 DILATED CARDIOMYOPATHY (HCC): ICD-10-CM

## 2022-04-21 DIAGNOSIS — Z00.00 MEDICARE ANNUAL WELLNESS VISIT, SUBSEQUENT: Primary | ICD-10-CM

## 2022-04-21 DIAGNOSIS — G89.29 CHRONIC LOW BACK PAIN WITHOUT SCIATICA, UNSPECIFIED BACK PAIN LATERALITY: ICD-10-CM

## 2022-04-21 PROBLEM — M96.1 LUMBAR POST-LAMINECTOMY SYNDROME: Status: RESOLVED | Noted: 2020-02-25 | Resolved: 2022-04-21

## 2022-04-21 PROBLEM — G47.10 HYPERSOMNIA: Status: RESOLVED | Noted: 2019-05-21 | Resolved: 2022-04-21

## 2022-04-21 PROBLEM — R05.9 COUGH: Status: RESOLVED | Noted: 2020-08-16 | Resolved: 2022-04-21

## 2022-04-21 PROCEDURE — 99214 OFFICE O/P EST MOD 30 MIN: CPT | Performed by: INTERNAL MEDICINE

## 2022-04-21 PROCEDURE — G0438 PPPS, INITIAL VISIT: HCPCS | Performed by: INTERNAL MEDICINE

## 2022-04-21 PROCEDURE — 1123F ACP DISCUSS/DSCN MKR DOCD: CPT | Performed by: INTERNAL MEDICINE

## 2022-04-21 RX ORDER — LIDOCAINE 50 MG/G
1 PATCH TOPICAL DAILY
Qty: 90 PATCH | Refills: 3 | Status: SHIPPED | OUTPATIENT
Start: 2022-04-21

## 2022-04-21 RX ORDER — PHENOL 1.4 %
AEROSOL, SPRAY (ML) MUCOUS MEMBRANE 2 TIMES DAILY
COMMUNITY

## 2022-04-21 NOTE — ASSESSMENT & PLAN NOTE
Labs were reviewed and control is adequate, recommended dietary and exercise efforts and will continue off medications  Discussed need for good foot and eye care  Discussed the benefit of weight loss on her glucose control      Lab Results   Component Value Date    HGBA1C 7 2 (H) 04/19/2022
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
## 2022-04-21 NOTE — PATIENT INSTRUCTIONS
Medicare Preventive Visit Patient Instructions  Thank you for completing your Welcome to Medicare Visit or Medicare Annual Wellness Visit today  Your next wellness visit will be due in one year (4/22/2023)  The screening/preventive services that you may require over the next 5-10 years are detailed below  Some tests may not apply to you based off risk factors and/or age  Screening tests ordered at today's visit but not completed yet may show as past due  Also, please note that scanned in results may not display below  Preventive Screenings:  Service Recommendations Previous Testing/Comments   Colorectal Cancer Screening  * Colonoscopy    * Fecal Occult Blood Test (FOBT)/Fecal Immunochemical Test (FIT)  * Fecal DNA/Cologuard Test  * Flexible Sigmoidoscopy Age: 54-65 years old   Colonoscopy: every 10 years (may be performed more frequently if at higher risk)  OR  FOBT/FIT: every 1 year  OR  Cologuard: every 3 years  OR  Sigmoidoscopy: every 5 years  Screening may be recommended earlier than age 48 if at higher risk for colorectal cancer  Also, an individualized decision between you and your healthcare provider will decide whether screening between the ages of 74-80 would be appropriate  Colonoscopy: Not on file  FOBT/FIT: Not on file  Cologuard: 10/14/2019  Sigmoidoscopy: Not on file          Breast Cancer Screening Age: 36 years old  Frequency: every 1-2 years  Not required if history of left and right mastectomy Mammogram: 09/01/2010        Cervical Cancer Screening Between the ages of 21-29, pap smear recommended once every 3 years  Between the ages of 33-67, can perform pap smear with HPV co-testing every 5 years     Recommendations may differ for women with a history of total hysterectomy, cervical cancer, or abnormal pap smears in past  Pap Smear: Not on file    Screening Not Indicated   Hepatitis C Screening Once for adults born between [facility]  More frequently in patients at high risk for Hepatitis C Hep C Antibody: Not on file        Diabetes Screening 1-2 times per year if you're at risk for diabetes or have pre-diabetes Fasting glucose: 196 mg/dL   A1C: 7 2 %    Screening Not Indicated  History Diabetes   Cholesterol Screening Once every 5 years if you don't have a lipid disorder  May order more often based on risk factors  Lipid panel: 04/19/2022    Screening Current     Other Preventive Screenings Covered by Medicare:  1  Abdominal Aortic Aneurysm (AAA) Screening: covered once if your at risk  You're considered to be at risk if you have a family history of AAA  2  Lung Cancer Screening: covers low dose CT scan once per year if you meet all of the following conditions: (1) Age 50-69; (2) No signs or symptoms of lung cancer; (3) Current smoker or have quit smoking within the last 15 years; (4) You have a tobacco smoking history of at least 30 pack years (packs per day multiplied by number of years you smoked); (5) You get a written order from a healthcare provider  3  Glaucoma Screening: covered annually if you're considered high risk: (1) You have diabetes OR (2) Family history of glaucoma OR (3)  aged 48 and older OR (3)  American aged 72 and older  3  Osteoporosis Screening: covered every 2 years if you meet one of the following conditions: (1) You're estrogen deficient and at risk for osteoporosis based off medical history and other findings; (2) Have a vertebral abnormality; (3) On glucocorticoid therapy for more than 3 months; (4) Have primary hyperparathyroidism; (5) On osteoporosis medications and need to assess response to drug therapy  · Last bone density test (DXA Scan): Not on file  5  HIV Screening: covered annually if you're between the age of 12-76  Also covered annually if you are younger than 13 and older than 72 with risk factors for HIV infection  For pregnant patients, it is covered up to 3 times per pregnancy      Immunizations:  Immunization Recommendations Influenza Vaccine Annual influenza vaccination during flu season is recommended for all persons aged >= 6 months who do not have contraindications   Pneumococcal Vaccine (Prevnar and Pneumovax)  * Prevnar = PCV13  * Pneumovax = PPSV23   Adults 25-60 years old: 1-3 doses may be recommended based on certain risk factors  Adults 72 years old: Prevnar (PCV13) vaccine recommended followed by Pneumovax (PPSV23) vaccine  If already received PPSV23 since turning 65, then PCV13 recommended at least one year after PPSV23 dose  Hepatitis B Vaccine 3 dose series if at intermediate or high risk (ex: diabetes, end stage renal disease, liver disease)   Tetanus (Td) Vaccine - COST NOT COVERED BY MEDICARE PART B Following completion of primary series, a booster dose should be given every 10 years to maintain immunity against tetanus  Td may also be given as tetanus wound prophylaxis  Tdap Vaccine - COST NOT COVERED BY MEDICARE PART B Recommended at least once for all adults  For pregnant patients, recommended with each pregnancy  Shingles Vaccine (Shingrix) - COST NOT COVERED BY MEDICARE PART B  2 shot series recommended in those aged 48 and above     Health Maintenance Due:      Topic Date Due    Hepatitis C Screening  Never done    Breast Cancer Screening: Mammogram  09/01/2011    Colorectal Cancer Screening  10/14/2022     Immunizations Due:      Topic Date Due    Pneumococcal Vaccine: 65+ Years (1 of 2 - PPSV23) Never done    DTaP,Tdap,and Td Vaccines (1 - Tdap) Never done    Influenza Vaccine (1) 09/01/2021     Advance Directives   What are advance directives? Advance directives are legal documents that state your wishes and plans for medical care  These plans are made ahead of time in case you lose your ability to make decisions for yourself  Advance directives can apply to any medical decision, such as the treatments you want, and if you want to donate organs  What are the types of advance directives?   There are many types of advance directives, and each state has rules about how to use them  You may choose a combination of any of the following:  · Living will: This is a written record of the treatment you want  You can also choose which treatments you do not want, which to limit, and which to stop at a certain time  This includes surgery, medicine, IV fluid, and tube feedings  · Durable power of  for healthcare [facility]): This is a written record that states who you want to make healthcare choices for you when you are unable to make them for yourself  This person, called a proxy, is usually a family member or a friend  You may choose more than 1 proxy  · Do not resuscitate (DNR) order:  A DNR order is used in case your heart stops beating or you stop breathing  It is a request not to have certain forms of treatment, such as CPR  A DNR order may be included in other types of advance directives  · Medical directive: This covers the care that you want if you are in a coma, near death, or unable to make decisions for yourself  You can list the treatments you want for each condition  Treatment may include pain medicine, surgery, blood transfusions, dialysis, IV or tube feedings, and a ventilator (breathing machine)  · Values history: This document has questions about your views, beliefs, and how you feel and think about life  This information can help others choose the care that you would choose  Why are advance directives important? An advance directive helps you control your care  Although spoken wishes may be used, it is better to have your wishes written down  Spoken wishes can be misunderstood, or not followed  Treatments may be given even if you do not want them  An advance directive may make it easier for your family to make difficult choices about your care  Urinary Incontinence   Urinary incontinence (UI)  is when you lose control of your bladder   UI develops because your bladder cannot store or empty urine properly  The 3 most common types of UI are stress incontinence, urge incontinence, or both  Medicines:   · May be given to help strengthen your bladder control  Report any side effects of medication to your healthcare provider  Do pelvic muscle exercises often:  Your pelvic muscles help you stop urinating  Squeeze these muscles tight for 5 seconds, then relax for 5 seconds  Gradually work up to squeezing for 10 seconds  Do 3 sets of 15 repetitions a day, or as directed  This will help strengthen your pelvic muscles and improve bladder control  Train your bladder:  Go to the bathroom at set times, such as every 2 hours, even if you do not feel the urge to go  You can also try to hold your urine when you feel the urge to go  For example, hold your urine for 5 minutes when you feel the urge to go  As that becomes easier, hold your urine for 10 minutes  Self-care:   · Keep a UI record  Write down how often you leak urine and how much you leak  Make a note of what you were doing when you leaked urine  · Drink liquids as directed  You may need to limit the amount of liquid you drink to help control your urine leakage  Do not drink any liquid right before you go to bed  Limit or do not have drinks that contain caffeine or alcohol  · Prevent constipation  Eat a variety of high-fiber foods  Good examples are high-fiber cereals, beans, vegetables, and whole-grain breads  Walking is the best way to trigger your intestines to have a bowel movement  · Exercise regularly and maintain a healthy weight  Weight loss and exercise will decrease pressure on your bladder and help you control your leakage  · Use a catheter as directed  to help empty your bladder  A catheter is a tiny, plastic tube that is put into your bladder to drain your urine  · Go to behavior therapy as directed  Behavior therapy may be used to help you learn to control your urge to urinate      Weight Management   Why it is important to manage your weight:  Being overweight increases your risk of health conditions such as heart disease, high blood pressure, type 2 diabetes, and certain types of cancer  It can also increase your risk for osteoarthritis, sleep apnea, and other respiratory problems  Aim for a slow, steady weight loss  Even a small amount of weight loss can lower your risk of health problems  How to lose weight safely:  A safe and healthy way to lose weight is to eat fewer calories and get regular exercise  You can lose up about 1 pound a week by decreasing the number of calories you eat by 500 calories each day  Healthy meal plan for weight management:  A healthy meal plan includes a variety of foods, contains fewer calories, and helps you stay healthy  A healthy meal plan includes the following:  · Eat whole-grain foods more often  A healthy meal plan should contain fiber  Fiber is the part of grains, fruits, and vegetables that is not broken down by your body  Whole-grain foods are healthy and provide extra fiber in your diet  Some examples of whole-grain foods are whole-wheat breads and pastas, oatmeal, brown rice, and bulgur  · Eat a variety of vegetables every day  Include dark, leafy greens such as spinach, kale, felecia greens, and mustard greens  Eat yellow and orange vegetables such as carrots, sweet potatoes, and winter squash  · Eat a variety of fruits every day  Choose fresh or canned fruit (canned in its own juice or light syrup) instead of juice  Fruit juice has very little or no fiber  · Eat low-fat dairy foods  Drink fat-free (skim) milk or 1% milk  Eat fat-free yogurt and low-fat cottage cheese  Try low-fat cheeses such as mozzarella and other reduced-fat cheeses  · Choose meat and other protein foods that are low in fat  Choose beans or other legumes such as split peas or lentils  Choose fish, skinless poultry (chicken or turkey), or lean cuts of red meat (beef or pork)   Before you cook meat or poultry, cut off any visible fat  · Use less fat and oil  Try baking foods instead of frying them  Add less fat, such as margarine, sour cream, regular salad dressing and mayonnaise to foods  Eat fewer high-fat foods  Some examples of high-fat foods include french fries, doughnuts, ice cream, and cakes  · Eat fewer sweets  Limit foods and drinks that are high in sugar  This includes candy, cookies, regular soda, and sweetened drinks  Exercise:  Exercise at least 30 minutes per day on most days of the week  Some examples of exercise include walking, biking, dancing, and swimming  You can also fit in more physical activity by taking the stairs instead of the elevator or parking farther away from stores  Ask your healthcare provider about the best exercise plan for you  © Copyright [employer] 2018 Information is for End User's use only and may not be sold, redistributed or otherwise used for commercial purposes   All illustrations and images included in CareNotes® are the copyrighted property of [employer]  or [address] [place] [address]

## 2022-04-29 DIAGNOSIS — J45.20 MILD INTERMITTENT ASTHMA WITHOUT COMPLICATION: ICD-10-CM

## 2022-05-02 RX ORDER — ALBUTEROL SULFATE 90 UG/1
2 AEROSOL, METERED RESPIRATORY (INHALATION) EVERY 6 HOURS PRN
Qty: 18 G | Refills: 3 | Status: SHIPPED | OUTPATIENT
Start: 2022-05-02
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
## 2022-05-23 NOTE — ASSESSMENT & PLAN NOTE
She will continue her aspirin and was given an alternate neurology number to follow up with  Advised ER for any recurrent neurologic symptoms  [Normal] : mucosa is normal [Midline] : trachea located in midline position

## 2022-06-30 DIAGNOSIS — J31.0 NONALLERGIC RHINITIS: ICD-10-CM

## 2022-07-01 RX ORDER — IPRATROPIUM BROMIDE 21 UG/1
SPRAY, METERED NASAL
Qty: 120 ML | Refills: 3 | Status: SHIPPED | OUTPATIENT
Start: 2022-07-01

## 2022-07-14 ENCOUNTER — OFFICE VISIT (OUTPATIENT)
Dept: CARDIOLOGY CLINIC | Facility: CLINIC | Age: 72
End: 2022-07-14
Payer: MEDICARE

## 2022-07-14 VITALS
TEMPERATURE: 98 F | SYSTOLIC BLOOD PRESSURE: 130 MMHG | DIASTOLIC BLOOD PRESSURE: 60 MMHG | OXYGEN SATURATION: 96 % | WEIGHT: 233.1 LBS | HEIGHT: 65 IN | BODY MASS INDEX: 38.84 KG/M2 | HEART RATE: 78 BPM

## 2022-07-14 DIAGNOSIS — I10 ESSENTIAL HYPERTENSION: ICD-10-CM

## 2022-07-14 DIAGNOSIS — G45.9 TIA (TRANSIENT ISCHEMIC ATTACK): ICD-10-CM

## 2022-07-14 DIAGNOSIS — E66.2 MORBID (SEVERE) OBESITY WITH ALVEOLAR HYPOVENTILATION (HCC): ICD-10-CM

## 2022-07-14 DIAGNOSIS — I50.22 CHRONIC SYSTOLIC CONGESTIVE HEART FAILURE (HCC): Primary | ICD-10-CM

## 2022-07-14 DIAGNOSIS — G47.33 OSA (OBSTRUCTIVE SLEEP APNEA): ICD-10-CM

## 2022-07-14 PROCEDURE — 99214 OFFICE O/P EST MOD 30 MIN: CPT | Performed by: INTERNAL MEDICINE

## 2022-07-14 NOTE — PROGRESS NOTES
Cardiology Followup    Dawn Morales  [id number]  1950  [facility] [address]  [facility] [address] [id number]    Consult for: CHF    HPI: Dawn Morales is a 67y o  year old female who is here for followup of CHF  Since her last visit, she denies any chest pain, lower extremity edema, orthopnea or paroxysmal nocturnal dyspnea  She has some chronic dyspnea as she goes up steps but does feel an improvement after she uses her diuretic  Home systolic blood pressure has been ranging from 125-145 mm Hg with average SBP < 130 mmHg  Past Cardiac History: In August 2019, She had a cardiac MRI done for the evaluation of cardiomyopathy  MRI showed EF of 41% with a thin strip of intramyocardial fibrosis consistent with non-ischemic cardiomyopathy  In January 2019, she had an echocardiogram done during hospitalization for influenza  Echocardiogram showed EF of 45% with small pericardial effusion and mild pulmonary hypertension  She has no history of CHF or CAD  History of CVA in 2014 with left arm weakness  Was treated with TPA  2 years prior to that she had workup by Dr Ruby Christianson including catheterization which was normal   Patient had been on amiodarone since her CVA but denies history of atrial fibrillation  Amiodarone was stopped in 2019  Repeat 2D echocardiogram was done in June which showed an ejection fraction of 35%  She has a family history of CHF with 2 brothers who have pacemakers (one after a MI) both parents had CHF as well  Cardiac catheterization showed nonobstructive CAD  EF was 30-35%  Holter monitor was done which showed 5 episodes of VT with longest lasting 12 beats at 111 bpm   She had no symptoms at the time       The following portions of the patient's history were reviewed and updated as appropriate: allergies, current medications, past family history, past medical history, past social history, past surgical history and problem list        Current Outpatient Medications:     albuterol (PROVENTIL HFA,VENTOLIN HFA) 90 mcg/act inhaler, Inhale 2 puffs every 6 (six) hours as needed for wheezing, Disp: 18 g, Rfl: 3    aspirin 325 mg tablet, Take 325 mg by mouth daily, Disp: , Rfl:     benzonatate (TESSALON PERLES) 100 mg capsule, Take 1 capsule (100 mg total) by mouth 3 (three) times a day as needed for cough, Disp: 270 capsule, Rfl: 0    Cholecalciferol (VITAMIN D-3) 1000 units CAPS, Take 5,000 Units by mouth daily, Disp: , Rfl:     Entresto  MG TABS, TAKE 1 TABLET BY MOUTH  TWICE DAILY, Disp: 180 tablet, Rfl: 3    ibuprofen (MOTRIN) 200 mg tablet, Take by mouth if needed  , Disp: , Rfl:     ipratropium (ATROVENT) 0 03 % nasal spray, USE 2 SPRAYS IN BOTH  NOSTRILS 3 TIMES DAILY AS  NEEDED FOR RHINITIS, Disp: 120 mL, Rfl: 3    lidocaine (LIDODERM) 5 %, Apply 1 patch topically daily Remove & Discard patch within 12 hours or as directed by MD, Disp: 90 patch, Rfl: 3    Melatonin 10 MG TABS, Take by mouth 2 (two) times a day, Disp: , Rfl:     spironolactone (ALDACTONE) 25 mg tablet, TAKE 1 TABLET BY MOUTH  DAILY, Disp: 90 tablet, Rfl: 3    vitamin E, tocopherol, 400 units capsule, Take 400 Units by mouth 2 (two) times a day, Disp: , Rfl:   Allergies   Allergen Reactions    Other Anaphylaxis     Inhaler breax    Carvedilol Chest Pain and Hypertension    Lisinopril Other (See Comments)     Dizziness, cough    Olmesartan Medoxomil-Hctz      Category: Adverse Reaction; Annotation - [id number]: dizzy    Doxycycline Rash         Review of Systems:  Review of Systems   Respiratory: Positive for shortness of breath  Cardiovascular: Negative for chest pain, palpitations and leg swelling  Musculoskeletal: Positive for arthralgias, back pain and myalgias  All other systems reviewed and are negative        Physical Exam:  Vitals:    07/14/22 1517   BP: 130/60   BP Location: Left arm Patient Position: Sitting   Cuff Size: Standard   Pulse: 78   Temp: 98 °F (36 7 °C)   SpO2: 96%   Weight: 106 kg (233 lb 1 6 oz)   Height: 5' 5" (1 651 m)     Physical Exam  Constitutional:       General: She is not in acute distress  Appearance: She is well-developed  She is not diaphoretic  HENT:      Head: Normocephalic and atraumatic  Eyes:      Conjunctiva/sclera: Conjunctivae normal       Pupils: Pupils are equal, round, and reactive to light  Neck:      Thyroid: No thyromegaly  Vascular: No JVD  Cardiovascular:      Rate and Rhythm: Normal rate and regular rhythm  Heart sounds: Normal heart sounds  No murmur heard  No friction rub  No gallop  Pulmonary:      Effort: Pulmonary effort is normal       Breath sounds: Normal breath sounds  Musculoskeletal:      Cervical back: Neck supple  Skin:     General: Skin is warm and dry  Findings: No erythema or rash  Neurological:      General: No focal deficit present  Mental Status: She is alert and oriented to person, place, and time  Cranial Nerves: No cranial nerve deficit  Psychiatric:         Mood and Affect: Mood normal          Behavior: Behavior normal          Thought Content: Thought content normal          Judgment: Judgment normal          Labs:  Lab Results   Component Value Date    K 4 0 04/19/2022     04/19/2022    CO2 25 04/19/2022    BUN 24 04/19/2022    CREATININE 0 69 04/19/2022    CALCIUM 9 7 04/19/2022     Lab Results   Component Value Date    WBC 7 09 04/19/2022    HGB 12 5 04/19/2022    HCT 39 5 04/19/2022    MCV 92 04/19/2022     04/19/2022     Lab Results   Component Value Date    TRIG 259 (H) 04/19/2022    HDL 47 (L) 04/19/2022     EKG (independently reviewed): NSR with LVH and nonspecific ST abnormalities    Discussion/Summary:  1  Chronic systolic congestive heart failure ([address] )    2  Essential hypertension    3  TIA (transient ischemic attack)    4   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)    5  LUCIA (obstructive sleep apnea)      - patient's EF was 41% on cardiac MRI with similar EF on most recent echocardiogram   Study consistent with a nonischemic cardiomyopathy  She had nonobstructive CAD on cath  Holter monitor did not show frequent PVCs but episodes of VT was seen  TSH was normal  She does not drink    - Continue Entresto and spironolactone  She cannot tolerate beta blocker (has tried metoprolol and carvedilol)  Tolerating Entresto   mg daily  - most recent renal function was normal   Potassium level was also normal   - discussed diet and weight loss  - torsemide 10 mg  She may use as needed  Monitor daily weights and if increase in > 3 pounds, she should take dose  - Followup with pulmonologist for CPAP adjustment as needed  -   last echocardiogram was done in January 2021 which showed EF of 40-45%  - discussed the addition of Jardiance or Jennye Cones  Patient does not wish to use any additional medications at this time

## 2022-07-15 PROCEDURE — 93000 ELECTROCARDIOGRAM COMPLETE: CPT | Performed by: INTERNAL MEDICINE

## 2022-07-22 LAB
LEFT EYE DIABETIC RETINOPATHY: NORMAL
RIGHT EYE DIABETIC RETINOPATHY: NORMAL

## 2022-08-22 ENCOUNTER — APPOINTMENT (OUTPATIENT)
Dept: RADIOLOGY | Facility: CLINIC | Age: 72
End: 2022-08-22
Payer: MEDICARE

## 2022-08-22 ENCOUNTER — OFFICE VISIT (OUTPATIENT)
Dept: OBGYN CLINIC | Facility: CLINIC | Age: 72
End: 2022-08-22
Payer: MEDICARE

## 2022-08-22 VITALS
SYSTOLIC BLOOD PRESSURE: 148 MMHG | HEART RATE: 102 BPM | BODY MASS INDEX: 38.69 KG/M2 | WEIGHT: 232.2 LBS | DIASTOLIC BLOOD PRESSURE: 80 MMHG | HEIGHT: 65 IN

## 2022-08-22 DIAGNOSIS — M25.562 LEFT KNEE PAIN, UNSPECIFIED CHRONICITY: ICD-10-CM

## 2022-08-22 DIAGNOSIS — M70.62 TROCHANTERIC BURSITIS OF BOTH HIPS: ICD-10-CM

## 2022-08-22 DIAGNOSIS — M17.11 PRIMARY OSTEOARTHRITIS OF RIGHT KNEE: ICD-10-CM

## 2022-08-22 DIAGNOSIS — M70.61 TROCHANTERIC BURSITIS OF BOTH HIPS: ICD-10-CM

## 2022-08-22 DIAGNOSIS — M17.12 PRIMARY LOCALIZED OSTEOARTHRITIS OF LEFT KNEE: Primary | ICD-10-CM

## 2022-08-22 PROCEDURE — 20610 DRAIN/INJ JOINT/BURSA W/O US: CPT | Performed by: ORTHOPAEDIC SURGERY

## 2022-08-22 PROCEDURE — 99213 OFFICE O/P EST LOW 20 MIN: CPT | Performed by: ORTHOPAEDIC SURGERY

## 2022-08-22 PROCEDURE — 73562 X-RAY EXAM OF KNEE 3: CPT

## 2022-08-22 RX ORDER — LIDOCAINE HYDROCHLORIDE 10 MG/ML
4 INJECTION, SOLUTION INFILTRATION; PERINEURAL
Status: COMPLETED | OUTPATIENT
Start: 2022-08-22 | End: 2022-08-22

## 2022-08-22 RX ORDER — DEXAMETHASONE SODIUM PHOSPHATE 100 MG/10ML
40 INJECTION INTRAMUSCULAR; INTRAVENOUS
Status: COMPLETED | OUTPATIENT
Start: 2022-08-22 | End: 2022-08-22

## 2022-08-22 RX ADMIN — DEXAMETHASONE SODIUM PHOSPHATE 40 MG: 100 INJECTION INTRAMUSCULAR; INTRAVENOUS at 16:35

## 2022-08-22 RX ADMIN — LIDOCAINE HYDROCHLORIDE 4 ML: 10 INJECTION, SOLUTION INFILTRATION; PERINEURAL at 16:35
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
## 2022-09-13 ENCOUNTER — APPOINTMENT (OUTPATIENT)
Dept: RADIOLOGY | Facility: CLINIC | Age: 72
End: 2022-09-13
Payer: MEDICARE

## 2022-09-13 ENCOUNTER — OFFICE VISIT (OUTPATIENT)
Dept: OBGYN CLINIC | Facility: CLINIC | Age: 72
End: 2022-09-13
Payer: MEDICARE

## 2022-09-13 VITALS
SYSTOLIC BLOOD PRESSURE: 135 MMHG | DIASTOLIC BLOOD PRESSURE: 80 MMHG | WEIGHT: 232 LBS | HEART RATE: 74 BPM | HEIGHT: 65 IN | BODY MASS INDEX: 38.65 KG/M2

## 2022-09-13 DIAGNOSIS — M70.61 TROCHANTERIC BURSITIS OF RIGHT HIP: ICD-10-CM

## 2022-09-13 DIAGNOSIS — M17.11 PRIMARY OSTEOARTHRITIS OF RIGHT KNEE: ICD-10-CM

## 2022-09-13 DIAGNOSIS — M25.551 PAIN IN RIGHT HIP: Primary | ICD-10-CM

## 2022-09-13 DIAGNOSIS — M25.551 PAIN IN RIGHT HIP: ICD-10-CM

## 2022-09-13 DIAGNOSIS — M70.62 TROCHANTERIC BURSITIS OF LEFT HIP: ICD-10-CM

## 2022-09-13 PROCEDURE — 72170 X-RAY EXAM OF PELVIS: CPT

## 2022-09-13 PROCEDURE — 20610 DRAIN/INJ JOINT/BURSA W/O US: CPT | Performed by: ORTHOPAEDIC SURGERY

## 2022-09-13 PROCEDURE — 99214 OFFICE O/P EST MOD 30 MIN: CPT | Performed by: ORTHOPAEDIC SURGERY

## 2022-09-13 RX ORDER — LIDOCAINE HYDROCHLORIDE 10 MG/ML
2 INJECTION, SOLUTION INFILTRATION; PERINEURAL
Status: COMPLETED | OUTPATIENT
Start: 2022-09-13 | End: 2022-09-13

## 2022-09-13 RX ORDER — TRIAMCINOLONE ACETONIDE 40 MG/ML
80 INJECTION, SUSPENSION INTRA-ARTICULAR; INTRAMUSCULAR
Status: COMPLETED | OUTPATIENT
Start: 2022-09-13 | End: 2022-09-13

## 2022-09-13 RX ORDER — BUPIVACAINE HYDROCHLORIDE 5 MG/ML
2 INJECTION, SOLUTION EPIDURAL; INTRACAUDAL
Status: COMPLETED | OUTPATIENT
Start: 2022-09-13 | End: 2022-09-13

## 2022-09-13 RX ORDER — TRIAMCINOLONE ACETONIDE 40 MG/ML
40 INJECTION, SUSPENSION INTRA-ARTICULAR; INTRAMUSCULAR
Status: COMPLETED | OUTPATIENT
Start: 2022-09-13 | End: 2022-09-13

## 2022-09-13 RX ADMIN — TRIAMCINOLONE ACETONIDE 40 MG: 40 INJECTION, SUSPENSION INTRA-ARTICULAR; INTRAMUSCULAR at 14:51

## 2022-09-13 RX ADMIN — BUPIVACAINE HYDROCHLORIDE 2 ML: 5 INJECTION, SOLUTION EPIDURAL; INTRACAUDAL at 14:51

## 2022-09-13 RX ADMIN — LIDOCAINE HYDROCHLORIDE 2 ML: 10 INJECTION, SOLUTION INFILTRATION; PERINEURAL at 14:51

## 2022-09-13 RX ADMIN — TRIAMCINOLONE ACETONIDE 80 MG: 40 INJECTION, SUSPENSION INTRA-ARTICULAR; INTRAMUSCULAR at 14:51
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
## 2022-09-23 DIAGNOSIS — I50.22 CHRONIC SYSTOLIC (CONGESTIVE) HEART FAILURE (HCC): ICD-10-CM

## 2022-09-23 RX ORDER — SPIRONOLACTONE 25 MG/1
TABLET ORAL
Qty: 90 TABLET | Refills: 3 | Status: SHIPPED | OUTPATIENT
Start: 2022-09-23

## 2022-09-27 ENCOUNTER — PROCEDURE VISIT (OUTPATIENT)
Dept: OBGYN CLINIC | Facility: CLINIC | Age: 72
End: 2022-09-27
Payer: MEDICARE

## 2022-09-27 DIAGNOSIS — M17.11 PRIMARY OSTEOARTHRITIS OF RIGHT KNEE: Primary | ICD-10-CM

## 2022-09-27 PROCEDURE — 20610 DRAIN/INJ JOINT/BURSA W/O US: CPT | Performed by: ORTHOPAEDIC SURGERY

## 2022-09-27 RX ORDER — HYALURONATE SODIUM 10 MG/ML
20 SYRINGE (ML) INTRAARTICULAR
Status: COMPLETED | OUTPATIENT
Start: 2022-09-27 | End: 2022-09-27

## 2022-09-27 RX ADMIN — Medication 20 MG: at 16:11
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
## 2022-10-04 ENCOUNTER — PROCEDURE VISIT (OUTPATIENT)
Dept: OBGYN CLINIC | Facility: CLINIC | Age: 72
End: 2022-10-04
Payer: MEDICARE

## 2022-10-04 DIAGNOSIS — M17.11 PRIMARY OSTEOARTHRITIS OF RIGHT KNEE: Primary | ICD-10-CM

## 2022-10-04 PROCEDURE — 20610 DRAIN/INJ JOINT/BURSA W/O US: CPT | Performed by: ORTHOPAEDIC SURGERY

## 2022-10-04 RX ORDER — HYALURONATE SODIUM 10 MG/ML
20 SYRINGE (ML) INTRAARTICULAR
Status: COMPLETED | OUTPATIENT
Start: 2022-10-04 | End: 2022-10-04

## 2022-10-04 RX ADMIN — Medication 20 MG: at 17:56
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
## 2022-10-06 ENCOUNTER — OFFICE VISIT (OUTPATIENT)
Dept: PAIN MEDICINE | Facility: CLINIC | Age: 72
End: 2022-10-06
Payer: MEDICARE

## 2022-10-06 ENCOUNTER — TELEPHONE (OUTPATIENT)
Dept: PAIN MEDICINE | Facility: CLINIC | Age: 72
End: 2022-10-06

## 2022-10-06 VITALS
HEIGHT: 65 IN | SYSTOLIC BLOOD PRESSURE: 140 MMHG | WEIGHT: 228 LBS | BODY MASS INDEX: 37.99 KG/M2 | DIASTOLIC BLOOD PRESSURE: 60 MMHG | HEART RATE: 78 BPM | TEMPERATURE: 98.2 F | RESPIRATION RATE: 18 BRPM

## 2022-10-06 DIAGNOSIS — M54.50 CHRONIC RIGHT-SIDED LOW BACK PAIN WITHOUT SCIATICA: ICD-10-CM

## 2022-10-06 DIAGNOSIS — M47.816 LUMBAR SPONDYLOSIS: ICD-10-CM

## 2022-10-06 DIAGNOSIS — G89.29 CHRONIC RIGHT-SIDED LOW BACK PAIN WITHOUT SCIATICA: ICD-10-CM

## 2022-10-06 DIAGNOSIS — G89.4 CHRONIC PAIN SYNDROME: Primary | ICD-10-CM

## 2022-10-06 DIAGNOSIS — M96.1 LUMBAR POST-LAMINECTOMY SYNDROME: ICD-10-CM

## 2022-10-06 PROCEDURE — 99214 OFFICE O/P EST MOD 30 MIN: CPT | Performed by: ANESTHESIOLOGY

## 2022-10-06 PROCEDURE — 99204 OFFICE O/P NEW MOD 45 MIN: CPT | Performed by: ANESTHESIOLOGY

## 2022-10-06 NOTE — TELEPHONE ENCOUNTER
Schedule patient for 10/27/22 & 11/10/22  No as needed pain meds for 6 hrs before and 6/8 hrs after procedure  Pain level must be 5 or greater  Need to arrange transportation  Proper clothing for procedure  If ill or placed on antibiotics please call to reschedule
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
## 2022-10-11 ENCOUNTER — PROCEDURE VISIT (OUTPATIENT)
Dept: OBGYN CLINIC | Facility: CLINIC | Age: 72
End: 2022-10-11
Payer: MEDICARE

## 2022-10-11 VITALS — TEMPERATURE: 98.2 F

## 2022-10-11 DIAGNOSIS — M17.11 PRIMARY OSTEOARTHRITIS OF RIGHT KNEE: Primary | ICD-10-CM

## 2022-10-11 PROCEDURE — 20610 DRAIN/INJ JOINT/BURSA W/O US: CPT | Performed by: ORTHOPAEDIC SURGERY

## 2022-10-11 RX ORDER — HYALURONATE SODIUM 10 MG/ML
20 SYRINGE (ML) INTRAARTICULAR
Status: COMPLETED | OUTPATIENT
Start: 2022-10-11 | End: 2022-10-11

## 2022-10-11 RX ADMIN — Medication 20 MG: at 15:57
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
## 2022-10-27 ENCOUNTER — APPOINTMENT (OUTPATIENT)
Dept: RADIOLOGY | Facility: HOSPITAL | Age: 72
End: 2022-10-27
Payer: MEDICARE

## 2022-10-27 ENCOUNTER — HOSPITAL ENCOUNTER (OUTPATIENT)
Facility: AMBULARY SURGERY CENTER | Age: 72
Setting detail: OUTPATIENT SURGERY
Discharge: HOME | End: 2022-10-27
Attending: ANESTHESIOLOGY | Admitting: ANESTHESIOLOGY
Payer: MEDICARE

## 2022-10-27 VITALS
TEMPERATURE: 97.4 F | DIASTOLIC BLOOD PRESSURE: 81 MMHG | RESPIRATION RATE: 18 BRPM | SYSTOLIC BLOOD PRESSURE: 190 MMHG | OXYGEN SATURATION: 96 % | HEART RATE: 80 BPM

## 2022-10-27 PROCEDURE — 64494 INJ PARAVERT F JNT L/S 2 LEV: CPT | Performed by: ANESTHESIOLOGY

## 2022-10-27 PROCEDURE — 64493 INJ PARAVERT F JNT L/S 1 LEV: CPT | Performed by: ANESTHESIOLOGY

## 2022-10-27 RX ORDER — LIDOCAINE HYDROCHLORIDE 10 MG/ML
INJECTION, SOLUTION INFILTRATION; PERINEURAL AS NEEDED
Status: DISCONTINUED | OUTPATIENT
Start: 2022-10-27 | End: 2022-10-27 | Stop reason: HOSPADM

## 2022-10-27 RX ORDER — LIDOCAINE WITH 8.4% SOD BICARB 0.9%(10ML)
SYRINGE (ML) INJECTION AS NEEDED
Status: DISCONTINUED | OUTPATIENT
Start: 2022-10-27 | End: 2022-10-27 | Stop reason: HOSPADM

## 2022-10-27 NOTE — DISCHARGE INSTRUCTIONS

## 2022-10-27 NOTE — OP NOTE
ATTENDING PHYSICIAN:  Saray Barrett MD     PRE-PROCEDURE DIAGNOSIS:  Lumbar facet arthropathy  POST-PROCEDURE DIAGNOSIS:  Lumbar facet arthropathy  PROCEDURE:  Bilateral lumbar diagnostic medial branch blocks at the L4, L5, and S1 levels  ESTIMATED BLOOD LOSS:  Minimal     COMPLICATIONS:  None  ANESTHESIA:  Local     LOCATION:  [facility]  CONSENT: Today's procedure and its risks, benefits, as well as alternatives were explained to the patient  Risks include but are not limited to bleeding, infection, weakness, nerve irritation or damage, hematoma formation, abscess formation, failure the pain to improve, or potential worsening of the pain  The patient verbalized understanding and wished to proceed with the procedure  Written informed consent was thereby obtained  DESCRIPTION OF PROCEDURE: After written informed consent was obtained, the patient was taken to the fluoroscopy suite and placed in the prone position  Anatomical landmarks were identified with the aid of fluoroscopy in the PA and oblique views  The patient's lumbar region was prepped with antiseptic solution and draped in the usual sterile fashion  Strict aseptic technique was utilized  The skin and subcutaneous tissues at each needle entry site was infiltrated with a small amount of 1% preservative-free lidocaine using a 25-gauge 1-1/2-inch needle  A 25-gauge 3-1/2-inch needle was then incrementally advanced under fluoroscopic guidance in multiple views at each level such that the needle tip was advanced to contact os at the junction of the superior articulating process and the superomedial border of the transverse process at each of the cephalad levels as well as to contact os at the junction of the superior articulating process and the superomedial border of the sacral ala at the S1 level   After negative aspiration was confirmed, 0 5 mL of preservative-free 1% Lidocaine was injected into the cephalad needles and 1 mL of preservative-free 1% Lidocaine was injected into the needles at the S1 level  All needles were removed intact and hemostasis was maintained throughout  The patient tolerated the procedure well and no paresthesias or other complications were reported during or following this procedure  The antiseptic was wiped clean and Band-Aids were placed as appropriate  The patient was transferred to the recovery area and was observed for an appropriate period of time during which the patient remained hemodynamically stable and neurovascularly intact as the patient was prior to the procedure  The patient was subsequently discharged home in stable condition with supervision  The patient was instructed to call the office in a few days with an update  Discharge instructions were provided  I was present for and participated in all key and critical portions of this procedure      Nita Morrissey  10/27/2022  2:57 PM

## 2022-11-09 ENCOUNTER — RA CDI HCC (OUTPATIENT)
Dept: OTHER | Facility: HOSPITAL | Age: 72
End: 2022-11-09

## 2022-11-10 ENCOUNTER — HOSPITAL ENCOUNTER (OUTPATIENT)
Facility: AMBULARY SURGERY CENTER | Age: 72
Setting detail: OUTPATIENT SURGERY
Discharge: HOME | End: 2022-11-10
Attending: ANESTHESIOLOGY | Admitting: ANESTHESIOLOGY

## 2022-11-10 ENCOUNTER — APPOINTMENT (OUTPATIENT)
Dept: RADIOLOGY | Facility: HOSPITAL | Age: 72
End: 2022-11-10

## 2022-11-10 VITALS
HEART RATE: 76 BPM | TEMPERATURE: 96.7 F | SYSTOLIC BLOOD PRESSURE: 171 MMHG | OXYGEN SATURATION: 98 % | DIASTOLIC BLOOD PRESSURE: 76 MMHG | RESPIRATION RATE: 18 BRPM

## 2022-11-10 RX ORDER — LIDOCAINE HYDROCHLORIDE 10 MG/ML
INJECTION, SOLUTION INFILTRATION; PERINEURAL AS NEEDED
Status: DISCONTINUED | OUTPATIENT
Start: 2022-11-10 | End: 2022-11-10 | Stop reason: HOSPADM

## 2022-11-10 RX ORDER — BUPIVACAINE HYDROCHLORIDE 2.5 MG/ML
INJECTION, SOLUTION EPIDURAL; INFILTRATION; INTRACAUDAL AS NEEDED
Status: DISCONTINUED | OUTPATIENT
Start: 2022-11-10 | End: 2022-11-10 | Stop reason: HOSPADM

## 2022-11-10 NOTE — DISCHARGE INSTRUCTIONS
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
## 2022-11-15 ENCOUNTER — APPOINTMENT (OUTPATIENT)
Dept: LAB | Facility: CLINIC | Age: 72
End: 2022-11-15

## 2022-11-15 DIAGNOSIS — I10 ESSENTIAL HYPERTENSION: ICD-10-CM

## 2022-11-15 DIAGNOSIS — E11.9 TYPE 2 DIABETES MELLITUS WITHOUT COMPLICATION, WITHOUT LONG-TERM CURRENT USE OF INSULIN (HCC): ICD-10-CM

## 2022-11-15 LAB
ALBUMIN SERPL BCP-MCNC: 3.6 G/DL (ref 3.5–5)
ALP SERPL-CCNC: 50 U/L (ref 46–116)
ALT SERPL W P-5'-P-CCNC: 33 U/L (ref 12–78)
ANION GAP SERPL CALCULATED.3IONS-SCNC: 6 MMOL/L (ref 4–13)
AST SERPL W P-5'-P-CCNC: 15 U/L (ref 5–45)
BASOPHILS # BLD AUTO: 0.05 THOUSANDS/ÂΜL (ref 0–0.1)
BASOPHILS NFR BLD AUTO: 1 % (ref 0–1)
BILIRUB SERPL-MCNC: 0.35 MG/DL (ref 0.2–1)
BUN SERPL-MCNC: 22 MG/DL (ref 5–25)
CALCIUM SERPL-MCNC: 10.2 MG/DL (ref 8.3–10.1)
CHLORIDE SERPL-SCNC: 109 MMOL/L (ref 96–108)
CHOLEST SERPL-MCNC: 211 MG/DL
CO2 SERPL-SCNC: 26 MMOL/L (ref 21–32)
CREAT SERPL-MCNC: 0.7 MG/DL (ref 0.6–1.3)
EOSINOPHIL # BLD AUTO: 0.18 THOUSAND/ÂΜL (ref 0–0.61)
EOSINOPHIL NFR BLD AUTO: 2 % (ref 0–6)
ERYTHROCYTE [DISTWIDTH] IN BLOOD BY AUTOMATED COUNT: 13.1 % (ref 11.6–15.1)
GFR SERPL CREATININE-BSD FRML MDRD: 86 ML/MIN/1.73SQ M
GLUCOSE P FAST SERPL-MCNC: 191 MG/DL (ref 65–99)
HCT VFR BLD AUTO: 37.7 % (ref 34.8–46.1)
HDLC SERPL-MCNC: 58 MG/DL
HGB BLD-MCNC: 12 G/DL (ref 11.5–15.4)
IMM GRANULOCYTES # BLD AUTO: 0.08 THOUSAND/UL (ref 0–0.2)
IMM GRANULOCYTES NFR BLD AUTO: 1 % (ref 0–2)
LDLC SERPL CALC-MCNC: 123 MG/DL (ref 0–100)
LYMPHOCYTES # BLD AUTO: 2.45 THOUSANDS/ÂΜL (ref 0.6–4.47)
LYMPHOCYTES NFR BLD AUTO: 28 % (ref 14–44)
MCH RBC QN AUTO: 30 PG (ref 26.8–34.3)
MCHC RBC AUTO-ENTMCNC: 31.8 G/DL (ref 31.4–37.4)
MCV RBC AUTO: 94 FL (ref 82–98)
MONOCYTES # BLD AUTO: 0.78 THOUSAND/ÂΜL (ref 0.17–1.22)
MONOCYTES NFR BLD AUTO: 9 % (ref 4–12)
NEUTROPHILS # BLD AUTO: 5.16 THOUSANDS/ÂΜL (ref 1.85–7.62)
NEUTS SEG NFR BLD AUTO: 59 % (ref 43–75)
NONHDLC SERPL-MCNC: 153 MG/DL
NRBC BLD AUTO-RTO: 0 /100 WBCS
PLATELET # BLD AUTO: 339 THOUSANDS/UL (ref 149–390)
PMV BLD AUTO: 10.4 FL (ref 8.9–12.7)
POTASSIUM SERPL-SCNC: 4.6 MMOL/L (ref 3.5–5.3)
PROT SERPL-MCNC: 6.5 G/DL (ref 6.4–8.4)
RBC # BLD AUTO: 4 MILLION/UL (ref 3.81–5.12)
SODIUM SERPL-SCNC: 141 MMOL/L (ref 135–147)
TRIGL SERPL-MCNC: 150 MG/DL
WBC # BLD AUTO: 8.7 THOUSAND/UL (ref 4.31–10.16)

## 2022-11-16 ENCOUNTER — TELEPHONE (OUTPATIENT)
Dept: PAIN MEDICINE | Facility: CLINIC | Age: 72
End: 2022-11-16

## 2022-11-16 LAB
EST. AVERAGE GLUCOSE BLD GHB EST-MCNC: 157 MG/DL
HBA1C MFR BLD: 7.1 %

## 2022-11-16 NOTE — TELEPHONE ENCOUNTER
----- Message from Marina Thompson MD sent at 11/15/2022  2:07 PM EST -----  Ok to schedule RFA   ----- Message -----  From: Lissa Westbrook  Sent: 11/15/2022   1:38 PM EST  To: Marina Thompson MD    Pain diary for MBB #2 was scanned into the chart under the media tab

## 2022-11-16 NOTE — TELEPHONE ENCOUNTER
Schedule patient for left RFA 12/1/22 & right  RFA 1/5/23      Need to arrange transportation  Proper clothing for procedure  If ill or placed on antibiotics please call to reschedule

## 2022-11-17 ENCOUNTER — OFFICE VISIT (OUTPATIENT)
Dept: FAMILY MEDICINE CLINIC | Facility: CLINIC | Age: 72
End: 2022-11-17

## 2022-11-17 VITALS
HEIGHT: 65 IN | TEMPERATURE: 97.6 F | SYSTOLIC BLOOD PRESSURE: 134 MMHG | RESPIRATION RATE: 18 BRPM | OXYGEN SATURATION: 98 % | HEART RATE: 80 BPM | WEIGHT: 232 LBS | DIASTOLIC BLOOD PRESSURE: 70 MMHG | BODY MASS INDEX: 38.65 KG/M2

## 2022-11-17 DIAGNOSIS — E11.9 TYPE 2 DIABETES MELLITUS WITHOUT COMPLICATION, WITHOUT LONG-TERM CURRENT USE OF INSULIN (HCC): Primary | ICD-10-CM

## 2022-11-17 DIAGNOSIS — Z12.11 COLON CANCER SCREENING: ICD-10-CM

## 2022-11-17 DIAGNOSIS — I10 ESSENTIAL HYPERTENSION: ICD-10-CM

## 2022-11-17 DIAGNOSIS — Z12.31 ENCOUNTER FOR SCREENING MAMMOGRAM FOR MALIGNANT NEOPLASM OF BREAST: ICD-10-CM

## 2022-11-17 DIAGNOSIS — R42 VERTIGO: ICD-10-CM

## 2022-11-17 DIAGNOSIS — Z12.39 SCREENING BREAST EXAMINATION: ICD-10-CM

## 2022-11-17 RX ORDER — TORSEMIDE 10 MG/1
10 TABLET ORAL DAILY PRN
Qty: 90 TABLET | Refills: 1 | Status: SHIPPED | OUTPATIENT
Start: 2022-11-17

## 2022-11-17 NOTE — PROGRESS NOTES
Name: Dawn Morales      : 1950      MRN: [id number]  Encounter Provider: Jeri Rod MD  Encounter Date: 2022   Encounter department: [address]     1  Type 2 diabetes mellitus without complication, without long-term current use of insulin ([facility] )  Assessment & Plan:    Lab Results   Component Value Date    HGBA1C 7 1 (H) 11/15/2022   Well controlled and improved from previous  Continue dietary issues  Discussed need for good foot and eye care  Orders:  -     CBC and differential; Future; Expected date: 2023  -     Comprehensive metabolic panel; Future; Expected date: 2023  -     Lipid panel; Future; Expected date: 2023  -     HEMOGLOBIN A1C W/ EAG ESTIMATION; Future; Expected date: 2023    2  Essential hypertension  Assessment & Plan:  Well controlled and will continue current medications as ordered  Orders:  -     torsemide (DEMADEX) 10 mg tablet; Take 1 tablet (10 mg total) by mouth daily as needed (swelling)  -     CBC and differential; Future; Expected date: 2023  -     Comprehensive metabolic panel; Future; Expected date: 2023  -     Lipid panel; Future; Expected date: 2023  -     HEMOGLOBIN A1C W/ EAG ESTIMATION; Future; Expected date: 2023    3  Vertigo  Comments: Will refer for [facility]  Neuro exam within normal   Orders:  -     Ambulatory Referral to Physical Therapy; Future    4  Colon cancer screening  -     Cologuard    5  Screening breast examination  -     Mammo screening bilateral w 3d & cad; Future; Expected date: 2022    6  Encounter for screening mammogram for malignant neoplasm of breast  -     Mammo screening bilateral w 3d & cad; Future; Expected date: 2022           Subjective      Here for a follow up visit  She and her  have been working on [employer]  She has been having a lot of back pain and has ablations scheduled for November and January    Since she last had exam by the pain specialist, she has numbness one great toe and 2 lateral on the other feet  She has been on gabapentin in the past and does not want any medication for this at this time  Denies chest pain, dyspnea, hypoglycemic episodes  Review of Systems   Constitutional: Negative  Respiratory: Negative  Cardiovascular: Negative  Endocrine: Negative  Musculoskeletal: Positive for back pain  Neurological: Positive for dizziness  Current Outpatient Medications on File Prior to Visit   Medication Sig   • albuterol (PROVENTIL HFA,VENTOLIN HFA) 90 mcg/act inhaler Inhale 2 puffs every 6 (six) hours as needed for wheezing   • aspirin 325 mg tablet Take 325 mg by mouth daily   • benzonatate (TESSALON PERLES) 100 mg capsule Take 1 capsule (100 mg total) by mouth 3 (three) times a day as needed for cough   • Cholecalciferol (VITAMIN D-3) 1000 units CAPS Take 5,000 Units by mouth daily   • Entresto  MG TABS TAKE 1 TABLET BY MOUTH  TWICE DAILY   • ibuprofen (MOTRIN) 200 mg tablet Take by mouth if needed     • ipratropium (ATROVENT) 0 03 % nasal spray USE 2 SPRAYS IN BOTH  NOSTRILS 3 TIMES DAILY AS  NEEDED FOR RHINITIS   • lidocaine (LIDODERM) 5 % Apply 1 patch topically daily Remove & Discard patch within 12 hours or as directed by MD   • Melatonin 10 MG TABS Take by mouth 2 (two) times a day   • Multiple Minerals-Vitamins (CALCIUM CITRATE PLUS/MAGNESIUM PO) in the morning   • Multiple Vitamins-Minerals (PRESERVISION AREDS 2+MULTI VIT PO) in the morning   • spironolactone (ALDACTONE) 25 mg tablet TAKE 1 TABLET BY MOUTH  DAILY   • vitamin E, tocopherol, 400 units capsule Take 400 Units by mouth 2 (two) times a day       Objective     /70   Pulse 80   Temp 97 6 °F (36 4 °C)   Resp 18   Ht 5' 5" (1 651 m)   Wt 105 kg (232 lb)   SpO2 98%   BMI 38 61 kg/m²     Physical Exam  Constitutional:       Appearance: She is well-developed and well-nourished  She is obese     Eyes: Conjunctiva/sclera: Conjunctivae normal    Neck:      Thyroid: No thyromegaly  Vascular: No JVD  Cardiovascular:      Rate and Rhythm: Normal rate and regular rhythm  Pulses: Intact distal pulses  Heart sounds: Normal heart sounds  No murmur heard  No friction rub  No gallop  Pulmonary:      Effort: Pulmonary effort is normal       Breath sounds: Normal breath sounds  No wheezing or rales  Abdominal:      General: Bowel sounds are normal  There is no distension  Palpations: Abdomen is soft  Tenderness: There is no abdominal tenderness  Musculoskeletal:         General: No edema  Cervical back: Neck supple  Neurological:      General: No focal deficit present  Mental Status: She is alert and oriented to person, place, and time  Motor: No weakness        Coordination: Coordination normal       Gait: Gait normal       Deep Tendon Reflexes: Reflexes normal        Luac Nevarez MD

## 2022-11-18 ENCOUNTER — TELEPHONE (OUTPATIENT)
Dept: ADMINISTRATIVE | Facility: OTHER | Age: 72
End: 2022-11-18

## 2022-11-18 NOTE — TELEPHONE ENCOUNTER
----- Message from Kwaku Howe MD sent at 11/17/2022  2:33 PM EST -----  11/17/22 2:33 PM    Hello, our patient No patient name on file  has had Diabetic Eye Exam completed/performed  Please assist in updating the patient chart by making an External outreach to Dr Layton Pinto facility located in [place], [place]  The date of service is September 2022      Thank you,  Marisol Chatman PG [facility]

## 2022-11-18 NOTE — LETTER
Diabetic Eye Exam Form    Date Requested: 22  Patient: Warden Bingham  Patient : 1950   Referring Provider: Rene Trinidad MD      DIABETIC Eye Exam Date _______________________________      Type of Exam MUST be documented for Diabetic Eye Exams  Please CHECK ONE  Retinal Exam       Dilated Retinal Exam       OCT       Optomap-Iris Exam      Fundus Photography       Left Eye - Please check Retinopathy or No Retinopathy        Exam did show retinopathy    Exam did not show retinopathy       Right Eye - Please check Retinopathy or No Retinopathy       Exam did show retinopathy    Exam did not show retinopathy       Comments __________________________________________________________    Practice Providing Exam ______________________________________________    Exam Performed By (print name) _______________________________________      Provider Signature ___________________________________________________      These reports are needed for  compliance  Please fax this completed form and a copy of the Diabetic Eye Exam report to our office located at [place] [address] as soon as possible via [phone] attention Madisyn: Phone [phone]  We thank you for your assistance in treating our mutual patient

## 2022-11-18 NOTE — LETTER
Diabetic Eye Exam Form    Date Requested: 22  Patient: Mayelin Hanks  Patient : 1950   Referring Provider: Julissa Jordan MD      DIABETIC Eye Exam Date _______________________________      Type of Exam MUST be documented for Diabetic Eye Exams  Please CHECK ONE  Retinal Exam       Dilated Retinal Exam       OCT       Optomap-Iris Exam      Fundus Photography       Left Eye - Please check Retinopathy or No Retinopathy        Exam did show retinopathy    Exam did not show retinopathy       Right Eye - Please check Retinopathy or No Retinopathy       Exam did show retinopathy    Exam did not show retinopathy       Comments __________________________________________________________    Practice Providing Exam ______________________________________________    Exam Performed By (print name) _______________________________________      Provider Signature ___________________________________________________      These reports are needed for  compliance  Please fax this completed form and a copy of the Diabetic Eye Exam report to our office located at [place] [address] as soon as possible via [phone] attention Madisyn: Phone [phone]  We thank you for your assistance in treating our mutual patient     (sent to Dr Leonardo Jurado, OD)

## 2022-11-21 NOTE — TELEPHONE ENCOUNTER
Upon review of the In Basket request and the patient's chart, initial outreach has been made via fax to facility  , please see Contacts section for details       Thank you  Roberto Lin MA

## 2022-11-25 ENCOUNTER — TELEPHONE (OUTPATIENT)
Dept: OBGYN CLINIC | Facility: HOSPITAL | Age: 72
End: 2022-11-25

## 2022-11-25 NOTE — TELEPHONE ENCOUNTER
Caller: Patient     Doctor/Office: Inderjit    Callback: [phone]    Location of pain: right hip     Pain scale: 9-10    Duration of pain: A week    Patient is having L4, L5, S1 RFA done on 12/1  She didn't know if that would interfere with any treatment that might be suggested for the hip

## 2022-11-25 NOTE — TELEPHONE ENCOUNTER
As a follow-up, a second attempt has been made for outreach via telephone call to facility  Please see Contacts section for details      Thank you  Vilma Joiner MA

## 2022-11-28 ENCOUNTER — TELEPHONE (OUTPATIENT)
Dept: OBGYN CLINIC | Facility: CLINIC | Age: 72
End: 2022-11-28

## 2022-11-28 NOTE — TELEPHONE ENCOUNTER
Sw patient and she states the pain in her hip has calmed down  She would not be due for another Hip injection until January patient is understanding of this  If after a week of her procedure with Dr Laura Edwards in January, if she is still having pain she can scheudle a fu to receive another Hip injection

## 2022-11-28 NOTE — TELEPHONE ENCOUNTER
Spoke with patient regarding her scheudling request for a repeat CSI in R hip, she would not be eligible until January   She will wait a week after her procedure with Dr Derek Womack and if she is still having pain she can come in for an injection

## 2022-11-29 NOTE — TELEPHONE ENCOUNTER
As a final attempt, a third outreach has been made via telephone call to facility  Please see Contacts section for details  This encounter will be closed and completed by end of day  Should we receive the requested information because of previous outreach attempts, the requested patient's chart will be updated appropriately       Thank you  Chelsea Roberts MA

## 2022-11-30 NOTE — PRE-PROCEDURE INSTRUCTIONS
Pre-Surgery Instructions:   Medication Instructions   • albuterol (PROVENTIL HFA,VENTOLIN HFA) 90 mcg/act inhaler Uses PRN- OK to take day of surgery   • aspirin 325 mg tablet Hold day of surgery  • benzonatate (TESSALON PERLES) 100 mg capsule Hold day of surgery  • Cholecalciferol (VITAMIN D-3) 1000 units CAPS Hold day of surgery  • Entresto  MG TABS Take day of surgery  • ibuprofen (MOTRIN) 200 mg tablet Hold day of surgery  • ipratropium (ATROVENT) 0 03 % nasal spray Uses PRN- OK to take day of surgery   • lidocaine (LIDODERM) 5 % Uses PRN- OK to take day of surgery   • Melatonin 10 MG TABS Hold day of surgery  • Multiple Vitamins-Minerals (PRESERVISION AREDS 2+MULTI VIT PO) Hold day of surgery  • spironolactone (ALDACTONE) 25 mg tablet Hold day of surgery  • torsemide (DEMADEX) 10 mg tablet Hold day of surgery  • vitamin E, tocopherol, 400 units capsule Hold day of surgery  Pre op instructions reviewed with pt via phone  Instructed to take entresto a m of surgery    Estiven Lucio [phone]  My Surgical Experience    The following information was developed to assist you to prepare for your operation  What do I need to do before coming to the hospital?  • Arrange for a responsible person to drive you to and from the hospital   • Arrange care for your children at home  Children are not allowed in the recovery areas of the hospital  • Plan to wear clothing that is easy to put on and take off  If you are having shoulder surgery, wear a shirt that buttons or zippers in the front  Bathing  o Shower the evening before and the morning of your surgery with an antibacterial soap   Please refer to the Pre Op Showering Instructions for Surgery Patients Sheet   o Remove nail polish and all body piercing jewelry  o Do not shave any body part for at least 24 hours before surgery-this includes face, arms, legs and upper body  Food  o Nothing to eat or drink after midnight the night before your surgery  This includes candy and chewing gum  o Exception: If your surgery is after 12:00pm (noon), you may have clear liquids such as 7-Up®, ginger ale, apple or cranberry juice, Jell-O®, water, or clear broth until 8:00 am  o Do not drink milk or juice with pulp on the morning before surgery  o Do not drink alcohol 24 hours before surgery  Medicine  o Follow instructions you received from your surgeon about which medicines you may take on the day of surgery  o If instructed to take medicine on the morning of surgery, take pills with just a small sip of water  Call your prescribing doctor for specific information on what to do if you take insulin    What should I bring to the hospital?    Bring:  • Crutches or a walker, if you have them, for foot or knee surgery  • A list of the daily medicines, vitamins, minerals, herbals and nutritional supplements you take  Include the dosages of medicines and the time you take them each day  • Glasses, dentures or hearing aids  • Minimal clothing; you will be wearing hospital sleepwear  • Photo ID; required to verify your identity  • If you have a Living Will or Power of , bring a copy of the documents  • If you have an ostomy, bring an extra pouch and any supplies you use    Do not bring  • Medicines or inhalers  • Money, valuables or jewelry    What other information should I know about the day of surgery? • Notify your surgeons if you develop a cold, sore throat, cough, fever, rash or any other illness  • Report to the Ambulatory Surgical/Same Day Surgery Unit  • You will be instructed to stop at Registration only if you have not been pre-registered  • Inform your  fi they do not stay that they will be asked by the staff to leave a phone number where they can be reached  • Be available to be reached before surgery   In the event the operating room schedule changes, you may be asked to come in earlier or later than expected    *It is important to tell your doctor and others involved in your health care if you are taking or have been taking any non-prescription drugs, vitamins, minerals, herbals or other nutritional supplements   Any of these may interact with some food or medicines and cause a reaction

## 2022-12-01 ENCOUNTER — APPOINTMENT (OUTPATIENT)
Dept: RADIOLOGY | Facility: HOSPITAL | Age: 72
End: 2022-12-01

## 2022-12-01 ENCOUNTER — HOSPITAL ENCOUNTER (OUTPATIENT)
Facility: AMBULARY SURGERY CENTER | Age: 72
Setting detail: OUTPATIENT SURGERY
Discharge: HOME | End: 2022-12-01
Attending: ANESTHESIOLOGY | Admitting: ANESTHESIOLOGY

## 2022-12-01 VITALS
TEMPERATURE: 96.7 F | HEART RATE: 82 BPM | RESPIRATION RATE: 18 BRPM | DIASTOLIC BLOOD PRESSURE: 74 MMHG | SYSTOLIC BLOOD PRESSURE: 153 MMHG | OXYGEN SATURATION: 98 %

## 2022-12-01 RX ORDER — LIDOCAINE HYDROCHLORIDE 10 MG/ML
INJECTION, SOLUTION EPIDURAL; INFILTRATION; INTRACAUDAL; PERINEURAL AS NEEDED
Status: DISCONTINUED | OUTPATIENT
Start: 2022-12-01 | End: 2022-12-01 | Stop reason: HOSPADM

## 2022-12-01 RX ORDER — LIDOCAINE HYDROCHLORIDE 20 MG/ML
INJECTION, SOLUTION EPIDURAL; INFILTRATION; INTRACAUDAL; PERINEURAL AS NEEDED
Status: DISCONTINUED | OUTPATIENT
Start: 2022-12-01 | End: 2022-12-01 | Stop reason: HOSPADM

## 2022-12-01 NOTE — DISCHARGE INSTRUCTIONS
Medial Branch Radiofrequency Ablation     WHAT YOU NEED TO KNOW:   Medial branch radiofrequency ablation (RFA) is a procedure used to treat facet joint pain in your neck, mid back, or lower back  Facet joints are found at the back of each vertebra  A needle electrode is used to send electrical currents to the nerves in your facet joint  The electrical currents create heat that damages the nerve so it cannot send pain signals  ACTIVITY  Do not drive or operate machinery today  No strenuous activity today - bending, lifting, etc   You may shower today, but do not sit in a tub of water  Resume normal activities tomorrow as tolerated  CARE OF THE INJECTION SITE  If you have soreness or pain, apply ice to the area today (20 minutes on/20 minutes off)  Starting tomorrow, you may use warm, moist heat or ice if needed  [facility] if you have any of the following: redness, drainage, swelling, or fever above 100°F     SPECIAL INSTRUCTIONS  Our office will call you tomorrow for a progress report and make an appointment for a follow up visit in 4 weeks  If you feel a sunburn-like sensation in the area of your procedure, call our office  MEDICATIONS  Continue to take all routine medications  Our office may have instructed you to hold some medications  As no general anesthesia was used in today's procedure, you should not experience any side effects related to anesthesia  If you have a problem specifically related to your procedure, please call our office at [phone]  Problems not related to your procedure should be directed to your primary care physician

## 2022-12-01 NOTE — OP NOTE
ATTENDING PHYSICIAN:   Ca Daily MD      PREPROCEDURE DIAGNOSIS:  Left lumbar facet arthropathy  POSTPROCEDURE DIAGNOSIS:  Left lumbar facet arthropathy  PROCEDURE: Left L4, L5, and S1 lumbar facet nerve radiofrequency ablation under fluoroscopic guidance  ANESTHESIA:  Local     ESTIMATED BLOOD LOSS:  Minimal     COMPLICATIONS:  None  LOCATION:   [facility]  HISTORY OF PRESENTING ILLNESS:   We feel radiofrequency denervation of the L4, L5, and S1 facets is medically necessary, given the patient has disabling low back pain, which we suspect is facet mediated in the absence of nerve root compression or radicular pain  The patient has had two positive confirmatory diagnostic medial branch blocks  The patient has also failed three months of conservative therapy, including nonopioid medications, manipulation, physical therapy and home exercise program   The patient has not been treated with radiofrequency denervation at this anatomic location within the past six months  CONSENT:  Today's procedure, its potential benefits as well as its risks and potential side effects were reviewed  Discussed risks of the procedure including bleeding, infection, nerve irritation or damage, reactions to the medications, failure of the pain to improve, and potential worsening of the pain were explained in detail to the patient who verbalized understanding and who wished to proceed  Written informed consent was thereby obtained  DESCRIPTION OF THE PROCEDURE: After written informed consent was obtained, the patient was taken to the fluoroscopy suite and placed in the prone position  Anatomical landmarks were identified by way of palpation with fluoroscopy in the PA and oblique views  The patient's lumbar region was then prepped and draped in the usual sterile fashion using Chlorhexidine    The skin and subcutaneous tissues were subsequently infiltrated with approximately 1 ml of 1% preservative free Lidocaine at each of the four intended needle entry sites using a 25 gauge 1-1/2 inch needle  Via fluoroscopy in the AP and oblique views, an active tip needle was then incrementally advanced under fluoroscopic guidance at each level  At each of these levels, the needle tip was made to contact os at the superior medial border of the junction of the transverse process of the lumbar levels and to contact the os at the medial aspect of the groove formed by the sacral ala in the superior articular process of S1  After proper needle placement was confirmed with fluoroscopic guidance at each level, sensory and motor stimulation was performed at 2 Hz and 50 Hz  At all levels, there was negative extension into the lower extremities  Following this portion of the procedure, a 1 ml injectate of 2% preservative free Lidocaine was instilled at all of the levels  After a period of approximately 90 seconds, each level was lesioned at 90 degrees Celsius  Following the initial lesioning, each needle tip was repositioned x 2 under fluoroscopic guidance in a clockwise and counterclockwise fashion  Following each reposition, a total of two additional lesions at each side were performed for 90 seconds at 90 degrees Celsius  All needles were removed with the tips intact  The patient tolerated the procedure and hemostasis was maintained  There were no apparent paresthesias or complications  This skin was wiped clean and a Band-Aid was placed as appropriate  The patient was monitored for an appropriate period of time following the procedure and remained hemodynamically stable and neurovascularly intact  The patient was ultimately discharged to home with supervision in good condition and instructed to call the office to report the response  I was present for and participated in all key and critical portions of this procedure      Sruthi Barrios MD  12/1/2022  1:56 PM

## 2022-12-01 NOTE — TELEPHONE ENCOUNTER
Upon review of the In Basket request we were able to locate, review, and update the patient chart as requested for Diabetic Eye Exam     Any additional questions or concerns should be emailed to the Practice Liaisons via the appropriate education email address, please do not reply via In Basket      Thank you  Dinesh Maravilla MA

## 2022-12-01 NOTE — INTERVAL H&P NOTE
H&P reviewed  After examining the patient I find no changes in the patients condition since the H&P had been written      Vitals:    12/01/22 1248   BP: 153/74   Pulse: 82   Resp: 18   Temp: (!) 96 7 °F (35 9 °C)   SpO2: 98%

## 2022-12-02 ENCOUNTER — TELEPHONE (OUTPATIENT)
Dept: RADIOLOGY | Facility: CLINIC | Age: 72
End: 2022-12-02

## 2022-12-02 NOTE — TELEPHONE ENCOUNTER
S/w pt sp/p Lt Lumbar RFA  Pt stated needle sites look good, denies S/S of infect, denies fever, denies soreness and denies sun burn like sensation  Advised pt if they have any further pain to take OTC/prescribed meds and/or use ice/heat and that it can take 4-6wks to see full effect  Confirmed nxt appt w/ pt  Pt verbalized understanding  Pt has Rt Lumbar RFA on 1/4/23 w/ KW  --> Pt asking to be added to cx list if RFA can be done sooner   Thank you

## 2022-12-05 ENCOUNTER — HOSPITAL ENCOUNTER (OUTPATIENT)
Facility: AMBULARY SURGERY CENTER | Age: 72
Setting detail: OUTPATIENT SURGERY
Discharge: HOME | End: 2022-12-05
Attending: OPHTHALMOLOGY | Admitting: OPHTHALMOLOGY

## 2022-12-05 ENCOUNTER — ANESTHESIA EVENT (OUTPATIENT)
Dept: PERIOP | Facility: AMBULARY SURGERY CENTER | Age: 72
End: 2022-12-05

## 2022-12-05 ENCOUNTER — ANESTHESIA (OUTPATIENT)
Dept: PERIOP | Facility: AMBULARY SURGERY CENTER | Age: 72
End: 2022-12-05

## 2022-12-05 VITALS
RESPIRATION RATE: 16 BRPM | HEART RATE: 74 BPM | OXYGEN SATURATION: 98 % | HEIGHT: 65 IN | TEMPERATURE: 96.9 F | WEIGHT: 232 LBS | SYSTOLIC BLOOD PRESSURE: 153 MMHG | BODY MASS INDEX: 38.65 KG/M2 | DIASTOLIC BLOOD PRESSURE: 69 MMHG

## 2022-12-05 DIAGNOSIS — H25.11 AGE-RELATED NUCLEAR CATARACT OF RIGHT EYE: Primary | ICD-10-CM

## 2022-12-05 DEVICE — ACRYSOF(R) IQ ASPHERIC NATURAL IOL, SINGLE-PIECE ACRYLIC FOLDABLE PCL, UV WITH BLUE LIGHTFILTER, 13.0MM LENGTH, 6.0MM ANTERIORASYMMETRIC BICONVEX OPTIC, PLANAR HAPTICS.
Type: IMPLANTABLE DEVICE | Site: EYE | Status: FUNCTIONAL
Brand: ACRYSOF®

## 2022-12-05 RX ORDER — KETOROLAC TROMETHAMINE 5 MG/ML
1 SOLUTION OPHTHALMIC 4 TIMES DAILY
Qty: 5 ML | Refills: 0
Start: 2022-12-05

## 2022-12-05 RX ORDER — TETRACAINE HYDROCHLORIDE 5 MG/ML
SOLUTION OPHTHALMIC AS NEEDED
Status: DISCONTINUED | OUTPATIENT
Start: 2022-12-05 | End: 2022-12-05 | Stop reason: HOSPADM

## 2022-12-05 RX ORDER — GATIFLOXACIN 5 MG/ML
SOLUTION/ DROPS OPHTHALMIC AS NEEDED
Status: DISCONTINUED | OUTPATIENT
Start: 2022-12-05 | End: 2022-12-05 | Stop reason: HOSPADM

## 2022-12-05 RX ORDER — MIDAZOLAM HYDROCHLORIDE 2 MG/2ML
INJECTION, SOLUTION INTRAMUSCULAR; INTRAVENOUS AS NEEDED
Status: DISCONTINUED | OUTPATIENT
Start: 2022-12-05 | End: 2022-12-05

## 2022-12-05 RX ORDER — LIDOCAINE HYDROCHLORIDE 20 MG/ML
1 JELLY TOPICAL
Status: COMPLETED | OUTPATIENT
Start: 2022-12-05 | End: 2022-12-05

## 2022-12-05 RX ORDER — GATIFLOXACIN 5 MG/ML
1 SOLUTION/ DROPS OPHTHALMIC 2 TIMES DAILY
Qty: 3 ML | Refills: 0
Start: 2022-12-05

## 2022-12-05 RX ORDER — TETRACAINE HYDROCHLORIDE 5 MG/ML
1 SOLUTION OPHTHALMIC ONCE
Status: COMPLETED | OUTPATIENT
Start: 2022-12-05 | End: 2022-12-05

## 2022-12-05 RX ORDER — PHENYLEPHRINE HCL 2.5 %
1 DROPS OPHTHALMIC (EYE)
Status: COMPLETED | OUTPATIENT
Start: 2022-12-05 | End: 2022-12-05

## 2022-12-05 RX ORDER — LIDOCAINE HYDROCHLORIDE 10 MG/ML
INJECTION, SOLUTION EPIDURAL; INFILTRATION; INTRACAUDAL; PERINEURAL AS NEEDED
Status: DISCONTINUED | OUTPATIENT
Start: 2022-12-05 | End: 2022-12-05 | Stop reason: HOSPADM

## 2022-12-05 RX ORDER — CYCLOPENTOLATE HYDROCHLORIDE 10 MG/ML
1 SOLUTION/ DROPS OPHTHALMIC
Status: COMPLETED | OUTPATIENT
Start: 2022-12-05 | End: 2022-12-05

## 2022-12-05 RX ORDER — KETOROLAC TROMETHAMINE 5 MG/ML
1 SOLUTION OPHTHALMIC
Status: COMPLETED | OUTPATIENT
Start: 2022-12-05 | End: 2022-12-05

## 2022-12-05 RX ORDER — BALANCED SALT SOLUTION 6.4; .75; .48; .3; 3.9; 1.7 MG/ML; MG/ML; MG/ML; MG/ML; MG/ML; MG/ML
SOLUTION OPHTHALMIC AS NEEDED
Status: DISCONTINUED | OUTPATIENT
Start: 2022-12-05 | End: 2022-12-05 | Stop reason: HOSPADM

## 2022-12-05 RX ADMIN — KETOROLAC TROMETHAMINE 1 DROP: 5 SOLUTION OPHTHALMIC at 10:35

## 2022-12-05 RX ADMIN — KETOROLAC TROMETHAMINE 1 DROP: 5 SOLUTION OPHTHALMIC at 10:05

## 2022-12-05 RX ADMIN — PHENYLEPHRINE HYDROCHLORIDE 1 DROP: 25 SOLUTION/ DROPS OPHTHALMIC at 10:35

## 2022-12-05 RX ADMIN — KETOROLAC TROMETHAMINE 1 DROP: 5 SOLUTION OPHTHALMIC at 10:20

## 2022-12-05 RX ADMIN — PHENYLEPHRINE HYDROCHLORIDE 1 DROP: 25 SOLUTION/ DROPS OPHTHALMIC at 10:05

## 2022-12-05 RX ADMIN — CYCLOPENTOLATE HYDROCHLORIDE 1 DROP: 10 SOLUTION/ DROPS OPHTHALMIC at 10:35

## 2022-12-05 RX ADMIN — MIDAZOLAM 2 MG: 1 INJECTION INTRAMUSCULAR; INTRAVENOUS at 10:48

## 2022-12-05 RX ADMIN — PHENYLEPHRINE HYDROCHLORIDE 1 DROP: 25 SOLUTION/ DROPS OPHTHALMIC at 10:20

## 2022-12-05 RX ADMIN — PHENYLEPHRINE HYDROCHLORIDE 1 DROP: 25 SOLUTION/ DROPS OPHTHALMIC at 09:55

## 2022-12-05 RX ADMIN — LIDOCAINE HYDROCHLORIDE 1 APPLICATION: 20 JELLY TOPICAL at 10:05

## 2022-12-05 RX ADMIN — TETRACAINE HYDROCHLORIDE 1 DROP: 5 SOLUTION OPHTHALMIC at 09:55

## 2022-12-05 RX ADMIN — LIDOCAINE HYDROCHLORIDE 1 APPLICATION: 20 JELLY TOPICAL at 10:20

## 2022-12-05 RX ADMIN — CYCLOPENTOLATE HYDROCHLORIDE 1 DROP: 10 SOLUTION/ DROPS OPHTHALMIC at 10:05

## 2022-12-05 RX ADMIN — KETOROLAC TROMETHAMINE 1 DROP: 5 SOLUTION OPHTHALMIC at 09:55

## 2022-12-05 RX ADMIN — CYCLOPENTOLATE HYDROCHLORIDE 1 DROP: 10 SOLUTION/ DROPS OPHTHALMIC at 09:55

## 2022-12-05 RX ADMIN — LIDOCAINE HYDROCHLORIDE 1 APPLICATION: 20 JELLY TOPICAL at 09:55

## 2022-12-05 RX ADMIN — CYCLOPENTOLATE HYDROCHLORIDE 1 DROP: 10 SOLUTION/ DROPS OPHTHALMIC at 10:20

## 2022-12-05 NOTE — ANESTHESIA POSTPROCEDURE EVALUATION
Post-Op Assessment Note    CV Status:  Stable  Pain Score: 0    Pain management: adequate     Mental Status:  Alert and awake   Hydration Status:  Stable   PONV Controlled:  None   Airway Patency:  Patent and adequate      Post Op Vitals Reviewed: Yes      Staff: CRNA         No notable events documented      BP      Temp     Pulse     Resp      SpO2

## 2022-12-05 NOTE — DISCHARGE INSTRUCTIONS
Dr Vianney Schultz Cataract Instructions    Activity:     1  No Driving until instructed   2  Keep shield on until seen tomorrow except when administering drops   3  No heavy lifting   4  No water in eye     Diet:     1  Resume normal diet    Normal Symptoms:     1  Mild Headache   2  Scratchy or picky feeling around eye    Call the office if:     1  You have any questions or concerns   2  If eye pain is not relieved by extra strength tylenol    Office phone number:  [phone]      Next appointment:     1  See Dr Vianney Schultz at his office tomorrow as scheduled                           11:00 am__________    2  Bring blue eye kit with you and eyedrops to the office    A new set of comprehensive instructions will be given and reviewed with you during your office visit tomorrow

## 2022-12-05 NOTE — OP NOTE
OPERATIVE REPORT    PATIENT NAME: Merlin Bucco    :  1950  MRN: [id number]  Pt Location: [place] [address] OR ROOM 01    Surgery Date: 2022    Surgeon(s) and Role:     * Leigh Gooden MD - Primary    Age-related nuclear cataract, right eye [H25 11]    Post-Op Diagnosis Codes:     * Age-related nuclear cataract, right eye [H25 11]    Procedure(s):  EXTRACTION EXTRACAPSULAR CATARACT PHACO INTRAOCULAR LENS (IOL)    Anesthesia Type:   IV Sedation with Anesthesia    Operative Indications:  Age-related nuclear cataract, right eye [H25 11]  Decreased vision to 20/40  With problems reading  Pt requested cataract sx the right eye    Procedure and Technique:    Procedure Details     The patient was brought in the OR in stable condition and placed on the operative table  The right eye was prepped and draped in the usual sterile manner  Attention was directed to the right eye where a lid speculum was placed  A 2 4 mm clear corneal incision was made temperally  1/2 cc of 1% MPF Lidocaine was irrigated into the anterior chamber followed by viscoat  The side port incision was placed superiorly  The capsularrhexis was made and the nucleus was hydrodissected with BSS  The nucleus was then removed with the phaco handpiece followed by removal of the cortical material with the I/A handpiece  The capsular bag was then filled with Provisc  The IOL was folded and placed in to the capsular bag and centered well  The remaining Provisc was removed from the eye with the I/A  The wounds were hydrated with BSS and found to be water tight  The lid speculum was removed and 2 drops of Gatifloxicin were placed over the cornea  A protective eye shield was taped over the eye and the patient went to PACU in stable condition  I will see the patient in the office tomorrow and the expected post op period is a few weeks         Complications: None        Disposition: PACU   Condition: Stable    SIGNATURE: Leigh Gooden MD  DATE:  2022  TIME: 11:21 AM

## 2022-12-05 NOTE — ANESTHESIA PREPROCEDURE EVALUATION
Procedure:  EXTRACTION EXTRACAPSULAR CATARACT PHACO INTRAOCULAR LENS (IOL) (Right: Eye)    Relevant Problems   CARDIO   (+) Chronic systolic congestive heart failure (HCC)   (+) Essential hypertension   (+) History Abnormal heart rhythm      ENDO   (+) Type 2 diabetes mellitus without complication, without long-term current use of insulin (HCC)      MUSCULOSKELETAL   (+) Chronic right-sided low back pain without sciatica   (+) Lumbar spondylosis   (+) Neurogenic claudication due to lumbar spinal stenosis      NEURO/PSYCH   (+) CVA (cerebral vascular accident) ([facility] [address] )   (+) Chronic pain syndrome   (+) Chronic right-sided low back pain without sciatica   (+) Neurogenic claudication due to lumbar spinal stenosis   (+) TIA (transient ischemic attack)      PULMONARY   (+) Mild intermittent asthma without complication   (+) LUCIA (obstructive sleep apnea)        Physical Exam    Airway    Mallampati score: III         Dental   No notable dental hx     Cardiovascular      Pulmonary      Other Findings        Anesthesia Plan  ASA Score- 3     Anesthesia Type- IV sedation with anesthesia with ASA Monitors  Additional Monitors:   Airway Plan:           Plan Factors-Exercise tolerance (METS): >4 METS  Chart reviewed  EKG reviewed  Existing labs reviewed  Patient summary reviewed  Patient is not a current smoker  Induction- intravenous  Postoperative Plan-     Informed Consent- Anesthetic plan and risks discussed with patient  I personally reviewed this patient with the CRNA  Discussed and agreed on the Anesthesia Plan with the CRNA  Komal Elder

## 2023-01-03 NOTE — PRE-PROCEDURE INSTRUCTIONS
Pre-Surgery Instructions:   Medication Instructions   • albuterol (PROVENTIL HFA,VENTOLIN HFA) 90 mcg/act inhaler Uses PRN- OK to take day of surgery   • aspirin 325 mg tablet Hold day of surgery  • benzonatate (TESSALON PERLES) 100 mg capsule Hold day of surgery  • Cholecalciferol (VITAMIN D-3) 1000 units CAPS Hold day of surgery  • Entresto  MG TABS Take day of surgery  • ibuprofen (MOTRIN) 200 mg tablet Hold day of surgery  • ipratropium (ATROVENT) 0 03 % nasal spray Uses PRN- OK to take day of surgery   • lidocaine (LIDODERM) 5 % Hold day of surgery  • Melatonin 10 MG TABS Hold day of surgery  • Multiple Vitamins-Minerals (PRESERVISION AREDS 2+MULTI VIT PO) Hold day of surgery  • spironolactone (ALDACTONE) 25 mg tablet Hold day of surgery  • torsemide (DEMADEX) 10 mg tablet Hold day of surgery  • vitamin E, tocopherol, 400 units capsule Hold day of surgery  Pre op instructions reviewed with pt via phone  Instructed to take entresto a m of surgery    Cori Leon [phone]  My Surgical Experience    The following information was developed to assist you to prepare for your operation  What do I need to do before coming to the hospital?  • Arrange for a responsible person to drive you to and from the hospital   • Arrange care for your children at home  Children are not allowed in the recovery areas of the hospital  • Plan to wear clothing that is easy to put on and take off  If you are having shoulder surgery, wear a shirt that buttons or zippers in the front  Bathing  o Shower the evening before and the morning of your surgery with an antibacterial soap  Please refer to the Pre Op Showering Instructions for Surgery Patients Sheet   o Remove nail polish and all body piercing jewelry  o Do not shave any body part for at least 24 hours before surgery-this includes face, arms, legs and upper body  Food  o Nothing to eat or drink after midnight the night before your surgery  This includes candy and chewing gum  o Exception: If your surgery is after 12:00pm (noon), you may have clear liquids such as 7-Up®, ginger ale, apple or cranberry juice, Jell-O®, water, or clear broth until 8:00 am  o Do not drink milk or juice with pulp on the morning before surgery  o Do not drink alcohol 24 hours before surgery  Medicine  o Follow instructions you received from your surgeon about which medicines you may take on the day of surgery  o If instructed to take medicine on the morning of surgery, take pills with just a small sip of water  Call your prescribing doctor for specific information on what to do if you take insulin    What should I bring to the hospital?    Bring:  • Crutches or a walker, if you have them, for foot or knee surgery  • A list of the daily medicines, vitamins, minerals, herbals and nutritional supplements you take  Include the dosages of medicines and the time you take them each day  • Glasses, dentures or hearing aids  • Minimal clothing; you will be wearing hospital sleepwear  • Photo ID; required to verify your identity  • If you have a Living Will or Power of , bring a copy of the documents  • If you have an ostomy, bring an extra pouch and any supplies you use    Do not bring  • Medicines or inhalers  • Money, valuables or jewelry    What other information should I know about the day of surgery? • Notify your surgeons if you develop a cold, sore throat, cough, fever, rash or any other illness  • Report to the Ambulatory Surgical/Same Day Surgery Unit  • You will be instructed to stop at Registration only if you have not been pre-registered  • Inform your  fi they do not stay that they will be asked by the staff to leave a phone number where they can be reached  • Be available to be reached before surgery   In the event the operating room schedule changes, you may be asked to come in earlier or later than expected    *It is important to tell your doctor and others involved in your health care if you are taking or have been taking any non-prescription drugs, vitamins, minerals, herbals or other nutritional supplements   Any of these may interact with some food or medicines and cause a reaction

## 2023-01-04 ENCOUNTER — TELEPHONE (OUTPATIENT)
Dept: RADIOLOGY | Facility: CLINIC | Age: 73
End: 2023-01-04

## 2023-01-04 ENCOUNTER — HOSPITAL ENCOUNTER (OUTPATIENT)
Facility: AMBULARY SURGERY CENTER | Age: 73
Setting detail: OUTPATIENT SURGERY
Discharge: HOME | End: 2023-01-04
Attending: PHYSICAL MEDICINE & REHABILITATION | Admitting: PHYSICAL MEDICINE & REHABILITATION

## 2023-01-04 ENCOUNTER — APPOINTMENT (OUTPATIENT)
Dept: RADIOLOGY | Facility: HOSPITAL | Age: 73
End: 2023-01-04

## 2023-01-04 VITALS
TEMPERATURE: 97.1 F | HEART RATE: 82 BPM | DIASTOLIC BLOOD PRESSURE: 70 MMHG | RESPIRATION RATE: 18 BRPM | SYSTOLIC BLOOD PRESSURE: 155 MMHG | OXYGEN SATURATION: 98 %

## 2023-01-04 RX ORDER — BUPIVACAINE HYDROCHLORIDE 2.5 MG/ML
INJECTION, SOLUTION EPIDURAL; INFILTRATION; INTRACAUDAL AS NEEDED
Status: DISCONTINUED | OUTPATIENT
Start: 2023-01-04 | End: 2023-01-04 | Stop reason: HOSPADM

## 2023-01-04 RX ORDER — LIDOCAINE HYDROCHLORIDE 10 MG/ML
INJECTION, SOLUTION EPIDURAL; INFILTRATION; INTRACAUDAL; PERINEURAL AS NEEDED
Status: DISCONTINUED | OUTPATIENT
Start: 2023-01-04 | End: 2023-01-04 | Stop reason: HOSPADM

## 2023-01-04 RX ORDER — METHYLPREDNISOLONE ACETATE 40 MG/ML
INJECTION, SUSPENSION INTRA-ARTICULAR; INTRALESIONAL; INTRAMUSCULAR; SOFT TISSUE AS NEEDED
Status: DISCONTINUED | OUTPATIENT
Start: 2023-01-04 | End: 2023-01-04 | Stop reason: HOSPADM

## 2023-01-04 NOTE — DISCHARGE INSTRUCTIONS
Medial Branch Radiofrequency Ablation     WHAT YOU NEED TO KNOW:   Medial branch radiofrequency ablation (RFA) is a procedure used to treat facet joint pain in your neck, mid back, or lower back  Facet joints are found at the back of each vertebra  A needle electrode is used to send electrical currents to the nerves in your facet joint  The electrical currents create heat that damages the nerve so it cannot send pain signals  ACTIVITY  Do not drive or operate machinery today  No strenuous activity today - bending, lifting, etc   You may shower today, but do not sit in a tub of water  Resume normal activities tomorrow as tolerated  CARE OF THE INJECTION SITE  If you have soreness or pain, apply ice to the area today (20 minutes on/20 minutes off)  Starting tomorrow, you may use warm, moist heat or ice if needed  Notify the [facility] if you have any of the following: redness, drainage, swelling, or fever above 100°F     SPECIAL INSTRUCTIONS  Our office will call you tomorrow for a progress report and make an appointment for a follow up visit in 4 weeks  If you feel a sunburn-like sensation in the area of your procedure, call our office  MEDICATIONS  Continue to take all routine medications  Our office may have instructed you to hold some medications  As no general anesthesia was used in today's procedure, you should not experience any side effects related to anesthesia  If you have a problem specifically related to your procedure, please call our office at [phone]  Problems not related to your procedure should be directed to your primary care physician  mammogram

## 2023-01-04 NOTE — OP NOTE
OPERATIVE REPORT  PATIENT NAME: Veronica Conway    :  1950  MRN: [id number]  Pt Location: [facility] MINOR/PAIN ROOM 01    SURGERY DATE: 2023    Surgeon(s) and Role:     * Lisa Easton DO - Primary    Preop Diagnosis:  Lumbar postlaminectomy syndrome [M96 1]  Lumbar spondylosis [M47 816]  Lumbar back pain [M54 50]  Chronic pain syndrome [G89 4]    Post-Op Diagnosis Codes:     * Lumbar postlaminectomy syndrome [M96 1]     * Lumbar spondylosis [M47 816]     * Lumbar back pain [M54 50]     * Chronic pain syndrome [G89 4]    Procedure(s) (LRB):  L4 L5 S1 RADIO FREQUENCY ABLATION (RFA) [id number] (Right)    Indication:  Mechanical low back pain  Preoperative diagnosis:  1  Lumbar spondylosis                                                                            2  Low back pain  Postoperative diagnosis: 1  Lumbar spondylosis                                                                            2  Low back pain     Procedure:                      Fluoroscopically-guided Radiofrequency denervation of the right-sided L4, L5, S1 medial branch/dorsal ramus nerve(s)      EBL:  none  Specimens:  not applicable     After discussing the risks, benefits, and alternatives to the procedure, the patient expressed understanding and wished to proceed  The patient was brought to the fluoroscopy suite and placed in the prone position  Procedural pause conducted to verify:  correct patient identity, procedure to be performed and as applicable, correct side and site, correct patient position, and availability of implants, special equipment and special requirements  Using fluoroscopy, the junction of the transverse process and superior articulating process of the appropriate levels were identified and marked  The skin was sterilely prepped and draped in the usual fashion using Chloraprep skin prep  The skin and subcutaneous tissue were anesthetized with 1% lidocaine     Using fluoroscopic guidance, a 150 mm 18 gauge RFK RF cannula with a 10 mm active tip was advanced to each target  This was confirmed using PA, lateral and oblique fluoroscopic views  Motor testing was performed at 2Hz up to 2 volts, and the measured tissue impedances were satisfactory  With final needle positioning, there was no evidence of radicular stimulation  Prior to lesioning, 1% lidocaine was injected at each site  After waiting 1-2 minutes for local anesthesia to take effect, lesioning was performed at 90 degrees Celsius for 90 seconds  The probes were then rotated approximately 90 degrees while still maintaining contact with bone and a second lesion was performed with the exact same parameters as noted above  After RF treatment, each site received 0 25% bupivacaine mixed with 40 milligrams/milliliter Depo-Medrol  The patient tolerated the procedure well and there were no apparent complications  After appropriate observation, the patient was dismissed from the clinic in good condition under their own power                                                                                                   SIGNATURE: Fran Tate DO  DATE: January 4, 2023  TIME: 1:30 PM

## 2023-01-04 NOTE — TELEPHONE ENCOUNTER
Call pt 1/5 s/p Rt lumbar RFA  Needs ovs   Pt of Dr Chantelle Gonzalez who had RFA done by Dr Joaquina Paris
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
## 2023-01-05 NOTE — TELEPHONE ENCOUNTER
S/w pt  Denies concerns post RFA and is doing well  Scheduled 2/13 f/u ov with MG    Pt states AS discussed starting PT after RFAs  Pt is requesting PT referral for low back pain  Are you willing to place referral or should f/u ov be moved up to address request with MG?   Pt has only seen AS in office

## 2023-01-07 DIAGNOSIS — I50.22 CHRONIC SYSTOLIC CONGESTIVE HEART FAILURE (HCC): ICD-10-CM

## 2023-01-09 ENCOUNTER — ANESTHESIA (OUTPATIENT)
Dept: PERIOP | Facility: AMBULARY SURGERY CENTER | Age: 73
End: 2023-01-09

## 2023-01-09 ENCOUNTER — HOSPITAL ENCOUNTER (OUTPATIENT)
Facility: AMBULARY SURGERY CENTER | Age: 73
Setting detail: OUTPATIENT SURGERY
Discharge: HOME | End: 2023-01-09
Attending: OPHTHALMOLOGY | Admitting: OPHTHALMOLOGY

## 2023-01-09 ENCOUNTER — ANESTHESIA EVENT (OUTPATIENT)
Dept: PERIOP | Facility: AMBULARY SURGERY CENTER | Age: 73
End: 2023-01-09

## 2023-01-09 VITALS
HEIGHT: 65 IN | RESPIRATION RATE: 18 BRPM | OXYGEN SATURATION: 98 % | TEMPERATURE: 96.6 F | SYSTOLIC BLOOD PRESSURE: 179 MMHG | HEART RATE: 82 BPM | BODY MASS INDEX: 38.61 KG/M2 | DIASTOLIC BLOOD PRESSURE: 76 MMHG

## 2023-01-09 DIAGNOSIS — H25.12 AGE-RELATED NUCLEAR CATARACT OF LEFT EYE: Primary | ICD-10-CM

## 2023-01-09 DEVICE — ACRYSOF(R) IQ ASPHERIC NATURAL IOL, SINGLE-PIECE ACRYLIC FOLDABLE PCL, UV WITH BLUE LIGHTFILTER, 13.0MM LENGTH, 6.0MM ANTERIORASYMMETRIC BICONVEX OPTIC, PLANAR HAPTICS.
Type: IMPLANTABLE DEVICE | Site: EYE | Status: FUNCTIONAL
Brand: ACRYSOF®

## 2023-01-09 RX ORDER — CYCLOPENTOLATE HYDROCHLORIDE 10 MG/ML
1 SOLUTION/ DROPS OPHTHALMIC
Status: COMPLETED | OUTPATIENT
Start: 2023-01-09 | End: 2023-01-09

## 2023-01-09 RX ORDER — KETOROLAC TROMETHAMINE 5 MG/ML
1 SOLUTION OPHTHALMIC
Status: COMPLETED | OUTPATIENT
Start: 2023-01-09 | End: 2023-01-09

## 2023-01-09 RX ORDER — TETRACAINE HYDROCHLORIDE 5 MG/ML
1 SOLUTION OPHTHALMIC ONCE
Status: COMPLETED | OUTPATIENT
Start: 2023-01-09 | End: 2023-01-09

## 2023-01-09 RX ORDER — PHENYLEPHRINE HCL 2.5 %
1 DROPS OPHTHALMIC (EYE)
Status: COMPLETED | OUTPATIENT
Start: 2023-01-09 | End: 2023-01-09

## 2023-01-09 RX ORDER — GATIFLOXACIN 5 MG/ML
1 SOLUTION/ DROPS OPHTHALMIC 2 TIMES DAILY
Qty: 3 ML | Refills: 0
Start: 2023-01-09

## 2023-01-09 RX ORDER — LIDOCAINE HYDROCHLORIDE 20 MG/ML
1 JELLY TOPICAL
Status: COMPLETED | OUTPATIENT
Start: 2023-01-09 | End: 2023-01-09

## 2023-01-09 RX ORDER — LIDOCAINE HYDROCHLORIDE 10 MG/ML
INJECTION, SOLUTION EPIDURAL; INFILTRATION; INTRACAUDAL; PERINEURAL AS NEEDED
Status: DISCONTINUED | OUTPATIENT
Start: 2023-01-09 | End: 2023-01-09 | Stop reason: HOSPADM

## 2023-01-09 RX ORDER — TETRACAINE HYDROCHLORIDE 5 MG/ML
SOLUTION OPHTHALMIC AS NEEDED
Status: DISCONTINUED | OUTPATIENT
Start: 2023-01-09 | End: 2023-01-09 | Stop reason: HOSPADM

## 2023-01-09 RX ORDER — MIDAZOLAM HYDROCHLORIDE 2 MG/2ML
INJECTION, SOLUTION INTRAMUSCULAR; INTRAVENOUS AS NEEDED
Status: DISCONTINUED | OUTPATIENT
Start: 2023-01-09 | End: 2023-01-09

## 2023-01-09 RX ORDER — SACUBITRIL AND VALSARTAN 97; 103 MG/1; MG/1
TABLET, FILM COATED ORAL
Qty: 180 TABLET | Refills: 3 | Status: SHIPPED | OUTPATIENT
Start: 2023-01-09

## 2023-01-09 RX ORDER — GATIFLOXACIN 5 MG/ML
SOLUTION/ DROPS OPHTHALMIC AS NEEDED
Status: DISCONTINUED | OUTPATIENT
Start: 2023-01-09 | End: 2023-01-09 | Stop reason: HOSPADM

## 2023-01-09 RX ORDER — BALANCED SALT SOLUTION 6.4; .75; .48; .3; 3.9; 1.7 MG/ML; MG/ML; MG/ML; MG/ML; MG/ML; MG/ML
SOLUTION OPHTHALMIC AS NEEDED
Status: DISCONTINUED | OUTPATIENT
Start: 2023-01-09 | End: 2023-01-09 | Stop reason: HOSPADM

## 2023-01-09 RX ORDER — KETOROLAC TROMETHAMINE 5 MG/ML
1 SOLUTION OPHTHALMIC 4 TIMES DAILY
Qty: 5 ML | Refills: 0
Start: 2023-01-09

## 2023-01-09 RX ADMIN — KETOROLAC TROMETHAMINE 1 DROP: 5 SOLUTION OPHTHALMIC at 08:48

## 2023-01-09 RX ADMIN — LIDOCAINE HYDROCHLORIDE 1 APPLICATION: 20 JELLY TOPICAL at 09:03

## 2023-01-09 RX ADMIN — CYCLOPENTOLATE HYDROCHLORIDE 1 DROP: 10 SOLUTION/ DROPS OPHTHALMIC at 09:03

## 2023-01-09 RX ADMIN — PHENYLEPHRINE HYDROCHLORIDE 1 DROP: 25 SOLUTION/ DROPS OPHTHALMIC at 09:18

## 2023-01-09 RX ADMIN — CYCLOPENTOLATE HYDROCHLORIDE 1 DROP: 10 SOLUTION/ DROPS OPHTHALMIC at 09:18

## 2023-01-09 RX ADMIN — CYCLOPENTOLATE HYDROCHLORIDE 1 DROP: 10 SOLUTION/ DROPS OPHTHALMIC at 09:33

## 2023-01-09 RX ADMIN — PHENYLEPHRINE HYDROCHLORIDE 1 DROP: 25 SOLUTION/ DROPS OPHTHALMIC at 09:03

## 2023-01-09 RX ADMIN — CYCLOPENTOLATE HYDROCHLORIDE 1 DROP: 10 SOLUTION/ DROPS OPHTHALMIC at 08:48

## 2023-01-09 RX ADMIN — LIDOCAINE HYDROCHLORIDE 1 APPLICATION: 20 JELLY TOPICAL at 09:19

## 2023-01-09 RX ADMIN — MIDAZOLAM 2 MG: 1 INJECTION INTRAMUSCULAR; INTRAVENOUS at 09:45

## 2023-01-09 RX ADMIN — KETOROLAC TROMETHAMINE 1 DROP: 5 SOLUTION OPHTHALMIC at 09:33

## 2023-01-09 RX ADMIN — TETRACAINE HYDROCHLORIDE 1 DROP: 5 SOLUTION OPHTHALMIC at 08:48

## 2023-01-09 RX ADMIN — LIDOCAINE HYDROCHLORIDE 1 APPLICATION: 20 JELLY TOPICAL at 08:49

## 2023-01-09 RX ADMIN — KETOROLAC TROMETHAMINE 1 DROP: 5 SOLUTION OPHTHALMIC at 09:03

## 2023-01-09 RX ADMIN — KETOROLAC TROMETHAMINE 1 DROP: 5 SOLUTION OPHTHALMIC at 09:18

## 2023-01-09 RX ADMIN — PHENYLEPHRINE HYDROCHLORIDE 1 DROP: 25 SOLUTION/ DROPS OPHTHALMIC at 09:33

## 2023-01-09 RX ADMIN — PHENYLEPHRINE HYDROCHLORIDE 1 DROP: 25 SOLUTION/ DROPS OPHTHALMIC at 08:48

## 2023-01-09 NOTE — ANESTHESIA POSTPROCEDURE EVALUATION
Post-Op Assessment Note    CV Status:  Stable    Pain management: adequate     Mental Status:  Alert and awake   Hydration Status:  Euvolemic   PONV Controlled:  Controlled   Airway Patency:  Patent      Post Op Vitals Reviewed: Yes      Staff: CRNA         No notable events documented      BP   179/67   Temp     Pulse  68   Resp   14   SpO2   98

## 2023-01-09 NOTE — ANESTHESIA PREPROCEDURE EVALUATION
Procedure:  EXTRACTION EXTRACAPSULAR CATARACT PHACO INTRAOCULAR LENS (IOL) (Left: Eye)    Relevant Problems   CARDIO   (+) Chronic systolic congestive heart failure (HCC)   (+) Essential hypertension   (+) History Abnormal heart rhythm      ENDO   (+) Type 2 diabetes mellitus without complication, without long-term current use of insulin (HCC)      MUSCULOSKELETAL   (+) Chronic right-sided low back pain without sciatica   (+) Lumbar spondylosis   (+) Neurogenic claudication due to lumbar spinal stenosis      NEURO/PSYCH   (+) CVA (cerebral vascular accident) ([facility] [address] )   (+) Chronic pain syndrome   (+) Chronic right-sided low back pain without sciatica   (+) Neurogenic claudication due to lumbar spinal stenosis   (+) TIA (transient ischemic attack)      PULMONARY   (+) Mild intermittent asthma without complication   (+) LUCIA (obstructive sleep apnea)        Physical Exam    Airway    Mallampati score: II  TM Distance: >3 FB  Neck ROM: full     Dental   No notable dental hx     Cardiovascular  Cardiovascular exam normal    Pulmonary  Pulmonary exam normal     Other Findings        Anesthesia Plan  ASA Score- 3     Anesthesia Type- IV sedation with anesthesia with ASA Monitors  Additional Monitors:   Airway Plan:           Plan Factors-Exercise tolerance (METS): >4 METS  Chart reviewed  Imaging results reviewed  Existing labs reviewed  Patient summary reviewed  Patient is not a current smoker  Obstructive sleep apnea risk education given perioperatively  Induction-     Postoperative Plan-     Informed Consent- Anesthetic plan and risks discussed with patient  I personally reviewed this patient with the CRNA  Discussed and agreed on the Anesthesia Plan with the CRNA  Sharon Edmond

## 2023-01-09 NOTE — OP NOTE
OPERATIVE REPORT    PATIENT NAME: Shakira Walker    :  1950  MRN: [id number]  Pt Location: [facility] OR ROOM 01    Surgery Date: 2023    Surgeon(s) and Role:     * Samir Rodgers MD - Primary    Age-related nuclear cataract, left eye [H25 12]    Post-Op Diagnosis Codes:     * Age-related nuclear cataract, left eye [H25 12]    Procedure(s):  EXTRACTION EXTRACAPSULAR CATARACT PHACO INTRAOCULAR LENS (IOL)    Anesthesia Type:   IV Sedation with Anesthesia    Operative Indications:  Age-related nuclear cataract, left eye [H25 12]  Decreased vision to 20/40 at near  With problems reading  Pt requested cataract sx the left eye    Procedure and Technique:    Procedure Details     The patient was brought in the OR in stable condition and placed on the operative table  The left eye was prepped and draped in the usual sterile manner  Attention was directed to the left eye where a lid speculum was placed  A 2 4 mm clear corneal incision was made temperally  1/2 cc of 1% MPF Lidocaine was irrigated into the anterior chamber followed by viscoat  The side port incision was placed superiorly  The capsularrhexis was made and the nucleus was hydrodissected with BSS  The nucleus was then removed with the phaco handpiece followed by removal of the cortical material with the I/A handpiece  The capsular bag was then filled with Provisc  The IOL was folded and placed in to the capsular bag and centered well  The remaining Provisc was removed from the eye with the I/A  The wounds were hydrated with BSS and found to be water tight  The lid speculum was removed and 2 drops of Gatifloxicin were placed over the cornea  A protective eye shield was taped over the eye and the patient went to PACU in stable condition  I will see the patient in the office tomorrow and the expected post op period is a few weeks         Complications: None        Disposition: PACU   Condition: Stable    SIGNATURE: Samir Rodgers MD  DATE:  2023  TIME: 10:12 AM

## 2023-01-09 NOTE — DISCHARGE INSTR - AVS FIRST PAGE
Dr Parul Crowe Cataract Instructions    Activity:     1  No Driving until instructed   2  Keep shield on until seen tomorrow except when administering drops   3  No heavy lifting   4  No water in eye     Diet:     1  Resume normal diet    Normal Symptoms:     1  Mild Headache   2  Scratchy or picky feeling around eye    Call the office if:     1  You have any questions or concerns   2  If eye pain is not relieved by extra strength tylenol    Office phone number:  [phone]      Next appointment:     1  See Dr Parul Crowe at his office tomorrow as scheduled   __________________________________________________________   2  Bring blue eye kit with you and eyedrops to the office    A new set of comprehensive instructions will be given and reviewed with you during your office visit tomorrow

## 2023-01-13 DIAGNOSIS — J45.20 MILD INTERMITTENT ASTHMA WITHOUT COMPLICATION: ICD-10-CM

## 2023-02-04 LAB — COLOGUARD RESULT REPORTABLE: NEGATIVE

## 2023-02-09 ENCOUNTER — OFFICE VISIT (OUTPATIENT)
Dept: CARDIOLOGY CLINIC | Facility: CLINIC | Age: 73
End: 2023-02-09

## 2023-02-09 VITALS
HEART RATE: 79 BPM | HEIGHT: 64 IN | BODY MASS INDEX: 40.19 KG/M2 | WEIGHT: 235.4 LBS | SYSTOLIC BLOOD PRESSURE: 140 MMHG | OXYGEN SATURATION: 98 % | DIASTOLIC BLOOD PRESSURE: 60 MMHG

## 2023-02-09 DIAGNOSIS — I50.22 CHRONIC SYSTOLIC CONGESTIVE HEART FAILURE (HCC): Primary | ICD-10-CM

## 2023-02-09 DIAGNOSIS — G45.9 TIA (TRANSIENT ISCHEMIC ATTACK): ICD-10-CM

## 2023-02-09 DIAGNOSIS — I10 ESSENTIAL HYPERTENSION: ICD-10-CM

## 2023-02-09 DIAGNOSIS — G47.33 OSA (OBSTRUCTIVE SLEEP APNEA): ICD-10-CM

## 2023-02-09 DIAGNOSIS — E66.2 MORBID (SEVERE) OBESITY WITH ALVEOLAR HYPOVENTILATION (HCC): ICD-10-CM

## 2023-02-09 NOTE — PROGRESS NOTES
Cardiology Followup    Mago De Guzman  [id number]  1950  [facility] [address]  [facility] [address] [address]    Consult for: CHF    HPI: Mago De Guzman is a 67y o  year old female who is here for followup of CHF  Since her last visit, she has undergone multiple procedures including bilateral cataract surgery and nerve ablation for back pain  She had procedure done twice  First procedure there was some improvement but she developed severe pain afterwards  She did not have any significant change with second procedure  She denies any chest pain  She has chronic dyspnea as she goes upstairs but denies any lower extremity edema, orthopnea or paroxysmal nocturnal dyspnea  Home systolic blood pressure has been ranging from 125-145 mm Hg with average SBP near 130 mmHg  Past Cardiac History: In August 2019, She had a cardiac MRI done for the evaluation of cardiomyopathy  MRI showed EF of 41% with a thin strip of intramyocardial fibrosis consistent with non-ischemic cardiomyopathy  In January 2019, she had an echocardiogram done during hospitalization for influenza  Echocardiogram showed EF of 45% with small pericardial effusion and mild pulmonary hypertension  She has no history of CHF or CAD  History of CVA in 2014 with left arm weakness  Was treated with TPA  2 years prior to that she had workup by Dr Malcom Talamantes including catheterization which was normal   Patient had been on amiodarone since her CVA but denies history of atrial fibrillation  Amiodarone was stopped in 2019  Repeat 2D echocardiogram was done in June which showed an ejection fraction of 35%  She has a family history of CHF with 2 brothers who have pacemakers (one after a MI) both parents had CHF as well  Cardiac catheterization showed nonobstructive CAD  EF was 30-35%    Holter monitor was done which showed 5 episodes of VT with longest lasting 12 beats at 111 bpm   She had no symptoms at the time       The following portions of the patient's history were reviewed and updated as appropriate: allergies, current medications, past family history, past medical history, past social history, past surgical history and problem list        Current Outpatient Medications:   •  aspirin 325 mg tablet, Take 325 mg by mouth daily, Disp: , Rfl:   •  benzonatate (TESSALON PERLES) 100 mg capsule, Take 1 capsule (100 mg total) by mouth 3 (three) times a day as needed for cough, Disp: 270 capsule, Rfl: 0  •  Cholecalciferol (VITAMIN D-3) 1000 units CAPS, Take 5,000 Units by mouth daily, Disp: , Rfl:   •  Entresto  MG TABS, TAKE 1 TABLET BY MOUTH  TWICE DAILY, Disp: 180 tablet, Rfl: 3  •  gatifloxacin (ZYMAXID) 0 5 %, Administer 1 drop into the left eye 2 (two) times a day, Disp: 3 mL, Rfl: 0  •  ibuprofen (MOTRIN) 200 mg tablet, Take by mouth if needed  , Disp: , Rfl:   •  ipratropium (ATROVENT) 0 03 % nasal spray, USE 2 SPRAYS IN BOTH  NOSTRILS 3 TIMES DAILY AS  NEEDED FOR RHINITIS, Disp: 120 mL, Rfl: 3  •  ketorolac (ACULAR) 0 5 % ophthalmic solution, Administer 1 drop into the left eye 4 (four) times a day, Disp: 5 mL, Rfl: 0  •  lidocaine (LIDODERM) 5 %, Apply 1 patch topically daily Remove & Discard patch within 12 hours or as directed by MD, Disp: 90 patch, Rfl: 3  •  Melatonin 10 MG TABS, Take by mouth daily at bedtime, Disp: , Rfl:   •  Multiple Vitamins-Minerals (PRESERVISION AREDS 2+MULTI VIT PO), 2 (two) times a day, Disp: , Rfl:   •  spironolactone (ALDACTONE) 25 mg tablet, TAKE 1 TABLET BY MOUTH  DAILY, Disp: 90 tablet, Rfl: 3  •  torsemide (DEMADEX) 10 mg tablet, Take 1 tablet (10 mg total) by mouth daily as needed (swelling), Disp: 90 tablet, Rfl: 1  •  Ventolin  (90 Base) MCG/ACT inhaler, INHALE 2 PUFFS EVERY 6 HOURS AS NEEDED FOR WHEEZING, Disp: 18 g, Rfl: 2  •  vitamin E, tocopherol, 400 units capsule, Take 400 Units by mouth daily, Disp: , Rfl:   Allergies   Allergen Reactions   • Breo Ellipta [Fluticasone Furoate-Vilanterol] Anaphylaxis   • Carvedilol Chest Pain and Hypertension   • Lisinopril Other (See Comments)     Dizziness, cough   • Olmesartan Medoxomil-Hctz Other (See Comments)     Category: Adverse Reaction; Annotation - [id number]: dizzy   • Doxycycline Rash         Review of Systems:  Review of Systems   Respiratory: Positive for shortness of breath  Cardiovascular: Negative for chest pain, palpitations and leg swelling  Musculoskeletal: Positive for arthralgias, back pain and myalgias  All other systems reviewed and are negative  Physical Exam:  Vitals:    02/09/23 1301   BP: 140/60   BP Location: Left arm   Patient Position: Sitting   Cuff Size: Standard   Pulse: 79   SpO2: 98%   Weight: 107 kg (235 lb 6 4 oz)   Height: 5' 4" (1 626 m)     Physical Exam  Constitutional:       General: She is not in acute distress  Appearance: She is well-developed  She is not diaphoretic  HENT:      Head: Normocephalic and atraumatic  Eyes:      Conjunctiva/sclera: Conjunctivae normal       Pupils: Pupils are equal, round, and reactive to light  Neck:      Thyroid: No thyromegaly  Vascular: No JVD  Cardiovascular:      Rate and Rhythm: Normal rate and regular rhythm  Heart sounds: Normal heart sounds  No murmur heard  No friction rub  No gallop  Pulmonary:      Effort: Pulmonary effort is normal       Breath sounds: Normal breath sounds  Abdominal:      General: There is no distension  Musculoskeletal:      Cervical back: Neck supple  Right lower leg: No edema  Left lower leg: No edema  Skin:     General: Skin is warm and dry  Findings: No erythema or rash  Neurological:      General: No focal deficit present  Mental Status: She is alert and oriented to person, place, and time  Cranial Nerves: No cranial nerve deficit     Psychiatric:         Mood and Affect: Mood normal  Behavior: Behavior normal          Thought Content: Thought content normal          Judgment: Judgment normal          Labs:  Lab Results   Component Value Date    K 4 6 11/15/2022     (H) 11/15/2022    CO2 26 11/15/2022    BUN 22 11/15/2022    CREATININE 0 70 11/15/2022    CALCIUM 10 2 (H) 11/15/2022     Lab Results   Component Value Date    WBC 8 70 11/15/2022    HGB 12 0 11/15/2022    HCT 37 7 11/15/2022    MCV 94 11/15/2022     11/15/2022     Lab Results   Component Value Date    TRIG 150 11/15/2022    HDL 58 11/15/2022     EKG (independently reviewed): NSR with LVH and nonspecific ST abnormalities    Discussion/Summary:  1  Chronic systolic congestive heart failure ([address] )    2  Essential hypertension    3  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)    4  TIA (transient ischemic attack)    5  LUCIA (obstructive sleep apnea)      - patient's EF was 41% on cardiac MRI with similar EF on most recent echocardiogram   Study consistent with a nonischemic cardiomyopathy  She had nonobstructive CAD on cath  Holter monitor did not show frequent PVCs but episodes of VT was seen  TSH was normal  She does not drink    - Continue Entresto and spironolactone  She cannot tolerate beta blocker (has tried metoprolol and carvedilol)  Tolerating Entresto   mg daily  - most recent renal function was normal   Potassium level was also normal   -She is limited because of back pain  Once symptoms improve, recommend exercise program and weight loss  - torsemide 10 mg  She may continue to use as needed  Monitor daily weights and if increase in > 3 pounds, she should take dose  - Followup with pulmonologist for CPAP adjustment as needed  -   last echocardiogram was done in January 2021 which showed EF of 40-45%  -Would benefit from the addition of Bonna Piano or Howie Stain  Patient does not wish to use any additional medications at this time

## 2023-02-13 ENCOUNTER — OFFICE VISIT (OUTPATIENT)
Dept: PAIN MEDICINE | Facility: CLINIC | Age: 73
End: 2023-02-13

## 2023-02-13 ENCOUNTER — APPOINTMENT (OUTPATIENT)
Dept: RADIOLOGY | Facility: CLINIC | Age: 73
End: 2023-02-13

## 2023-02-13 VITALS
WEIGHT: 236 LBS | DIASTOLIC BLOOD PRESSURE: 71 MMHG | SYSTOLIC BLOOD PRESSURE: 129 MMHG | HEART RATE: 78 BPM | BODY MASS INDEX: 40.51 KG/M2 | TEMPERATURE: 98.2 F

## 2023-02-13 DIAGNOSIS — M54.50 CHRONIC RIGHT-SIDED LOW BACK PAIN WITHOUT SCIATICA: ICD-10-CM

## 2023-02-13 DIAGNOSIS — M47.816 LUMBAR SPONDYLOSIS: ICD-10-CM

## 2023-02-13 DIAGNOSIS — G89.29 CHRONIC RIGHT-SIDED LOW BACK PAIN WITHOUT SCIATICA: ICD-10-CM

## 2023-02-13 DIAGNOSIS — G89.4 CHRONIC PAIN SYNDROME: Primary | ICD-10-CM

## 2023-02-13 DIAGNOSIS — M96.1 LUMBAR POST-LAMINECTOMY SYNDROME: ICD-10-CM

## 2023-02-13 DIAGNOSIS — M79.18 MYOFASCIAL PAIN SYNDROME: Primary | ICD-10-CM

## 2023-02-13 RX ORDER — PREDNISONE 10 MG/1
TABLET ORAL
Qty: 26 TABLET | Refills: 0 | Status: SHIPPED | OUTPATIENT
Start: 2023-02-13 | End: 2023-02-23
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
## 2023-02-17 ENCOUNTER — TELEPHONE (OUTPATIENT)
Dept: PAIN MEDICINE | Facility: CLINIC | Age: 73
End: 2023-02-17

## 2023-02-17 DIAGNOSIS — G89.29 CHRONIC RIGHT-SIDED LOW BACK PAIN WITHOUT SCIATICA: ICD-10-CM

## 2023-02-17 DIAGNOSIS — M54.50 CHRONIC RIGHT-SIDED LOW BACK PAIN WITHOUT SCIATICA: ICD-10-CM

## 2023-02-17 DIAGNOSIS — M47.816 LUMBAR SPONDYLOSIS: Primary | ICD-10-CM

## 2023-02-17 RX ORDER — METHOCARBAMOL 500 MG/1
500 TABLET, FILM COATED ORAL 4 TIMES DAILY
Qty: 60 TABLET | Refills: 0 | Status: SHIPPED | OUTPATIENT
Start: 2023-02-17

## 2023-02-17 NOTE — TELEPHONE ENCOUNTER
S/w pt and advised of same  Pt appreciative of muscle relaxer  Advised to see how this medication affects her before driving or operating machinery as it can cause drowsiness  Pt would like to try PT  Pt will start after she finishes the steroid  Can order please be placed?

## 2023-02-17 NOTE — TELEPHONE ENCOUNTER
----- Message from [facility], Conrad Dhruv  sent at 2/17/2023  1:20 PM EST -----  Straightening of lordosis-  This could cause muscle tightness  You may benefit from a muscle relaxer  You also may benefit from physical therapy regarding flat back syndrome  This is when you back straightens and loses the natural curve this could make standing up straight and walking painful

## 2023-03-06 ENCOUNTER — OFFICE VISIT (OUTPATIENT)
Dept: PULMONOLOGY | Facility: MEDICAL CENTER | Age: 73
End: 2023-03-06

## 2023-03-06 VITALS
HEIGHT: 64 IN | SYSTOLIC BLOOD PRESSURE: 122 MMHG | WEIGHT: 235 LBS | RESPIRATION RATE: 12 BRPM | OXYGEN SATURATION: 96 % | HEART RATE: 91 BPM | DIASTOLIC BLOOD PRESSURE: 82 MMHG | TEMPERATURE: 98.4 F | BODY MASS INDEX: 40.12 KG/M2

## 2023-03-06 DIAGNOSIS — R05.9 COUGH, UNSPECIFIED TYPE: ICD-10-CM

## 2023-03-06 DIAGNOSIS — J45.20 MILD INTERMITTENT ASTHMA WITHOUT COMPLICATION: ICD-10-CM

## 2023-03-06 DIAGNOSIS — G47.33 OSA (OBSTRUCTIVE SLEEP APNEA): Primary | ICD-10-CM

## 2023-03-06 DIAGNOSIS — E66.2 MORBID (SEVERE) OBESITY WITH ALVEOLAR HYPOVENTILATION (HCC): ICD-10-CM

## 2023-03-06 NOTE — PATIENT INSTRUCTIONS
Continue CPAP at home with full facemask    Dr Jacqueline Guevara -better fast and make it last    Can use benzonatate 100 mg 1 capsule when needed up to twice a day or so
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
## 2023-03-07 NOTE — ASSESSMENT & PLAN NOTE
She does use oxygen 2 L/min with her CPAP at nighttime  This is likely due to alveolar hypoventilation from obesity  Spirometry in 2019 showed moderate to severe restrictive impairment with forced vital capacity of 50% of predicted  Did encourage her to try to lose some weight    She bought her own oxygen concentrator and uses 2 L/min of oxygen with her CPAP at night  I will order nocturnal pulse oximetry recording to see if her oxygen saturations are satisfactory with the supplemental oxygen

## 2023-03-07 NOTE — ASSESSMENT & PLAN NOTE
Moderate obstructive sleep apnea with good compliance to auto CPAP therapy  She does use fullface mask  I reviewed compliance data from past month and she used for average just under 4 hours per night  Her auto CPAP is set at 7 to 20 cm of water  She had an average AHI of 1 0 which is good and her average CPAP pressure was 19  DME company is [employer] for she will continue using CPAP  She was uses 2 L of oxygen with her CPAP at night

## 2023-03-07 NOTE — ASSESSMENT & PLAN NOTE
This does have periodic cough and will probably take benzonatate 100 mg which helps    She also has been ongoing she uses periodically

## 2023-03-07 NOTE — ASSESSMENT & PLAN NOTE
No wheezing on examination today  She does use her Ventolin inhaler  Not having any problem with her asthma now      She positive methacholine challenge study in 2017

## 2023-03-13 ENCOUNTER — TELEPHONE (OUTPATIENT)
Dept: PULMONOLOGY | Facility: CLINIC | Age: 73
End: 2023-03-13

## 2023-03-13 NOTE — TELEPHONE ENCOUNTER
Shania Yost from [facility] [phone] option #1 she would like a call back regarding Pulse oximetry order if someone can give her a call back  She said it stated one thing and patient is telling her another and just wanted to confirm

## 2023-03-16 ENCOUNTER — OFFICE VISIT (OUTPATIENT)
Dept: PAIN MEDICINE | Facility: CLINIC | Age: 73
End: 2023-03-16

## 2023-03-16 VITALS
HEART RATE: 91 BPM | DIASTOLIC BLOOD PRESSURE: 61 MMHG | SYSTOLIC BLOOD PRESSURE: 155 MMHG | WEIGHT: 238 LBS | BODY MASS INDEX: 40.85 KG/M2

## 2023-03-16 DIAGNOSIS — M62.838 MUSCLE SPASM: ICD-10-CM

## 2023-03-16 DIAGNOSIS — M51.16 LUMBAR DISC DISEASE WITH RADICULOPATHY: ICD-10-CM

## 2023-03-16 DIAGNOSIS — G89.4 CHRONIC PAIN SYNDROME: Primary | ICD-10-CM

## 2023-03-16 DIAGNOSIS — M96.1 POST LAMINECTOMY SYNDROME: ICD-10-CM

## 2023-03-16 DIAGNOSIS — E11.9 TYPE 2 DIABETES MELLITUS WITHOUT COMPLICATION, WITHOUT LONG-TERM CURRENT USE OF INSULIN (HCC): ICD-10-CM

## 2023-03-16 RX ORDER — TIZANIDINE 4 MG/1
4 TABLET ORAL EVERY 8 HOURS PRN
Qty: 60 TABLET | Refills: 1 | Status: SHIPPED | OUTPATIENT
Start: 2023-03-16
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
## 2023-03-20 ENCOUNTER — HOSPITAL ENCOUNTER (OUTPATIENT)
Dept: RADIOLOGY | Facility: HOSPITAL | Age: 73
Discharge: HOME | End: 2023-03-20
Attending: PHYSICAL MEDICINE & REHABILITATION

## 2023-03-20 DIAGNOSIS — M51.16 LUMBAR DISC DISEASE WITH RADICULOPATHY: ICD-10-CM

## 2023-03-20 DIAGNOSIS — M62.838 MUSCLE SPASM: ICD-10-CM

## 2023-03-20 DIAGNOSIS — M96.1 POST LAMINECTOMY SYNDROME: ICD-10-CM

## 2023-03-20 DIAGNOSIS — G89.4 CHRONIC PAIN SYNDROME: ICD-10-CM

## 2023-03-28 ENCOUNTER — TELEPHONE (OUTPATIENT)
Dept: RADIOLOGY | Facility: CLINIC | Age: 73
End: 2023-03-28

## 2023-03-28 ENCOUNTER — TELEPHONE (OUTPATIENT)
Dept: NEUROSURGERY | Facility: CLINIC | Age: 73
End: 2023-03-28

## 2023-03-28 NOTE — TELEPHONE ENCOUNTER
----- Message from Albina Roberts DO sent at 3/28/2023  1:18 PM EDT -----  Please notify patient of mild to moderate foraminal and central narrowing most notable at L3-L4 and L4-L5 with the scar tissue and continued surgical history of the L5 hemilaminectomy  We will await neurosurgical evaluation  Thank you  ----- Message -----  From: Interface, Radiology Results In  Sent: 3/24/2023  12:23 PM EDT  To: Albina Roberts DO

## 2023-03-28 NOTE — TELEPHONE ENCOUNTER
SPOKE TO PT  ADVISED OF INTAKE REVIEW RESPONSE FROM DR Nalini Adamson  PT EXPRESSED DISSATISFACTION WITH NEEDING TO SEE AP FIRST, GO FOR PT AND EDSI  SHE DEMANDED I PERSONALLY SPEAK WITH DR MORROW TO ASK HIM Kranthi Garcia 4 DOESN'T WANT TO SEE HER AND IF SHE SHOULD GO TO ANOTHER DR Donny Feldman TO EXPLAIN THE PROCESS, PT WAS NOT RECEPTIVE    SG IS AWARE OF CONVERSATION

## 2023-03-28 NOTE — TELEPHONE ENCOUNTER
"S/w pt, advised of the same  Pt verbalized understanding, states she has not scheduled an OV with neurosurgery yet  States she feels as though they \"do not want to see her\" as she was required to have MRI first, then it was recommended that she go to PT or have an injection first  Pt states staff \"wouldn't give her an appt\" (please see telephone task from neurosurgery staff dated 3/28)  Pt asked if there is another neurosurgeon she could be referred to out of network    "

## 2023-04-05 ENCOUNTER — TELEPHONE (OUTPATIENT)
Age: 73
End: 2023-04-05

## 2023-04-05 NOTE — TELEPHONE ENCOUNTER
----- Message from Sprout Route sent at 4/4/2023  1:57 PM EDT -----  Please let the patient know that there was no significant desaturation on her overnight oxygen testing  Thank you

## 2023-05-04 ENCOUNTER — HOSPITAL ENCOUNTER (OUTPATIENT)
Dept: RADIOLOGY | Facility: HOSPITAL | Age: 73
Discharge: HOME | End: 2023-05-04

## 2023-05-04 DIAGNOSIS — M47.816 LUMBAR SPONDYLOSIS: ICD-10-CM

## 2023-05-06 PROBLEM — R05.9 COUGH: Status: RESOLVED | Noted: 2020-08-16 | Resolved: 2023-05-06

## 2023-05-11 ENCOUNTER — TELEPHONE (OUTPATIENT)
Dept: PAIN MEDICINE | Facility: CLINIC | Age: 73
End: 2023-05-11

## 2023-05-11 NOTE — TELEPHONE ENCOUNTER
Caller: Noel Berry    Doctor/Jeannie Sun    Callback#: [phone]        Patient is requesting a transfer of care for the following reason: feels more comfortable with Dr Puja Lucero        Doctor: Aura Rausch     Would you release patient from your care? Doctor: Puja Lucero     Would you take patient on as a patient? Please advise,   Thank you

## 2023-05-11 NOTE — TELEPHONE ENCOUNTER
I called patient back and made her aware she is good to go and scheduled 5/18 with Dr Elmore Macoupin

## 2023-05-17 ENCOUNTER — RA CDI HCC (OUTPATIENT)
Dept: OTHER | Facility: HOSPITAL | Age: 73
End: 2023-05-17

## 2023-05-17 NOTE — PROGRESS NOTES
I11 0   [facility]  coding opportunities          Chart Reviewed number of suggestions sent to Provider: 1     Patients Insurance     Medicare Insurance: [employer]

## 2023-05-18 ENCOUNTER — TELEPHONE (OUTPATIENT)
Dept: CARDIOLOGY CLINIC | Facility: CLINIC | Age: 73
End: 2023-05-18

## 2023-05-18 ENCOUNTER — TELEPHONE (OUTPATIENT)
Dept: PAIN MEDICINE | Facility: CLINIC | Age: 73
End: 2023-05-18

## 2023-05-18 ENCOUNTER — OFFICE VISIT (OUTPATIENT)
Dept: PAIN MEDICINE | Facility: CLINIC | Age: 73
End: 2023-05-18

## 2023-05-18 VITALS
DIASTOLIC BLOOD PRESSURE: 64 MMHG | WEIGHT: 238 LBS | SYSTOLIC BLOOD PRESSURE: 132 MMHG | HEART RATE: 81 BPM | BODY MASS INDEX: 40.85 KG/M2

## 2023-05-18 DIAGNOSIS — M54.16 LUMBAR RADICULOPATHY: ICD-10-CM

## 2023-05-18 DIAGNOSIS — G89.4 CHRONIC PAIN SYNDROME: Primary | ICD-10-CM

## 2023-05-18 DIAGNOSIS — M47.816 LUMBAR FACET ARTHROPATHY: ICD-10-CM

## 2023-05-18 DIAGNOSIS — M47.816 LUMBAR SPONDYLOSIS: ICD-10-CM

## 2023-05-18 NOTE — TELEPHONE ENCOUNTER
PATIENT WILL BE UNDERGOING EPIDORAL SHOTS AND WILL NEED TO BE OFF HER THINNERS 6 DAYS PRIOR TO THE SHOTS  WILL NEED OK (CLEARANCE ) FROM DR WILDE

## 2023-05-18 NOTE — TELEPHONE ENCOUNTER
This patient is being considered for a neuraxial injection for the management of their pain  However, the patient is currently on an anticoagulant per your orders  The [employer] Guideline on neuraxial procedures and anti-coagulation indicates that medication should be held as follows: Aspirin 6 days prior to injection  Please advise if you ok the hold of this medication      Thank you,    Mita Aguilera  Procedure   [facility]

## 2023-05-23 ENCOUNTER — APPOINTMENT (OUTPATIENT)
Dept: LAB | Facility: CLINIC | Age: 73
End: 2023-05-23

## 2023-05-23 DIAGNOSIS — E11.9 TYPE 2 DIABETES MELLITUS WITHOUT COMPLICATION, WITHOUT LONG-TERM CURRENT USE OF INSULIN (HCC): ICD-10-CM

## 2023-05-23 DIAGNOSIS — I10 ESSENTIAL HYPERTENSION: ICD-10-CM

## 2023-05-23 LAB
ALBUMIN SERPL BCP-MCNC: 3.8 G/DL (ref 3.5–5)
ALP SERPL-CCNC: 47 U/L (ref 46–116)
ALT SERPL W P-5'-P-CCNC: 33 U/L (ref 12–78)
ANION GAP SERPL CALCULATED.3IONS-SCNC: 2 MMOL/L (ref 4–13)
AST SERPL W P-5'-P-CCNC: 12 U/L (ref 5–45)
BASOPHILS # BLD AUTO: 0.05 THOUSANDS/ÂΜL (ref 0–0.1)
BASOPHILS NFR BLD AUTO: 1 % (ref 0–1)
BILIRUB SERPL-MCNC: 0.33 MG/DL (ref 0.2–1)
BUN SERPL-MCNC: 17 MG/DL (ref 5–25)
CALCIUM SERPL-MCNC: 9.9 MG/DL (ref 8.3–10.1)
CHLORIDE SERPL-SCNC: 108 MMOL/L (ref 96–108)
CHOLEST SERPL-MCNC: 220 MG/DL
CO2 SERPL-SCNC: 27 MMOL/L (ref 21–32)
CREAT SERPL-MCNC: 0.65 MG/DL (ref 0.6–1.3)
EOSINOPHIL # BLD AUTO: 0.26 THOUSAND/ÂΜL (ref 0–0.61)
EOSINOPHIL NFR BLD AUTO: 3 % (ref 0–6)
ERYTHROCYTE [DISTWIDTH] IN BLOOD BY AUTOMATED COUNT: 13.2 % (ref 11.6–15.1)
EST. AVERAGE GLUCOSE BLD GHB EST-MCNC: 197 MG/DL
GFR SERPL CREATININE-BSD FRML MDRD: 88 ML/MIN/1.73SQ M
GLUCOSE P FAST SERPL-MCNC: 207 MG/DL (ref 65–99)
HBA1C MFR BLD: 8.5 %
HCT VFR BLD AUTO: 39.3 % (ref 34.8–46.1)
HDLC SERPL-MCNC: 54 MG/DL
HGB BLD-MCNC: 12.6 G/DL (ref 11.5–15.4)
IMM GRANULOCYTES # BLD AUTO: 0.02 THOUSAND/UL (ref 0–0.2)
IMM GRANULOCYTES NFR BLD AUTO: 0 % (ref 0–2)
LDLC SERPL CALC-MCNC: 118 MG/DL (ref 0–100)
LYMPHOCYTES # BLD AUTO: 2.64 THOUSANDS/ÂΜL (ref 0.6–4.47)
LYMPHOCYTES NFR BLD AUTO: 30 % (ref 14–44)
MCH RBC QN AUTO: 29.9 PG (ref 26.8–34.3)
MCHC RBC AUTO-ENTMCNC: 32.1 G/DL (ref 31.4–37.4)
MCV RBC AUTO: 93 FL (ref 82–98)
MONOCYTES # BLD AUTO: 0.81 THOUSAND/ÂΜL (ref 0.17–1.22)
MONOCYTES NFR BLD AUTO: 9 % (ref 4–12)
NEUTROPHILS # BLD AUTO: 5.11 THOUSANDS/ÂΜL (ref 1.85–7.62)
NEUTS SEG NFR BLD AUTO: 57 % (ref 43–75)
NONHDLC SERPL-MCNC: 166 MG/DL
NRBC BLD AUTO-RTO: 0 /100 WBCS
PLATELET # BLD AUTO: 344 THOUSANDS/UL (ref 149–390)
PMV BLD AUTO: 11 FL (ref 8.9–12.7)
POTASSIUM SERPL-SCNC: 4.3 MMOL/L (ref 3.5–5.3)
PROT SERPL-MCNC: 6.7 G/DL (ref 6.4–8.4)
RBC # BLD AUTO: 4.22 MILLION/UL (ref 3.81–5.12)
SODIUM SERPL-SCNC: 137 MMOL/L (ref 135–147)
TRIGL SERPL-MCNC: 239 MG/DL
WBC # BLD AUTO: 8.89 THOUSAND/UL (ref 4.31–10.16)

## 2023-05-25 ENCOUNTER — OFFICE VISIT (OUTPATIENT)
Dept: FAMILY MEDICINE CLINIC | Facility: CLINIC | Age: 73
End: 2023-05-25

## 2023-05-25 VITALS
TEMPERATURE: 97.9 F | BODY MASS INDEX: 39.44 KG/M2 | DIASTOLIC BLOOD PRESSURE: 68 MMHG | RESPIRATION RATE: 18 BRPM | HEIGHT: 64 IN | HEART RATE: 75 BPM | SYSTOLIC BLOOD PRESSURE: 142 MMHG | WEIGHT: 231 LBS

## 2023-05-25 DIAGNOSIS — Z00.00 MEDICARE ANNUAL WELLNESS VISIT, SUBSEQUENT: Primary | ICD-10-CM

## 2023-05-25 DIAGNOSIS — I10 ESSENTIAL HYPERTENSION: ICD-10-CM

## 2023-05-25 DIAGNOSIS — E11.9 TYPE 2 DIABETES MELLITUS WITHOUT COMPLICATION, WITHOUT LONG-TERM CURRENT USE OF INSULIN (HCC): ICD-10-CM

## 2023-05-25 DIAGNOSIS — M96.1 POSTLAMINECTOMY SYNDROME, LUMBAR REGION: ICD-10-CM

## 2023-05-25 NOTE — ASSESSMENT & PLAN NOTE
Lab Results   Component Value Date    HGBA1C 8 5 (H) 05/23/2023     Control is poor and has worsened  She is not on any medications  Discussed options, would recommend starting metformin  We could have her work on diet and exercise and recheck in 3 months  She opts to recheck  She and her  will continue to try to lose weight and exercise and will recheck labs in 3 months  Discussed need for good foot and eye care

## 2023-05-25 NOTE — PATIENT INSTRUCTIONS
Medicare Preventive Visit Patient Instructions  Thank you for completing your Welcome to Medicare Visit or Medicare Annual Wellness Visit today  Your next wellness visit will be due in one year (5/25/2024)  The screening/preventive services that you may require over the next 5-10 years are detailed below  Some tests may not apply to you based off risk factors and/or age  Screening tests ordered at today's visit but not completed yet may show as past due  Also, please note that scanned in results may not display below  Preventive Screenings:  Service Recommendations Previous Testing/Comments   Colorectal Cancer Screening  * Colonoscopy    * Fecal Occult Blood Test (FOBT)/Fecal Immunochemical Test (FIT)  * Fecal DNA/Cologuard Test  * Flexible Sigmoidoscopy Age: 39-70 years old   Colonoscopy: every 10 years (may be performed more frequently if at higher risk)  OR  FOBT/FIT: every 1 year  OR  Cologuard: every 3 years  OR  Sigmoidoscopy: every 5 years  Screening may be recommended earlier than age 39 if at higher risk for colorectal cancer  Also, an individualized decision between you and your healthcare provider will decide whether screening between the ages of 74-80 would be appropriate  Colonoscopy: 01/30/2023  FOBT/FIT: Not on file  Cologuard: 01/30/2023  Sigmoidoscopy: Not on file    Screening Current     Breast Cancer Screening Age: 36 years old  Frequency: every 1-2 years  Not required if history of left and right mastectomy Mammogram: 09/01/2010        Cervical Cancer Screening Between the ages of 21-29, pap smear recommended once every 3 years  Between the ages of 33-67, can perform pap smear with HPV co-testing every 5 years     Recommendations may differ for women with a history of total hysterectomy, cervical cancer, or abnormal pap smears in past  Pap Smear: Not on file    Screening Not Indicated   Hepatitis C Screening Once for adults born between 1945 and 1965  More frequently in patients at high risk for Hepatitis C Hep C Antibody: Not on file        Diabetes Screening 1-2 times per year if you're at risk for diabetes or have pre-diabetes Fasting glucose: 207 mg/dL (5/23/2023)  A1C: 8 5 % (5/23/2023)  Screening Not Indicated  History Diabetes   Cholesterol Screening Once every 5 years if you don't have a lipid disorder  May order more often based on risk factors  Lipid panel: 05/23/2023    Screening Current     Other Preventive Screenings Covered by Medicare:  1  Abdominal Aortic Aneurysm (AAA) Screening: covered once if your at risk  You're considered to be at risk if you have a family history of AAA  2  Lung Cancer Screening: covers low dose CT scan once per year if you meet all of the following conditions: (1) Age 50-69; (2) No signs or symptoms of lung cancer; (3) Current smoker or have quit smoking within the last 15 years; (4) You have a tobacco smoking history of at least 20 pack years (packs per day multiplied by number of years you smoked); (5) You get a written order from a healthcare provider  3  Glaucoma Screening: covered annually if you're considered high risk: (1) You have diabetes OR (2) Family history of glaucoma OR (3)  aged 48 and older OR (3)  American aged 72 and older  3  Osteoporosis Screening: covered every 2 years if you meet one of the following conditions: (1) You're estrogen deficient and at risk for osteoporosis based off medical history and other findings; (2) Have a vertebral abnormality; (3) On glucocorticoid therapy for more than 3 months; (4) Have primary hyperparathyroidism; (5) On osteoporosis medications and need to assess response to drug therapy  · Last bone density test (DXA Scan): Not on file  5  HIV Screening: covered annually if you're between the age of 12-76  Also covered annually if you are younger than 13 and older than 72 with risk factors for HIV infection   For pregnant patients, it is covered up to 3 times per pregnancy  Immunizations:  Immunization Recommendations   Influenza Vaccine Annual influenza vaccination during flu season is recommended for all persons aged >= 6 months who do not have contraindications   Pneumococcal Vaccine   * Pneumococcal conjugate vaccine = PCV13 (Prevnar 13), PCV15 (Vaxneuvance), PCV20 (Prevnar 20)  * Pneumococcal polysaccharide vaccine = PPSV23 (Pneumovax) Adults 25-60 years old: 1-3 doses may be recommended based on certain risk factors  Adults 72 years old: 1-2 doses may be recommended based off what pneumonia vaccine you previously received   Hepatitis B Vaccine 3 dose series if at intermediate or high risk (ex: diabetes, end stage renal disease, liver disease)   Tetanus (Td) Vaccine - COST NOT COVERED BY MEDICARE PART B Following completion of primary series, a booster dose should be given every 10 years to maintain immunity against tetanus  Td may also be given as tetanus wound prophylaxis  Tdap Vaccine - COST NOT COVERED BY MEDICARE PART B Recommended at least once for all adults  For pregnant patients, recommended with each pregnancy  Shingles Vaccine (Shingrix) - COST NOT COVERED BY MEDICARE PART B  2 shot series recommended in those aged 48 and above     Health Maintenance Due:      Topic Date Due   • Hepatitis C Screening  Never done   • Breast Cancer Screening: Mammogram  09/01/2011   • Colorectal Cancer Screening  01/30/2026     Immunizations Due:      Topic Date Due   • Pneumococcal Vaccine: 65+ Years (1 - PCV) Never done   • COVID-19 Vaccine (4 - Pfizer series) 02/05/2022     Advance Directives   What are advance directives? Advance directives are legal documents that state your wishes and plans for medical care  These plans are made ahead of time in case you lose your ability to make decisions for yourself  Advance directives can apply to any medical decision, such as the treatments you want, and if you want to donate organs     What are the types of advance directives? There are many types of advance directives, and each state has rules about how to use them  You may choose a combination of any of the following:  · Living will: This is a written record of the treatment you want  You can also choose which treatments you do not want, which to limit, and which to stop at a certain time  This includes surgery, medicine, IV fluid, and tube feedings  · Durable power of  for healthcare [facility]): This is a written record that states who you want to make healthcare choices for you when you are unable to make them for yourself  This person, called a proxy, is usually a family member or a friend  You may choose more than 1 proxy  · Do not resuscitate (DNR) order:  A DNR order is used in case your heart stops beating or you stop breathing  It is a request not to have certain forms of treatment, such as CPR  A DNR order may be included in other types of advance directives  · Medical directive: This covers the care that you want if you are in a coma, near death, or unable to make decisions for yourself  You can list the treatments you want for each condition  Treatment may include pain medicine, surgery, blood transfusions, dialysis, IV or tube feedings, and a ventilator (breathing machine)  · Values history: This document has questions about your views, beliefs, and how you feel and think about life  This information can help others choose the care that you would choose  Why are advance directives important? An advance directive helps you control your care  Although spoken wishes may be used, it is better to have your wishes written down  Spoken wishes can be misunderstood, or not followed  Treatments may be given even if you do not want them  An advance directive may make it easier for your family to make difficult choices about your care  Urinary Incontinence   Urinary incontinence (UI)  is when you lose control of your bladder   UI develops because your bladder cannot store or empty urine properly  The 3 most common types of UI are stress incontinence, urge incontinence, or both  Medicines:   · May be given to help strengthen your bladder control  Report any side effects of medication to your healthcare provider  Do pelvic muscle exercises often:  Your pelvic muscles help you stop urinating  Squeeze these muscles tight for 5 seconds, then relax for 5 seconds  Gradually work up to squeezing for 10 seconds  Do 3 sets of 15 repetitions a day, or as directed  This will help strengthen your pelvic muscles and improve bladder control  Train your bladder:  Go to the bathroom at set times, such as every 2 hours, even if you do not feel the urge to go  You can also try to hold your urine when you feel the urge to go  For example, hold your urine for 5 minutes when you feel the urge to go  As that becomes easier, hold your urine for 10 minutes  Self-care:   · Keep a UI record  Write down how often you leak urine and how much you leak  Make a note of what you were doing when you leaked urine  · Drink liquids as directed  You may need to limit the amount of liquid you drink to help control your urine leakage  Do not drink any liquid right before you go to bed  Limit or do not have drinks that contain caffeine or alcohol  · Prevent constipation  Eat a variety of high-fiber foods  Good examples are high-fiber cereals, beans, vegetables, and whole-grain breads  Walking is the best way to trigger your intestines to have a bowel movement  · Exercise regularly and maintain a healthy weight  Weight loss and exercise will decrease pressure on your bladder and help you control your leakage  · Use a catheter as directed  to help empty your bladder  A catheter is a tiny, plastic tube that is put into your bladder to drain your urine  · Go to behavior therapy as directed  Behavior therapy may be used to help you learn to control your urge to urinate      Weight Management Why it is important to manage your weight:  Being overweight increases your risk of health conditions such as heart disease, high blood pressure, type 2 diabetes, and certain types of cancer  It can also increase your risk for osteoarthritis, sleep apnea, and other respiratory problems  Aim for a slow, steady weight loss  Even a small amount of weight loss can lower your risk of health problems  How to lose weight safely:  A safe and healthy way to lose weight is to eat fewer calories and get regular exercise  You can lose up about 1 pound a week by decreasing the number of calories you eat by 500 calories each day  Healthy meal plan for weight management:  A healthy meal plan includes a variety of foods, contains fewer calories, and helps you stay healthy  A healthy meal plan includes the following:  · Eat whole-grain foods more often  A healthy meal plan should contain fiber  Fiber is the part of grains, fruits, and vegetables that is not broken down by your body  Whole-grain foods are healthy and provide extra fiber in your diet  Some examples of whole-grain foods are whole-wheat breads and pastas, oatmeal, brown rice, and bulgur  · Eat a variety of vegetables every day  Include dark, leafy greens such as spinach, kale, felecia greens, and mustard greens  Eat yellow and orange vegetables such as carrots, sweet potatoes, and winter squash  · Eat a variety of fruits every day  Choose fresh or canned fruit (canned in its own juice or light syrup) instead of juice  Fruit juice has very little or no fiber  · Eat low-fat dairy foods  Drink fat-free (skim) milk or 1% milk  Eat fat-free yogurt and low-fat cottage cheese  Try low-fat cheeses such as mozzarella and other reduced-fat cheeses  · Choose meat and other protein foods that are low in fat  Choose beans or other legumes such as split peas or lentils  Choose fish, skinless poultry (chicken or turkey), or lean cuts of red meat (beef or pork)   Before you cook meat or poultry, cut off any visible fat  · Use less fat and oil  Try baking foods instead of frying them  Add less fat, such as margarine, sour cream, regular salad dressing and mayonnaise to foods  Eat fewer high-fat foods  Some examples of high-fat foods include french fries, doughnuts, ice cream, and cakes  · Eat fewer sweets  Limit foods and drinks that are high in sugar  This includes candy, cookies, regular soda, and sweetened drinks  Exercise:  Exercise at least 30 minutes per day on most days of the week  Some examples of exercise include walking, biking, dancing, and swimming  You can also fit in more physical activity by taking the stairs instead of the elevator or parking farther away from stores  Ask your healthcare provider about the best exercise plan for you  © Copyright [employer] 2018 Information is for End User's use only and may not be sold, redistributed or otherwise used for commercial purposes   All illustrations and images included in CareNotes® are the copyrighted property of [employer]  or [address] [place] [address]
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
## 2023-06-08 ENCOUNTER — EVALUATION (OUTPATIENT)
Dept: PHYSICAL THERAPY | Facility: REHABILITATION | Age: 73
End: 2023-06-08
Payer: MEDICARE

## 2023-06-08 DIAGNOSIS — G89.29 CHRONIC RIGHT-SIDED LOW BACK PAIN WITHOUT SCIATICA: ICD-10-CM

## 2023-06-08 DIAGNOSIS — M47.816 LUMBAR SPONDYLOSIS: ICD-10-CM

## 2023-06-08 DIAGNOSIS — M54.50 CHRONIC RIGHT-SIDED LOW BACK PAIN WITHOUT SCIATICA: ICD-10-CM

## 2023-06-08 PROCEDURE — 97162 PT EVAL MOD COMPLEX 30 MIN: CPT

## 2023-06-08 PROCEDURE — 97110 THERAPEUTIC EXERCISES: CPT
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
## 2023-06-13 ENCOUNTER — OFFICE VISIT (OUTPATIENT)
Dept: PHYSICAL THERAPY | Facility: REHABILITATION | Age: 73
End: 2023-06-13
Payer: MEDICARE

## 2023-06-13 DIAGNOSIS — M47.816 LUMBAR SPONDYLOSIS: Primary | ICD-10-CM

## 2023-06-13 DIAGNOSIS — M54.50 CHRONIC RIGHT-SIDED LOW BACK PAIN WITHOUT SCIATICA: ICD-10-CM

## 2023-06-13 DIAGNOSIS — G89.29 CHRONIC RIGHT-SIDED LOW BACK PAIN WITHOUT SCIATICA: ICD-10-CM

## 2023-06-13 PROCEDURE — 97110 THERAPEUTIC EXERCISES: CPT

## 2023-06-13 PROCEDURE — 97112 NEUROMUSCULAR REEDUCATION: CPT

## 2023-06-13 NOTE — PROGRESS NOTES
"Daily Note     Today's date: 2023  Patient name: Danuta Dietz  : 1950  MRN: [id number]  Referring provider: Ml Dempsey DO  Dx:   Encounter Diagnosis     ICD-10-CM    1  Lumbar spondylosis  M47 816       2  Chronic right-sided low back pain without sciatica  M54 50     G89 29                      Subjective: patient states she is still having a lot of issues with any standing activities      Objective: See treatment diary below      Assessment: Tolerated treatment fair  Patient demonstrated fatigue post treatment, exhibited good technique with therapeutic exercises and would benefit from continued PT Patient with fair tolerance to initiation of treatment  Patient gets mild relief with flexion based exercises      Plan: Continue per plan of care        Precautions:  Extensive low back history      Manuals                                                                 Neuro Re-Ed             bridges             suma Pink 2x10                                                                             Ther Ex             Hip abd 2x10            Hip ext 2x10            PB rollouts 3 ways 5\"x10            PPT 5\"x10            Ball squeezes 5\"x10                                                   Ther Activity             Step up             STS             Gait Training                                       Modalities                                            "

## 2023-06-16 ENCOUNTER — OFFICE VISIT (OUTPATIENT)
Dept: PHYSICAL THERAPY | Facility: REHABILITATION | Age: 73
End: 2023-06-16
Payer: MEDICARE

## 2023-06-16 DIAGNOSIS — G89.29 CHRONIC RIGHT-SIDED LOW BACK PAIN WITHOUT SCIATICA: ICD-10-CM

## 2023-06-16 DIAGNOSIS — M47.816 LUMBAR SPONDYLOSIS: Primary | ICD-10-CM

## 2023-06-16 DIAGNOSIS — M54.50 CHRONIC RIGHT-SIDED LOW BACK PAIN WITHOUT SCIATICA: ICD-10-CM

## 2023-06-16 PROCEDURE — 97112 NEUROMUSCULAR REEDUCATION: CPT

## 2023-06-16 PROCEDURE — 97110 THERAPEUTIC EXERCISES: CPT

## 2023-06-16 NOTE — PROGRESS NOTES
"Daily Note     Today's date: 2023  Patient name: Christel Major  : 1950  MRN: [id number]  Referring provider: Radha Marcum DO  Dx:   Encounter Diagnosis     ICD-10-CM    1  Lumbar spondylosis  M47 816       2  Chronic right-sided low back pain without sciatica  M54 50     G89 29                      Subjective: patient states she is still having issues walking longer distances      Objective: See treatment diary below      Assessment: Tolerated treatment well  Patient demonstrated fatigue post treatment, exhibited good technique with therapeutic exercises and would benefit from continued PT Patient continues to display decreased standing tolerance  Improving tolerance to supine strengthening today      Plan: Continue per plan of care        Precautions:  Extensive low back history      Manuals                                                                Neuro Re-Ed             bridges             clamshells Pink 2x10 Pink 2x10                                                                            Ther Ex             Hip abd 2x10 2x10           Hip ext 2x10 2x10           PB rollouts 3 ways 5\"x10 3 ways 5\"x10           PPT 5\"x10 5\"x10           Ball squeezes 5\"x10 5\"x20                                                  Ther Activity             Step up             STS             Gait Training                                       Modalities                                            "

## 2023-06-19 ENCOUNTER — HOSPITAL ENCOUNTER (OUTPATIENT)
Dept: RADIOLOGY | Facility: CLINIC | Age: 73
Discharge: HOME | End: 2023-06-19
Payer: MEDICARE

## 2023-06-19 VITALS
TEMPERATURE: 98.1 F | DIASTOLIC BLOOD PRESSURE: 72 MMHG | OXYGEN SATURATION: 90 % | RESPIRATION RATE: 18 BRPM | SYSTOLIC BLOOD PRESSURE: 115 MMHG | HEART RATE: 83 BPM

## 2023-06-19 DIAGNOSIS — G89.4 CHRONIC PAIN SYNDROME: ICD-10-CM

## 2023-06-19 DIAGNOSIS — M47.816 LUMBAR FACET ARTHROPATHY: ICD-10-CM

## 2023-06-19 DIAGNOSIS — M47.816 LUMBAR SPONDYLOSIS: ICD-10-CM

## 2023-06-19 DIAGNOSIS — M54.16 LUMBAR RADICULOPATHY: ICD-10-CM

## 2023-06-19 PROCEDURE — 62323 NJX INTERLAMINAR LMBR/SAC: CPT | Performed by: PHYSICAL MEDICINE & REHABILITATION

## 2023-06-19 RX ORDER — METHYLPREDNISOLONE ACETATE 80 MG/ML
80 INJECTION, SUSPENSION INTRA-ARTICULAR; INTRALESIONAL; INTRAMUSCULAR; PARENTERAL; SOFT TISSUE ONCE
Status: COMPLETED | OUTPATIENT
Start: 2023-06-19 | End: 2023-06-19

## 2023-06-19 RX ADMIN — IOHEXOL 1 ML: 300 INJECTION, SOLUTION INTRAVENOUS at 11:31

## 2023-06-19 RX ADMIN — METHYLPREDNISOLONE ACETATE 80 MG: 80 INJECTION, SUSPENSION INTRA-ARTICULAR; INTRALESIONAL; INTRAMUSCULAR; PARENTERAL; SOFT TISSUE at 11:31
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
## 2023-06-19 NOTE — DISCHARGE INSTR - LAB
Epidural Steroid Injection   WHAT YOU NEED TO KNOW:   An epidural steroid injection (ADDI) is a procedure to inject steroid medicine into the epidural space  The epidural space is between your spinal cord and vertebrae  Steroids reduce inflammation and fluid buildup in your spine that may be causing pain  You may be given pain medicine along with the steroids  ACTIVITY  Do not drive or operate machinery today  No strenuous activity today - bending, lifting, etc   You may resume normal activites starting tomorrow - start slowly and as tolerated  You may shower today, but no tub baths or hot tubs  You may have numbness for several hours from the local anesthetic  Please use caution and common sense, especially with weight-bearing activities  CARE OF THE INJECTION SITE  If you have soreness or pain, apply ice to the area today (20 minutes on/20 minutes off)  Starting tomorrow, you may use warm, moist heat or ice if needed  You may have an increase or change in your discomfort for 36-48 hours after your treatment  Apply ice and continue with any pain medication you have been prescribed  Notify the [facility] if you have any of the following: redness, drainage, swelling, headache, stiff neck or fever above 100°F     SPECIAL INSTRUCTIONS  Our office will contact you in approximately 7 days for a progress report  MEDICATIONS  Continue to take all routine medications  Our office may have instructed you to hold some medications  As no general anesthesia was used in today's procedure, you should not experience any side effects related to anesthesia  If you are diabetic, the steroids used in today's injection may temporarily increase your blood sugar levels after the first few days after your injection  Please keep a close eye on your sugars and alert the doctor who manages your diabetes if your sugars are significantly high from your baseline or you are symptomatic       If you have a problem specifically related to your procedure, please call our office at [phone]  Problems not related to your procedure should be directed to your primary care physician

## 2023-06-22 ENCOUNTER — OFFICE VISIT (OUTPATIENT)
Dept: PHYSICAL THERAPY | Facility: REHABILITATION | Age: 73
End: 2023-06-22
Payer: MEDICARE

## 2023-06-22 DIAGNOSIS — M54.50 CHRONIC RIGHT-SIDED LOW BACK PAIN WITHOUT SCIATICA: ICD-10-CM

## 2023-06-22 DIAGNOSIS — G89.29 CHRONIC RIGHT-SIDED LOW BACK PAIN WITHOUT SCIATICA: ICD-10-CM

## 2023-06-22 DIAGNOSIS — M47.816 LUMBAR SPONDYLOSIS: Primary | ICD-10-CM

## 2023-06-22 PROCEDURE — 97112 NEUROMUSCULAR REEDUCATION: CPT

## 2023-06-22 PROCEDURE — 97110 THERAPEUTIC EXERCISES: CPT

## 2023-06-22 NOTE — PROGRESS NOTES
"Daily Note     Today's date: 2023  Patient name: Wilbern Gitelman  : 1950  MRN: [id number]  Referring provider: Ganesh Galeas DO  Dx:   Encounter Diagnosis     ICD-10-CM    1  Lumbar spondylosis  M47 816       2  Chronic right-sided low back pain without sciatica  M54 50     G89 29           Start Time: 1100  Stop Time: 1145  Total time in clinic (min): 45 minutes    Subjective: patient states she is about 90% better since the injection      Objective: See treatment diary below      Assessment: Tolerated treatment well  Patient demonstrated fatigue post treatment, exhibited good technique with therapeutic exercises and would benefit from continued PT Patient with improving symptoms since getting the injection  Took it easy to let the injection set in today  Will progress standing exercises next session      Plan: Continue per plan of care        Precautions:  Extensive low back history      Manuals                                                               Neuro Re-Ed             bridges             clamshells Pink 2x10 Pink 2x10 Pink 2x10                                                                           Ther Ex             Hip abd 2x10 2x10 2x10          Hip ext 2x10 2x10 2x10          PB rollouts 3 ways 5\"x10 3 ways 5\"x10 3 ways 5\"x10          PPT 5\"x10 5\"x10 5\"x20          Ball squeezes 5\"x10 5\"x20 5\"x20                                                 Ther Activity             Step up             STS             Gait Training                                       Modalities                                            "

## 2023-06-26 ENCOUNTER — TELEPHONE (OUTPATIENT)
Dept: PAIN MEDICINE | Facility: CLINIC | Age: 73
End: 2023-06-26

## 2023-06-26 NOTE — TELEPHONE ENCOUNTER
1st attempt  Patient was unavailable to speak, Lm with spouse to cb with % improvement and pain level  Provided cb number

## 2023-06-27 ENCOUNTER — OFFICE VISIT (OUTPATIENT)
Dept: PHYSICAL THERAPY | Facility: REHABILITATION | Age: 73
End: 2023-06-27
Payer: MEDICARE

## 2023-06-27 DIAGNOSIS — M54.50 CHRONIC RIGHT-SIDED LOW BACK PAIN WITHOUT SCIATICA: ICD-10-CM

## 2023-06-27 DIAGNOSIS — M47.816 LUMBAR SPONDYLOSIS: Primary | ICD-10-CM

## 2023-06-27 DIAGNOSIS — G89.29 CHRONIC RIGHT-SIDED LOW BACK PAIN WITHOUT SCIATICA: ICD-10-CM

## 2023-06-27 PROCEDURE — 97112 NEUROMUSCULAR REEDUCATION: CPT

## 2023-06-27 PROCEDURE — 97530 THERAPEUTIC ACTIVITIES: CPT

## 2023-06-27 PROCEDURE — 97110 THERAPEUTIC EXERCISES: CPT

## 2023-06-27 NOTE — PROGRESS NOTES
"Daily Note     Today's date: 2023  Patient name: Wilbern Gitelman  : 1950  MRN: [id number]  Referring provider: Ganesh Galeas DO  Dx:   Encounter Diagnosis     ICD-10-CM    1  Lumbar spondylosis  M47 816       2  Chronic right-sided low back pain without sciatica  M54 50     G89 29                      Subjective: patient states she feels like the pain is coming back a little      Objective: See treatment diary below      Assessment: Tolerated treatment well  Patient demonstrated fatigue post treatment, exhibited good technique with therapeutic exercises and would benefit from continued PT Patient able to walk 3 minutes on the treadmill today before needing a rest      Plan: Continue per plan of care        Precautions:  Extensive low back history      Manuals                                                              Neuro Re-Ed             bridges             clamshells Pink 2x10 Pink 2x10 Pink 2x10 Pink 3x10         rows    Pink 2x10         Low row    Pink 2x10                                                Ther Ex             Hip abd 2x10 2x10 2x10 2x10         Hip ext 2x10 2x10 2x10 2x10         PB rollouts 3 ways 5\"x10 3 ways 5\"x10 3 ways 5\"x10 3 ways 5\"x10         PPT 5\"x10 5\"x10 5\"x20          Ball squeezes 5\"x10 5\"x20 5\"x20 3\"x30         sidesteps    3 laps                                   Ther Activity             treadmill    3 mins         Step up             STS             Gait Training                                       Modalities                                            "

## 2023-06-29 ENCOUNTER — OFFICE VISIT (OUTPATIENT)
Dept: PHYSICAL THERAPY | Facility: REHABILITATION | Age: 73
End: 2023-06-29
Payer: MEDICARE

## 2023-06-29 DIAGNOSIS — M47.816 LUMBAR SPONDYLOSIS: Primary | ICD-10-CM

## 2023-06-29 DIAGNOSIS — M54.50 CHRONIC RIGHT-SIDED LOW BACK PAIN WITHOUT SCIATICA: ICD-10-CM

## 2023-06-29 DIAGNOSIS — G89.29 CHRONIC RIGHT-SIDED LOW BACK PAIN WITHOUT SCIATICA: ICD-10-CM

## 2023-06-29 PROCEDURE — 97110 THERAPEUTIC EXERCISES: CPT

## 2023-06-29 PROCEDURE — 97112 NEUROMUSCULAR REEDUCATION: CPT

## 2023-06-29 NOTE — PROGRESS NOTES
"Daily Note     Today's date: 2023  Patient name: Brianne Scott  : 1950  MRN: [id number]  Referring provider: Shyanne Castillo DO  Dx:   Encounter Diagnosis     ICD-10-CM    1  Lumbar spondylosis  M47 816       2  Chronic right-sided low back pain without sciatica  M54 50     G89 29                      Subjective: Patient states she was very achy later after her last session and the following day  She is just beginning to feel a little better, requests to hold treadmill today  Objective: See treatment diary below      Assessment: Modified program due to subjective report  Most exercises performed seated as patient did not think she could tolerate supine for semi reclined  Attempted standing TE but patient exhibited limited standing tolerance today  Patient demonstrated fatigue post treatment and would benefit from continued PT to improve LOF  Discussed DOMS  Plan: Progress treatment as tolerated  Resume full program as able        Precautions:  Extensive low back history      Manuals                                                             Neuro Re-Ed             bridges             clamshells Pink 2x10 Pink 2x10 Pink 2x10 Pink 3x10 Seated 3x10        rows    Pink 2x10 seated Pink 10x        Low row    Pink 2x10 seated Pink 10x                                                Ther Ex             Hip abd 2x10 2x10 2x10 2x10 10x on R only held        Hip ext 2x10 2x10 2x10 2x10 held        PB rollouts 3 ways 5\"x10 3 ways 5\"x10 3 ways 5\"x10 3 ways 5\"x10 3 ways 5\" 2x10         PPT 5\"x10 5\"x10 5\"x20  seated 5\"x20         Ball squeezes 5\"x10 5\"x20 5\"x20 3\"x30 3\"x20        sidesteps    3 laps 3 laps                                   Ther Activity             treadmill    3 mins held        Step up             STS             Gait Training                                       Modalities                                            " negative...

## 2023-06-29 NOTE — TELEPHONE ENCOUNTER
Spoke with Pt regarding continued pain  Pt states she is concerned that the injections did not work  Pt had PRO: L3-L4 LESI on 6/19 with KW  Advised Pt that it takes x3-5 days for steroid to start to work & x2wks for the full effect of steroid injections  Advised Pt that its a bit too early to tell if injections are working as expected  Pt states she uses heat, & Diclofenac gel with slight relief  Pt states she does NOT take any medication for discomfort & does NOT want to start to take any medication for discomfort  Pt states ice does not help  Pt verbalized understanding & was appreciative for the clarification of when to expect the injections to fully work      Please advise-

## 2023-06-30 NOTE — TELEPHONE ENCOUNTER
We could offer her an additional injections either at a different level or do a different approach such as a transforaminal epidural steroid injection

## 2023-06-30 NOTE — TELEPHONE ENCOUNTER
Will call pt at 2 week danny, 7/3, for update   Will offer alternate injection as per below, if no improvement

## 2023-07-03 ENCOUNTER — APPOINTMENT (OUTPATIENT)
Dept: PHYSICAL THERAPY | Facility: REHABILITATION | Age: 73
End: 2023-07-03
Payer: MEDICARE

## 2023-07-03 NOTE — TELEPHONE ENCOUNTER
S/w pt for update on L3-4 LESI from 6/19/23. Pt states she felt "great" for 7 days post inj. Pt states pain came back same as prior to inj on 6/26/23. Pt agreeable to trying a different approach to inj and would like to be called to sched today if poss. Pt states she has upcoming NS appt and would cx until after nxt inj but would like to provide them w/ a date to be reschd. Pls advise. Thank you!

## 2023-07-05 NOTE — TELEPHONE ENCOUNTER
Terese Worley, DO  You 9 minutes ago (4:17 PM)       Yes - that is fine.   MAy hold the asprin      Please call with the hold instructions

## 2023-07-05 NOTE — TELEPHONE ENCOUNTER
Pt upset as she didn't hear back from the office as she called Monday. I explaeind to the pt the office was closed yesterday and I just received the response a short while ago. She states she cancelled the NS appt that was scheduled for later today , since she did not hear back, and she thought she would just move forward with another injection. I explained KW advice and she is asking why a phone conversation can not be held with NS as its an hour drive.   She prefers to not see NS at this time and wants help

## 2023-07-05 NOTE — TELEPHONE ENCOUNTER
S/W pt.  She is aware the  will call to arrange  PT also feels it is a waste of her time to see NS again as before it was surgery with risk of improvement or risk of death and she doesn't feel that is an option

## 2023-07-05 NOTE — TELEPHONE ENCOUNTER
Given significant but short-term relief in her pain   Okay to try epidural from a different approach  Plan for bilateral L4-L5 LESI under fluoroscopy guidance  Please advise patient to reschedule neurosurgical appointment  Thank you

## 2023-07-05 NOTE — TELEPHONE ENCOUNTER
Sidney Brenner DO  [facility] 18 minutes ago (11:32 AM)       Continue with NS appt prior to any further interventional approaches   Thank you

## 2023-07-05 NOTE — TELEPHONE ENCOUNTER
Scheduled patient for LESI 7/19/23  Patient is taking ASA 325mgNothing to eat or drink 1 hour prior to procedure  Needs to arrange transportation  Proper clothing for procedure  No vaccines 2 weeks prior or after procedure  If ill or place on antibiotics, please call to reschedule    Dear Dr. Brandon Mike has been scheduled with Dr Sudarshan Monson for a epidural steroid injection on 7/19/23. The patient has currently taking Aspirin, this medication needs to be held for 6 days prior to the procedure. Can the patient hold this medication for required days prior to the procedure?

## 2023-07-06 ENCOUNTER — OFFICE VISIT (OUTPATIENT)
Dept: PHYSICAL THERAPY | Facility: REHABILITATION | Age: 73
End: 2023-07-06
Payer: MEDICARE

## 2023-07-06 DIAGNOSIS — M54.50 CHRONIC RIGHT-SIDED LOW BACK PAIN WITHOUT SCIATICA: ICD-10-CM

## 2023-07-06 DIAGNOSIS — M47.816 LUMBAR SPONDYLOSIS: Primary | ICD-10-CM

## 2023-07-06 DIAGNOSIS — G89.29 CHRONIC RIGHT-SIDED LOW BACK PAIN WITHOUT SCIATICA: ICD-10-CM

## 2023-07-06 PROCEDURE — 97112 NEUROMUSCULAR REEDUCATION: CPT

## 2023-07-06 PROCEDURE — 97110 THERAPEUTIC EXERCISES: CPT

## 2023-07-06 NOTE — TELEPHONE ENCOUNTER
S/w pt and advised to hold 325mg ASA for LESI. LD to be 7/12, advised pt to hold ASA 7/13 until advised to resume after procedure on 7/19. Pt verbalized understanding and appreciative of call.

## 2023-07-06 NOTE — PROGRESS NOTES
Daily Note     Today's date: 2023  Patient name: Jani Newell  : 1950  MRN: [id number]  Referring provider: Kayla Bae DO  Dx:   Encounter Diagnosis     ICD-10-CM    1. Lumbar spondylosis  M47.816       2. Chronic right-sided low back pain without sciatica  M54.50     G89.29                      Subjective: Patient is scheduled for epidural injection on 23. Objective: See treatment diary below      Assessment: Patient completed there ex routine with frequent short rest breaks. Limited sitting and standing tolerance. Most exercises performed seated as patient unable to tolerate supine position. Patient demonstrated fatigue post treatment and would benefit from continued PT to improve LOF. Plan: Progress treatment as tolerated. Patient will call to schedule further appointments after talking with her physician.   Scheduled for epidural on 26     Precautions:  Extensive low back history      Manuals                                                            Neuro Re-Ed             bridges             clamshells Pink 2x10 Pink 2x10 Pink 2x10 Pink 3x10 Seated 3x10 Seated 3x10 pink       rows    Pink 2x10 seated Pink 10x Seated  Pink x10       Low row    Pink 2x10 seated Pink 10x  Seated pink x10                                              Ther Ex             Hip abd 2x10 2x10 2x10 2x10 10x on R only held 10x each       Hip ext 2x10 2x10 2x10 2x10 held x10 each       PB rollouts 3 ways 5"x10 3 ways 5"x10 3 ways 5"x10 3 ways 5"x10 3 ways 5" 2x10  3 ways 5" 2x10       PPT 5"x10 5"x10 5"x20  seated 5"x20  Seated  5"x20       Ball squeezes 5"x10 5"x20 5"x20 3"x30 3"x20 3" x15       sidesteps    3 laps 3 laps         Standing glut sets      x10                    Ther Activity             treadmill    3 mins held        Step up             STS             Gait Training                                       Modalities

## 2023-07-19 ENCOUNTER — HOSPITAL ENCOUNTER (OUTPATIENT)
Facility: AMBULARY SURGERY CENTER | Age: 73
Setting detail: OUTPATIENT SURGERY
Discharge: HOME | End: 2023-07-19
Attending: PHYSICAL MEDICINE & REHABILITATION | Admitting: PHYSICAL MEDICINE & REHABILITATION
Payer: MEDICARE

## 2023-07-19 ENCOUNTER — HOSPITAL ENCOUNTER (OUTPATIENT)
Dept: RADIOLOGY | Facility: HOSPITAL | Age: 73
Setting detail: OUTPATIENT SURGERY
Discharge: HOME | End: 2023-07-19
Payer: MEDICARE

## 2023-07-19 VITALS
OXYGEN SATURATION: 96 % | HEART RATE: 85 BPM | DIASTOLIC BLOOD PRESSURE: 64 MMHG | SYSTOLIC BLOOD PRESSURE: 151 MMHG | RESPIRATION RATE: 18 BRPM | TEMPERATURE: 97.2 F

## 2023-07-19 DIAGNOSIS — Z92.241 S/P EPIDURAL STEROID INJECTION: ICD-10-CM

## 2023-07-19 PROCEDURE — 62323 NJX INTERLAMINAR LMBR/SAC: CPT | Performed by: PHYSICAL MEDICINE & REHABILITATION

## 2023-07-19 RX ORDER — METHYLPREDNISOLONE ACETATE 40 MG/ML
INJECTION, SUSPENSION INTRA-ARTICULAR; INTRALESIONAL; INTRAMUSCULAR; SOFT TISSUE AS NEEDED
Status: DISCONTINUED | OUTPATIENT
Start: 2023-07-19 | End: 2023-07-19 | Stop reason: HOSPADM

## 2023-07-19 RX ORDER — SODIUM CHLORIDE 9 MG/ML
INJECTION INTRAVENOUS AS NEEDED
Status: DISCONTINUED | OUTPATIENT
Start: 2023-07-19 | End: 2023-07-19 | Stop reason: HOSPADM

## 2023-07-19 RX ORDER — LIDOCAINE HYDROCHLORIDE 10 MG/ML
INJECTION, SOLUTION EPIDURAL; INFILTRATION; INTRACAUDAL; PERINEURAL AS NEEDED
Status: DISCONTINUED | OUTPATIENT
Start: 2023-07-19 | End: 2023-07-19 | Stop reason: HOSPADM

## 2023-07-19 RX ORDER — MAGNESIUM 30 MG
30 TABLET ORAL DAILY
COMMUNITY

## 2023-07-19 NOTE — DISCHARGE INSTRUCTIONS
Epidural Steroid Injection   WHAT YOU NEED TO KNOW:   An epidural steroid injection (ADDI) is a procedure to inject steroid medicine into the epidural space. The epidural space is between your spinal cord and vertebrae. Steroids reduce inflammation and fluid buildup in your spine that may be causing pain. You may be given pain medicine along with the steroids. ACTIVITY  Do not drive or operate machinery today. No strenuous activity today - bending, lifting, etc.  You may resume normal activites starting tomorrow - start slowly and as tolerated. You may shower today, but no tub baths or hot tubs. You may have numbness for several hours from the local anesthetic. Please use caution and common sense, especially with weight-bearing activities. CARE OF THE INJECTION SITE  If you have soreness or pain, apply ice to the area today (20 minutes on/20 minutes off). Starting tomorrow, you may use warm, moist heat or ice if needed. You may have an increase or change in your discomfort for 36-48 hours after your treatment. Apply ice and continue with any pain medication you have been prescribed. Notify the [facility] if you have any of the following: redness, drainage, swelling, headache, stiff neck or fever above 100°F.    SPECIAL INSTRUCTIONS  Our office will contact you in approximately 7 days for a progress report. MEDICATIONS  Continue to take all routine medications. Our office may have instructed you to hold some medications. As no general anesthesia was used in today's procedure, you should not experience any side effects related to anesthesia. If you are diabetic, the steroids used in today's injection may temporarily increase your blood sugar levels after the first few days after your injection. Please keep a close eye on your sugars and alert the doctor who manages your diabetes if your sugars are significantly high from your baseline or you are symptomatic.      If you have a problem specifically related to your procedure, please call our office at [phone]. Problems not related to your procedure should be directed to your primary care physician.
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
## 2023-07-19 NOTE — OP NOTE
OPERATIVE REPORT  PATIENT NAME: Ann Webb    :  1950  MRN: [id number]  Pt Location: [facility]/PAIN ROOM 01    SURGERY DATE: 2023    Surgeon(s) and Role:     * DO [address] Primary    Preop Diagnosis:  Lumbar spondylosis [M47.816]  Lumbar radiculopathy [M54.16]    Post-Op Diagnosis Codes:     * Lumbar spondylosis [M47.816]     * Lumbar radiculopathy [M54.16]    Procedure(s):  Bilateral - L4-L5  LUMBAR epidural steroid injection (70971)    Lumbar epidural  Indication:  Back and radiating leg pain  Preoperative diagnosis:  Lumbar radiculitis  Postoperative diagnosis:  Lumbar radiculitis    Procedure: Fluoroscopically-guided L4-L5 interlaminar epidural steroid injection under fluoroscopy    EBL:  none  Specimens:  not applicable    After discussing the risks, benefits, and alternatives to the procedure, the patient expressed understanding and wished to proceed. The patient was brought to the fluoroscopy suite and placed in the prone position. A procedural pause was conducted to verify:  correct patient identity, procedure to be performed and as applicable, correct side and site, correct patient position, and availability of implants, special equipment and special requirements. After identifying the L4-L5 space fluoroscopically, the skin was sterilely prepped and draped in the usual fashion using Chloraprep skin prep. The skin and subcutaneous tissues were anesthetized with 1% lidocaine. Utilizing a loss of resistance technique and intermittent fluoroscopic guidance, a 3.5 inch 20-gauge Tuohy needle was advanced into the epidural space. Proper needle positioning was confirmed using multiple fluoroscopic views. After negative aspiration, Omnipaque 240 contrast was injected confirming epidural spread without evidence of intravascular or intrathecal spread. A 4 ml solution consisting of 80 mg Depo-Medrol in sterile saline was injected slowly and incrementally into the epidural space. Following the injection the needle was withdrawn slightly and flushed with 1% buffered lidocaine as it was fully extracted. The patient tolerated the procedure well and there were no apparent complications. After appropriate observation, the patient was dismissed from the clinic in good condition under their own power.           SIGNATURE: Ang Cho DO  DATE: July 19, 2023  TIME: 3:05 PM

## 2023-07-26 ENCOUNTER — TELEPHONE (OUTPATIENT)
Dept: RADIOLOGY | Facility: MEDICAL CENTER | Age: 73
End: 2023-07-26

## 2023-07-27 ENCOUNTER — OFFICE VISIT (OUTPATIENT)
Dept: CARDIOLOGY CLINIC | Facility: CLINIC | Age: 73
End: 2023-07-27
Payer: MEDICARE

## 2023-07-27 VITALS
OXYGEN SATURATION: 96 % | BODY MASS INDEX: 36.49 KG/M2 | WEIGHT: 219 LBS | HEIGHT: 65 IN | HEART RATE: 72 BPM | SYSTOLIC BLOOD PRESSURE: 120 MMHG | DIASTOLIC BLOOD PRESSURE: 56 MMHG

## 2023-07-27 DIAGNOSIS — I42.0 DILATED CARDIOMYOPATHY (HCC): ICD-10-CM

## 2023-07-27 DIAGNOSIS — E66.2 MORBID (SEVERE) OBESITY WITH ALVEOLAR HYPOVENTILATION (HCC): ICD-10-CM

## 2023-07-27 DIAGNOSIS — G45.9 TIA (TRANSIENT ISCHEMIC ATTACK): ICD-10-CM

## 2023-07-27 DIAGNOSIS — I50.22 CHRONIC SYSTOLIC CONGESTIVE HEART FAILURE (HCC): ICD-10-CM

## 2023-07-27 DIAGNOSIS — I31.39 PERICARDIAL EFFUSION: ICD-10-CM

## 2023-07-27 DIAGNOSIS — G47.33 OSA (OBSTRUCTIVE SLEEP APNEA): ICD-10-CM

## 2023-07-27 DIAGNOSIS — Z01.810 PRE-OPERATIVE CARDIOVASCULAR EXAMINATION: Primary | ICD-10-CM

## 2023-07-27 DIAGNOSIS — I10 ESSENTIAL HYPERTENSION: ICD-10-CM

## 2023-07-27 PROCEDURE — 99214 OFFICE O/P EST MOD 30 MIN: CPT | Performed by: INTERNAL MEDICINE

## 2023-07-27 NOTE — PROGRESS NOTES
Cardiology Followup    Isak Niño  [id number]  1950  [facility]  [facility] [address] [address]    Consult for: CHF    HPI: Isak Niño is a 68y.o. year old female who is here for followup of CHF. Since her last visit, she underwent epidural injection and had improvement initially but has developed pain again. She denies any chest pain. She has chronic dyspnea as she goes upstairs but denies any lower extremity edema, orthopnea or paroxysmal nocturnal dyspnea. Home blood pressure has been well controlled. Past Cardiac History: In August 2019, She had a cardiac MRI done for the evaluation of cardiomyopathy. MRI showed EF of 41% with a thin strip of intramyocardial fibrosis consistent with non-ischemic cardiomyopathy. In January 2019, she had an echocardiogram done during hospitalization for influenza. Echocardiogram showed EF of 45% with small pericardial effusion and mild pulmonary hypertension. She has no history of CHF or CAD. History of CVA in 2014 with left arm weakness. Was treated with TPA. 2 years prior to that she had workup by Dr. Yusuf Borrero including catheterization which was normal.  Patient had been on amiodarone since her CVA but denies history of atrial fibrillation. Amiodarone was stopped in 2019. Repeat 2D echocardiogram was done in June which showed an ejection fraction of 35%. She has a family history of CHF with 2 brothers who have pacemakers (one after a MI) both parents had CHF as well. Cardiac catheterization showed nonobstructive CAD. EF was 30-35%. Holter monitor was done which showed 5 episodes of VT with longest lasting 12 beats at 111 bpm.  She had no symptoms at the time.      The following portions of the patient's history were reviewed and updated as appropriate: allergies, current medications, past family history, past medical history, past social history, past surgical history and problem list.       Current Outpatient Medications:   •  aspirin 325 mg tablet, Take 325 mg by mouth daily, Disp: , Rfl:   •  benzonatate (TESSALON PERLES) 100 mg capsule, Take 1 capsule (100 mg total) by mouth 3 (three) times a day as needed for cough, Disp: 270 capsule, Rfl: 0  •  Cholecalciferol (VITAMIN D-3) 1000 units CAPS, Take 5,000 Units by mouth daily, Disp: , Rfl:   •  Diclofenac Sodium (VOLTAREN) 1 %, PLEASE SEE ATTACHED FOR DETAILED DIRECTIONS, Disp: , Rfl:   •  Entresto  MG TABS, TAKE 1 TABLET BY MOUTH  TWICE DAILY, Disp: 180 tablet, Rfl: 3  •  ibuprofen (MOTRIN) 200 mg tablet, Take by mouth if needed  , Disp: , Rfl:   •  ipratropium (ATROVENT) 0.03 % nasal spray, USE 2 SPRAYS IN BOTH  NOSTRILS 3 TIMES DAILY AS  NEEDED FOR RHINITIS, Disp: 120 mL, Rfl: 3  •  magnesium 30 MG tablet, Take 30 mg by mouth daily Unsure of the dosage, Disp: , Rfl:   •  Melatonin 10 MG TABS, Take by mouth daily at bedtime, Disp: , Rfl:   •  Multiple Vitamins-Minerals (PRESERVISION AREDS 2+MULTI VIT PO), 2 (two) times a day, Disp: , Rfl:   •  spironolactone (ALDACTONE) 25 mg tablet, TAKE 1 TABLET BY MOUTH  DAILY, Disp: 90 tablet, Rfl: 3  •  torsemide (DEMADEX) 10 mg tablet, Take 1 tablet (10 mg total) by mouth daily as needed (swelling), Disp: 90 tablet, Rfl: 1  •  Ventolin  (90 Base) MCG/ACT inhaler, INHALE 2 PUFFS EVERY 6 HOURS AS NEEDED FOR WHEEZING, Disp: 18 g, Rfl: 2  •  vitamin E, tocopherol, 400 units capsule, Take 400 Units by mouth daily, Disp: , Rfl:   Allergies   Allergen Reactions   • Breo Ellipta [Fluticasone Furoate-Vilanterol] Anaphylaxis   • Carvedilol Chest Pain and Hypertension   • Lisinopril Other (See Comments)     Dizziness, cough   • Olmesartan Medoxomil-Hctz Other (See Comments)     Category: Adverse Reaction; Annotation - [id number]: dizzy   • Doxycycline Rash         Review of Systems:  Review of Systems   Respiratory: Positive for shortness of breath. Cardiovascular: Negative for chest pain, palpitations and leg swelling. Musculoskeletal: Positive for arthralgias and myalgias. All other systems reviewed and are negative. Physical Exam:  Vitals:    07/27/23 1442   BP: 120/56   BP Location: Left arm   Patient Position: Sitting   Cuff Size: Standard   Pulse: 72   SpO2: 96%   Weight: 99.3 kg (219 lb)   Height: 5' 5" (1.651 m)     Physical Exam  Constitutional:       General: She is not in acute distress. Appearance: She is well-developed. She is not diaphoretic. HENT:      Head: Normocephalic and atraumatic. Eyes:      Conjunctiva/sclera: Conjunctivae normal.      Pupils: Pupils are equal, round, and reactive to light. Neck:      Thyroid: No thyromegaly. Vascular: No JVD. Cardiovascular:      Rate and Rhythm: Normal rate and regular rhythm. Heart sounds: Normal heart sounds. No murmur heard. No friction rub. No gallop. Pulmonary:      Effort: Pulmonary effort is normal.      Breath sounds: Normal breath sounds. Musculoskeletal:      Cervical back: Neck supple. Right lower leg: No edema. Left lower leg: No edema. Skin:     General: Skin is warm and dry. Findings: No erythema or rash. Neurological:      General: No focal deficit present. Mental Status: She is alert and oriented to person, place, and time. Cranial Nerves: No cranial nerve deficit. Psychiatric:         Mood and Affect: Mood normal.         Behavior: Behavior normal.         Thought Content:  Thought content normal.         Judgment: Judgment normal.         Labs:  Lab Results   Component Value Date    K 4.3 05/23/2023     05/23/2023    CO2 27 05/23/2023    BUN 17 05/23/2023    CREATININE 0.65 05/23/2023    CALCIUM 9.9 05/23/2023     Lab Results   Component Value Date    WBC 8.89 05/23/2023    HGB 12.6 05/23/2023    HCT 39.3 05/23/2023    MCV 93 05/23/2023     05/23/2023     Lab Results   Component Value Date    TRIG 239 (H) 05/23/2023    HDL 54 05/23/2023     EKG (independently reviewed): NSR with LVH and nonspecific ST abnormalities    Discussion/Summary:  1. Pre-operative cardiovascular examination    2. Essential hypertension    3. Chronic systolic congestive heart failure ([address])    4. 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)    5. TIA (transient ischemic attack)    6. LUCIA (obstructive sleep apnea)    7. Dilated cardiomyopathy ([address])    8. Pericardial effusion      - patient's EF was 41% on cardiac MRI with similar EF on most recent echocardiogram.  Study consistent with a nonischemic cardiomyopathy. She had nonobstructive CAD on cath. Holter monitor did not show frequent PVCs but episodes of VT was seen. TSH was normal  She does not drink    - Continue Entresto and spironolactone. She cannot tolerate beta blocker (has tried metoprolol and carvedilol). Tolerating Entresto   mg daily. - most recent renal function was normal.  Potassium level was also normal.  -She is limited because of back pain. Once symptoms improve, recommend exercise program and weight loss. - torsemide 10 mg. She may continue to use as needed. Monitor daily weights and if increase in > 3 pounds, she should take dose. - Followup with pulmonologist for CPAP adjustment as needed. -   last echocardiogram was done in January 2021 which showed EF of 40-45%. -Would benefit from the addition of [place] or [place]. Patient does not wish to use any additional medications at this time. -Repeat echocardiogram ordered.

## 2023-07-28 PROCEDURE — 93000 ELECTROCARDIOGRAM COMPLETE: CPT | Performed by: INTERNAL MEDICINE

## 2023-08-08 ENCOUNTER — HOSPITAL ENCOUNTER (OUTPATIENT)
Dept: NON INVASIVE DIAGNOSTICS | Facility: HOSPITAL | Age: 73
Discharge: HOME | End: 2023-08-08
Attending: INTERNAL MEDICINE
Payer: MEDICARE

## 2023-08-08 VITALS
BODY MASS INDEX: 36.49 KG/M2 | WEIGHT: 219 LBS | HEIGHT: 65 IN | DIASTOLIC BLOOD PRESSURE: 72 MMHG | SYSTOLIC BLOOD PRESSURE: 157 MMHG | HEART RATE: 80 BPM

## 2023-08-08 DIAGNOSIS — I50.22 CHRONIC SYSTOLIC CONGESTIVE HEART FAILURE (HCC): ICD-10-CM

## 2023-08-08 PROCEDURE — 93306 TTE W/DOPPLER COMPLETE: CPT

## 2023-08-08 PROCEDURE — 93306 TTE W/DOPPLER COMPLETE: CPT | Performed by: INTERNAL MEDICINE

## 2023-08-09 LAB
AORTIC ROOT: 3.2 CM
APICAL FOUR CHAMBER EJECTION FRACTION: 48 %
E WAVE DECELERATION TIME: 296 MS
FRACTIONAL SHORTENING: 36 (ref 28–44)
INTERVENTRICULAR SEPTUM IN DIASTOLE (PARASTERNAL SHORT AXIS VIEW): 1 CM
INTERVENTRICULAR SEPTUM: 1 CM (ref 0.6–1.1)
LAAS-AP2: 23.7 CM2
LAAS-AP4: 21.7 CM2
LEFT ATRIUM SIZE: 3.9 CM
LEFT ATRIUM VOLUME (MOD BIPLANE): 74 ML
LEFT INTERNAL DIMENSION IN SYSTOLE: 3.5 CM (ref 2.1–4)
LEFT VENTRICLE DIASTOLIC VOLUME (MOD BIPLANE): 86 ML
LEFT VENTRICLE SYSTOLIC VOLUME (MOD BIPLANE): 43 ML
LEFT VENTRICULAR INTERNAL DIMENSION IN DIASTOLE: 5.5 CM (ref 3.5–6)
LEFT VENTRICULAR POSTERIOR WALL IN END DIASTOLE: 1.1 CM
LEFT VENTRICULAR STROKE VOLUME: 92 ML
LV EF: 49 %
LVSV (TEICH): 92 ML
MV E'TISSUE VEL-LAT: 7 CM/S
MV E'TISSUE VEL-SEP: 7 CM/S
MV PEAK A VEL: 0.81 M/S
MV PEAK E VEL: 61 CM/S
MV STENOSIS PRESSURE HALF TIME: 86 MS
MV VALVE AREA P 1/2 METHOD: 2.56
RIGHT ATRIUM AREA SYSTOLE A4C: 9.2 CM2
RIGHT VENTRICLE ID DIMENSION: 3 CM
SL CV LEFT ATRIUM LENGTH A2C: 5.5 CM
SL CV LV EF: 40
SL CV PED ECHO LEFT VENTRICLE DIASTOLIC VOLUME (MOD BIPLANE) 2D: 144 ML
SL CV PED ECHO LEFT VENTRICLE SYSTOLIC VOLUME (MOD BIPLANE) 2D: 52 ML
TR MAX PG: 15 MMHG
TR PEAK VELOCITY: 2 M/S
TRICUSPID ANNULAR PLANE SYSTOLIC EXCURSION: 2 CM
TRICUSPID VALVE PEAK REGURGITATION VELOCITY: 1.97 M/S

## 2023-08-11 ENCOUNTER — TELEPHONE (OUTPATIENT)
Dept: CARDIOLOGY CLINIC | Facility: CLINIC | Age: 73
End: 2023-08-11

## 2023-08-11 NOTE — TELEPHONE ENCOUNTER
----- Message from Casey Menon, DO sent at 8/11/2023  3:31 PM EDT -----  Can you please let the patient know her echocardiogram was the same as the last one? Ejection fraction is 40-45% range.   Same as before

## 2023-08-24 DIAGNOSIS — I50.22 CHRONIC SYSTOLIC (CONGESTIVE) HEART FAILURE (HCC): ICD-10-CM

## 2023-08-24 RX ORDER — SPIRONOLACTONE 25 MG/1
TABLET ORAL
Qty: 90 TABLET | Refills: 3 | Status: SHIPPED | OUTPATIENT
Start: 2023-08-24

## 2023-11-29 ENCOUNTER — RA CDI HCC (OUTPATIENT)
Dept: OTHER | Facility: HOSPITAL | Age: 73
End: 2023-11-29

## 2023-11-29 NOTE — PROGRESS NOTES
I11.0   [address] coding opportunities          Chart Reviewed number of suggestions sent to Provider: 1     Patients Insurance     Medicare Insurance: [employer]

## 2023-12-01 ENCOUNTER — APPOINTMENT (OUTPATIENT)
Dept: LAB | Facility: CLINIC | Age: 73
End: 2023-12-01
Payer: MEDICARE

## 2023-12-01 DIAGNOSIS — E11.9 TYPE 2 DIABETES MELLITUS WITHOUT COMPLICATION, WITHOUT LONG-TERM CURRENT USE OF INSULIN (HCC): ICD-10-CM

## 2023-12-01 LAB
ALBUMIN SERPL BCP-MCNC: 4 G/DL (ref 3.5–5)
ALP SERPL-CCNC: 43 U/L (ref 34–104)
ALT SERPL W P-5'-P-CCNC: 16 U/L (ref 7–52)
ANION GAP SERPL CALCULATED.3IONS-SCNC: 7 MMOL/L
AST SERPL W P-5'-P-CCNC: 13 U/L (ref 13–39)
BILIRUB SERPL-MCNC: 0.36 MG/DL (ref 0.2–1)
BUN SERPL-MCNC: 23 MG/DL (ref 5–25)
CALCIUM SERPL-MCNC: 10.1 MG/DL (ref 8.4–10.2)
CHLORIDE SERPL-SCNC: 108 MMOL/L (ref 96–108)
CHOLEST SERPL-MCNC: 185 MG/DL
CO2 SERPL-SCNC: 25 MMOL/L (ref 21–32)
CREAT SERPL-MCNC: 0.54 MG/DL (ref 0.6–1.3)
CREAT UR-MCNC: 173 MG/DL
ERYTHROCYTE [DISTWIDTH] IN BLOOD BY AUTOMATED COUNT: 12.5 % (ref 11.6–15.1)
EST. AVERAGE GLUCOSE BLD GHB EST-MCNC: 169 MG/DL
GFR SERPL CREATININE-BSD FRML MDRD: 93 ML/MIN/1.73SQ M
GLUCOSE P FAST SERPL-MCNC: 197 MG/DL (ref 65–99)
HBA1C MFR BLD: 7.5 %
HCT VFR BLD AUTO: 38.3 % (ref 34.8–46.1)
HDLC SERPL-MCNC: 49 MG/DL
HGB BLD-MCNC: 12.3 G/DL (ref 11.5–15.4)
LDLC SERPL CALC-MCNC: 78 MG/DL (ref 0–100)
MCH RBC QN AUTO: 29.6 PG (ref 26.8–34.3)
MCHC RBC AUTO-ENTMCNC: 32.1 G/DL (ref 31.4–37.4)
MCV RBC AUTO: 92 FL (ref 82–98)
MICROALBUMIN UR-MCNC: 33 MG/L
MICROALBUMIN/CREAT 24H UR: 19 MG/G CREATININE (ref 0–30)
PLATELET # BLD AUTO: 279 THOUSANDS/UL (ref 149–390)
PMV BLD AUTO: 10.9 FL (ref 8.9–12.7)
POTASSIUM SERPL-SCNC: 4.4 MMOL/L (ref 3.5–5.3)
PROT SERPL-MCNC: 6.5 G/DL (ref 6.4–8.4)
RBC # BLD AUTO: 4.15 MILLION/UL (ref 3.81–5.12)
SODIUM SERPL-SCNC: 140 MMOL/L (ref 135–147)
TRIGL SERPL-MCNC: 289 MG/DL
WBC # BLD AUTO: 7.42 THOUSAND/UL (ref 4.31–10.16)

## 2023-12-01 PROCEDURE — 80053 COMPREHEN METABOLIC PANEL: CPT

## 2023-12-01 PROCEDURE — 83036 HEMOGLOBIN GLYCOSYLATED A1C: CPT

## 2023-12-01 PROCEDURE — 80061 LIPID PANEL: CPT

## 2023-12-01 PROCEDURE — 36415 COLL VENOUS BLD VENIPUNCTURE: CPT

## 2023-12-01 PROCEDURE — 82570 ASSAY OF URINE CREATININE: CPT

## 2023-12-01 PROCEDURE — 85027 COMPLETE CBC AUTOMATED: CPT

## 2023-12-01 PROCEDURE — 82043 UR ALBUMIN QUANTITATIVE: CPT

## 2023-12-04 ENCOUNTER — OFFICE VISIT (OUTPATIENT)
Dept: FAMILY MEDICINE CLINIC | Facility: CLINIC | Age: 73
End: 2023-12-04
Payer: MEDICARE

## 2023-12-04 VITALS
RESPIRATION RATE: 16 BRPM | SYSTOLIC BLOOD PRESSURE: 116 MMHG | BODY MASS INDEX: 36.61 KG/M2 | DIASTOLIC BLOOD PRESSURE: 46 MMHG | TEMPERATURE: 98.3 F | WEIGHT: 220 LBS | HEART RATE: 76 BPM

## 2023-12-04 DIAGNOSIS — I10 ESSENTIAL HYPERTENSION: ICD-10-CM

## 2023-12-04 DIAGNOSIS — I42.0 DILATED CARDIOMYOPATHY (HCC): ICD-10-CM

## 2023-12-04 DIAGNOSIS — E11.9 TYPE 2 DIABETES MELLITUS WITHOUT COMPLICATION, WITHOUT LONG-TERM CURRENT USE OF INSULIN (HCC): Primary | ICD-10-CM

## 2023-12-04 DIAGNOSIS — R05.9 COUGH: ICD-10-CM

## 2023-12-04 PROCEDURE — 99214 OFFICE O/P EST MOD 30 MIN: CPT | Performed by: INTERNAL MEDICINE

## 2023-12-04 RX ORDER — BLOOD SUGAR DIAGNOSTIC
STRIP MISCELLANEOUS
Qty: 180 STRIP | Refills: 3 | Status: SHIPPED | OUTPATIENT
Start: 2023-12-04

## 2023-12-04 RX ORDER — BENZONATATE 100 MG/1
100 CAPSULE ORAL 3 TIMES DAILY PRN
Qty: 270 CAPSULE | Refills: 0 | Status: SHIPPED | OUTPATIENT
Start: 2023-12-04

## 2023-12-04 RX ORDER — BLOOD-GLUCOSE METER
EACH MISCELLANEOUS 2 TIMES DAILY
Qty: 1 KIT | Refills: 0 | Status: SHIPPED | OUTPATIENT
Start: 2023-12-04

## 2023-12-04 RX ORDER — VITAMIN B COMPLEX
1 CAPSULE ORAL DAILY
COMMUNITY

## 2023-12-04 NOTE — ASSESSMENT & PLAN NOTE
Lab Results   Component Value Date    HGBA1C 7.5 (H) 12/01/2023     Improved from previous and will continue current dietary measures, encouraged exercise and weight loss. Discussed need for good foot and eye care.
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
## 2023-12-05 ENCOUNTER — TELEPHONE (OUTPATIENT)
Dept: ADMINISTRATIVE | Facility: OTHER | Age: 73
End: 2023-12-05

## 2023-12-05 NOTE — TELEPHONE ENCOUNTER
----- Message from Brionna Chatman MD sent at 12/4/2023  1:23 PM EST -----  12/04/23 1:23 PM    Hello, our patient No patient name on file. has had Diabetic Eye Exam completed/performed. Please assist in updating the patient chart by making an External outreach to Dr. Leblanc facility located in [facility]. The date of service is 2023.    Thank you,  Brionna Chatman  On license of [facility]

## 2023-12-05 NOTE — TELEPHONE ENCOUNTER
----- Message from Emily Diggs sent at 12/4/2023  1:11 PM EST -----  12/04/23 1:12 PM    Hello, our patient Kamini Martines has had Diabetic Eye Exam completed/performed. Please assist in updating the patient chart by making an External outreach to Dr Gruber facility located in [place], [place]. The date of service is February.    Thank you,  Emily Diggs  [facility]

## 2023-12-05 NOTE — LETTER
Diabetic Eye Exam Form    Date Requested: 23  Patient: Kamini Martines  Patient : 1950   Referring Provider: Brionna Chatman MD      DIABETIC Eye Exam Date _______________________________      Type of Exam MUST be documented for Diabetic Eye Exams. Please CHECK ONE.     Retinal Exam       Dilated Retinal Exam       OCT       Optomap-Iris Exam      Fundus Photography       Left Eye - Please check Retinopathy or No Retinopathy        Exam did show retinopathy    Exam did not show retinopathy       Right Eye - Please check Retinopathy or No Retinopathy       Exam did show retinopathy    Exam did not show retinopathy       Comments __________________________________________________________    Practice Providing Exam ______________________________________________    Exam Performed By (print name) _______________________________________      Provider Signature ___________________________________________________      These reports are needed for  compliance.  Please fax this completed form and a copy of the Diabetic Eye Exam report to our office located at [address] [place] [address] as soon as possible via Fax [phone] attention [place]: Phone [phone]  We thank you for your assistance in treating our mutual patient.    Dr. Leblanc - [phone] F [phone]

## 2023-12-05 NOTE — LETTER
Diabetic Eye Exam Form    Date Requested: 23  Patient: Kamini Martines  Patient : 1950   Referring Provider: Brionna Chatman MD      DIABETIC Eye Exam Date _______________________________      Type of Exam MUST be documented for Diabetic Eye Exams. Please CHECK ONE.     Retinal Exam       Dilated Retinal Exam       OCT       Optomap-Iris Exam      Fundus Photography       Left Eye - Please check Retinopathy or No Retinopathy        Exam did show retinopathy    Exam did not show retinopathy       Right Eye - Please check Retinopathy or No Retinopathy       Exam did show retinopathy    Exam did not show retinopathy       Comments __________________________________________________________    Practice Providing Exam ______________________________________________    Exam Performed By (print name) _______________________________________      Provider Signature ___________________________________________________      These reports are needed for  compliance.  Please fax this completed form and a copy of the Diabetic Eye Exam report to our office located at [address] [place] [address] as soon as possible via Fax [phone] attention [place]: Phone [phone]  We thank you for your assistance in treating our mutual patient.    Dr. Leblanc - [phone] F [phone]

## 2023-12-08 NOTE — TELEPHONE ENCOUNTER
Upon review of the In Basket request and the patient's chart, initial outreach has been made via fax to facility. Please see Contacts section for details.     Thank you  Aracely Newsome

## 2023-12-09 DIAGNOSIS — I50.22 CHRONIC SYSTOLIC CONGESTIVE HEART FAILURE (HCC): ICD-10-CM

## 2023-12-12 NOTE — TELEPHONE ENCOUNTER
As a follow-up, a second attempt has been made for outreach via fax to facility. Please see Contacts section for details.    Thank you  Aracely Newsome

## 2023-12-13 RX ORDER — SACUBITRIL AND VALSARTAN 97; 103 MG/1; MG/1
TABLET, FILM COATED ORAL
Qty: 180 TABLET | Refills: 3 | Status: SHIPPED | OUTPATIENT
Start: 2023-12-13

## 2023-12-15 ENCOUNTER — OFFICE VISIT (OUTPATIENT)
Dept: OBGYN CLINIC | Facility: CLINIC | Age: 73
End: 2023-12-15
Payer: MEDICARE

## 2023-12-15 VITALS
BODY MASS INDEX: 36.65 KG/M2 | DIASTOLIC BLOOD PRESSURE: 67 MMHG | WEIGHT: 220 LBS | HEART RATE: 80 BPM | HEIGHT: 65 IN | SYSTOLIC BLOOD PRESSURE: 149 MMHG

## 2023-12-15 DIAGNOSIS — M17.12 PRIMARY OSTEOARTHRITIS OF LEFT KNEE: ICD-10-CM

## 2023-12-15 DIAGNOSIS — M70.61 TROCHANTERIC BURSITIS OF RIGHT HIP: Primary | ICD-10-CM

## 2023-12-15 PROCEDURE — 99213 OFFICE O/P EST LOW 20 MIN: CPT | Performed by: ORTHOPAEDIC SURGERY

## 2023-12-15 PROCEDURE — 20610 DRAIN/INJ JOINT/BURSA W/O US: CPT | Performed by: ORTHOPAEDIC SURGERY

## 2023-12-15 RX ORDER — LIDOCAINE HYDROCHLORIDE 10 MG/ML
2 INJECTION, SOLUTION INFILTRATION; PERINEURAL
Status: COMPLETED | OUTPATIENT
Start: 2023-12-15 | End: 2023-12-15

## 2023-12-15 RX ORDER — DEXAMETHASONE SODIUM PHOSPHATE 10 MG/ML
40 INJECTION, SOLUTION INTRAMUSCULAR; INTRAVENOUS
Status: COMPLETED | OUTPATIENT
Start: 2023-12-15 | End: 2023-12-15

## 2023-12-15 RX ORDER — TRIAMCINOLONE ACETONIDE 40 MG/ML
80 INJECTION, SUSPENSION INTRA-ARTICULAR; INTRAMUSCULAR
Status: COMPLETED | OUTPATIENT
Start: 2023-12-15 | End: 2023-12-15

## 2023-12-15 RX ORDER — BUPIVACAINE HYDROCHLORIDE 5 MG/ML
2 INJECTION, SOLUTION EPIDURAL; INTRACAUDAL
Status: COMPLETED | OUTPATIENT
Start: 2023-12-15 | End: 2023-12-15

## 2023-12-15 RX ORDER — LIDOCAINE HYDROCHLORIDE 10 MG/ML
4 INJECTION, SOLUTION INFILTRATION; PERINEURAL
Status: COMPLETED | OUTPATIENT
Start: 2023-12-15 | End: 2023-12-15

## 2023-12-15 RX ADMIN — LIDOCAINE HYDROCHLORIDE 2 ML: 10 INJECTION, SOLUTION INFILTRATION; PERINEURAL at 11:30

## 2023-12-15 RX ADMIN — DEXAMETHASONE SODIUM PHOSPHATE 40 MG: 10 INJECTION, SOLUTION INTRAMUSCULAR; INTRAVENOUS at 11:30

## 2023-12-15 RX ADMIN — TRIAMCINOLONE ACETONIDE 80 MG: 40 INJECTION, SUSPENSION INTRA-ARTICULAR; INTRAMUSCULAR at 11:30

## 2023-12-15 RX ADMIN — LIDOCAINE HYDROCHLORIDE 4 ML: 10 INJECTION, SOLUTION INFILTRATION; PERINEURAL at 11:30

## 2023-12-15 RX ADMIN — BUPIVACAINE HYDROCHLORIDE 2 ML: 5 INJECTION, SOLUTION EPIDURAL; INTRACAUDAL at 11:30
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
## 2023-12-18 ENCOUNTER — OFFICE VISIT (OUTPATIENT)
Dept: OBGYN CLINIC | Facility: CLINIC | Age: 73
End: 2023-12-18
Payer: MEDICARE

## 2023-12-18 ENCOUNTER — APPOINTMENT (OUTPATIENT)
Dept: RADIOLOGY | Facility: CLINIC | Age: 73
End: 2023-12-18
Payer: MEDICARE

## 2023-12-18 VITALS
HEART RATE: 58 BPM | DIASTOLIC BLOOD PRESSURE: 71 MMHG | WEIGHT: 220 LBS | BODY MASS INDEX: 36.65 KG/M2 | SYSTOLIC BLOOD PRESSURE: 172 MMHG | HEIGHT: 65 IN

## 2023-12-18 DIAGNOSIS — M19.011 OSTEOARTHRITIS OF RIGHT AC (ACROMIOCLAVICULAR) JOINT: Primary | ICD-10-CM

## 2023-12-18 DIAGNOSIS — M47.812 OSTEOARTHRITIS OF FACET JOINT OF CERVICAL SPINE: ICD-10-CM

## 2023-12-18 DIAGNOSIS — M25.511 ACUTE PAIN OF RIGHT SHOULDER: ICD-10-CM

## 2023-12-18 PROCEDURE — 72040 X-RAY EXAM NECK SPINE 2-3 VW: CPT

## 2023-12-18 PROCEDURE — 99214 OFFICE O/P EST MOD 30 MIN: CPT | Performed by: ORTHOPAEDIC SURGERY

## 2023-12-18 PROCEDURE — 73030 X-RAY EXAM OF SHOULDER: CPT
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
## 2023-12-22 NOTE — TELEPHONE ENCOUNTER
As a final attempt, a third outreach has been made via telephone call to facility. Please see Contacts section for details. This encounter will be closed and completed by end of day. Should we receive the requested information because of previous outreach attempts, the requested patient's chart will be updated appropriately.     Thank you  Aracely Newsome

## 2024-01-04 ENCOUNTER — OFFICE VISIT (OUTPATIENT)
Dept: OBGYN CLINIC | Facility: CLINIC | Age: 74
End: 2024-01-04
Payer: MEDICARE

## 2024-01-04 VITALS
HEART RATE: 92 BPM | SYSTOLIC BLOOD PRESSURE: 148 MMHG | WEIGHT: 220 LBS | HEIGHT: 65 IN | BODY MASS INDEX: 36.65 KG/M2 | DIASTOLIC BLOOD PRESSURE: 53 MMHG

## 2024-01-04 DIAGNOSIS — M76.822 TIBIALIS POSTERIOR TENDINITIS, LEFT: ICD-10-CM

## 2024-01-04 DIAGNOSIS — M19.011 OSTEOARTHRITIS OF RIGHT AC (ACROMIOCLAVICULAR) JOINT: ICD-10-CM

## 2024-01-04 DIAGNOSIS — M17.12 PRIMARY OSTEOARTHRITIS OF LEFT KNEE: Primary | ICD-10-CM

## 2024-01-04 PROCEDURE — 20606 DRAIN/INJ JOINT/BURSA W/US: CPT | Performed by: ORTHOPAEDIC SURGERY

## 2024-01-04 PROCEDURE — 99214 OFFICE O/P EST MOD 30 MIN: CPT | Performed by: ORTHOPAEDIC SURGERY

## 2024-01-04 RX ORDER — TRIAMCINOLONE ACETONIDE 40 MG/ML
40 INJECTION, SUSPENSION INTRA-ARTICULAR; INTRAMUSCULAR
Status: COMPLETED | OUTPATIENT
Start: 2024-01-04 | End: 2024-01-04

## 2024-01-04 RX ORDER — LIDOCAINE HYDROCHLORIDE 10 MG/ML
1 INJECTION, SOLUTION INFILTRATION; PERINEURAL
Status: COMPLETED | OUTPATIENT
Start: 2024-01-04 | End: 2024-01-04

## 2024-01-04 RX ADMIN — LIDOCAINE HYDROCHLORIDE 1 ML: 10 INJECTION, SOLUTION INFILTRATION; PERINEURAL at 11:30

## 2024-01-04 RX ADMIN — TRIAMCINOLONE ACETONIDE 40 MG: 40 INJECTION, SUSPENSION INTRA-ARTICULAR; INTRAMUSCULAR at 11:30
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
## 2024-01-12 ENCOUNTER — OFFICE VISIT (OUTPATIENT)
Dept: PAIN MEDICINE | Facility: CLINIC | Age: 74
End: 2024-01-12
Payer: MEDICARE

## 2024-01-12 VITALS
HEART RATE: 77 BPM | DIASTOLIC BLOOD PRESSURE: 70 MMHG | WEIGHT: 218.6 LBS | HEIGHT: 65 IN | SYSTOLIC BLOOD PRESSURE: 129 MMHG | BODY MASS INDEX: 36.42 KG/M2

## 2024-01-12 DIAGNOSIS — M47.812 OSTEOARTHRITIS OF FACET JOINT OF CERVICAL SPINE: ICD-10-CM

## 2024-01-12 PROCEDURE — 99214 OFFICE O/P EST MOD 30 MIN: CPT | Performed by: PHYSICAL MEDICINE & REHABILITATION
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
## 2024-01-23 ENCOUNTER — OFFICE VISIT (OUTPATIENT)
Dept: CARDIOLOGY CLINIC | Facility: CLINIC | Age: 74
End: 2024-01-23
Payer: MEDICARE

## 2024-01-23 VITALS
SYSTOLIC BLOOD PRESSURE: 120 MMHG | OXYGEN SATURATION: 96 % | DIASTOLIC BLOOD PRESSURE: 60 MMHG | HEART RATE: 80 BPM | HEIGHT: 65 IN | BODY MASS INDEX: 36.49 KG/M2 | WEIGHT: 219 LBS

## 2024-01-23 DIAGNOSIS — I10 ESSENTIAL HYPERTENSION: ICD-10-CM

## 2024-01-23 DIAGNOSIS — G47.33 OSA (OBSTRUCTIVE SLEEP APNEA): ICD-10-CM

## 2024-01-23 DIAGNOSIS — I42.0 DILATED CARDIOMYOPATHY (HCC): Primary | ICD-10-CM

## 2024-01-23 DIAGNOSIS — I50.22 CHRONIC SYSTOLIC CONGESTIVE HEART FAILURE (HCC): ICD-10-CM

## 2024-01-23 PROCEDURE — 99214 OFFICE O/P EST MOD 30 MIN: CPT | Performed by: INTERNAL MEDICINE

## 2024-01-23 PROCEDURE — 93000 ELECTROCARDIOGRAM COMPLETE: CPT | Performed by: INTERNAL MEDICINE

## 2024-01-23 NOTE — PROGRESS NOTES
Cardiology Followup    Kamini Martines  [id number]  1950  [facility] [address] [address]    Consult for: CHF    HPI: Kamini Martines is a 73 y.o. year old female who is here for followup of CHF.   Since her last visit, she denies any chest pain.  She has chronic exertional dyspnea which is unchanged from previous.  Denies any lower extremity edema, orthopnea or paroxysmal nocturnal dyspnea.  She feels lightheadedness at times.    Past Cardiac History:   In August 2019, She had a cardiac MRI done for the evaluation of cardiomyopathy.  MRI showed EF of 41% with a thin strip of intramyocardial fibrosis consistent with non-ischemic cardiomyopathy.        In January 2019, she had an echocardiogram done during hospitalization for influenza. Echocardiogram showed EF of 45% with small pericardial effusion and mild pulmonary hypertension.  She has no history of CHF or CAD.  History of CVA in 2014 with left arm weakness.  Was treated with TPA.  2 years prior to that she had workup by Dr. Valera including catheterization which was normal.  Patient had been on amiodarone since her CVA but denies history of atrial fibrillation.  Amiodarone was stopped in 2019.   Repeat 2D echocardiogram was done in June which showed an ejection fraction of 35%.      She has a family history of CHF with 2 brothers who have pacemakers (one after a MI) both parents had CHF as well.  Cardiac catheterization showed nonobstructive CAD.  EF was 30-35%.  Holter monitor was done which showed 5 episodes of VT with longest lasting 12 beats at 111 bpm.  She had no symptoms at the time.     The following portions of the patient's history were reviewed and updated as appropriate: allergies, current medications, past family history, past medical history, past social history, past surgical history and problem list.       Current Outpatient Medications:      aspirin 325 mg tablet, Take 325 mg by mouth daily, Disp: , Rfl:     b complex vitamins capsule, Take 1 capsule by mouth daily, Disp: , Rfl:     benzonatate (TESSALON PERLES) 100 mg capsule, Take 1 capsule (100 mg total) by mouth 3 (three) times a day as needed for cough, Disp: 270 capsule, Rfl: 0    Blood Glucose Monitoring Suppl (Accu-Chek Guide) w/Device KIT, Use 2 (two) times a day, Disp: 1 kit, Rfl: 0    Cholecalciferol (VITAMIN D-3) 1000 units CAPS, Take 5,000 Units by mouth daily, Disp: , Rfl:     Diclofenac Sodium (VOLTAREN) 1 %, PLEASE SEE ATTACHED FOR DETAILED DIRECTIONS, Disp: , Rfl:     Entresto  MG TABS, TAKE 1 TABLET BY MOUTH TWICE  DAILY, Disp: 180 tablet, Rfl: 3    glucose blood (Accu-Chek Guide) test strip, Use as instructed, Disp: 180 strip, Rfl: 3    ipratropium (ATROVENT) 0.03 % nasal spray, USE 2 SPRAYS IN BOTH  NOSTRILS 3 TIMES DAILY AS  NEEDED FOR RHINITIS, Disp: 120 mL, Rfl: 3    magnesium 30 MG tablet, Take 30 mg by mouth daily Unsure of the dosage, Disp: , Rfl:     Melatonin 10 MG TABS, Take by mouth daily at bedtime, Disp: , Rfl:     Multiple Vitamins-Minerals (PRESERVISION AREDS 2+MULTI VIT PO), 2 (two) times a day, Disp: , Rfl:     spironolactone (ALDACTONE) 25 mg tablet, TAKE 1 TABLET BY MOUTH  DAILY, Disp: 90 tablet, Rfl: 3    torsemide (DEMADEX) 10 mg tablet, Take 1 tablet (10 mg total) by mouth daily as needed (swelling), Disp: 90 tablet, Rfl: 1    Ventolin  (90 Base) MCG/ACT inhaler, INHALE 2 PUFFS EVERY 6 HOURS AS NEEDED FOR WHEEZING, Disp: 18 g, Rfl: 2    vitamin E, tocopherol, 400 units capsule, Take 400 Units by mouth daily, Disp: , Rfl:   Allergies   Allergen Reactions    Breo Ellipta [Fluticasone Furoate-Vilanterol] Anaphylaxis    Carvedilol Chest Pain and Hypertension    Lisinopril Other (See Comments)     Dizziness, cough    Olmesartan Medoxomil-Hctz Other (See Comments)     Category: Adverse Reaction; Annotation - [id number]: dizzy    Doxycycline Rash  "        Review of Systems:  Review of Systems   Respiratory:  Positive for shortness of breath. Negative for cough.    Cardiovascular:  Negative for chest pain, palpitations and leg swelling.   Musculoskeletal:  Positive for arthralgias, back pain, gait problem and neck pain.   All other systems reviewed and are negative.      Physical Exam:  Vitals:    01/23/24 1319   BP: 120/60   BP Location: Right arm   Patient Position: Sitting   Cuff Size: Standard   Pulse: 80   SpO2: 96%   Weight: 99.3 kg (219 lb)   Height: 5' 5\" (1.651 m)     Physical Exam  Constitutional:       General: She is not in acute distress.     Appearance: Normal appearance. She is well-developed. She is not diaphoretic.   HENT:      Head: Normocephalic and atraumatic.   Eyes:      General: No scleral icterus.     Conjunctiva/sclera: Conjunctivae normal.      Pupils: Pupils are equal, round, and reactive to light.   Neck:      Thyroid: No thyromegaly.      Vascular: No JVD.   Cardiovascular:      Rate and Rhythm: Normal rate and regular rhythm.      Heart sounds: Normal heart sounds. No murmur heard.     No friction rub. No gallop.   Pulmonary:      Effort: Pulmonary effort is normal.      Breath sounds: Normal breath sounds.   Musculoskeletal:      Cervical back: Neck supple.      Right lower leg: No edema.      Left lower leg: No edema.   Skin:     General: Skin is warm and dry.      Findings: No erythema or rash.   Neurological:      General: No focal deficit present.      Mental Status: She is alert and oriented to person, place, and time. Mental status is at baseline.   Psychiatric:         Mood and Affect: Mood normal.         Behavior: Behavior normal.         Thought Content: Thought content normal.         Judgment: Judgment normal.         Labs:  Lab Results   Component Value Date    K 4.4 12/01/2023     12/01/2023    CO2 25 12/01/2023    BUN 23 12/01/2023    CREATININE 0.54 (L) 12/01/2023    CALCIUM 10.1 12/01/2023     Lab Results "   Component Value Date    WBC 7.42 12/01/2023    HGB 12.3 12/01/2023    HCT 38.3 12/01/2023    MCV 92 12/01/2023     12/01/2023     Lab Results   Component Value Date    TRIG 289 (H) 12/01/2023    HDL 49 (L) 12/01/2023     EKG (independently reviewed): NSR with LVH and nonspecific ST abnormalities    Discussion/Summary:  1. Dilated cardiomyopathy (HCC)    2. Essential hypertension    3. Chronic systolic congestive heart failure (HCC)    4. LUCIA (obstructive sleep apnea)      - patient's EF was 41% on cardiac MRI with similar EF on most recent echocardiogram.  Study consistent with a nonischemic cardiomyopathy.  She had nonobstructive CAD on cath.  Holter monitor did not show frequent PVCs but episodes of VT was seen.  TSH was normal  She does not drink    - Continue Entresto and spironolactone.  She cannot tolerate beta blocker (has tried metoprolol and carvedilol).  Tolerating Entresto   mg daily.  - most recent renal function was normal.  Potassium level was also normal.  -She is limited because of back pain.    - torsemide 10 mg.  She may continue to use as needed.  Monitor daily weights and if increase in > 3 pounds, she should take dose.      - Followup with pulmonologist for CPAP adjustment as needed.   -   last echocardiogram showed EF of 40-45%.  No change from previous.    -Would benefit from the addition of Jardiance or Farxiga.  Patient does not wish to use any additional medications at this time.

## 2024-01-25 ENCOUNTER — PROCEDURE VISIT (OUTPATIENT)
Dept: OBGYN CLINIC | Facility: CLINIC | Age: 74
End: 2024-01-25
Payer: MEDICARE

## 2024-01-25 VITALS
SYSTOLIC BLOOD PRESSURE: 138 MMHG | BODY MASS INDEX: 36.49 KG/M2 | WEIGHT: 219 LBS | HEIGHT: 65 IN | HEART RATE: 77 BPM | DIASTOLIC BLOOD PRESSURE: 65 MMHG

## 2024-01-25 DIAGNOSIS — M17.12 PRIMARY OSTEOARTHRITIS OF LEFT KNEE: Primary | ICD-10-CM

## 2024-01-25 PROCEDURE — 20610 DRAIN/INJ JOINT/BURSA W/O US: CPT | Performed by: ORTHOPAEDIC SURGERY

## 2024-01-26 ENCOUNTER — TELEPHONE (OUTPATIENT)
Age: 74
End: 2024-01-26

## 2024-01-26 NOTE — TELEPHONE ENCOUNTER
"Called and spoke w/pt and she had visco injection in left knee 1/25/24 w/Dr Bernard. No swelling or redness.  Has pain 9/10. Tylenol does not work. Advised that this may be \"flare up\" after injection as gel has been placed between bone on bone and can cause some inflammation. She should rest, ice, compress w/knee sleeve/ace wrap (not too tight) for support; elevated.  Can take OTC pain meds per label instructions if allowed. Also can try to off load w/cane or walker, if available.  States she is having problems w/ getting up from standing position, climbing stairs, getting on and off toilet.  She would like to know if she can use a lidocaine patch that she has for her back pain?  Please review and advise.    Pt would like this addressed today as she is aware Dr Bernard is off on the weekends.   "

## 2024-01-26 NOTE — TELEPHONE ENCOUNTER
Caller: Self    Doctor: Marco Antonio    Reason for call: Patient is reporting increased pain since injection yesterday, 9/10. States when she tries to bend , it feels like there is something under knee cap    Call back#: [phone]   Chief Complaint   Patient presents with     Skin Check     Neck & right ribs        There were no vitals filed for this visit.  Wt Readings from Last 1 Encounters:   08/10/22 64 kg (141 lb)       Suha Coats LPN .................10/11/2022

## 2024-02-09 ENCOUNTER — APPOINTMENT (OUTPATIENT)
Dept: RADIOLOGY | Facility: CLINIC | Age: 74
End: 2024-02-09
Payer: MEDICARE

## 2024-02-09 ENCOUNTER — OFFICE VISIT (OUTPATIENT)
Dept: OBGYN CLINIC | Facility: CLINIC | Age: 74
End: 2024-02-09
Payer: MEDICARE

## 2024-02-09 VITALS
DIASTOLIC BLOOD PRESSURE: 80 MMHG | BODY MASS INDEX: 36.49 KG/M2 | WEIGHT: 219 LBS | HEIGHT: 65 IN | HEART RATE: 76 BPM | SYSTOLIC BLOOD PRESSURE: 152 MMHG

## 2024-02-09 DIAGNOSIS — M25.562 LEFT KNEE PAIN, UNSPECIFIED CHRONICITY: ICD-10-CM

## 2024-02-09 DIAGNOSIS — M17.12 PRIMARY LOCALIZED OSTEOARTHRITIS OF LEFT KNEE: Primary | ICD-10-CM

## 2024-02-09 PROCEDURE — 99214 OFFICE O/P EST MOD 30 MIN: CPT | Performed by: ORTHOPAEDIC SURGERY

## 2024-02-09 PROCEDURE — 20610 DRAIN/INJ JOINT/BURSA W/O US: CPT | Performed by: ORTHOPAEDIC SURGERY

## 2024-02-09 PROCEDURE — 73562 X-RAY EXAM OF KNEE 3: CPT

## 2024-02-09 RX ORDER — OXYCODONE HYDROCHLORIDE AND ACETAMINOPHEN 5; 325 MG/1; MG/1
1 TABLET ORAL EVERY 8 HOURS PRN
Qty: 15 TABLET | Refills: 0 | Status: SHIPPED | OUTPATIENT
Start: 2024-02-09 | End: 2024-02-14

## 2024-02-09 RX ORDER — LIDOCAINE HYDROCHLORIDE 10 MG/ML
4 INJECTION, SOLUTION INFILTRATION; PERINEURAL
Status: COMPLETED | OUTPATIENT
Start: 2024-02-09 | End: 2024-02-09

## 2024-02-09 RX ORDER — DEXAMETHASONE SODIUM PHOSPHATE 10 MG/ML
40 INJECTION, SOLUTION INTRAMUSCULAR; INTRAVENOUS
Status: COMPLETED | OUTPATIENT
Start: 2024-02-09 | End: 2024-02-09

## 2024-02-09 RX ADMIN — DEXAMETHASONE SODIUM PHOSPHATE 40 MG: 10 INJECTION, SOLUTION INTRAMUSCULAR; INTRAVENOUS at 14:00

## 2024-02-09 RX ADMIN — LIDOCAINE HYDROCHLORIDE 4 ML: 10 INJECTION, SOLUTION INFILTRATION; PERINEURAL at 14:00
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
## 2024-03-14 DIAGNOSIS — J45.20 MILD INTERMITTENT ASTHMA WITHOUT COMPLICATION: ICD-10-CM

## 2024-03-14 RX ORDER — ALBUTEROL SULFATE 90 UG/1
2 AEROSOL, METERED RESPIRATORY (INHALATION) EVERY 6 HOURS PRN
Qty: 18 G | Refills: 1 | Status: SHIPPED | OUTPATIENT
Start: 2024-03-14

## 2024-04-12 NOTE — ASSESSMENT & PLAN NOTE
Lab Results   Component Value Date    HGBA1C 7 1 (H) 11/15/2022   Well controlled and improved from previous  Continue dietary issues  Discussed need for good foot and eye care      Ashlee Bloom RN
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
## 2024-06-03 ENCOUNTER — RA CDI HCC (OUTPATIENT)
Dept: OTHER | Facility: HOSPITAL | Age: 74
End: 2024-06-03

## 2024-06-06 ENCOUNTER — APPOINTMENT (OUTPATIENT)
Dept: LAB | Facility: CLINIC | Age: 74
End: 2024-06-06
Payer: MEDICARE

## 2024-06-06 DIAGNOSIS — E11.9 TYPE 2 DIABETES MELLITUS WITHOUT COMPLICATION, WITHOUT LONG-TERM CURRENT USE OF INSULIN (HCC): ICD-10-CM

## 2024-06-06 DIAGNOSIS — I10 ESSENTIAL HYPERTENSION: ICD-10-CM

## 2024-06-06 LAB
ERYTHROCYTE [DISTWIDTH] IN BLOOD BY AUTOMATED COUNT: 12.7 % (ref 11.6–15.1)
EST. AVERAGE GLUCOSE BLD GHB EST-MCNC: 183 MG/DL
HBA1C MFR BLD: 8 %
HCT VFR BLD AUTO: 39.1 % (ref 34.8–46.1)
HGB BLD-MCNC: 12.4 G/DL (ref 11.5–15.4)
MCH RBC QN AUTO: 29.7 PG (ref 26.8–34.3)
MCHC RBC AUTO-ENTMCNC: 31.7 G/DL (ref 31.4–37.4)
MCV RBC AUTO: 94 FL (ref 82–98)
PLATELET # BLD AUTO: 373 THOUSANDS/UL (ref 149–390)
PMV BLD AUTO: 11 FL (ref 8.9–12.7)
RBC # BLD AUTO: 4.17 MILLION/UL (ref 3.81–5.12)
WBC # BLD AUTO: 9.92 THOUSAND/UL (ref 4.31–10.16)

## 2024-06-06 PROCEDURE — 36415 COLL VENOUS BLD VENIPUNCTURE: CPT

## 2024-06-06 PROCEDURE — 82570 ASSAY OF URINE CREATININE: CPT

## 2024-06-06 PROCEDURE — 82043 UR ALBUMIN QUANTITATIVE: CPT

## 2024-06-06 PROCEDURE — 83036 HEMOGLOBIN GLYCOSYLATED A1C: CPT

## 2024-06-06 PROCEDURE — 80061 LIPID PANEL: CPT

## 2024-06-06 PROCEDURE — 85027 COMPLETE CBC AUTOMATED: CPT

## 2024-06-06 PROCEDURE — 80053 COMPREHEN METABOLIC PANEL: CPT

## 2024-06-07 LAB
ALBUMIN SERPL BCP-MCNC: 4 G/DL (ref 3.5–5)
ALP SERPL-CCNC: 45 U/L (ref 34–104)
ALT SERPL W P-5'-P-CCNC: 18 U/L (ref 7–52)
ANION GAP SERPL CALCULATED.3IONS-SCNC: 11 MMOL/L (ref 4–13)
AST SERPL W P-5'-P-CCNC: 13 U/L (ref 13–39)
BILIRUB SERPL-MCNC: 0.29 MG/DL (ref 0.2–1)
BUN SERPL-MCNC: 26 MG/DL (ref 5–25)
CALCIUM SERPL-MCNC: 9.8 MG/DL (ref 8.4–10.2)
CHLORIDE SERPL-SCNC: 105 MMOL/L (ref 96–108)
CHOLEST SERPL-MCNC: 180 MG/DL
CO2 SERPL-SCNC: 24 MMOL/L (ref 21–32)
CREAT SERPL-MCNC: 0.55 MG/DL (ref 0.6–1.3)
CREAT UR-MCNC: 195.5 MG/DL
GFR SERPL CREATININE-BSD FRML MDRD: 92 ML/MIN/1.73SQ M
GLUCOSE P FAST SERPL-MCNC: 188 MG/DL (ref 65–99)
HDLC SERPL-MCNC: 50 MG/DL
LDLC SERPL CALC-MCNC: 83 MG/DL (ref 0–100)
MICROALBUMIN UR-MCNC: 100 MG/L
MICROALBUMIN/CREAT 24H UR: 51 MG/G CREATININE (ref 0–30)
POTASSIUM SERPL-SCNC: 4.8 MMOL/L (ref 3.5–5.3)
PROT SERPL-MCNC: 6.2 G/DL (ref 6.4–8.4)
SODIUM SERPL-SCNC: 140 MMOL/L (ref 135–147)
TRIGL SERPL-MCNC: 233 MG/DL

## 2024-06-10 ENCOUNTER — OFFICE VISIT (OUTPATIENT)
Dept: FAMILY MEDICINE CLINIC | Facility: CLINIC | Age: 74
End: 2024-06-10
Payer: MEDICARE

## 2024-06-10 VITALS
DIASTOLIC BLOOD PRESSURE: 70 MMHG | BODY MASS INDEX: 37.73 KG/M2 | HEART RATE: 86 BPM | RESPIRATION RATE: 16 BRPM | SYSTOLIC BLOOD PRESSURE: 136 MMHG | TEMPERATURE: 97.3 F | WEIGHT: 221 LBS | HEIGHT: 64 IN

## 2024-06-10 DIAGNOSIS — J45.20 MILD INTERMITTENT ASTHMA WITHOUT COMPLICATION: ICD-10-CM

## 2024-06-10 DIAGNOSIS — Z11.59 ENCOUNTER FOR HEPATITIS C SCREENING TEST FOR LOW RISK PATIENT: ICD-10-CM

## 2024-06-10 DIAGNOSIS — Z00.00 MEDICARE ANNUAL WELLNESS VISIT, SUBSEQUENT: Primary | ICD-10-CM

## 2024-06-10 DIAGNOSIS — E66.2 MORBID (SEVERE) OBESITY WITH ALVEOLAR HYPOVENTILATION (HCC): ICD-10-CM

## 2024-06-10 DIAGNOSIS — I10 ESSENTIAL HYPERTENSION: ICD-10-CM

## 2024-06-10 DIAGNOSIS — R13.10 DYSPHAGIA, UNSPECIFIED TYPE: ICD-10-CM

## 2024-06-10 DIAGNOSIS — E11.9 TYPE 2 DIABETES MELLITUS WITHOUT COMPLICATION, WITHOUT LONG-TERM CURRENT USE OF INSULIN (HCC): ICD-10-CM

## 2024-06-10 PROCEDURE — 99214 OFFICE O/P EST MOD 30 MIN: CPT | Performed by: INTERNAL MEDICINE

## 2024-06-10 PROCEDURE — G0439 PPPS, SUBSEQ VISIT: HCPCS | Performed by: INTERNAL MEDICINE

## 2024-06-10 RX ORDER — ALBUTEROL SULFATE 90 UG/1
2 AEROSOL, METERED RESPIRATORY (INHALATION) EVERY 6 HOURS PRN
Qty: 18 G | Refills: 3 | Status: SHIPPED | OUTPATIENT
Start: 2024-06-10

## 2024-06-10 NOTE — PATIENT INSTRUCTIONS
Medicare Preventive Visit Patient Instructions  Thank you for completing your Welcome to Medicare Visit or Medicare Annual Wellness Visit today. Your next wellness visit will be due in one year (6/11/2025).  The screening/preventive services that you may require over the next 5-10 years are detailed below. Some tests may not apply to you based off risk factors and/or age. Screening tests ordered at today's visit but not completed yet may show as past due. Also, please note that scanned in results may not display below.  Preventive Screenings:  Service Recommendations Previous Testing/Comments   Colorectal Cancer Screening  * Colonoscopy    * Fecal Occult Blood Test (FOBT)/Fecal Immunochemical Test (FIT)  * Fecal DNA/Cologuard Test  * Flexible Sigmoidoscopy Age: 45-75 years old   Colonoscopy: every 10 years (may be performed more frequently if at higher risk)  OR  FOBT/FIT: every 1 year  OR  Cologuard: every 3 years  OR  Sigmoidoscopy: every 5 years  Screening may be recommended earlier than age 45 if at higher risk for colorectal cancer. Also, an individualized decision between you and your healthcare provider will decide whether screening between the ages of 76-85 would be appropriate. Colonoscopy: Not on file  FOBT/FIT: Not on file  Cologuard: 01/30/2023  Sigmoidoscopy: Not on file    Screening Current     Breast Cancer Screening Age: 40+ years old  Frequency: every 1-2 years  Not required if history of left and right mastectomy Mammogram: 09/01/2010        Cervical Cancer Screening Between the ages of 21-29, pap smear recommended once every 3 years.   Between the ages of 30-65, can perform pap smear with HPV co-testing every 5 years.   Recommendations may differ for women with a history of total hysterectomy, cervical cancer, or abnormal pap smears in past. Pap Smear: Not on file    Screening Not Indicated   Hepatitis C Screening Once for adults born between 1945 and 1965  More frequently in patients at high risk  for Hepatitis C Hep C Antibody: Not on file        Diabetes Screening 1-2 times per year if you're at risk for diabetes or have pre-diabetes Fasting glucose: 188 mg/dL (6/6/2024)  A1C: 8.0 % (6/6/2024)  Screening Not Indicated  History Diabetes   Cholesterol Screening Once every 5 years if you don't have a lipid disorder. May order more often based on risk factors. Lipid panel: 06/06/2024    Screening Current     Other Preventive Screenings Covered by Medicare:  Abdominal Aortic Aneurysm (AAA) Screening: covered once if your at risk. You're considered to be at risk if you have a family history of AAA.  Lung Cancer Screening: covers low dose CT scan once per year if you meet all of the following conditions: (1) Age 55-77; (2) No signs or symptoms of lung cancer; (3) Current smoker or have quit smoking within the last 15 years; (4) You have a tobacco smoking history of at least 20 pack years (packs per day multiplied by number of years you smoked); (5) You get a written order from a healthcare provider.  Glaucoma Screening: covered annually if you're considered high risk: (1) You have diabetes OR (2) Family history of glaucoma OR (3)  aged 50 and older OR (4)  American aged 65 and older  Osteoporosis Screening: covered every 2 years if you meet one of the following conditions: (1) You're estrogen deficient and at risk for osteoporosis based off medical history and other findings; (2) Have a vertebral abnormality; (3) On glucocorticoid therapy for more than 3 months; (4) Have primary hyperparathyroidism; (5) On osteoporosis medications and need to assess response to drug therapy.   Last bone density test (DXA Scan): Not on file.  HIV Screening: covered annually if you're between the age of 15-65. Also covered annually if you are younger than 15 and older than 65 with risk factors for HIV infection. For pregnant patients, it is covered up to 3 times per pregnancy.    Immunizations:  Immunization  Recommendations   Influenza Vaccine Annual influenza vaccination during flu season is recommended for all persons aged >= 6 months who do not have contraindications   Pneumococcal Vaccine   * Pneumococcal conjugate vaccine = PCV13 (Prevnar 13), PCV15 (Vaxneuvance), PCV20 (Prevnar 20)  * Pneumococcal polysaccharide vaccine = PPSV23 (Pneumovax) Adults 19-63 yo with certain risk factors or if 65+ yo  If never received any pneumonia vaccine: recommend Prevnar 20 (PCV20)  Give PCV20 if previously received 1 dose of PCV13 or PPSV23   Hepatitis B Vaccine 3 dose series if at intermediate or high risk (ex: diabetes, end stage renal disease, liver disease)   Respiratory syncytial virus (RSV) Vaccine - COVERED BY MEDICARE PART D  * RSVPreF3 (Arexvy) CDC recommends that adults 60 years of age and older may receive a single dose of RSV vaccine using shared clinical decision-making (SCDM)   Tetanus (Td) Vaccine - COST NOT COVERED BY MEDICARE PART B Following completion of primary series, a booster dose should be given every 10 years to maintain immunity against tetanus. Td may also be given as tetanus wound prophylaxis.   Tdap Vaccine - COST NOT COVERED BY MEDICARE PART B Recommended at least once for all adults. For pregnant patients, recommended with each pregnancy.   Shingles Vaccine (Shingrix) - COST NOT COVERED BY MEDICARE PART B  2 shot series recommended in those 19 years and older who have or will have weakened immune systems or those 50 years and older     Health Maintenance Due:      Topic Date Due   • Hepatitis C Screening  Never done   • Breast Cancer Screening: Mammogram  09/01/2011   • Colorectal Cancer Screening  01/30/2026     Immunizations Due:      Topic Date Due   • Pneumococcal Vaccine: 65+ Years (1 of 2 - PCV) Never done   • COVID-19 Vaccine (4 - 2023-24 season) 09/01/2023   • Influenza Vaccine (Season Ended) 09/01/2024     Advance Directives   What are advance directives?  Advance directives are legal  documents that state your wishes and plans for medical care. These plans are made ahead of time in case you lose your ability to make decisions for yourself. Advance directives can apply to any medical decision, such as the treatments you want, and if you want to donate organs.   What are the types of advance directives?  There are many types of advance directives, and each state has rules about how to use them. You may choose a combination of any of the following:  Living will:  This is a written record of the treatment you want. You can also choose which treatments you do not want, which to limit, and which to stop at a certain time. This includes surgery, medicine, IV fluid, and tube feedings.   Durable power of  for healthcare (DPAHC):  This is a written record that states who you want to make healthcare choices for you when you are unable to make them for yourself. This person, called a proxy, is usually a family member or a friend. You may choose more than 1 proxy.  Do not resuscitate (DNR) order:  A DNR order is used in case your heart stops beating or you stop breathing. It is a request not to have certain forms of treatment, such as CPR. A DNR order may be included in other types of advance directives.  Medical directive:  This covers the care that you want if you are in a coma, near death, or unable to make decisions for yourself. You can list the treatments you want for each condition. Treatment may include pain medicine, surgery, blood transfusions, dialysis, IV or tube feedings, and a ventilator (breathing machine).  Values history:  This document has questions about your views, beliefs, and how you feel and think about life. This information can help others choose the care that you would choose.  Why are advance directives important?  An advance directive helps you control your care. Although spoken wishes may be used, it is better to have your wishes written down. Spoken wishes can be  misunderstood, or not followed. Treatments may be given even if you do not want them. An advance directive may make it easier for your family to make difficult choices about your care.   Urinary Incontinence   Urinary incontinence (UI)  is when you lose control of your bladder. UI develops because your bladder cannot store or empty urine properly. The 3 most common types of UI are stress incontinence, urge incontinence, or both.  Medicines:   May be given to help strengthen your bladder control. Report any side effects of medication to your healthcare provider.  Do pelvic muscle exercises often:  Your pelvic muscles help you stop urinating. Squeeze these muscles tight for 5 seconds, then relax for 5 seconds. Gradually work up to squeezing for 10 seconds. Do 3 sets of 15 repetitions a day, or as directed. This will help strengthen your pelvic muscles and improve bladder control.  Train your bladder:  Go to the bathroom at set times, such as every 2 hours, even if you do not feel the urge to go. You can also try to hold your urine when you feel the urge to go. For example, hold your urine for 5 minutes when you feel the urge to go. As that becomes easier, hold your urine for 10 minutes.   Self-care:   Keep a UI record.  Write down how often you leak urine and how much you leak. Make a note of what you were doing when you leaked urine.  Drink liquids as directed. You may need to limit the amount of liquid you drink to help control your urine leakage. Do not drink any liquid right before you go to bed. Limit or do not have drinks that contain caffeine or alcohol.   Prevent constipation.  Eat a variety of high-fiber foods. Good examples are high-fiber cereals, beans, vegetables, and whole-grain breads. Walking is the best way to trigger your intestines to have a bowel movement.  Exercise regularly and maintain a healthy weight.  Weight loss and exercise will decrease pressure on your bladder and help you control your  leakage.   Use a catheter as directed  to help empty your bladder. A catheter is a tiny, plastic tube that is put into your bladder to drain your urine.   Go to behavior therapy as directed.  Behavior therapy may be used to help you learn to control your urge to urinate.    Weight Management   Why it is important to manage your weight:  Being overweight increases your risk of health conditions such as heart disease, high blood pressure, type 2 diabetes, and certain types of cancer. It can also increase your risk for osteoarthritis, sleep apnea, and other respiratory problems. Aim for a slow, steady weight loss. Even a small amount of weight loss can lower your risk of health problems.  How to lose weight safely:  A safe and healthy way to lose weight is to eat fewer calories and get regular exercise. You can lose up about 1 pound a week by decreasing the number of calories you eat by 500 calories each day.   Healthy meal plan for weight management:  A healthy meal plan includes a variety of foods, contains fewer calories, and helps you stay healthy. A healthy meal plan includes the following:  Eat whole-grain foods more often.  A healthy meal plan should contain fiber. Fiber is the part of grains, fruits, and vegetables that is not broken down by your body. Whole-grain foods are healthy and provide extra fiber in your diet. Some examples of whole-grain foods are whole-wheat breads and pastas, oatmeal, brown rice, and bulgur.  Eat a variety of vegetables every day.  Include dark, leafy greens such as spinach, kale, felecia greens, and mustard greens. Eat yellow and orange vegetables such as carrots, sweet potatoes, and winter squash.   Eat a variety of fruits every day.  Choose fresh or canned fruit (canned in its own juice or light syrup) instead of juice. Fruit juice has very little or no fiber.  Eat low-fat dairy foods.  Drink fat-free (skim) milk or 1% milk. Eat fat-free yogurt and low-fat cottage cheese. Try  low-fat cheeses such as mozzarella and other reduced-fat cheeses.  Choose meat and other protein foods that are low in fat.  Choose beans or other legumes such as split peas or lentils. Choose fish, skinless poultry (chicken or turkey), or lean cuts of red meat (beef or pork). Before you cook meat or poultry, cut off any visible fat.   Use less fat and oil.  Try baking foods instead of frying them. Add less fat, such as margarine, sour cream, regular salad dressing and mayonnaise to foods. Eat fewer high-fat foods. Some examples of high-fat foods include french fries, doughnuts, ice cream, and cakes.  Eat fewer sweets.  Limit foods and drinks that are high in sugar. This includes candy, cookies, regular soda, and sweetened drinks.  Exercise:  Exercise at least 30 minutes per day on most days of the week. Some examples of exercise include walking, biking, dancing, and swimming. You can also fit in more physical activity by taking the stairs instead of the elevator or parking farther away from stores. Ask your healthcare provider about the best exercise plan for you.      © Copyright [employer] 2018 Information is for End User's use only and may not be sold, redistributed or otherwise used for commercial purposes. All illustrations and images included in CareNotes® are the copyrighted property of [employer] or [employer]

## 2024-06-10 NOTE — ASSESSMENT & PLAN NOTE
"  Lab Results   Component Value Date    HGBA1C 8.0 (H) 06/06/2024     This has worsened and is not well controlled.  She refuses to add a medication to her regimen.  She has \"something else she wants to try\" but won't state what that is. Refuses addition of any medication at this time.  Discussed need for exercise and weight loss.    "

## 2024-06-10 NOTE — PROGRESS NOTES
"Ambulatory Visit  Name: Kamini Martines      : 1950      MRN: [id number]  Encounter Provider: Brionna Chatman MD  Encounter Date: 6/10/2024   Encounter department: [facility]    Assessment & Plan   1. Medicare annual wellness visit, subsequent  2. 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)  Assessment & Plan:  Discussed need for exercise and weight loss to decrease other comorbidities.  Orders:  -     Hemoglobin A1C; Future; Expected date: 09/10/2024  -     Comprehensive metabolic panel; Future; Expected date: 09/10/2024  -     CBC and Platelet; Future; Expected date: 09/10/2024  -     Lipid Panel with Direct LDL reflex; Future; Expected date: 09/10/2024  -     Hepatitis C antibody; Future  3. Type 2 diabetes mellitus without complication, without long-term current use of insulin (HCC)  Assessment & Plan:    Lab Results   Component Value Date    HGBA1C 8.0 (H) 2024     This has worsened and is not well controlled.  She refuses to add a medication to her regimen.  She has \"something else she wants to try\" but won't state what that is. Refuses addition of any medication at this time.  Discussed need for exercise and weight loss.    Orders:  -     Hemoglobin A1C; Future; Expected date: 09/10/2024  -     Comprehensive metabolic panel; Future; Expected date: 09/10/2024  -     CBC and Platelet; Future; Expected date: 09/10/2024  -     Lipid Panel with Direct LDL reflex; Future; Expected date: 09/10/2024  -     Hepatitis C antibody; Future  4. Essential hypertension  Assessment & Plan:  Well controlled on current medications and will continue as ordered.   Orders:  -     Hemoglobin A1C; Future; Expected date: 09/10/2024  -     Comprehensive metabolic panel; Future; Expected date: 09/10/2024  -     CBC and Platelet; Future; Expected date: 09/10/2024  -     Lipid Panel with Direct LDL reflex; Future; Expected date: 09/10/2024  -     Hepatitis C antibody; Future  5. Dysphagia, " unspecified type  -     Ambulatory Referral to Gastroenterology; Future  6. Mild intermittent asthma without complication  Assessment & Plan:  Stable, continue albuterol PRN.  Orders:  -     albuterol (Ventolin HFA) 90 mcg/act inhaler; Inhale 2 puffs every 6 (six) hours as needed for wheezing  7. Encounter for hepatitis C screening test for low risk patient  -     Hepatitis C antibody; Future       Preventive health issues were discussed with patient, and age appropriate screening tests were ordered as noted in patient's After Visit Summary. Personalized health advice and appropriate referrals for health education or preventive services given if needed, as noted in patient's After Visit Summary.    History of Present Illness     She is very happy and has a multitude of orthopedic complaints.  She is very tired. She does not sleep well due to back pain and shoulder pain.   She is not using the CPAP machine because she feels like this is causing her neck pain.  Has been seeing ortho and pain management but does not feel as though they help her.  At this point, she feels as though she shouldn't even leave the house. Is not using a cane or a walker.  Has tried otc topicals without relief.  Reviewed labs with patient.  She refuses to start a medication for her DM.  Refuses mammogram or other screening procedures.  Denies hypoglycemia.  Up to date with eye exam.  She has been having some issues with chicken and other meat getting stuck in her throat.  Has never seen GI.    Diabetes  Pertinent negatives for hypoglycemia include no seizures. Pertinent negatives for diabetes include no chest pain.      Patient Care Team:  Brionna Chatman MD as PCP - General (Internal Medicine)  MD Nelly Osullivan MD    Review of Systems   Constitutional:  Negative for chills and fever.   HENT:  Negative for ear pain and sore throat.    Eyes:  Negative for pain and visual disturbance.   Respiratory:  Negative for cough and  shortness of breath.    Cardiovascular:  Negative for chest pain and palpitations.   Gastrointestinal:  Negative for abdominal pain and vomiting.        As per HPI.   Genitourinary:  Negative for dysuria and hematuria.   Musculoskeletal:  Negative for arthralgias and back pain.   Skin:  Negative for color change and rash.   Neurological:  Negative for seizures and syncope.   All other systems reviewed and are negative.    Medical History Reviewed by provider this encounter:       Annual Wellness Visit Questionnaire   Kamini is here for her Subsequent Wellness visit.     Health Risk Assessment:   Patient rates overall health as poor. Patient feels that their physical health rating is much worse. Patient is dissatisfied with their life. Eyesight was rated as same. Hearing was rated as same. Patient feels that their emotional and mental health rating is same. Patients states they are sometimes angry. Patient states they are sometimes unusually tired/fatigued. Pain experienced in the last 7 days has been a lot. Patient's pain rating has been 10/10. Patient states that she has experienced no weight loss or gain in last 6 months.     Depression Screening:   PHQ-2 Score: 0      Fall Risk Screening:   In the past year, patient has experienced: no history of falling in past year      Urinary Incontinence Screening:   Patient has leaked urine accidently in the last six months.     Home Safety:  Patient has trouble with stairs inside or outside of their home. Patient has working smoke alarms and has working carbon monoxide detector. Home safety hazards include: medications that cause fatigue.     Nutrition:   Current diet is Regular.     Medications:   Patient is currently taking over-the-counter supplements. OTC medications include: see medication list. Patient is able to manage medications.     Activities of Daily Living (ADLs)/Instrumental Activities of Daily Living (IADLs):   Walk and transfer into and out of bed and  chair?: Yes  Dress and groom yourself?: Yes    Bathe or shower yourself?: Yes    Feed yourself? Yes  Do your laundry/housekeeping?: Yes  Manage your money, pay your bills and track your expenses?: Yes  Make your own meals?: Yes    Do your own shopping?: No    Durable Medical Equipment Suppliers  [employer]    Previous Hospitalizations:   Any hospitalizations or ED visits within the last 12 months?: No      Advance Care Planning:   Living will: No    Durable POA for healthcare: No    Advanced directive: No      Comments: Declines information.    Cognitive Screening:   Provider or family/friend/caregiver concerned regarding cognition?: No    PREVENTIVE SCREENINGS      Cardiovascular Screening:    General: Screening Current      Diabetes Screening:     General: Screening Not Indicated and History Diabetes      Colorectal Cancer Screening:     General: Screening Current      Breast Cancer Screening:     General: Patient Declines and Risks and Benefits Discussed      Cervical Cancer Screening:    General: Screening Not Indicated and Risks and Benefits Discussed      Osteoporosis Screening:    General: Risks and Benefits Discussed and Patient Declines      Abdominal Aortic Aneurysm (AAA) Screening:        General: Screening Not Indicated      Lung Cancer Screening:     General: Screening Not Indicated      Hepatitis C Screening:    General: Risks and Benefits Discussed    Screening, Brief Intervention, and Referral to Treatment (SBIRT)    Screening  Typical number of drinks in a day: 0  Typical number of drinks in a week: 0  Interpretation: Low risk drinking behavior.    AUDIT-C Screenin) How often did you have a drink containing alcohol in the past year? never  2) How many drinks did you have on a typical day when you were drinking in the past year? 0  3) How often did you have 6 or more drinks on one occasion in the past year? never    AUDIT-C Score: 0  Interpretation: Score 0-2 (female): Negative screen for alcohol  "misuse    Single Item Drug Screening:  How often have you used an illegal drug (including marijuana) or a prescription medication for non-medical reasons in the past year? never    Single Item Drug Screen Score: 0  Interpretation: Negative screen for possible drug use disorder    Brief Intervention  Alcohol & drug use screenings were reviewed. No concerns regarding substance use disorder identified.     Other Counseling Topics:   Car/seat belt/driving safety, skin self-exam, sunscreen and regular weightbearing exercise and calcium and vitamin D intake.     Social Determinants of Health     Financial Resource Strain: Low Risk  (5/22/2023)    Overall Financial Resource Strain (CARDIA)    • Difficulty of Paying Living Expenses: Not very hard   Food Insecurity: No Food Insecurity (6/10/2024)    Hunger Vital Sign    • Worried About Running Out of Food in the Last Year: Never true    • Ran Out of Food in the Last Year: Never true   Transportation Needs: No Transportation Needs (6/10/2024)    PRAPARE - Transportation    • Lack of Transportation (Medical): No    • Lack of Transportation (Non-Medical): No   Housing Stability: Unknown (6/10/2024)    Housing Stability Vital Sign    • Unable to Pay for Housing in the Last Year: No    • Homeless in the Last Year: No   Utilities: Not At Risk (6/10/2024)    [facility] Utilities    • Threatened with loss of utilities: No     No results found.    Objective     /70   Pulse 86   Temp (!) 97.3 °F (36.3 °C)   Resp 16   Ht 5' 4\" (1.626 m)   Wt 100 kg (221 lb)   BMI 37.93 kg/m²     Physical Exam  Constitutional:       General: She is not in acute distress.     Appearance: She is well-developed. She is obese. She is not diaphoretic.   HENT:      Head: Normocephalic and atraumatic.      Right Ear: External ear normal.      Left Ear: External ear normal.      Nose: Nose normal.      Mouth/Throat:      Mouth: Oropharynx is clear and moist.   Eyes:      Extraocular Movements: EOM normal. "      Pupils: Pupils are equal, round, and reactive to light.   Neck:      Thyroid: No thyromegaly.      Vascular: No JVD.   Cardiovascular:      Rate and Rhythm: Regular rhythm.      Pulses: no weak pulses.           Dorsalis pedis pulses are 2+ on the right side and 2+ on the left side.      Heart sounds: No murmur heard.     No friction rub. No gallop.   Pulmonary:      Effort: Pulmonary effort is normal.      Breath sounds: Normal breath sounds. No wheezing or rales.   Abdominal:      General: Bowel sounds are normal. There is no distension.      Palpations: Abdomen is soft.      Tenderness: There is no abdominal tenderness.   Musculoskeletal:         General: No edema. Normal range of motion.      Cervical back: Normal range of motion and neck supple.   Feet:      Right foot:      Skin integrity: No ulcer, skin breakdown, erythema, warmth, callus or dry skin.      Left foot:      Skin integrity: No ulcer, skin breakdown, erythema, warmth, callus or dry skin.   Skin:     General: Skin is warm and dry.      Findings: No rash.   Neurological:      Mental Status: She is alert and oriented to person, place, and time.      Cranial Nerves: No cranial nerve deficit.      Sensory: No sensory deficit.      Motor: No abnormal muscle tone.      Coordination: Coordination normal.      Deep Tendon Reflexes: Reflexes normal.   Psychiatric:         Mood and Affect: Mood and affect normal.         Behavior: Behavior normal.         Thought Content: Thought content normal.         Judgment: Judgment normal.     Patient's shoes and socks removed.    Right Foot/Ankle   Right Foot Inspection  Skin Exam: skin normal and skin intact. No dry skin, no warmth, no callus, no erythema, no maceration, no abnormal color, no pre-ulcer, no ulcer and no callus.     Toe Exam: ROM and strength within normal limits.     Sensory   Monofilament testing: intact    Vascular  Capillary refills: < 3 seconds  The right DP pulse is 2+.     Left  Foot/Ankle  Left Foot Inspection  Skin Exam: skin normal and skin intact. No dry skin, no warmth, no erythema, no maceration, normal color, no pre-ulcer, no ulcer and no callus.     Toe Exam: ROM and strength within normal limits.     Sensory   Monofilament testing: intact    Vascular  Capillary refills: < 3 seconds  The left DP pulse is 2+.     Assign Risk Category  No deformity present  No loss of protective sensation  No weak pulses  Risk: 0      Administrative Statements

## 2024-06-27 ENCOUNTER — OFFICE VISIT (OUTPATIENT)
Dept: OBGYN CLINIC | Facility: CLINIC | Age: 74
End: 2024-06-27
Payer: MEDICARE

## 2024-06-27 VITALS
HEART RATE: 76 BPM | BODY MASS INDEX: 36.82 KG/M2 | WEIGHT: 221 LBS | DIASTOLIC BLOOD PRESSURE: 73 MMHG | HEIGHT: 65 IN | SYSTOLIC BLOOD PRESSURE: 133 MMHG

## 2024-06-27 DIAGNOSIS — M19.011 OSTEOARTHRITIS OF RIGHT AC (ACROMIOCLAVICULAR) JOINT: Primary | ICD-10-CM

## 2024-06-27 DIAGNOSIS — M19.012 OSTEOARTHRITIS OF LEFT ACROMIOCLAVICULAR JOINT: ICD-10-CM

## 2024-06-27 PROCEDURE — 20606 DRAIN/INJ JOINT/BURSA W/US: CPT | Performed by: ORTHOPAEDIC SURGERY

## 2024-06-27 PROCEDURE — 99214 OFFICE O/P EST MOD 30 MIN: CPT | Performed by: ORTHOPAEDIC SURGERY

## 2024-06-27 RX ORDER — LIDOCAINE HYDROCHLORIDE 10 MG/ML
1 INJECTION, SOLUTION INFILTRATION; PERINEURAL
Status: COMPLETED | OUTPATIENT
Start: 2024-06-27 | End: 2024-06-27

## 2024-06-27 RX ORDER — TRIAMCINOLONE ACETONIDE 40 MG/ML
40 INJECTION, SUSPENSION INTRA-ARTICULAR; INTRAMUSCULAR
Status: COMPLETED | OUTPATIENT
Start: 2024-06-27 | End: 2024-06-27

## 2024-06-27 RX ADMIN — TRIAMCINOLONE ACETONIDE 40 MG: 40 INJECTION, SUSPENSION INTRA-ARTICULAR; INTRAMUSCULAR at 13:15

## 2024-06-27 RX ADMIN — LIDOCAINE HYDROCHLORIDE 1 ML: 10 INJECTION, SOLUTION INFILTRATION; PERINEURAL at 13:15
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
## 2024-06-29 ENCOUNTER — APPOINTMENT (EMERGENCY)
Dept: RADIOLOGY | Facility: HOSPITAL | Age: 74
End: 2024-06-29
Payer: MEDICARE

## 2024-06-29 ENCOUNTER — HOSPITAL ENCOUNTER (EMERGENCY)
Facility: HOSPITAL | Age: 74
Discharge: HOME | End: 2024-06-29
Attending: EMERGENCY MEDICINE
Payer: MEDICARE

## 2024-06-29 VITALS
TEMPERATURE: 96.9 F | RESPIRATION RATE: 20 BRPM | OXYGEN SATURATION: 97 % | WEIGHT: 222.8 LBS | SYSTOLIC BLOOD PRESSURE: 167 MMHG | DIASTOLIC BLOOD PRESSURE: 70 MMHG | HEART RATE: 57 BPM | BODY MASS INDEX: 37.08 KG/M2

## 2024-06-29 DIAGNOSIS — N20.0 KIDNEY STONE: Primary | ICD-10-CM

## 2024-06-29 LAB
ALBUMIN SERPL BCG-MCNC: 4.1 G/DL (ref 3.5–5)
ALP SERPL-CCNC: 42 U/L (ref 34–104)
ALT SERPL W P-5'-P-CCNC: 14 U/L (ref 7–52)
AMORPH URATE CRY URNS QL MICRO: ABNORMAL
ANION GAP SERPL CALCULATED.3IONS-SCNC: 6 MMOL/L (ref 4–13)
AST SERPL W P-5'-P-CCNC: 9 U/L (ref 13–39)
BACTERIA UR QL AUTO: ABNORMAL /HPF
BASOPHILS # BLD AUTO: 0.02 THOUSANDS/ÂΜL (ref 0–0.1)
BASOPHILS NFR BLD AUTO: 0 % (ref 0–1)
BILIRUB SERPL-MCNC: 0.29 MG/DL (ref 0.2–1)
BILIRUB UR QL STRIP: NEGATIVE
BUN SERPL-MCNC: 25 MG/DL (ref 5–25)
CALCIUM SERPL-MCNC: 9.6 MG/DL (ref 8.4–10.2)
CHLORIDE SERPL-SCNC: 105 MMOL/L (ref 96–108)
CLARITY UR: CLEAR
CO2 SERPL-SCNC: 26 MMOL/L (ref 21–32)
COLOR UR: YELLOW
CREAT SERPL-MCNC: 0.67 MG/DL (ref 0.6–1.3)
EOSINOPHIL # BLD AUTO: 0.04 THOUSAND/ÂΜL (ref 0–0.61)
EOSINOPHIL NFR BLD AUTO: 0 % (ref 0–6)
ERYTHROCYTE [DISTWIDTH] IN BLOOD BY AUTOMATED COUNT: 13 % (ref 11.6–15.1)
GFR SERPL CREATININE-BSD FRML MDRD: 86 ML/MIN/1.73SQ M
GLUCOSE SERPL-MCNC: 212 MG/DL (ref 65–140)
GLUCOSE UR STRIP-MCNC: NEGATIVE MG/DL
HCT VFR BLD AUTO: 35.6 % (ref 34.8–46.1)
HGB BLD-MCNC: 11.3 G/DL (ref 11.5–15.4)
HGB UR QL STRIP.AUTO: ABNORMAL
IMM GRANULOCYTES # BLD AUTO: 0.05 THOUSAND/UL (ref 0–0.2)
IMM GRANULOCYTES NFR BLD AUTO: 0 % (ref 0–2)
KETONES UR STRIP-MCNC: NEGATIVE MG/DL
LEUKOCYTE ESTERASE UR QL STRIP: NEGATIVE
LYMPHOCYTES # BLD AUTO: 2.02 THOUSANDS/ÂΜL (ref 0.6–4.47)
LYMPHOCYTES NFR BLD AUTO: 17 % (ref 14–44)
MCH RBC QN AUTO: 29.3 PG (ref 26.8–34.3)
MCHC RBC AUTO-ENTMCNC: 31.7 G/DL (ref 31.4–37.4)
MCV RBC AUTO: 92 FL (ref 82–98)
MONOCYTES # BLD AUTO: 0.89 THOUSAND/ÂΜL (ref 0.17–1.22)
MONOCYTES NFR BLD AUTO: 7 % (ref 4–12)
NEUTROPHILS # BLD AUTO: 9.21 THOUSANDS/ÂΜL (ref 1.85–7.62)
NEUTS SEG NFR BLD AUTO: 76 % (ref 43–75)
NITRITE UR QL STRIP: NEGATIVE
NON-SQ EPI CELLS URNS QL MICRO: ABNORMAL /HPF
NRBC BLD AUTO-RTO: 0 /100 WBCS
PH UR STRIP.AUTO: 5.5 [PH]
PLATELET # BLD AUTO: 305 THOUSANDS/UL (ref 149–390)
PMV BLD AUTO: 9.9 FL (ref 8.9–12.7)
POTASSIUM SERPL-SCNC: 3.9 MMOL/L (ref 3.5–5.3)
PROT SERPL-MCNC: 6.4 G/DL (ref 6.4–8.4)
PROT UR STRIP-MCNC: ABNORMAL MG/DL
RBC # BLD AUTO: 3.86 MILLION/UL (ref 3.81–5.12)
RBC #/AREA URNS AUTO: ABNORMAL /HPF
SODIUM SERPL-SCNC: 137 MMOL/L (ref 135–147)
SP GR UR STRIP.AUTO: >=1.03 (ref 1–1.03)
UROBILINOGEN UR STRIP-ACNC: 2 MG/DL
WBC # BLD AUTO: 12.23 THOUSAND/UL (ref 4.31–10.16)
WBC #/AREA URNS AUTO: ABNORMAL /HPF

## 2024-06-29 PROCEDURE — 99284 EMERGENCY DEPT VISIT MOD MDM: CPT

## 2024-06-29 PROCEDURE — 99284 EMERGENCY DEPT VISIT MOD MDM: CPT | Performed by: EMERGENCY MEDICINE

## 2024-06-29 PROCEDURE — 74018 RADEX ABDOMEN 1 VIEW: CPT

## 2024-06-29 PROCEDURE — 96361 HYDRATE IV INFUSION ADD-ON: CPT

## 2024-06-29 PROCEDURE — 36415 COLL VENOUS BLD VENIPUNCTURE: CPT | Performed by: EMERGENCY MEDICINE

## 2024-06-29 PROCEDURE — 74176 CT ABD & PELVIS W/O CONTRAST: CPT

## 2024-06-29 PROCEDURE — 80053 COMPREHEN METABOLIC PANEL: CPT | Performed by: EMERGENCY MEDICINE

## 2024-06-29 PROCEDURE — 81001 URINALYSIS AUTO W/SCOPE: CPT | Performed by: EMERGENCY MEDICINE

## 2024-06-29 PROCEDURE — 85025 COMPLETE CBC W/AUTO DIFF WBC: CPT | Performed by: EMERGENCY MEDICINE

## 2024-06-29 PROCEDURE — 96375 TX/PRO/DX INJ NEW DRUG ADDON: CPT

## 2024-06-29 PROCEDURE — 96374 THER/PROPH/DIAG INJ IV PUSH: CPT

## 2024-06-29 RX ORDER — TAMSULOSIN HYDROCHLORIDE 0.4 MG/1
0.4 CAPSULE ORAL
Qty: 10 CAPSULE | Refills: 0 | Status: SHIPPED | OUTPATIENT
Start: 2024-06-29

## 2024-06-29 RX ORDER — ONDANSETRON 2 MG/ML
4 INJECTION INTRAMUSCULAR; INTRAVENOUS ONCE
Status: COMPLETED | OUTPATIENT
Start: 2024-06-29 | End: 2024-06-29

## 2024-06-29 RX ORDER — OXYCODONE HYDROCHLORIDE AND ACETAMINOPHEN 5; 325 MG/1; MG/1
1 TABLET ORAL EVERY 4 HOURS PRN
Qty: 20 TABLET | Refills: 0 | Status: SHIPPED | OUTPATIENT
Start: 2024-06-29 | End: 2024-07-06

## 2024-06-29 RX ORDER — OXYCODONE HYDROCHLORIDE AND ACETAMINOPHEN 5; 325 MG/1; MG/1
2 TABLET ORAL ONCE
Status: COMPLETED | OUTPATIENT
Start: 2024-06-29 | End: 2024-06-29

## 2024-06-29 RX ORDER — TAMSULOSIN HYDROCHLORIDE 0.4 MG/1
0.4 CAPSULE ORAL ONCE
Status: COMPLETED | OUTPATIENT
Start: 2024-06-29 | End: 2024-06-29

## 2024-06-29 RX ORDER — HYDROMORPHONE HCL/PF 1 MG/ML
1 SYRINGE (ML) INJECTION ONCE
Status: COMPLETED | OUTPATIENT
Start: 2024-06-29 | End: 2024-06-29

## 2024-06-29 RX ORDER — KETOROLAC TROMETHAMINE 30 MG/ML
15 INJECTION, SOLUTION INTRAMUSCULAR; INTRAVENOUS ONCE
Status: COMPLETED | OUTPATIENT
Start: 2024-06-29 | End: 2024-06-29

## 2024-06-29 RX ADMIN — HYDROMORPHONE HYDROCHLORIDE 1 MG: 1 INJECTION, SOLUTION INTRAMUSCULAR; INTRAVENOUS; SUBCUTANEOUS at 18:10

## 2024-06-29 RX ADMIN — SODIUM CHLORIDE 1000 ML: 0.9 INJECTION, SOLUTION INTRAVENOUS at 17:34

## 2024-06-29 RX ADMIN — ONDANSETRON 4 MG: 2 INJECTION INTRAMUSCULAR; INTRAVENOUS at 21:46

## 2024-06-29 RX ADMIN — TAMSULOSIN HYDROCHLORIDE 0.4 MG: 0.4 CAPSULE ORAL at 21:27

## 2024-06-29 RX ADMIN — OXYCODONE HYDROCHLORIDE AND ACETAMINOPHEN 2 TABLET: 5; 325 TABLET ORAL at 21:27

## 2024-06-29 RX ADMIN — KETOROLAC TROMETHAMINE 15 MG: 30 INJECTION, SOLUTION INTRAMUSCULAR at 17:35

## 2024-06-29 NOTE — ED NOTES
Pt given ice chips per Ok by . Pt IV fluids still infusing. Pt educated on importance of keeping arm straight and expressed understanding.      Chikis Jimenez RN  06/29/24 1927
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
## 2024-07-01 ENCOUNTER — VBI (OUTPATIENT)
Dept: FAMILY MEDICINE CLINIC | Facility: CLINIC | Age: 74
End: 2024-07-01

## 2024-07-01 NOTE — TELEPHONE ENCOUNTER
07/01/24 11:36 AM    Patient contacted post ED visit, [facility] department spoke with patient/caregiver and outreach was successful.    Thank you.  Joann Dillard PG [facility]ED VIR

## 2024-07-02 ENCOUNTER — ANESTHESIA EVENT (OUTPATIENT)
Dept: PERIOP | Facility: HOSPITAL | Age: 74
End: 2024-07-02
Payer: MEDICARE

## 2024-07-02 NOTE — PRE-PROCEDURE INSTRUCTIONS
Pre-Surgery Instructions:   Medication Instructions    albuterol (Ventolin HFA) 90 mcg/act inhaler Uses PRN- OK to take day of surgery    aspirin 325 mg tablet Instructions provided by Moriah dose 6/30    b complex vitamins capsule Hold day of surgery.    Cholecalciferol (VITAMIN D-3) 1000 units CAPS Hold day of surgery.    Diclofenac Sodium (VOLTAREN) 1 % Hold day of surgery.    Entresto  MG TABS Take day of surgery.    ipratropium (ATROVENT) 0.03 % nasal spray Uses PRN- OK to take day of surgery    magnesium 30 MG tablet Hold day of surgery.    Melatonin 10 MG TABS Take night before surgery    Multiple Vitamins-Minerals (PRESERVISION AREDS 2+MULTI VIT PO) Hold day of surgery.    oxyCODONE-acetaminophen (PERCOCET) 5-325 mg per tablet Uses PRN- OK to take day of surgery    spironolactone (ALDACTONE) 25 mg tablet Hold day of surgery.    tamsulosin (FLOMAX) 0.4 mg Take night before surgery    vitamin E, tocopherol, 400 units capsule Hold day of surgery.   Medication instructions for day surgery reviewed. Please use only a sip of water to take your instructed medications. Avoid all over the counter vitamins, supplements and NSAIDS for one week prior to surgery per anesthesia guidelines. Tylenol is ok to take as needed.     You will receive a call one business day prior to surgery with an arrival time and hospital directions. If your surgery is scheduled on a Monday, the hospital will be calling you on the Friday prior to your surgery. If you have not heard from anyone by 8pm, please call the hospital supervisor through the hospital  at [phone]. ([place] [phone] or [place] [phone]).    Do not eat or drink anything after midnight the night before your surgery, including candy, mints, lifesavers, or chewing gum. Do not drink alcohol 24hrs before your surgery. Try not to smoke at least 24hrs before your surgery.       Follow the pre surgery showering instructions as listed in the “My Surgical  Experience Booklet” or otherwise provided by your surgeon's office. Do not use a blade to shave the surgical area 1 week before surgery. It is okay to use a clean electric clippers up to 24 hours before surgery. Do not apply any lotions, creams, including makeup, cologne, deodorant, or perfumes after showering on the day of your surgery. Do not use dry shampoo, hair spray, hair gel, or any type of hair products.     No contact lenses, eye make-up, or artificial eyelashes. Remove nail polish, including gel polish, and any artificial, gel, or acrylic nails if possible. Remove all jewelry including rings and body piercing jewelry.     Wear causal clothing that is easy to take on and off. Consider your type of surgery.    Keep any valuables, jewelry, piercings at home. Please bring any specially ordered equipment (sling, braces) if indicated.    Arrange for a responsible person to drive you to and from the hospital on the day of your surgery. Please confirm the visitor policy for the day of your procedure when you receive your phone call with an arrival time.     Call the surgeon's office with any new illnesses, exposures, or additional questions prior to surgery.    Please reference your “My Surgical Experience Booklet” for additional information to prepare for your upcoming surgery.    Pt. Verbalized an understanding of all instructions reviewed and offers no concerns at this time.

## 2024-07-03 ENCOUNTER — ANESTHESIA (OUTPATIENT)
Dept: PERIOP | Facility: HOSPITAL | Age: 74
End: 2024-07-03
Payer: MEDICARE

## 2024-07-03 ENCOUNTER — APPOINTMENT (OUTPATIENT)
Dept: RADIOLOGY | Facility: HOSPITAL | Age: 74
End: 2024-07-03
Payer: MEDICARE

## 2024-07-03 ENCOUNTER — HOSPITAL ENCOUNTER (OUTPATIENT)
Facility: HOSPITAL | Age: 74
Setting detail: OUTPATIENT SURGERY
Discharge: HOME | End: 2024-07-03
Attending: SPECIALIST | Admitting: SPECIALIST
Payer: MEDICARE

## 2024-07-03 VITALS
HEART RATE: 73 BPM | BODY MASS INDEX: 36.44 KG/M2 | DIASTOLIC BLOOD PRESSURE: 70 MMHG | OXYGEN SATURATION: 95 % | RESPIRATION RATE: 16 BRPM | WEIGHT: 218.7 LBS | TEMPERATURE: 97.8 F | HEIGHT: 65 IN | SYSTOLIC BLOOD PRESSURE: 164 MMHG

## 2024-07-03 DIAGNOSIS — N20.0 KIDNEY STONE: ICD-10-CM

## 2024-07-03 LAB
GLUCOSE SERPL-MCNC: 191 MG/DL (ref 65–140)
GLUCOSE SERPL-MCNC: 195 MG/DL (ref 65–140)

## 2024-07-03 PROCEDURE — C2617 STENT, NON-COR, TEM W/O DEL: HCPCS | Performed by: SPECIALIST

## 2024-07-03 PROCEDURE — C1758 CATHETER, URETERAL: HCPCS | Performed by: SPECIALIST

## 2024-07-03 PROCEDURE — 82948 REAGENT STRIP/BLOOD GLUCOSE: CPT

## 2024-07-03 PROCEDURE — 82360 CALCULUS ASSAY QUANT: CPT | Performed by: SPECIALIST

## 2024-07-03 PROCEDURE — 74018 RADEX ABDOMEN 1 VIEW: CPT

## 2024-07-03 DEVICE — INLAY URETERAL STENT W/O GUIDEWIRE
Type: IMPLANTABLE DEVICE | Site: URETER | Status: FUNCTIONAL
Brand: BARD® INLAY® URETERAL STENT

## 2024-07-03 RX ORDER — PROPOFOL 10 MG/ML
INJECTION, EMULSION INTRAVENOUS AS NEEDED
Status: DISCONTINUED | OUTPATIENT
Start: 2024-07-03 | End: 2024-07-03

## 2024-07-03 RX ORDER — SODIUM CHLORIDE, SODIUM LACTATE, POTASSIUM CHLORIDE, CALCIUM CHLORIDE 600; 310; 30; 20 MG/100ML; MG/100ML; MG/100ML; MG/100ML
100 INJECTION, SOLUTION INTRAVENOUS CONTINUOUS
Status: CANCELLED | OUTPATIENT
Start: 2024-07-03

## 2024-07-03 RX ORDER — SODIUM CHLORIDE, SODIUM LACTATE, POTASSIUM CHLORIDE, CALCIUM CHLORIDE 600; 310; 30; 20 MG/100ML; MG/100ML; MG/100ML; MG/100ML
75 INJECTION, SOLUTION INTRAVENOUS CONTINUOUS
Status: DISCONTINUED | OUTPATIENT
Start: 2024-07-03 | End: 2024-07-03 | Stop reason: HOSPADM

## 2024-07-03 RX ORDER — ONDANSETRON 2 MG/ML
4 INJECTION INTRAMUSCULAR; INTRAVENOUS ONCE AS NEEDED
Status: DISCONTINUED | OUTPATIENT
Start: 2024-07-03 | End: 2024-07-03 | Stop reason: HOSPADM

## 2024-07-03 RX ORDER — ONDANSETRON 2 MG/ML
INJECTION INTRAMUSCULAR; INTRAVENOUS AS NEEDED
Status: DISCONTINUED | OUTPATIENT
Start: 2024-07-03 | End: 2024-07-03

## 2024-07-03 RX ORDER — FENTANYL CITRATE 50 UG/ML
INJECTION, SOLUTION INTRAMUSCULAR; INTRAVENOUS AS NEEDED
Status: DISCONTINUED | OUTPATIENT
Start: 2024-07-03 | End: 2024-07-03

## 2024-07-03 RX ORDER — MAGNESIUM HYDROXIDE 1200 MG/15ML
LIQUID ORAL AS NEEDED
Status: DISCONTINUED | OUTPATIENT
Start: 2024-07-03 | End: 2024-07-03 | Stop reason: HOSPADM

## 2024-07-03 RX ORDER — CEFAZOLIN SODIUM 1 G/3ML
INJECTION, POWDER, FOR SOLUTION INTRAMUSCULAR; INTRAVENOUS AS NEEDED
Status: DISCONTINUED | OUTPATIENT
Start: 2024-07-03 | End: 2024-07-03

## 2024-07-03 RX ORDER — FENTANYL CITRATE/PF 50 MCG/ML
50 SYRINGE (ML) INJECTION
Status: DISCONTINUED | OUTPATIENT
Start: 2024-07-03 | End: 2024-07-03 | Stop reason: HOSPADM

## 2024-07-03 RX ORDER — LIDOCAINE HYDROCHLORIDE 10 MG/ML
INJECTION, SOLUTION EPIDURAL; INFILTRATION; INTRACAUDAL; PERINEURAL AS NEEDED
Status: DISCONTINUED | OUTPATIENT
Start: 2024-07-03 | End: 2024-07-03

## 2024-07-03 RX ADMIN — FENTANYL CITRATE 50 MCG: 50 INJECTION, SOLUTION INTRAMUSCULAR; INTRAVENOUS at 08:40

## 2024-07-03 RX ADMIN — PROPOFOL 50 MG: 10 INJECTION, EMULSION INTRAVENOUS at 08:48

## 2024-07-03 RX ADMIN — CEFAZOLIN 2000 MG: 1 INJECTION, POWDER, FOR SOLUTION INTRAVENOUS at 08:44

## 2024-07-03 RX ADMIN — FENTANYL CITRATE 25 MCG: 50 INJECTION, SOLUTION INTRAMUSCULAR; INTRAVENOUS at 08:50

## 2024-07-03 RX ADMIN — ONDANSETRON 4 MG: 2 INJECTION INTRAMUSCULAR; INTRAVENOUS at 08:50

## 2024-07-03 RX ADMIN — FENTANYL CITRATE 25 MCG: 50 INJECTION, SOLUTION INTRAMUSCULAR; INTRAVENOUS at 08:30

## 2024-07-03 RX ADMIN — LIDOCAINE HYDROCHLORIDE 50 MG: 10 INJECTION, SOLUTION EPIDURAL; INFILTRATION; INTRACAUDAL; PERINEURAL at 08:27

## 2024-07-03 RX ADMIN — PROPOFOL 100 MG: 10 INJECTION, EMULSION INTRAVENOUS at 08:27

## 2024-07-03 RX ADMIN — SODIUM CHLORIDE, SODIUM LACTATE, POTASSIUM CHLORIDE, AND CALCIUM CHLORIDE: .6; .31; .03; .02 INJECTION, SOLUTION INTRAVENOUS at 07:16

## 2024-07-03 NOTE — DISCHARGE INSTR - AVS FIRST PAGE
#1 no heavy straining or lifting above 10 pounds for 2 weeks    #2 call office fevers, chills, or worsening blood in the urine.    #3  Patient has follow-up Dr. Harris Monday, July 8 at 10 AM for cystoscopy stent removal      Jaylon Harris M.D. [address] [place] [address]  [phone]  8:30 AM to 4:30 PM  Monday through Friday    [place] [address] [id number]  [address]  [place] [address]  [phone]  1:00 to 5:00 PM  Wednesday
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
## 2024-07-03 NOTE — OP NOTE
OPERATIVE REPORT  PATIENT NAME: Kamini Martines    :  1950  MRN: [id number]  Pt Location: [facility] OR ROOM 04    SURGERY DATE: 7/3/2024    Surgeons and Role:     * Jaylon Harris MD - Primary    Preop Diagnosis:  Kidney stone [N20.0]  5 mm right proximal ureteral stone    Post-Op Diagnosis Codes:     * Kidney stone [N20.0]  6 mm right proximal ureteral stone    Procedure(s):  Right - CYSTOSCOPY URETEROSCOPY WITH LITHOTRIPSY HOLMIUM LASER. RETROGRADE PYELOGRAM. STONE BASKET EXTRACTION. AND INSERTION STENT URETERAL    Specimen(s):  ID Type Source Tests Collected by Time Destination   A : right ureteral calculi Calculus Ureter, Right STONE ANALYSIS Jaylon Harris MD 7/3/2024 0819        Estimated Blood Loss:   Minimal    Drains:  * No LDAs found *    Anesthesia Type:   General/LMA    Operative Indications:  Kidney stone [N20.0]  74-year-old female seen in the office earlier in the week presenting to [facility] last week with severe right flank pain nausea found to have 5 mm right proximal ureteral stone sent down analgesics requesting to have it removed and soon as possible and seen in the office 2 days ago where risk of anesthesia infection bleeding additional Yannes procedures    Operative Findings:  6 mm proximal ureteral stone  Laser fragmentation basket extraction 24 cm 6 Kinyarwanda stent      Complications:   None    Procedure and Technique:  Patient identified in the holding area right side marked consent signed placed in the OR suite after anesthesia induced placed in lithotomy position draped and prepped sterile fashion timeout performed 22 Kinyarwanda cystoscope passed through the urethra to the bladder 0.038 wire passed up the right orifice into the right renal pelvis of the safety the semirigid ureteroscope was then passed with the stone encountered in the proximal ureter the laser was passed the stone was broken into multiple fragments basket extraction scope removed cystoscope replaced and over the wire a  24 cm 6 Sinhala stent was passed the wire moved scope removed patient of procedure well awakened taken recovery in stable condition   I was present for the entire procedure.    Patient Disposition:  PACU         SIGNATURE: Jaylon Harris MD  DATE: July 3, 2024  TIME: 9:06 AM

## 2024-07-03 NOTE — ANESTHESIA PREPROCEDURE EVALUATION
Procedure:  CYSTOSCOPY URETEROSCOPY WITH LITHOTRIPSY HOLMIUM LASER, RETROGRADE PYELOGRAM AND INSERTION STENT URETERAL (Right: Bladder)    Relevant Problems   ANESTHESIA (within normal limits)      CARDIO   (+) Chronic systolic congestive heart failure (HCC)   (+) Essential hypertension   (+) History Abnormal heart rhythm      ENDO   (+) Type 2 diabetes mellitus without complication, without long-term current use of insulin (HCC)      MUSCULOSKELETAL   (+) Chronic right-sided low back pain without sciatica   (+) Lumbar spondylosis   (+) Neurogenic claudication due to lumbar spinal stenosis      NEURO/PSYCH   (+) CVA (cerebral vascular accident) (HCC)   (+) Chronic pain syndrome   (+) Chronic right-sided low back pain without sciatica   (+) Neurogenic claudication due to lumbar spinal stenosis   (+) TIA (transient ischemic attack)      PULMONARY   (+) Mild intermittent asthma without complication   (+) LUCIA (obstructive sleep apnea)        Physical Exam    Airway    Mallampati score: II  TM Distance: >3 FB  Neck ROM: limited     Dental    upper dentures and lower dentures    Cardiovascular  Rhythm: regular, Rate: normal    Pulmonary   Breath sounds clear to auscultation    Other Findings  post-pubertal.      Anesthesia Plan  ASA Score- 3     Anesthesia Type- general with ASA Monitors.         Additional Monitors:     Airway Plan: LMA.           Plan Factors-Exercise tolerance (METS): >4 METS.    Chart reviewed. EKG reviewed.  Existing labs reviewed. Patient summary reviewed.    Patient is not a current smoker.              Induction- intravenous.    Postoperative Plan- Plan for postoperative opioid use. Planned trial extubation    Perioperative Resuscitation Plan - Level 1 - Full Code.       Informed Consent- Anesthetic plan and risks discussed with patient.  I personally reviewed this patient with the CRNA. Discussed and agreed on the Anesthesia Plan with the CRNA..

## 2024-07-03 NOTE — PERIOPERATIVE NURSING NOTE
Received from [facility], assisted to bathroom but unable to void.  Assisted to bed, given coffee, water.  Call bell in reach.

## 2024-07-07 DIAGNOSIS — I50.22 CHRONIC SYSTOLIC (CONGESTIVE) HEART FAILURE (HCC): ICD-10-CM

## 2024-07-09 RX ORDER — SPIRONOLACTONE 25 MG/1
TABLET ORAL
Qty: 90 TABLET | Refills: 1 | Status: SHIPPED | OUTPATIENT
Start: 2024-07-09

## 2024-07-15 LAB
COLOR STONE: NORMAL
COM MFR STONE: 100 %
COMMENT-STONE3: NORMAL
COMPOSITION: NORMAL
LABORATORY COMMENT REPORT: NORMAL
LEFT EYE DIABETIC RETINOPATHY: NORMAL
PHOTO: NORMAL
RIGHT EYE DIABETIC RETINOPATHY: NORMAL
SIZE STONE: NORMAL MM
SPEC SOURCE SUBJ: NORMAL
STONE ANALYSIS-IMP: NORMAL
STONE ANALYSIS-IMP: NORMAL
WT STONE: 4 MG

## 2024-08-06 ENCOUNTER — OFFICE VISIT (OUTPATIENT)
Dept: PAIN MEDICINE | Facility: CLINIC | Age: 74
End: 2024-08-06
Payer: MEDICARE

## 2024-08-06 VITALS
BODY MASS INDEX: 36.65 KG/M2 | WEIGHT: 220 LBS | DIASTOLIC BLOOD PRESSURE: 72 MMHG | SYSTOLIC BLOOD PRESSURE: 128 MMHG | HEIGHT: 65 IN | HEART RATE: 85 BPM

## 2024-08-06 DIAGNOSIS — M70.62 TROCHANTERIC BURSITIS OF LEFT HIP: Primary | ICD-10-CM

## 2024-08-06 DIAGNOSIS — M79.18 MYOFASCIAL PAIN SYNDROME: ICD-10-CM

## 2024-08-06 DIAGNOSIS — G57.02 PIRIFORMIS SYNDROME OF LEFT SIDE: ICD-10-CM

## 2024-08-06 PROCEDURE — 99214 OFFICE O/P EST MOD 30 MIN: CPT | Performed by: PHYSICAL MEDICINE & REHABILITATION
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
## 2024-08-21 ENCOUNTER — HOSPITAL ENCOUNTER (OUTPATIENT)
Dept: RADIOLOGY | Facility: HOSPITAL | Age: 74
Discharge: HOME | End: 2024-08-21
Attending: SPECIALIST
Payer: MEDICARE

## 2024-08-21 DIAGNOSIS — N13.30 HYDRONEPHROSIS, UNSPECIFIED HYDRONEPHROSIS TYPE: ICD-10-CM

## 2024-08-21 PROCEDURE — 76775 US EXAM ABDO BACK WALL LIM: CPT

## 2024-08-23 ENCOUNTER — PROCEDURE VISIT (OUTPATIENT)
Dept: PAIN MEDICINE | Facility: CLINIC | Age: 74
End: 2024-08-23
Payer: MEDICARE

## 2024-08-23 ENCOUNTER — APPOINTMENT (OUTPATIENT)
Dept: LAB | Facility: CLINIC | Age: 74
End: 2024-08-23
Payer: MEDICARE

## 2024-08-23 VITALS
HEART RATE: 80 BPM | DIASTOLIC BLOOD PRESSURE: 80 MMHG | WEIGHT: 220 LBS | SYSTOLIC BLOOD PRESSURE: 136 MMHG | BODY MASS INDEX: 36.65 KG/M2 | HEIGHT: 65 IN

## 2024-08-23 DIAGNOSIS — N20.0 CALCULUS, RENAL: ICD-10-CM

## 2024-08-23 DIAGNOSIS — M47.816 LUMBAR SPONDYLOSIS: ICD-10-CM

## 2024-08-23 DIAGNOSIS — M51.26 LUMBAR HERNIATED DISC: ICD-10-CM

## 2024-08-23 DIAGNOSIS — M54.16 LUMBAR RADICULOPATHY: Primary | ICD-10-CM

## 2024-08-23 PROBLEM — M70.62 TROCHANTERIC BURSITIS OF LEFT HIP: Status: ACTIVE | Noted: 2024-08-23

## 2024-08-23 LAB
CALCIUM 24H UR-MCNC: 235.3 MG/24 HRS (ref 100–300)
PERIOD: 24 HOURS
PH UR STRIP.AUTO: 5.5 [PH]
SODIUM 24H UR-SRATE: 106.6 MMOL/24 HRS (ref 40–220)
SPECIMEN VOL UR: 1300 ML
URATE 24H UR-MCNC: 479.7 MG/24 HRS (ref 250–750)

## 2024-08-23 PROCEDURE — 83986 ASSAY PH BODY FLUID NOS: CPT

## 2024-08-23 PROCEDURE — 20611 DRAIN/INJ JOINT/BURSA W/US: CPT | Performed by: PHYSICAL MEDICINE & REHABILITATION

## 2024-08-23 PROCEDURE — 84300 ASSAY OF URINE SODIUM: CPT

## 2024-08-23 PROCEDURE — 82340 ASSAY OF CALCIUM IN URINE: CPT

## 2024-08-23 PROCEDURE — 83945 ASSAY OF OXALATE: CPT

## 2024-08-23 PROCEDURE — 84560 ASSAY OF URINE/URIC ACID: CPT

## 2024-08-23 PROCEDURE — 82507 ASSAY OF CITRATE: CPT

## 2024-08-23 RX ORDER — BUPIVACAINE HYDROCHLORIDE 2.5 MG/ML
20 INJECTION, SOLUTION EPIDURAL; INFILTRATION; INTRACAUDAL ONCE
Status: CANCELLED | OUTPATIENT
Start: 2024-08-23 | End: 2024-08-23

## 2024-08-23 RX ORDER — METHYLPREDNISOLONE ACETATE 40 MG/ML
40 INJECTION, SUSPENSION INTRA-ARTICULAR; INTRALESIONAL; INTRAMUSCULAR; SOFT TISSUE ONCE
Status: CANCELLED | OUTPATIENT
Start: 2024-08-23 | End: 2024-08-23

## 2024-08-23 NOTE — H&P (VIEW-ONLY)
Indication: Lateral hip pain  Preprocedure diagnosis: 1. Trochanteric bursitis  2. Lateral hip pain  Postprocedure diagnosis: 1. Trochanteric bursitis  2. Lateral hip pain  Procedure: Ultrasound-guided left trochanteric bursa injection(s)  After discussing the risks, benefits, and alternatives to the procedure, the patient expressed understanding and wished to proceed. The patient was brought to the procedure suite and placed in the sidelying position. A procedural pause was conducted to verify: correct patient identity, procedure to be performed and as applicable, correct side and site, correct patient position, and availability of implants, special equipment or special requirements. A simple surgical tray was used. Prior to the procedure, the lateral hip was examined with a 12 MHz linear transducer to visualize the greater trochanter, tendons, and bursa and to determine the optimal needle path. Following this, the lateral hip was prepared with a ChloraPrep scrub, then re-examined using the same transducer, a sterile ultrasound transducer cover, and sterile ultrasound transducer gel. Thereafter, using continuous ultrasound guidance, a 2.5 inch 25-gauge needle was advanced into the peritrochanteric bursa region. After visualization of the tip in the target area and negative aspiration for blood, a mixture of 40 mg Depo-Medrol in 3 mL of 0.25% bupivacaine was injected into the trochanteric bursa. Following the injection, the needle was withdrawn. The patient tolerated the procedure well and there were no apparent complications. After an appropriate amount of observation, the patient was dismissed from the clinic in good condition under their own power.

## 2024-08-28 LAB
CITRATE 24H UR-MCNC: 235 MG/L
CITRATE 24H UR-MRATE: 306 MG/24 HR (ref 320–1240)
OXALATE 24H UR-MRATE: 21 MG/24 HR (ref 4–31)
OXALATE UR-MCNC: 16 MG/L

## 2024-08-29 ENCOUNTER — OFFICE VISIT (OUTPATIENT)
Dept: GASTROENTEROLOGY | Facility: CLINIC | Age: 74
End: 2024-08-29
Payer: MEDICARE

## 2024-08-29 ENCOUNTER — TELEPHONE (OUTPATIENT)
Dept: GASTROENTEROLOGY | Facility: CLINIC | Age: 74
End: 2024-08-29

## 2024-08-29 VITALS
OXYGEN SATURATION: 96 % | HEART RATE: 70 BPM | SYSTOLIC BLOOD PRESSURE: 154 MMHG | WEIGHT: 217 LBS | BODY MASS INDEX: 36.15 KG/M2 | DIASTOLIC BLOOD PRESSURE: 60 MMHG | HEIGHT: 65 IN

## 2024-08-29 DIAGNOSIS — K59.1 FUNCTIONAL DIARRHEA: ICD-10-CM

## 2024-08-29 DIAGNOSIS — R13.19 ESOPHAGEAL DYSPHAGIA: Primary | ICD-10-CM

## 2024-08-29 DIAGNOSIS — R14.0 FLATULENCE/GAS PAIN/BELCHING: ICD-10-CM

## 2024-08-29 PROCEDURE — 99204 OFFICE O/P NEW MOD 45 MIN: CPT | Performed by: INTERNAL MEDICINE

## 2024-08-29 NOTE — TELEPHONE ENCOUNTER
Our mutual patient is scheduled for procedure: EGD    On: October 14, 2024     With: Dr. Teresa Day MD    He/She is taking the following blood thinner: 325 Aspirin (Tyrell, Ecotrin)    Can this be stopped (UP TO YOUR DISCRETION) days prior to the procedure    Physician Approving clearance: Dr Worley

## 2024-08-29 NOTE — PATIENT INSTRUCTIONS
For your bowel habits, as we discussed during today's visit,  - I would recommend starting psyllium, fiber supplementation.  This can be done with use of Konsyl, Citrucil, Metamucil or Benefiber fiber, which can be purchased over-the-counter.  I would recommend starting slow with 1 tsp mixed into a large glass of water, once a day, and increasing to goal of 1 tbsp mixed into a large glass of water per day.  - this helps with both diarrhea and constipation, helping to bulk the stool, and should not cause constipation or diarrhea, but treat both  - the only side effect of this can be bloating, if this occurs, cut down on the dose, and increase your water intake or alternatively, consider switching to an alternative as listed above    - recommend dietary elimination trials,  This can be done with the low fodmap diet which helps to identify foods that cause diarrhea, gas production  - elimination of dairy/lactose / milk based products and/or caffeinated products can be high yield

## 2024-08-29 NOTE — PROGRESS NOTES
Ambulatory Visit  Name: Kamini Martines      : 1950      MRN: [id number]  Encounter Provider: Teresa Day MD  Encounter Date: 2024   Encounter department: [facility]    Assessment & Plan   1. Esophageal dysphagia  Assessment & Plan:  Intermittent chronic > 12 months solid food dysphagia, suspect Schatzki's ring or other benign etiology given her lack of weight loss  She does have a strong smoking history, will plan for EGD to rule out obstructive etiology   Obtain barium esophagram to assess for motility cause  Counseled on smoking cessation  Orders:  -     Ambulatory Referral to Gastroenterology  -     EGD; Future; Expected date: 2024  -     FL barium swallow; Future; Expected date: 2024  2. Functional diarrhea  Assessment & Plan:  Chronic symptoms > 12 months, not previously evaluated  Chronic postprandial diarrhea that is intermittent without warning signs or symptoms  Suspect osmotic cause, counseled on low FODMAP diet, recommend lactose/dairy avoidance, suspect component of lactose intolerance  Discussed use of Gas-X, psyllium powder fiber to improve her bowel habit consistency and improve her gas related symptoms  Alternatively she can also try Lactaid before her dairy intake  3. Flatulence/gas pain/belching      History of Present Illness     Kamini Martines is a 74 y.o. female who presents with dysphagia.  She has an adrenal adenoma, chronic pain syndrome, CHF, CVA, HTN, asthma, LUCIA, neurogenic claudication due to lumbar spinal stenosis s/p laminectomy, tobacco use (40 pack year smoking history), T2DM (last HbA1c 8%), vitamin D deficiency, BMI 36.  She is present with her  at today's visit, who contributes to history.  Patient complains of solid food dysphagia which is intermittent and chronic >12 months.  Symptoms mostly occur with meat intake. Symptoms have lessened in frequency lately, was occurring several times a week, now has had only  "2 episodes/3 months.    She additionally describes intermittent diarrhea and symptoms of gas, usually in the mornings.  A few times a week she has bowel movements 3-4 times in a day.  This occurs every single time she urinates.  She does admit to urinary frequency.  She denies any rectal bleeding, unintentional weight loss. Denies nocturnal stools.  She does admit to diet heavy in dairy, ice cream, creamer with her coffee.   Cologuard Jan 2023-negative    Abdominal Pain  Associated symptoms include belching and diarrhea. Pertinent negatives include no arthralgias, dysuria, fever, hematuria or vomiting.         Review of Systems   Constitutional:  Negative for chills and fever.   HENT:  Negative for ear pain and sore throat.    Eyes:  Negative for pain and visual disturbance.   Respiratory:  Negative for cough and shortness of breath.    Cardiovascular:  Negative for chest pain and palpitations.   Gastrointestinal:  Positive for abdominal pain and diarrhea. Negative for vomiting.   Genitourinary:  Negative for dysuria and hematuria.   Musculoskeletal:  Negative for arthralgias and back pain.   Skin:  Negative for color change and rash.   Neurological:  Negative for seizures and syncope.   All other systems reviewed and are negative.      Objective     /60 (BP Location: Right arm, Patient Position: Sitting, Cuff Size: Standard)   Pulse 70   Ht 5' 5\" (1.651 m)   Wt 98.4 kg (217 lb)   SpO2 96%   BMI 36.11 kg/m²     Physical Exam  Vitals and nursing note reviewed.   Constitutional:       General: She is not in acute distress.     Appearance: Normal appearance.   HENT:      Head: Normocephalic and atraumatic.   Eyes:      Conjunctiva/sclera: Conjunctivae normal.   Cardiovascular:      Rate and Rhythm: Normal rate and regular rhythm.      Heart sounds: No murmur heard.  Pulmonary:      Effort: Pulmonary effort is normal. No respiratory distress.      Breath sounds: Normal breath sounds.   Abdominal:      " Palpations: Abdomen is soft.      Tenderness: There is no abdominal tenderness.   Musculoskeletal:         General: No swelling.      Cervical back: Neck supple.   Skin:     General: Skin is warm and dry.      Capillary Refill: Capillary refill takes less than 2 seconds.   Neurological:      Mental Status: She is alert.   Psychiatric:         Mood and Affect: Mood normal.       Administrative Statements

## 2024-08-30 ENCOUNTER — TELEPHONE (OUTPATIENT)
Age: 74
End: 2024-08-30

## 2024-08-30 NOTE — TELEPHONE ENCOUNTER
Patients GI provider:  Dr. Day    Number to return call: [phone]    Reason for call: Pt called stated needs prescription sent to [facility].Per pt had an office visit with Dr Day and she was going to sent a prescription.    Scheduled procedure/appointment date if applicable:  10/14/2024

## 2024-09-02 PROBLEM — R13.19 ESOPHAGEAL DYSPHAGIA: Status: ACTIVE | Noted: 2024-09-02

## 2024-09-02 NOTE — ASSESSMENT & PLAN NOTE
Intermittent chronic > 12 months solid food dysphagia, suspect Schatzki's ring or other benign etiology given her lack of weight loss  She does have a strong smoking history, will plan for EGD to rule out obstructive etiology   Obtain barium esophagram to assess for motility cause  Counseled on smoking cessation

## 2024-09-02 NOTE — ASSESSMENT & PLAN NOTE
Chronic symptoms > 12 months, not previously evaluated  Chronic postprandial diarrhea that is intermittent without warning signs or symptoms  Suspect osmotic cause, counseled on low FODMAP diet, recommend lactose/dairy avoidance, suspect component of lactose intolerance  Discussed use of Gas-X, psyllium powder fiber to improve her bowel habit consistency and improve her gas related symptoms  Alternatively she can also try Lactaid before her dairy intake

## 2024-09-06 ENCOUNTER — TELEPHONE (OUTPATIENT)
Dept: PAIN MEDICINE | Facility: CLINIC | Age: 74
End: 2024-09-06

## 2024-09-09 ENCOUNTER — HOSPITAL ENCOUNTER (OUTPATIENT)
Dept: RADIOLOGY | Facility: HOSPITAL | Age: 74
Discharge: HOME | End: 2024-09-09
Attending: INTERNAL MEDICINE
Payer: MEDICARE

## 2024-09-09 DIAGNOSIS — R13.19 ESOPHAGEAL DYSPHAGIA: ICD-10-CM

## 2024-09-09 DIAGNOSIS — R13.19 ESOPHAGEAL DYSPHAGIA: Primary | ICD-10-CM

## 2024-09-09 DIAGNOSIS — K21.9 GASTROESOPHAGEAL REFLUX DISEASE, UNSPECIFIED WHETHER ESOPHAGITIS PRESENT: ICD-10-CM

## 2024-09-09 PROCEDURE — 74220 X-RAY XM ESOPHAGUS 1CNTRST: CPT

## 2024-09-09 RX ORDER — OMEPRAZOLE 40 MG/1
40 CAPSULE, DELAYED RELEASE ORAL DAILY
Qty: 90 CAPSULE | Refills: 3 | Status: SHIPPED | OUTPATIENT
Start: 2024-09-09

## 2024-09-11 ENCOUNTER — APPOINTMENT (OUTPATIENT)
Dept: RADIOLOGY | Facility: HOSPITAL | Age: 74
End: 2024-09-11
Payer: MEDICARE

## 2024-09-11 ENCOUNTER — HOSPITAL ENCOUNTER (OUTPATIENT)
Facility: AMBULARY SURGERY CENTER | Age: 74
Setting detail: OUTPATIENT SURGERY
Discharge: HOME | End: 2024-09-11
Attending: PHYSICAL MEDICINE & REHABILITATION | Admitting: PHYSICAL MEDICINE & REHABILITATION
Payer: MEDICARE

## 2024-09-11 VITALS
DIASTOLIC BLOOD PRESSURE: 78 MMHG | OXYGEN SATURATION: 96 % | HEIGHT: 65 IN | SYSTOLIC BLOOD PRESSURE: 157 MMHG | BODY MASS INDEX: 36.11 KG/M2 | RESPIRATION RATE: 18 BRPM | HEART RATE: 74 BPM | TEMPERATURE: 97.1 F

## 2024-09-11 PROCEDURE — 77002 NEEDLE LOCALIZATION BY XRAY: CPT | Performed by: PHYSICAL MEDICINE & REHABILITATION

## 2024-09-11 PROCEDURE — 20552 NJX 1/MLT TRIGGER POINT 1/2: CPT | Performed by: PHYSICAL MEDICINE & REHABILITATION

## 2024-09-11 PROCEDURE — NC001 PR NO CHARGE: Performed by: PHYSICAL MEDICINE & REHABILITATION

## 2024-09-11 RX ORDER — ACETAMINOPHEN 325 MG/1
650 TABLET ORAL EVERY 6 HOURS PRN
COMMUNITY

## 2024-09-11 RX ORDER — BUPIVACAINE HYDROCHLORIDE 2.5 MG/ML
INJECTION, SOLUTION EPIDURAL; INFILTRATION; INTRACAUDAL AS NEEDED
Status: DISCONTINUED | OUTPATIENT
Start: 2024-09-11 | End: 2024-09-11 | Stop reason: HOSPADM

## 2024-09-11 RX ORDER — METHYLPREDNISOLONE ACETATE 80 MG/ML
INJECTION, SUSPENSION INTRA-ARTICULAR; INTRALESIONAL; INTRAMUSCULAR; SOFT TISSUE AS NEEDED
Status: DISCONTINUED | OUTPATIENT
Start: 2024-09-11 | End: 2024-09-11 | Stop reason: HOSPADM

## 2024-09-11 RX ORDER — LIDOCAINE HYDROCHLORIDE 10 MG/ML
INJECTION, SOLUTION EPIDURAL; INFILTRATION; INTRACAUDAL; PERINEURAL AS NEEDED
Status: DISCONTINUED | OUTPATIENT
Start: 2024-09-11 | End: 2024-09-11 | Stop reason: HOSPADM

## 2024-09-11 NOTE — INTERVAL H&P NOTE
H&P reviewed. After examining the patient I find no changes in the patients condition since the H&P had been written.    Vitals:    09/11/24 0825   BP: 162/78   Pulse: 79   Resp: 18   Temp: (!) 97.1 °F (36.2 °C)   SpO2: 96%

## 2024-09-11 NOTE — OP NOTE
OPERATIVE REPORT  PATIENT NAME: Kamini Martines    :  1950  MRN: [id number]  Pt Location: [facility] MINOR/PAIN ROOM 01    SURGERY DATE: 2024    Surgeons and Role:     * Migel Salgado, DO - Primary    Preop Diagnosis:  Piriformis syndrome of left side [G57.02]    Post-Op Diagnosis Codes:     * Piriformis syndrome of left side [G57.02]    Procedure(s):  Left - LEFT PIRIFORMIS INJECTION    Indication: Low back/buttock pain  Preoperative Diagnosis: 1. piriformis syndrome  Postoperative Diagnosis: 1. piriformis syndrome  Procedure: Fluoroscopic injection of the Left piriformis muscles  EBL: none  Specimens: not applicable  After discussing the risks, benefits, and alternatives to the procedure, the patient expressed understanding and wished to proceed. The patient was brought to the fluoroscopy suite and placed in the prone position. Procedural pause conducted to verify: correct patient identity, procedure to be performed and as applicable, correct side and site, correct patient position, and availability of implants, special equipment and special requirements. Using fluoroscopy, the superior portion of the acetabulum was identified. The skin was sterilely prepped and draped in the usual fashion using Chloraprep skin prep. The skin and subcutaneous tissue were anesthetized with 1% lidocaine. Using fluoroscopic guidance, a 3.5 inch 22 gauge spinal needle was advanced to the target. Proper needle positioning was confirmed using multiple fluoroscopic views. After negative aspiration, Omnipaque 240 contrast was injected, showing intramuscular spread of contrast without any evidence of intravascular uptake. A 4 mL solution consisting of 80 mg of Depo-Medrol in 0.25% bupivacaine was injected slowly and incrementally into the piriformis muscle. Following the injection, the needle was withdrawn.  The patient tolerated the procedure well and there were no apparent complications. After appropriate observation, the  patient was dismissed from the clinic in good condition under their own power.         SIGNATURE: Desmond Mendosa,   DATE: September 11, 2024  TIME: 9:24 AM

## 2024-09-11 NOTE — DISCHARGE INSTRUCTIONS

## 2024-09-13 NOTE — TELEPHONE ENCOUNTER
Pre. Op. Clearance note- Cardiology    Kamini Martines   74 y.o.  female  1950      MD Kamini Mendez :   Patient's chart was reviewed for preop clearance.   Patient was seen in our office on 01/23/2024.  Patient has past medical history significant for   1. Dilated cardiomyopathy (HCC)    2. Essential hypertension    3. Chronic systolic congestive heart failure (HCC)    4. LUCIA (obstructive sleep apnea)    .  Patient is now scheduled for EGD 10/24/2024.      Patient has no clinical evidence of heart failure or ischemia or active arrhythmia.       In my opinion patient is in optimum condition for the procedure as planned.  Patient is low risk for the surgery as planned.  Continue current cardiac medications.      Patient can hold  Aspirin for  5-7 days as required for surgery.    Please restart after the procedure when okay from surgical point of view and advise patient to contact our office.    If you have any question please do not hesitate to call us at our office of . [facility].  Phone # [phone]       Terese Worley DO  9/13/2024  9:13 AM

## 2024-09-21 ENCOUNTER — HOSPITAL ENCOUNTER (OUTPATIENT)
Dept: MRI IMAGING | Facility: HOSPITAL | Age: 74
Discharge: HOME | End: 2024-09-21
Attending: PHYSICAL MEDICINE & REHABILITATION
Payer: MEDICARE

## 2024-09-21 DIAGNOSIS — M47.816 LUMBAR SPONDYLOSIS: ICD-10-CM

## 2024-09-21 DIAGNOSIS — M51.26 LUMBAR HERNIATED DISC: ICD-10-CM

## 2024-09-21 DIAGNOSIS — M54.16 LUMBAR RADICULOPATHY: ICD-10-CM

## 2024-09-21 PROCEDURE — 72148 MRI LUMBAR SPINE W/O DYE: CPT

## 2024-09-24 ENCOUNTER — TELEPHONE (OUTPATIENT)
Dept: PAIN MEDICINE | Facility: CLINIC | Age: 74
End: 2024-09-24

## 2024-09-24 NOTE — TELEPHONE ENCOUNTER
Last seen August 6 in office evaluation at which time the primary focus was the acute left-sided low back, hip and leg pain as such the MRI was ordered and as discussed below would consider an L4-L5 and 5-S1 left-sided TFESI if she continues to report pain into the left leg  Please schedule if she wishes  Thank you

## 2024-09-24 NOTE — TELEPHONE ENCOUNTER
S/W pt to advise of results  She states when she was in for the injection 9/11 she asked what area was going to be viewed in the MRI and states the Dr said her shoulders which was the area she states was bothering her. Notes when she went for the MRI they told her the order was for the lumbar and that is what she had completed. She is confused as to why that was ordered and not the shoulders.  She notes from the injection on 9/11 she has minimal relief. She still can not elevate her legs while sitting in the recliner with out increased pain in her left leg    Please advise

## 2024-09-24 NOTE — TELEPHONE ENCOUNTER
S/W pt and advised. She states she needs something to help her  Would like to move forward with L4-L5 and 5-S1 left-sided TFESI as recommended  Aware she would be called to schedule

## 2024-09-24 NOTE — TELEPHONE ENCOUNTER
----- Message from Migel aSlgado DO sent at 9/24/2024  9:59 AM EDT -----  Please notify patient MRI lumbar spine demonstrating mild canal stenosis at L4-L5 and the previous laminectomy at L5-S1 with residual soft tissue fibrosis  Hopefully she was doing well after the recent injections but if she is not obtaining any significant relief would consider a left-sided L4-L5 and 5-S1 TFESI under fluoroscopy guidance  Please advise  Thank you  ----- Message -----  From: Interface, Radiology Results In  Sent: 9/23/2024   3:22 PM EDT  To: Migel Salgado DO

## 2024-09-25 ENCOUNTER — TELEPHONE (OUTPATIENT)
Dept: PAIN MEDICINE | Facility: CLINIC | Age: 74
End: 2024-09-25

## 2024-09-25 NOTE — TELEPHONE ENCOUNTER
Patient has been scheduled for their procedure, please see [employer] message for additional details.

## 2024-09-25 NOTE — TELEPHONE ENCOUNTER
Pt reports no improvement post inj   Pain level 10/10  Patient is aware she would be getting a call from the  for another injection as stated in diff task.

## 2024-09-30 ENCOUNTER — ANESTHESIA (OUTPATIENT)
Dept: ANESTHESIOLOGY | Facility: HOSPITAL | Age: 74
End: 2024-09-30

## 2024-09-30 ENCOUNTER — ANESTHESIA EVENT (OUTPATIENT)
Dept: ANESTHESIOLOGY | Facility: HOSPITAL | Age: 74
End: 2024-09-30

## 2024-09-30 DIAGNOSIS — E11.9 TYPE 2 DIABETES MELLITUS WITHOUT COMPLICATION, WITHOUT LONG-TERM CURRENT USE OF INSULIN (HCC): ICD-10-CM

## 2024-10-01 RX ORDER — BLOOD SUGAR DIAGNOSTIC
STRIP MISCELLANEOUS
Qty: 200 STRIP | Refills: 1 | Status: SHIPPED | OUTPATIENT
Start: 2024-10-01

## 2024-10-02 DIAGNOSIS — I50.22 CHRONIC SYSTOLIC CONGESTIVE HEART FAILURE (HCC): ICD-10-CM

## 2024-10-02 RX ORDER — SACUBITRIL AND VALSARTAN 97; 103 MG/1; MG/1
TABLET, FILM COATED ORAL
Qty: 180 TABLET | Refills: 1 | Status: SHIPPED | OUTPATIENT
Start: 2024-10-02

## 2024-10-07 ENCOUNTER — TELEPHONE (OUTPATIENT)
Dept: GASTROENTEROLOGY | Facility: AMBULARY SURGERY CENTER | Age: 74
End: 2024-10-07

## 2024-10-07 DIAGNOSIS — R13.19 ESOPHAGEAL DYSPHAGIA: ICD-10-CM

## 2024-10-07 DIAGNOSIS — K21.9 GASTROESOPHAGEAL REFLUX DISEASE, UNSPECIFIED WHETHER ESOPHAGITIS PRESENT: ICD-10-CM

## 2024-10-07 RX ORDER — OMEPRAZOLE 40 MG/1
40 CAPSULE, DELAYED RELEASE ORAL DAILY
Qty: 90 CAPSULE | Refills: 3 | Status: SHIPPED | OUTPATIENT
Start: 2024-10-07

## 2024-10-07 NOTE — TELEPHONE ENCOUNTER
We typically do not hold aspirin prior to all procedures but due to concern for Schatzki's ring and need for possible dilation this is likely why it was requested by Dr. Day. Can hold for 5 days. Thank you!

## 2024-10-07 NOTE — TELEPHONE ENCOUNTER
Received refill request from [facility] for Omeprazole 40 mg 90 day supply. Please send to: .    [facility] - [place], [place] - [address]  [address] [address]  Phone: [phone]  Fax: [phone]  [place] #: --

## 2024-10-12 RX ORDER — SODIUM CHLORIDE 9 MG/ML
125 INJECTION, SOLUTION INTRAVENOUS CONTINUOUS
Status: CANCELLED | OUTPATIENT
Start: 2024-10-12

## 2024-10-14 ENCOUNTER — ANESTHESIA (OUTPATIENT)
Dept: GASTROENTEROLOGY | Facility: AMBULARY SURGERY CENTER | Age: 74
End: 2024-10-14
Payer: MEDICARE

## 2024-10-14 ENCOUNTER — HOSPITAL ENCOUNTER (OUTPATIENT)
Dept: GASTROENTEROLOGY | Facility: AMBULARY SURGERY CENTER | Age: 74
Setting detail: OUTPATIENT SURGERY
Discharge: HOME | End: 2024-10-14
Attending: INTERNAL MEDICINE
Payer: MEDICARE

## 2024-10-14 VITALS
HEART RATE: 79 BPM | OXYGEN SATURATION: 98 % | DIASTOLIC BLOOD PRESSURE: 58 MMHG | RESPIRATION RATE: 18 BRPM | TEMPERATURE: 97.5 F | SYSTOLIC BLOOD PRESSURE: 134 MMHG

## 2024-10-14 DIAGNOSIS — R13.19 ESOPHAGEAL DYSPHAGIA: ICD-10-CM

## 2024-10-14 PROCEDURE — 88305 TISSUE EXAM BY PATHOLOGIST: CPT | Performed by: PATHOLOGY

## 2024-10-14 PROCEDURE — 43239 EGD BIOPSY SINGLE/MULTIPLE: CPT | Performed by: INTERNAL MEDICINE

## 2024-10-14 PROCEDURE — 88342 IMHCHEM/IMCYTCHM 1ST ANTB: CPT | Performed by: PATHOLOGY

## 2024-10-14 PROCEDURE — 43248 EGD GUIDE WIRE INSERTION: CPT | Performed by: INTERNAL MEDICINE

## 2024-10-14 RX ORDER — PROPOFOL 10 MG/ML
INJECTION, EMULSION INTRAVENOUS CONTINUOUS PRN
Status: DISCONTINUED | OUTPATIENT
Start: 2024-10-14 | End: 2024-10-14

## 2024-10-14 RX ORDER — SODIUM CHLORIDE 9 MG/ML
125 INJECTION, SOLUTION INTRAVENOUS CONTINUOUS
Status: DISCONTINUED | OUTPATIENT
Start: 2024-10-14 | End: 2024-10-18 | Stop reason: HOSPADM

## 2024-10-14 RX ORDER — SODIUM CHLORIDE, SODIUM LACTATE, POTASSIUM CHLORIDE, CALCIUM CHLORIDE 600; 310; 30; 20 MG/100ML; MG/100ML; MG/100ML; MG/100ML
INJECTION, SOLUTION INTRAVENOUS CONTINUOUS PRN
Status: DISCONTINUED | OUTPATIENT
Start: 2024-10-14 | End: 2024-10-14

## 2024-10-14 RX ORDER — LIDOCAINE HYDROCHLORIDE 10 MG/ML
INJECTION, SOLUTION EPIDURAL; INFILTRATION; INTRACAUDAL; PERINEURAL AS NEEDED
Status: DISCONTINUED | OUTPATIENT
Start: 2024-10-14 | End: 2024-10-14

## 2024-10-14 RX ORDER — PROPOFOL 10 MG/ML
INJECTION, EMULSION INTRAVENOUS AS NEEDED
Status: DISCONTINUED | OUTPATIENT
Start: 2024-10-14 | End: 2024-10-14

## 2024-10-14 RX ADMIN — LIDOCAINE HYDROCHLORIDE 40 MG: 10 INJECTION, SOLUTION EPIDURAL; INFILTRATION; INTRACAUDAL; PERINEURAL at 10:23

## 2024-10-14 RX ADMIN — PROPOFOL 50 MCG/KG/MIN: 10 INJECTION, EMULSION INTRAVENOUS at 10:24

## 2024-10-14 RX ADMIN — PROPOFOL 70 MG: 10 INJECTION, EMULSION INTRAVENOUS at 10:24

## 2024-10-14 RX ADMIN — SODIUM CHLORIDE, SODIUM LACTATE, POTASSIUM CHLORIDE, AND CALCIUM CHLORIDE: .6; .31; .03; .02 INJECTION, SOLUTION INTRAVENOUS at 10:03
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
## 2024-10-14 NOTE — ANESTHESIA PREPROCEDURE EVALUATION
Procedure:  EGD    Relevant Problems   CARDIO   (+) Chronic systolic congestive heart failure (HCC)   (+) Essential hypertension   (+) History Abnormal heart rhythm      ENDO   (+) Type 2 diabetes mellitus without complication, without long-term current use of insulin (HCC)      GI/HEPATIC   (+) Esophageal dysphagia      MUSCULOSKELETAL   (+) Chronic right-sided low back pain without sciatica   (+) Lumbar spondylosis   (+) Neurogenic claudication due to lumbar spinal stenosis      NEURO/PSYCH   (+) CVA (cerebral vascular accident) (HCC)   (+) Chronic pain syndrome   (+) Chronic right-sided low back pain without sciatica   (+) Neurogenic claudication due to lumbar spinal stenosis   (+) TIA (transient ischemic attack)      PULMONARY   (+) Mild intermittent asthma without complication   (+) LUCIA (obstructive sleep apnea)      Other   (+) Class 2 obesity due to excess calories without serious comorbidity with body mass index (BMI) of 39.0 to 39.9 in adult             Anesthesia Plan  ASA Score- 3     Anesthesia Type- IV sedation with anesthesia with ASA Monitors.         Additional Monitors:     Airway Plan:            Plan Factors-    Chart reviewed.                      Induction- intravenous.    Postoperative Plan-         Informed Consent- Anesthetic plan and risks discussed with patient.  I personally reviewed this patient with the CRNA. Discussed and agreed on the Anesthesia Plan with the CRNA..

## 2024-10-14 NOTE — ANESTHESIA POSTPROCEDURE EVALUATION
Post-Op Assessment Note    CV Status:  Stable  Pain Score: 0    Pain management: adequate       Mental Status:  Alert and awake   Hydration Status:  Euvolemic   PONV Controlled:  Controlled   Airway Patency:  Patent     Post Op Vitals Reviewed: Yes    No anethesia notable event occurred.    Staff: Anesthesiologist, CRNA           Last Filed PACU Vitals:  Vitals Value Taken Time   Temp     Pulse 74 10/14/24 1038   /54 10/14/24 1038   Resp 16 10/14/24 1038   SpO2 99 % 10/14/24 1038       Modified Mil:  Activity: 2 (10/14/2024 10:40 AM)  Respiration: 2 (10/14/2024 10:40 AM)  Circulation: 2 (10/14/2024 10:40 AM)  Consciousness: 1 (10/14/2024 10:40 AM)  Oxygen Saturation: 1 (10/14/2024 10:40 AM)  Modified Mil Score: 8 (10/14/2024 10:40 AM)

## 2024-10-14 NOTE — ANESTHESIA POSTPROCEDURE EVALUATION
Post-Op Assessment Note    CV Status:  Stable    Pain management: adequate       Mental Status:  Alert and awake   Hydration Status:  Euvolemic   PONV Controlled:  Controlled   Airway Patency:  Patent     Post Op Vitals Reviewed: Yes    No anethesia notable event occurred.            Last Filed PACU Vitals:  Vitals Value Taken Time   Temp     Pulse 79 10/14/24 1052   /58 10/14/24 1052   Resp 18 10/14/24 1052   SpO2 98 % 10/14/24 1052       Modified Mil:  Activity: 2 (10/14/2024 10:53 AM)  Respiration: 2 (10/14/2024 10:53 AM)  Circulation: 2 (10/14/2024 10:53 AM)  Consciousness: 2 (10/14/2024 10:53 AM)  Oxygen Saturation: 2 (10/14/2024 10:53 AM)  Modified Mil Score: 10 (10/14/2024 10:53 AM)

## 2024-10-18 PROCEDURE — 88305 TISSUE EXAM BY PATHOLOGIST: CPT | Performed by: PATHOLOGY

## 2024-10-18 PROCEDURE — 88342 IMHCHEM/IMCYTCHM 1ST ANTB: CPT | Performed by: PATHOLOGY

## 2024-10-23 ENCOUNTER — HOSPITAL ENCOUNTER (OUTPATIENT)
Dept: RADIOLOGY | Facility: HOSPITAL | Age: 74
Discharge: HOME | End: 2024-10-23
Attending: PHYSICAL MEDICINE & REHABILITATION
Payer: MEDICARE

## 2024-10-23 VITALS
DIASTOLIC BLOOD PRESSURE: 69 MMHG | OXYGEN SATURATION: 95 % | RESPIRATION RATE: 18 BRPM | SYSTOLIC BLOOD PRESSURE: 148 MMHG | HEART RATE: 79 BPM | TEMPERATURE: 96.8 F

## 2024-10-23 DIAGNOSIS — M51.16 INTERVERTEBRAL DISC DISORDER WITH RADICULOPATHY OF LUMBAR REGION: ICD-10-CM

## 2024-10-23 PROCEDURE — 64483 NJX AA&/STRD TFRM EPI L/S 1: CPT | Performed by: PHYSICAL MEDICINE & REHABILITATION

## 2024-10-23 PROCEDURE — 64484 NJX AA&/STRD TFRM EPI L/S EA: CPT | Performed by: PHYSICAL MEDICINE & REHABILITATION

## 2024-10-23 RX ORDER — LIDOCAINE HYDROCHLORIDE 10 MG/ML
4 INJECTION, SOLUTION EPIDURAL; INFILTRATION; INTRACAUDAL; PERINEURAL ONCE
Status: COMPLETED | OUTPATIENT
Start: 2024-10-23 | End: 2024-10-23

## 2024-10-23 RX ORDER — METHYLPREDNISOLONE ACETATE 40 MG/ML
40 INJECTION, SUSPENSION INTRA-ARTICULAR; INTRALESIONAL; INTRAMUSCULAR; PARENTERAL; SOFT TISSUE ONCE
Status: COMPLETED | OUTPATIENT
Start: 2024-10-23 | End: 2024-10-23

## 2024-10-23 RX ORDER — BUPIVACAINE HCL/PF 2.5 MG/ML
2 VIAL (ML) INJECTION ONCE
Status: COMPLETED | OUTPATIENT
Start: 2024-10-23 | End: 2024-10-23

## 2024-10-23 RX ADMIN — LIDOCAINE HYDROCHLORIDE 4 ML: 10 INJECTION, SOLUTION EPIDURAL; INFILTRATION; INTRACAUDAL; PERINEURAL at 13:50

## 2024-10-23 RX ADMIN — IOHEXOL 2 ML: 300 INJECTION, SOLUTION INTRAVENOUS at 13:54

## 2024-10-23 RX ADMIN — BUPIVACAINE HYDROCHLORIDE 2 ML: 2.5 INJECTION, SOLUTION EPIDURAL; INFILTRATION; INTRACAUDAL at 13:54

## 2024-10-23 RX ADMIN — METHYLPREDNISOLONE ACETATE 40 MG: 40 INJECTION, SUSPENSION INTRA-ARTICULAR; INTRALESIONAL; INTRAMUSCULAR; SOFT TISSUE at 13:54
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
## 2024-10-23 NOTE — DISCHARGE INSTR - LAB
Epidural Steroid Injection   WHAT YOU NEED TO KNOW:   An epidural steroid injection (ADDI) is a procedure to inject steroid medicine into the epidural space. The epidural space is between your spinal cord and vertebrae. Steroids reduce inflammation and fluid buildup in your spine that may be causing pain. You may be given pain medicine along with the steroids.          ACTIVITY  Do not drive or operate machinery today.  No strenuous activity today - bending, lifting, etc.  You may resume normal activites starting tomorrow - start slowly and as tolerated.  You may shower today, but no tub baths or hot tubs.  You may have numbness for several hours from the local anesthetic. Please use caution and common sense, especially with weight-bearing activities.    CARE OF THE INJECTION SITE  If you have soreness or pain, apply ice to the area today (20 minutes on/20 minutes off).  Starting tomorrow, you may use warm, moist heat or ice if needed.  You may have an increase or change in your discomfort for 36-48 hours after your treatment.  Apply ice and continue with any pain medication you have been prescribed.  Notify the [facility] if you have any of the following: redness, drainage, swelling, headache, stiff neck or fever above 100°F.    SPECIAL INSTRUCTIONS  Our office will contact you in approximately 14 days for a progress report.    MEDICATIONS  Continue to take all routine medications.  Our office may have instructed you to hold some medications.    As no general anesthesia was used in today's procedure, you should not experience any side effects related to anesthesia.     If you are diabetic, the steroids used in today's injection may temporarily increase your blood sugar levels after the first few days after your injection. Please keep a close eye on your sugars and alert the doctor who manages your diabetes if your sugars are significantly high from your baseline or you are symptomatic.     If you have a  problem specifically related to your procedure, please call our office at [phone].  Problems not related to your procedure should be directed to your primary care physician.

## 2024-11-06 ENCOUNTER — TELEPHONE (OUTPATIENT)
Dept: PAIN MEDICINE | Facility: CLINIC | Age: 74
End: 2024-11-06

## 2024-11-06 DIAGNOSIS — M51.16 INTERVERTEBRAL DISC DISORDER WITH RADICULOPATHY OF LUMBAR REGION: Primary | ICD-10-CM

## 2024-11-06 RX ORDER — TRAMADOL HYDROCHLORIDE 50 MG/1
50 TABLET ORAL 2 TIMES DAILY PRN
Qty: 60 TABLET | Refills: 0 | Status: SHIPPED | OUTPATIENT
Start: 2024-11-06

## 2024-11-06 NOTE — TELEPHONE ENCOUNTER
Patient Reports     0    %     improvement post injection    Pain Level  10   /10  Patient would like  to discuss

## 2024-12-03 ENCOUNTER — OFFICE VISIT (OUTPATIENT)
Dept: PAIN MEDICINE | Facility: CLINIC | Age: 74
End: 2024-12-03
Payer: MEDICARE

## 2024-12-03 VITALS
HEIGHT: 65 IN | DIASTOLIC BLOOD PRESSURE: 94 MMHG | HEART RATE: 96 BPM | SYSTOLIC BLOOD PRESSURE: 189 MMHG | RESPIRATION RATE: 16 BRPM | BODY MASS INDEX: 36.11 KG/M2

## 2024-12-03 DIAGNOSIS — M47.816 LUMBAR SPONDYLOSIS: ICD-10-CM

## 2024-12-03 DIAGNOSIS — M70.62 TROCHANTERIC BURSITIS OF LEFT HIP: ICD-10-CM

## 2024-12-03 DIAGNOSIS — G89.4 CHRONIC PAIN SYNDROME: ICD-10-CM

## 2024-12-03 DIAGNOSIS — M51.16 INTERVERTEBRAL DISC DISORDER WITH RADICULOPATHY OF LUMBAR REGION: Primary | ICD-10-CM

## 2024-12-03 DIAGNOSIS — M51.26 LUMBAR HERNIATED DISC: ICD-10-CM

## 2024-12-03 DIAGNOSIS — M96.1 LUMBAR POST-LAMINECTOMY SYNDROME: ICD-10-CM

## 2024-12-03 PROCEDURE — 99214 OFFICE O/P EST MOD 30 MIN: CPT

## 2024-12-03 RX ORDER — TRAMADOL HYDROCHLORIDE 50 MG/1
50 TABLET ORAL EVERY 6 HOURS PRN
Qty: 120 TABLET | Refills: 2 | Status: SHIPPED | OUTPATIENT
Start: 2024-12-03

## 2024-12-03 NOTE — PATIENT INSTRUCTIONS
"Patient Education     Taking opioids safely   The Basics   Written by the doctors and editors at [facility]   What are opioids? -- Opioids are a group of prescription medicines that relieve pain. They work by attaching to \"opioid receptors\" in the body and blocking pain signals.  Opioids are sometimes used when other types of pain medicine do not help enough. Opioids can be helpful for treating short-term, or \"acute,\" pain, like after surgery or an injury. They are also sometimes used to treat long-term, or \"chronic,\" pain, like for people with cancer. But they come with risks.  If your doctor prescribes an opioid medicine, it's important to understand the risks and know how to stay safe.  What are the risks of taking opioids? -- You should know that:   Opioids have side effects. Some are just bothersome, and some can be dangerous. For example, taking too much of an opioid is called an \"overdose.\" An overdose can cause serious problems and even death.   In some cases, taking opioids can lead to misuse. For example, people might take the medicine when they don't need it for pain. Or they might take more than they are supposed to. Sharing or selling opioids are other examples of misuse.   There is a risk of addiction. This is also called \"opioid use disorder.\"  If you take too much, or take opioids with alcohol or certain other drugs, it can cause serious harm. It can even cause death from overdose.  How do I stay safe? -- There are things you can do to stay safe if you need to take an opioid medicine (figure 1). These things help protect yourself and others.  Know your medicines:    Opioids come in different forms. \"Immediate-release\" medicines work quickly and last for a short time. \"Extended-release\" medicines work more slowly and last longer. Make sure that you know what type of opioid you have. Read the label and the information that comes with your prescription.   Follow your treatment plan carefully. Take only " "the dose your doctor prescribes, and only as often as they tell you to.   Never take opioids that were not prescribed to you.   Some opioids come combined with other medicines like acetaminophen or an \"NSAID\" (like ibuprofen). Do not take any extra NSAIDs or acetaminophen without talking to your doctor first.   Make sure that all of your doctors know every medicine you take, even those that are non-prescription. Some medicines can affect the way opioids work. Bring a complete list of all of your pain medicines and other medicines with you whenever you go to a doctor, nurse, dentist, or pharmacist.   Ask your doctor or pharmacist if it is safe to take your other medicines with your opioid medicine.  Use and store your medicine safely:    Do not drink alcohol while you are taking opioids.   Do not take opioids with medicines that make you sleepy, unless your doctor tells you to. Examples include:   \"Benzodiazepines\" like diazepam (sample brand name: Valium) or alprazolam (sample brand name: Xanax)   Gabapentin (sample brand name: Neurontin) or pregabalin (brand name: Lyrica)   Muscle relaxants like baclofen or cyclobenzaprine   Sleeping pills like zolpidem (sample brand name: Ambien)   Talk to your doctor about whether it is safe to drive. Opioids can make you feel tired or have trouble thinking clearly. If you are starting a new prescription or taking a higher dose, you might need to avoid things like driving, using dangerous machinery, or other activities that could be risky.   Store your opioids in a safe place, such as a locked cabinet. This prevents children, teens, or anyone else from getting to them.   Never share your opioids with other people.  Be aware of side effects:    Opioid medicines can cause side effects. There are often ways to prevent or treat these.   Call your doctor or nurse if you have side effects that bother you, such as:   Constipation - Your doctor or nurse might suggest that you take a " "laxative to prevent or treat constipation. If your bowel movements are hard and dry, a stool softener might help. Drink plenty of water, and try to get regular physical activity.   Mild nausea or stomach discomfort - Taking the medicine with or after food can help with this. Nausea usually gets better with time.   Severe nausea, vomiting, or itchiness - If you have any of these problems, your doctor might be able to switch you to a different medicine.   Dry mouth   Feeling dizzy or sleepy, or having trouble thinking clearly   Vision problems   Being clumsy or falling down   Know the signs of an opioid overdose. Get help right away if you think that you or someone else took too much of an opioid medicine. Signs of an overdose are listed below.  Stay safe when stopping your opioid medicine:    When opioids are needed to treat acute pain, doctors usually try to prescribe them for only a short time. This usually means a few days or a week. They also prescribe the lowest dose possible to relieve pain.   Follow your doctor's instructions about how to stop taking your opioid once your pain has improved. This usually involves \"tapering,\" or reducing the dose gradually. If you stop an opioid suddenly, this can cause unpleasant symptoms like stomach ache, diarrhea, or shakes. This is called \"withdrawal.\" Tapering the dose can help prevent withdrawal.   When your pain gets better, get rid of any leftover medicines. Your doctor, nurse, or pharmacist can suggest ways to get rid of them. This might involve flushing them down the toilet or mixing them with something like dirt or cat litter, then putting the mixture in a sealed container in the trash. Some police stations and pharmacies also take leftover medicines.  What is naloxone? -- Naloxone is a medicine that reverses the effects of opioids. It can prevent death from an opioid overdose. Naloxone comes as an injection (shot), or as a spray that goes in the nose. Naloxone nasal " spray (brand name: Narcan) is available without a prescription.  If you or someone you know uses opioids, it's a good idea to keep naloxone with you. Make sure that you and your family and friends know how and when to use it.  When should I call for help? -- If you are taking an opioid, it's important to know when to get help. Signs of an opioid overdose include:   Extreme sleepiness   Slow breathing, or no breathing at all   Very small pupils (the black circles in the center of the eyes)   Very slow heartbeat  If you took too much of your opioid medicine or think that someone is having an opioid overdose:   If you have naloxone, give it immediately. Naloxone can save a person's life. But it needs to be given as soon as possible.   Call for an ambulance right away (in the [place] and [place], call 9-1-1).  Call your doctor or nurse if:   You are having side effects that bother you.   You have questions about how to take your medicine.   You are having trouble managing your pain.  All topics are updated as new evidence becomes available and our peer review process is complete.  This topic retrieved from [employer] on: May 15, 2024.  Topic 846013 Version 1.0  Release: 32.4.3 - C32.134  © 2024 UpToDate, Inc. and/or its affiliates. All rights reserved.  figure 1: Tips for medication safety     Tips to use your medicine safely include:  (A) Read labels so you know how to take your medicines.  (B) Have a routine for taking your medicines.  (C) Make sure you know what foods, drinks, and other medicines are safe for you.  (D) Store medicines in a safe place.  (E) Work with your health care team and ask questions if you have them.  Graphic 685505 Version 2.0  Consumer Information Use and Disclaimer   Disclaimer: This generalized information is a limited summary of diagnosis, treatment, and/or medication information. It is not meant to be comprehensive and should be used as a tool to help the user understand and/or assess potential  diagnostic and treatment options. It does NOT include all information about conditions, treatments, medications, side effects, or risks that may apply to a specific patient. It is not intended to be medical advice or a substitute for the medical advice, diagnosis, or treatment of a health care provider based on the health care provider's examination and assessment of a patient's specific and unique circumstances. Patients must speak with a health care provider for complete information about their health, medical questions, and treatment options, including any risks or benefits regarding use of medications. This information does not endorse any treatments or medications as safe, effective, or approved for treating a specific patient. UpToDate, Inc. and its affiliates disclaim any warranty or liability relating to this information or the use thereof.The use of this information is governed by the Terms of Use, available at https://www.woltersAutoReflex.comuwer.com/en/know/clinical-effectiveness-terms. 2024© UpToDate, Inc. and its affiliates and/or licensors. All rights reserved.  Copyright   © 2024 Up[employer]Date, Inc. and/or its affiliates. All rights reserved.
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
## 2024-12-07 DIAGNOSIS — I50.22 CHRONIC SYSTOLIC (CONGESTIVE) HEART FAILURE (HCC): ICD-10-CM

## 2024-12-09 RX ORDER — SPIRONOLACTONE 25 MG/1
25 TABLET ORAL DAILY
Qty: 90 TABLET | Refills: 1 | Status: SHIPPED | OUTPATIENT
Start: 2024-12-09

## 2025-01-22 ENCOUNTER — RA CDI HCC (OUTPATIENT)
Dept: OTHER | Facility: HOSPITAL | Age: 75
End: 2025-01-22

## 2025-01-23 ENCOUNTER — OFFICE VISIT (OUTPATIENT)
Dept: CARDIOLOGY CLINIC | Facility: CLINIC | Age: 75
End: 2025-01-23
Payer: MEDICARE

## 2025-01-23 VITALS
HEIGHT: 65 IN | SYSTOLIC BLOOD PRESSURE: 112 MMHG | DIASTOLIC BLOOD PRESSURE: 70 MMHG | BODY MASS INDEX: 36.82 KG/M2 | WEIGHT: 221 LBS | HEART RATE: 71 BPM | OXYGEN SATURATION: 97 %

## 2025-01-23 DIAGNOSIS — E66.2 MORBID (SEVERE) OBESITY WITH ALVEOLAR HYPOVENTILATION (HCC): ICD-10-CM

## 2025-01-23 DIAGNOSIS — I10 ESSENTIAL HYPERTENSION: ICD-10-CM

## 2025-01-23 DIAGNOSIS — I42.0 DILATED CARDIOMYOPATHY (HCC): ICD-10-CM

## 2025-01-23 DIAGNOSIS — I50.22 CHRONIC SYSTOLIC (CONGESTIVE) HEART FAILURE (HCC): Primary | ICD-10-CM

## 2025-01-23 DIAGNOSIS — E11.9 TYPE 2 DIABETES MELLITUS WITHOUT COMPLICATION, WITHOUT LONG-TERM CURRENT USE OF INSULIN (HCC): ICD-10-CM

## 2025-01-23 PROCEDURE — 93000 ELECTROCARDIOGRAM COMPLETE: CPT | Performed by: INTERNAL MEDICINE

## 2025-01-23 PROCEDURE — 99214 OFFICE O/P EST MOD 30 MIN: CPT | Performed by: INTERNAL MEDICINE

## 2025-01-23 RX ORDER — SOLIFENACIN SUCCINATE 10 MG/1
TABLET, FILM COATED ORAL
COMMUNITY
Start: 2024-10-31

## 2025-01-23 NOTE — PROGRESS NOTES
Cardiology Followup    Kamini Martines  [id number]  1950  [facility] [address] [address]    Consult for: CHF    HPI: Kamini Martines is a 74 y.o. year old female who is here for followup of CHF.   Since her last visit, she has had a large amount of back and orthopedic pain.  Denies any chest pain or shortness of breath.  No LE edema, orthopnea or PND.  Denies any syncope or near syncope.      Past Cardiac History:   In August 2019, She had a cardiac MRI done for the evaluation of cardiomyopathy.  MRI showed EF of 41% with a thin strip of intramyocardial fibrosis consistent with non-ischemic cardiomyopathy.        In January 2019, she had an echocardiogram done during hospitalization for influenza. Echocardiogram showed EF of 45% with small pericardial effusion and mild pulmonary hypertension.  She has no history of CHF or CAD.  History of CVA in 2014 with left arm weakness.  Was treated with TPA.  2 years prior to that she had workup by Dr. Valera including catheterization which was normal.  Patient had been on amiodarone since her CVA but denies history of atrial fibrillation.  Amiodarone was stopped in 2019.   Repeat 2D echocardiogram was done in June which showed an ejection fraction of 35%.      She has a family history of CHF with 2 brothers who have pacemakers (one after a MI) both parents had CHF as well.  Cardiac catheterization showed nonobstructive CAD.  EF was 30-35%.  Holter monitor was done which showed 5 episodes of VT with longest lasting 12 beats at 111 bpm.  She had no symptoms at the time.     The following portions of the patient's history were reviewed and updated as appropriate: allergies, current medications, past family history, past medical history, past social history, past surgical history and problem list.       Current Outpatient Medications:     acetaminophen (TYLENOL) 325 mg  tablet, Take 650 mg by mouth every 6 (six) hours as needed for mild pain, Disp: , Rfl:     albuterol (Ventolin HFA) 90 mcg/act inhaler, Inhale 2 puffs every 6 (six) hours as needed for wheezing, Disp: 18 g, Rfl: 3    b complex vitamins capsule, Take 1 capsule by mouth daily, Disp: , Rfl:     benzonatate (TESSALON PERLES) 100 mg capsule, Take 1 capsule (100 mg total) by mouth 3 (three) times a day as needed for cough, Disp: 270 capsule, Rfl: 0    Blood Glucose Monitoring Suppl (Accu-Chek Guide) w/Device KIT, Use 2 (two) times a day, Disp: 1 kit, Rfl: 0    Cholecalciferol (VITAMIN D-3) 1000 units CAPS, Take 5,000 Units by mouth daily, Disp: , Rfl:     Diclofenac Sodium (VOLTAREN) 1 %, PLEASE SEE ATTACHED FOR DETAILED DIRECTIONS, Disp: , Rfl:     glucose blood (Accu-Chek Guide) test strip, USE AS DIRECTED, Disp: 200 strip, Rfl: 1    ipratropium (ATROVENT) 0.03 % nasal spray, USE 2 SPRAYS IN BOTH  NOSTRILS 3 TIMES DAILY AS  NEEDED FOR RHINITIS, Disp: 120 mL, Rfl: 3    magnesium 30 MG tablet, Take 30 mg by mouth daily Unsure of the dosage, Disp: , Rfl:     Melatonin 10 MG TABS, Take by mouth daily at bedtime, Disp: , Rfl:     Multiple Vitamins-Minerals (PRESERVISION AREDS 2+MULTI VIT PO), 2 (two) times a day, Disp: , Rfl:     NON FORMULARY, 2 Herbal cap daily for constipation   GLYCOGEN Support sweet relief brand, Disp: , Rfl:     omeprazole (PriLOSEC) 40 MG capsule, Take 1 capsule (40 mg total) by mouth daily, Disp: 90 capsule, Rfl: 3    potassium citrate (UROCIT-K) 5 MEQ (540 MG), Take 5 mEq by mouth 3 (three) times a day with meals 2 tabs, Disp: , Rfl:     sacubitril-valsartan (Entresto)  MG TABS, TAKE 1 TABLET BY MOUTH TWICE  DAILY, Disp: 180 tablet, Rfl: 1    solifenacin (VESICARE) 10 MG tablet, , Disp: , Rfl:     spironolactone (ALDACTONE) 25 mg tablet, TAKE 1 TABLET BY MOUTH DAILY, Disp: 90 tablet, Rfl: 1    torsemide (DEMADEX) 10 mg tablet, Take 1 tablet (10 mg total) by mouth daily as needed (swelling),  "Disp: 90 tablet, Rfl: 1    traMADol (Ultram) 50 mg tablet, Take 1 tablet (50 mg total) by mouth every 6 (six) hours as needed for moderate pain, Disp: 120 tablet, Rfl: 2    Vibegron (Gemtesa) 75 MG TABS, Take by mouth daily in the early morning, Disp: , Rfl:     vitamin E, tocopherol, 400 units capsule, Take 400 Units by mouth daily, Disp: , Rfl:     aspirin 325 mg tablet, Take 325 mg by mouth daily (Patient not taking: Reported on 12/3/2024), Disp: , Rfl:   Allergies   Allergen Reactions    Breo Ellipta [Fluticasone Furoate-Vilanterol] Anaphylaxis    Carvedilol Chest Pain and Hypertension    Lisinopril Other (See Comments)     Dizziness, cough    Olmesartan Medoxomil-Hctz Other (See Comments)     Category: Adverse Reaction; Annotation - [id number]: dizzy    Doxycycline Rash         Review of Systems:  Review of Systems   Respiratory:  Negative for shortness of breath.    Cardiovascular:  Negative for chest pain, palpitations and leg swelling.   Musculoskeletal:  Positive for arthralgias, back pain and gait problem.   All other systems reviewed and are negative.      Physical Exam:  Vitals:    01/23/25 1555   BP: 112/70   BP Location: Left arm   Patient Position: Sitting   Cuff Size: Standard   Pulse: 71   SpO2: 97%   Weight: 100 kg (221 lb)   Height: 5' 5\" (1.651 m)     Physical Exam  Constitutional:       General: She is not in acute distress.     Appearance: Normal appearance. She is well-developed. She is not diaphoretic.   HENT:      Head: Normocephalic and atraumatic.   Eyes:      General: No scleral icterus.     Conjunctiva/sclera: Conjunctivae normal.      Pupils: Pupils are equal, round, and reactive to light.   Neck:      Thyroid: No thyromegaly.      Vascular: No JVD.   Cardiovascular:      Rate and Rhythm: Normal rate and regular rhythm.      Heart sounds: Normal heart sounds. No murmur heard.     No friction rub. No gallop.   Pulmonary:      Effort: Pulmonary effort is normal.      Breath sounds: Normal " breath sounds.   Musculoskeletal:      Cervical back: Neck supple.      Right lower leg: No edema.      Left lower leg: No edema.   Skin:     General: Skin is warm and dry.      Findings: No erythema or rash.   Neurological:      General: No focal deficit present.      Mental Status: She is alert and oriented to person, place, and time. Mental status is at baseline.   Psychiatric:         Mood and Affect: Mood normal.         Behavior: Behavior normal.         Thought Content: Thought content normal.         Judgment: Judgment normal.         Labs:  Lab Results   Component Value Date    K 3.9 06/29/2024     06/29/2024    CO2 26 06/29/2024    BUN 25 06/29/2024    CREATININE 0.67 06/29/2024    CALCIUM 9.6 06/29/2024     Lab Results   Component Value Date    WBC 12.23 (H) 06/29/2024    HGB 11.3 (L) 06/29/2024    HCT 35.6 06/29/2024    MCV 92 06/29/2024     06/29/2024     Lab Results   Component Value Date    TRIG 233 (H) 06/06/2024    HDL 50 06/06/2024     EKG (independently reviewed): NSR with LVH and nonspecific ST abnormalities    Discussion/Summary:  1. Chronic systolic (congestive) heart failure (HCC)    2. Dilated cardiomyopathy (HCC)    3. Essential hypertension    4. 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)    5. Type 2 diabetes mellitus without complication, without long-term current use of insulin (HCC)      - patient's EF was 41% on cardiac MRI with similar EF on most recent echocardiogram.  Study consistent with a nonischemic cardiomyopathy.  She had nonobstructive CAD on cath.  Holter monitor did not show frequent PVCs but episodes of VT was seen.  TSH was normal  She does not drink    - Continue Entresto and spironolactone.  She cannot tolerate beta blocker (has tried metoprolol and carvedilol).  Tolerating Entresto   mg daily.  - most recent renal function was normal.  Potassium level was also normal.  She has blood work scheduled for tomorrow.    -She is limited  because of back pain.    - torsemide 10 mg.  She may continue to use as needed.  Monitor daily weights and if increase in > 3 pounds, she should take dose.      - Followup with pulmonologist for CPAP adjustment as needed.   -   last echocardiogram showed EF of 40-45%.  No change from previous.

## 2025-01-27 ENCOUNTER — APPOINTMENT (OUTPATIENT)
Dept: LAB | Facility: CLINIC | Age: 75
End: 2025-01-27
Payer: MEDICARE

## 2025-01-27 DIAGNOSIS — Z11.59 ENCOUNTER FOR HEPATITIS C SCREENING TEST FOR LOW RISK PATIENT: ICD-10-CM

## 2025-01-27 DIAGNOSIS — I10 ESSENTIAL HYPERTENSION: ICD-10-CM

## 2025-01-27 DIAGNOSIS — E66.2 MORBID (SEVERE) OBESITY WITH ALVEOLAR HYPOVENTILATION (HCC): ICD-10-CM

## 2025-01-27 DIAGNOSIS — I50.22 CHRONIC SYSTOLIC (CONGESTIVE) HEART FAILURE (HCC): ICD-10-CM

## 2025-01-27 DIAGNOSIS — E11.9 TYPE 2 DIABETES MELLITUS WITHOUT COMPLICATION, WITHOUT LONG-TERM CURRENT USE OF INSULIN (HCC): ICD-10-CM

## 2025-01-27 LAB
ALBUMIN SERPL BCG-MCNC: 3.9 G/DL (ref 3.5–5)
ALP SERPL-CCNC: 54 U/L (ref 34–104)
ALT SERPL W P-5'-P-CCNC: 16 U/L (ref 7–52)
ANION GAP SERPL CALCULATED.3IONS-SCNC: 9 MMOL/L (ref 4–13)
AST SERPL W P-5'-P-CCNC: 12 U/L (ref 13–39)
BILIRUB SERPL-MCNC: 0.33 MG/DL (ref 0.2–1)
BUN SERPL-MCNC: 25 MG/DL (ref 5–25)
CALCIUM SERPL-MCNC: 10 MG/DL (ref 8.4–10.2)
CHLORIDE SERPL-SCNC: 101 MMOL/L (ref 96–108)
CHOLEST SERPL-MCNC: 198 MG/DL (ref ?–200)
CO2 SERPL-SCNC: 27 MMOL/L (ref 21–32)
CREAT SERPL-MCNC: 0.61 MG/DL (ref 0.6–1.3)
ERYTHROCYTE [DISTWIDTH] IN BLOOD BY AUTOMATED COUNT: 12.7 % (ref 11.6–15.1)
EST. AVERAGE GLUCOSE BLD GHB EST-MCNC: 194 MG/DL
GFR SERPL CREATININE-BSD FRML MDRD: 89 ML/MIN/1.73SQ M
GLUCOSE P FAST SERPL-MCNC: 222 MG/DL (ref 65–99)
HBA1C MFR BLD: 8.4 %
HCT VFR BLD AUTO: 37.8 % (ref 34.8–46.1)
HDLC SERPL-MCNC: 43 MG/DL
HGB BLD-MCNC: 12 G/DL (ref 11.5–15.4)
LDLC SERPL CALC-MCNC: 98 MG/DL (ref 0–100)
MCH RBC QN AUTO: 28.8 PG (ref 26.8–34.3)
MCHC RBC AUTO-ENTMCNC: 31.7 G/DL (ref 31.4–37.4)
MCV RBC AUTO: 91 FL (ref 82–98)
PLATELET # BLD AUTO: 325 THOUSANDS/UL (ref 149–390)
PMV BLD AUTO: 11 FL (ref 8.9–12.7)
POTASSIUM SERPL-SCNC: 4.1 MMOL/L (ref 3.5–5.3)
PROT SERPL-MCNC: 6.3 G/DL (ref 6.4–8.4)
RBC # BLD AUTO: 4.16 MILLION/UL (ref 3.81–5.12)
SODIUM SERPL-SCNC: 137 MMOL/L (ref 135–147)
TRIGL SERPL-MCNC: 287 MG/DL (ref ?–150)
WBC # BLD AUTO: 8.56 THOUSAND/UL (ref 4.31–10.16)

## 2025-01-27 PROCEDURE — 80053 COMPREHEN METABOLIC PANEL: CPT

## 2025-01-27 PROCEDURE — 80061 LIPID PANEL: CPT

## 2025-01-27 PROCEDURE — 85027 COMPLETE CBC AUTOMATED: CPT

## 2025-01-27 PROCEDURE — 83036 HEMOGLOBIN GLYCOSYLATED A1C: CPT

## 2025-01-27 PROCEDURE — 36415 COLL VENOUS BLD VENIPUNCTURE: CPT

## 2025-01-27 PROCEDURE — 86803 HEPATITIS C AB TEST: CPT

## 2025-01-28 LAB — HCV AB SER QL: NORMAL

## 2025-01-29 ENCOUNTER — OFFICE VISIT (OUTPATIENT)
Dept: FAMILY MEDICINE CLINIC | Facility: CLINIC | Age: 75
End: 2025-01-29
Payer: MEDICARE

## 2025-01-29 ENCOUNTER — RESULTS FOLLOW-UP (OUTPATIENT)
Dept: FAMILY MEDICINE CLINIC | Facility: CLINIC | Age: 75
End: 2025-01-29

## 2025-01-29 VITALS
RESPIRATION RATE: 16 BRPM | HEART RATE: 78 BPM | BODY MASS INDEX: 36.62 KG/M2 | TEMPERATURE: 97.6 F | WEIGHT: 219.8 LBS | HEIGHT: 65 IN | SYSTOLIC BLOOD PRESSURE: 124 MMHG | DIASTOLIC BLOOD PRESSURE: 84 MMHG

## 2025-01-29 DIAGNOSIS — F11.20 OPIOID DEPENDENCE, UNCOMPLICATED (HCC): ICD-10-CM

## 2025-01-29 DIAGNOSIS — H02.9 LESION OF RIGHT EYELID: ICD-10-CM

## 2025-01-29 DIAGNOSIS — I10 ESSENTIAL HYPERTENSION: ICD-10-CM

## 2025-01-29 DIAGNOSIS — E11.9 TYPE 2 DIABETES MELLITUS WITHOUT COMPLICATION, WITHOUT LONG-TERM CURRENT USE OF INSULIN (HCC): Primary | ICD-10-CM

## 2025-01-29 PROBLEM — R14.0 FLATULENCE/GAS PAIN/BELCHING: Status: RESOLVED | Noted: 2024-08-29 | Resolved: 2025-01-29

## 2025-01-29 PROCEDURE — 99214 OFFICE O/P EST MOD 30 MIN: CPT | Performed by: INTERNAL MEDICINE

## 2025-01-29 PROCEDURE — G2211 COMPLEX E/M VISIT ADD ON: HCPCS | Performed by: INTERNAL MEDICINE

## 2025-01-29 RX ORDER — METFORMIN HYDROCHLORIDE 500 MG/1
500 TABLET, EXTENDED RELEASE ORAL
Qty: 90 TABLET | Refills: 3 | Status: SHIPPED | OUTPATIENT
Start: 2025-01-29

## 2025-01-29 NOTE — ASSESSMENT & PLAN NOTE
Not well controlled. Has not been able to be as active due to her back pain.  Will start metformin once daily.  Discussed possible side effects with patient.  Asked patient to call sooner than next scheduled appointment for any complaints or issues.    Lab Results   Component Value Date    HGBA1C 8.4 (H) 01/27/2025     Orders:  •  metFORMIN (GLUCOPHAGE-XR) 500 mg 24 hr tablet; Take 1 tablet (500 mg total) by mouth daily with dinner  •  Hemoglobin A1C; Future  •  Comprehensive metabolic panel; Future  •  CBC and Platelet; Future  •  Albumin / creatinine urine ratio; Future  •  Lipid Panel with Direct LDL reflex; Future

## 2025-01-29 NOTE — ASSESSMENT & PLAN NOTE
Well controlled and will continue entresto, aldactone, and torsemide.    Orders:  •  Hemoglobin A1C; Future  •  Comprehensive metabolic panel; Future  •  CBC and Platelet; Future  •  Albumin / creatinine urine ratio; Future  •  Lipid Panel with Direct LDL reflex; Future

## 2025-01-29 NOTE — PROGRESS NOTES
Name: Kamini Martines      : 1950      MRN: [id number]  Encounter Provider: Brionna Chatman MD  Encounter Date: 2025   Encounter department: [facility]  :  Assessment & Plan  Type 2 diabetes mellitus without complication, without long-term current use of insulin (HCC)  Not well controlled. Has not been able to be as active due to her back pain.  Will start metformin once daily.  Discussed possible side effects with patient.  Asked patient to call sooner than next scheduled appointment for any complaints or issues.    Lab Results   Component Value Date    HGBA1C 8.4 (H) 2025     Orders:  •  metFORMIN (GLUCOPHAGE-XR) 500 mg 24 hr tablet; Take 1 tablet (500 mg total) by mouth daily with dinner  •  Hemoglobin A1C; Future  •  Comprehensive metabolic panel; Future  •  CBC and Platelet; Future  •  Albumin / creatinine urine ratio; Future  •  Lipid Panel with Direct LDL reflex; Future    Essential hypertension  Well controlled and will continue entresto, aldactone, and torsemide.    Orders:  •  Hemoglobin A1C; Future  •  Comprehensive metabolic panel; Future  •  CBC and Platelet; Future  •  Albumin / creatinine urine ratio; Future  •  Lipid Panel with Direct LDL reflex; Future    Opioid dependence, uncomplicated (HCC)  Managed by pain and spine.        Lesion of right eyelid  She would like this removed; it is getting larger.   Orders:  •  Ambulatory Referral to Dermatology; Future           History of Present Illness   Here for follow up visit.  Has been having issues with continued back pain and radiculopathy. Has not been able to do very much.  It is very stressful.    Has a lesion R eyelid that seems to be rapidly growing.  She would like to have this removed.  Checks bp readings frequently at home and these have been < 120/80 daily.      Review of Systems   Constitutional: Negative.    Respiratory: Negative.     Cardiovascular: Negative.    Endocrine: Negative for polydipsia, polyphagia  "and polyuria.       Objective   /84   Pulse 78   Temp 97.6 °F (36.4 °C)   Resp 16   Ht 5' 5\" (1.651 m)   Wt 99.7 kg (219 lb 12.8 oz)   BMI 36.58 kg/m²      Physical Exam  Constitutional:       Appearance: Normal appearance.   HENT:      Head: Normocephalic and atraumatic.   Eyes:      Pupils: Pupils are equal, round, and reactive to light.   Cardiovascular:      Rate and Rhythm: Normal rate and regular rhythm.      Heart sounds: No murmur heard.     No friction rub. No gallop.   Pulmonary:      Effort: Pulmonary effort is normal.      Breath sounds: Normal breath sounds. No wheezing or rales.   Abdominal:      General: Abdomen is flat. Bowel sounds are normal.      Palpations: Abdomen is soft.      Tenderness: There is no abdominal tenderness.   Musculoskeletal:         General: No swelling.   Neurological:      Mental Status: She is alert.         "

## 2025-02-04 ENCOUNTER — HOSPITAL ENCOUNTER (OUTPATIENT)
Dept: NON INVASIVE DIAGNOSTICS | Facility: HOSPITAL | Age: 75
Discharge: HOME | End: 2025-02-04
Attending: INTERNAL MEDICINE
Payer: MEDICARE

## 2025-02-04 VITALS
WEIGHT: 219 LBS | HEART RATE: 61 BPM | BODY MASS INDEX: 36.49 KG/M2 | SYSTOLIC BLOOD PRESSURE: 152 MMHG | DIASTOLIC BLOOD PRESSURE: 71 MMHG | HEIGHT: 65 IN

## 2025-02-04 DIAGNOSIS — I50.22 CHRONIC SYSTOLIC (CONGESTIVE) HEART FAILURE (HCC): ICD-10-CM

## 2025-02-04 LAB
AORTIC ROOT: 3.5 CM
AV LVOT PEAK GRADIENT: 4 MMHG
AV PEAK GRADIENT: 4 MMHG
BSA FOR ECHO PROCEDURE: 2.06 M2
DOP CALC LVOT AREA: 3.14 CM2
DOP CALC LVOT DIAMETER: 2 CM
E WAVE DECELERATION TIME: 148 MS
E/A RATIO: 0.8
FRACTIONAL SHORTENING: 24 (ref 28–44)
INTERVENTRICULAR SEPTUM IN DIASTOLE (PARASTERNAL SHORT AXIS VIEW): 0.9 CM
INTERVENTRICULAR SEPTUM: 0.9 CM (ref 0.6–1.1)
LAAS-AP2: 23.1 CM2
LAAS-AP4: 21.8 CM2
LEFT ATRIUM AREA SYSTOLE SINGLE PLANE A4C: 22.6 CM2
LEFT ATRIUM SIZE: 3.8 CM
LEFT ATRIUM VOLUME (MOD BIPLANE): 68 ML
LEFT ATRIUM VOLUME INDEX (MOD BIPLANE): 33 ML/M2
LEFT INTERNAL DIMENSION IN SYSTOLE: 4.5 CM (ref 2.1–4)
LEFT VENTRICULAR INTERNAL DIMENSION IN DIASTOLE: 5.9 CM (ref 3.5–6)
LEFT VENTRICULAR POSTERIOR WALL IN END DIASTOLE: 1 CM
LEFT VENTRICULAR STROKE VOLUME: 79 ML
LVSV (TEICH): 79 ML
MV E'TISSUE VEL-LAT: 5 CM/S
MV E'TISSUE VEL-SEP: 6 CM/S
MV PEAK A VEL: 0.8 M/S
MV PEAK E VEL: 64 CM/S
MV STENOSIS PRESSURE HALF TIME: 43 MS
MV VALVE AREA P 1/2 METHOD: 5.12
PV PEAK GRADIENT: 2 MMHG
RIGHT ATRIUM AREA SYSTOLE A4C: 10.4 CM2
RIGHT VENTRICLE ID DIMENSION: 2.4 CM
SL CV LEFT ATRIUM LENGTH A2C: 5.9 CM
SL CV PED ECHO LEFT VENTRICLE DIASTOLIC VOLUME (MOD BIPLANE) 2D: 171 ML
SL CV PED ECHO LEFT VENTRICLE SYSTOLIC VOLUME (MOD BIPLANE) 2D: 92 ML
TR MAX PG: 19 MMHG
TR PEAK VELOCITY: 2.2 M/S
TRICUSPID ANNULAR PLANE SYSTOLIC EXCURSION: 2.2 CM
TRICUSPID VALVE PEAK REGURGITATION VELOCITY: 2.18 M/S

## 2025-02-04 PROCEDURE — 93306 TTE W/DOPPLER COMPLETE: CPT

## 2025-02-04 PROCEDURE — 93306 TTE W/DOPPLER COMPLETE: CPT | Performed by: INTERNAL MEDICINE

## 2025-02-06 ENCOUNTER — RESULTS FOLLOW-UP (OUTPATIENT)
Dept: CARDIOLOGY CLINIC | Facility: CLINIC | Age: 75
End: 2025-02-06

## 2025-02-06 NOTE — TELEPHONE ENCOUNTER
----- Message from Terese Worley DO sent at 2/6/2025  2:28 PM EST -----  Can you please let the patient know echo was unchanged.  Ejection fraction is mildly reduced but no change from last one

## 2025-02-11 ENCOUNTER — PROCEDURE VISIT (OUTPATIENT)
Dept: OBGYN CLINIC | Facility: CLINIC | Age: 75
End: 2025-02-11
Payer: MEDICARE

## 2025-02-11 VITALS — BODY MASS INDEX: 36.49 KG/M2 | WEIGHT: 219 LBS | HEIGHT: 65 IN

## 2025-02-11 DIAGNOSIS — M19.011 OSTEOARTHRITIS OF RIGHT AC (ACROMIOCLAVICULAR) JOINT: Primary | ICD-10-CM

## 2025-02-11 DIAGNOSIS — M19.012 OSTEOARTHRITIS OF LEFT ACROMIOCLAVICULAR JOINT: ICD-10-CM

## 2025-02-11 PROCEDURE — 99213 OFFICE O/P EST LOW 20 MIN: CPT | Performed by: ORTHOPAEDIC SURGERY

## 2025-02-11 PROCEDURE — 20606 DRAIN/INJ JOINT/BURSA W/US: CPT | Performed by: ORTHOPAEDIC SURGERY

## 2025-02-11 RX ORDER — LIDOCAINE HYDROCHLORIDE 10 MG/ML
1 INJECTION, SOLUTION INFILTRATION; PERINEURAL
Status: COMPLETED | OUTPATIENT
Start: 2025-02-11 | End: 2025-02-11

## 2025-02-11 RX ORDER — TRIAMCINOLONE ACETONIDE 40 MG/ML
40 INJECTION, SUSPENSION INTRA-ARTICULAR; INTRAMUSCULAR
Status: COMPLETED | OUTPATIENT
Start: 2025-02-11 | End: 2025-02-11

## 2025-02-11 RX ADMIN — LIDOCAINE HYDROCHLORIDE 1 ML: 10 INJECTION, SOLUTION INFILTRATION; PERINEURAL at 11:15

## 2025-02-11 RX ADMIN — TRIAMCINOLONE ACETONIDE 40 MG: 40 INJECTION, SUSPENSION INTRA-ARTICULAR; INTRAMUSCULAR at 11:15
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
## 2025-02-18 ENCOUNTER — APPOINTMENT (OUTPATIENT)
Dept: LAB | Facility: CLINIC | Age: 75
End: 2025-02-18
Payer: MEDICARE

## 2025-02-18 ENCOUNTER — TRANSCRIBE ORDERS (OUTPATIENT)
Dept: LAB | Facility: CLINIC | Age: 75
End: 2025-02-18

## 2025-02-18 DIAGNOSIS — N20.0 RENAL CALCULUS: ICD-10-CM

## 2025-02-18 DIAGNOSIS — N20.0 RENAL CALCULUS: Primary | ICD-10-CM

## 2025-02-18 LAB
PERIOD: 24 HOURS
PH UR STRIP.AUTO: 6 [PH]
SPECIMEN VOL UR: 1400 ML

## 2025-02-18 PROCEDURE — 82507 ASSAY OF CITRATE: CPT

## 2025-02-18 PROCEDURE — 83945 ASSAY OF OXALATE: CPT

## 2025-02-18 PROCEDURE — 84560 ASSAY OF URINE/URIC ACID: CPT

## 2025-02-18 PROCEDURE — 83986 ASSAY PH BODY FLUID NOS: CPT

## 2025-02-18 PROCEDURE — 82340 ASSAY OF CALCIUM IN URINE: CPT

## 2025-02-18 PROCEDURE — 84300 ASSAY OF URINE SODIUM: CPT

## 2025-02-19 LAB
CALCIUM 24H UR-MCNC: 168 MG/24 HRS (ref 100–300)
PERIOD: 24 HOURS
PERIOD: 24 HOURS
SODIUM 24H UR-SRATE: 189 MMOL/24 HRS (ref 40–220)
SPECIMEN VOL UR: 1400 ML
URATE 24H UR-MCNC: 583.8 MG/24 HRS (ref 250–750)

## 2025-02-20 ENCOUNTER — OFFICE VISIT (OUTPATIENT)
Dept: OBGYN CLINIC | Facility: CLINIC | Age: 75
End: 2025-02-20
Payer: MEDICARE

## 2025-02-20 ENCOUNTER — APPOINTMENT (OUTPATIENT)
Dept: RADIOLOGY | Facility: CLINIC | Age: 75
End: 2025-02-20
Payer: MEDICARE

## 2025-02-20 VITALS — WEIGHT: 219 LBS | BODY MASS INDEX: 36.49 KG/M2 | HEIGHT: 65 IN

## 2025-02-20 DIAGNOSIS — M17.11 PRIMARY OSTEOARTHRITIS OF RIGHT KNEE: ICD-10-CM

## 2025-02-20 DIAGNOSIS — M25.561 RIGHT KNEE PAIN, UNSPECIFIED CHRONICITY: ICD-10-CM

## 2025-02-20 DIAGNOSIS — M17.12 PRIMARY LOCALIZED OSTEOARTHRITIS OF LEFT KNEE: Primary | ICD-10-CM

## 2025-02-20 DIAGNOSIS — M17.12 PRIMARY LOCALIZED OSTEOARTHRITIS OF LEFT KNEE: ICD-10-CM

## 2025-02-20 PROCEDURE — 99213 OFFICE O/P EST LOW 20 MIN: CPT | Performed by: ORTHOPAEDIC SURGERY

## 2025-02-20 PROCEDURE — 73562 X-RAY EXAM OF KNEE 3: CPT
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
## 2025-02-21 LAB
OXALATE 24H UR-MRATE: 32 MG/24 HR (ref 4–31)
OXALATE UR-MCNC: 23 MG/L

## 2025-02-24 LAB
CITRATE 24H UR-MCNC: 584 MG/L
CITRATE 24H UR-MRATE: 818 MG/24 HR (ref 320–1240)

## 2025-02-25 ENCOUNTER — OFFICE VISIT (OUTPATIENT)
Dept: PAIN MEDICINE | Facility: CLINIC | Age: 75
End: 2025-02-25
Payer: MEDICARE

## 2025-02-25 DIAGNOSIS — M51.16 INTERVERTEBRAL DISC DISORDER WITH RADICULOPATHY OF LUMBAR REGION: ICD-10-CM

## 2025-02-25 DIAGNOSIS — M47.816 LUMBAR SPONDYLOSIS: ICD-10-CM

## 2025-02-25 DIAGNOSIS — F11.20 UNCOMPLICATED OPIOID DEPENDENCE (HCC): ICD-10-CM

## 2025-02-25 DIAGNOSIS — Z79.891 LONG-TERM CURRENT USE OF OPIATE ANALGESIC: ICD-10-CM

## 2025-02-25 DIAGNOSIS — M54.12 CERVICAL RADICULOPATHY: Primary | ICD-10-CM

## 2025-02-25 DIAGNOSIS — G89.4 CHRONIC PAIN SYNDROME: ICD-10-CM

## 2025-02-25 DIAGNOSIS — M47.812 CERVICAL SPONDYLOSIS: ICD-10-CM

## 2025-02-25 PROCEDURE — 99214 OFFICE O/P EST MOD 30 MIN: CPT

## 2025-02-25 RX ORDER — TRAMADOL HYDROCHLORIDE 50 MG/1
50 TABLET ORAL EVERY 6 HOURS PRN
Qty: 120 TABLET | Refills: 2 | Status: SHIPPED | OUTPATIENT
Start: 2025-03-10
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
## 2025-02-25 NOTE — PATIENT INSTRUCTIONS
"Patient Education     Taking opioids safely   The Basics   Written by the doctors and editors at [facility]   What are opioids? -- Opioids are a group of prescription medicines that relieve pain. They work by attaching to \"opioid receptors\" in the body and blocking pain signals.  Opioids are sometimes used when other types of pain medicine do not help enough. Opioids can be helpful for treating short-term, or \"acute,\" pain, like after surgery or an injury. They are also sometimes used to treat long-term, or \"chronic,\" pain, like for people with cancer. But they come with risks.  If your doctor prescribes an opioid medicine, it's important to understand the risks and know how to stay safe.  What are the risks of taking opioids? -- You should know that:   Opioids have side effects. Some are just bothersome, and some can be dangerous. For example, taking too much of an opioid is called an \"overdose.\" An overdose can cause serious problems and even death.   In some cases, taking opioids can lead to misuse. For example, people might take the medicine when they don't need it for pain. Or they might take more than they are supposed to. Sharing or selling opioids are other examples of misuse.   There is a risk of addiction. This is also called \"opioid use disorder.\"  If you take too much, or take opioids with alcohol or certain other drugs, it can cause serious harm. It can even cause death from overdose.  How do I stay safe? -- There are things you can do to stay safe if you need to take an opioid medicine (figure 1). These things help protect yourself and others.  Know your medicines:    Opioids come in different forms. \"Immediate-release\" medicines work quickly and last for a short time. \"Extended-release\" medicines work more slowly and last longer. Make sure that you know what type of opioid you have. Read the label and the information that comes with your prescription.   Follow your treatment plan carefully. Take only " "the dose your doctor prescribes, and only as often as they tell you to.   Never take opioids that were not prescribed to you.   Some opioids come combined with other medicines like acetaminophen or an \"NSAID\" (like ibuprofen). Do not take any extra NSAIDs or acetaminophen without talking to your doctor first.   Make sure that all of your doctors know every medicine you take, even those that are non-prescription. Some medicines can affect the way opioids work. Bring a complete list of all of your pain medicines and other medicines with you whenever you go to a doctor, nurse, dentist, or pharmacist.   Ask your doctor or pharmacist if it is safe to take your other medicines with your opioid medicine.  Use and store your medicine safely:    Do not drink alcohol while you are taking opioids.   Do not take opioids with medicines that make you sleepy, unless your doctor tells you to. Examples include:   \"Benzodiazepines\" like diazepam (sample brand name: Valium) or alprazolam (sample brand name: Xanax)   Gabapentin (sample brand name: Neurontin) or pregabalin (brand name: Lyrica)   Muscle relaxants like baclofen or cyclobenzaprine   Sleeping pills like zolpidem (sample brand name: Ambien)   Talk to your doctor about whether it is safe to drive. Opioids can make you feel tired or have trouble thinking clearly. If you are starting a new prescription or taking a higher dose, you might need to avoid things like driving, using dangerous machinery, or other activities that could be risky.   Store your opioids in a safe place, such as a locked cabinet. This prevents children, teens, or anyone else from getting to them.   Never share your opioids with other people.  Be aware of side effects:    Opioid medicines can cause side effects. There are often ways to prevent or treat these.   Call your doctor or nurse if you have side effects that bother you, such as:   Constipation - Your doctor or nurse might suggest that you take a " "laxative to prevent or treat constipation. If your bowel movements are hard and dry, a stool softener might help. Drink plenty of water, and try to get regular physical activity.   Mild nausea or stomach discomfort - Taking the medicine with or after food can help with this. Nausea usually gets better with time.   Severe nausea, vomiting, or itchiness - If you have any of these problems, your doctor might be able to switch you to a different medicine.   Dry mouth   Feeling dizzy or sleepy, or having trouble thinking clearly   Vision problems   Being clumsy or falling down   Know the signs of an opioid overdose. Get help right away if you think that you or someone else took too much of an opioid medicine. Signs of an overdose are listed below.  Stay safe when stopping your opioid medicine:    When opioids are needed to treat acute pain, doctors usually try to prescribe them for only a short time. This usually means a few days or a week. They also prescribe the lowest dose possible to relieve pain.   Follow your doctor's instructions about how to stop taking your opioid once your pain has improved. This usually involves \"tapering,\" or reducing the dose gradually. If you stop an opioid suddenly, this can cause unpleasant symptoms like stomach ache, diarrhea, or shakes. This is called \"withdrawal.\" Tapering the dose can help prevent withdrawal.   When your pain gets better, get rid of any leftover medicines. Your doctor, nurse, or pharmacist can suggest ways to get rid of them. This might involve flushing them down the toilet or mixing them with something like dirt or cat litter, then putting the mixture in a sealed container in the trash. Some police stations and pharmacies also take leftover medicines.  What is naloxone? -- Naloxone is a medicine that reverses the effects of opioids. It can prevent death from an opioid overdose. Naloxone comes as an injection (shot), or as a spray that goes in the nose. Naloxone nasal " spray (brand name: Narcan) is available without a prescription.  If you or someone you know uses opioids, it's a good idea to keep naloxone with you. Make sure that you and your family and friends know how and when to use it.  When should I call for help? -- If you are taking an opioid, it's important to know when to get help. Signs of an opioid overdose include:   Extreme sleepiness   Slow breathing, or no breathing at all   Very small pupils (the black circles in the center of the eyes)   Very slow heartbeat  If you took too much of your opioid medicine or think that someone is having an opioid overdose:   If you have naloxone, give it immediately. Naloxone can save a person's life. But it needs to be given as soon as possible.   Call for an ambulance right away (in the [place] and [place], call 9-1-1).  Call your doctor or nurse if:   You are having side effects that bother you.   You have questions about how to take your medicine.   You are having trouble managing your pain.  All topics are updated as new evidence becomes available and our peer review process is complete.  This topic retrieved from [employer] on: May 15, 2024.  Topic 002811 Version 1.0  Release: 32.4.3 - C32.134  © 2024 UpToDate, Inc. and/or its affiliates. All rights reserved.  figure 1: Tips for medication safety     Tips to use your medicine safely include:  (A) Read labels so you know how to take your medicines.  (B) Have a routine for taking your medicines.  (C) Make sure you know what foods, drinks, and other medicines are safe for you.  (D) Store medicines in a safe place.  (E) Work with your health care team and ask questions if you have them.  Graphic 353123 Version 2.0  Consumer Information Use and Disclaimer   Disclaimer: This generalized information is a limited summary of diagnosis, treatment, and/or medication information. It is not meant to be comprehensive and should be used as a tool to help the user understand and/or assess potential  diagnostic and treatment options. It does NOT include all information about conditions, treatments, medications, side effects, or risks that may apply to a specific patient. It is not intended to be medical advice or a substitute for the medical advice, diagnosis, or treatment of a health care provider based on the health care provider's examination and assessment of a patient's specific and unique circumstances. Patients must speak with a health care provider for complete information about their health, medical questions, and treatment options, including any risks or benefits regarding use of medications. This information does not endorse any treatments or medications as safe, effective, or approved for treating a specific patient. UpToDate, Inc. and its affiliates disclaim any warranty or liability relating to this information or the use thereof.The use of this information is governed by the Terms of Use, available at https://www.wolters2nd Watchuwer.com/en/know/clinical-effectiveness-terms. 2024© UpToDate, Inc. and its affiliates and/or licensors. All rights reserved.  Copyright   © 2024 UpToDate, Inc. and/or its affiliates. All rights reserved.

## 2025-02-27 LAB
6MAM UR QL CFM: NEGATIVE NG/ML
7AMINOCLONAZEPAM UR QL CFM: NEGATIVE NG/ML
A-OH ALPRAZ UR QL CFM: NEGATIVE NG/ML
AMPHET UR QL CFM: NEGATIVE NG/ML
AMPHET UR QL CFM: NEGATIVE NG/ML
BUPRENORPHINE UR QL CFM: NEGATIVE NG/ML
BUTALBITAL UR QL CFM: NEGATIVE NG/ML
BZE UR QL CFM: NEGATIVE NG/ML
CODEINE UR QL CFM: NEGATIVE NG/ML
EDDP UR QL CFM: NEGATIVE NG/ML
ETHYL GLUCURONIDE UR QL CFM: NEGATIVE NG/ML
ETHYL SULFATE UR QL SCN: NEGATIVE NG/ML
FENTANYL UR QL CFM: NEGATIVE NG/ML
GLIADIN IGG SER IA-ACNC: NEGATIVE NG/ML
HYDROCODONE UR QL CFM: NEGATIVE NG/ML
HYDROCODONE UR QL CFM: NEGATIVE NG/ML
HYDROMORPHONE UR QL CFM: NEGATIVE NG/ML
LORAZEPAM UR QL CFM: NEGATIVE NG/ML
MDMA UR QL CFM: NEGATIVE NG/ML
ME-PHENIDATE UR QL CFM: NEGATIVE NG/ML
MEPERIDINE UR QL CFM: NEGATIVE NG/ML
METHADONE UR QL CFM: NEGATIVE NG/ML
METHAMPHET UR QL CFM: NEGATIVE NG/ML
MORPHINE UR QL CFM: NEGATIVE NG/ML
MORPHINE UR QL CFM: NEGATIVE NG/ML
NORBUPRENORPHINE UR QL CFM: NEGATIVE NG/ML
NORDIAZEPAM UR QL CFM: NEGATIVE NG/ML
NORFENTANYL UR QL CFM: NEGATIVE NG/ML
NORHYDROCODONE UR QL CFM: NEGATIVE NG/ML
NORHYDROCODONE UR QL CFM: NEGATIVE NG/ML
NORMEPERIDINE UR QL CFM: NEGATIVE NG/ML
NOROXYCODONE UR QL CFM: NEGATIVE NG/ML
OXAZEPAM UR QL CFM: NEGATIVE NG/ML
OXYCODONE UR QL CFM: NEGATIVE NG/ML
OXYMORPHONE UR QL CFM: NEGATIVE NG/ML
OXYMORPHONE UR QL CFM: NEGATIVE NG/ML
PARA-FLUOROFENTANYL QUANTIFICATION: NORMAL NG/ML
PCP UR QL CFM: NEGATIVE NG/ML
PHENOBARB UR QL CFM: NEGATIVE NG/ML
SECOBARBITAL UR QL CFM: NEGATIVE NG/ML
SL AMB 5F-ADB-M7 METABOLITE QUANTIFICATION: NEGATIVE NG/ML
SL AMB 7-OH-MITRAGYNINE (KRATOM ALKALOID) QUANTIFICATION: NEGATIVE NG/ML
SL AMB AB-FUBINACA-M3 METABOLITE QUANTIFICATION: NEGATIVE NG/ML
SL AMB ACETYL FENTANYL QUANTIFICATION: NORMAL NG/ML
SL AMB ACETYL NORFENTANYL QUANTIFICATION: NORMAL NG/ML
SL AMB ACRYL FENTANYL QUANTIFICATION: NORMAL NG/ML
SL AMB CARFENTANIL QUANTIFICATION: NORMAL NG/ML
SL AMB CTHC (MARIJUANA METABOLITE) QUANTIFICATION: NEGATIVE NG/ML
SL AMB DEXTROMETHORPHAN QUANTIFICATION: NEGATIVE NG/ML
SL AMB DEXTRORPHAN (DEXTROMETHORPHAN METABOLITE) QUANT: NEGATIVE NG/ML
SL AMB DEXTRORPHAN (DEXTROMETHORPHAN METABOLITE) QUANT: NEGATIVE NG/ML
SL AMB JWH018 METABOLITE QUANTIFICATION: NEGATIVE NG/ML
SL AMB JWH073 METABOLITE QUANTIFICATION: NEGATIVE NG/ML
SL AMB MDMB-FUBINACA-M1 METABOLITE QUANTIFICATION: NEGATIVE NG/ML
SL AMB N-DESMETHYL-TRAMADOL QUANTIFICATION: NORMAL NG/ML
SL AMB PHENTERMINE QUANTIFICATION: NEGATIVE NG/ML
SL AMB RCS4 METABOLITE QUANTIFICATION: NEGATIVE NG/ML
SL AMB RITALINIC ACID QUANTIFICATION: NEGATIVE NG/ML
SPECIMEN DRAWN SERPL: NEGATIVE NG/ML
TAPENTADOL UR QL CFM: NEGATIVE NG/ML
TEMAZEPAM UR QL CFM: NEGATIVE NG/ML
TEMAZEPAM UR QL CFM: NEGATIVE NG/ML
TRAMADOL UR QL CFM: NORMAL NG/ML
URATE/CREAT 24H UR: NORMAL NG/ML

## 2025-03-02 DIAGNOSIS — E11.9 TYPE 2 DIABETES MELLITUS WITHOUT COMPLICATION, WITHOUT LONG-TERM CURRENT USE OF INSULIN (HCC): ICD-10-CM

## 2025-03-03 RX ORDER — BLOOD SUGAR DIAGNOSTIC
STRIP MISCELLANEOUS
Qty: 200 STRIP | Refills: 1 | Status: SHIPPED | OUTPATIENT
Start: 2025-03-03

## 2025-03-07 ENCOUNTER — HOSPITAL ENCOUNTER (OUTPATIENT)
Dept: RADIOLOGY | Facility: HOSPITAL | Age: 75
Discharge: HOME | End: 2025-03-07
Payer: MEDICARE

## 2025-03-07 DIAGNOSIS — M54.12 CERVICAL RADICULOPATHY: ICD-10-CM

## 2025-03-07 PROCEDURE — 72141 MRI NECK SPINE W/O DYE: CPT

## 2025-03-11 ENCOUNTER — RESULTS FOLLOW-UP (OUTPATIENT)
Dept: PAIN MEDICINE | Facility: CLINIC | Age: 75
End: 2025-03-11

## 2025-03-11 NOTE — RESULT ENCOUNTER NOTE
S/w pt, advised of the same. Pt verbalized understanding, reports ongoing sharp pain in left side of neck. Pt states she is no longer able to drive as she cannot turn head. Pt agreeable to inj.

## 2025-03-13 ENCOUNTER — TELEPHONE (OUTPATIENT)
Dept: PAIN MEDICINE | Facility: CLINIC | Age: 75
End: 2025-03-13

## 2025-03-13 NOTE — TELEPHONE ENCOUNTER
This patient is being considered for a neuraxial injection for the management of their pain. However, the patient is currently on an anticoagulant per your orders. The American Society of Regional Anesthesia Guideline on neuraxial procedures and anti-coagulation indicates that medication should be held as follows: Aspirin 6 days prior to injection.   Please advise if you ok the hold of this medication.    Thank you,    Mahesh Oswald  Procedure   [facility]

## 2025-03-13 NOTE — TELEPHONE ENCOUNTER
----- Message from Radha CUMMINGS sent at 3/11/2025  2:23 PM EDT -----    ----- Message -----  From: STEFFANY Rashid  Sent: 3/11/2025   2:16 PM EDT  To: [facility]    Please call patient with MRI of cervical spine showing spondylitic degenerative changes resulting in mild multilevel foraminal narrowing.  Can you get an update on her symptoms?  Would likely recommend a cervical epidural steroid injection if still symptomatic

## 2025-03-19 NOTE — TELEPHONE ENCOUNTER
Patient calling back, she has not heard back about Aspirin hold. Patient is waiting on response to schedule her injection.     Please review and contact patient with recommendations so she can schedule her procedure.

## 2025-03-25 ENCOUNTER — TELEPHONE (OUTPATIENT)
Dept: PAIN MEDICINE | Facility: CLINIC | Age: 75
End: 2025-03-25

## 2025-03-25 NOTE — TELEPHONE ENCOUNTER
RN s/w pt regarding ASA hold prior to CHASITY on 4/16.  Pt aware last dose of ASA to be taken on 4/9 and then no more ASA starting on 4/10 up to and including 4/16 to restart 24 hours after procedure will be gone over at time of d/c.    Pt denied taking NSAIDS and aware may take tylenol and  or tramadol prior to the procedure.    Pt appreciative of call.

## 2025-03-25 NOTE — TELEPHONE ENCOUNTER
----- Message from Fabi SOARES sent at 3/25/2025 12:13 PM EDT -----  Regarding: NEREYDA pt-ASA hold  Patient has been scheduled with Dr. Salgado for CHASITY on 4/16/25.  Patient is currently on ASA, hold approval was obtained and can be found in the patient's chart.  Patient is aware that our clinical department will be contacting them with further instructions on hold dates.    Thank you,  Mahesh

## 2025-04-08 DIAGNOSIS — I50.22 CHRONIC SYSTOLIC CONGESTIVE HEART FAILURE (HCC): ICD-10-CM

## 2025-04-09 RX ORDER — SACUBITRIL AND VALSARTAN 97; 103 MG/1; MG/1
1 TABLET, FILM COATED ORAL 2 TIMES DAILY
Qty: 180 TABLET | Refills: 1 | Status: SHIPPED | OUTPATIENT
Start: 2025-04-09

## 2025-04-15 ENCOUNTER — TELEPHONE (OUTPATIENT)
Dept: DERMATOLOGY | Facility: CLINIC | Age: 75
End: 2025-04-15

## 2025-04-15 ENCOUNTER — TELEPHONE (OUTPATIENT)
Dept: PAIN MEDICINE | Facility: CLINIC | Age: 75
End: 2025-04-15

## 2025-04-15 NOTE — TELEPHONE ENCOUNTER
Called patient from referral workqueue. Spoke to pt , he said she was not available to talk and asked to take a message. I told him  that our office received a referral from her Dr's office to schedule her an appointment with  Dermatology, if she is still interested in making an appointment please give our office a call at [phone]. He said he will tell her. (Routine Referral)

## 2025-04-16 ENCOUNTER — HOSPITAL ENCOUNTER (OUTPATIENT)
Dept: RADIOLOGY | Facility: HOSPITAL | Age: 75
Discharge: HOME | End: 2025-04-16
Attending: PHYSICAL MEDICINE & REHABILITATION
Payer: MEDICARE

## 2025-04-16 VITALS
DIASTOLIC BLOOD PRESSURE: 70 MMHG | RESPIRATION RATE: 18 BRPM | TEMPERATURE: 97.8 F | OXYGEN SATURATION: 98 % | HEART RATE: 76 BPM | SYSTOLIC BLOOD PRESSURE: 164 MMHG

## 2025-04-16 DIAGNOSIS — M47.812 CERVICAL SPONDYLOSIS: ICD-10-CM

## 2025-04-16 DIAGNOSIS — M50.120 CERVICAL DISC DISORDER WITH RADICULOPATHY OF MID-CERVICAL REGION: Primary | ICD-10-CM

## 2025-04-16 PROCEDURE — 62321 NJX INTERLAMINAR CRV/THRC: CPT | Performed by: PHYSICAL MEDICINE & REHABILITATION

## 2025-04-16 RX ORDER — GABAPENTIN 100 MG/1
100 CAPSULE ORAL 2 TIMES DAILY
Qty: 180 CAPSULE | Refills: 0 | Status: ON HOLD | OUTPATIENT
Start: 2025-04-16

## 2025-04-16 RX ORDER — METHYLPREDNISOLONE ACETATE 80 MG/ML
80 INJECTION, SUSPENSION INTRA-ARTICULAR; INTRALESIONAL; INTRAMUSCULAR; PARENTERAL; SOFT TISSUE ONCE
Status: COMPLETED | OUTPATIENT
Start: 2025-04-16 | End: 2025-04-16

## 2025-04-16 RX ADMIN — IOHEXOL 1 ML: 300 INJECTION, SOLUTION INTRAVENOUS at 14:31

## 2025-04-16 RX ADMIN — METHYLPREDNISOLONE ACETATE 80 MG: 80 INJECTION, SUSPENSION INTRA-ARTICULAR; INTRALESIONAL; INTRAMUSCULAR; SOFT TISSUE at 14:31

## 2025-04-16 NOTE — DISCHARGE INSTR - LAB
Epidural Steroid Injection   WHAT YOU NEED TO KNOW:   An epidural steroid injection (ADDI) is a procedure to inject steroid medicine into the epidural space. The epidural space is between your spinal cord and vertebrae. Steroids reduce inflammation and fluid buildup in your spine that may be causing pain. You may be given pain medicine along with the steroids.          ACTIVITY  Do not drive or operate machinery today.  No strenuous activity today - bending, lifting, etc.  You may resume normal activites starting tomorrow - start slowly and as tolerated.  You may shower today, but no tub baths or hot tubs.  You may have numbness for several hours from the local anesthetic. Please use caution and common sense, especially with weight-bearing activities.    CARE OF THE INJECTION SITE  If you have soreness or pain, apply ice to the area today (20 minutes on/20 minutes off).  Starting tomorrow, you may use warm, moist heat or ice if needed.  You may have an increase or change in your discomfort for 36-48 hours after your treatment.  Apply ice and continue with any pain medication you have been prescribed.  Notify the [facility] if you have any of the following: redness, drainage, swelling, headache, stiff neck or fever above 100°F.    SPECIAL INSTRUCTIONS  Our office will contact you in approximately 14 days for a progress report.    MEDICATIONS  Continue to take all routine medications.  Our office may have instructed you to hold some medications.    As no general anesthesia was used in today's procedure, you should not experience any side effects related to anesthesia.     If you are diabetic, the steroids used in today's injection may temporarily increase your blood sugar levels after the first few days after your injection. Please keep a close eye on your sugars and alert the doctor who manages your diabetes if your sugars are significantly high from your baseline or you are symptomatic.     If you have a  problem specifically related to your procedure, please call our office at [phone].  Problems not related to your procedure should be directed to your primary care physician.
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
## 2025-04-24 ENCOUNTER — APPOINTMENT (INPATIENT)
Dept: CT IMAGING | Facility: HOSPITAL | Age: 75
DRG: 481 | End: 2025-04-24
Payer: MEDICARE

## 2025-04-24 ENCOUNTER — APPOINTMENT (EMERGENCY)
Dept: CT IMAGING | Facility: HOSPITAL | Age: 75
DRG: 481 | End: 2025-04-24
Payer: MEDICARE

## 2025-04-24 ENCOUNTER — ANESTHESIA (INPATIENT)
Dept: PERIOP | Facility: HOSPITAL | Age: 75
DRG: 481 | End: 2025-04-24
Payer: MEDICARE

## 2025-04-24 ENCOUNTER — HOSPITAL ENCOUNTER (INPATIENT)
Facility: HOSPITAL | Age: 75
LOS: 4 days | DRG: 481 | End: 2025-04-28
Attending: EMERGENCY MEDICINE | Admitting: SURGERY
Payer: MEDICARE

## 2025-04-24 ENCOUNTER — APPOINTMENT (EMERGENCY)
Dept: RADIOLOGY | Facility: HOSPITAL | Age: 75
DRG: 481 | End: 2025-04-24
Payer: MEDICARE

## 2025-04-24 ENCOUNTER — ANESTHESIA EVENT (INPATIENT)
Dept: PERIOP | Facility: HOSPITAL | Age: 75
DRG: 481 | End: 2025-04-24
Payer: MEDICARE

## 2025-04-24 ENCOUNTER — APPOINTMENT (INPATIENT)
Dept: RADIOLOGY | Facility: HOSPITAL | Age: 75
DRG: 481 | End: 2025-04-24
Payer: MEDICARE

## 2025-04-24 DIAGNOSIS — S72.002A CLOSED FRACTURE OF LEFT HIP, INITIAL ENCOUNTER (HCC): Primary | ICD-10-CM
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
## 2025-04-24 DIAGNOSIS — M50.120 CERVICAL DISC DISORDER WITH RADICULOPATHY OF MID-CERVICAL REGION: ICD-10-CM

## 2025-04-24 DIAGNOSIS — I10 HYPERTENSION, UNSPECIFIED TYPE: ICD-10-CM

## 2025-04-24 PROBLEM — R33.9 URINARY RETENTION: Status: ACTIVE | Noted: 2025-04-24

## 2025-04-24 PROBLEM — R54 FRAILTY: Status: ACTIVE | Noted: 2025-04-24

## 2025-04-24 PROBLEM — R26.2 AMBULATORY DYSFUNCTION: Status: ACTIVE | Noted: 2025-04-24

## 2025-04-24 PROBLEM — Z91.89 AT RISK FOR DELIRIUM: Status: ACTIVE | Noted: 2025-04-24

## 2025-04-24 LAB
ABO GROUP BLD: NORMAL
ABO GROUP BLD: NORMAL
ANION GAP SERPL CALCULATED.3IONS-SCNC: 7 MMOL/L (ref 4–13)
ANION GAP SERPL CALCULATED.3IONS-SCNC: 9 MMOL/L (ref 4–13)
APTT PPP: 19 SECONDS (ref 23–34)
ATRIAL RATE: 326 BPM
BASOPHILS # BLD AUTO: 0.06 THOUSANDS/ÂΜL (ref 0–0.1)
BASOPHILS NFR BLD AUTO: 1 % (ref 0–1)
BLD GP AB SCN SERPL QL: NEGATIVE
BUN SERPL-MCNC: 35 MG/DL (ref 5–25)
BUN SERPL-MCNC: 36 MG/DL (ref 5–25)
CALCIUM SERPL-MCNC: 9.4 MG/DL (ref 8.4–10.2)
CALCIUM SERPL-MCNC: 9.7 MG/DL (ref 8.4–10.2)
CARDIAC TROPONIN I PNL SERPL HS: 13 NG/L (ref 8–18)
CHLORIDE SERPL-SCNC: 101 MMOL/L (ref 96–108)
CHLORIDE SERPL-SCNC: 102 MMOL/L (ref 96–108)
CO2 SERPL-SCNC: 28 MMOL/L (ref 21–32)
CO2 SERPL-SCNC: 28 MMOL/L (ref 21–32)
CREAT SERPL-MCNC: 0.63 MG/DL (ref 0.6–1.3)
CREAT SERPL-MCNC: 0.65 MG/DL (ref 0.6–1.3)
EOSINOPHIL # BLD AUTO: 0.25 THOUSAND/ÂΜL (ref 0–0.61)
EOSINOPHIL NFR BLD AUTO: 2 % (ref 0–6)
ERYTHROCYTE [DISTWIDTH] IN BLOOD BY AUTOMATED COUNT: 13.5 % (ref 11.6–15.1)
ERYTHROCYTE [DISTWIDTH] IN BLOOD BY AUTOMATED COUNT: 13.6 % (ref 11.6–15.1)
GFR SERPL CREATININE-BSD FRML MDRD: 87 ML/MIN/1.73SQ M
GFR SERPL CREATININE-BSD FRML MDRD: 88 ML/MIN/1.73SQ M
GLUCOSE SERPL-MCNC: 185 MG/DL (ref 65–140)
GLUCOSE SERPL-MCNC: 190 MG/DL (ref 65–140)
GLUCOSE SERPL-MCNC: 209 MG/DL (ref 65–140)
GLUCOSE SERPL-MCNC: 241 MG/DL (ref 65–140)
HCT VFR BLD AUTO: 35.1 % (ref 34.8–46.1)
HCT VFR BLD AUTO: 37.3 % (ref 34.8–46.1)
HGB BLD-MCNC: 11.3 G/DL (ref 11.5–15.4)
HGB BLD-MCNC: 12.1 G/DL (ref 11.5–15.4)
IMM GRANULOCYTES # BLD AUTO: 0.07 THOUSAND/UL (ref 0–0.2)
IMM GRANULOCYTES NFR BLD AUTO: 1 % (ref 0–2)
INR PPP: 0.86 (ref 0.85–1.19)
LYMPHOCYTES # BLD AUTO: 2.84 THOUSANDS/ÂΜL (ref 0.6–4.47)
LYMPHOCYTES NFR BLD AUTO: 26 % (ref 14–44)
MCH RBC QN AUTO: 29.4 PG (ref 26.8–34.3)
MCH RBC QN AUTO: 29.6 PG (ref 26.8–34.3)
MCHC RBC AUTO-ENTMCNC: 32.2 G/DL (ref 31.4–37.4)
MCHC RBC AUTO-ENTMCNC: 32.4 G/DL (ref 31.4–37.4)
MCV RBC AUTO: 91 FL (ref 82–98)
MCV RBC AUTO: 92 FL (ref 82–98)
MONOCYTES # BLD AUTO: 1.05 THOUSAND/ÂΜL (ref 0.17–1.22)
MONOCYTES NFR BLD AUTO: 10 % (ref 4–12)
NEUTROPHILS # BLD AUTO: 6.62 THOUSANDS/ÂΜL (ref 1.85–7.62)
NEUTS SEG NFR BLD AUTO: 60 % (ref 43–75)
NRBC BLD AUTO-RTO: 0 /100 WBCS
P AXIS: 55 DEGREES
PLATELET # BLD AUTO: 314 THOUSANDS/UL (ref 149–390)
PLATELET # BLD AUTO: 328 THOUSANDS/UL (ref 149–390)
PMV BLD AUTO: 10.2 FL (ref 8.9–12.7)
PMV BLD AUTO: 10.4 FL (ref 8.9–12.7)
POTASSIUM SERPL-SCNC: 4.4 MMOL/L (ref 3.5–5.3)
POTASSIUM SERPL-SCNC: 4.4 MMOL/L (ref 3.5–5.3)
PR INTERVAL: 128 MS
PROTHROMBIN TIME: 12.4 SECONDS (ref 12.3–15)
QRS AXIS: 15 DEGREES
QRSD INTERVAL: 88 MS
QT INTERVAL: 358 MS
QTC INTERVAL: 413 MS
RBC # BLD AUTO: 3.82 MILLION/UL (ref 3.81–5.12)
RBC # BLD AUTO: 4.12 MILLION/UL (ref 3.81–5.12)
RH BLD: POSITIVE
RH BLD: POSITIVE
SODIUM SERPL-SCNC: 137 MMOL/L (ref 135–147)
SODIUM SERPL-SCNC: 138 MMOL/L (ref 135–147)
SPECIMEN EXPIRATION DATE: NORMAL
T WAVE AXIS: 96 DEGREES
VENTRICULAR RATE: 80 BPM
WBC # BLD AUTO: 10.89 THOUSAND/UL (ref 4.31–10.16)
WBC # BLD AUTO: 18 THOUSAND/UL (ref 4.31–10.16)

## 2025-04-24 PROCEDURE — 86900 BLOOD TYPING SEROLOGIC ABO: CPT | Performed by: PHYSICIAN ASSISTANT

## 2025-04-24 PROCEDURE — 80048 BASIC METABOLIC PNL TOTAL CA: CPT | Performed by: SURGERY

## 2025-04-24 PROCEDURE — 80048 BASIC METABOLIC PNL TOTAL CA: CPT | Performed by: EMERGENCY MEDICINE

## 2025-04-24 PROCEDURE — C1713 ANCHOR/SCREW BN/BN,TIS/BN: HCPCS | Performed by: STUDENT IN AN ORGANIZED HEALTH CARE EDUCATION/TRAINING PROGRAM

## 2025-04-24 PROCEDURE — 90715 TDAP VACCINE 7 YRS/> IM: CPT

## 2025-04-24 PROCEDURE — NC001 PR NO CHARGE: Performed by: SURGERY

## 2025-04-24 PROCEDURE — 36415 COLL VENOUS BLD VENIPUNCTURE: CPT | Performed by: EMERGENCY MEDICINE

## 2025-04-24 PROCEDURE — 99223 1ST HOSP IP/OBS HIGH 75: CPT | Performed by: STUDENT IN AN ORGANIZED HEALTH CARE EDUCATION/TRAINING PROGRAM

## 2025-04-24 PROCEDURE — 72192 CT PELVIS W/O DYE: CPT

## 2025-04-24 PROCEDURE — 72170 X-RAY EXAM OF PELVIS: CPT

## 2025-04-24 PROCEDURE — 72125 CT NECK SPINE W/O DYE: CPT

## 2025-04-24 PROCEDURE — 93005 ELECTROCARDIOGRAM TRACING: CPT

## 2025-04-24 PROCEDURE — 93010 ELECTROCARDIOGRAM REPORT: CPT | Performed by: INTERNAL MEDICINE

## 2025-04-24 PROCEDURE — 71045 X-RAY EXAM CHEST 1 VIEW: CPT

## 2025-04-24 PROCEDURE — C1769 GUIDE WIRE: HCPCS | Performed by: STUDENT IN AN ORGANIZED HEALTH CARE EDUCATION/TRAINING PROGRAM

## 2025-04-24 PROCEDURE — 99285 EMERGENCY DEPT VISIT HI MDM: CPT | Performed by: EMERGENCY MEDICINE

## 2025-04-24 PROCEDURE — 86850 RBC ANTIBODY SCREEN: CPT | Performed by: PHYSICIAN ASSISTANT

## 2025-04-24 PROCEDURE — 99284 EMERGENCY DEPT VISIT MOD MDM: CPT

## 2025-04-24 PROCEDURE — 85730 THROMBOPLASTIN TIME PARTIAL: CPT | Performed by: EMERGENCY MEDICINE

## 2025-04-24 PROCEDURE — 73552 X-RAY EXAM OF FEMUR 2/>: CPT

## 2025-04-24 PROCEDURE — 96375 TX/PRO/DX INJ NEW DRUG ADDON: CPT

## 2025-04-24 PROCEDURE — 70450 CT HEAD/BRAIN W/O DYE: CPT

## 2025-04-24 PROCEDURE — 99223 1ST HOSP IP/OBS HIGH 75: CPT | Performed by: NURSE PRACTITIONER

## 2025-04-24 PROCEDURE — 85027 COMPLETE CBC AUTOMATED: CPT | Performed by: SURGERY

## 2025-04-24 PROCEDURE — 27245 TREAT THIGH FRACTURE: CPT | Performed by: STUDENT IN AN ORGANIZED HEALTH CARE EDUCATION/TRAINING PROGRAM

## 2025-04-24 PROCEDURE — 82948 REAGENT STRIP/BLOOD GLUCOSE: CPT

## 2025-04-24 PROCEDURE — 96365 THER/PROPH/DIAG IV INF INIT: CPT

## 2025-04-24 PROCEDURE — 85025 COMPLETE CBC W/AUTO DIFF WBC: CPT | Performed by: EMERGENCY MEDICINE

## 2025-04-24 PROCEDURE — 90471 IMMUNIZATION ADMIN: CPT

## 2025-04-24 PROCEDURE — 86901 BLOOD TYPING SEROLOGIC RH(D): CPT | Performed by: PHYSICIAN ASSISTANT

## 2025-04-24 PROCEDURE — 85610 PROTHROMBIN TIME: CPT | Performed by: EMERGENCY MEDICINE

## 2025-04-24 PROCEDURE — 84484 ASSAY OF TROPONIN QUANT: CPT

## 2025-04-24 PROCEDURE — 0QS706Z REPOSITION LEFT UPPER FEMUR WITH INTRAMEDULLARY INTERNAL FIXATION DEVICE, OPEN APPROACH: ICD-10-PCS | Performed by: STUDENT IN AN ORGANIZED HEALTH CARE EDUCATION/TRAINING PROGRAM

## 2025-04-24 PROCEDURE — 99223 1ST HOSP IP/OBS HIGH 75: CPT | Performed by: SURGERY

## 2025-04-24 DEVICE — TFNA FENESTRATED HELICAL BLADE 95MM - STERILE
Type: IMPLANTABLE DEVICE | Site: LEG | Status: FUNCTIONAL
Brand: TFN-ADVANCE

## 2025-04-24 DEVICE — TRAUMACEM(TM) V+ INJECTABLE BONE CEMENT - STERILE
Type: IMPLANTABLE DEVICE | Site: LEG | Status: FUNCTIONAL
Brand: TRAUMACEM

## 2025-04-24 DEVICE — LOCKING SCREW FOR IM NAIL Ø 5MM/ 42MM/ XL25/ STERILE: Type: IMPLANTABLE DEVICE | Site: LEG | Status: FUNCTIONAL

## 2025-04-24 DEVICE — IMPLANTABLE DEVICE
Type: IMPLANTABLE DEVICE | Site: LEG | Status: FUNCTIONAL
Brand: TRAUMACEM(TM) V+INJECTION CANNULA F.TFNA

## 2025-04-24 DEVICE — TRAUMACEM(TM) V+ SYRINGE KIT - STERILE
Type: IMPLANTABLE DEVICE | Site: LEG | Status: FUNCTIONAL
Brand: TRAUMACEM

## 2025-04-24 DEVICE — 2.5MM REAMING ROD WITH BALL TIP/950MM-STERILE: Type: IMPLANTABLE DEVICE | Site: LEG | Status: FUNCTIONAL

## 2025-04-24 DEVICE — LOCKING SCREW FOR IM NAIL Ø 5MM/ 44MM/ XL25/ STERILE: Type: IMPLANTABLE DEVICE | Site: LEG | Status: FUNCTIONAL

## 2025-04-24 DEVICE — 10MM/130 DEG TI CANN TFNA 380MM/LEFT - STERILE
Type: IMPLANTABLE DEVICE | Site: LEG | Status: FUNCTIONAL
Brand: TFN-ADVANCE

## 2025-04-24 RX ORDER — ACETAMINOPHEN 10 MG/ML
1000 INJECTION, SOLUTION INTRAVENOUS ONCE
Status: COMPLETED | OUTPATIENT
Start: 2025-04-24 | End: 2025-04-24

## 2025-04-24 RX ORDER — ENOXAPARIN SODIUM 100 MG/ML
30 INJECTION SUBCUTANEOUS EVERY 12 HOURS
Status: DISCONTINUED | OUTPATIENT
Start: 2025-04-24 | End: 2025-04-24

## 2025-04-24 RX ORDER — FAMOTIDINE 20 MG/1
20 TABLET, FILM COATED ORAL DAILY
Status: DISCONTINUED | OUTPATIENT
Start: 2025-04-24 | End: 2025-04-28 | Stop reason: HOSPADM

## 2025-04-24 RX ORDER — TRANEXAMIC ACID 10 MG/ML
INJECTION, SOLUTION INTRAVENOUS AS NEEDED
Status: DISCONTINUED | OUTPATIENT
Start: 2025-04-24 | End: 2025-04-24

## 2025-04-24 RX ORDER — PROMETHAZINE HYDROCHLORIDE 25 MG/ML
12.5 INJECTION, SOLUTION INTRAMUSCULAR; INTRAVENOUS ONCE AS NEEDED
Status: DISCONTINUED | OUTPATIENT
Start: 2025-04-24 | End: 2025-04-24 | Stop reason: HOSPADM

## 2025-04-24 RX ORDER — SODIUM CHLORIDE, SODIUM LACTATE, POTASSIUM CHLORIDE, CALCIUM CHLORIDE 600; 310; 30; 20 MG/100ML; MG/100ML; MG/100ML; MG/100ML
125 INJECTION, SOLUTION INTRAVENOUS CONTINUOUS
Status: DISCONTINUED | OUTPATIENT
Start: 2025-04-24 | End: 2025-04-25

## 2025-04-24 RX ORDER — ONDANSETRON 2 MG/ML
4 INJECTION INTRAMUSCULAR; INTRAVENOUS ONCE AS NEEDED
Status: DISCONTINUED | OUTPATIENT
Start: 2025-04-24 | End: 2025-04-24 | Stop reason: HOSPADM

## 2025-04-24 RX ORDER — ROCURONIUM BROMIDE 10 MG/ML
INJECTION, SOLUTION INTRAVENOUS AS NEEDED
Status: DISCONTINUED | OUTPATIENT
Start: 2025-04-24 | End: 2025-04-24

## 2025-04-24 RX ORDER — HYDROMORPHONE HCL/PF 1 MG/ML
0.5 SYRINGE (ML) INJECTION ONCE
Refills: 0 | Status: COMPLETED | OUTPATIENT
Start: 2025-04-24 | End: 2025-04-24

## 2025-04-24 RX ORDER — SODIUM CHLORIDE, SODIUM LACTATE, POTASSIUM CHLORIDE, CALCIUM CHLORIDE 600; 310; 30; 20 MG/100ML; MG/100ML; MG/100ML; MG/100ML
INJECTION, SOLUTION INTRAVENOUS CONTINUOUS PRN
Status: DISCONTINUED | OUTPATIENT
Start: 2025-04-24 | End: 2025-04-24

## 2025-04-24 RX ORDER — ACETAMINOPHEN 325 MG/1
975 TABLET ORAL EVERY 8 HOURS SCHEDULED
Status: DISCONTINUED | OUTPATIENT
Start: 2025-04-24 | End: 2025-04-24

## 2025-04-24 RX ORDER — HYDROMORPHONE HCL IN WATER/PF 6 MG/30 ML
0.2 PATIENT CONTROLLED ANALGESIA SYRINGE INTRAVENOUS EVERY 2 HOUR PRN
Refills: 0 | Status: DISCONTINUED | OUTPATIENT
Start: 2025-04-24 | End: 2025-04-28

## 2025-04-24 RX ORDER — GABAPENTIN 100 MG/1
100 CAPSULE ORAL 2 TIMES DAILY
Status: DISCONTINUED | OUTPATIENT
Start: 2025-04-24 | End: 2025-04-28 | Stop reason: HOSPADM

## 2025-04-24 RX ORDER — POLYETHYLENE GLYCOL 3350 17 G/17G
17 POWDER, FOR SOLUTION ORAL DAILY
Status: DISCONTINUED | OUTPATIENT
Start: 2025-04-24 | End: 2025-04-28 | Stop reason: HOSPADM

## 2025-04-24 RX ORDER — LIDOCAINE HYDROCHLORIDE 10 MG/ML
INJECTION, SOLUTION EPIDURAL; INFILTRATION; INTRACAUDAL; PERINEURAL AS NEEDED
Status: DISCONTINUED | OUTPATIENT
Start: 2025-04-24 | End: 2025-04-24

## 2025-04-24 RX ORDER — HYDROMORPHONE HCL/PF 1 MG/ML
SYRINGE (ML) INJECTION AS NEEDED
Status: DISCONTINUED | OUTPATIENT
Start: 2025-04-24 | End: 2025-04-24

## 2025-04-24 RX ORDER — FENTANYL CITRATE 50 UG/ML
INJECTION, SOLUTION INTRAMUSCULAR; INTRAVENOUS AS NEEDED
Status: DISCONTINUED | OUTPATIENT
Start: 2025-04-24 | End: 2025-04-24

## 2025-04-24 RX ORDER — LABETALOL HYDROCHLORIDE 5 MG/ML
10 INJECTION, SOLUTION INTRAVENOUS
Status: DISCONTINUED | OUTPATIENT
Start: 2025-04-24 | End: 2025-04-24 | Stop reason: HOSPADM

## 2025-04-24 RX ORDER — ENOXAPARIN SODIUM 100 MG/ML
30 INJECTION SUBCUTANEOUS EVERY 12 HOURS
Status: DISCONTINUED | OUTPATIENT
Start: 2025-04-24 | End: 2025-04-28 | Stop reason: HOSPADM

## 2025-04-24 RX ORDER — MAGNESIUM HYDROXIDE/ALUMINUM HYDROXICE/SIMETHICONE 120; 1200; 1200 MG/30ML; MG/30ML; MG/30ML
30 SUSPENSION ORAL ONCE
Status: DISCONTINUED | OUTPATIENT
Start: 2025-04-24 | End: 2025-04-24

## 2025-04-24 RX ORDER — ALBUTEROL SULFATE 0.83 MG/ML
2.5 SOLUTION RESPIRATORY (INHALATION) ONCE AS NEEDED
Status: DISCONTINUED | OUTPATIENT
Start: 2025-04-24 | End: 2025-04-24 | Stop reason: HOSPADM

## 2025-04-24 RX ORDER — METOCLOPRAMIDE HYDROCHLORIDE 5 MG/ML
10 INJECTION INTRAMUSCULAR; INTRAVENOUS ONCE AS NEEDED
Status: DISCONTINUED | OUTPATIENT
Start: 2025-04-24 | End: 2025-04-24 | Stop reason: HOSPADM

## 2025-04-24 RX ORDER — CEFAZOLIN SODIUM 2 G/50ML
2000 SOLUTION INTRAVENOUS EVERY 8 HOURS
Status: COMPLETED | OUTPATIENT
Start: 2025-04-25 | End: 2025-04-25

## 2025-04-24 RX ORDER — FENTANYL CITRATE/PF 50 MCG/ML
25 SYRINGE (ML) INJECTION
Status: DISCONTINUED | OUTPATIENT
Start: 2025-04-24 | End: 2025-04-24 | Stop reason: HOSPADM

## 2025-04-24 RX ORDER — MAGNESIUM HYDROXIDE/ALUMINUM HYDROXICE/SIMETHICONE 120; 1200; 1200 MG/30ML; MG/30ML; MG/30ML
30 SUSPENSION ORAL EVERY 4 HOURS PRN
Status: DISCONTINUED | OUTPATIENT
Start: 2025-04-24 | End: 2025-04-28 | Stop reason: HOSPADM

## 2025-04-24 RX ORDER — MAGNESIUM HYDROXIDE 1200 MG/15ML
LIQUID ORAL AS NEEDED
Status: DISCONTINUED | OUTPATIENT
Start: 2025-04-24 | End: 2025-04-24 | Stop reason: HOSPADM

## 2025-04-24 RX ORDER — HYDROMORPHONE HCL/PF 1 MG/ML
0.5 SYRINGE (ML) INJECTION
Status: DISCONTINUED | OUTPATIENT
Start: 2025-04-24 | End: 2025-04-24 | Stop reason: HOSPADM

## 2025-04-24 RX ORDER — SPIRONOLACTONE 25 MG/1
25 TABLET ORAL DAILY
Status: DISCONTINUED | OUTPATIENT
Start: 2025-04-24 | End: 2025-04-28 | Stop reason: HOSPADM

## 2025-04-24 RX ORDER — HYDRALAZINE HYDROCHLORIDE 20 MG/ML
5 INJECTION INTRAMUSCULAR; INTRAVENOUS
Status: DISCONTINUED | OUTPATIENT
Start: 2025-04-24 | End: 2025-04-24 | Stop reason: HOSPADM

## 2025-04-24 RX ORDER — ONDANSETRON 2 MG/ML
INJECTION INTRAMUSCULAR; INTRAVENOUS AS NEEDED
Status: DISCONTINUED | OUTPATIENT
Start: 2025-04-24 | End: 2025-04-24

## 2025-04-24 RX ORDER — ONDANSETRON 2 MG/ML
4 INJECTION INTRAMUSCULAR; INTRAVENOUS EVERY 6 HOURS PRN
Status: DISCONTINUED | OUTPATIENT
Start: 2025-04-24 | End: 2025-04-28 | Stop reason: HOSPADM

## 2025-04-24 RX ORDER — FENTANYL CITRATE 50 UG/ML
50 INJECTION, SOLUTION INTRAMUSCULAR; INTRAVENOUS ONCE
Refills: 0 | Status: DISCONTINUED | OUTPATIENT
Start: 2025-04-24 | End: 2025-04-24

## 2025-04-24 RX ORDER — HYDROMORPHONE HCL IN WATER/PF 6 MG/30 ML
0.2 PATIENT CONTROLLED ANALGESIA SYRINGE INTRAVENOUS
Status: DISCONTINUED | OUTPATIENT
Start: 2025-04-24 | End: 2025-04-24 | Stop reason: HOSPADM

## 2025-04-24 RX ORDER — FENTANYL CITRATE 50 UG/ML
50 INJECTION, SOLUTION INTRAMUSCULAR; INTRAVENOUS ONCE
Refills: 0 | Status: COMPLETED | OUTPATIENT
Start: 2025-04-24 | End: 2025-04-24

## 2025-04-24 RX ORDER — HYDROMORPHONE HCL/PF 1 MG/ML
0.5 SYRINGE (ML) INJECTION ONCE
Status: DISCONTINUED | OUTPATIENT
Start: 2025-04-24 | End: 2025-04-24

## 2025-04-24 RX ORDER — CEFAZOLIN SODIUM 2 G/50ML
2000 SOLUTION INTRAVENOUS EVERY 8 HOURS
Status: DISCONTINUED | OUTPATIENT
Start: 2025-04-24 | End: 2025-04-24

## 2025-04-24 RX ORDER — PROPOFOL 10 MG/ML
INJECTION, EMULSION INTRAVENOUS AS NEEDED
Status: DISCONTINUED | OUTPATIENT
Start: 2025-04-24 | End: 2025-04-24

## 2025-04-24 RX ORDER — ACETAMINOPHEN 325 MG/1
975 TABLET ORAL EVERY 8 HOURS SCHEDULED
Status: DISCONTINUED | OUTPATIENT
Start: 2025-04-24 | End: 2025-04-28 | Stop reason: HOSPADM

## 2025-04-24 RX ORDER — CEFAZOLIN SODIUM 2 G/50ML
2000 SOLUTION INTRAVENOUS ONCE
Status: COMPLETED | OUTPATIENT
Start: 2025-04-24 | End: 2025-04-24

## 2025-04-24 RX ORDER — PHENYLEPHRINE HCL IN 0.9% NACL 1 MG/10 ML
SYRINGE (ML) INTRAVENOUS AS NEEDED
Status: DISCONTINUED | OUTPATIENT
Start: 2025-04-24 | End: 2025-04-24

## 2025-04-24 RX ORDER — OXYCODONE HYDROCHLORIDE 5 MG/1
5 TABLET ORAL EVERY 4 HOURS PRN
Refills: 0 | Status: DISCONTINUED | OUTPATIENT
Start: 2025-04-24 | End: 2025-04-28 | Stop reason: HOSPADM

## 2025-04-24 RX ORDER — FAMOTIDINE 20 MG/1
20 TABLET, FILM COATED ORAL ONCE
Status: DISCONTINUED | OUTPATIENT
Start: 2025-04-24 | End: 2025-04-24

## 2025-04-24 RX ORDER — PANTOPRAZOLE SODIUM 40 MG/1
40 TABLET, DELAYED RELEASE ORAL
Status: DISCONTINUED | OUTPATIENT
Start: 2025-04-24 | End: 2025-04-28 | Stop reason: HOSPADM

## 2025-04-24 RX ADMIN — HYDROMORPHONE HYDROCHLORIDE 0.2 MG: 0.2 INJECTION, SOLUTION INTRAMUSCULAR; INTRAVENOUS; SUBCUTANEOUS at 04:01

## 2025-04-24 RX ADMIN — OXYCODONE HYDROCHLORIDE 5 MG: 5 TABLET ORAL at 13:01

## 2025-04-24 RX ADMIN — TETANUS TOXOID, REDUCED DIPHTHERIA TOXOID AND ACELLULAR PERTUSSIS VACCINE, ADSORBED 0.5 ML: 5; 2.5; 8; 8; 2.5 SUSPENSION INTRAMUSCULAR at 01:06

## 2025-04-24 RX ADMIN — FENTANYL CITRATE 25 MCG: 50 INJECTION INTRAMUSCULAR; INTRAVENOUS at 19:23

## 2025-04-24 RX ADMIN — ACETAMINOPHEN 975 MG: 325 TABLET, FILM COATED ORAL at 13:01

## 2025-04-24 RX ADMIN — PROPOFOL 140 MG: 10 INJECTION, EMULSION INTRAVENOUS at 17:33

## 2025-04-24 RX ADMIN — HYDROMORPHONE HYDROCHLORIDE 0.5 MG: 1 INJECTION, SOLUTION INTRAMUSCULAR; INTRAVENOUS; SUBCUTANEOUS at 18:45

## 2025-04-24 RX ADMIN — HYDROMORPHONE HYDROCHLORIDE 0.5 MG: 1 INJECTION, SOLUTION INTRAMUSCULAR; INTRAVENOUS; SUBCUTANEOUS at 00:58

## 2025-04-24 RX ADMIN — FENTANYL CITRATE 100 MCG: 50 INJECTION INTRAMUSCULAR; INTRAVENOUS at 17:32

## 2025-04-24 RX ADMIN — FENTANYL CITRATE 50 MCG: 50 INJECTION INTRAMUSCULAR; INTRAVENOUS at 00:21

## 2025-04-24 RX ADMIN — ALUMINUM HYDROXIDE, MAGNESIUM HYDROXIDE, AND DIMETHICONE 30 ML: 200; 20; 200 SUSPENSION ORAL at 02:56

## 2025-04-24 RX ADMIN — ENOXAPARIN SODIUM 30 MG: 30 INJECTION SUBCUTANEOUS at 08:05

## 2025-04-24 RX ADMIN — TRANEXAMIC ACID 990 MG: 100 INJECTION, SOLUTION INTRAVENOUS at 01:49

## 2025-04-24 RX ADMIN — ACETAMINOPHEN 975 MG: 325 TABLET, FILM COATED ORAL at 22:06

## 2025-04-24 RX ADMIN — FENTANYL CITRATE 25 MCG: 50 INJECTION INTRAMUSCULAR; INTRAVENOUS at 19:28

## 2025-04-24 RX ADMIN — CEFAZOLIN SODIUM 2000 MG: 2 SOLUTION INTRAVENOUS at 17:29

## 2025-04-24 RX ADMIN — FAMOTIDINE 20 MG: 20 TABLET, FILM COATED ORAL at 08:06

## 2025-04-24 RX ADMIN — FENTANYL CITRATE 25 MCG: 50 INJECTION INTRAMUSCULAR; INTRAVENOUS at 19:36

## 2025-04-24 RX ADMIN — SODIUM CHLORIDE, SODIUM LACTATE, POTASSIUM CHLORIDE, AND CALCIUM CHLORIDE 125 ML/HR: .6; .31; .03; .02 INJECTION, SOLUTION INTRAVENOUS at 20:39

## 2025-04-24 RX ADMIN — PROPOFOL 50 MG: 10 INJECTION, EMULSION INTRAVENOUS at 18:45

## 2025-04-24 RX ADMIN — GABAPENTIN 100 MG: 100 CAPSULE ORAL at 08:07

## 2025-04-24 RX ADMIN — Medication 200 MCG: at 17:39

## 2025-04-24 RX ADMIN — HYDROMORPHONE HYDROCHLORIDE 0.2 MG: 0.2 INJECTION, SOLUTION INTRAMUSCULAR; INTRAVENOUS; SUBCUTANEOUS at 19:57

## 2025-04-24 RX ADMIN — SODIUM CHLORIDE, SODIUM LACTATE, POTASSIUM CHLORIDE, AND CALCIUM CHLORIDE: .6; .31; .03; .02 INJECTION, SOLUTION INTRAVENOUS at 17:29

## 2025-04-24 RX ADMIN — SUGAMMADEX 200 MG: 100 INJECTION, SOLUTION INTRAVENOUS at 18:45

## 2025-04-24 RX ADMIN — OXYCODONE HYDROCHLORIDE 5 MG: 5 TABLET ORAL at 22:09

## 2025-04-24 RX ADMIN — ONDANSETRON 4 MG: 2 INJECTION INTRAMUSCULAR; INTRAVENOUS at 17:44

## 2025-04-24 RX ADMIN — Medication 100 MCG: at 17:31

## 2025-04-24 RX ADMIN — PROPOFOL 50 MCG/KG/MIN: 10 INJECTION, EMULSION INTRAVENOUS at 17:40

## 2025-04-24 RX ADMIN — TRANEXAMIC ACID 1000 MG: 10 INJECTION, SOLUTION INTRAVENOUS at 17:44

## 2025-04-24 RX ADMIN — ACETAMINOPHEN 1000 MG: 1000 INJECTION, SOLUTION INTRAVENOUS at 00:23

## 2025-04-24 RX ADMIN — ENOXAPARIN SODIUM 30 MG: 30 INJECTION SUBCUTANEOUS at 22:07

## 2025-04-24 RX ADMIN — OXYCODONE HYDROCHLORIDE 5 MG: 5 TABLET ORAL at 01:55

## 2025-04-24 RX ADMIN — ROCURONIUM 50 MG: 50 INJECTION, SOLUTION INTRAVENOUS at 17:33

## 2025-04-24 RX ADMIN — FENTANYL CITRATE 25 MCG: 50 INJECTION INTRAMUSCULAR; INTRAVENOUS at 19:18

## 2025-04-24 RX ADMIN — OXYCODONE HYDROCHLORIDE 5 MG: 5 TABLET ORAL at 08:07

## 2025-04-24 RX ADMIN — ACETAMINOPHEN 975 MG: 325 TABLET, FILM COATED ORAL at 08:05

## 2025-04-24 RX ADMIN — LIDOCAINE HYDROCHLORIDE 100 MG: 10 INJECTION, SOLUTION EPIDURAL; INFILTRATION; INTRACAUDAL at 17:32

## 2025-04-24 RX ADMIN — PHENYLEPHRINE HYDROCHLORIDE 20 MCG/MIN: 50 INJECTION INTRAVENOUS at 17:41

## 2025-04-24 NOTE — OP NOTE
OPERATIVE REPORT  PATIENT NAME: Kamini Martines    :  1950  MRN: [id number]  Pt Location: [facility] OR ROOM 01    SURGERY DATE: 2025    Surgeon(s) and Role:     * Tamir Parry DO - Primary     * Reynaldo Garcia MD - Assisting   I was present for the entire procedure. and I was present for all critical portions of the procedure.    Preop Diagnosis:  #1 left peritrochanteric femur fracture    Post-Op Diagnosis:  #1 left peritrochanteric femur fracture    Procedures:  #1 open reduction internal fixation left peritrochanteric femur fracture with an intramedullary nail      Specimen(s):  None    Estimated Blood Loss:   50 cc    Drains:  None    Anesthesia Type:   General endotracheal    Operative Indications:  The patient is a 75-year-old female that normally walks without an assistive device that had a ground-level fall resulting in a left varus impacted proximal femoral fracture which was comminuted in nature.  To restore ambulatory status and decrease complications associate with bedridden status patient was consented for open reduction internal fixation with an intramedullary device      Implants:   Synthes 380 mm x 10 mm TFN a with a 95 mm helical blade and PMMA augmentation    Tourniquet time:   None      Complications:   No acute complications were encountered.  Patient was transferred to PACU in stable condition    Operative findings:  Patient had a left peritrochanteric femur fracture which comminuted nature.  It is able to be closed reduced on the fracture top table.  A cephalomedullary device was able to be implanted and it was augmented with PMMA to avoid cut through of the helical blade.    Procedure and Technique:  Patient is a 75-year-old female that was seen and examined in the preoperative holding area.  The operative extremity was marked.  All patient's questions were answered.  The patient was then taken back to the operating room where general endotracheal anesthesia was administered  by department anesthesia.  The patient was then transferred over the operating table using CTLS spine precautions.  All bony prominences were well-padded.  The patient timeout was performed to confirm correct side, correct patient, correct procedure.  All were in agreement procedure was started.  Using the fracture top table close reduction of the extremity was performed using the fracture top table.  The patient's lower extremity was then prepped and draped in a standard sterile orthopedic fashion.  A timeout was performed confirm correct site, correct patient, correct procedure.  All in agreement procedure was started.  Bony anatomical landmarks were marked out using fluoroscopic imaging.  A incision was then made proximal to the greater trochanter sharply with a #10 blade.  Blunt dissection was utilized using a curved Lowe scissor down to the trochanter.  A guidewire was then inserted into the proximal aspect of the femur confirming entry angle and entry point on AP and lateral x-rays.  An opening reamer was then used to gain access to the medullary canal.  A ball-tipped guidewire was then inserted center center down the length of the femur and confirmed to be in the appropriate position on AP and lateral imaging.  Sequential reaming was then performed from 8.5 mm to 11.5 mm.  A Synthes 10 mm x 380 mm TFN a nail was selected attached to the jig and inserted into the intramedullary canal.  Once it was impacted down to the appropriate seated location a second percutaneous incision was made in line with the path of the helical blade.  A guidewire was then placed confirming its position on AP and lateral x-rays.  Once appropriate tip to apex distance was confirmed a 95 mm helical blade was then inserted.  Using the external jig and releasing traction and compression was obtained at the fracture site.  The setscrew was then locked down and backed off half a turn.  AP and lateral  x-rays confirm position of the helical  blade with appropriate alignment.  Due to the patient's osteopenia and fracture pattern decision was made to perform cement augmentation of the nail.  Utilizing the external jig Omnipaque solution was injected through the helical blade to confirm no intra-articular penetration.  No intra-articular extravasation of the radiopaque solution was noted and therefore PMMA was then injected into the helical blade to augment the fixation in the femoral head with radiographs confirming no intra-articular extravasation.  Attention was then turned to placement of distal interlocking screws.  Utilizing percutaneous perfect screw insertion a interlocking screw was placed into the proximal aspect of the dynamic slot and through the proximal static hole.  The external jig was then removed and final reduction and implant placement was confirmed under multiplanar fluoroscopic imaging.  The wounds were then all copiously irrigated with sterile normal saline.  The deep tissues were repaired using a 0 PDS suture.  The subcutaneous tissues repaired with a 2-0 Monocryl suture.  The skin was closed with staples.  Wounds were then dressed in Mepilex dressings.  The patient was then transferred off the operating room table using CT LS spine precautions.  The patient was then extubated and transferred to PACU in stable condition.          Postoperative plan:  Patient be weightbearing as tolerated to the left lower extremity.  The patient received 24 hours postoperative antibiotics for infection prophylaxis.  The patient will be continued on Lovenox while in the hospital and be discharged on aspirin 81 mg twice daily for DVT prophylaxis.  Patient will need follow-up in 2 weeks for repeat x-ray evaluation and removal of her staples    SIGNATURE: Tamir Parry DO  DATE: April 24, 2025  TIME: 6:54 PM

## 2025-04-24 NOTE — ASSESSMENT & PLAN NOTE
Mechanical fall with head strike, takes daily ASA.    CT head and C-spine, no acute traumatic injuries.    CT pelvis-Comminuted intertrochanteric fracture of the left femur     -Fall precautions  -PT/OT
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
## 2025-04-24 NOTE — ASSESSMENT & PLAN NOTE
[place] fall precautions   Assess patient frequently for physical needs, encourage use of assistant devices as needed and directed by PT/OT  Identify cognitive and physical deficits and behaviors that affect risk of falls  Consider moving patient closer to nursing station to monitor more closely for impulsive behavior which may increase risk of falls  Educate patient/family on patient safety including physical limitations and importance of using call bell for assistance   Modify environment to reduce risk of injury including disconnecting from pole when not in use, ensuring adequate lighting in room and restroom, ensuring that path to restroom is clear and free of trip hazards  PT/OT consulted to assist with strengthening/mobility and assist with discharge planning to appropriate level of care

## 2025-04-24 NOTE — ASSESSMENT & PLAN NOTE
Left peritrochanteric femur fracture  Non weight bearing to the left lower extremity.   For operative fixation today  NPO for procedure  Consent obtained, in OR  Pre operative abx ordered  Intra operative txa ordered  Pain control per primary team  DVT ppx per primary team.   Medical management per primary team.   Rest of care per primary team.
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
## 2025-04-24 NOTE — ANESTHESIA PREPROCEDURE EVALUATION
Procedure:  INSERTION NAIL IM FEMUR ANTEGRADE (TROCHANTERIC) (Left: Leg Upper)    Relevant Problems   CARDIO   (+) Chronic systolic congestive heart failure (HCC)   (+) HTN (hypertension)   (+) History Abnormal heart rhythm      ENDO   (+) Type 2 diabetes mellitus without complication, without long-term current use of insulin (HCC)      GI/HEPATIC   (+) Esophageal dysphagia      MUSCULOSKELETAL   (+) Chronic right-sided low back pain without sciatica   (+) Neurogenic claudication due to lumbar spinal stenosis      NEURO/PSYCH   (+) CVA (cerebral vascular accident) (HCC)   (+) Chronic pain syndrome   (+) Chronic right-sided low back pain without sciatica   (+) TIA (transient ischemic attack)      PULMONARY   (+) Mild intermittent asthma without complication   (+) LUCIA (obstructive sleep apnea)        Physical Exam    Airway    Mallampati score: III  TM Distance: >3 FB  Neck ROM: full     Dental   Comment: Edentuous     Cardiovascular  Cardiovascular exam normal    Pulmonary  Pulmonary exam normal     Other Findings  post-pubertal.    Left Ventricle: Left ventricular cavity size is normal. Wall thickness is mildly increased. There is mild concentric hypertrophy. The left ventricular ejection fraction is 40-45%.  GLS is -13.9% systolic function is mildly reduced. There is global hypokinesis with regional variation. Diastolic function is mildly abnormal, consistent with grade I (abnormal) relaxation.    Right Ventricle: Systolic function is normal. Wall thickness is increased.    Left Atrium: The atrium is mildly dilated.    Mitral Valve: There is trace regurgitation.    Tricuspid Valve: There is mild regurgitation.    Pericardium: There is a trivial pericardial effusion along the right ventricular free wall. There is no echocardiographic evidence of tamponade.    Prior TTE study available for comparison. Prior study date: 8/8/2023. No significant changes noted compared to the prior study.    Anesthesia Plan  ASA Score-  3     Anesthesia Type- general with ASA Monitors.         Additional Monitors:     Airway Plan: ETT.           Plan Factors-Exercise tolerance (METS): <4 METS.    Chart reviewed. EKG reviewed.  Existing labs reviewed. Patient summary reviewed.    Patient is not a current smoker.              Induction- intravenous.    Postoperative Plan- Plan for postoperative opioid use.         Informed Consent- Anesthetic plan and risks discussed with patient.  I personally reviewed this patient with the CRNA. Discussed and agreed on the Anesthesia Plan with the CRNA..      NPO Status:  Vitals Value Taken Time   Date of last liquid 04/24/25 04/24/25 1702   Time of last liquid 1400 04/24/25 1702   Date of last solid 04/23/25 04/24/25 1702   Time of last solid 1730 04/24/25 1702

## 2025-04-24 NOTE — ASSESSMENT & PLAN NOTE
Clinical Frail Scale:   Encourage adequate hydration and nutrition, including protein in meals  OOB as tolerated  PT/OT consulted  Encourage early and frequent mobilization   Contacted patient son, Levar, and relayed plan for hepatitis C treatment with [facility] for 12 weeks. Referral order placed and discussed this process with him. He verbalized understanding.

## 2025-04-24 NOTE — OCCUPATIONAL THERAPY NOTE
Occupational Therapy Cancellation Note        Patient Name: Kamini Martines  Today's Date: 4/24/2025 04/24/25 0925   Note Type   Note type Cancelled Session   Cancel Reasons Patient to operating room  (tentatively)   Additional Comments OT orders received, chart reviewed. Pt with L femur fracture, pending formal orthopedic consult but tentative plan for OR tomorrow- will cancel and f/u post op. Appreciate updated WBS and activity orders     Lizett Geiger, OT

## 2025-04-24 NOTE — ASSESSMENT & PLAN NOTE
"Lab Results   Component Value Date    HGBA1C 8.4 (H) 01/27/2025       No results for input(s): \"POCGLU\" in the last 72 hours.    Blood Sugar Average: Last 72 hrs:  Takes metformin 500 mg daily at home  Would recommend checking A1c and monitoring of blood sugars  Encourage dietary lifestyle medications  Management per primary  "

## 2025-04-24 NOTE — CASE MANAGEMENT
Case Management Assessment & Discharge Planning Note    Patient name Kamini Martines  Location OR POOL/OR POOL MRN [id number]  : 1950 Date 2025       Current Admission Date: 2025  Current Admission Diagnosis:Fall   Patient Active Problem List    Diagnosis Date Noted Date Diagnosed    Fall 2025     Closed left hip fracture, initial encounter ([facility]) 2025     Urinary retention 2025     Frailty 2025     At risk for delirium 2025     Ambulatory dysfunction 2025     Cervical spondylosis 2025     Opioid dependence, uncomplicated ([facility]) 2025     Esophageal dysphagia 2024     Functional diarrhea 2024     Trochanteric bursitis of left hip 2024     Lumbar spondylosis 10/06/2022     Type 2 diabetes mellitus without complication, without long-term current use of insulin ([facility]) 2020     Class 2 obesity in adult 2020     Chronic pain syndrome 2020     Intervertebral disc disorder with radiculopathy of lumbar region 2020     Lumbar post-laminectomy syndrome 2020     Neurogenic claudication due to lumbar spinal stenosis 2020     Postlaminectomy syndrome, lumbar region 2020     Chronic right-sided low back pain without sciatica 2019     TIA (transient ischemic attack) 2019     Chronic systolic congestive heart failure ([facility]) 2019     Vitamin D deficiency 08/15/2019     Lumbar herniated disc 08/15/2019     Dilated cardiomyopathy ([facility]) 2019     LCUIA (obstructive sleep apnea)      Morbid (severe) obesity with alveolar hypoventilation ([facility]) 2019     Mild intermittent asthma without complication 2019     HTN (hypertension) 2019     History Abnormal heart rhythm 2019     Adrenal adenoma 2019     Pericardial effusion 2019     CVA (cerebral vascular accident) ([facility]) 2019       LOS (days): 0  Geometric Mean LOS (GMLOS) (days): 2.8  Days to GMLOS:2.1      OBJECTIVE:    Risk of Unplanned Readmission Score: 18.96         Current admission status: Inpatient       Preferred Pharmacy:   [id number] - [place], [place] - [address]  [address] [address]  Phone: [phone] Fax: [phone]    [facility] - [place], [place] - [address]  [address]  [address]  [facility] [address]  Phone: [phone] Fax: [phone]    Primary Care Provider: Brionna Chatman MD    Primary Insurance: MEDICARE  Secondary Insurance: BLUE CROSS    ASSESSMENT:  Active Health Care Proxies       Ferdinand Martines Health Care Representative - Spouse   Primary Phone: [phone] (Mobile)  Home Phone: [phone]  Work Phone: [phone]            Readmission Root Cause  30 Day Readmission: No    Patient Information  Admitted from:: Home  Mental Status: Alert  During Assessment patient was accompanied by: Spouse  Assessment information provided by:: Patient, Spouse  Primary Caregiver: Self  Support Systems: Spouse/significant other  County of Residence: [place]  [place] [facility] do you live in?: [place]  Home entry access options. Select all that apply.: Stairs  Number of steps to enter home.: 3  Type of Current Residence: 2 story home  Upon entering residence, is there a bedroom on the main floor (no further steps)?: No  A bedroom is located on the following floor levels of residence (select all that apply):: 2nd Floor  Upon entering residence, is there a bathroom on the main floor (no further steps)?: Yes  Number of steps to 2nd floor from main floor: One Flight  Living Arrangements: Lives w/ Spouse/significant other    Activities of Daily Living Prior to Admission  Functional Status: Independent  Completes ADLs independently?: Yes  Ambulates independently?: Yes  Does patient use assisted devices?: No  Does patient currently own DME?: Yes  What DME does the patient currently own?: [employer]  Does patient have a history of Outpatient  Therapy (PT/OT)?: No  Does the patient have a history of Short-Term Rehab?: Yes  Does patient have a history of HHC?: No  Does patient currently have HHC?: No    Patient Information Continued  Income Source: Pension/half-way  Does patient have prescription coverage?: Yes  Can the patient afford their medications and any related supplies (such as glucometers or test strips)?: Yes  Does patient receive dialysis treatments?: No  Does patient have a history of substance abuse?: No    Means of Transportation  [facility] Self    DISCHARGE DETAILS:    Discharge planning discussed with:: patient and  Ferdinand- at bedside  [place] of Choice: Yes    CM contacted family/caregiver?: Yes  Were Treatment Team discharge recommendations reviewed with patient/caregiver?: Yes  Did patient/caregiver verbalize understanding of patient care needs?: Yes  Were patient/caregiver advised of the risks associated with not following Treatment Team discharge recommendations?: Yes    Contacts  Patient Contacts: Ferdinand  Relationship to Patient:: Family ()  Contact Method: In Person  Reason/Outcome: Continuity of Care, Discharge Planning, Emergency Contact    Other Referral/Resources/Interventions Provided:  Interventions: Other (Specify) (TBD)  Referral Comments: Patient [facility]/p fall. CM met with patient and  Ferdinand at bedside to complete assessment/ discuss dcp. Patient lives with  in a 2 story home; has JONNY, bathroom on first floor, bedroom on second. Patient  is fully independent at baseline- no DME. Patient has a history of STR (many years ago). Patient and  aware therapy will be in post-op to evaluate and CM will follow up with recs, as able.    Would you like to participate in our [facility] service program?  : No - Declined    Treatment Team Recommendation: Other (TBD)  Discharge Destination Plan:: Other (TBD)  Transport at Discharge : Other (Comment)  (TBD)

## 2025-04-24 NOTE — PHYSICAL THERAPY NOTE
Physical Therapy Cancellation Note     04/24/25 0647   Note Type   Note type Cancelled Session   Cancel Reasons Patient to operating room   Additional Comments referral received for PT eval and tx. pt is pending Orthopedics consult regarding left femur fx. if surgery is required, postoperative reorders will be needed including weight bearing status and activity orders.     Forest Betancourt, PT

## 2025-04-24 NOTE — ASSESSMENT & PLAN NOTE
At a baseline ambulates without AD  PT/OT following  Fall precautions  Out of bed as tolerated  Encourage early and frequent mobilization  Encourage adequate hydration and nutrition  Provide adequate pain management   Goal is OR today   Continue with PT/OT for continued strength and balance training

## 2025-04-24 NOTE — PROGRESS NOTES
Progress Note - Trauma   Name: Kamini Martines 75 y.o. female I MRN: [id number]  Unit/Bed#: S -01 I Date of Admission: 4/24/2025   Date of Service: 4/24/2025 I Hospital Day: 0     Assessment & Plan  Closed left hip fracture, initial encounter ([facility])  CT pelvis-Comminuted intertrochanteric fracture of the left femur     - Ortho consulted  - TXA 10 mg/kg given  - Multimodal pain management  - Nonweightbearing left lower extremity  - PT/OT  Fall  Mechanical fall with head strike, takes daily ASA.    CT head and C-spine, no acute traumatic injuries.    CT pelvis-Comminuted intertrochanteric fracture of the left femur     -Fall precautions  -PT/OT  At risk for delirium    Ambulatory dysfunction      Seen and examined. EMR reviewed.    Fall/ambulatory dysfunction - PT/OT  Left femoral neck fracture - ortho plans surgery today  At risk for delirium - maintain sleep wake cycle    Leukocytosis without fever - suspect reactive, check postop. UA neg. Pulmonary hygiene  Chemical dvt ppx  Home meds as appropriate.  Multimodal pain control     Total pt care time: 20 min

## 2025-04-24 NOTE — ASSESSMENT & PLAN NOTE
Follows outpatient with [facility]  With history of cervical radiculopathy, cervical spondylosis, intervertebral disc disorder with radiculopathy lumbar spine, lumbar spondylosis  Patient has been taking tramadol for pain management

## 2025-04-24 NOTE — H&P
H&P - Trauma   Name: Kamini Martines 75 y.o. female I MRN: [id number]  Unit/Bed#: S -01 I Date of Admission: 4/24/2025   Date of Service: 4/24/2025 I Hospital Day: 0     Assessment & Plan  Closed left hip fracture, initial encounter (HCC)  CT pelvis-Comminuted intertrochanteric fracture of the left femur     - Ortho consulted  - TXA 10 mg/kg given  - Multimodal pain management  - Nonweightbearing left lower extremity  - PT/OT  HTN (hypertension)  Continue home medications of Aldactone and Entresto.  Fall  Mechanical fall with head strike, takes daily ASA.    CT head and C-spine, no acute traumatic injuries.    CT pelvis-Comminuted intertrochanteric fracture of the left femur     -Fall precautions  -PT/OT    Trauma Alert: Evaluation; trauma team notified at 0110 via text   Model of Arrival: Ambulance    Trauma Team: Attending Dr Evangelista and Residents Dr Sánchez  Consultants:     Orthopedics: routine consult; Epic consult order placed;     History of Present Illness   Chief Complaint: L hip pain  Mechanism:Fall     Kamini Martines is a 75 y.o. female who presents as a trauma C to the emergency department after mechanical fall with head strike and left hip pain, takes daily ASA.  Patient states she was walking at home, tripped over something and fell on her left side with immediate left-sided hip pain.  Patient also states that she hit her head on a cabinet, denies any LOC.  CT head and C-spine negative for traumatic injuries but left hip x-ray does show a fracture.    Review of Systems   HENT:  Negative for ear pain and sore throat.    Eyes:  Negative for visual disturbance.   Respiratory:  Negative for cough and shortness of breath.    Cardiovascular:  Negative for chest pain and palpitations.   Gastrointestinal:  Negative for abdominal pain, nausea and vomiting.   Genitourinary:  Negative for dysuria and hematuria.   Musculoskeletal:  Positive for arthralgias. Negative for back pain, neck pain and neck stiffness.    Skin:  Negative for color change, rash and wound.   Neurological:  Negative for dizziness, seizures, syncope, weakness, light-headedness and headaches.   All other systems reviewed and are negative.    Medical History Review: I have reviewed the patient's PMH, PSH, Social History, Family History, Meds, and Allergies   Immunization History   Administered Date(s) Administered    COVID-19 PFIZER VACCINE 0.3 ML IM 03/31/2021, 04/22/2021, 12/11/2021    INFLUENZA 07/04/2019    Influenza, high dose seasonal 0.7 mL 12/11/2019    Tdap 04/24/2025     Last Tetanus: 04/2024    No data recordedISAR Score: Did you order a geriatric consult if the score was 2 or greater?: yes       Objective :  Temp:  [98.3 °F (36.8 °C)-98.4 °F (36.9 °C)] 98.3 °F (36.8 °C)  HR:  [80-86] 81  BP: (132-197)/(65-84) 132/68  Resp:  [15-22] 15  SpO2:  [93 %-97 %] 95 %  O2 Device: None (Room air)    Initial Vitals:   Temperature: 98.4 °F (36.9 °C) (04/24/25 0013)  Pulse: 86 (04/24/25 0011)  Respirations: 18 (04/24/25 0011)  Blood Pressure: (!) 197/84 (04/24/25 0011)    Primary Survey:   Airway:        Status: patent;        Pre-hospital Interventions: none        Hospital Interventions: none  Breathing:        Pre-hospital Interventions: none       Effort: normal       Right breath sounds: normal       Left breath sounds: normal  Circulation:        Rhythm: regular       Rate: regular   Right Pulses Left Pulses    R radial: 2+    R pedal: 2+     L radial: 2+    L pedal: 2+       Disability:        GCS: Eye: 4; Verbal: 5 Motor: 6 Total: 15       Right Pupil: round;  reactive         Left Pupil:  round;  reactive      R Motor Strength L Motor Strength    R : 5/5  R dorsiflex: 5/5  R plantarflex: 5/5 L : 5/5  L dorsiflex: 5/5  L plantarflex: 5/5        Sensory:  No sensory deficit  Exposure:       Completed: Yes      Secondary Survey:  Physical Exam  Constitutional:       General: She is not in acute distress.  HENT:      Head: Normocephalic and  atraumatic.      Right Ear: Tympanic membrane, ear canal and external ear normal.      Left Ear: Tympanic membrane, ear canal and external ear normal.      Nose: Nose normal.      Mouth/Throat:      Mouth: Mucous membranes are moist.      Pharynx: Oropharynx is clear.   Eyes:      Extraocular Movements: Extraocular movements intact.      Conjunctiva/sclera: Conjunctivae normal.      Pupils: Pupils are equal, round, and reactive to light.   Cardiovascular:      Rate and Rhythm: Normal rate.      Pulses: Normal pulses.   Pulmonary:      Effort: Pulmonary effort is normal. No respiratory distress.      Breath sounds: Normal breath sounds. No wheezing or rales.   Chest:      Chest wall: No tenderness.   Abdominal:      General: Abdomen is flat.      Tenderness: There is no abdominal tenderness.   Musculoskeletal:         General: Tenderness present. No swelling.      Cervical back: Normal range of motion and neck supple. No rigidity or tenderness.      Right lower leg: Edema present.      Left lower leg: Edema present.      Comments: Tenderness to palpation of L hip with severe pain with active or passive ROM. NVI   Skin:     General: Skin is warm and dry.      Capillary Refill: Capillary refill takes less than 2 seconds.   Neurological:      General: No focal deficit present.      Mental Status: She is alert and oriented to person, place, and time.      Cranial Nerves: No cranial nerve deficit.      Sensory: No sensory deficit.      Motor: No weakness.             Lab Results: I have reviewed the following results:  Recent Labs     04/24/25  0105 04/24/25  0315   WBC  --  18.00*   HGB  --  11.3*   HCT  --  35.1   PLT  --  314   SODIUM  --  137   K  --  4.4   CL  --  102   CO2  --  28   BUN  --  35*   CREATININE  --  0.63   GLUC  --  241*   PTT 19*  --    INR 0.86  --        Imaging Results: I have personally reviewed pertinent images saved in PACS. CT scan findings (and other pertinent positive findings on images) were  discussed with radiology. My interpretation of the images/reports are as follows:  Chest Xray(s): negative for acute findings   FAST exam(s): N/A   CT Scan(s): positive for acute findings: Comminuted intertrochanteric fracture of the left femur   Additional Xray(s): positive for acute findings: Acute left femoral neck fracture     Other Studies:

## 2025-04-24 NOTE — ASSESSMENT & PLAN NOTE
Most recent blood pressure 164/71  Takes Entresto 97/103 mg twice daily, spironolactone 25 mg daily  Avoid hypotension  Management per primary

## 2025-04-24 NOTE — ASSESSMENT & PLAN NOTE
S/p fall  CT -Comminuted intertrochanteric fracture of the left femur   Orthopedics was consulted and is following  Plan: Plan for surgical fixation this afternoon  Multimodal pain regimen including geriatric pain protocol  PT/OT consult   Management per orthopedics
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
## 2025-04-24 NOTE — ASSESSMENT & PLAN NOTE
Patient is alert oriented x4  No cognitive testing on file  No documented history of dementia, no impairment  Able to say days of week backwards without difficulty  Recommend checking TSH and vitamin B12 levels  CT head- PARENCHYMA: Decreased attenuation is noted in periventricular and subcortical white matter demonstrating an appearance that is statistically most likely to represent mild microangiopathic change; this appearance is similar when compared to most recent prior examination.  At risk for delirium due to age, acute pain, hospitalization, anesthesia  QTC- 413  Recommend delirium precautions  Maintain sleep-wake cycle, avoid nighttime interruptions  minimize overnight interruptions, group overnight vitals/labs/nursing checks as possible  dim lights, close blinds and turn off tv to minimize stimulation and encourage sleep environment in evenings  Provide adequate pain control  Avoid urinary retention and constipation  Provide frequent and early mobilization  Provide frequent redirection and reorientation as needed  Avoid medications that may worsen or precipitate delirium such as tramadol, benzodiazepines, anticholinergics, and Benadryl  Redirect unwanted behaviors as first-line therapy, avoid physical restraints as able to  Continue to monitor

## 2025-04-24 NOTE — PLAN OF CARE
Problem: Potential for Falls  Goal: Patient will remain free of falls  Description: INTERVENTIONS:- Educate patient/family on patient safety including physical limitations- Instruct patient to call for assistance with activity - Consult OT/PT to assist with strengthening/mobility - Keep Call bell within reach- Keep bed low and locked with side rails adjusted as appropriate- Keep care items and personal belongings within reach- Initiate and maintain comfort rounds- Make Fall Risk Sign visible to staff- Apply yellow socks and bracelet for high fall risk patients- Consider moving patient to room near nurses station  INTERVENTIONS:- Educate patient/family on patient safety including physical limitations- Instruct patient to call for assistance with activity - Consult OT/PT to assist with strengthening/mobility - Keep Call bell within reach- Keep bed low and locked with side rails adjusted as appropriate- Keep care items and personal belongings within reach- Initiate and maintain comfort rounds- Make Fall Risk Sign visible to staff- Apply yellow socks and bracelet for high fall risk patients- Consider moving patient to room near nurses station  Outcome: Progressing     Problem: PAIN - ADULT  Goal: Verbalizes/displays adequate comfort level or baseline comfort level  Description: Interventions:- Encourage patient to monitor pain and request assistance- Assess pain using appropriate pain scale- Administer analgesics based on type and severity of pain and evaluate response- Implement non-pharmacological measures as appropriate and evaluate response- Consider cultural and social influences on pain and pain management- Notify physician/advanced practitioner if interventions unsuccessful or patient reports new pain  Outcome: Progressing     Problem: INFECTION - ADULT  Goal: Absence or prevention of progression during hospitalization  Description: INTERVENTIONS:- Assess and monitor for signs and symptoms of infection- Monitor  lab/diagnostic results- Monitor all insertion sites, i.e. indwelling lines, tubes, and drains- Monitor endotracheal if appropriate and nasal secretions for changes in amount and color- [place] appropriate cooling/warming therapies per order- Administer medications as ordered- Instruct and encourage patient and family to use good hand hygiene technique- Identify and instruct in appropriate isolation precautions for identified infection/condition  Outcome: Progressing  Goal: Absence of fever/infection during neutropenic period  Description: INTERVENTIONS:- Monitor WBC  Outcome: Progressing     Problem: SAFETY ADULT  Goal: Patient will remain free of falls  Description: INTERVENTIONS:- Educate patient/family on patient safety including physical limitations- Instruct patient to call for assistance with activity - Consult OT/PT to assist with strengthening/mobility - Keep Call bell within reach- Keep bed low and locked with side rails adjusted as appropriate- Keep care items and personal belongings within reach- Initiate and maintain comfort rounds- Make Fall Risk Sign visible to staff- Apply yellow socks and bracelet for high fall risk patients- Consider moving patient to room near nurses station  INTERVENTIONS:- Educate patient/family on patient safety including physical limitations- Instruct patient to call for assistance with activity - Consult OT/PT to assist with strengthening/mobility - Keep Call bell within reach- Keep bed low and locked with side rails adjusted as appropriate- Keep care items and personal belongings within reach- Initiate and maintain comfort rounds- Make Fall Risk Sign visible to staff- Apply yellow socks and bracelet for high fall risk patients- Consider moving patient to room near nurses station  Outcome: Progressing  Goal: Maintain or return to baseline ADL function  Description: INTERVENTIONS:-  Assess patient's ability to carry out ADLs; assess patient's baseline for ADL function and  identify physical deficits which impact ability to perform ADLs (bathing, care of mouth/teeth, toileting, grooming, dressing, etc.)- Assess/evaluate cause of self-care deficits - Assess range of motion- Assess patient's mobility; develop plan if impaired- Assess patient's need for assistive devices and provide as appropriate- Encourage maximum independence but intervene and supervise when necessary- Involve family in performance of ADLs- Assess for home care needs following discharge - Consider OT consult to assist with ADL evaluation and planning for discharge- Provide patient education as appropriate  Outcome: Progressing  Goal: Maintains/Returns to pre admission functional level  Description: INTERVENTIONS:- Perform AM-PAC 6 Click Basic Mobility/ Daily Activity assessment daily.- Set and communicate daily mobility goal to care team and patient/family/caregiver. - Collaborate with rehabilitation services on mobility goals if consulted- Out of bed for toileting- Record patient progress and toleration of activity level   Outcome: Progressing

## 2025-04-24 NOTE — ANESTHESIA POSTPROCEDURE EVALUATION
Post-Op Assessment Note    CV Status:  Stable  Pain Score: 0    Pain management: adequate       Mental Status:  Sleepy and arousable   Hydration Status:  Euvolemic   PONV Controlled:  Controlled   Airway Patency:  Patent  Two or more mitigation strategies used for obstructive sleep apnea   Post Op Vitals Reviewed: Yes    No anethesia notable event occurred.    Staff: CRNA, Anesthesiologist           Last Filed PACU Vitals:  Vitals Value Taken Time   Temp 98.8    Pulse 82    /55    Resp 15    SpO2 100

## 2025-04-24 NOTE — PLAN OF CARE
Problem: Prexisting or High Potential for Compromised Skin Integrity  Goal: Skin integrity is maintained or improved  Description: INTERVENTIONS:- Identify patients at risk for skin breakdown- Assess and monitor skin integrity- Assess and monitor nutrition and hydration status- Monitor labs - Assess for incontinence - Turn and reposition patient- Assist with mobility/ambulation- Relieve pressure over bony prominences- Avoid friction and shearing- Provide appropriate hygiene as needed including keeping skin clean and dry- Evaluate need for skin moisturizer/barrier cream- Collaborate with interdisciplinary team - Patient/family teaching- Consider wound care consult   4/24/2025 0427 by Candace Barksdale RN  Outcome: Progressing    Goal: Maintain or return to baseline ADL function  Description: INTERVENTIONS:-  Assess patient's ability to carry out ADLs; assess patient's baseline for ADL function and identify physical deficits which impact ability to perform ADLs (bathing, care of mouth/teeth, toileting, grooming, dressing, etc.)- Assess/evaluate cause of self-care deficits - Assess range of motion- Assess patient's mobility; develop plan if impaired- Assess patient's need for assistive devices and provide as appropriate- Encourage maximum independence but intervene and supervise when necessary- Involve family in performance of ADLs- Assess for home care needs following discharge - Consider OT consult to assist with ADL evaluation and planning for discharge- Provide patient education as appropriate  Outcome: Progressing     Problem: PAIN - ADULT  Goal: Verbalizes/displays adequate comfort level or baseline comfort level  Description: Interventions:- Encourage patient to monitor pain and request assistance- Assess pain using appropriate pain scale- Administer analgesics based on type and severity of pain and evaluate response- Implement non-pharmacological measures as appropriate and evaluate response- Consider cultural  and social influences on pain and pain management- Notify physician/advanced practitioner if interventions unsuccessful or patient reports new pain  Outcome: Progressing

## 2025-04-24 NOTE — ASSESSMENT & PLAN NOTE
CT pelvis-Comminuted intertrochanteric fracture of the left femur     - Ortho consulted  - TXA 10 mg/kg given  - Multimodal pain management  - Nonweightbearing left lower extremity  - PT/OT

## 2025-04-24 NOTE — ASSESSMENT & PLAN NOTE
Wt Readings from Last 3 Encounters:   04/24/25 99.3 kg (219 lb)   02/20/25 99.3 kg (219 lb)   02/11/25 99.3 kg (219 lb)   Follows outpatient with [facility]'s cardiology-last appointment 1/23/2025  Takes Entresto and spironolactone  Further management per primary

## 2025-04-24 NOTE — ED ATTENDING ATTESTATION
4/24/2025  ILuz Marina DO, saw and evaluated the patient. I have discussed the patient with the resident/non-physician practitioner and agree with the resident's/non-physician practitioner's findings, Plan of Care, and MDM as documented in the resident's/non-physician practitioner's note, except where noted. All available labs and Radiology studies were reviewed.  I was present for key portions of any procedure(s) performed by the resident/non-physician practitioner and I was immediately available to provide assistance.       At this point I agree with the current assessment done in the Emergency Department.  I have conducted an independent evaluation of this patient a history and physical is as follows:    ED Course   75-year-old female presents to the emergency department by EMS from home after having a mechanical fall.  Patient states she was getting up to get a drink of water when she lost her balance and fell onto her left hip.  Patient believes she did strike her head but did not have a loss of consciousness and denies headache.  Patient denies any numbness or tingling of the upper or lower extremities.  She is having severe left hip pain.  She arrives with a left lower extremity shortened and externally rotated.  No previous hip injury.  She takes aspirin.    Past medical history: Osteoarthritis, congestive heart failure, diabetes    Physical exam: Patient is awake and alert moderate distress due to pain.  Pupils are equal and reactive.  Cervical collar is in place.  No anterior chest wall tenderness.  Airway is intact.  Equal bilateral breath sounds noted.  Abdomen is soft, nontender, nondistended with normoactive bowel sounds.  There is tenderness with palpation of the left proximal hip with shortening and external rotation.  2+ distal pulses noted throughout.  No focal motor or sensory deficits.    Assessment/plan: 75-year-old female presents after having mechanical fall with deformed and painful left  lower extremity.  Differential diagnosis includes was not limited to acute left hip fracture, dislocation, subluxation, contusion.    Will treat pain, check CT scan of head and neck and x-rays of the left hip and pelvis, reassess.          Critical Care Time  Procedures

## 2025-04-25 LAB
ANION GAP SERPL CALCULATED.3IONS-SCNC: 9 MMOL/L (ref 4–13)
BACTERIA UR QL AUTO: ABNORMAL /HPF
BILIRUB UR QL STRIP: NEGATIVE
BUDDING YEAST: PRESENT
BUN SERPL-MCNC: 27 MG/DL (ref 5–25)
CALCIUM SERPL-MCNC: 8.7 MG/DL (ref 8.4–10.2)
CHLORIDE SERPL-SCNC: 105 MMOL/L (ref 96–108)
CLARITY UR: ABNORMAL
CO2 SERPL-SCNC: 24 MMOL/L (ref 21–32)
COLOR UR: ABNORMAL
CREAT SERPL-MCNC: 0.99 MG/DL (ref 0.6–1.3)
ERYTHROCYTE [DISTWIDTH] IN BLOOD BY AUTOMATED COUNT: 14 % (ref 11.6–15.1)
GFR SERPL CREATININE-BSD FRML MDRD: 55 ML/MIN/1.73SQ M
GLUCOSE SERPL-MCNC: 219 MG/DL (ref 65–140)
GLUCOSE SERPL-MCNC: 220 MG/DL (ref 65–140)
GLUCOSE SERPL-MCNC: 220 MG/DL (ref 65–140)
GLUCOSE SERPL-MCNC: 271 MG/DL (ref 65–140)
GLUCOSE UR STRIP-MCNC: NEGATIVE MG/DL
HCT VFR BLD AUTO: 29.8 % (ref 34.8–46.1)
HGB BLD-MCNC: 9.3 G/DL (ref 11.5–15.4)
HGB UR QL STRIP.AUTO: ABNORMAL
KETONES UR STRIP-MCNC: NEGATIVE MG/DL
LEUKOCYTE ESTERASE UR QL STRIP: ABNORMAL
MCH RBC QN AUTO: 29.5 PG (ref 26.8–34.3)
MCHC RBC AUTO-ENTMCNC: 31.2 G/DL (ref 31.4–37.4)
MCV RBC AUTO: 95 FL (ref 82–98)
NITRITE UR QL STRIP: NEGATIVE
NON-SQ EPI CELLS URNS QL MICRO: ABNORMAL /HPF
PH UR STRIP.AUTO: 6.5 [PH]
PLATELET # BLD AUTO: 261 THOUSANDS/UL (ref 149–390)
PMV BLD AUTO: 10.2 FL (ref 8.9–12.7)
POTASSIUM SERPL-SCNC: 4.3 MMOL/L (ref 3.5–5.3)
PROT UR STRIP-MCNC: ABNORMAL MG/DL
RBC # BLD AUTO: 3.15 MILLION/UL (ref 3.81–5.12)
RBC #/AREA URNS AUTO: ABNORMAL /HPF
SODIUM SERPL-SCNC: 138 MMOL/L (ref 135–147)
SP GR UR STRIP.AUTO: 1.02 (ref 1–1.03)
UROBILINOGEN UR STRIP-ACNC: <2 MG/DL
WBC # BLD AUTO: 11.06 THOUSAND/UL (ref 4.31–10.16)
WBC #/AREA URNS AUTO: ABNORMAL /HPF

## 2025-04-25 PROCEDURE — 81001 URINALYSIS AUTO W/SCOPE: CPT

## 2025-04-25 PROCEDURE — 87086 URINE CULTURE/COLONY COUNT: CPT

## 2025-04-25 PROCEDURE — 82948 REAGENT STRIP/BLOOD GLUCOSE: CPT

## 2025-04-25 PROCEDURE — 99024 POSTOP FOLLOW-UP VISIT: CPT | Performed by: PHYSICIAN ASSISTANT

## 2025-04-25 PROCEDURE — 97530 THERAPEUTIC ACTIVITIES: CPT

## 2025-04-25 PROCEDURE — 80048 BASIC METABOLIC PNL TOTAL CA: CPT

## 2025-04-25 PROCEDURE — 97163 PT EVAL HIGH COMPLEX 45 MIN: CPT

## 2025-04-25 PROCEDURE — 99232 SBSQ HOSP IP/OBS MODERATE 35: CPT | Performed by: NURSE PRACTITIONER

## 2025-04-25 PROCEDURE — 97167 OT EVAL HIGH COMPLEX 60 MIN: CPT

## 2025-04-25 PROCEDURE — 99232 SBSQ HOSP IP/OBS MODERATE 35: CPT | Performed by: SURGERY

## 2025-04-25 PROCEDURE — 85027 COMPLETE CBC AUTOMATED: CPT

## 2025-04-25 RX ORDER — INSULIN LISPRO 100 [IU]/ML
1-6 INJECTION, SOLUTION INTRAVENOUS; SUBCUTANEOUS
Status: DISCONTINUED | OUTPATIENT
Start: 2025-04-25 | End: 2025-04-28 | Stop reason: HOSPADM

## 2025-04-25 RX ADMIN — INSULIN LISPRO 2 UNITS: 100 INJECTION, SOLUTION INTRAVENOUS; SUBCUTANEOUS at 13:35

## 2025-04-25 RX ADMIN — INSULIN LISPRO 2 UNITS: 100 INJECTION, SOLUTION INTRAVENOUS; SUBCUTANEOUS at 16:00

## 2025-04-25 RX ADMIN — ENOXAPARIN SODIUM 30 MG: 30 INJECTION SUBCUTANEOUS at 08:51

## 2025-04-25 RX ADMIN — HYDROMORPHONE HYDROCHLORIDE 0.2 MG: 0.2 INJECTION, SOLUTION INTRAMUSCULAR; INTRAVENOUS; SUBCUTANEOUS at 23:37

## 2025-04-25 RX ADMIN — GABAPENTIN 100 MG: 100 CAPSULE ORAL at 08:50

## 2025-04-25 RX ADMIN — SACUBITRIL AND VALSARTAN 1 TABLET: 97; 103 TABLET, FILM COATED ORAL at 16:00

## 2025-04-25 RX ADMIN — OXYCODONE HYDROCHLORIDE 5 MG: 5 TABLET ORAL at 04:40

## 2025-04-25 RX ADMIN — OXYCODONE HYDROCHLORIDE 5 MG: 5 TABLET ORAL at 08:50

## 2025-04-25 RX ADMIN — ACETAMINOPHEN 975 MG: 325 TABLET, FILM COATED ORAL at 13:42

## 2025-04-25 RX ADMIN — ACETAMINOPHEN 975 MG: 325 TABLET, FILM COATED ORAL at 04:40

## 2025-04-25 RX ADMIN — ENOXAPARIN SODIUM 30 MG: 30 INJECTION SUBCUTANEOUS at 22:04

## 2025-04-25 RX ADMIN — CEFAZOLIN SODIUM 2000 MG: 2 SOLUTION INTRAVENOUS at 08:58

## 2025-04-25 RX ADMIN — ACETAMINOPHEN 975 MG: 325 TABLET, FILM COATED ORAL at 22:04

## 2025-04-25 RX ADMIN — OXYCODONE HYDROCHLORIDE 5 MG: 5 TABLET ORAL at 22:04

## 2025-04-25 RX ADMIN — CEFAZOLIN SODIUM 2000 MG: 2 SOLUTION INTRAVENOUS at 01:02

## 2025-04-25 RX ADMIN — HYDROMORPHONE HYDROCHLORIDE 0.2 MG: 0.2 INJECTION, SOLUTION INTRAMUSCULAR; INTRAVENOUS; SUBCUTANEOUS at 01:01

## 2025-04-25 RX ADMIN — GABAPENTIN 100 MG: 100 CAPSULE ORAL at 16:01

## 2025-04-25 RX ADMIN — FAMOTIDINE 20 MG: 20 TABLET, FILM COATED ORAL at 08:50

## 2025-04-25 RX ADMIN — SACUBITRIL AND VALSARTAN 1 TABLET: 97; 103 TABLET, FILM COATED ORAL at 08:52

## 2025-04-25 RX ADMIN — OXYCODONE HYDROCHLORIDE 5 MG: 5 TABLET ORAL at 13:42

## 2025-04-25 RX ADMIN — PANTOPRAZOLE SODIUM 40 MG: 40 TABLET, DELAYED RELEASE ORAL at 04:40

## 2025-04-25 NOTE — QUICK NOTE
"Acute Care Surgery   Post-Op Check Progress Note   Kamiin Martines 75 y.o. female MRN: [id number]  Unit/Bed#: S -01 Encounter: [id number]    Assessment and Plan:    75-year-old F with left peritrochanteric femur fracture  status post open reduction internal fixation left peritrochanteric femur fracture with an intramedullary nail .   - P.R.N. Analgesia.   - Encouraged incentive spirometry use.      Subjective/Objective     Subjective: \"Having pain but I'm managing to sleep\"    Objective:     Blood pressure 92/61, pulse 86, temperature 98.1 °F (36.7 °C), resp. rate 15, height 5' 5\" (1.651 m), weight 99.3 kg (219 lb), SpO2 98%.,Body mass index is 36.44 kg/m².      Intake/Output Summary (Last 24 hours) at 4/24/2025 2259  Last data filed at 4/24/2025 1849  Gross per 24 hour   Intake 500 ml   Output 310 ml   Net 190 ml       Invasive Devices       Peripheral Intravenous Line  Duration             Peripheral IV 04/24/25 Left Wrist <1 day              Drain  Duration             Ureteral Internal Stent Right ureter 6 Fr. 295 days                    Physical Exam:    GENERAL APPEARANCE: Patient in no acute distress.  HEENT: NCAT; PERRL, EOMs intact; Mucous membranes moist  CV: Regular rate and rhythm; + S1, S2; no murmur/gallops/rubs appreciated.  LUNGS: Clear to auscultation; no wheezes/rales/rhonci.  ABD: NABS; soft; non-distended; non-tender.  EXT: no clubbing/cyanosis/edema. B/L LE NVI.  NEURO: GCS 15; no focal neurologic deficits; neurovascularly intact.  SKIN: Warm, dry and well perfused; no rash; no jaundice.                Aydee Martinez MD  4/24/2025    "

## 2025-04-25 NOTE — ASSESSMENT & PLAN NOTE
- Status post mechanical fall with the below noted injuries.  - Fall precautions.  - Geriatric Medicine consultation for evaluation, medication review and recommendations.  - PT and OT evaluation and treatment as indicated.  - Case Management consultation for disposition planning.

## 2025-04-25 NOTE — CASE MANAGEMENT
Case Management Discharge Planning Note    Patient name Kamini Martines  [facility] S /S -01 MRN [id number]  : 1950 Date 2025       Current Admission Date: 2025  Current Admission Diagnosis:Fall   Patient Active Problem List    Diagnosis Date Noted Date Diagnosed    Fall 2025     Closed left hip fracture, initial encounter ([facility]) 2025     Urinary retention 2025     Frailty 2025     At risk for delirium 2025     Ambulatory dysfunction 2025     Cervical spondylosis 2025     Opioid dependence, uncomplicated ([facility]) 2025     Esophageal dysphagia 2024     Functional diarrhea 2024     Trochanteric bursitis of left hip 2024     Lumbar spondylosis 10/06/2022     Type 2 diabetes mellitus without complication, without long-term current use of insulin ([facility]) 2020     Class 2 obesity in adult 2020     Chronic pain syndrome 2020     Intervertebral disc disorder with radiculopathy of lumbar region 2020     Lumbar post-laminectomy syndrome 2020     Neurogenic claudication due to lumbar spinal stenosis 2020     Postlaminectomy syndrome, lumbar region 2020     Chronic right-sided low back pain without sciatica 2019     TIA (transient ischemic attack) 2019     Chronic systolic congestive heart failure ([facility]) 2019     Vitamin D deficiency 08/15/2019     Lumbar herniated disc 08/15/2019     Dilated cardiomyopathy ([facility]) 2019     LUCIA (obstructive sleep apnea)      Morbid (severe) obesity with alveolar hypoventilation ([facility]) 2019     Mild intermittent asthma without complication 2019     HTN (hypertension) 2019     History Abnormal heart rhythm 2019     Adrenal adenoma 2019     Pericardial effusion 2019     CVA (cerebral vascular accident) ([facility]) 2019       LOS (days): 1  Geometric Mean LOS (GMLOS) (days): 4.4  Days to GMLOS:2.8      OBJECTIVE:  Risk of Unplanned Readmission Score: 19         Current admission status: Inpatient   Preferred Pharmacy:   [id number] - [place] - [address] [place]  [address] [address]  Phone: [phone] Fax: [phone]    [facility] - [place], [place] - [address]  [address]  [address]  [facility] [address]  Phone: [phone] Fax: [phone]    Primary Care Provider: Brionna Chatman MD    Primary Insurance: MEDICARE  Secondary Insurance: BLUE CROSS    DISCHARGE DETAILS:    Discharge planning discussed with:: patient and , Ferdinand- at bedside  [place] of Choice: Yes  Comments - Freedom of Choice: STR  CM contacted family/caregiver?: Yes  Were Treatment Team discharge recommendations reviewed with patient/caregiver?: Yes  Did patient/caregiver verbalize understanding of patient care needs?: N/A- going to facility  Were patient/caregiver advised of the risks associated with not following Treatment Team discharge recommendations?: Yes    Contacts  Patient Contacts: Ferdinand  Relationship to Patient:: Family ()  Contact Method: In Person  Reason/Outcome: Continuity of Care, Discharge Planning, Emergency Contact, Referral    Requested Home Health Care         Is the patient interested in HHC at discharge?: No    DME Referral Provided  Referral made for DME?: No    Other Referral/Resources/Interventions Provided:  Interventions: Short Term Rehab  Referral Comments: CM met with patient and  Ferdinand at bedside. Discussed therapy recommendation of STR, patient and  agreeable. Referrals to IRF and SNF placed via [facility]. CM to follow up, as able to continue with dcp.    Would you like to participate in our [facility] service program?  : No - Declined    Treatment Team Recommendation: Short Term Rehab  Discharge Destination Plan:: Short Term Rehab  Transport at Discharge : [facility] Ambulance

## 2025-04-25 NOTE — PROGRESS NOTES
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 for traumatic injuries. L LE imaging showed Comminuted intertrochanteric fracture of the left femur. Orth was consulted. Pt is s/p IMN on 4/24. Maintain weightbearing as tolerated status on the left lower extremity . Adams catheter was placed 4/25 due to urinary retention, removed on 4/28. Urinary retention protocol. UA negative for bacteria on 4/25.    Pt was independent PTA with transfers, amb, and ADLs. Pt lives with spouse in a 2 story home with 2 JONNY. Pt is currently functioning below baseline needing up to Max A for ADLs and Mod A for transfers/amb. Pt would benefit from [facility] admission to have close medical management while participating in 3 hours of therapy per day that will include physical and occupational therapy.  PM&R to maximize function and provide medical oversight. The MD will monitor patient co-morbidities while on the unit as well as order any additional tests, labs, and consults needed. [facility] specialized interdisciplinary team will meet weekly to discuss patient overall medical status and rehab goals in preparation for D/C home.  Inpatient acute rehab is recommended for patient to maximize overall strength, endurance, self care, and mobility for a safe and timely transition back home.

## 2025-04-25 NOTE — PLAN OF CARE
Problem: Prexisting or High Potential for Compromised Skin Integrity  Goal: Skin integrity is maintained or improved  Description: INTERVENTIONS:- Identify patients at risk for skin breakdown- Assess and monitor skin integrity- Assess and monitor nutrition and hydration status- Monitor labs - Assess for incontinence - Turn and reposition patient- Assist with mobility/ambulation- Relieve pressure over bony prominences- Avoid friction and shearing- Provide appropriate hygiene as needed including keeping skin clean and dry- Evaluate need for skin moisturizer/barrier cream- Collaborate with interdisciplinary team - Patient/family teaching- Consider wound care consult   Outcome: Progressing     Problem: PAIN - ADULT  Goal: Verbalizes/displays adequate comfort level or baseline comfort level  Description: Interventions:- Encourage patient to monitor pain and request assistance- Assess pain using appropriate pain scale- Administer analgesics based on type and severity of pain and evaluate response- Implement non-pharmacological measures as appropriate and evaluate response- Consider cultural and social influences on pain and pain management- Notify physician/advanced practitioner if interventions unsuccessful or patient reports new pain  Outcome: Progressing     Problem: INFECTION - ADULT  Goal: Absence or prevention of progression during hospitalization  Description: INTERVENTIONS:- Assess and monitor for signs and symptoms of infection- Monitor lab/diagnostic results- Monitor all insertion sites, i.e. indwelling lines, tubes, and drains- Monitor endotracheal if appropriate and nasal secretions for changes in amount and color- [place] appropriate cooling/warming therapies per order- Administer medications as ordered- Instruct and encourage patient and family to use good hand hygiene technique- Identify and instruct in appropriate isolation precautions for identified infection/condition  Outcome: Progressing  Goal:  Absence of fever/infection during neutropenic period  Description: INTERVENTIONS:- Monitor WBC  Outcome: Progressing

## 2025-04-25 NOTE — PROGRESS NOTES
Progress Note - Geriatric Medicine   Name: Kamini Martines 75 y.o. female I MRN: [id number]  Unit/Bed#: S -01 I Date of Admission: 4/24/2025   Date of Service: 4/25/2025 I Hospital Day: 1     Assessment & Plan  Fall  [place] fall precautions   Assess patient frequently for physical needs, encourage use of assistant devices as needed and directed by PT/OT  Identify cognitive and physical deficits and behaviors that affect risk of falls  Consider moving patient closer to nursing station to monitor more closely for impulsive behavior which may increase risk of falls  Educate patient/family on patient safety including physical limitations and importance of using call bell for assistance   Modify environment to reduce risk of injury including disconnecting from pole when not in use, ensuring adequate lighting in room and restroom, ensuring that path to restroom is clear and free of trip hazards  PT/OT consulted to assist with strengthening/mobility and assist with discharge planning to appropriate level of care  Closed left hip fracture, initial encounter ([facility])  S/p fall  CT -Comminuted intertrochanteric fracture of the left femur   Orthopedics was consulted and is following  Plan: S/p open reduction internal fixation left peritrochanteric femur fracture with intramedullary nail on 4/25/2025  Multimodal pain regimen including geriatric pain protocol  PT/OT consult   Management per orthopedics  At risk for delirium  Patient is alert oriented x4  No cognitive testing on file  No documented history of dementia, no impairment  Able to say days of week backwards without difficulty  Recommend checking TSH and vitamin B12 levels  CT head- PARENCHYMA: Decreased attenuation is noted in periventricular and subcortical white matter demonstrating an appearance that is statistically most likely to represent mild microangiopathic change; this appearance is similar when compared to most recent prior examination.  At risk for  delirium due to age, acute pain, hospitalization, anesthesia  QTC- 413  Recommend delirium precautions  Maintain sleep-wake cycle, avoid nighttime interruptions  minimize overnight interruptions, group overnight vitals/labs/nursing checks as possible  dim lights, close blinds and turn off tv to minimize stimulation and encourage sleep environment in evenings  Provide adequate pain control  Avoid urinary retention and constipation  Provide frequent and early mobilization  Provide frequent redirection and reorientation as needed  Avoid medications that may worsen or precipitate delirium such as tramadol, benzodiazepines, anticholinergics, and Benadryl  Redirect unwanted behaviors as first-line therapy, avoid physical restraints as able to  Continue to monitor  Ambulatory dysfunction  At a baseline ambulates without AD  PT/OT following  Fall precautions  Out of bed as tolerated  Encourage early and frequent mobilization  Encourage adequate hydration and nutrition  Provide adequate pain management   Goal is undetermined, although likely will need short-term rehab  Continue with PT/OT for continued strength and balance training   Urinary retention  Takes Vesicare daily-although not on formulary in hospital  Adams catheter was placed this morning due to urinary retention  Voiding trial when appropriate      Subjective:   Kamini is a 75-year-old being seen for a geriatrics follow-up.  Upon exam patient was lying in bed, resting.  She appeared comfortable and was in no acute distress.  Still reporting moderate to severe pain, much worse when she tried to work with therapy.  Had trouble sleeping overnight due to some issues with urinary retention, now has Adams in place.  Appetite is slightly decreased, though not a fan of the food.  Per nursing no acute concerns or issues at this time.    Review of Systems   Constitutional:  Positive for activity change. Negative for appetite change.   HENT:  Negative for trouble  "swallowing.    Respiratory:  Negative for cough and shortness of breath.    Cardiovascular:  Negative for chest pain and palpitations.   Gastrointestinal:  Negative for abdominal pain, constipation, diarrhea, nausea and vomiting.   Genitourinary:  Positive for difficulty urinating (hernandez in place). Negative for hematuria.   Musculoskeletal:  Positive for arthralgias, back pain, gait problem and neck pain.   Neurological:  Positive for weakness and light-headedness (when getting OOB). Negative for dizziness and headaches.   Psychiatric/Behavioral:  Positive for dysphoric mood and sleep disturbance. Negative for confusion. The patient is not nervous/anxious.          Objective:     Vitals: Blood pressure (!) 125/49, pulse 86, temperature 98.6 °F (37 °C), resp. rate 15, height 5' 5\" (1.651 m), weight 99.3 kg (219 lb), SpO2 94%.,Body mass index is 36.44 kg/m².      Intake/Output Summary (Last 24 hours) at 4/25/2025 1041  Last data filed at 4/25/2025 0436  Gross per 24 hour   Intake 400 ml   Output 1080 ml   Net -680 ml       Current Medications: Reviewed    Physical Exam:   Physical Exam  Vitals reviewed.   Constitutional:       General: She is not in acute distress.     Appearance: She is not ill-appearing.      Comments: Frail appearing    Cardiovascular:      Rate and Rhythm: Normal rate and regular rhythm.   Pulmonary:      Effort: Pulmonary effort is normal.      Breath sounds: Normal breath sounds.   Abdominal:      General: Bowel sounds are normal.      Palpations: Abdomen is soft.   Musculoskeletal:         General: Tenderness and signs of injury present.   Skin:     General: Skin is warm and dry.   Neurological:      General: No focal deficit present.      Mental Status: She is alert and oriented to person, place, and time. Mental status is at baseline.      Cranial Nerves: No cranial nerve deficit.      Motor: Weakness present.      Gait: Gait abnormal.          Invasive Devices       Peripheral Intravenous " "Line  Duration             Peripheral IV 04/24/25 Left Wrist <1 day              Drain  Duration             Ureteral Internal Stent Right ureter 6 Fr. 296 days    Urethral Catheter 16 Fr. <1 day                    Lab, Imaging and other studies: Results Review Statement: No pertinent imaging studies reviewed.    Please note:  Voice-recognition software may have been used in the preparation of this document.  Occasional wrong word or \"sound-alike\" substitutions may have occurred due to the inherent limitations of voice recognition software.  Interpretation should be guided by context.     "

## 2025-04-25 NOTE — [facility] ADMISSION
Patients case reviewed, patient looks appropriate to come to [facility] pending medical stability and LOF at discharge.  [facility] admissions will continue to follow patient for progression of care and discharge readiness.

## 2025-04-25 NOTE — ASSESSMENT & PLAN NOTE
S/p open reduction internal fixation left peritrochanteric femur fracture with an intramedullary nail with Dr. Parry 4/25/2025 (POD 1).   Weight bearing as tolerated to the left lower extremity  Range of motion as tolerated.   Pain control per primary team  DVT ppx: recommend lovenox while in house, d/c on aspirin 81mg BID x 6 weeks.   Monitor for s/s ABLA, transfuse for hgb less than 7, per primary team.   Medical management per primary team.   Rest of care per primary team.   Should follow up with Dr. Parry upon discharge.

## 2025-04-25 NOTE — ASSESSMENT & PLAN NOTE
Takes Vesicare daily-although not on formulary in hospital  Adams catheter was placed this morning due to urinary retention  Voiding trial when appropriate

## 2025-04-25 NOTE — ASSESSMENT & PLAN NOTE
At a baseline ambulates without AD  PT/OT following  Fall precautions  Out of bed as tolerated  Encourage early and frequent mobilization  Encourage adequate hydration and nutrition  Provide adequate pain management   Goal is undetermined, although likely will need short-term rehab  Continue with PT/OT for continued strength and balance training

## 2025-04-25 NOTE — ASSESSMENT & PLAN NOTE
- Comminuted intertrochanteric femur fracture, present on admission.  - Status post IMN on 4/24.  - Appreciate Orthopedic surgery evaluation, recommendations and interventions as noted.  - Maintain weightbearing as tolerated status on the left lower extremity .  - Monitor left lower extremity neurovascular exam.  - Continue multimodal analgesic regimen.  - Continue DVT prophylaxis.  - PT and OT evaluation and treatment as indicated.  - Outpatient follow up with Orthopedic surgery for re-evaluation.

## 2025-04-25 NOTE — AR[facility] ADMISSION
[facility]  spoke with  patient's spouse,Ferdinand.  Introduced self, explained role, reviewed [facility] program, services offered, acute rehab criteria, review of referral by [facility] Medical Director, insurance authorization process, three ARC locations and anticipated rehab length of stay. [facility] Rehab folder was emailed to son. All questions were answered. Patient's spouse's  first choice is [facility], second choice [facility]. Spouse was made aware [facility] Reviewer will communicate with their Care Manager who will keep patient updated on referral status.

## 2025-04-25 NOTE — ASSESSMENT & PLAN NOTE
[place] fall precautions   Assess patient frequently for physical needs, encourage use of assistant devices as needed and directed by PT/OT  Identify cognitive and physical deficits and behaviors that affect risk of falls  Consider moving patient closer to nursing station to monitor more closely for impulsive behavior which may increase risk of falls  Educate patient/family on patient safety including physical limitations and importance of using call bell for assistance   Modify environment to reduce risk of injury including disconnecting from pole when not in use, ensuring adequate lighting in room and restroom, ensuring that path to restroom is clear and free of trip hazards  PT/OT consulted to assist with strengthening/mobility and assist with discharge planning to appropriate level of care

## 2025-04-25 NOTE — PLAN OF CARE
Problem: PHYSICAL THERAPY ADULT  Goal: Performs mobility at highest level of function for planned discharge setting.  See evaluation for individualized goals.  Description: Treatment/Interventions: Functional transfer training, LE strengthening/ROM, Elevations, Therapeutic exercise, Endurance training, Patient/family training, Bed mobility, Gait training, Compensatory technique education, Equipment eval/education  Equipment Recommended: Walker       See flowsheet documentation for full assessment, interventions and recommendations.  4/25/2025 1337 by Gardenia Siddiqi PT  Note: Prognosis: Fair  Problem List: Decreased strength, Decreased endurance, Impaired balance, Decreased mobility, Decreased cognition, Impaired judgement, Decreased safety awareness, Pain, Orthopedic restrictions, Decreased skin integrity  Assessment: Kamini Martines is a 75 y.o. Female who presents to [facility] on 4/24/25 due to fall. Orders for PT eval and treat received. Comorbidities affecting pt's functional mobility at time of evaluation include: HTN, CVA, lumbar spondylosis, frailty, ambulatory dysfunction. Personal factors affecting DC include: inaccessible home environment, lives in 2 story house, ambulating w/ assistive device, stairs to enter home, inability to ambulate household distances, decreased cognition, and positive fall history. At baseline, pt mobilizes independently w/ no AD, and w/ 2 fall(s) in the previous 6 months. Upon evaluation, pt presents w/ the following deficits: impaired strength, impaired skin integrity, impaired balance, impaired cognition, decreased safety awareness, decreased endurance/activity tolerance, and pain affecting mobility. Pt currently requires  max Ax2 for bed mobility, mod Ax2 for transfers. Pt's clinical presentation is unstable/unpredictable due to abnormal lab values, need for increased assistance w/ functional mobility compared to baseline, pain affecting mobility tolerance, need for input for  mobility technique, need for input for task focus, need to input for safety awareness, recent drastic decline in mobility status, recent h/o falls, ongoing medical management. From a PT/mobility standpoint given the above findings, DC recommendation is level: II (Moderate Rehab Resource Intensity). During current admission, pt will benefit from continued skilled inpatient PT in the acute care setting in order to address the above deficits and to maximize function and mobility prior to DC from acute care.  Barriers to Discharge: Inaccessible home environment     Rehab Resource Intensity Level, PT: II (Moderate Resource Intensity)    See flowsheet documentation for full assessment.
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
## 2025-04-25 NOTE — PROGRESS NOTES
Progress Note - Trauma   Name: Kamini Martines 75 y.o. female I MRN: [id number]  Unit/Bed#: S -01 I Date of Admission: 4/24/2025   Date of Service: 4/25/2025 I Hospital Day: 1    Assessment & Plan  Closed left hip fracture, initial encounter (HCC)  - Comminuted intertrochanteric femur fracture, present on admission.  - Status post IMN on 4/24.  - Appreciate Orthopedic surgery evaluation, recommendations and interventions as noted.  - Maintain weightbearing as tolerated status on the left lower extremity .  - Monitor left lower extremity neurovascular exam.  - Continue multimodal analgesic regimen.  - Continue DVT prophylaxis.  - PT and OT evaluation and treatment as indicated.  - Outpatient follow up with Orthopedic surgery for re-evaluation.    Fall  - Status post mechanical fall with the below noted injuries.  - Fall precautions.  - Geriatric Medicine consultation for evaluation, medication review and recommendations.  - PT and OT evaluation and treatment as indicated.  - Case Management consultation for disposition planning.    Bowel Regimen: Miralax  VTE Prophylaxis:VTE covered by:  enoxaparin, Subcutaneous, 30 mg at 04/25/25 0851         Disposition: Continue med/surg status with ongoing management of left hip fracture Please contact the SecureChat role for the Trauma service with any questions/concerns.    24 Hour Events : Patient POD#1 s/p left femoral IMN   Subjective : Patient reports pain in her left hip area which is limiting her mobility. She was able to sleep some overnight. She has no other complaints.     Objective :  Temp:  [98.1 °F (36.7 °C)-99.1 °F (37.3 °C)] 98.6 °F (37 °C)  HR:  [72-92] 86  BP: ()/(49-70) 125/49  Resp:  [11-20] 15  SpO2:  [93 %-100 %] 94 %  O2 Device: None (Room air)  Nasal Cannula O2 Flow Rate (L/min):  [4 L/min] 4 L/min    I/O         04/23 0701 04/24 0700 04/24 0701 04/25 0700 04/25 0701 04/26 0700    I.V. (mL/kg)  400 (4)     IV Piggyback 100      Total  Intake(mL/kg) 100 (1) 400 (4)     Urine (mL/kg/hr)  1070 (0.4)     Blood  10     Total Output  1080     Net +100 -680                  Lines/Drains/Airways       Active Status       Name Placement date Placement time Site Days    Urethral Catheter 16 Fr. 04/25/25  0435  --  less than 1                  Physical Exam  Vitals and nursing note reviewed.   Constitutional:       General: She is not in acute distress.     Appearance: Normal appearance. She is not ill-appearing or toxic-appearing.   HENT:      Head: Normocephalic.      Nose: Nose normal. No congestion or rhinorrhea.      Mouth/Throat:      Mouth: Mucous membranes are moist.      Pharynx: Oropharynx is clear.   Eyes:      Extraocular Movements: Extraocular movements intact.      Conjunctiva/sclera: Conjunctivae normal.      Pupils: Pupils are equal, round, and reactive to light.   Cardiovascular:      Rate and Rhythm: Normal rate and regular rhythm.      Heart sounds: No murmur heard.     No friction rub. No gallop.   Pulmonary:      Effort: Pulmonary effort is normal. No respiratory distress.      Breath sounds: No wheezing, rhonchi or rales.   Abdominal:      General: Bowel sounds are normal.      Palpations: Abdomen is soft.      Tenderness: There is no abdominal tenderness. There is no guarding or rebound.   Musculoskeletal:         General: No tenderness or deformity.      Cervical back: Normal range of motion.      Left hip: No tenderness. Decreased range of motion.      Comments: Left thigh surgical dressings intact without strike through. Thigh compartments soft and compressible.    Skin:     General: Skin is warm and dry.      Capillary Refill: Capillary refill takes less than 2 seconds.      Findings: No bruising.   Neurological:      General: No focal deficit present.      Mental Status: She is alert and oriented to person, place, and time.      Sensory: No sensory deficit.      Motor: No weakness.               Lab Results: I have reviewed the  following results:  Recent Labs     04/24/25  0105 04/24/25  0315   WBC  --  18.00*   HGB  --  11.3*   HCT  --  35.1   PLT  --  314   SODIUM  --  137   K  --  4.4   CL  --  102   CO2  --  28   BUN  --  35*   CREATININE  --  0.63   GLUC  --  241*   PTT 19*  --    INR 0.86  --
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
## 2025-04-25 NOTE — ASSESSMENT & PLAN NOTE
Wt Readings from Last 3 Encounters:   04/24/25 99.3 kg (219 lb)   02/20/25 99.3 kg (219 lb)   02/11/25 99.3 kg (219 lb)   Follows outpatient with [facility]'s cardiology-last appointment 1/23/2025  Takes Entresto and spironolactone  Further management per primary

## 2025-04-25 NOTE — PROGRESS NOTES
Progress Note - Orthopedics   Name: Kamini Martines 75 y.o. female I MRN: [id number]  Unit/Bed#: S -01 I Date of Admission: 4/24/2025   Date of Service: 4/25/2025 I Hospital Day: 1    Assessment & Plan  Closed left hip fracture, initial encounter ([facility])  S/p open reduction internal fixation left peritrochanteric femur fracture with an intramedullary nail with Dr. Parry 4/25/2025 (POD 1).   Weight bearing as tolerated to the left lower extremity  Range of motion as tolerated.   Pain control per primary team  DVT ppx: recommend lovenox while in house, d/c on aspirin 81mg BID x 6 weeks.   Monitor for s/s ABLA, transfuse for hgb less than 7, per primary team.   Medical management per primary team.   Rest of care per primary team.   Should follow up with Dr. Parry upon discharge.   Urinary retention  Per primary team.   Fall  With above noted injuries.   Class 2 obesity in adult    I have discussed the above management plan in detail with the primary service.   Orthopedics service will follow.  Please contact the SecureChat role for the Orthopedics service with any questions/concerns.    Subjective   75 y.o.female seen and examined at bedside. Complains of left leg pain. No other complaints. Has not yet ambulated with PT yet.     Objective :  Temp:  [98.1 °F (36.7 °C)-99.1 °F (37.3 °C)] 98.6 °F (37 °C)  HR:  [72-92] 86  BP: ()/(49-70) 125/49  Resp:  [11-20] 15  SpO2:  [93 %-100 %] 94 %  O2 Device: None (Room air)  Nasal Cannula O2 Flow Rate (L/min):  [4 L/min] 4 L/min    Physical Exam  Musculoskeletal: Left lower extremity  Surgical dressings c/d/I without strike through  Thigh soft and compressible.   Motor intact to +FHL/EHL, +ankle dorsi/plantar flexion  Sensation intact to saphenous, sural, tibial, superficial peroneal nerve, and deep peroneal  2+ DP pulse  No calf swelling or tenderness to palpation      Lab Results: I have reviewed the following results:  Recent Labs     04/24/25  0023 04/24/25  0105  "04/24/25  0315   WBC 10.89*  --  18.00*   HGB 12.1  --  11.3*   HCT 37.3  --  35.1     --  314   BUN 36*  --  35*   CREATININE 0.65  --  0.63   PTT  --  19*  --    INR  --  0.86  --      Blood Culture:    Lab Results   Component Value Date    BLOODCX Staphylococcus coagulase negative (A) 02/12/2019     Wound Culture: No results found for: \"WOUNDCULT\"        "
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
## 2025-04-25 NOTE — ASSESSMENT & PLAN NOTE
S/p fall  CT -Comminuted intertrochanteric fracture of the left femur   Orthopedics was consulted and is following  Plan: S/p open reduction internal fixation left peritrochanteric femur fracture with intramedullary nail on 4/25/2025  Multimodal pain regimen including geriatric pain protocol  PT/OT consult   Management per orthopedics

## 2025-04-25 NOTE — ANESTHESIA POSTPROCEDURE EVALUATION
Post-Op Assessment Note    CV Status:  Stable    Pain management: adequate       Mental Status:  Alert and awake   Hydration Status:  Stable   PONV Controlled:  Controlled   Airway Patency:  Patent  Airway: intubated     Post Op Vitals Reviewed: Yes    No anethesia notable event occurred.    Staff: Anesthesiologist           Last Filed PACU Vitals:  Vitals Value Taken Time   Temp 98.7 °F (37.1 °C) 04/24/25 1945   Pulse 74 04/24/25 2011   /60 04/24/25 2010   Resp 14 04/24/25 2011   SpO2 100 % 04/24/25 2011   Vitals shown include unfiled device data.    Modified Mil:     Vitals Value Taken Time   Activity 1 04/24/25 1901   Respiration 2 04/24/25 1901   Circulation 2 04/24/25 1901   Consciousness 1 04/24/25 1901   Oxygen Saturation 2 04/24/25 1901     Modified Mil Score: 8

## 2025-04-26 LAB
ANION GAP SERPL CALCULATED.3IONS-SCNC: 8 MMOL/L (ref 4–13)
BACTERIA UR CULT: NORMAL
BUN SERPL-MCNC: 22 MG/DL (ref 5–25)
CALCIUM SERPL-MCNC: 8.9 MG/DL (ref 8.4–10.2)
CHLORIDE SERPL-SCNC: 106 MMOL/L (ref 96–108)
CO2 SERPL-SCNC: 25 MMOL/L (ref 21–32)
CREAT SERPL-MCNC: 0.52 MG/DL (ref 0.6–1.3)
ERYTHROCYTE [DISTWIDTH] IN BLOOD BY AUTOMATED COUNT: 13.6 % (ref 11.6–15.1)
GFR SERPL CREATININE-BSD FRML MDRD: 93 ML/MIN/1.73SQ M
GLUCOSE SERPL-MCNC: 206 MG/DL (ref 65–140)
GLUCOSE SERPL-MCNC: 217 MG/DL (ref 65–140)
GLUCOSE SERPL-MCNC: 247 MG/DL (ref 65–140)
GLUCOSE SERPL-MCNC: 252 MG/DL (ref 65–140)
GLUCOSE SERPL-MCNC: 255 MG/DL (ref 65–140)
HCT VFR BLD AUTO: 29.4 % (ref 34.8–46.1)
HGB BLD-MCNC: 9.4 G/DL (ref 11.5–15.4)
MCH RBC QN AUTO: 29.7 PG (ref 26.8–34.3)
MCHC RBC AUTO-ENTMCNC: 32 G/DL (ref 31.4–37.4)
MCV RBC AUTO: 93 FL (ref 82–98)
PLATELET # BLD AUTO: 244 THOUSANDS/UL (ref 149–390)
PMV BLD AUTO: 11 FL (ref 8.9–12.7)
POTASSIUM SERPL-SCNC: 4.1 MMOL/L (ref 3.5–5.3)
RBC # BLD AUTO: 3.17 MILLION/UL (ref 3.81–5.12)
SODIUM SERPL-SCNC: 139 MMOL/L (ref 135–147)
WBC # BLD AUTO: 12.16 THOUSAND/UL (ref 4.31–10.16)

## 2025-04-26 PROCEDURE — 80048 BASIC METABOLIC PNL TOTAL CA: CPT | Performed by: PHYSICIAN ASSISTANT

## 2025-04-26 PROCEDURE — 82948 REAGENT STRIP/BLOOD GLUCOSE: CPT

## 2025-04-26 PROCEDURE — 85027 COMPLETE CBC AUTOMATED: CPT | Performed by: PHYSICIAN ASSISTANT

## 2025-04-26 PROCEDURE — 99232 SBSQ HOSP IP/OBS MODERATE 35: CPT | Performed by: SURGERY

## 2025-04-26 PROCEDURE — 99024 POSTOP FOLLOW-UP VISIT: CPT | Performed by: PHYSICIAN ASSISTANT

## 2025-04-26 RX ORDER — INSULIN LISPRO 100 [IU]/ML
3 INJECTION, SOLUTION INTRAVENOUS; SUBCUTANEOUS
Status: DISCONTINUED | OUTPATIENT
Start: 2025-04-26 | End: 2025-04-28 | Stop reason: HOSPADM

## 2025-04-26 RX ORDER — METHOCARBAMOL 500 MG/1
500 TABLET, FILM COATED ORAL EVERY 6 HOURS PRN
Status: DISCONTINUED | OUTPATIENT
Start: 2025-04-26 | End: 2025-04-28 | Stop reason: HOSPADM

## 2025-04-26 RX ADMIN — OXYCODONE HYDROCHLORIDE 5 MG: 5 TABLET ORAL at 10:12

## 2025-04-26 RX ADMIN — INSULIN LISPRO 2 UNITS: 100 INJECTION, SOLUTION INTRAVENOUS; SUBCUTANEOUS at 08:35

## 2025-04-26 RX ADMIN — SACUBITRIL AND VALSARTAN 1 TABLET: 97; 103 TABLET, FILM COATED ORAL at 08:35

## 2025-04-26 RX ADMIN — ACETAMINOPHEN 975 MG: 325 TABLET, FILM COATED ORAL at 21:15

## 2025-04-26 RX ADMIN — SACUBITRIL AND VALSARTAN 1 TABLET: 97; 103 TABLET, FILM COATED ORAL at 16:41

## 2025-04-26 RX ADMIN — OXYCODONE HYDROCHLORIDE 5 MG: 5 TABLET ORAL at 19:48

## 2025-04-26 RX ADMIN — METHOCARBAMOL 500 MG: 500 TABLET ORAL at 10:43

## 2025-04-26 RX ADMIN — INSULIN LISPRO 3 UNITS: 100 INJECTION, SOLUTION INTRAVENOUS; SUBCUTANEOUS at 16:42

## 2025-04-26 RX ADMIN — METHOCARBAMOL 500 MG: 500 TABLET ORAL at 21:15

## 2025-04-26 RX ADMIN — ENOXAPARIN SODIUM 30 MG: 30 INJECTION SUBCUTANEOUS at 08:33

## 2025-04-26 RX ADMIN — INSULIN LISPRO 3 UNITS: 100 INJECTION, SOLUTION INTRAVENOUS; SUBCUTANEOUS at 16:41

## 2025-04-26 RX ADMIN — ACETAMINOPHEN 975 MG: 325 TABLET, FILM COATED ORAL at 05:16

## 2025-04-26 RX ADMIN — INSULIN LISPRO 3 UNITS: 100 INJECTION, SOLUTION INTRAVENOUS; SUBCUTANEOUS at 12:32

## 2025-04-26 RX ADMIN — PANTOPRAZOLE SODIUM 40 MG: 40 TABLET, DELAYED RELEASE ORAL at 05:17

## 2025-04-26 RX ADMIN — INSULIN LISPRO 3 UNITS: 100 INJECTION, SOLUTION INTRAVENOUS; SUBCUTANEOUS at 11:14

## 2025-04-26 RX ADMIN — ACETAMINOPHEN 975 MG: 325 TABLET, FILM COATED ORAL at 12:53

## 2025-04-26 RX ADMIN — FAMOTIDINE 20 MG: 20 TABLET, FILM COATED ORAL at 08:33

## 2025-04-26 RX ADMIN — GABAPENTIN 100 MG: 100 CAPSULE ORAL at 08:33

## 2025-04-26 RX ADMIN — OXYCODONE HYDROCHLORIDE 5 MG: 5 TABLET ORAL at 03:01

## 2025-04-26 RX ADMIN — GABAPENTIN 100 MG: 100 CAPSULE ORAL at 16:41

## 2025-04-26 RX ADMIN — POLYETHYLENE GLYCOL 3350 17 G: 17 POWDER, FOR SOLUTION ORAL at 08:33

## 2025-04-26 RX ADMIN — ENOXAPARIN SODIUM 30 MG: 30 INJECTION SUBCUTANEOUS at 21:15

## 2025-04-26 RX ADMIN — HYDROMORPHONE HYDROCHLORIDE 0.2 MG: 0.2 INJECTION, SOLUTION INTRAMUSCULAR; INTRAVENOUS; SUBCUTANEOUS at 05:19

## 2025-04-26 NOTE — ASSESSMENT & PLAN NOTE
S/p open reduction internal fixation left peritrochanteric femur fracture with an intramedullary nail with Dr. Parry 4/24/2025 (POD 1).   Weight bearing as tolerated to the left lower extremity  Range of motion as tolerated.   Monitor for s/s ABLA, hgb 9.4 this AM. transfuse for hgb less than 7, management per primary team.   Pain control per primary team  DVT ppx: recommend lovenox while in house, d/c on aspirin 81mg BID x 6 weeks.   Medical management per primary team.   Rest of care per primary team.   Outpatient f/u Dr. Parry 2 weeks post-op

## 2025-04-26 NOTE — DISCHARGE INSTR - AVS FIRST PAGE
Discharge Instructions - Orthopedics  Kamini Martines 75 y.o. female MRN: [id number]  Unit/Bed#: S -11    Weight Bearing Status:                                           Weightbearing as tolerated left lower extremity     DVT prophylaxis:  Aspirin 81 mg BID x 6 weeks     Pain:  Continue analgesics as directed    Dressing Instructions:   Please keep clean, dry and intact until follow up   -DO NOT REMOVE DRESSINGS OVER SURGICAL WOUNDS  -DO NOT SHOWER UNTIL AUTHORIZED BY OPERATING PHYSICIAN   -DO NOT REMOVE STAPLES/SUTURES UNLESS AUTHORIZED BY OPERATING PHYSICIAN  -If dressing become saturated/wet contact the treating orthopedic surgeon prior to removing dressings    Appt Instructions:   If you do not have your appointment, please call the clinic at [phone] to schedule with Dr. Parry  Otherwise follow up as scheduled.    Contact the office sooner if you experience any increased numbness/tingling in the extremities.      Miscellaneous: Follow-up in 2 weeks for staple removal and repeat x-ray

## 2025-04-26 NOTE — PROGRESS NOTES
Progress Note - Orthopedics   Name: Kamini Martines 75 y.o. female I MRN: [id number]  Unit/Bed#: S -01 I Date of Admission: 4/24/2025   Date of Service: 4/26/2025 I Hospital Day: 2    Assessment & Plan  Closed left hip fracture, initial encounter ([facility])  S/p open reduction internal fixation left peritrochanteric femur fracture with an intramedullary nail with Dr. Parry 4/24/2025 (POD 1).   Weight bearing as tolerated to the left lower extremity  Range of motion as tolerated.   Monitor for s/s ABLA, hgb 9.4 this AM. transfuse for hgb less than 7, management per primary team.   Pain control per primary team  DVT ppx: recommend lovenox while in house, d/c on aspirin 81mg BID x 6 weeks.   Medical management per primary team.   Rest of care per primary team.   Outpatient f/u Dr. Parry 2 weeks post-op  Urinary retention  Per primary team.   Fall  With above noted injuries.     Medical co-morbidities include urinary retention, HTN which are being managed per primary team. Orthopedics service will follow.  Please contact the SecureChat role for the Orthopedics service with any questions/concerns.    Subjective   75 y.o.female seen and examined bedside this morning. No acute events, no new complaints. Pain well controlled, notes pain is improved this AM, was happy she did not have pain when moving her leg today. Denies fevers, chills, CP, SOB, N/V, numbness or tingling. Patient reports she wants to work with PT today to move to the chair.     Objective :  Temp:  [98.4 °F (36.9 °C)-99.4 °F (37.4 °C)] 98.4 °F (36.9 °C)  HR:  [84-99] 84  BP: (109-146)/(42-60) 116/59  Resp:  [17-18] 18  SpO2:  [92 %-96 %] 96 %  O2 Device: None (Room air)    Physical Exam  Constitutional:       General: She is not in acute distress.     Appearance: She is not ill-appearing.   Pulmonary:      Effort: Pulmonary effort is normal. No respiratory distress.   Skin:     Capillary Refill: Capillary refill takes less than 2 seconds.  "  Neurological:      General: No focal deficit present.      Mental Status: She is alert. Mental status is at baseline.       Musculoskeletal: Left lower extremity  Skin intact . No erythema or ecchymosis.  No ttp of thigh  Muscles of thigh and calf soft and compressible   Dressing C/D/I without strikethrough   Motor intact to +FHL/EHL, +ankle dorsi/plantar flexion  Sensation intact to saphenous, sural, tibial, superficial peroneal nerve, and deep peroneal  2+ DP pulse  No calf swelling or tenderness to palpation      Lab Results: I have reviewed the following results:  Recent Labs     04/24/25  0105 04/24/25  0315 04/25/25  0955 04/26/25  0502   WBC  --  18.00* 11.06* 12.16*   HGB  --  11.3* 9.3* 9.4*   HCT  --  35.1 29.8* 29.4*   PLT  --  314 261 244   BUN  --  35* 27* 22   CREATININE  --  0.63 0.99 0.52*   PTT 19*  --   --   --    INR 0.86  --   --   --      Blood Culture:    Lab Results   Component Value Date    BLOODCX Staphylococcus coagulase negative (A) 02/12/2019     Wound Culture: No results found for: \"WOUNDCULT\"    Imaging Results Review: No pertinent imaging studies reviewed.  Other Study Results Review: No additional pertinent studies reviewed.    Suad Tran PA-C  "

## 2025-04-26 NOTE — PROGRESS NOTES
Progress Note - Trauma   Name: Kamini Martines 75 y.o. female I MRN: [id number]  Unit/Bed#: S -01 I Date of Admission: 4/24/2025   Date of Service: 4/26/2025 I Hospital Day: 2    Assessment & Plan  Closed left hip fracture, initial encounter (HCC)  - Comminuted intertrochanteric femur fracture, present on admission.  - Status post IMN on 4/24.  - Appreciate Orthopedic surgery evaluation, recommendations and interventions as noted.  - Maintain weightbearing as tolerated status on the left lower extremity .  - Monitor left lower extremity neurovascular exam.  - Continue multimodal analgesic regimen.  - Continue DVT prophylaxis.  - PT and OT evaluation and treatment as indicated.  - Outpatient follow up with Orthopedic surgery for re-evaluation.    Fall  - Status post mechanical fall with the below noted injuries.  - Fall precautions.  - Geriatric Medicine consultation for evaluation, medication review and recommendations.  - PT and OT evaluation and treatment as indicated.  - Case Management consultation for disposition planning.    Bowel Regimen: Miralax  VTE Prophylaxis:VTE covered by:  enoxaparin, Subcutaneous, 30 mg at 04/26/25 0833        Disposition: Medically stable for discharge pending placement Please contact the SecureChat role for the Trauma service with any questions/concerns.s.    24 Hour Events : No overnight events  Subjective : Patient reports she had spasms in her upper left leg overnight which resolved on their own. She is tolerating her diet and was able to ambulate to the chair with nursing staff today.     Objective :  Temp:  [98.4 °F (36.9 °C)-99.4 °F (37.4 °C)] 98.4 °F (36.9 °C)  HR:  [84-99] 84  BP: (109-146)/(42-60) 116/59  Resp:  [17-18] 18  SpO2:  [92 %-96 %] 96 %  O2 Device: None (Room air)    I/O         04/24 0701 04/25 0700 04/25 0701 04/26 0700 04/26 0701 04/27 0700    I.V. (mL/kg) 400 (4)      IV Piggyback       Total Intake(mL/kg) 400 (4)      Urine (mL/kg/hr) 1070 (0.4) 1125  (0.5)     Blood 10      Total Output 8720 5796     Net -519 -1632                  Lines/Drains/Airways       Active Status       Name Placement date Placement time Site Days    Urethral Catheter 16 Fr. 04/25/25  0435  --  1                  Gen: No acute distress sitting up in chair  Neuro: AAOx3, GCS 15, no focal neurodeficit  HEENT: PERRLA, EOMI, mucous membranes moist  Cards: RRR, S1, S2 without murmur rub or gallop  Pulm: Clear to auscultation bilaterally without wheezes rales or rhonchi  GI: Soft, nontender, nondistended  : Voiding independently  MSK: Left thigh compartments soft and compressible   Skin: Warm, dry, intact           Lab Results: I have reviewed the following results:  Recent Labs     04/24/25  0105 04/24/25  0315 04/26/25  0502   WBC  --    < > 12.16*   HGB  --    < > 9.4*   HCT  --    < > 29.4*   PLT  --    < > 244   SODIUM  --    < > 139   K  --    < > 4.1   CL  --    < > 106   CO2  --    < > 25   BUN  --    < > 22   CREATININE  --    < > 0.52*   GLUC  --    < > 206*   PTT 19*  --   --    INR 0.86  --   --     < > = values in this interval not displayed.

## 2025-04-27 LAB
ANION GAP SERPL CALCULATED.3IONS-SCNC: 9 MMOL/L (ref 4–13)
BUN SERPL-MCNC: 27 MG/DL (ref 5–25)
CALCIUM SERPL-MCNC: 9.1 MG/DL (ref 8.4–10.2)
CHLORIDE SERPL-SCNC: 105 MMOL/L (ref 96–108)
CO2 SERPL-SCNC: 24 MMOL/L (ref 21–32)
CREAT SERPL-MCNC: 0.55 MG/DL (ref 0.6–1.3)
ERYTHROCYTE [DISTWIDTH] IN BLOOD BY AUTOMATED COUNT: 13.5 % (ref 11.6–15.1)
GFR SERPL CREATININE-BSD FRML MDRD: 92 ML/MIN/1.73SQ M
GLUCOSE SERPL-MCNC: 184 MG/DL (ref 65–140)
GLUCOSE SERPL-MCNC: 203 MG/DL (ref 65–140)
GLUCOSE SERPL-MCNC: 207 MG/DL (ref 65–140)
GLUCOSE SERPL-MCNC: 239 MG/DL (ref 65–140)
GLUCOSE SERPL-MCNC: 251 MG/DL (ref 65–140)
HCT VFR BLD AUTO: 27.7 % (ref 34.8–46.1)
HGB BLD-MCNC: 9.1 G/DL (ref 11.5–15.4)
MCH RBC QN AUTO: 29.9 PG (ref 26.8–34.3)
MCHC RBC AUTO-ENTMCNC: 32.9 G/DL (ref 31.4–37.4)
MCV RBC AUTO: 91 FL (ref 82–98)
PLATELET # BLD AUTO: 254 THOUSANDS/UL (ref 149–390)
PMV BLD AUTO: 10.6 FL (ref 8.9–12.7)
POTASSIUM SERPL-SCNC: 4.1 MMOL/L (ref 3.5–5.3)
RBC # BLD AUTO: 3.04 MILLION/UL (ref 3.81–5.12)
SODIUM SERPL-SCNC: 138 MMOL/L (ref 135–147)
WBC # BLD AUTO: 12.95 THOUSAND/UL (ref 4.31–10.16)

## 2025-04-27 PROCEDURE — 85027 COMPLETE CBC AUTOMATED: CPT | Performed by: PHYSICIAN ASSISTANT

## 2025-04-27 PROCEDURE — 80048 BASIC METABOLIC PNL TOTAL CA: CPT | Performed by: PHYSICIAN ASSISTANT

## 2025-04-27 PROCEDURE — 99024 POSTOP FOLLOW-UP VISIT: CPT | Performed by: PHYSICIAN ASSISTANT

## 2025-04-27 PROCEDURE — 82948 REAGENT STRIP/BLOOD GLUCOSE: CPT

## 2025-04-27 RX ADMIN — INSULIN LISPRO 3 UNITS: 100 INJECTION, SOLUTION INTRAVENOUS; SUBCUTANEOUS at 16:26

## 2025-04-27 RX ADMIN — ENOXAPARIN SODIUM 30 MG: 30 INJECTION SUBCUTANEOUS at 08:42

## 2025-04-27 RX ADMIN — OXYCODONE HYDROCHLORIDE 5 MG: 5 TABLET ORAL at 19:33

## 2025-04-27 RX ADMIN — ENOXAPARIN SODIUM 30 MG: 30 INJECTION SUBCUTANEOUS at 21:13

## 2025-04-27 RX ADMIN — FAMOTIDINE 20 MG: 20 TABLET, FILM COATED ORAL at 08:42

## 2025-04-27 RX ADMIN — ACETAMINOPHEN 975 MG: 325 TABLET, FILM COATED ORAL at 21:13

## 2025-04-27 RX ADMIN — METHOCARBAMOL 500 MG: 500 TABLET ORAL at 19:57

## 2025-04-27 RX ADMIN — GABAPENTIN 100 MG: 100 CAPSULE ORAL at 08:42

## 2025-04-27 RX ADMIN — OXYCODONE HYDROCHLORIDE 5 MG: 5 TABLET ORAL at 00:14

## 2025-04-27 RX ADMIN — INSULIN LISPRO 3 UNITS: 100 INJECTION, SOLUTION INTRAVENOUS; SUBCUTANEOUS at 08:43

## 2025-04-27 RX ADMIN — POLYETHYLENE GLYCOL 3350 17 G: 17 POWDER, FOR SOLUTION ORAL at 08:42

## 2025-04-27 RX ADMIN — PANTOPRAZOLE SODIUM 40 MG: 40 TABLET, DELAYED RELEASE ORAL at 06:00

## 2025-04-27 RX ADMIN — SACUBITRIL AND VALSARTAN 1 TABLET: 97; 103 TABLET, FILM COATED ORAL at 08:43

## 2025-04-27 RX ADMIN — INSULIN LISPRO 3 UNITS: 100 INJECTION, SOLUTION INTRAVENOUS; SUBCUTANEOUS at 11:11

## 2025-04-27 RX ADMIN — SACUBITRIL AND VALSARTAN 1 TABLET: 97; 103 TABLET, FILM COATED ORAL at 16:26

## 2025-04-27 RX ADMIN — INSULIN LISPRO 2 UNITS: 100 INJECTION, SOLUTION INTRAVENOUS; SUBCUTANEOUS at 08:43

## 2025-04-27 RX ADMIN — ACETAMINOPHEN 975 MG: 325 TABLET, FILM COATED ORAL at 06:00

## 2025-04-27 RX ADMIN — GABAPENTIN 100 MG: 100 CAPSULE ORAL at 16:25

## 2025-04-27 RX ADMIN — ACETAMINOPHEN 975 MG: 325 TABLET, FILM COATED ORAL at 13:38

## 2025-04-27 NOTE — ASSESSMENT & PLAN NOTE
POD 3 S/p open reduction internal fixation left peritrochanteric femur fracture with an intramedullary nail with Dr. Parry 4/24/2025   Weight bearing as tolerated to the left lower extremity  Range of motion as tolerated.   Monitor for s/s ABLA, hgb 9.1 this AM. transfuse for hgb less than 7, management per primary team.   Pain control per primary team  DVT ppx: recommend lovenox while in house, d/c on aspirin 81mg BID x 6 weeks.   Medical management per primary team.   Rest of care per primary team.   Outpatient f/u Dr. Parry 2 weeks post-op

## 2025-04-27 NOTE — PROGRESS NOTES
Progress Note - Orthopedics   Name: Kamini Martines 75 y.o. female I MRN: [id number]  Unit/Bed#: S -01 I Date of Admission: 4/24/2025   Date of Service: 4/27/2025 I Hospital Day: 3    Assessment & Plan  Closed left hip fracture, initial encounter ([facility])  POD 3 S/p open reduction internal fixation left peritrochanteric femur fracture with an intramedullary nail with Dr. Parry 4/24/2025   Weight bearing as tolerated to the left lower extremity  Range of motion as tolerated.   Monitor for s/s ABLA, hgb 9.1 this AM. transfuse for hgb less than 7, management per primary team.   Pain control per primary team  DVT ppx: recommend lovenox while in house, d/c on aspirin 81mg BID x 6 weeks.   Medical management per primary team.   Rest of care per primary team.   Outpatient f/u Dr. Parry 2 weeks post-op  Urinary retention  Per primary team.   Fall  With above noted injuries.     Medical co-morbidities include urinary retention, HTN , which are being managed per primary team. Orthopedics service will follow.  Please contact the SecureChat role for the Orthopedics service with any questions/concerns.    Subjective   75 y.o.female seen and examined bedside today. No acute events, no new complaints. Pain well controlled currently but notes discomfort with movement. Denies numbness or tingling. Denies chest pain/SOB. Patient reports no issues with urination or bowel movements. Eager to work with PT more.     Objective :  Temp:  [98.7 °F (37.1 °C)-99.7 °F (37.6 °C)] 98.8 °F (37.1 °C)  HR:  [88-97] 88  BP: (122-143)/(51-60) 143/60  Resp:  [17-18] 17  SpO2:  [92 %-97 %] 92 %  O2 Device: None (Room air)    Physical Exam  Vitals and nursing note reviewed.   Constitutional:       General: She is not in acute distress.     Appearance: She is obese. She is not ill-appearing or diaphoretic.      Comments: Patient sitting comfortably in bedside chair. Family bedside    Pulmonary:      Effort: Pulmonary effort is normal. No  "respiratory distress.   Skin:     Capillary Refill: Capillary refill takes less than 2 seconds.   Neurological:      General: No focal deficit present.      Mental Status: She is alert and oriented to person, place, and time. Mental status is at baseline.       Musculoskeletal: Left lower extremity  Skin intact. No erythema or ecchymosis.  Dressing C/D/I without strikethrough  No ttp  Muscles of thigh and calf soft and compressible   Motor intact to +FHL/EHL, +ankle dorsi/plantar flexion  Sensation intact to saphenous, sural, tibial, superficial peroneal nerve, and deep peroneal  2+ DP pulse  No calf swelling or tenderness to palpation      Lab Results: I have reviewed the following results:  Recent Labs     04/25/25  0955 04/26/25  0502 04/27/25  0449   WBC 11.06* 12.16* 12.95*   HGB 9.3* 9.4* 9.1*   HCT 29.8* 29.4* 27.7*    244 254   BUN 27* 22 27*   CREATININE 0.99 0.52* 0.55*     Blood Culture:    Lab Results   Component Value Date    BLOODCX Staphylococcus coagulase negative (A) 02/12/2019     Wound Culture: No results found for: \"WOUNDCULT\"    Imaging Results Review: No pertinent imaging studies reviewed.  Other Study Results Review: No additional pertinent studies reviewed.    Suad Tran PA-C  "

## 2025-04-27 NOTE — PLAN OF CARE
Problem: MUSCULOSKELETAL - ADULT  Goal: Maintain or return mobility to safest level of function  Description: INTERVENTIONS:- Assess patient's ability to carry out ADLs; assess patient's baseline for ADL function and identify physical deficits which impact ability to perform ADLs (bathing, care of mouth/teeth, toileting, grooming, dressing, etc.)- Assess/evaluate cause of self-care deficits - Assess range of motion- Assess patient's mobility- Assess patient's need for assistive devices and provide as appropriate- Encourage maximum independence but intervene and supervise when necessary- Involve family in performance of ADLs- Assess for home care needs following discharge - Consider OT consult to assist with ADL evaluation and planning for discharge- Provide patient education as appropriate  Outcome: Progressing  Goal: Maintain proper alignment of affected body part  Description: INTERVENTIONS:- Support, maintain and protect limb and body alignment- Provide patient/ family with appropriate education  Outcome: Progressing         Problem: SAFETY ADULT  Goal: Maintain or return to baseline ADL function  Description: INTERVENTIONS:-  Assess patient's ability to carry out ADLs; assess patient's baseline for ADL function and identify physical deficits which impact ability to perform ADLs (bathing, care of mouth/teeth, toileting, grooming, dressing, etc.)- Assess/evaluate cause of self-care deficits - Assess range of motion- Assess patient's mobility; develop plan if impaired- Assess patient's need for assistive devices and provide as appropriate- Encourage maximum independence but intervene and supervise when necessary- Involve family in performance of ADLs- Assess for home care needs following discharge - Consider OT consult to assist with ADL evaluation and planning for discharge- Provide patient education as appropriate  4/26/2025 2346 by Elke Cedeno RN  Outcome: Progressing  4/26/2025 2345 by Elke Cedeno  RN  Outcome: Progressing     Problem: SAFETY ADULT  Goal: Patient will remain free of falls  Description: INTERVENTIONS:- Educate patient/family on patient safety including physical limitations- Instruct patient to call for assistance with activity - Consult OT/PT to assist with strengthening/mobility - Keep Call bell within reach- Keep bed low and locked with side rails adjusted as appropriate- Keep care items and personal belongings within reach- Initiate and maintain comfort rounds- Make Fall Risk Sign visible to staff- Offer Toileting every 2 Hours, in advance of need- Initiate/Maintain bed alarm- Obtain necessary fall risk management equipment: - Apply yellow socks and bracelet for high fall risk patients- Consider moving patient to room near nurses station  INTERVENTIONS:- Educate patient/family on patient safety including physical limitations- Instruct patient to call for assistance with activity - Consult OT/PT to assist with strengthening/mobility - Keep Call bell within reach- Keep bed low and locked with side rails adjusted as appropriate- Keep care items and personal belongings within reach- Initiate and maintain comfort rounds- Make Fall Risk Sign visible to staff- Offer Toileting every 2 Hours, in advance of need- Initiate/Maintain bed alarm- Obtain necessary fall risk management equipment: - Apply yellow socks and bracelet for high fall risk patients- Consider moving patient to room near nurses station  4/26/2025 2346 by Elke Cedeno RN  Outcome: Progressing  4/26/2025 2345 by Elke Cedeno RN  Outcome: Progressing        Problem: PAIN - ADULT  Goal: Verbalizes/displays adequate comfort level or baseline comfort level  Description: Interventions:- Encourage patient to monitor pain and request assistance- Assess pain using appropriate pain scale- Administer analgesics based on type and severity of pain and evaluate response- Implement non-pharmacological measures as appropriate and evaluate  response- Consider cultural and social influences on pain and pain management- Notify physician/advanced practitioner if interventions unsuccessful or patient reports new pain  Interventions:- Encourage patient to monitor pain and request assistance- Assess pain using appropriate pain scale- Administer analgesics based on type and severity of pain and evaluate response- Implement non-pharmacological measures as appropriate and evaluate response- Consider cultural and social influences on pain and pain management- Notify physician/advanced practitioner if interventions unsuccessful or patient reports new pain  4/26/2025 2347 by Elke Cedeno RN  Outcome: Progressing  4/26/2025 2346 by Elke Cedeno RN  Outcome: Progressing  4/26/2025 2345 by Elke Cedeno RN  Outcome: Progressing         Problem: DISCHARGE PLANNING  Goal: Discharge to home or other facility with appropriate resources  Description: INTERVENTIONS:- Identify barriers to discharge w/patient and caregiver- Arrange for needed discharge resources and transportation as appropriate- Identify discharge learning needs (meds, wound care, etc.)- Arrange for interpretive services to assist at discharge as needed- Refer to Case Management Department for coordinating discharge planning if the patient needs post-hospital services based on physician/advanced practitioner order or complex needs related to functional status, cognitive ability, or social support system  INTERVENTIONS:- Identify barriers to discharge w/patient and caregiver- Arrange for needed discharge resources and transportation as appropriate- Identify discharge learning needs (meds, wound care, etc.)- Arrange for interpretive services to assist at discharge as needed- Refer to Case Management Department for coordinating discharge planning if the patient needs post-hospital services based on physician/advanced practitioner order or complex needs related to functional status, cognitive ability, or  social support system  4/26/2025 2347 by Elke Cedeno RN  Outcome: Progressing  4/26/2025 2346 by Elke Cedeno RN  Outcome: Progressing  4/26/2025 2345 by Elke Cedeno RN  Outcome: Progressing     Problem: Knowledge Deficit  Goal: Patient/family/caregiver demonstrates understanding of disease process, treatment plan, medications, and discharge instructions  Description: Complete learning assessment and assess knowledge base.Interventions:- Provide teaching at level of understanding- Provide teaching via preferred learning methods  Complete learning assessment and assess knowledge base.Interventions:- Provide teaching at level of understanding- Provide teaching via preferred learning methods  4/26/2025 2347 by Elke Cedeno RN  Outcome: Progressing  4/26/2025 2346 by Elke Cedeno RN  Outcome: Progressing  4/26/2025 2345 by Elke Cedeno RN  Outcome: Progressing

## 2025-04-27 NOTE — PROGRESS NOTES
"Progress Note - Trauma   Name: Kamini Martines 75 y.o. female I MRN: [id number]  Unit/Bed#: S -01 I Date of Admission: 4/24/2025   Date of Service: 4/27/2025 I Hospital Day: 3    Assessment & Plan  Closed left hip fracture, initial encounter (HCC)  - Comminuted intertrochanteric femur fracture, present on admission.  - Status post IMN on 4/24.  - Appreciate Orthopedic surgery evaluation, recommendations and interventions as noted.  - Maintain weightbearing as tolerated status on the left lower extremity .  - Monitor left lower extremity neurovascular exam.  - Continue multimodal analgesic regimen.  - Continue DVT prophylaxis.  - PT and OT evaluation and treatment as indicated.  - Outpatient follow up with Orthopedic surgery for re-evaluation.    Fall  - Status post mechanical fall with the below noted injuries.  - Fall precautions.  - Geriatric Medicine consultation for evaluation, medication review and recommendations.  - PT and OT evaluation and treatment as indicated.  - Case Management consultation for disposition planning.   HTN (hypertension)  Continue home medications of Aldactone and Entresto.   Urinary retention  Adams catheter in place   -Retention protocol ordered.    Bowel Regimen: Miralax  VTE Prophylaxis:VTE covered by:  enoxaparin, Subcutaneous, 30 mg at 04/26/25 2115        Disposition: Staying Please contact the SecureChat role for the Trauma service with any questions/concerns.    24 Hour Events : NAEON  Subjective : \"Doing fine\"    Objective :  Temp:  [98.4 °F (36.9 °C)-99.7 °F (37.6 °C)] 98.9 °F (37.2 °C)  HR:  [84-99] 89  BP: (109-139)/(42-60) 139/53  Resp:  [17-18] 17  SpO2:  [92 %-97 %] 97 %  O2 Device: None (Room air)    I/O         04/25 0701  04/26 0700 04/26 0701  04/27 0700    P.O.  240    Total Intake(mL/kg)  240 (2.4)    Urine (mL/kg/hr) 1125 (0.5) 775 (0.3)    Total Output 1125 775    Net -1121 -773                Lines/Drains/Airways       Active Status       Name Placement date " Placement time Site Days    Urethral Catheter 16 Fr. 04/25/25  0435  --  2                  Physical Exam  Vitals and nursing note reviewed.   Constitutional:       Appearance: Normal appearance.   HENT:      Head: Normocephalic and atraumatic.      Right Ear: External ear normal.      Left Ear: External ear normal.      Nose: Nose normal.      Mouth/Throat:      Mouth: Mucous membranes are moist.      Pharynx: Oropharynx is clear.   Eyes:      Extraocular Movements: Extraocular movements intact.      Conjunctiva/sclera: Conjunctivae normal.   Cardiovascular:      Rate and Rhythm: Normal rate.   Pulmonary:      Effort: Pulmonary effort is normal.   Musculoskeletal:         General: Normal range of motion.      Cervical back: Normal range of motion and neck supple.      Comments: L foot NVI   Skin:     General: Skin is warm and dry.   Neurological:      General: No focal deficit present.      Mental Status: She is alert.   Psychiatric:         Mood and Affect: Mood normal.         Behavior: Behavior normal.               Lab Results: I have reviewed the following results:  Recent Labs     04/27/25  0449   WBC 12.95*   HGB 9.1*   HCT 27.7*      SODIUM 138   K 4.1      CO2 24   BUN 27*   CREATININE 0.55*   GLUC 184*       Imaging Results Review: No pertinent imaging studies reviewed.  Other Study Results Review: No additional pertinent studies reviewed.

## 2025-04-28 ENCOUNTER — HOSPITAL ENCOUNTER (INPATIENT)
Facility: HOSPITAL | Age: 75
LOS: 14 days | Discharge: HOME HEALTH | DRG: 560 | End: 2025-05-12
Attending: INTERNAL MEDICINE | Admitting: INTERNAL MEDICINE
Payer: MEDICARE

## 2025-04-28 VITALS
RESPIRATION RATE: 18 BRPM | WEIGHT: 219 LBS | TEMPERATURE: 98.9 F | DIASTOLIC BLOOD PRESSURE: 62 MMHG | SYSTOLIC BLOOD PRESSURE: 150 MMHG | HEIGHT: 65 IN | HEART RATE: 91 BPM | OXYGEN SATURATION: 96 % | BODY MASS INDEX: 36.49 KG/M2

## 2025-04-28 DIAGNOSIS — R13.19 ESOPHAGEAL DYSPHAGIA: ICD-10-CM

## 2025-04-28 DIAGNOSIS — N20.0 KIDNEY STONES: ICD-10-CM

## 2025-04-28 DIAGNOSIS — E56.9 VITAMIN DEFICIENCY: ICD-10-CM

## 2025-04-28 DIAGNOSIS — J31.0 NONALLERGIC RHINITIS: ICD-10-CM

## 2025-04-28 DIAGNOSIS — J45.20 MILD INTERMITTENT ASTHMA WITHOUT COMPLICATION: ICD-10-CM

## 2025-04-28 DIAGNOSIS — Z91.89 AT RISK FOR CONSTIPATION: ICD-10-CM

## 2025-04-28 DIAGNOSIS — I50.22 CHRONIC SYSTOLIC CONGESTIVE HEART FAILURE (HCC): ICD-10-CM

## 2025-04-28 DIAGNOSIS — S72.002A CLOSED FRACTURE OF LEFT HIP, INITIAL ENCOUNTER (HCC): ICD-10-CM

## 2025-04-28 DIAGNOSIS — E11.9 TYPE 2 DIABETES MELLITUS WITHOUT COMPLICATION, WITHOUT LONG-TERM CURRENT USE OF INSULIN (HCC): ICD-10-CM

## 2025-04-28 DIAGNOSIS — K21.9 GASTROESOPHAGEAL REFLUX DISEASE, UNSPECIFIED WHETHER ESOPHAGITIS PRESENT: ICD-10-CM

## 2025-04-28 DIAGNOSIS — E83.42 HYPOMAGNESEMIA: ICD-10-CM

## 2025-04-28 DIAGNOSIS — R26.2 AMBULATORY DYSFUNCTION: Primary | ICD-10-CM

## 2025-04-28 DIAGNOSIS — M80.00XA OSTEOPOROSIS WITH CURRENT PATHOLOGICAL FRACTURE, UNSPECIFIED OSTEOPOROSIS TYPE, INITIAL ENCOUNTER: ICD-10-CM

## 2025-04-28 DIAGNOSIS — I63.9 CEREBROVASCULAR ACCIDENT (CVA), UNSPECIFIED MECHANISM (HCC): ICD-10-CM

## 2025-04-28 DIAGNOSIS — I50.22 CHRONIC SYSTOLIC (CONGESTIVE) HEART FAILURE (HCC): ICD-10-CM

## 2025-04-28 DIAGNOSIS — S72.002A CLOSED LEFT HIP FRACTURE, INITIAL ENCOUNTER (HCC): ICD-10-CM

## 2025-04-28 PROBLEM — I25.10 CAD (CORONARY ARTERY DISEASE): Chronic | Status: ACTIVE | Noted: 2025-04-28

## 2025-04-28 PROBLEM — I50.20 HFREF (HEART FAILURE WITH REDUCED EJECTION FRACTION) (HCC): Chronic | Status: ACTIVE | Noted: 2025-04-28

## 2025-04-28 LAB
GLUCOSE SERPL-MCNC: 185 MG/DL (ref 65–140)
GLUCOSE SERPL-MCNC: 199 MG/DL (ref 65–140)
GLUCOSE SERPL-MCNC: 230 MG/DL (ref 65–140)
GLUCOSE SERPL-MCNC: 280 MG/DL (ref 65–140)

## 2025-04-28 PROCEDURE — 99024 POSTOP FOLLOW-UP VISIT: CPT | Performed by: PHYSICIAN ASSISTANT

## 2025-04-28 PROCEDURE — NC001 PR NO CHARGE: Performed by: SURGERY

## 2025-04-28 PROCEDURE — 99223 1ST HOSP IP/OBS HIGH 75: CPT | Performed by: INTERNAL MEDICINE

## 2025-04-28 PROCEDURE — 97116 GAIT TRAINING THERAPY: CPT

## 2025-04-28 PROCEDURE — 82948 REAGENT STRIP/BLOOD GLUCOSE: CPT

## 2025-04-28 PROCEDURE — 97535 SELF CARE MNGMENT TRAINING: CPT

## 2025-04-28 PROCEDURE — NC001 PR NO CHARGE: Performed by: STUDENT IN AN ORGANIZED HEALTH CARE EDUCATION/TRAINING PROGRAM

## 2025-04-28 PROCEDURE — 97530 THERAPEUTIC ACTIVITIES: CPT

## 2025-04-28 PROCEDURE — 99238 HOSP IP/OBS DSCHRG MGMT 30/<: CPT | Performed by: PHYSICIAN ASSISTANT

## 2025-04-28 RX ORDER — GABAPENTIN 100 MG/1
100 CAPSULE ORAL 2 TIMES DAILY
Qty: 180 CAPSULE | Refills: 0 | Status: ON HOLD | OUTPATIENT
Start: 2025-04-28

## 2025-04-28 RX ORDER — ACETAMINOPHEN 325 MG/1
975 TABLET ORAL EVERY 8 HOURS SCHEDULED
Status: DISCONTINUED | OUTPATIENT
Start: 2025-04-28 | End: 2025-05-12 | Stop reason: HOSPADM

## 2025-04-28 RX ORDER — POLYETHYLENE GLYCOL 3350 17 G/17G
17 POWDER, FOR SOLUTION ORAL DAILY
Status: DISCONTINUED | OUTPATIENT
Start: 2025-04-29 | End: 2025-05-04

## 2025-04-28 RX ORDER — SPIRONOLACTONE 25 MG/1
25 TABLET ORAL DAILY
Status: DISCONTINUED | OUTPATIENT
Start: 2025-04-29 | End: 2025-04-28

## 2025-04-28 RX ORDER — METHOCARBAMOL 500 MG/1
500 TABLET, FILM COATED ORAL EVERY 6 HOURS PRN
Status: DISCONTINUED | OUTPATIENT
Start: 2025-04-28 | End: 2025-05-12 | Stop reason: HOSPADM

## 2025-04-28 RX ORDER — ACETAMINOPHEN 325 MG/1
975 TABLET ORAL EVERY 8 HOURS SCHEDULED
Status: ON HOLD
Start: 2025-04-28

## 2025-04-28 RX ORDER — ENOXAPARIN SODIUM 100 MG/ML
30 INJECTION SUBCUTANEOUS EVERY 12 HOURS
Status: DISCONTINUED | OUTPATIENT
Start: 2025-04-28 | End: 2025-05-12 | Stop reason: HOSPADM

## 2025-04-28 RX ORDER — FAMOTIDINE 20 MG/1
20 TABLET, FILM COATED ORAL DAILY
Status: DISCONTINUED | OUTPATIENT
Start: 2025-04-29 | End: 2025-04-29

## 2025-04-28 RX ORDER — MAGNESIUM HYDROXIDE/ALUMINUM HYDROXICE/SIMETHICONE 120; 1200; 1200 MG/30ML; MG/30ML; MG/30ML
30 SUSPENSION ORAL EVERY 4 HOURS PRN
Status: DISCONTINUED | OUTPATIENT
Start: 2025-04-28 | End: 2025-05-12 | Stop reason: HOSPADM

## 2025-04-28 RX ORDER — INSULIN LISPRO 100 [IU]/ML
1-6 INJECTION, SOLUTION INTRAVENOUS; SUBCUTANEOUS
Status: DISCONTINUED | OUTPATIENT
Start: 2025-04-28 | End: 2025-05-12 | Stop reason: HOSPADM

## 2025-04-28 RX ORDER — OXYCODONE HYDROCHLORIDE 5 MG/1
5 TABLET ORAL EVERY 4 HOURS PRN
Refills: 0 | Status: DISCONTINUED | OUTPATIENT
Start: 2025-04-28 | End: 2025-05-12 | Stop reason: HOSPADM

## 2025-04-28 RX ORDER — PANTOPRAZOLE SODIUM 40 MG/1
40 TABLET, DELAYED RELEASE ORAL
Status: ON HOLD
Start: 2025-04-29

## 2025-04-28 RX ORDER — FAMOTIDINE 20 MG/1
20 TABLET, FILM COATED ORAL DAILY
Status: ON HOLD
Start: 2025-04-29

## 2025-04-28 RX ORDER — ALBUTEROL SULFATE 90 UG/1
2 INHALANT RESPIRATORY (INHALATION) EVERY 6 HOURS PRN
Status: DISCONTINUED | OUTPATIENT
Start: 2025-04-28 | End: 2025-05-12 | Stop reason: HOSPADM

## 2025-04-28 RX ORDER — ASPIRIN 81 MG/1
81 TABLET ORAL 2 TIMES DAILY
Qty: 84 TABLET | Refills: 0 | Status: ON HOLD | OUTPATIENT
Start: 2025-04-28 | End: 2025-06-09

## 2025-04-28 RX ORDER — INSULIN GLARGINE 100 [IU]/ML
8 INJECTION, SOLUTION SUBCUTANEOUS
Status: DISCONTINUED | OUTPATIENT
Start: 2025-04-28 | End: 2025-04-30

## 2025-04-28 RX ORDER — INSULIN LISPRO 100 [IU]/ML
3 INJECTION, SOLUTION INTRAVENOUS; SUBCUTANEOUS
Status: DISCONTINUED | OUTPATIENT
Start: 2025-04-28 | End: 2025-05-07

## 2025-04-28 RX ORDER — SPIRONOLACTONE 25 MG/1
12.5 TABLET ORAL DAILY
Status: DISCONTINUED | OUTPATIENT
Start: 2025-04-29 | End: 2025-05-01

## 2025-04-28 RX ORDER — ASPIRIN 325 MG
325 TABLET ORAL DAILY
Status: DISCONTINUED | OUTPATIENT
Start: 2025-04-29 | End: 2025-05-12 | Stop reason: HOSPADM

## 2025-04-28 RX ORDER — INSULIN LISPRO 100 [IU]/ML
3 INJECTION, SOLUTION INTRAVENOUS; SUBCUTANEOUS
Status: ON HOLD
Start: 2025-04-28

## 2025-04-28 RX ORDER — INSULIN LISPRO 100 [IU]/ML
1-5 INJECTION, SOLUTION INTRAVENOUS; SUBCUTANEOUS
Status: DISCONTINUED | OUTPATIENT
Start: 2025-04-28 | End: 2025-05-12 | Stop reason: HOSPADM

## 2025-04-28 RX ORDER — PANTOPRAZOLE SODIUM 40 MG/1
40 TABLET, DELAYED RELEASE ORAL
Status: DISCONTINUED | OUTPATIENT
Start: 2025-04-29 | End: 2025-05-12 | Stop reason: HOSPADM

## 2025-04-28 RX ORDER — OXYCODONE HYDROCHLORIDE 5 MG/1
5 TABLET ORAL EVERY 4 HOURS PRN
Qty: 15 TABLET | Refills: 0 | Status: ON HOLD | OUTPATIENT
Start: 2025-04-28 | End: 2025-05-08

## 2025-04-28 RX ORDER — POLYETHYLENE GLYCOL 3350 17 G/17G
17 POWDER, FOR SOLUTION ORAL DAILY
Status: ON HOLD
Start: 2025-04-29

## 2025-04-28 RX ORDER — GABAPENTIN 100 MG/1
100 CAPSULE ORAL 2 TIMES DAILY
Status: DISCONTINUED | OUTPATIENT
Start: 2025-04-28 | End: 2025-05-01

## 2025-04-28 RX ORDER — METHOCARBAMOL 500 MG/1
500 TABLET, FILM COATED ORAL EVERY 6 HOURS PRN
Status: ON HOLD
Start: 2025-04-28

## 2025-04-28 RX ORDER — INSULIN LISPRO 100 [IU]/ML
1-6 INJECTION, SOLUTION INTRAVENOUS; SUBCUTANEOUS
Status: ON HOLD
Start: 2025-04-28

## 2025-04-28 RX ADMIN — INSULIN GLARGINE 8 UNITS: 100 INJECTION, SOLUTION SUBCUTANEOUS at 21:20

## 2025-04-28 RX ADMIN — OXYCODONE HYDROCHLORIDE 5 MG: 5 TABLET ORAL at 11:40

## 2025-04-28 RX ADMIN — ACETAMINOPHEN 975 MG: 325 TABLET, FILM COATED ORAL at 13:24

## 2025-04-28 RX ADMIN — INSULIN LISPRO 2 UNITS: 100 INJECTION, SOLUTION INTRAVENOUS; SUBCUTANEOUS at 09:52

## 2025-04-28 RX ADMIN — INSULIN LISPRO 4 UNITS: 100 INJECTION, SOLUTION INTRAVENOUS; SUBCUTANEOUS at 12:58

## 2025-04-28 RX ADMIN — INSULIN LISPRO 3 UNITS: 100 INJECTION, SOLUTION INTRAVENOUS; SUBCUTANEOUS at 12:58

## 2025-04-28 RX ADMIN — INSULIN LISPRO 2 UNITS: 100 INJECTION, SOLUTION INTRAVENOUS; SUBCUTANEOUS at 21:20

## 2025-04-28 RX ADMIN — GABAPENTIN 100 MG: 100 CAPSULE ORAL at 19:28

## 2025-04-28 RX ADMIN — METHOCARBAMOL 500 MG: 500 TABLET ORAL at 11:40

## 2025-04-28 RX ADMIN — SACUBITRIL AND VALSARTAN 1 TABLET: 97; 103 TABLET, FILM COATED ORAL at 09:51

## 2025-04-28 RX ADMIN — ACETAMINOPHEN 975 MG: 325 TABLET, FILM COATED ORAL at 04:54

## 2025-04-28 RX ADMIN — OXYCODONE HYDROCHLORIDE 5 MG: 5 TABLET ORAL at 16:13

## 2025-04-28 RX ADMIN — OXYCODONE HYDROCHLORIDE 5 MG: 5 TABLET ORAL at 04:55

## 2025-04-28 RX ADMIN — ACETAMINOPHEN 975 MG: 325 TABLET, FILM COATED ORAL at 21:18

## 2025-04-28 RX ADMIN — METHOCARBAMOL 500 MG: 500 TABLET ORAL at 04:55

## 2025-04-28 RX ADMIN — INSULIN LISPRO 3 UNITS: 100 INJECTION, SOLUTION INTRAVENOUS; SUBCUTANEOUS at 09:52

## 2025-04-28 RX ADMIN — ENOXAPARIN SODIUM 30 MG: 30 INJECTION SUBCUTANEOUS at 09:51

## 2025-04-28 RX ADMIN — FAMOTIDINE 20 MG: 20 TABLET, FILM COATED ORAL at 09:51

## 2025-04-28 RX ADMIN — PANTOPRAZOLE SODIUM 40 MG: 40 TABLET, DELAYED RELEASE ORAL at 04:55

## 2025-04-28 RX ADMIN — SACUBITRIL AND VALSARTAN 1 TABLET: 97; 103 TABLET, FILM COATED ORAL at 21:18

## 2025-04-28 RX ADMIN — INSULIN LISPRO 3 UNITS: 100 INJECTION, SOLUTION INTRAVENOUS; SUBCUTANEOUS at 19:28

## 2025-04-28 RX ADMIN — GABAPENTIN 100 MG: 100 CAPSULE ORAL at 09:51

## 2025-04-28 RX ADMIN — ENOXAPARIN SODIUM 30 MG: 30 INJECTION SUBCUTANEOUS at 21:20

## 2025-04-28 NOTE — DISCHARGE SUMMARY
Discharge Summary - Trauma   Name: Kamini Martines 75 y.o. female I MRN: [id number]  Unit/Bed#: S -01 I Date of Admission: 4/24/2025   Date of Service: 4/28/2025 I Hospital Day: 4    Admission Date: 4/24/2025 0003  Discharge Date: 04/28/25  Admitting Diagnosis: Closed fracture of left hip, initial encounter ([facility]) [S72.002A]  Multiple injuries due to trauma [T07.XXXA]    Urinary retention  Assessment & Plan  Adams catheter placed for retention- removed on 4/28 at 6 am.   Urinary retention protocol  UA negative for bacteria on 4/25    Closed left hip fracture, initial encounter ([facility])  Assessment & Plan  - Comminuted intertrochanteric femur fracture, present on admission.  - Status post IMN on 4/24.  - Appreciate Orthopedic surgery evaluation, recommendations and interventions as noted.  - Maintain weightbearing as tolerated status on the left lower extremity .  - Monitor left lower extremity neurovascular exam.  - Continue multimodal analgesic regimen.  - Continue DVT prophylaxis.  - PT and OT evaluation and treatment as indicated.  - Outpatient follow up with Orthopedic surgery for re-evaluation. D/C to [facility] today.       Fall  Assessment & Plan  - Status post mechanical fall with the below noted injuries.  - Fall precautions.  - Geriatric Medicine consultation for evaluation, medication review and recommendations.  - PT and OT evaluation and treatment as indicated. Recommending inpatient rehab.   - Case Management consultation for disposition planning. D/C to [facility] today.      Type 2 diabetes mellitus without complication, without long-term current use of insulin ([facility])  Assessment & Plan  Lab Results   Component Value Date    HGBA1C 8.4 (H) 01/27/2025       Recent Labs     04/27/25  1615 04/27/25  2101 04/28/25  0741 04/28/25  1106   POCGLU 251* 203* 199* 280*       Blood Sugar Average: Last 72 hrs:  (P) 231.6936453784378809      - resume home regimen on discharge      Chronic systolic congestive heart  "failure (HCC)  Assessment & Plan  Wt Readings from Last 3 Encounters:   04/24/25 99.3 kg (219 lb)   02/20/25 99.3 kg (219 lb)   02/11/25 99.3 kg (219 lb)         - no signs of volume overload  - resume home Aldactone  - f/u with Cardiologist    HTN (hypertension)  Assessment & Plan  Continue home medications of Aldactone and Entresto.         Discharge Diagnosis:   Medical Problems       Resolved Problems  Date Reviewed: 2/25/2025   None         HPI: As documented by Dr. Sánchez who evaluated the patient on admission, \"Kamini Martines is a 75 y.o. female who presents as a trauma C to the emergency department after mechanical fall with head strike and left hip pain, takes daily ASA.  Patient states she was walking at home, tripped over something and fell on her left side with immediate left-sided hip pain.  Patient also states that she hit her head on a cabinet, denies any LOC.  CT head and C-spine negative for traumatic injuries but left hip x-ray does show a fracture. \"    Procedures Performed: No orders of the defined types were placed in this encounter.      Summary of Hospital Course:  Patient was placed on the trauma service following fall with a left femoral neck fracture. She went to the OR on 4/24 for ORIF with IMN. She is WBAT on the LLE post op. Her pain is controlled. She was seen by PT/OT who recommended inpatient rehab. She had a hernandez cathter placed for retention on 4/25. This was removed on 4/28. She is medically stable for discharge to inpatient rehab. She will d/c to [facility] today. She will f/u with orthopedics in 2 weeks. She is to continue Aspirin 81 mg BID x 6 weeks for DVT ppx on discharge.     Significant Findings, Care, Treatment and Services Provided:   XR femur 2 vw left  Result Date: 4/25/2025  Impression: Fluoroscopy provided for procedure guidance. Please refer to the separate procedure note for additional details. Workstation performed: [id number]     CT pelvis wo contrast  Result Date: " 4/24/2025  Impression: Comminuted intertrochanteric fracture of the left femur Workstation performed: [id number]     XR chest 1 view  Result Date: 4/24/2025  Impression: No acute cardiopulmonary disease. Workstation performed: [id number]     XR pelvis ap only 1 or 2 vw  Result Date: 4/24/2025  Impression: Acute displaced fracture of the left femoral neck. The study was marked in EPIC for immediate notification. Computerized Assisted Algorithm (CAA) may have been used to analyze all applicable images. Workstation performed: [id number]     XR femur 2 vw left  Result Date: 4/24/2025  Impression: Acute displaced fracture of the left femoral neck. The study was marked in EPIC for immediate notification. Computerized Assisted Algorithm (CAA) may have been used to analyze all applicable images. Workstation performed: [id number]     CT head without contrast  Result Date: 4/24/2025  Impression: No acute intracranial abnormality. I personally discussed this study with MEENAKSHI HILL on 4/24/2025 1:12 AM. Workstation performed: [id number]     CT spine cervical without contrast  Result Date: 4/24/2025  Impression: No cervical spine fracture or traumatic malalignment. I personally discussed this study with MEENAKSHI HILL on 4/24/2025 1:12 AM. Workstation performed: [id number]         Complications: none    Condition at Discharge: good       Discharge instructions/Information to patient and family:   See After Visit Summary (AVS) for information provided to patient and family.      Provisions for Follow-Up Care:  See after visit summary for information related to follow-up care and any pertinent home health orders.      PCP: Brionna Chatman MD    Disposition: Short-term rehab at [facility]    Planned Readmission: No     Discharge Medications:  See after visit summary for reconciled discharge medications provided to patient and family.      Discharge Statement:  I have spent a total time of 25 minutes in caring for this patient on the day of  the visit/encounter. .

## 2025-04-28 NOTE — ASSESSMENT & PLAN NOTE
POD 4 S/p open reduction internal fixation left peritrochanteric femur fracture with an intramedullary nail with Dr. Parry 4/24/2025   Weight bearing as tolerated to the left lower extremity  Range of motion as tolerated.   Monitor for s/s ABLA, hgb 9.1. transfuse for hgb less than 7, management per primary team.   Pain control per primary team  DVT ppx: recommend lovenox while in house, d/c on aspirin 81mg BID x 6 weeks.   Medical management per primary team.   Rest of care per primary team.   Outpatient f/u Dr. Parry 2 weeks post-op

## 2025-04-28 NOTE — PLAN OF CARE
Problem: Prexisting or High Potential for Compromised Skin Integrity  Goal: Skin integrity is maintained or improved  Description: INTERVENTIONS:- Identify patients at risk for skin breakdown- Assess and monitor skin integrity- Assess and monitor nutrition and hydration status- Monitor labs - Assess for incontinence - Turn and reposition patient- Assist with mobility/ambulation- Relieve pressure over bony prominences- Avoid friction and shearing- Provide appropriate hygiene as needed including keeping skin clean and dry- Evaluate need for skin moisturizer/barrier cream- Collaborate with interdisciplinary team - Patient/family teaching- Consider wound care consult   Outcome: Progressing     Problem: PAIN - ADULT  Goal: Verbalizes/displays adequate comfort level or baseline comfort level  Description: Interventions:- Encourage patient to monitor pain and request assistance- Assess pain using appropriate pain scale- Administer analgesics based on type and severity of pain and evaluate response- Implement non-pharmacological measures as appropriate and evaluate response- Consider cultural and social influences on pain and pain management- Notify physician/advanced practitioner if interventions unsuccessful or patient reports new pain  Interventions:- Encourage patient to monitor pain and request assistance- Assess pain using appropriate pain scale- Administer analgesics based on type and severity of pain and evaluate response- Implement non-pharmacological measures as appropriate and evaluate response- Consider cultural and social influences on pain and pain management- Notify physician/advanced practitioner if interventions unsuccessful or patient reports new pain  Outcome: Progressing     Problem: INFECTION - ADULT  Goal: Absence or prevention of progression during hospitalization  Description: INTERVENTIONS:- Assess and monitor for signs and symptoms of infection- Monitor lab/diagnostic results- Monitor all  insertion sites, i.e. indwelling lines, tubes, and drains- Monitor endotracheal if appropriate and nasal secretions for changes in amount and color- Trenary appropriate cooling/warming therapies per order- Administer medications as ordered- Instruct and encourage patient and family to use good hand hygiene technique- Identify and instruct in appropriate isolation precautions for identified infection/condition  Outcome: Progressing

## 2025-04-28 NOTE — ASSESSMENT & PLAN NOTE
Adams catheter placed for retention- removed on 4/28 at 6 am.   Urinary retention protocol  UA negative for bacteria on 4/25

## 2025-04-28 NOTE — ASSESSMENT & PLAN NOTE
Lab Results   Component Value Date    HGBA1C 8.4 (H) 01/27/2025       Recent Labs     04/27/25  1615 04/27/25  2101 04/28/25  0741 04/28/25  1106   POCGLU 251* 203* 199* 280*       Blood Sugar Average: Last 72 hrs:  (P) 231.4473333282617375      - resume home regimen on discharge

## 2025-04-28 NOTE — CASE MANAGEMENT
Case Management Progress Note    Patient name Kamini Martines  Location S /S -01 MRN [id number]  : 1950 Date 2025       LOS (days): 4  Geometric Mean LOS (GMLOS) (days): 4.4  Days to GMLOS:-0.1        OBJECTIVE:    Current admission status: Inpatient  Preferred Pharmacy:   [facility]/pharmacy #[id number] [place] [id number] [address]  Phone: [phone] Fax: [phone]    [facility] - [place], [place] - [address]  [address] [address]  Phone: [phone] Fax: [phone]    Primary Care Provider: Brionna Chatman MD    Primary Insurance: MEDICARE  Secondary Insurance: BLUE CROSS    PROGRESS NOTE:  CM met with patient at bedside. Provided list of accepted IRFs-- patient to review. Aware CM will follow up shortly regarding selection.

## 2025-04-28 NOTE — ASSESSMENT & PLAN NOTE
Wt Readings from Last 3 Encounters:   04/24/25 99.3 kg (219 lb)   02/20/25 99.3 kg (219 lb)   02/11/25 99.3 kg (219 lb)         - no signs of volume overload  - resume home Aldactone  - f/u with Cardiologist

## 2025-04-28 NOTE — CASE MANAGEMENT
Case Management Discharge Planning Note    Patient name Kamini Martines  [facility] S /S -01 MRN [id number]  : 1950 Date 2025       Current Admission Date: 2025  Current Admission Diagnosis:Closed left hip fracture, initial encounter (Formerly [facility])   Patient Active Problem List    Diagnosis Date Noted Date Diagnosed    Fall 2025     Closed left hip fracture, initial encounter (Formerly [facility]) 2025     Urinary retention 2025     Frailty 2025     At risk for delirium 2025     Ambulatory dysfunction 2025     Cervical spondylosis 2025     Opioid dependence, uncomplicated (Formerly [facility]) 2025     Esophageal dysphagia 2024     Functional diarrhea 2024     Trochanteric bursitis of left hip 2024     Lumbar spondylosis 10/06/2022     Type 2 diabetes mellitus without complication, without long-term current use of insulin (Formerly [facility]) 2020     Class 2 obesity in adult 2020     Chronic pain syndrome 2020     Intervertebral disc disorder with radiculopathy of lumbar region 2020     Lumbar post-laminectomy syndrome 2020     Neurogenic claudication due to lumbar spinal stenosis 2020     Postlaminectomy syndrome, lumbar region 2020     Chronic right-sided low back pain without sciatica 2019     TIA (transient ischemic attack) 2019     Chronic systolic congestive heart failure ([facility]ly [facility]) 2019     Vitamin D deficiency 08/15/2019     Lumbar herniated disc 08/15/2019     Dilated cardiomyopathy ([facility]ly [facility]) 2019     LUCIA (obstructive sleep apnea)      Morbid (severe) obesity with alveolar hypoventilation (Formerly [facility]) 2019     Mild intermittent asthma without complication 2019     HTN (hypertension) 2019     History Abnormal heart rhythm 2019     Adrenal adenoma 2019     Pericardial effusion 2019     CVA (cerebral vascular accident) (Formerly [facility]) 2019       LOS (days): 4  Geometric Mean LOS (GMLOS)  (days): 4.4  Days to GMLOS:-0.2     OBJECTIVE:  Risk of Unplanned Readmission Score: 19.62         Current admission status: Inpatient   Preferred Pharmacy:   [id number] - [place], [place] - [address]  [address] [address]  Phone: [phone] Fax: [phone]    [facility] - [place], [place] - [address]  [address]  [address]  [facility] [address]  Phone: [phone] Fax: [phone]    Primary Care Provider: Brionna Chatman MD    Primary Insurance: MEDICARE  Secondary Insurance: BLUE CROSS    DISCHARGE DETAILS:    Discharge planning discussed with:: patient and , Ferdinand- at bedside  [place] of Choice: Yes  Comments - Freedom of Choice: STR  CM contacted family/caregiver?: Yes  Were Treatment Team discharge recommendations reviewed with patient/caregiver?: Yes  Did patient/caregiver verbalize understanding of patient care needs?: N/A- going to facility  Were patient/caregiver advised of the risks associated with not following Treatment Team discharge recommendations?: Yes    Contacts  Patient Contacts: Ferdinand  Relationship to Patient:: Family ()  Contact Method: In Person  Reason/Outcome: Continuity of Care, Discharge Planning, Emergency Contact, Referral    Requested Home Health Care         Is the patient interested in HHC at discharge?: No    DME Referral Provided  Referral made for DME?: No    Other Referral/Resources/Interventions Provided:  Interventions: Short Term Rehab, Acute Rehab  Referral Comments: CM met back with patient and  Ferdinand at bedside. Patient stating she has selected [facility]. CM confirmed with trauma AP that patient is med clear for d/c. Confirmed with [facility] they can accommodate admission. 4:30PM BLS confirmed, all appropriate parties aware- med necessity placed in binder on S2. No further CM needs.    Would you like to participate in our [facility] service  program?  : No - Declined    Treatment Team Recommendation: Short Term Rehab, Acute Rehab  Discharge Destination Plan:: Short Term Rehab, Acute Rehab  Transport at Discharge : [facility] Ambulance  Transported by (Company and Unit #): [employer]    IMM Given (Date)::  (N/A; acute to acute)    Accepting Facility Name, City & State : [facility], [place]  Receiving Facility/Agency Phone Number: [phone]  Facility/Agency Fax Number: [phone]

## 2025-04-28 NOTE — PLAN OF CARE
Problem: OCCUPATIONAL THERAPY ADULT  Goal: Performs self-care activities at highest level of function for planned discharge setting.  See evaluation for individualized goals.  Description: Treatment Interventions: ADL retraining, Functional transfer training, Endurance training, Patient/family training, Cognitive reorientation, Equipment evaluation/education, Compensatory technique education, Continued evaluation, Energy conservation, Activityengagement          See flowsheet documentation for full assessment, interventions and recommendations.   Note: Limitation: Decreased ADL status, Decreased Safe judgement during ADL, Decreased cognition, Decreased endurance, Decreased self-care trans, Decreased high-level ADLs  Prognosis: Fair  Assessment: Patient seen for OT treatment on 4/28/2025 s/p admission for left hip fracture s/p IM nail. Patient agreeable to OT session. Patient participated in toileting, dressing , self-care transfers, and functional mobility with intervention focus on maximizing functional independence during ADL tasks. Kamini Martines is showing continued need for Max A for LB ADLs and toileting but progressing in transfers and functional mobility. Personal factors continuing to impact D/C include: Assistance needed for ADLs and functional mobility From OT standpoint, patient would benefit from skilled intervention to maximize independence with ADLs and functional mobility. Goals remain appropriate, continue POC. At this time, recommending D/C to: Level 1: maximum resource intensity     Rehab Resource Intensity Level, OT: I (Maximum Resource Intensity)        Lizett Geiger OT

## 2025-04-28 NOTE — INCIDENTAL FINDINGS
The following findings require follow up:  Radiographic finding   Findin.1 cm right thyroid nodule. Incidental discovery of one or more thyroid nodule(s) measuring less than 1.5 cm and without suspicious features is noted in this patient who is above 35 years old; according to   guidelines published in the 2015 white paper on incidental thyroid nodules in the Journal of the American College of Radiology (JACR), no further evaluation is recommended.      Follow up required: family doctor   Follow up should be done within 2 week(s)      Incidental finding results were discussed with the Patient by Flavia Carvalho PA-C on 25.   They expressed understanding and all questions answered.  
0
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
## 2025-04-28 NOTE — PROGRESS NOTES
Progress Note - Trauma   Name: Kamini Martines 75 y.o. female I MRN: [id number]  Unit/Bed#: S -01 I Date of Admission: 4/24/2025   Date of Service: 4/28/2025 I Hospital Day: 4    Assessment & Plan  Fall  - Status post mechanical fall with the below noted injuries.  - Fall precautions.  - Geriatric Medicine consultation for evaluation, medication review and recommendations.  - PT and OT evaluation and treatment as indicated. Recommending inpatient rehab.   - Case Management consultation for disposition planning. [facility] referrals.   Closed left hip fracture, initial encounter (HCC)  - Comminuted intertrochanteric femur fracture, present on admission.  - Status post IMN on 4/24.  - Appreciate Orthopedic surgery evaluation, recommendations and interventions as noted.  - Maintain weightbearing as tolerated status on the left lower extremity .  - Monitor left lower extremity neurovascular exam.  - Continue multimodal analgesic regimen.  - Continue DVT prophylaxis.  - PT and OT evaluation and treatment as indicated.  - Outpatient follow up with Orthopedic surgery for re-evaluation.    HTN (hypertension)  Continue home medications of Aldactone and Entresto.   Urinary retention  Adams catheter placed for retention- removed on 4/28 at 6 am.   Urinary retention protocol  UA negative for bacteria on 4/25    Bowel Regimen: miralax  VTE Prophylaxis:VTE covered by:  enoxaparin, Subcutaneous, 30 mg at 04/28/25 0951    and Sequential compression device (Venodyne)      Disposition: continue med-surg status, rehab placement pending; due to void this afternoon Please contact the SecureChat role for the Trauma service with any questions/concerns.    24 Hour Events : Adams catheter removed this morning  Subjective : Patient is feeling well. She had some muscle spasms in her left hip earlier this morning. The robaxin helps with spasms. She is in the chair now. She did have a BM this morning. Adams was removed but no void yet.      Objective :  Temp:  [97.8 °F (36.6 °C)-99.4 °F (37.4 °C)] 99.1 °F (37.3 °C)  HR:  [83-89] 85  BP: (124-149)/(52-62) 145/56  Resp:  [18] 18  SpO2:  [95 %-98 %] 95 %  O2 Device: None (Room air)    I/O         04/26 0701 04/27 0700 04/27 0701 04/28 0700 04/28 0701 04/29 0700    P.O. 240      Total Intake(mL/kg) 240 (2.4)      Urine (mL/kg/hr) 775 (0.3) 900 (0.4)     Total Output 775 900     Net -535 -900                    Physical Exam  Constitutional:       Appearance: Normal appearance.   HENT:      Head: Normocephalic.      Nose: Nose normal.      Mouth/Throat:      Mouth: Mucous membranes are moist.   Cardiovascular:      Rate and Rhythm: Normal rate and regular rhythm.      Pulses: Normal pulses.   Pulmonary:      Effort: Pulmonary effort is normal. No respiratory distress.      Breath sounds: Normal breath sounds.   Abdominal:      General: Abdomen is flat.      Palpations: Abdomen is soft.      Tenderness: There is no abdominal tenderness.   Musculoskeletal:         General: Swelling present. No deformity.      Cervical back: Normal range of motion and neck supple.      Comments: + L hip/thigh incisions C/D/I with dressings in place; + NVI distally in LLE   Skin:     General: Skin is warm and dry.   Neurological:      General: No focal deficit present.      Mental Status: She is alert and oriented to person, place, and time.      Sensory: No sensory deficit.      Motor: No weakness.   Psychiatric:         Behavior: Behavior normal.               Lab Results: I have reviewed the following results:  Recent Labs     04/27/25  0449   WBC 12.95*   HGB 9.1*   HCT 27.7*      SODIUM 138   K 4.1      CO2 24   BUN 27*   CREATININE 0.55*   GLUC 184*       Imaging Results Review: No pertinent imaging studies reviewed.  Other Study Results Review: No additional pertinent studies reviewed.

## 2025-04-28 NOTE — PLAN OF CARE
Problem: INFECTION - ADULT  Goal: Absence or prevention of progression during hospitalization  Description: INTERVENTIONS:- Assess and monitor for signs and symptoms of infection- Monitor lab/diagnostic results- Monitor all insertion sites, i.e. indwelling lines, tubes, and drains- Monitor endotracheal if appropriate and nasal secretions for changes in amount and color- [place] appropriate cooling/warming therapies per order- Administer medications as ordered- Instruct and encourage patient and family to use good hand hygiene technique- Identify and instruct in appropriate isolation precautions for identified infection/condition  Outcome: Progressing  Goal: Absence of fever/infection during neutropenic period  Description: INTERVENTIONS:- Monitor WBC  Outcome: Progressing    Problem: PAIN - ADULT  Goal: Verbalizes/displays adequate comfort level or baseline comfort level  Description: Interventions:- Encourage patient to monitor pain and request assistance- Assess pain using appropriate pain scale- Administer analgesics based on type and severity of pain and evaluate response- Implement non-pharmacological measures as appropriate and evaluate response- Consider cultural and social influences on pain and pain management- Notify physician/advanced practitioner if interventions unsuccessful or patient reports new pain  Interventions:- Encourage patient to monitor pain and request assistance- Assess pain using appropriate pain scale- Administer analgesics based on type and severity of pain and evaluate response- Implement non-pharmacological measures as appropriate and evaluate response- Consider cultural and social influences on pain and pain management- Notify physician/advanced practitioner if interventions unsuccessful or patient reports new pain  Outcome: Progressing       Problem: DISCHARGE PLANNING  Goal: Discharge to home or other facility with appropriate resources  Description: INTERVENTIONS:- Identify  barriers to discharge w/patient and caregiver- Arrange for needed discharge resources and transportation as appropriate- Identify discharge learning needs (meds, wound care, etc.)- Arrange for interpretive services to assist at discharge as needed- Refer to Case Management Department for coordinating discharge planning if the patient needs post-hospital services based on physician/advanced practitioner order or complex needs related to functional status, cognitive ability, or social support system  INTERVENTIONS:- Identify barriers to discharge w/patient and caregiver- Arrange for needed discharge resources and transportation as appropriate- Identify discharge learning needs (meds, wound care, etc.)- Arrange for interpretive services to assist at discharge as needed- Refer to Case Management Department for coordinating discharge planning if the patient needs post-hospital services based on physician/advanced practitioner order or complex needs related to functional status, cognitive ability, or social support system  Outcome: Progressing

## 2025-04-28 NOTE — ASSESSMENT & PLAN NOTE
- Status post mechanical fall with the below noted injuries.  - Fall precautions.  - Geriatric Medicine consultation for evaluation, medication review and recommendations.  - PT and OT evaluation and treatment as indicated. Recommending inpatient rehab.   - Case Management consultation for disposition planning. D/C to [facility] today.

## 2025-04-28 NOTE — ASSESSMENT & PLAN NOTE
- Comminuted intertrochanteric femur fracture, present on admission.  - Status post IMN on 4/24.  - Appreciate Orthopedic surgery evaluation, recommendations and interventions as noted.  - Maintain weightbearing as tolerated status on the left lower extremity .  - Monitor left lower extremity neurovascular exam.  - Continue multimodal analgesic regimen.  - Continue DVT prophylaxis.  - PT and OT evaluation and treatment as indicated.  - Outpatient follow up with Orthopedic surgery for re-evaluation. D/C to [facility] today.

## 2025-04-28 NOTE — OCCUPATIONAL THERAPY NOTE
Occupational Therapy  Treatment Note     Patient Name: Kamini Martines  Today's Date: 4/28/2025  Problem List  Active Problems:    HTN (hypertension)    Chronic systolic congestive heart failure (HCC)    Chronic pain syndrome    Class 2 obesity in adult    Type 2 diabetes mellitus without complication, without long-term current use of insulin (HCC)    Fall    Closed left hip fracture, initial encounter ([facility])    Urinary retention    Frailty    At risk for delirium    Ambulatory dysfunction        04/28/25 0915   OT Last Visit   OT Visit Date 04/28/25   Note Type   Note Type Treatment   Pain Assessment   Pain Assessment Tool FLACC   Pain Location/Orientation Orientation: Left;Location: Hip   Pain Onset/Description Onset: Ongoing;Frequency: Constant/Continuous   Effect of Pain on Daily Activities comfort, activity tolerance, LB ADLs   Hospital Pain Intervention(s) Ambulation/increased activity;Repositioned  (declined ice)   Multiple Pain Sites No   Pain Rating: FLACC (Rest) - Face 0   Pain Rating: FLACC (Rest) - Legs 0   Pain Rating: FLACC (Rest) - Activity 0   Pain Rating: FLACC (Rest) - Cry 0   Pain Rating: FLACC (Rest) - Consolability 0   Score: FLACC (Rest) 0   Pain Rating: FLACC (Activity) - Face 1   Pain Rating: FLACC (Activity) - Legs 1   Pain Rating: FLACC (Activity) - Activity 0   Pain Rating: FLACC (Activity) - Cry 1   Pain Rating: FLACC (Activity) - Consolability 0   Score: FLACC (Activity) 3   Restrictions/Precautions   Weight Bearing Precautions Per Order Yes   LLE Weight Bearing Per Order WBAT   Other Precautions Fall Risk;Pain;WBS;Chair Alarm;Bed Alarm   Lifestyle   Autonomy PTA, pt was independent in ADLs, mainly independent in IADLs, and uses no DME for functional mobility; (-) driving   Reciprocal Relationships Lives with her    Service to Others Retired   Intrinsic Gratification Enjoys playing with her dog   ADL   Eating Assistance 6  Modified independent   LB Dressing Assistance 2  Maximal  Assistance   LB Dressing Deficit Thread RLE into underwear;Thread LLE into underwear;Pull up over hips;Increased time to complete;Supervision/safety;Requires assistive device for steadying;Setup;Verbal cueing;Steadying   LB Dressing Comments intermittent steadying, intermittent A to thread LEs into underwear, pulls over hips c Min A steadying but requries A to complete   Toileting Assistance  2  Maximal Assistance   Toileting Deficit Bedside commode;Increased time to complete;Supervison/safety;Verbal cueing;Setup;Steadying;Clothing management up;Perineal hygiene  (total A posterior care, variable supervision to min A steadying, A with clothing management)   Functional Standing Tolerance   Time 2 min + 1 min   Activity LB ADLs, toileting   Comments Min A x1 without UE support vs supervision with UE support on RW   Bed Mobility   Additional Comments received sitting EOB, OOB in recliner at end of session   Transfers   Sit to Stand 4  Minimal assistance   Additional items Assist x 1;Increased time required   Stand to Sit 3  Moderate assistance   Additional items Assist x 1;Increased time required;Verbal cues   Stand pivot 3  Moderate assistance   Additional items Assist x 1;Increased time required;Verbal cues  (c RW)   Toilet transfer 3  Moderate assistance   Additional items Verbal cues;Increased time required;Assist x 1;Commode;Armrests   Additional Comments cueing for hand placements, increased time for initiation of transfers, occasionally uses rocking technique to assist c momentum   Functional Mobility   Functional Mobility 3  Moderate assistance   Additional Comments short distance ambulating transfers. Increased difficulty clearing LLE from floor. Cues for RW management with good application   Additional items Rolling walker   Toilet Transfers   Toilet Transfer From Rolling walker   Toilet Transfer Type To and from   Toilet Transfer to Standard bedside commode   Toilet Transfer Technique Ambulating   Toilet  Transfers Moderate assistance;Verbal cues   Subjective   Subjective Intermittently needs encouragement / education re: role of OT and ADL retraining, encouragement for attempting ADLs on own before requesting assistance   Cognition   Overall Cognitive Status WFL   Arousal/Participation Alert   Attention Attends with cues to redirect   Orientation Level Oriented to person;Oriented to place;Oriented to situation   Memory Within functional limits   Following Commands Follows all commands and directions without difficulty   Comments Pt agreeable to OT session.   Activity Tolerance   Activity Tolerance Patient limited by fatigue   Medical Staff Made Aware MYRANDA zhong,PCA [place]   Assessment   Assessment Patient seen for OT treatment on 4/28/2025 s/p admission for left hip fracture s/p IM nail. Patient agreeable to OT session. Patient participated in toileting, dressing , self-care transfers, and functional mobility with intervention focus on maximizing functional independence during ADL tasks. Kamini Martines is showing continued need for Max A for LB ADLs and toileting but progressing in transfers and functional mobility. Personal factors continuing to impact D/C include: Assistance needed for ADLs and functional mobility From OT standpoint, patient would benefit from skilled intervention to maximize independence with ADLs and functional mobility. Goals remain appropriate, continue POC. At this time, recommending D/C to: Level 1: maximum resource intensity   Plan   Treatment Interventions ADL retraining;Functional transfer training;UE strengthening/ROM;Endurance training;Equipment evaluation/education;Patient/family training;Neuromuscular reeducation;Compensatory technique education;Continued evaluation;Activityengagement;Energy conservation   Goal Expiration Date 05/09/25   OT Treatment Day 1   OT Frequency 3-5x/wk   Discharge Recommendation   Rehab Resource Intensity Level, OT I (Maximum Resource Intensity)   Additional  Comments  The patient's raw score on the -PAC Daily Activity Inpatient Short Form is 16. A raw score of less than 19 suggests the patient may benefit from discharge to post-acute rehabilitation services. Please refer to the recommendation of the Occupational Therapist for safe discharge planning.   -PAC Daily Activity Inpatient   Lower Body Dressing 2   Bathing 2   Toileting 2   Upper Body Dressing 3   Grooming 3   Eating 4   Daily Activity Raw Score 16   Daily Activity Standardized Score (Calc for Raw Score >=11) 35.96   AM-PAC Applied Cognition Inpatient   Following a Speech/Presentation 3   Understanding Ordinary Conversation 4   Taking Medications 4   Remembering Where Things Are Placed or Put Away 4   Remembering List of 4-5 Errands 3   Taking Care of Complicated Tasks 3   Applied Cognition Raw Score 21   Applied Cognition Standardized Score 44.3   End of Consult   Education Provided Yes   Patient Position at End of Consult Bed/Chair alarm activated;All needs within reach;Bedside chair   Nurse Communication Nurse aware of consult     OT GOALS:     Pt will improve functional mobility during ADL/IADL/leisure tasks with Min A using AE/DME prn. PROGRESSING     Pt will improve activity tolerance/functional endurance during ADL/IADL/leisure tasks for at least 20 minutes to improve occupational performance and engagement in valued occupations using AE/DME prn. PROGRESSING      Pt will engage in ongoing functional/formal cognitive assessments to assist with safe d/c planning and increase safety during functional tasks.     Pt will participate in simulated IADL management task w/ Min A to increase independence and engagement in valued occupations w/ good balance/safety.     Pt will improve static/dynamic sitting balance for at least 15 minutes with Mod I during functional tasks to decrease fall risk and improve independence and engagement in ADL/IADL/leisure activities. PROGRESSING      Pt will improve dynamic  standing balance for at least 15 minutes with S during functional tasks to decrease fall risk and improve independence and engagement in ADL/IADL/leisure activities.     Pt will follow 100% of multi-step commands in ADL/IADL/leisure activities to improve functional cognition used in functional daily routines.     Pt will complete functional transfers on and off all surfaces used in daily routines with S for safety to maximize functional/occupational performance. PROGRESSING      Pt will complete all bed mobility tasks with S to serve as a prerequisite for EOB/OOB ADL/IADL/leisure tasks, optimize positioning/comfort, and increase functional independence.     Pt will independently demonstrate good carryover of safety precautions, WB status, and education/training during ADL/IADL/leisure tasks with energy conservation techniques s/p skilled instruction without verbal cues.     Pt will complete UB ADL tasks with Mod I using AE/DME prn to increase functional independence in ADL/IADL/leisure tasks.     Pt will complete LB ADL tasks with Min A using AE/DME prn to increase functional independence in ADL/IADL/leisure tasks.      Pt will complete toileting tasks with Min A and good hygiene/thoroughness using AE/DME prn to increase functional independence.    Pt will independently identify and utilize 2-3 positive coping strategies to enhance overall wellbeing and engagement in valued occupations.    Lizett Geiger, OT

## 2025-04-28 NOTE — PROGRESS NOTES
Progress Note - Orthopedics   Name: Kamini Martines 75 y.o. female I MRN: [id number]  Unit/Bed#: S -01 I Date of Admission: 4/24/2025   Date of Service: 4/28/2025 I Hospital Day: 4    Assessment & Plan  Closed left hip fracture, initial encounter ([facility])  POD 4 S/p open reduction internal fixation left peritrochanteric femur fracture with an intramedullary nail with Dr. Parry 4/24/2025   Weight bearing as tolerated to the left lower extremity  Range of motion as tolerated.   Monitor for s/s ABLA, hgb 9.1. transfuse for hgb less than 7, management per primary team.   Pain control per primary team  DVT ppx: recommend lovenox while in house, d/c on aspirin 81mg BID x 6 weeks.   Medical management per primary team.   Rest of care per primary team.   Outpatient f/u Dr. Parry 2 weeks post-op  Urinary retention  Per primary team.   Fall  With above noted injuries.     Ok for discharge from Orthopedics service perspective.  Orthopedics service will follow.  Please contact the SecureChat role for the Orthopedics service with any questions/concerns.    Subjective   75 y.o.female seen and examined. Pain controlled. No other new or different complaints. Eager for discharge when able.     Objective :  Temp:  [97.8 °F (36.6 °C)-99.4 °F (37.4 °C)] 99.1 °F (37.3 °C)  HR:  [83-89] 85  BP: (124-149)/(52-62) 145/56  Resp:  [18] 18  SpO2:  [95 %-98 %] 95 %  O2 Device: None (Room air)    Physical Exam  Musculoskeletal: Left lower extremity  Surgical dressings c/d/I without strike through  Thigh soft and compressible.   Motor intact to +FHL/EHL, +ankle dorsi/plantar flexion  Sensation intact to saphenous, sural, tibial, superficial peroneal nerve, and deep peroneal  2+ DP pulse  No calf swelling or tenderness to palpation      Lab Results: I have reviewed the following results:  Recent Labs     04/26/25  0502 04/27/25  0449   WBC 12.16* 12.95*   HGB 9.4* 9.1*   HCT 29.4* 27.7*    254   BUN 22 27*   CREATININE 0.52* 0.55*  "    Blood Culture:    Lab Results   Component Value Date    BLOODCX Staphylococcus coagulase negative (A) 02/12/2019     Wound Culture: No results found for: \"WOUNDCULT\"        "

## 2025-04-28 NOTE — ASSESSMENT & PLAN NOTE
- Status post mechanical fall with the below noted injuries.  - Fall precautions.  - Geriatric Medicine consultation for evaluation, medication review and recommendations.  - PT and OT evaluation and treatment as indicated. Recommending inpatient rehab.   - Case Management consultation for disposition planning. [facility] referrals.

## 2025-04-28 NOTE — PLAN OF CARE
Problem: PHYSICAL THERAPY ADULT  Goal: Performs mobility at highest level of function for planned discharge setting.  See evaluation for individualized goals.  Description: Treatment/Interventions: Functional transfer training, LE strengthening/ROM, Elevations, Therapeutic exercise, Endurance training, Patient/family training, Bed mobility, Gait training, Compensatory technique education, Equipment eval/education  Equipment Recommended: Walker       See flowsheet documentation for full assessment, interventions and recommendations.  Outcome: Progressing  Note: Prognosis: Good  Problem List: Decreased strength, Decreased endurance, Impaired balance, Decreased mobility, Pain, Orthopedic restrictions, Decreased skin integrity  Assessment: PT treatment provided today to address deficits of: impaired balance, decreased activity tolerance, LE weakness. Interventions provided include: bed mobility, transfers, balance, gait, and endurance training in order to challenge pt's activity tolerance, progress pt towards to pt's baseline, and to maximize pt independence w/ functional mobility during current admission w/ L hip fxs. Compared to previous session, pt overall improvement in activity tolerance w/ pt able to initiate ambulation w/ the RW this tx session. Once in the chair pt w/ multiple questions regarding acute rehab, OT interventions, and progression of PT/therapy at rehab - pt educated w/ questions answered at this time. Pt continues to be functioning below baseline level, and remains limited in functional mobility due to pain, gait deviations, impaired balance. Pt will continue benefit from PT to promote independence w/ functional mobility, address mobility deficits, and progress towards set goals. Recommend DC w/ level: I (Maximum Rehab Resource Intensity) when medically cleared.  Barriers to Discharge: Inaccessible home environment     Rehab Resource Intensity Level, PT: I (Maximum Resource Intensity)    See  flowsheet documentation for full assessment.

## 2025-04-29 PROBLEM — E04.1 THYROID NODULE: Status: ACTIVE | Noted: 2025-04-29

## 2025-04-29 PROBLEM — D64.9 ANEMIA: Status: ACTIVE | Noted: 2025-04-29

## 2025-04-29 PROBLEM — D72.829 LEUKOCYTOSIS: Status: ACTIVE | Noted: 2025-04-29

## 2025-04-29 PROBLEM — Z74.09 IMPAIRED MOBILITY AND ACTIVITIES OF DAILY LIVING: Status: ACTIVE | Noted: 2025-04-24

## 2025-04-29 PROBLEM — S31.819S BUTTOCK WOUND, RIGHT, SEQUELA: Status: ACTIVE | Noted: 2025-04-29

## 2025-04-29 PROBLEM — Z78.9 IMPAIRED MOBILITY AND ACTIVITIES OF DAILY LIVING: Status: ACTIVE | Noted: 2025-04-24

## 2025-04-29 LAB
EST. AVERAGE GLUCOSE BLD GHB EST-MCNC: 177 MG/DL
GLUCOSE SERPL-MCNC: 178 MG/DL (ref 65–140)
GLUCOSE SERPL-MCNC: 180 MG/DL (ref 65–140)
GLUCOSE SERPL-MCNC: 194 MG/DL (ref 65–140)
GLUCOSE SERPL-MCNC: 204 MG/DL (ref 65–140)
HBA1C MFR BLD: 7.8 %

## 2025-04-29 PROCEDURE — 97167 OT EVAL HIGH COMPLEX 60 MIN: CPT

## 2025-04-29 PROCEDURE — 82948 REAGENT STRIP/BLOOD GLUCOSE: CPT

## 2025-04-29 PROCEDURE — 97163 PT EVAL HIGH COMPLEX 45 MIN: CPT

## 2025-04-29 PROCEDURE — 97535 SELF CARE MNGMENT TRAINING: CPT

## 2025-04-29 PROCEDURE — 83036 HEMOGLOBIN GLYCOSYLATED A1C: CPT | Performed by: INTERNAL MEDICINE

## 2025-04-29 PROCEDURE — 97110 THERAPEUTIC EXERCISES: CPT

## 2025-04-29 PROCEDURE — 97530 THERAPEUTIC ACTIVITIES: CPT

## 2025-04-29 PROCEDURE — 99232 SBSQ HOSP IP/OBS MODERATE 35: CPT | Performed by: NURSE PRACTITIONER

## 2025-04-29 PROCEDURE — 99223 1ST HOSP IP/OBS HIGH 75: CPT | Performed by: STUDENT IN AN ORGANIZED HEALTH CARE EDUCATION/TRAINING PROGRAM

## 2025-04-29 RX ORDER — FAMOTIDINE 20 MG/1
10 TABLET, FILM COATED ORAL DAILY
Status: DISCONTINUED | OUTPATIENT
Start: 2025-04-30 | End: 2025-05-05

## 2025-04-29 RX ADMIN — PANTOPRAZOLE SODIUM 40 MG: 40 TABLET, DELAYED RELEASE ORAL at 06:44

## 2025-04-29 RX ADMIN — ASPIRIN 325 MG ORAL TABLET 325 MG: 325 PILL ORAL at 08:35

## 2025-04-29 RX ADMIN — ENOXAPARIN SODIUM 30 MG: 30 INJECTION SUBCUTANEOUS at 21:03

## 2025-04-29 RX ADMIN — ACETAMINOPHEN 975 MG: 325 TABLET, FILM COATED ORAL at 21:03

## 2025-04-29 RX ADMIN — GABAPENTIN 100 MG: 100 CAPSULE ORAL at 08:35

## 2025-04-29 RX ADMIN — INSULIN LISPRO 3 UNITS: 100 INJECTION, SOLUTION INTRAVENOUS; SUBCUTANEOUS at 06:46

## 2025-04-29 RX ADMIN — INSULIN LISPRO 1 UNITS: 100 INJECTION, SOLUTION INTRAVENOUS; SUBCUTANEOUS at 17:04

## 2025-04-29 RX ADMIN — INSULIN LISPRO 1 UNITS: 100 INJECTION, SOLUTION INTRAVENOUS; SUBCUTANEOUS at 06:45

## 2025-04-29 RX ADMIN — INSULIN GLARGINE 8 UNITS: 100 INJECTION, SOLUTION SUBCUTANEOUS at 21:06

## 2025-04-29 RX ADMIN — SACUBITRIL AND VALSARTAN 1 TABLET: 97; 103 TABLET, FILM COATED ORAL at 21:09

## 2025-04-29 RX ADMIN — INSULIN LISPRO 2 UNITS: 100 INJECTION, SOLUTION INTRAVENOUS; SUBCUTANEOUS at 11:45

## 2025-04-29 RX ADMIN — INSULIN LISPRO 1 UNITS: 100 INJECTION, SOLUTION INTRAVENOUS; SUBCUTANEOUS at 21:08

## 2025-04-29 RX ADMIN — ENOXAPARIN SODIUM 30 MG: 30 INJECTION SUBCUTANEOUS at 08:35

## 2025-04-29 RX ADMIN — OXYCODONE HYDROCHLORIDE 5 MG: 5 TABLET ORAL at 10:18

## 2025-04-29 RX ADMIN — SPIRONOLACTONE 12.5 MG: 25 TABLET, FILM COATED ORAL at 08:36

## 2025-04-29 RX ADMIN — FAMOTIDINE 20 MG: 20 TABLET, FILM COATED ORAL at 08:35

## 2025-04-29 RX ADMIN — ACETAMINOPHEN 975 MG: 325 TABLET, FILM COATED ORAL at 06:44

## 2025-04-29 RX ADMIN — INSULIN LISPRO 3 UNITS: 100 INJECTION, SOLUTION INTRAVENOUS; SUBCUTANEOUS at 17:04

## 2025-04-29 RX ADMIN — Medication 1000 UNITS: at 08:35

## 2025-04-29 RX ADMIN — ACETAMINOPHEN 975 MG: 325 TABLET, FILM COATED ORAL at 13:15

## 2025-04-29 RX ADMIN — SACUBITRIL AND VALSARTAN 1 TABLET: 97; 103 TABLET, FILM COATED ORAL at 08:36

## 2025-04-29 RX ADMIN — INSULIN LISPRO 3 UNITS: 100 INJECTION, SOLUTION INTRAVENOUS; SUBCUTANEOUS at 11:45

## 2025-04-29 RX ADMIN — GABAPENTIN 100 MG: 100 CAPSULE ORAL at 17:03

## 2025-04-29 NOTE — PROGRESS NOTES
[facility] OT GOALS   04/29/25 1000   Rehab Team Goals   ADL Team Goal Patient will be independent with ADLs with least restrictive device upon completion of rehab program   Rehab Team Interventions   OT Interventions Self Care;Home Management;Therapeutic Exercise;Community Reintegration;Energy Conservation;Patient/Family Education   Eating Goal   Eating Goal 06. Independent - Patient completes the activity by him/herself with no assistance from a helper.   Status Ongoing;Target goal - two weeks   Interventions Dysphagia Education;Optimal Position   Grooming Goal   Oral Hygiene Goal 06. Independent - Patient completes the activity by him/herself with no assistance from a helper.   Status Ongoing;Target goal - two weeks   Intervention Assistive Device;Balance Work;Therapeutic Exercise;Tolerance Work   Tub/Shower Transfer Goal   Method   (NO current shower order)   Bathing Goal   Shower/bathe self Goal 06. Independent - Patient completes the activity by him/herself with no assistance from a helper.   Status Ongoing;Target goal - two weeks   Intervention ADL Training;Assistive Device;Therapeutic Exercise   Upper Body Dressing Goal   Upper body dressing Goal 06. Independent - Patient completes the activity by him/herself with no assistance from a helper.   Status Ongoing;Target goal - two weeks   Intervention Assistive Device;Balance Work;Therapeutic Exercise;Tolerance Work   Lower Body Dressing Goal   Lower body dressing Goal 06. Independent - Patient completes the activity by him/herself with no assistance from a helper.   Putting on/taking off footwear Goal 06. Independent - Patient completes the activity by him/herself with no assistance from a helper.   Status Ongoing;Target goal - two weeks   Intervention Assistive Device;Balance Work;Therapeutic Exercise;Tolerance Work   Toileting Transfer Goal   Toilet transfer Goal 06. Independent - Patient completes the activity by him/herself with no assistance from a helper.    Status Ongoing;Target goal - two weeks   Intervention ADL Training;Balance Work;Assistive Device   Toileting Goal   Toileting hygiene Goal 06. Independent - Patient completes the activity by him/herself with no assistance from a helper.   Status Ongoing;Target goal - two weeks   Intervention ADL Training;Balance Work;Assistive Device   Meal Prep and Kitchen Mobility   Assist Level Independent  (basic meal prep)   Status Ongoing;Target goal - two weeks   Medication Management   Assist Level Independent   Status Ongoing;Target goal - two weeks

## 2025-04-29 NOTE — CONSULTS
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 with the patient at least 3 days per week throughout the patient’s stay in the [facility] to assess the patient both medically and functionally, as well as to modify the course of treatment as needed to maximize the patient’s capacity to benefit from the rehabilitation process.  The patient requires an intensive and coordinated interdisciplinary approach to providing rehabilitation, as defined in Chapter 1, section 110.2.5 of the CMS Medicare Policy Manual. This will be achieved through periodic team conferences, conducted at least once in a 7-day period, and comprising of an interdisciplinary team of medical professionals consisting of: a rehabilitation physician, registered nurse,  and/or , and a licensed/certified therapist from each therapy discipline involved in treating the patient.     Changes Since Pre-admission Assessment: None -This patient's participation in rehab continues to be reasonable, necessary and appropriate.    CMS Required Post-Admission Physician Evaluation Elements  History and Physical, including medical history, functional history and active comorbidities as in above text.     Post-Admission Physician Evaluation:  The patient has the potential to make  improvement and is in need of physical, occupational, and/or therapy services. The patient may also need nutritional services. Given the patient's complex medical condition and risk of further medical complications, rehabilitative services cannot be safely provided at a lower level of care, such as a skilled nursing facility. I have reviewed the patient's functional and medical status at the time of the preadmission screening and they are the same as on the day of this admission. I acknowledge that I have personally performed a full physical examination on this patient within 24 hours of admission. The patient and/or family demonstrated understanding the rehabilitation program and the discharge process after we discussed them.     Agree in entirety: yes  Minor adaptions: none    Major changes: none    Lexa Bonilla, DO  Physical Medicine and Rehabilitation  [facility]    I have spent a total time of 85 minutes in caring for this patient on the day of the visit/encounter including Prognosis, Risks and benefits of tx options, Instructions for management, Patient and family education, Importance of tx compliance, Risk factor reductions, Counseling / Coordination of care, Documenting in the medical record, Reviewing/placing orders in the medical record (including tests, medications, and/or procedures), Obtaining or reviewing history  , and Communicating with other healthcare professionals .      ** Please Note: Fluency Direct voice to text software may have been used in the creation of this document. **

## 2025-04-29 NOTE — ASSESSMENT & PLAN NOTE
NICM  EF 41% on cardiac MRI (2019)/ECHO 40-45% 2/4/25  Follows with Dr Worley as an OP  Home: Entresto  mg BID/Aldactone 25mg qd/Torsemide 10mg qd prn edema  Here:  Entresto  mg BID/Aldactone as below  Does not tolerate BB = has tried Coreg and Metoprolol in past per Dr Worley's office note  Volume status stable  Restart Aldactone at 12.5mg qd today 4/29.  If tolerates, will increase back up to 25mg qd
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
## 2025-04-29 NOTE — ASSESSMENT & PLAN NOTE
Currently on a regimen of Entresto and spironolactone for the heart failure  Was on as needed Demadex at home in the past  Monitor pressures during therapy as well as during routine vitals

## 2025-04-29 NOTE — ASSESSMENT & PLAN NOTE
Currently on a as needed albuterol inhaler  Continue monitor on physical examination as well as during routine vitals, therapy vitals

## 2025-04-29 NOTE — ASSESSMENT & PLAN NOTE
HbA1C 7.8 on 4/29/25  Has a glucometer at home  Home: Metformin  mg daily with dinner  Here:  Lantus 8U qhs/Lispro 3U TID  Continue QID Accuchecks/SSI (Algo 3/1) and DM diet  If stable today, will consider restarting Metformin 4/30/25

## 2025-04-29 NOTE — ASSESSMENT & PLAN NOTE
"Found on CT cervical spine  \"1.1 cm right thyroid nodule. Incidental discovery of one or more thyroid nodule(s) measuring less than 1.5 cm and without suspicious features is noted in this patient who is above 35 years old; according to guidelines published in the February 2015 white paper on incidental thyroid nodules in the Journal of the American College of Radiology (JACR), no further evaluation is recommended.\"  "

## 2025-04-29 NOTE — ASSESSMENT & PLAN NOTE
Most recent WBC count of 12.95 which is down from postoperative 18.0  Continue to monitor on biweekly CBC or sooner if indicated, monitor for signs of infection at the surgical site

## 2025-04-29 NOTE — ASSESSMENT & PLAN NOTE
Wt Readings from Last 3 Encounters:   04/28/25 101 kg (221 lb 9 oz)   04/24/25 99.3 kg (219 lb)   02/20/25 99.3 kg (219 lb)   EF 41% on cardiac MRI and similar on echo  NICMP  Ct home Entresto (97/103) mg BID  Home Aldactone 25 mg daily had been held - restart at 12.5 mg daily on 4/29 and increase to 25 mg daily if BP tolerates  Does not tolerate BB  Monitor volume status

## 2025-04-29 NOTE — ASSESSMENT & PLAN NOTE
History of stroke in the past and was placed on aspirin 325 mg daily  Is also not on a statin at home  After history of stroke did not follow-up with neurology and refused follow-up with Dr. Jones.  Is not on the appropriate regimen for secondary stroke prophylaxis.  Was on 81 mg previously but stated her cardiologist increased it to 325 mg.  She endorsed that she did not start her statin.

## 2025-04-29 NOTE — PROGRESS NOTES
[facility] OT EVAL   04/29/25 1000   Patient Data   Rehab Impairment Impairment of mobility, safety and Activities of Daily Living (ADLs) due to Orthopedic Disorders:  08.11  Unilateral Hip Fracture   Etiologic Diagnosis intertrochanteric fracture of the left femur   Date of Onset 04/24/25   Support System   Name Pt lives at home w/ her  Ferdinand and their small dog. (-)  hx neck pain limits cervical ROM. PTA pt IND w/ mobility and ADLs. Reports niece lives next door. Pt's  has adult children who live in [facility], not available for regular assist. Reports  can provide light assist but expectation is that her niece will assist w/ housework. [employer] for groceries. [employer] for basic floor cleaning.   Home Setup   Type of Home Multi Level   Method of Entry Stairs  (backdoor entrance: 50ft walking then step up, walk across patio, step up, walk to door, step up into house)   First Floor Bathroom Full  (access to toilet but otherwise uses this room for storage for [employer] shopping)   First Floor Bathroom Accessibility Raised toilet seat   Second Floor Bathroom Full;Shower;Curtain   Second Floor Bathroom Accessibility Grab bars in tub/shower;Grab bars by toilet;Shower chair   Home Modification Comment pending progress   Available Equipment Roller Walker;Single Point Cane;Shower Chair   Baseline Information   Transportation Family/friends drive   Prior IADL Participation   Money Management   (PTA shares w/ )   Meal Preparation   (PTA IND)   Laundry   (PTA IND)   Home Cleaning   (PTA IND)   Prior Level of Function   Self-Care 3. Independent - Patient completed the activities by him/herself, with or without an assistive device, with no assistance from a helper.   Indoor-Mobility (Ambulation) 3. Independent - Patient completed the activities by him/herself, with or without an assistive device, with no assistance from a helper.   Stairs 3. Independent - Patient completed the activities by  him/herself, with or without an assistive device, with no assistance from a helper.   Functional Cognition 3. Independent - Patient completed the activities by him/herself, with or without an assistive device, with no assistance from a helper.  (w/ modifications, uses bebeto for med manangement)   Prior Assistance Needed for Shopping;Driving  ([employer] for grocery delivery)   Prior Device Used Z. None of the above   Falls in the Last Year   Number of falls in the past 12 months 2   Type of Injury Associated with Fall Major injury  (lead to admission)   Patient Preference   Nickname (Patient Preference) Charis   Psychosocial   Psychosocial (WDL) WDL   Patient Behaviors/Mood Calm;Cooperative   Restrictions/Precautions   LLE Weight Bearing Per Order WBAT   Pain Assessment   Pain Assessment Tool 0-10   Pain Score 7   Pain Location/Orientation Orientation: Left;Location: Hip   Hospital Pain Intervention(s) Medication (See MAR)   Eating Assessment   Type of Assistance Needed Independent   Eating CARE Score 6   Oral Hygiene   Type of Assistance Needed Physical assistance   Physical Assistance Level Total assistance   Comment PLOF pt completes in stance. d/t high pain in stance activity w/ no AD pt requires Ax2 to attempt. Seated w/ modified tech pt completes seated to rinse mouth w/ mouthwash at sup level.   Oral Hygiene CARE Score 1   Tub/Shower Transfer   Reason Not Assessed Sponge Bath;Medical  (no active shower order)   Shower/Bathe Self   Type of Assistance Needed Physical assistance   Physical Assistance Level Total assistance   Comment PLOF pt showers in stance in narrow WIS that does have Gb and shower chair. She reports baseline she leans on wall for balance and to manage leg/back pain. Assesed at PLOF pt requires Ax2 to safely complete 2' poor acitivity tolerance, high pain. Progreses to modA for SB seated EOb.   Shower/Bathe Self CARE Score 1   Bathing   Assessed Bath Style Sponge Bath   Dressing/Undressing  Clothing   Type of Assistance Needed Supervision   Comment seated don OH   Upper Body Dressing CARE Score 4   Type of Assistance Needed Physical assistance   Physical Assistance Level Total assistance   Comment In stance TA for pulling down brief. TA for applying tabbed brief and fr CM over hips in stance w/ RW   Lower Body Dressing CARE Score 1   Putting On/Taking Off Footwear   Type of Assistance Needed Physical assistance   Physical Assistance Level Total assistance   Comment TA don/doff  socks 2' pain. pt reports baseline she sits EOB and brings LE into bed for sock don/doff   Putting On/Taking Off Footwear CARE Score 1   Toileting Hygiene   Type of Assistance Needed Physical assistance   Physical Assistance Level Total assistance   Comment TA in stance at RW   Toileting Hygiene CARE Score 1   Toilet Transfer   Type of Assistance Needed Physical assistance   Physical Assistance Level Total assistance   Comment Ax2 SPT w/ RW from EOB<>BSC. High pain, poor tech w/ RW despite extensvie cues, inability to pick either BLE from ground to take proper step   Toilet Transfer CARE Score 1   Bowel/Bladder Management   Findings Continent of moderate amount of urine on bsc. Unmeasured as pt voided into aborbant pad in liner, reported such to [place] [facility]   Transfer Bed/Chair/Wheelchair   Type of Assistance Needed Physical assistance   Physical Assistance Level Total assistance   Comment Ax2 w/ RW SPT d/t high pain and poor RW tech despite cues, anxious as well.   Chair/Bed-to-Chair Transfer CARE Score 1   Lying to Sitting on Side of Bed   Type of Assistance Needed Physical assistance   Physical Assistance Level 76% or more   Lying to Sitting on Side of Bed CARE Score 2   Picking Up Object   Type of Assistance Needed Physical assistance   Physical Assistance Level Total assistance   Comment Ax2 for safety at RW, extensive cues to stand fully upright. Was not able to achieve fully upright on all standing attempts 2' pain and  "anxiety   Picking Up Object CARE Score 1   Walk 10 Feet   Comment despite RW intervention unable to advance either LE 2' high pain and anxiety   Reason if not Attempted Safety concerns   Walk 10 Feet CARE Score 88   Comprehension   QI: Comprehension 3. Usually Understands: Understands most conversations, but misses some part/intent of message. Requires cues at times to understand.   Expression   QI: Expression 3. Exhibits some difficulty with expressing needs and ideas (e.g., some words or finishing thoughts) or speech is not clear   RUE Assessment   RUE Assessment WFL  (gets pain shots for torn rotator cuff)   LUE Assessment   LUE Assessment WFL  (gets pain shots for torn rotator cuff)   Cognition   Overall Cognitive Status WFL   Arousal/Participation Alert;Cooperative   Attention Attends with cues to redirect   Orientation Level Oriented X4   Vision   Vision Comments WFl w/ glasses   Objective Measure   OT Findings /68 HR 83   Discharge Information   Vocational Plan Retired/not working   Patient's Discharge Plan return home w/ family support and ongoing therapy services   Patient's Rehab Expectations \"get moving better'   Barriers to Discharge Home Limited Family Support;Decreased Strength;Decreased Endurance;Pain;Safety Considerations   Impressions 75 year old female with PMH of Chronic systolic CHF, HTN, History Abnormal heart rhythm, DM2, Esophageal dysphagia, Chronic right-sided low back pain w/o sciatica, Neurogenic claudication due to lumbar spinal stenosis, Hx of  CVA, Mild intermittent asthma, LUCIA who presented to [facility] on 4/24 as a trauma C to the ED after mechanical fall (+) head strike and left hip pain, takes daily ASA. Pt states she was walking at home, tripped over something and fell on her left side with immediate left-sided hip pain. Pt also states that she hit her head on a cabinet, denies LOC.  CT head and C-spine negative. LLE imaging showed Comminuted intertrochanteric fx of " the L femur. Pt is s/p IMN on 4/24. WBAT to LLE. Adams catheter was placed 4/25 d/t urinary retention, removed on 4/28. Urinary retention protocol. UA negative for bacteria on 4/25. Pt presents to [facility] for OT eval on 4/29/25. PLOF pt IND w/ ADLs/basic IADLs, mobility no AD, (-) . This date when assessed at PLOF pt requires Ax2 for transfers and ADLs assessed at PLOF and w/ use of clincial judgement based on observations of similar tasks. Pt progresses to maxA x1 w/ RW for pivot transfer, although high pain and poor technique. Progresses to Ax1 for SB routine this date. In addition to high pain, barrier to safe DC home at this time include: PMH, requires extensive caregiver assist, WB prec, unsafe home set up, appropriate assist not available to pt, dec strength, dec insight, dec endurance, anxiety. Pt to benefit from IPOT w/ POC focusing on Adl/IADL retraining, pt/family edu, AE/Dme edu, leisure pursuits, TE, modalities, relaxation techniques, standardized/nonstandardized assessments as appropriate. ELOS 2 weeks w/ mod I ADL goals.   OT Therapy Minutes   OT Time In 1000   OT Time Out 1130   OT Total Time (minutes) 90   OT Mode of treatment - Individual (minutes) 90   OT Mode of treatment - Concurrent (minutes) 0   OT Mode of treatment - Group (minutes) 0   OT Mode of treatment - Co-treat (minutes) 0   OT Mode of Treatment - Total time(minutes) 90 minutes   OT Cumulative Minutes 90   Cumulative Minutes   Cumulative therapy minutes 90

## 2025-04-29 NOTE — ASSESSMENT & PLAN NOTE
BMI of 36.87 on arrival  Continue to recommend lifestyle modification, dietary changes, weight loss counseling especially in the setting of recent fracture.

## 2025-04-29 NOTE — ASSESSMENT & PLAN NOTE
Takes Prilosec 40mg qd at home  Currently on Protonix 40mg qd and Pepcid 20mg qd here  Will start weaning Pepcid and reduce to 10mg qd tomorrow 4/30/25

## 2025-04-29 NOTE — ASSESSMENT & PLAN NOTE
Lab Results   Component Value Date    HGBA1C 8.4 (H) 01/27/2025       Recent Labs     04/28/25  0741 04/28/25  1106 04/28/25  1919 04/28/25 2057   POCGLU 199* 280* 185* 230*       Blood Sugar Average: Last 72 hrs:  (P) 207.5  Home regimen: Metformin 500 mg daily - held  Lantus 8 hs, Humalog 3 TID, sliding scale insulin  Monitor blood sugars and adjust insulin accordingly

## 2025-04-29 NOTE — PLAN OF CARE
Problem: PAIN - ADULT  Goal: Verbalizes/displays adequate comfort level or baseline comfort level  Description: Interventions:- Encourage patient to monitor pain and request assistance- Assess pain using appropriate pain scale- Administer analgesics based on type and severity of pain and evaluate response- Implement non-pharmacological measures as appropriate and evaluate response- Consider cultural and social influences on pain and pain management- Notify physician/advanced practitioner if interventions unsuccessful or patient reports new pain  Outcome: Progressing     Problem: INFECTION - ADULT  Goal: Absence or prevention of progression during hospitalization  Description: INTERVENTIONS:- Assess and monitor for signs and symptoms of infection- Monitor lab/diagnostic results- Monitor all insertion sites, i.e. indwelling lines, tubes, and drains- Monitor endotracheal if appropriate and nasal secretions for changes in amount and color- [place] appropriate cooling/warming therapies per order- Administer medications as ordered- Instruct and encourage patient and family to use good hand hygiene technique- Identify and instruct in appropriate isolation precautions for identified infection/condition  Outcome: Progressing     Problem: SAFETY ADULT  Goal: Patient will remain free of falls  Description: INTERVENTIONS:- Educate patient/family on patient safety including physical limitations- Instruct patient to call for assistance with activity - Consult OT/PT to assist with strengthening/mobility - Keep Call bell within reach- Keep bed low and locked with side rails adjusted as appropriate- Keep care items and personal belongings within reach- Initiate and maintain comfort rounds- Make Fall Risk Sign visible to staff- Offer Toileting every 2 Hours, in advance of need- Initiate/Maintain bed/chair alarm- Obtain necessary fall risk management equipment: nonskid socks- Apply yellow socks and bracelet for high fall risk  patients- Consider moving patient to room near nurses station  Outcome: Progressing  Goal: Maintain or return to baseline ADL function  Description: INTERVENTIONS:-  Assess patient's ability to carry out ADLs; assess patient's baseline for ADL function and identify physical deficits which impact ability to perform ADLs (bathing, care of mouth/teeth, toileting, grooming, dressing, etc.)- Assess/evaluate cause of self-care deficits - Assess range of motion- Assess patient's mobility; develop plan if impaired- Assess patient's need for assistive devices and provide as appropriate- Encourage maximum independence but intervene and supervise when necessary- Involve family in performance of ADLs- Assess for home care needs following discharge - Consider OT consult to assist with ADL evaluation and planning for discharge- Provide patient education as appropriate  Outcome: Progressing  Goal: Maintains/Returns to pre admission functional level  Description: INTERVENTIONS:- Perform AM-PAC 6 Click Basic Mobility/ Daily Activity assessment daily.- Set and communicate daily mobility goal to care team and patient/family/caregiver. - Collaborate with rehabilitation services on mobility goals if consulted- Perform Range of Motion 3 times a day.- Reposition patient every 2 hours.- Dangle patient 3 times a day- Stand patient 3 times a day- Ambulate patient 3 times a day- Out of bed to chair 3 times a day - Out of bed for meals 3 times a day- Out of bed for toileting- Record patient progress and toleration of activity level   Outcome: Progressing     Problem: DISCHARGE PLANNING  Goal: Discharge to home or other facility with appropriate resources  Description: INTERVENTIONS:- Identify barriers to discharge w/patient and caregiver- Arrange for needed discharge resources and transportation as appropriate- Identify discharge learning needs (meds, wound care, etc.)- Arrange for interpretive services to assist at discharge as needed- Refer  to Case Management Department for coordinating discharge planning if the patient needs post-hospital services based on physician/advanced practitioner order or complex needs related to functional status, cognitive ability, or social support system  Outcome: Progressing

## 2025-04-29 NOTE — PROGRESS NOTES
PT ADELA ASHRAF        04/29/25 1230   Patient Data   Rehab Impairment Impairment of mobility, safety and Activities of Daily Living (ADLs) due to Orthopedic Disorders:  08.11  Unilateral Hip Fracture   Etiologic Diagnosis intertrochanteric fracture of the left femur   Date of Onset 04/24/25   Support System   Name Lives with supportive Ferdinand   Home Setup   Type of Home Multi Level   Method of Entry Stairs  (backdoor entrance: 50 ft walkway>1 step to patio>walk across patio>1 step to porch>walk to door >1 Step into the house)   Number of Stairs in Home   (full flight to second floor of home)   In Home Hand Rail Left   First Floor Bathroom Full   First Floor Bathroom Accessibility Raised toilet seat   Second Floor Bathroom Full;Shower;Curtain   Second Floor Bathroom Accessibility Grab bars in tub/shower;Grab bars by toilet;Shower chair   Available Equipment Roller Walker;Single Point Cane;Shower Chair   Baseline Information   Transportation Family/friends drive   Prior Level of Function   Self-Care 3. Independent - Patient completed the activities by him/herself, with or without an assistive device, with no assistance from a helper.   Indoor-Mobility (Ambulation) 3. Independent - Patient completed the activities by him/herself, with or without an assistive device, with no assistance from a helper.   Stairs 3. Independent - Patient completed the activities by him/herself, with or without an assistive device, with no assistance from a helper.   Functional Cognition 3. Independent - Patient completed the activities by him/herself, with or without an assistive device, with no assistance from a helper.   Prior Assistance Needed for Driving;Shopping   Prior Device Used Z. None of the above   Falls in the Last Year   Number of falls in the past 12 months 2  (reason for admission)   Patient Preference   Nickname (Patient Preference) Charis   Restrictions/Precautions   Precautions Fall Risk;Pain   LLE Weight Bearing Per  Order WBAT   Pain Assessment   Pain Assessment Tool 0-10   Pain Score 10 - Worst Possible Pain   Pain Location/Orientation Orientation: Left;Location: Hip   Hospital Pain Intervention(s) Rest;Repositioned  (communicated with RN to see if pain medication available)   Toilet Transfer   Comment (S)  Updated white board to reflect swap out technique to BSC when pt out of bed in recliner or WC.   Transfer Bed/Chair/Wheelchair   Type of Assistance Needed Physical assistance   Physical Assistance Level 51%-75%   Comment ModA with RW, VCs for RW management, shuffling/pivoted of feet, unable to clear/lift feet off floor when turning   Chair/Bed-to-Chair Transfer CARE Score 2   Roll Left and Right   Comment unable to roll to L due to pain   Reason if not Attempted Safety concerns   Roll Left and Right CARE Score 88   Sit to Lying   Type of Assistance Needed Physical assistance   Physical Assistance Level 51%-75%   Comment ModA for management of LEs   Sit to Lying CARE Score 2   Lying to Sitting on Side of Bed   Type of Assistance Needed Physical assistance   Physical Assistance Level 76% or more   Comment MaxA for trunk management   Lying to Sitting on Side of Bed CARE Score 2   Sit to Stand   Type of Assistance Needed Physical assistance   Physical Assistance Level 51%-75%   Comment ModA with RW, VCs for hand placement. Multiple attempts to achieve standing at times   Sit to Stand CARE Score 2   Picking Up Object   Comment unable to safely bend over from standing position. standing tolerance limited by pain   Reason if not Attempted Safety concerns   Picking Up Object CARE Score 88   Car Transfer   Comment activity tolerance limited by pain   Reason if not Attempted Safety concerns   Car Transfer CARE Score 88   Ambulation   Primary Mode of Locomotion Prior to Admission Walk   Distance Walked (feet)   (4 steps)   Assist Device Roller Walker   Gait Pattern Antalgic;Slow Marika;Decreased foot clearance;Inconsistant  Marika;Improper weight shift;Decreased L stance;Shuffle   Limitations Noted In Balance;Device Management;Endurance;Safety;Speed;Strength;Swing   Provided Assistance with: Balance;Weight Shift;Direction   Walk 10 Feet   Comment unable to take more than 4 steps due to L hip pain. Unable to properly clear feet. Shuffled   Reason if not Attempted Safety concerns   Walk 10 Feet CARE Score 88   Walk 50 Feet with Two Turns   Reason if not Attempted Safety concerns   Walk 50 Feet with Two Turns CARE Score 88   Walk 150 Feet   Reason if not Attempted Safety concerns   Walk 150 Feet CARE Score 88   Walking 10 Feet on Uneven Surfaces   Reason if not Attempted Safety concerns   Walking 10 Feet on Uneven Surfaces CARE Score 88   Wheel 50 Feet with Two Turns   Reason if not Attempted Activity not applicable   Wheel 50 Feet with Two Turns CARE Score 9   Wheel 150 Feet   Reason if not Attempted Activity not applicable   Wheel 150 Feet CARE Score 9   Curb or Single Stair   Comment unable to safely assess due to poor weightbearing on LLE and high pain level   Reason if not Attempted Safety concerns   1 Step (Curb) CARE Score 88   4 Steps   Reason if not Attempted Safety concerns   4 Steps CARE Score 88   12 Steps   Reason if not Attempted Safety concerns   12 Steps CARE Score 88   Comprehension   QI: Comprehension 3. Usually Understands: Understands most conversations, but misses some part/intent of message. Requires cues at times to understand.   Expression   QI: Expression 3. Exhibits some difficulty with expressing needs and ideas (e.g., some words or finishing thoughts) or speech is not clear   Cognition   Overall Cognitive Status WFL   Arousal/Participation Alert;Cooperative   Attention Attends with cues to redirect   Orientation Level Oriented X4   Objective Measure   PT Findings Educated patient on [facility] schedule - 3 hours/5 days a week and that she will be provided with daily schedule the afternoon before.   Therapeutic  Exercise   Therapeutic Exercise/Activity Access Code: [email or web address]  URL: [email or web address]  Date: 04/29/2025  Prepared by: Leonela Naik    Exercises  - Supine Ankle Pumps  - 1 x daily - 7 x weekly - 3 sets - 10 reps - 5 sec hold  - Supine Quadricep Sets  - 1 x daily - 7 x weekly - 3 sets - 10 reps - 5 sec hold  - Gluteal Sets  - 1 x daily - 7 x weekly - 3 sets - 10 reps - 5 sec hold  - Supine Hip Abduction  - 1 x daily - 7 x weekly - 3 sets - 10 reps  - Supine Active Straight Leg Raise  - 1 x daily - 7 x weekly - 3 sets - 10 reps  (Provided patient with above exercise handout. Removed heel slides and bridges from program as they were too hard to pt to complete at this time. Instructed pt to perform every 1-2 hours within tolerance.)   Discharge Information   Vocational Plan Retired/not working   Patient's Discharge Plan Return home with    Patient's Rehab Expectations To move better   Barriers to Discharge Home Limited Family Support;Unsafe Home Setup;Decreased Strength;Decreased Endurance;Safety Considerations;Pain   Impressions Patient is a 75 year old female who [facility] following hospitalization for Left hip fracture. PMH significant for hronic heart failure, nonischemic cardiomyopathy, CAD, hypertension, LUCIA not compliant with CPAP, lumbar spinal stenosis, multijoint osteoarthritis . Prior to admission, patient was independent for ADLs, non ,  walking without assistive device. Patient lives with with spouse, in a 2SH. On initial evaluation, patient presents decreased strength, imbalance, decreased endurance, fatigue, pain, and delayed righting reactions resulting in increased assistance for all functional mobility. Patient requires Mod Assistance  for bed mobility and Mod Assistance  for transfers.  Patient is high risk for falls secondary to deficits found on initial evaluation. Patient will benefit from physical therapy services to address the deficits identified on  evaluation and to maximize safety and independence with all functional mobility and to decrease caregiver burden. Barriers to discharge home at this time include: limited caregiver support/ inaccessible home environment/high risk for falls/pain. Patient is a good rehabilitation candidate. Patient's estimated length of stay is 2 weeks with goals set for  Mod I/Supervision . PT plan of care to focus on bed mobility/transfers/gait training/stair navigation/strength training/balance training/improving activity tolerance.   PT Therapy Minutes   PT Time In 1230   PT Time Out 1400   PT Total Time (minutes) 90   PT Mode of treatment - Individual (minutes) 90   PT Mode of treatment - Concurrent (minutes) 0   PT Mode of treatment - Group (minutes) 0   PT Mode of treatment - Co-treat (minutes) 0   PT Mode of Treatment - Total time(minutes) 90 minutes   PT Cumulative Minutes 90   Cumulative Minutes   Cumulative therapy minutes 180     Leonela Naik, PT, DPT

## 2025-04-29 NOTE — ASSESSMENT & PLAN NOTE
Currently on aspirin  Noted to have nonobstructive CAD on cardiac catheterization  Follow-up with outpatient cardiology

## 2025-04-29 NOTE — ASSESSMENT & PLAN NOTE
Patient was evaluated by the rehabilitation team MD and an appropriate candidate for acute inpatient rehabilitation program at this time.  The patient will tolerate 3 hours/day 5 to 7 days/week of intensive physical, occupational therapy in order to obtain goals for community discharge  Due to the patient's functional Compared to their baseline level of function in addition to their ongoing medical needs, the patient would benefit from daily supervision from a rehabilitation physician as well as rehabilitation nursing to implement and adjust the medical as well as functional plan of care in order to meet the patient's goals.

## 2025-04-29 NOTE — ASSESSMENT & PLAN NOTE
Patient presents on 4/24 to [facility] with left hip pain after a fall  And have a comminuted intertrochanteric fracture of the left femur  Status post ORIF with IM nailing by Dr. Parry on 4/24  Weightbearing as tolerated to the left lower extremity  Physical and Occupational Therapy  Pain control with Tylenol, gabapentin, Robaxin and as needed oxycodone  DVT prophylaxis.  Is recommended for 81 mg aspirin twice daily at the time of discharge to complete the 28 days however is on aspirin 325 mg daily currently

## 2025-04-29 NOTE — ASSESSMENT & PLAN NOTE
Presented to [facility] on 4/24/25 with left hip pain following a mechanical fall  Noted to have a comminuted intertrochanteric fracture of the left femur   S/p ORIF/IMN 4/24   Weightbearing as tolerated  PT/OT, pain control per PMR  Is having muscle spasms that Robaxin 500 mg q6hrs prn is helping.  She has taken none today so far (4/29)  Two week followup will be 5/8/25

## 2025-04-29 NOTE — CASE MANAGEMENT
Met w/pt and reviewed rehab routine and cm role. Pt resides with spouse in a multi level home with 3 junito. Pt has a half bath on the first flr and a full flight to bedroom. Pt has no prior experience with hhc or outpt therapy. Pt owns a roller walker, spc and shower chair. Cm reivewed team mtg process and potential los. Pt uses [facility] for pharmacy services. Pts niece lives next door and is able to help with cleaning, spouse does his own laundry and they have options for securing food. Cm to follow up post team

## 2025-04-29 NOTE — ASSESSMENT & PLAN NOTE
Presented to  [facility] on 4/24/25 with left hip pain following a mechanical fall  Noted to have a comminuted intertrochanteric fracture of the left femur   Underwent open reduction internal fixation with an intramedullary nail on 4/24 by orthopedics  Transferred to [facility] for acute rehab on 4/28  Weightbearing as tolerated  PT/OT  Pain control

## 2025-04-29 NOTE — TREATMENT PLAN
Individualized Plan of Care - [facility]  Kamini Martines 75 y.o. female MRN: [id number]  Unit/Bed#: [facility] 970-01 Encounter: [id number]     PATIENT INFORMATION  ADMISSION DATE: 4/28/2025  5:35 PM ASHLEE CATEGORY:Orthopedic Disorders:  08.11  Unilateral Hip Fracture   ADMISSION DIAGNOSIS: Fracture of left femur (HCC) [S72.92XA]  EXPECTED LOS: 10 to 14 days     MEDICAL/FUNCTIONAL PROGNOSIS  Based on my assessment of the patient's medical conditions and current functional status, the prognosis for attaining medical and functional goals or the IRF stay is:  Good    Medical Goals: Patient will be medically stable for discharge to [facility] upon completion of rehab program and Patient will be able to manage medical conditions and comorbid conditions with medications and follow up upon completion of rehab program    Cardiopulmonary function/Anemia: Ensure cardiopulmonary stability and optimize cardiopulmonary function not only at rest but with activity as patient's activity level significantly increases in acute rehab compared with prior to transfer in preparation for safe discharge from [facility].  Must closely and frequently monitor blood pressure, HR, anemia to ensure adequate cardiac output during ADLs and ambulation as patient is at increased risk for orthostatic hypotension/syncope and potential injury if not monitored for and managed adequately.    Blood pressure management:    Frequent monitoring of blood pressure with appropriate adjustments in blood pressure medication management to optimize blood pressure control and prevent/limit renal complications.   Monitoring impact of blood pressure and side-effects of blood pressure medications at rest and with activity.  Hypoxia prevention: Ensure appropriate level of oxygenation at rest and with activity to avoid symptomatic hypoxia, maximize functional performance, and decrease risk of atelectasis/pneumonia through close and frequent  monitoring, providing appropriate respiratory treatments (such as incentive spirometry), and when necessary provide/adjust respiratory medications.    DM: Patient will improve/maintain blood sugar control to ensure optimal healing and decrease risks of complications associated with DM through medication monitoring, adjustments as indicated, and optimal dietary intake and education.  Pain management:  Pain will improve with frequent evaluation of pain, careful adjustments in medications, frequent re-evaluation of patient's pain and medical/neurologic status to ensure optimal pain control, avoidance of potential serious and even life-threatening side-effects and drug interactions, as well as weaning pain medications as soon as possible to decrease risk of short and long-term use.     Orthopedic Disorder: Left hip fracture causing impaired mobility, ADLs, and gait:  intensive skilled therapies with physical therapy and occupational therapy with close oversight and management by rehab specialized physician in acute rehabilitation setting to most expeditiously and effectively improve functional mobility, transfers, upper and lower body strengthening, conditioning, balance, and gait training with appropriate assistive device.  Patient will have optimal supervision and management of patient's underlying orthopedic disorder with specialized rehabilitation physician during this period of recovery to ensure most expeditious and optimal recovery with decreased risks of fall/injury and other complications including acute worsening of ortho disorder, decrease risk of VTE, PNA, and skin ulceration.  Inpatient rehabilitation education/teaching:  To be provided to patient and typically family/caregiver (if able to be identified) by all skilled therapists, rehab nursing, case management, and rehab specialized physician to ensure optimal recovery and decrease risks of complications in both acute rehabilitation setting as well as  after discharge.   Bladder dysfunction:  Appropriate bladder management with appropriate toileting program from rehab nursing and staff with oversight management by rehabilitation physicians which include appropriate monitoring and possible adjustments in medications to with goals to optimize bladder function and decrease risk of bladder retention, incontinence, and urinary tract infection.   Bowel dysfunction: Appropriate bowel management with appropriate toileting program from rehab nursing and staff with oversight management by rehabilitation physicians which include adjustments in medications to optimize appropriate bowel function and prevent/decrease risk of constipation and bowel obstruction.    Obesity:  Close monitoring of nutrition status, nutrition specialist with adjustments in diet.   on appropriate short and long term nutrition and activity.  Obesity increases complexity of patient's overall condition and causes unique challenges during this part of patient's recovery process.  Supervise and if necessary make adjustments in rehab nursing care and skilled therapy care to ensure appropriate toileting, bed mobility, other ADLs, and ambulation to decrease risk of falls/injuries, VTE, skin breakdown/ulceration and optimize functional recovery.    Skin wounds: Appropriate skin checks for wound/skin evaluation including evaluation of healing, worsening of wounds, or signs of infection.   Wound care management from rehab nursing, wound care nursing, physicians.  Ensure frequent appropriate turning, positioning in bed, in chair, when mobilizing, and when appropriate with use of appropriate devices to optimize healing and decrease risk of worsening or new skin breakdown.    Skin management: Increased risk of skin wounds/breakdown/rashes due to recent immobility and co-morbidities.   Appropriate skin checks from nursing and as needed physician.  Frequent turning, application of appropriate barrier  creams/dressings, cushions.  Patient/caregiver education.  Optimal bowel/bladder hygiene and mobilization.        ANTICIPATED DISCHARGE DISPOSITION AND SERVICES  COMMUNITY SETTING: Home - independent/modified independent    ANTICIPATED FOLLOW-UP SERVICE:   Home Health Services: PT, OT, and Nursing      DISCIPLINE SPECIFIC PLANS:  Required Disciplines & Services: Rehabillitation Nursing, Case Management, and Diabetes Education    REQUIRED THERAPY:  Therapy Hours per Day Days per Week   Physical Therapy 1-2 5-6   Occupational Therapy 1-2 5-6   NOTE: Additional therapy time(s) or changes to allocation of therapies as appropriate to meet patient needs and to achieve functional goals.      Patient will participate in above therapy regimen consisting of PT and OT due to the following medical procedure/condition:Orthopedic Disorders:  08.11  Unilateral Hip Fracture    ANTICIPATED FUNCTIONAL OUTCOMES:  ADL:  Modified independent to set up   Bladder/Bowel:  Modified independent   Transfers:  Modified independent with rolling walker   Locomotion:  Modified independent with rolling walker for household distance ambulation, potentially independent for short distances in the house   Cognitive:  Independent     DISCHARGE PLANNING NEEDS  Equipment needs: Discharge needs to be reviewed with team      REHAB ANTICIPATED PARTICIPATION RESTRICTIONS:  Amubulate Short Distances Only, Assist with Mobility, Assist with Tub Shower Transfer, Inaccessible Bathroom, Rquires Assist with ADLS, Requires Assit with Homemaking, and Requires Assit with Steps    Lexa Bonilla,   Physical Medicine and Rehabilitation  [facility]

## 2025-04-29 NOTE — ASSESSMENT & PLAN NOTE
Had indwelling Adams catheter placed 4/25 which was removed on 4/28 and did require initial straight catheterization  Monitor PVRs and will discontinue if maintains low PVRs appropriately x 3

## 2025-04-29 NOTE — ASSESSMENT & PLAN NOTE
Lab Results   Component Value Date    HGBA1C 7.8 (H) 04/29/2025       Recent Labs     04/29/25  1548 04/29/25  2106 04/30/25  0619 04/30/25  1034   POCGLU 180* 204* 175* 194*     Currently on regimen of Lantus 8 units at bedtime +3 units of lispro with meals as well as sliding scale insulin, Accu-Cheks 4 times daily  Continue monitoring blood glucose levels and adjust per recommendations by internal medicine  Continue carb controlled diet level 2

## 2025-04-29 NOTE — ASSESSMENT & PLAN NOTE
Wt Readings from Last 3 Encounters:   04/30/25 101 kg (221 lb 9 oz)   04/24/25 99.3 kg (219 lb)   02/20/25 99.3 kg (219 lb)     Echocardiogram most recently with a cardiac MRI showing EF of 41%  Nonischemic cardiomyopathy on Entresto twice daily at home as well as Aldactone.  Aldactone is being restarted today per internal medicine  Does not tolerate beta-blockers in the past  Monitor intake and output as well as weights  Education on diet and activity

## 2025-04-29 NOTE — PROGRESS NOTES
Progress Note - Hospitalist   Name: Kamini Martines 75 y.o. female I MRN: [id number]  Unit/Bed#: -01 I Date of Admission: 4/28/2025   Date of Service: 4/29/2025 I Hospital Day: 1    Assessment & Plan  Closed left hip fracture, initial encounter ([facility])  Presented to [facility] on 4/24/25 with left hip pain following a mechanical fall  Noted to have a comminuted intertrochanteric fracture of the left femur   S/p ORIF/IMN 4/24   Weightbearing as tolerated  PT/OT, pain control per PMR  Is having muscle spasms that Robaxin 500 mg q6hrs prn is helping.  She has taken none today so far (4/29)  Two week followup will be 5/8/25  HFrEF (heart failure with reduced ejection fraction) ([facility])  NICM  EF 41% on cardiac MRI (2019)/ECHO 40-45% 2/4/25  Follows with Dr Worley as an OP  Home: Entresto  mg BID/Aldactone 25mg qd/Torsemide 10mg qd prn edema  Here:  Entresto  mg BID/Aldactone as below  Does not tolerate BB = has tried Coreg and Metoprolol in past per Dr Worley's office note  Volume status stable  Restart Aldactone at 12.5mg qd today 4/29.  If tolerates, will increase back up to 25mg qd  Type 2 diabetes mellitus without complication, without long-term current use of insulin ([facility])  HbA1C 7.8 on 4/29/25  Has a glucometer at home  Home: Metformin  mg daily with dinner  Here:  Lantus 8U qhs/Lispro 3U TID  Continue QID Accuchecks/SSI (Algo 3/1) and DM diet  If stable today, will consider restarting Metformin 4/30/25  CVA (cerebral vascular accident) ([facility])  Continue  mg qd but need to see if she was actually taking it at home.  Records question use  Not on statins at home  LUCIA (obstructive sleep apnea)  Stopped using CPAP few months ago as strap was causing neck pain  Outpatient sleep f/u  Mild intermittent asthma without complication  No exacerbation  Continue Albuterol prn  Class 2 obesity in adult  BMI 36.87  Affects all aspects of care  Lifestyle modification  GERD (gastroesophageal reflux  Pt was seen at [facility] for abdominal pain. He had imaging done which revealed gallstone(s). Pt is requesting that you review imaging to determine if he should be seen by a general surgeon for further workup or not.    "disease)  Takes Prilosec 40mg qd at home  Currently on Protonix 40mg qd and Pepcid 20mg qd here  Will start weaning Pepcid and reduce to 10mg qd tomorrow 4/30/25  CAD (coronary artery disease)  Nonobstructive coronary artery disease on cardiac cath  On  mg daily for hx CVA  Urinary retention  Adams was placed on 4/25 and removed on 4/28  She takes Vesicare at home  Per PMR  Thyroid nodule  Found on CT cervical spine  \"1.1 cm right thyroid nodule. Incidental discovery of one or more thyroid nodule(s) measuring less than 1.5 cm and without suspicious features is noted in this patient who is above 35 years old; according to guidelines published in the February 2015 white paper on incidental thyroid nodules in the Journal of the American College of Radiology (JACR), no further evaluation is recommended.\"  Buttock wound, right, sequela  POA  Per PMR    The above assessment and plan was reviewed and updated as determined by my evaluation of the patient on 4/29/2025.    History of Present Illness   Patient seen and examined. Patients overnight issues or events were reviewed with nursing staff. New or overnight issues include the following:   No new or overnight issues.  Offers no complaints    Review of Systems    Objective :  Temp:  [97.8 °F (36.6 °C)-98.9 °F (37.2 °C)] 97.8 °F (36.6 °C)  HR:  [83-98] 91  BP: (127-150)/(62-66) 145/64  Resp:  [16-18] 18  SpO2:  [94 %-96 %] 96 %  O2 Device: None (Room air)    Invasive Devices       Drain  Duration             Ureteral Internal Stent Right ureter 6 Fr. 300 days                    Physical Exam  General Appearance: no distress, nontoxic appearing  HEENT:  External ear normal.  Nose normal w/o drainage. Mucous membranes are moist. Oropharynx is clear. Conjunctiva clear w/o icterus or redness.  Neck:  Supple, normal ROM  Lungs: BBS without crackles/wheeze/rhonchi; respirations unlabored with normal inspiratory/expiratory effort.  No retractions noted.  On RA  CV: regular " rate and rhythm; no rubs/murmurs/gallops, PMI normal   ABD: Abdomen is soft.  Bowel sounds all quadrants.  Nontender with no distention.    EXT: no edema  Skin: normal turgor, normal texture  Psych: affect normal, mood normal  Neuro: AAO       The above physical exam was reviewed and updated as determined by my evaluation of the patient on 4/29/2025.      Lab Results: I have reviewed the following results:  Results from last 7 days   Lab Units 04/27/25  0449 04/26/25  0502   WBC Thousand/uL 12.95* 12.16*   HEMOGLOBIN g/dL 9.1* 9.4*   HEMATOCRIT % 27.7* 29.4*   PLATELETS Thousands/uL 254 244     Results from last 7 days   Lab Units 04/27/25  0449 04/26/25  0502   SODIUM mmol/L 138 139   POTASSIUM mmol/L 4.1 4.1   CHLORIDE mmol/L 105 106   CO2 mmol/L 24 25   BUN mg/dL 27* 22   CREATININE mg/dL 0.55* 0.52*   CALCIUM mg/dL 9.1 8.9     Results from last 7 days   Lab Units 04/29/25  0713   HEMOGLOBIN A1C % 7.8*     Results from last 7 days   Lab Units 04/24/25  0105   INR  0.86     Results from last 7 days   Lab Units 04/29/25  0630 04/28/25  2057 04/28/25  1919   POC GLUCOSE mg/dl 178* 230* 185*       Imaging Results Review: No pertinent imaging studies reviewed.  Other Study Results Review: No additional pertinent studies reviewed.    Review of Scheduled Meds: Medications  reviewed and reconciled as needed  Current Facility-Administered Medications   Medication Dose Route Frequency Provider Last Rate    acetaminophen  975 mg Oral Q8H [facility] Tammy Pradhan MD      albuterol  2 puff Inhalation Q6H PRN Tammy Pradhan MD      aluminum-magnesium hydroxide-simethicone  30 mL Oral Q4H PRN Tammy Pradhan MD      aspirin  325 mg Oral Daily Tammy Pradhan MD      Cholecalciferol  1,000 Units Oral Daily Tammy Pradhan MD      enoxaparin  30 mg Subcutaneous Q12H Tammy Pradhan MD      famotidine  20 mg Oral Daily Tammy Pradhan MD      gabapentin  100 mg Oral BID Tammy Pradhan MD      insulin glargine  8 Units  Subcutaneous HS Tammy Pradhan MD      insulin lispro  1-5 Units Subcutaneous HS Tammy Pradhan MD      insulin lispro  1-6 Units Subcutaneous TID AC Tammy Pradhan MD      insulin lispro  3 Units Subcutaneous TID With Meals Tammy Pradhan MD      methocarbamol  500 mg Oral Q6H PRN Tammy Pradhan MD      multivitamin stress formula  1 tablet Oral Daily Tammy Pradhan MD      naloxone  0.04 mg Intravenous Q1MIN PRN Tammy Pradhan MD      oxyCODONE  2.5 mg Oral Q4H PRN Tammy Pradhan MD      Or    oxyCODONE  5 mg Oral Q4H PRN Tammy Pradhan MD      pantoprazole  40 mg Oral Early Morning Tammy Pradhan MD      polyethylene glycol  17 g Oral Daily Tammy Pradhan MD      sacubitril-valsartan  1 tablet Oral BID Tammy Pradhan MD      spironolactone  12.5 mg Oral Daily Tammy Pradhan MD         VTE Pharmacologic Prophylaxis: Lovenox  Code Status: Level 1 - Full Code  Current Length of Stay: 1 day(s)    Administrative Statements     ** Please Note:  voice to text software may have been used in the creation of this document. Although proof errors in transcription or interpretation are a potential of such software**

## 2025-04-29 NOTE — ASSESSMENT & PLAN NOTE
Preoperative hemoglobin of 12.1 now currently 9.1 qualifying for acute blood loss anemia  Continue to monitor hemoglobin on bio weekly CBC or sooner if quickly indicated

## 2025-04-29 NOTE — PROGRESS NOTES
PT ARC GOAL        04/29/25 1230   Rehab Team Interventions   PT Interventions Gait Training;Therapeutic Exercise;Transfer Training;Bed Mobility;Neuromuscualr Reeducation;Modalities;Patient/Family Education   PT Transfer Goal   Roll left and right Goal 06. Independent - Patient completes the activity by him/herself with no assistance from a helper.   Sit to lying Goal 06. Independent - Patient completes the activity by him/herself with no assistance from a helper.   Lying to sitting on side of bed Goal 06. Independent - Patient completes the activity by him/herself with no assistance from a helper.   Sit to stand Goal 06. Independent - Patient completes the activity by him/herself with no assistance from a helper.   Chair/bed-to-chair transfer Goal 06. Independent - Patient completes the activity by him/herself with no assistance from a helper.   Car Transfer Goal 04. Supervision or touching assistance- [place] provides VERBAL CUES or supervision throughout activity.   Assistive Device Roller Walker   Status Target goal - two weeks   Locomotion Goal   Primary discharge mode of locomotion Walking   Target Walk Distance 250 ft   Assist Device Roller Walker   Gait Pattern Improvement Excess Slow;Inconsistant Marika;Decreased foot clearance;Decreased L stance   Environment Level Surface;Well Lit   Walk 10 feet Goal 06. Independent - Patient completes the activity by him/herself with no assistance from a helper.   Walk 50 feet with 2 turns Goal 06. Independent - Patient completes the activity by him/herself with no assistance from a helper.   Walk 150 feet Goal 04. Supervision or touching assistance- [place] provides VERBAL CUES or supervision throughout activity.   Walking 10 feet on uneven surface 04. Supervision or touching assistance- [place] provides VERBAL CUES or supervision throughout activity.   Walking Goal Status Target goal - two weeks   Wheel 50 feet with 2 turns Goal 09. Not applicable   Wheel 150 feet Goal  09. Not applicable   Stairs Goal   1 step or curb goal 04. Supervision or touching assistance- [place] provides VERBAL CUES or supervision throughout activity.   4 steps Goal 04. Supervision or touching assistance- [place] provides VERBAL CUES or supervision throughout activity.   12 steps Goal 04. Supervision or touching assistance- [place] provides VERBAL CUES or supervision throughout activity.   Number of Stairs   (full flight)   Technique Non-reciprocal   Hand Rail Left   Status Target goal - two weeks   Object Retrieval Goal   Picking up object Goal 06. Independent - Patient completes the activity by him/herself with no assistance from a helper.   Assistive Device  Reacher   Small Object Picked Up marker

## 2025-04-29 NOTE — ASSESSMENT & PLAN NOTE
Continue  mg qd but need to see if she was actually taking it at home.  Records question use  Not on statins at home

## 2025-04-29 NOTE — ASSESSMENT & PLAN NOTE
Was noncompliant with the CPAP due to overall comfort with strap and discontinued its use  Follow-up with sleep medicine as an outpatient

## 2025-04-30 ENCOUNTER — PATIENT OUTREACH (OUTPATIENT)
Dept: CASE MANAGEMENT | Facility: OTHER | Age: 75
End: 2025-04-30

## 2025-04-30 ENCOUNTER — TELEPHONE (OUTPATIENT)
Dept: PAIN MEDICINE | Facility: CLINIC | Age: 75
End: 2025-04-30

## 2025-04-30 LAB
GLUCOSE SERPL-MCNC: 146 MG/DL (ref 65–140)
GLUCOSE SERPL-MCNC: 175 MG/DL (ref 65–140)
GLUCOSE SERPL-MCNC: 194 MG/DL (ref 65–140)
GLUCOSE SERPL-MCNC: 234 MG/DL (ref 65–140)

## 2025-04-30 PROCEDURE — 99232 SBSQ HOSP IP/OBS MODERATE 35: CPT | Performed by: NURSE PRACTITIONER

## 2025-04-30 PROCEDURE — 82948 REAGENT STRIP/BLOOD GLUCOSE: CPT

## 2025-04-30 PROCEDURE — 99232 SBSQ HOSP IP/OBS MODERATE 35: CPT | Performed by: STUDENT IN AN ORGANIZED HEALTH CARE EDUCATION/TRAINING PROGRAM

## 2025-04-30 PROCEDURE — 97110 THERAPEUTIC EXERCISES: CPT

## 2025-04-30 PROCEDURE — 97116 GAIT TRAINING THERAPY: CPT

## 2025-04-30 PROCEDURE — 97535 SELF CARE MNGMENT TRAINING: CPT

## 2025-04-30 PROCEDURE — 97530 THERAPEUTIC ACTIVITIES: CPT

## 2025-04-30 RX ORDER — ANTIOX #8/OM3/DHA/EPA/LUT/ZEAX 250-2.5 MG
1 CAPSULE ORAL 2 TIMES DAILY
Status: DISCONTINUED | OUTPATIENT
Start: 2025-04-30 | End: 2025-05-12 | Stop reason: HOSPADM

## 2025-04-30 RX ORDER — METFORMIN HYDROCHLORIDE 500 MG/1
500 TABLET, EXTENDED RELEASE ORAL
Status: DISCONTINUED | OUTPATIENT
Start: 2025-04-30 | End: 2025-05-03

## 2025-04-30 RX ADMIN — Medication 1 CAPSULE: at 20:47

## 2025-04-30 RX ADMIN — GABAPENTIN 100 MG: 100 CAPSULE ORAL at 17:18

## 2025-04-30 RX ADMIN — ACETAMINOPHEN 975 MG: 325 TABLET, FILM COATED ORAL at 13:04

## 2025-04-30 RX ADMIN — INSULIN LISPRO 2 UNITS: 100 INJECTION, SOLUTION INTRAVENOUS; SUBCUTANEOUS at 11:00

## 2025-04-30 RX ADMIN — SACUBITRIL AND VALSARTAN 1 TABLET: 97; 103 TABLET, FILM COATED ORAL at 20:48

## 2025-04-30 RX ADMIN — OXYCODONE HYDROCHLORIDE 5 MG: 5 TABLET ORAL at 08:56

## 2025-04-30 RX ADMIN — SACUBITRIL AND VALSARTAN 1 TABLET: 97; 103 TABLET, FILM COATED ORAL at 08:42

## 2025-04-30 RX ADMIN — OXYCODONE HYDROCHLORIDE 5 MG: 5 TABLET ORAL at 13:04

## 2025-04-30 RX ADMIN — INSULIN LISPRO 3 UNITS: 100 INJECTION, SOLUTION INTRAVENOUS; SUBCUTANEOUS at 08:44

## 2025-04-30 RX ADMIN — METFORMIN ER 500 MG 500 MG: 500 TABLET ORAL at 17:18

## 2025-04-30 RX ADMIN — INSULIN LISPRO 3 UNITS: 100 INJECTION, SOLUTION INTRAVENOUS; SUBCUTANEOUS at 11:01

## 2025-04-30 RX ADMIN — POLYETHYLENE GLYCOL 3350 17 G: 17 POWDER, FOR SOLUTION ORAL at 08:42

## 2025-04-30 RX ADMIN — INSULIN LISPRO 3 UNITS: 100 INJECTION, SOLUTION INTRAVENOUS; SUBCUTANEOUS at 17:19

## 2025-04-30 RX ADMIN — ENOXAPARIN SODIUM 30 MG: 30 INJECTION SUBCUTANEOUS at 08:51

## 2025-04-30 RX ADMIN — METHOCARBAMOL 500 MG: 500 TABLET ORAL at 20:47

## 2025-04-30 RX ADMIN — ACETAMINOPHEN 975 MG: 325 TABLET, FILM COATED ORAL at 20:48

## 2025-04-30 RX ADMIN — GABAPENTIN 100 MG: 100 CAPSULE ORAL at 08:49

## 2025-04-30 RX ADMIN — ASPIRIN 325 MG ORAL TABLET 325 MG: 325 PILL ORAL at 08:49

## 2025-04-30 RX ADMIN — FAMOTIDINE 10 MG: 20 TABLET, FILM COATED ORAL at 08:49

## 2025-04-30 RX ADMIN — ENOXAPARIN SODIUM 30 MG: 30 INJECTION SUBCUTANEOUS at 20:47

## 2025-04-30 RX ADMIN — METHOCARBAMOL 500 MG: 500 TABLET ORAL at 00:59

## 2025-04-30 RX ADMIN — OXYCODONE HYDROCHLORIDE 5 MG: 5 TABLET ORAL at 20:13

## 2025-04-30 RX ADMIN — Medication 1000 UNITS: at 08:49

## 2025-04-30 RX ADMIN — PANTOPRAZOLE SODIUM 40 MG: 40 TABLET, DELAYED RELEASE ORAL at 05:06

## 2025-04-30 RX ADMIN — SPIRONOLACTONE 12.5 MG: 25 TABLET, FILM COATED ORAL at 08:49

## 2025-04-30 RX ADMIN — INSULIN LISPRO 1 UNITS: 100 INJECTION, SOLUTION INTRAVENOUS; SUBCUTANEOUS at 08:44

## 2025-04-30 RX ADMIN — ACETAMINOPHEN 975 MG: 325 TABLET, FILM COATED ORAL at 05:06

## 2025-04-30 NOTE — PROGRESS NOTES
Progress Note - Hospitalist   Name: Kamini Martines 75 y.o. female I MRN: [id number]  Unit/Bed#: -01 I Date of Admission: 4/28/2025   Date of Service: 4/30/2025 I Hospital Day: 2    Assessment & Plan  Closed left hip fracture, initial encounter ([facility])  Presented to [facility] on 4/24/25 with left hip pain following a mechanical fall  Noted to have a comminuted intertrochanteric fracture of the left femur   S/p ORIF/IMN 4/24   Weightbearing as tolerated  PT/OT, pain control per PMR  Is having muscle spasms that Robaxin 500 mg q6hrs prn is helping.  She has taken none today so far (4/29)  Two week followup will be 5/8/25  HFrEF (heart failure with reduced ejection fraction) ([facility])  NICM  EF 41% on cardiac MRI (2019)/ECHO 40-45% 2/4/25  Follows with Dr Worley as an OP  Home: Entresto  mg BID/Aldactone 25mg qd/Torsemide 10mg qd prn edema  Here:  Entresto  mg BID/Aldactone as below  Does not tolerate BB = has tried Coreg and Metoprolol in past per Dr Worley's office note  Volume status stable  Restarted Aldactone at 12.5mg qd on 4/29.  If tolerates, will increase back up to 25mg qd  BMP AM 5/1/25  Type 2 diabetes mellitus without complication, without long-term current use of insulin ([facility])  HbA1C 7.8 on 4/29/25  Has a glucometer at home  Home: Metformin  mg daily with dinner  Here:  Lantus 8U qhs/Lispro 3U TID  Continue QID Accuchecks/SSI (Algo 3/1) and DM diet  Will restart Metformin  mg with dinner tonight 4/30/25 and stop the Lantus; keep the Lispro at 3U TID with meals  CVA (cerebral vascular accident) ([facility])  Continue  mg qd as at home  Not on statins at home because she didn't want to take in past  Didn't followup with Neuro after stroke  LUCIA (obstructive sleep apnea)  Stopped using CPAP few months ago as strap was causing neck pain  Outpatient sleep f/u  Mild intermittent asthma without complication  No exacerbation  Continue Albuterol prn  Class 2 obesity in adult  BMI  "36.87  Affects all aspects of care  Lifestyle modification  GERD (gastroesophageal reflux disease)  Takes Prilosec 40mg qd at home  Currently on Protonix 40mg qd and Pepcid 20mg qd here  Will start weaning Pepcid and reduce to 10mg qd today 4/30/25  CAD (coronary artery disease)  Nonobstructive coronary artery disease on cardiac cath  On  mg daily for hx CVA  Urinary retention  Adams was placed on 4/25 and removed on 4/28  She takes Vesicare at home  Per PMR  Thyroid nodule  Found on CT cervical spine  \"1.1 cm right thyroid nodule. Incidental discovery of one or more thyroid nodule(s) measuring less than 1.5 cm and without suspicious features is noted in this patient who is above 35 years old; according to guidelines published in the February 2015 white paper on incidental thyroid nodules in the Journal of the American College of Radiology (JACR), no further evaluation is recommended.\"  Buttock wound, right, sequela  POA  Per PMR  Anemia  Mild  stable  Leukocytosis  Mild  Likely reactive  No fever  Will watch    The above assessment and plan was reviewed and updated as determined by my evaluation of the patient on 4/30/2025.    History of Present Illness   Patient seen and examined. Patients overnight issues or events were reviewed with nursing staff. New or overnight issues include the following:   No new or overnight issues.  Offers no complaints    Review of Systems    Objective :  Temp:  [97.8 °F (36.6 °C)-98.6 °F (37 °C)] 98.6 °F (37 °C)  HR:  [75-87] 87  BP: (137-170)/(61-75) 137/65  Resp:  [18-19] 18  SpO2:  [95 %-98 %] 95 %  O2 Device: None (Room air)    Invasive Devices       Drain  Duration             Ureteral Internal Stent Right ureter 6 Fr. 301 days                    Physical Exam  General Appearance: no distress, non toxic appearing  HEENT: PERRLA, conjuctiva normal; oropharynx clear; mucous membranes moist   Neck:  Supple, normal ROM  Lungs: CTA, normal respiratory effort, no retractions, " expiratory effort normal  CV: regular rate and rhythm; no rubs/murmurs/gallops, PMI normal   ABD: soft; ND/NT; +BS  EXT: no edema  Skin: normal turgor, normal texture  Psych: affect normal, mood normal  Neuro: AAO        The above physical exam was reviewed and updated as determined by my evaluation of the patient on 4/30/2025.      Lab Results: I have reviewed the following results:  Results from last 7 days   Lab Units 04/27/25  0449 04/26/25  0502   WBC Thousand/uL 12.95* 12.16*   HEMOGLOBIN g/dL 9.1* 9.4*   HEMATOCRIT % 27.7* 29.4*   PLATELETS Thousands/uL 254 244     Results from last 7 days   Lab Units 04/27/25  0449 04/26/25  0502   SODIUM mmol/L 138 139   POTASSIUM mmol/L 4.1 4.1   CHLORIDE mmol/L 105 106   CO2 mmol/L 24 25   BUN mg/dL 27* 22   CREATININE mg/dL 0.55* 0.52*   CALCIUM mg/dL 9.1 8.9     Results from last 7 days   Lab Units 04/29/25  0713   HEMOGLOBIN A1C % 7.8*     Results from last 7 days   Lab Units 04/24/25  0105   INR  0.86     Results from last 7 days   Lab Units 04/30/25  1034 04/30/25  0619 04/29/25  2106   POC GLUCOSE mg/dl 194* 175* 204*       Imaging Results Review: No pertinent imaging studies reviewed.  Other Study Results Review: No additional pertinent studies reviewed.    Review of Scheduled Meds: Medications  reviewed and reconciled as needed  Current Facility-Administered Medications   Medication Dose Route Frequency Provider Last Rate    acetaminophen  975 mg Oral Q8H [facility] Tammy Pradhan MD      albuterol  2 puff Inhalation Q6H PRN Tammy Pradhan MD      aluminum-magnesium hydroxide-simethicone  30 mL Oral Q4H PRN Tammy Pradhan MD      aspirin  325 mg Oral Daily Tammy Pradhan MD      Cholecalciferol  1,000 Units Oral Daily Tammy Pradhan MD      enoxaparin  30 mg Subcutaneous Q12H Tammy Pradhan MD      famotidine  10 mg Oral Daily STEFFANY Nelson      gabapentin  100 mg Oral BID Tammy Pradhan MD      insulin glargine  8 Units Subcutaneous HS  Tammy Pradhan MD      insulin lispro  1-5 Units Subcutaneous HS Tammy Pradhan MD      insulin lispro  1-6 Units Subcutaneous TID AC Tammy Pradhan MD      insulin lispro  3 Units Subcutaneous TID With Meals Tammy Pradhan MD      methocarbamol  500 mg Oral Q6H PRN Tammy Pradhan MD      multivitamin stress formula  1 tablet Oral Daily Tammy Pradhan MD      naloxone  0.04 mg Intravenous Q1MIN PRN Tammy Pradhan MD      oxyCODONE  2.5 mg Oral Q4H PRN Tammy Pradhan MD      Or    oxyCODONE  5 mg Oral Q4H PRN Tammy Pradhan MD      pantoprazole  40 mg Oral Early Morning Tammy Pradhan MD      polyethylene glycol  17 g Oral Daily Tammy Pradhan MD      sacubitril-valsartan  1 tablet Oral BID Tammy Pradhan MD      spironolactone  12.5 mg Oral Daily Tammy Pradhan MD         VTE Pharmacologic Prophylaxis: Lovenox  Code Status: Level 1 - Full Code  Current Length of Stay: 2 day(s)    Administrative Statements     ** Please Note:  voice to text software may have been used in the creation of this document. Although proof errors in transcription or interpretation are a potential of such software**

## 2025-04-30 NOTE — ASSESSMENT & PLAN NOTE
Presented to [facility] on 4/24/25 with left hip pain following a mechanical fall  Noted to have a comminuted intertrochanteric fracture of the left femur   S/p ORIF/IMN 4/24   Weightbearing as tolerated  PT/OT, pain control per PMR  Is having muscle spasms that Robaxin 500 mg q6hrs prn is helping.  She has taken none today so far (4/29)  Two week followup will be 5/8/25

## 2025-04-30 NOTE — PCC PHYSICAL THERAPY
Overall patient is making progress towards her goals. Patient is now independent with STS and SPT transfers with RW as well as short distance ambulation in her room with RW. Patient continues to require assistance for bed mobility and stair management. Family training is scheduled for 5/8 to complete training on bed mobility, stairs, and car transfer. Physical therapy to continue to utilized the following interventions to maximize safety adn independence: Nu Step for ROM/endurance, gait training, repetitive stair training using compensatory strategies, LE strengthening, gait training, and bed mobility. Pt will benefit from  services on discharge.

## 2025-04-30 NOTE — PROGRESS NOTES
04/30/25 1330     Pain Assessment   Pain Assessment Tool 0-10   Pain Score 7   Pain Location/Orientation Orientation: Left;Location: Hip;Location: Leg   Restrictions/Precautions   Precautions Fall Risk;Pain   LLE Weight Bearing Per Order WBAT   Subjective   Subjective pt agreeable to perform skilled PT and ay her request bigger WC d/t left leg hitting side of smaller WC   Sit to Stand   Type of Assistance Needed Physical assistance   Physical Assistance Level 76% or more   Comment inc assistance for rise from seat as session progression.   Sit to Stand CARE Score 2   Bed-Chair Transfer   Type of Assistance Needed Physical assistance;Adaptive equipment   Physical Assistance Level 51%-75%   Comment does not advance stepping with feet. cont to only shuffle feet without stepping. very mlimited with tolerance in stance   Chair/Bed-to-Chair Transfer CARE Score 2   Transfer Bed/Chair/Wheelchair   Adaptive Equipment Roller Walker   Car Transfer   Reason if not Attempted Safety concerns   Car Transfer CARE Score 88   Walk 10 Feet   Reason if not Attempted Safety concerns   Walk 10 Feet CARE Score 88   Walk 50 Feet with Two Turns   Reason if not Attempted Safety concerns   Walk 50 Feet with Two Turns CARE Score 88   Walk 150 Feet   Reason if not Attempted Safety concerns   Walk 150 Feet CARE Score 88   Walking 10 Feet on Uneven Surfaces   Reason if not Attempted Safety concerns   Walking 10 Feet on Uneven Surfaces CARE Score 88   Ambulation   Primary Mode of Locomotion Prior to Admission Walk   Distance Walked (feet)   (2-3 shuffle step ,)   Assist Device Roller Walker   Does the patient walk? 2. Yes   Wheel 50 Feet with Two Turns   Reason if not Attempted Activity not applicable   Wheel 50 Feet with Two Turns CARE Score 9   Wheel 150 Feet   Reason if not Attempted Activity not applicable   Wheel 150 Feet CARE Score 9   Curb or Single Stair   Reason if not Attempted Safety concerns   1 Step (Curb) CARE Score 88   4 Steps    Reason if not Attempted Safety concerns   4 Steps CARE Score 88   12 Steps   Reason if not Attempted Safety concerns   12 Steps CARE Score 88   Therapeutic Interventions   Strengthening seated LAQ AP ball hip add 3x10 reps  STS x5 with rest   Flexibility manual stretch LE   Balance standing balance with RW side to side to advance WB on left leg   Equipment Use   Parallel Bars STS x3 TRAIL to advance LLE but not able at this time d/t barriers on pain and WB   Assessment   Treatment Assessment pt engaged for 90 min with rest breaks needed and overall pt barriers cont with pain in LLE and weakness. Pt has difficutly to advance LLE and off load to her RLE during gait traininig . Pt in parpl bars standing and WB side to side shifting her weight onto her LLE  but not able to advance or step , pt cont to shuffle her LLE and hans during SPT with RW . Cont POC and work on goals . Pt like ice post treatment  and pain meds 30 min before her PT/OT sessions .   Barriers to Discharge Inaccessible home environment   Plan   Progress Progressing toward goals   PT Therapy Minutes   PT Time In 1330   PT Time Out 1500   PT Total Time (minutes) 90   PT Mode of treatment - Individual (minutes) 90   PT Mode of treatment - Concurrent (minutes) 0   PT Mode of treatment - Group (minutes) 0   PT Mode of treatment - Co-treat (minutes) 0   PT Mode of Treatment - Total time(minutes) 90 minutes   PT Cumulative Minutes 180   Therapy Time missed   Time missed? No

## 2025-04-30 NOTE — PROGRESS NOTES
Outpatient Care Management ELI/SNF Pathway. Discharged 4/28/25 to [facility]. This Admin Coordinator will continue to monitor via chart review.

## 2025-04-30 NOTE — PROGRESS NOTES
"   04/30/25 0830   Pain Assessment   Pain Assessment Tool 0-10   Pain Score 9   Pain Location/Orientation Orientation: Left;Location: Leg;Location: Incision   Restrictions/Precautions   Precautions Fall Risk;Pain   Lifestyle   Autonomy \"Im telling you now, its not going to be easy.\"   Upper Body Dressing   Type of Assistance Needed Set-up / clean-up   Upper Body Dressing CARE Score 5   Lower Body Dressing   Type of Assistance Needed Physical assistance   Physical Assistance Level 76% or more   Comment assist to thread clothing. today in stance pt able to assist with transition of pants over hips. req assistance for underwear over hips.   Lower Body Dressing CARE Score 2   Putting On/Taking Off Footwear   Type of Assistance Needed Physical assistance   Physical Assistance Level Total assistance   Comment pt reports that her  will be bringing in her  shoe horn.   Putting On/Taking Off Footwear CARE Score 1   Sit to Stand   Type of Assistance Needed Physical assistance   Physical Assistance Level 51%-75%   Sit to Stand CARE Score 2   Bed-Chair Transfer   Type of Assistance Needed Physical assistance   Comment does not advance stepping with feet. cont to only shuffle feet without stepping. very mlimited with tolerance in stance.   Chair/Bed-to-Chair Transfer CARE Score -   Toileting Hygiene   Comment pt reports having a bidet at home.   Toilet Transfer   Comment pt complains pain in sitting in standard BSC. supplied with wide based drop arm BSC for trial.   Assessment   Treatment Assessment Skilled OT session focusing on ADL and func transfers. pt today c/o high levels of pain, but reports that this is chronic. pt reports familarity with :[facility]E. cont training in nexer session.   Prognosis Good   OT Therapy Minutes   OT Time In 0830   OT Time Out 0900   OT Total Time (minutes) 30   OT Mode of treatment - Individual (minutes) 30   OT Mode of treatment - Concurrent (minutes) 0   OT Mode of treatment - Group " (minutes) 0   OT Mode of treatment - Co-treat (minutes) 0   OT Mode of Treatment - Total time(minutes) 30 minutes   OT Cumulative Minutes 120

## 2025-04-30 NOTE — PROGRESS NOTES
04/30/25 0930   Pain Assessment   Pain Score 9   Pain Location/Orientation Orientation: Left;Location: Leg   Hospital Pain Intervention(s) Cold applied   Sit to Stand   Type of Assistance Needed Physical assistance   Physical Assistance Level 76% or more   Comment inc assistance for rise from seat as session progression.   Sit to Stand CARE Score 2   Bed-Chair Transfer   Type of Assistance Needed Physical assistance   Physical Assistance Level 51%-75%   Comment does not advance stepping with feet. cont to only shuffle feet without stepping. very mlimited with tolerance in stance   Chair/Bed-to-Chair Transfer CARE Score 2   Assessment   Treatment Assessment last 30 min session limited with pt having pain. pt apprehensive about completeitng any form of standing. ice applied for comfort. inc time to compelte repositioning in chair for comfort.   Prognosis Good   Plan   Progress Progressing toward goals   OT Therapy Minutes   OT Time In 0930   OT Time Out 1000   OT Total Time (minutes) 30   OT Mode of treatment - Individual (minutes) 30   OT Mode of treatment - Concurrent (minutes) 0   OT Mode of treatment - Group (minutes) 0   OT Mode of treatment - Co-treat (minutes) 0   OT Mode of Treatment - Total time(minutes) 30 minutes   OT Cumulative Minutes 150

## 2025-04-30 NOTE — PROGRESS NOTES
Progress Note - PMR   Name: Kamini Martines 75 y.o. female I MRN: [id number]  Unit/Bed#: -01 I Date of Admission: 4/28/2025   Date of Service: 4/30/2025 I Hospital Day: 2     Assessment & Plan  Closed left hip fracture, initial encounter ([facility])  Patient presents on 4/24 to [facility] with left hip pain after a fall  And have a comminuted intertrochanteric fracture of the left femur  Status post ORIF with IM nailing by Dr. Parry on 4/24  Weightbearing as tolerated to the left lower extremity  Physical and Occupational Therapy  Pain control with Tylenol, gabapentin, Robaxin and as needed oxycodone  DVT prophylaxis.  Is recommended for 81 mg aspirin twice daily at the time of discharge to complete the 28 days however is on aspirin 325 mg daily currently  HTN (hypertension)  Currently on a regimen of Entresto and spironolactone for the heart failure  Was on as needed Demadex at home in the past  Monitor pressures during therapy as well as during routine vitals  CVA (cerebral vascular accident) ([facility])  History of stroke in the past and was placed on aspirin 325 mg daily  Is also not on a statin at home  After history of stroke did not follow-up with neurology and refused follow-up with Dr. Jones.  Is not on the appropriate regimen for secondary stroke prophylaxis.  Was on 81 mg previously but stated her cardiologist increased it to 325 mg.  She endorsed that she did not start her statin.  Mild intermittent asthma without complication  Currently on a as needed albuterol inhaler  Continue monitor on physical examination as well as during routine vitals, therapy vitals  LUCIA (obstructive sleep apnea)  Was noncompliant with the CPAP due to overall comfort with strap and discontinued its use  Follow-up with sleep medicine as an outpatient  Class 2 obesity in adult  BMI of 36.87 on arrival  Continue to recommend lifestyle modification, dietary changes, weight loss counseling especially in the setting of  recent fracture.  Type 2 diabetes mellitus without complication, without long-term current use of insulin ([facility])  Lab Results   Component Value Date    HGBA1C 7.8 (H) 04/29/2025       Recent Labs     04/29/25  1548 04/29/25  2106 04/30/25  0619 04/30/25  1034   POCGLU 180* 204* 175* 194*     Currently on regimen of Lantus 8 units at bedtime +3 units of lispro with meals as well as sliding scale insulin, Accu-Cheks 4 times daily  Continue monitoring blood glucose levels and adjust per recommendations by internal medicine  Continue carb controlled diet level 2  Urinary retention  Had indwelling Adams catheter placed 4/25 which was removed on 4/28 and did require initial straight catheterization  Monitor PVRs and will discontinue if maintains low PVRs appropriately x 3  Impaired mobility and activities of daily living  Patient was evaluated by the rehabilitation team MD and an appropriate candidate for acute inpatient rehabilitation program at this time.  The patient will tolerate 3 hours/day 5 to 7 days/week of intensive physical, occupational therapy in order to obtain goals for community discharge  Due to the patient's functional Compared to their baseline level of function in addition to their ongoing medical needs, the patient would benefit from daily supervision from a rehabilitation physician as well as rehabilitation nursing to implement and adjust the medical as well as functional plan of care in order to meet the patient's goals.    HFrEF (heart failure with reduced ejection fraction) ([facility])  Wt Readings from Last 3 Encounters:   04/30/25 101 kg (221 lb 9 oz)   04/24/25 99.3 kg (219 lb)   02/20/25 99.3 kg (219 lb)     Echocardiogram most recently with a cardiac MRI showing EF of 41%  Nonischemic cardiomyopathy on Entresto twice daily at home as well as Aldactone.  Aldactone is being restarted today per internal medicine  Does not tolerate beta-blockers in the past  Monitor intake and output as well as  weights  Education on diet and activity        GERD (gastroesophageal reflux disease)  Continue Pepcid and Protonix and as needed Maalox  CAD (coronary artery disease)  Currently on aspirin  Noted to have nonobstructive CAD on cardiac catheterization  Follow-up with outpatient cardiology  Thyroid nodule    Buttock wound, right, sequela  Noted on admission  Consult wound care  Anemia  Preoperative hemoglobin of 12.1 now currently 9.1 qualifying for acute blood loss anemia  Continue to monitor hemoglobin on bio weekly CBC or sooner if quickly indicated  Leukocytosis  Most recent WBC count of 12.95 which is down from postoperative 18.0  Continue to monitor on biweekly CBC or sooner if indicated, monitor for signs of infection at the surgical site    Subjective   Kamini Martines is a 75 y.o. female with history of chronic heart failure, nonischemic cardiomyopathy, CAD, hypertension, LUCIA not compliant with CPAP, lumbar spinal stenosis, multijoint osteoarthritis who presented to the [facility] on 4/24 with left hip pain after having a fall.  She is found have comminuted intertrochanteric fracture of the left femur and underwent ORIF and IM nailing on 4/24 with Dr. Parry.  She had a Adams catheter placed and was removed on 4/28. The patient was evaluated by the Rehabilitation team and deemed an appropriate candidate for comprehensive inpatient rehabilitation and admitted to the [facility] on 4/28/202     Chief Complaint: f/u fracture and f/u ambulatory dysfunction    Interval: Patient seen and evaluated in room without any acute issues overnight.  Participating in therapy and bit sore but taking the pain medications accordingly prior to therapy sessions.  Overall on initial evaluations and over the first couple days still requiring mod to max assist for most activities and has not had any significant functional ambulation but has been working on transfers and standing tolerance.  I do anticipate that  this will improve over the next few days we will continue to follow her progress.  No fever, chills, nausea vomiting or cough or shortness of breath, diarrhea or constipation.  Eating approximately 50 to 75% of meals, voiding without incontinence and last bowel movement on 4/29 and small.  Had a larger one on 4/28.    Objective :  Temp:  [98.6 °F (37 °C)] 98.6 °F (37 °C)  HR:  [82-87] 87  BP: (137-170)/(61-75) 137/65  Resp:  [18-19] 18  SpO2:  [95 %-98 %] 95 %  O2 Device: None (Room air)    Functional Update:  Mobility: Mod to max assist for transfers mod to max assist for sit to stand  ADLs: Will assist for bathing, for dressing and footwear management, toileting    Physical Exam  Vitals reviewed.   Constitutional:       General: She is not in acute distress.     Appearance: She is obese.   HENT:      Head: Normocephalic and atraumatic.      Right Ear: External ear normal.      Left Ear: External ear normal.      Nose: Nose normal. No rhinorrhea.      Mouth/Throat:      Mouth: Mucous membranes are moist.      Pharynx: Oropharynx is clear.   Eyes:      General: No scleral icterus.  Cardiovascular:      Rate and Rhythm: Normal rate.   Pulmonary:      Effort: Pulmonary effort is normal. No respiratory distress.   Abdominal:      General: There is no distension.      Palpations: Abdomen is soft.   Skin:     General: Skin is warm and dry.      Comments: Mild ecchymosis throughout the left lateral thigh with silver dressing in place   Neurological:      Mental Status: She is alert and oriented to person, place, and time.   Psychiatric:         Mood and Affect: Mood normal.         Behavior: Behavior normal.           Scheduled Meds:  Current Facility-Administered Medications   Medication Dose Route Frequency Provider Last Rate    acetaminophen  975 mg Oral Q8H [facility] Tammy Pradhan MD      albuterol  2 puff Inhalation Q6H PRN Tammy Pradhan MD      aluminum-magnesium hydroxide-simethicone  30 mL Oral Q4H PRN Tammy  MD Lorne      aspirin  325 mg Oral Daily Tammy Pradhan MD      Cholecalciferol  1,000 Units Oral Daily Tammy Pradhan MD      enoxaparin  30 mg Subcutaneous Q12H Tammy Pradhan MD      famotidine  10 mg Oral Daily STEFFANY Nelson      gabapentin  100 mg Oral BID Tammy Pradhan MD      insulin lispro  1-5 Units Subcutaneous HS Tammy Pradhan MD      insulin lispro  1-6 Units Subcutaneous TID AC Tammy Pradhan MD      insulin lispro  3 Units Subcutaneous TID With Meals Tammy Pradhan MD      metFORMIN  500 mg Oral Daily With Dinner STEFFANY Nelson      methocarbamol  500 mg Oral Q6H PRN Tammy Pradhan MD      multivitamin stress formula  1 tablet Oral Daily Tammy Pradhan MD      naloxone  0.04 mg Intravenous Q1MIN PRN Tammy Pradhan MD      oxyCODONE  2.5 mg Oral Q4H PRN Tammy Pradhan MD      Or    oxyCODONE  5 mg Oral Q4H PRN Tammy Pradhan MD      pantoprazole  40 mg Oral Early Morning Tammy Pradhan MD      polyethylene glycol  17 g Oral Daily Tammy Pradhan MD      sacubitril-valsartan  1 tablet Oral BID Tammy Pradhan MD      spironolactone  12.5 mg Oral Daily Tammy Pradhan MD           Lab Results: I have reviewed the following results:  Results from last 7 days   Lab Units 04/27/25  0449 04/26/25  0502 04/25/25  0955   HEMOGLOBIN g/dL 9.1* 9.4* 9.3*   HEMATOCRIT % 27.7* 29.4* 29.8*   WBC Thousand/uL 12.95* 12.16* 11.06*   PLATELETS Thousands/uL 254 244 261     Results from last 7 days   Lab Units 04/27/25  0449 04/26/25  0502 04/25/25  0955   BUN mg/dL 27* 22 27*   SODIUM mmol/L 138 139 138   POTASSIUM mmol/L 4.1 4.1 4.3   CHLORIDE mmol/L 105 106 105   CREATININE mg/dL 0.55* 0.52* 0.99       Results from last 7 days   Lab Units 04/24/25  0105   PROTIME seconds 12.4   INR  0.86          Lexa Bonilla,   Physical Medicine and Rehabilitation  [facility]      I have spent a total time of 35 minutes in caring for  this patient on the day of the visit/encounter including Counseling / Coordination of care, Documenting in the medical record, and Communicating with other healthcare professionals .

## 2025-04-30 NOTE — ASSESSMENT & PLAN NOTE
HbA1C 7.8 on 4/29/25  Has a glucometer at home  Home: Metformin  mg daily with dinner  Here:  Lantus 8U qhs/Lispro 3U TID  Continue QID Accuchecks/SSI (Algo 3/1) and DM diet  Will restart Metformin  mg with dinner tonight 4/30/25 and stop the Lantus; keep the Lispro at 3U TID with meals

## 2025-04-30 NOTE — PROGRESS NOTES
04/30/25 0900   Pain Assessment   Pain Assessment Tool 0-10   Pain Score 8   Pain Location/Orientation Orientation: Left;Location: Hip;Location: Leg   Pain Onset/Description Onset: Ongoing   Hospital Pain Intervention(s) Cold applied;Repositioned;Rest;Emotional support   Restrictions/Precautions   Precautions Fall Risk;Pain   Weight Bearing Restrictions Yes   LLE Weight Bearing Per Order WBAT   Putting On/Taking Off Footwear   Type of Assistance Needed Supervision;Verbal cues;Adaptive equipment   Physical Assistance Level No physical assistance   Comment Pt reports familiarity with LHAE for footwear mgmt from prior therapy. Educated pt on dressing stick and sock aide, providing therapist demonstration of technique to doff/don socks. Pt then able to doff/don B/L socks w/LHAE seated with Sup, increased time, and min vc's for technique. Pt would benefit from continued repetitive practice during ADL sessions to ensure carryover hans with sock aide. Pt reports her  can also assist with footwear mgmt at time of D/C if needed   Putting On/Taking Off Footwear CARE Score 4   Exercise Tools   Exercise Tools Yes   Other Exercise Tool 1 Seated w/Sup, [id number] each of alternating UE elbow flexion and chest press using 2# DB for increased BUE strength during fxl STS/transfers. Pt tolerated well, with brief rest breaks between sets to manage fatigue. Pt reports having 2# wrist/ankle weights already at home. Pt may benefit from providing UE HEP using 2# weight for ongoing strengthening. Cold pack applied to L hip during UE TherEx for pain mgmt, with pt educated on 20min on/20min off rule.   Cognition   Overall Cognitive Status WFL   Arousal/Participation Alert;Cooperative   Attention Attends with cues to redirect   Orientation Level Oriented X4   Memory Within functional limits   Following Commands Follows one step commands without difficulty   Activity Tolerance   Activity Tolerance Patient tolerated treatment well    Assessment   Treatment Assessment Pt seen for 30min skilled OT session focused on LHAE training for footwear mgmt, UE strengthening, and pain mgmt for increased independence w/ADLs/IADLs and decreased caregiver burden. See detailed descriptions of fxl performance above. Pt tolerated brief session well despite elevated L hip pain, completing seated tasks with cold pack applied for pain mgmt. Pt cont to be limited by decreased strength, endurance, standing balance/tolerance, activity tolerance, pain. Pt would benefit from continued skilled OT focused on ADL retraining, IADL retraining, pt/family edu, AE/DME edu, UE strengthening, endurance training, fxl standing balance/tolerance.   Prognosis Good   Problem List Decreased strength;Decreased endurance;Impaired balance;Decreased mobility;Pain;Orthopedic restrictions;Decreased skin integrity;Obesity   Plan   Treatment/Interventions ADL retraining;Functional transfer training;Therapeutic exercise;Endurance training;Patient/family training;Equipment eval/education;Bed mobility;Compensatory technique education   Progress Progressing toward goals   Discharge Recommendation   Rehab Resource Intensity Level, OT   (pending)   OT Therapy Minutes   OT Time In 0900   OT Time Out 0930   OT Total Time (minutes) 30   OT Mode of treatment - Individual (minutes) 30   OT Mode of treatment - Concurrent (minutes) 0   OT Mode of treatment - Group (minutes) 0   OT Mode of treatment - Co-treat (minutes) 0   OT Mode of Treatment - Total time(minutes) 30 minutes   OT Cumulative Minutes 150   Therapy Time missed   Time missed? No

## 2025-04-30 NOTE — PCC OCCUPATIONAL THERAPY
Occupational Therapy Weekly Team Note    Pt continues to make good progress with skilled occupational therapy intervention and is progressing toward long term goals for ADL, IADL's, and functional transfers/mobility. Pts long term goals for ADLs are Independent with Rolling Walker. Pt continues to present with impairments in Impaired standing balance, Impaired righting reactions, Unilateral weakness or paralysis of Lower limb , limited activity tolerance, and Pain. Occupational performance remains limited by personal factors: co morbidities/Other health conditions, Habits and past and current behavioral patterns, Body habitus, and Orthopedic restrictions. Family training/education will not be required prior to D/C.     Pt will continue to benefit from skilled acute rehab OT services to address above mentioned barriers and maximize functional independence in baseline areas of occupation to meet established treatment goals with overall decreased burden of care. Plan of care to continue to focus on ADL Retraining ,  LHAE education/training, Functional Transfers, DME training/education, and Energy conservation training/education. Goals for the upcoming week are: basic ADL participation , Out of bed tolerance, establish DME needs, and continue to progress assess for progression to independent goals in the room  Anticipate Discharge date to be set. . Home with family/friends support              5/7/25 update:    Pt continues to make good progress with skilled occupational therapy intervention and is progressing toward long term goals for ADL, IADL's, and functional transfers/mobility. Pts long term goals for ADLs are Independent with Rolling Walker. Pt currently at grossly supervision level for basic ADLs except for footwear which she requires assist for mostly due to edema; supervision for toileting and basic func transfers with RW. Pt continues to present with impairments in Impaired standing balance, Impaired righting  reactions, Unilateral weakness or paralysis of Lower limb , limited activity tolerance, and Pain. Occupational performance remains limited by personal factors: co morbidities/Other health conditions, Habits and past and current behavioral patterns, Body habitus, and Orthopedic restrictions. Family training/education will not be required prior to D/C.     Pt will continue to benefit from skilled acute rehab OT services to address above mentioned barriers and maximize functional independence in baseline areas of occupation to meet established treatment goals with overall decreased burden of care. Plan of care to continue to focus on ADL Retraining ,  LHAE education/training, Functional Transfers, DME training/education, and Energy conservation training/education. Goals for the upcoming week are: basic ADL participation , Out of bed tolerance, establish DME needs, and continue to progress assess for progression to independent goals in the room  Anticipate Discharge date of 5/12/25. Home with family/friends support

## 2025-04-30 NOTE — ASSESSMENT & PLAN NOTE
Continue  mg qd as at home  Not on statins at home because she didn't want to take in past  Didn't followup with Neuro after stroke

## 2025-04-30 NOTE — ASSESSMENT & PLAN NOTE
Takes Prilosec 40mg qd at home  Currently on Protonix 40mg qd and Pepcid 20mg qd here  Will start weaning Pepcid and reduce to 10mg qd today 4/30/25

## 2025-04-30 NOTE — PLAN OF CARE
Problem: PAIN - ADULT  Goal: Verbalizes/displays adequate comfort level or baseline comfort level  Description: Interventions:- Encourage patient to monitor pain and request assistance- Assess pain using appropriate pain scale- Administer analgesics based on type and severity of pain and evaluate response- Implement non-pharmacological measures as appropriate and evaluate response- Consider cultural and social influences on pain and pain management- Notify physician/advanced practitioner if interventions unsuccessful or patient reports new pain  Outcome: Progressing     Problem: INFECTION - ADULT  Goal: Absence or prevention of progression during hospitalization  Description: INTERVENTIONS:- Assess and monitor for signs and symptoms of infection- Monitor lab/diagnostic results- Monitor all insertion sites, i.e. indwelling lines, tubes, and drains- Monitor endotracheal if appropriate and nasal secretions for changes in amount and color- [place] appropriate cooling/warming therapies per order- Administer medications as ordered- Instruct and encourage patient and family to use good hand hygiene technique- Identify and instruct in appropriate isolation precautions for identified infection/condition  Outcome: Progressing     Problem: SAFETY ADULT  Goal: Patient will remain free of falls  Description: INTERVENTIONS:- Educate patient/family on patient safety including physical limitations- Instruct patient to call for assistance with activity - Consult OT/PT to assist with strengthening/mobility - Keep Call bell within reach- Keep bed low and locked with side rails adjusted as appropriate- Keep care items and personal belongings within reach- Initiate and maintain comfort rounds- Make Fall Risk Sign visible to staff- Offer Toileting every 2 Hours, in advance of need- Initiate/Maintain bed/chair alarm- Obtain necessary fall risk management equipment: nonskid socks- Apply yellow socks and bracelet for high fall risk  patients- Consider moving patient to room near nurses station  Outcome: Progressing  Goal: Maintain or return to baseline ADL function  Description: INTERVENTIONS:-  Assess patient's ability to carry out ADLs; assess patient's baseline for ADL function and identify physical deficits which impact ability to perform ADLs (bathing, care of mouth/teeth, toileting, grooming, dressing, etc.)- Assess/evaluate cause of self-care deficits - Assess range of motion- Assess patient's mobility; develop plan if impaired- Assess patient's need for assistive devices and provide as appropriate- Encourage maximum independence but intervene and supervise when necessary- Involve family in performance of ADLs- Assess for home care needs following discharge - Consider OT consult to assist with ADL evaluation and planning for discharge- Provide patient education as appropriate  Outcome: Progressing  Goal: Maintains/Returns to pre admission functional level  Description: INTERVENTIONS:- Perform AM-PAC 6 Click Basic Mobility/ Daily Activity assessment daily.- Set and communicate daily mobility goal to care team and patient/family/caregiver. - Collaborate with rehabilitation services on mobility goals if consulted- Perform Range of Motion 3 times a day.- Reposition patient every 2 hours.- Dangle patient 3 times a day- Stand patient 3 times a day- Ambulate patient 3 times a day- Out of bed to chair 3 times a day - Out of bed for meals 3 times a day- Out of bed for toileting- Record patient progress and toleration of activity level   Outcome: Progressing     Problem: DISCHARGE PLANNING  Goal: Discharge to home or other facility with appropriate resources  Description: INTERVENTIONS:- Identify barriers to discharge w/patient and caregiver- Arrange for needed discharge resources and transportation as appropriate- Identify discharge learning needs (meds, wound care, etc.)- Arrange for interpretive services to assist at discharge as needed- Refer  to Case Management Department for coordinating discharge planning if the patient needs post-hospital services based on physician/advanced practitioner order or complex needs related to functional status, cognitive ability, or social support system  Outcome: Progressing     Problem: Prexisting or High Potential for Compromised Skin Integrity  Goal: Skin integrity is maintained or improved  Description: INTERVENTIONS:- Identify patients at risk for skin breakdown- Assess and monitor skin integrity- Assess and monitor nutrition and hydration status- Monitor labs - Assess for incontinence - Turn and reposition patient- Assist with mobility/ambulation- Relieve pressure over bony prominences- Avoid friction and shearing- Provide appropriate hygiene as needed including keeping skin clean and dry- Evaluate need for skin moisturizer/barrier cream- Collaborate with interdisciplinary team - Patient/family teaching- Consider wound care consult   Outcome: Progressing

## 2025-04-30 NOTE — ASSESSMENT & PLAN NOTE
NICM  EF 41% on cardiac MRI (2019)/ECHO 40-45% 2/4/25  Follows with Dr Worley as an OP  Home: Entresto  mg BID/Aldactone 25mg qd/Torsemide 10mg qd prn edema  Here:  Entresto  mg BID/Aldactone as below  Does not tolerate BB = has tried Coreg and Metoprolol in past per Dr Worley's office note  Volume status stable  Restarted Aldactone at 12.5mg qd on 4/29.  If tolerates, will increase back up to 25mg qd  BMP AM 5/1/25

## 2025-04-30 NOTE — PCC CARE MANAGEMENT
Pt is participating with therapy and making progress. Dc has been scheduled for early next week. Cm following to assist w/dc planning and to complete dme order.

## 2025-05-01 LAB
ANION GAP SERPL CALCULATED.3IONS-SCNC: 8 MMOL/L (ref 4–13)
BASOPHILS # BLD AUTO: 0.05 THOUSANDS/ÂΜL (ref 0–0.1)
BASOPHILS NFR BLD AUTO: 1 % (ref 0–1)
BUN SERPL-MCNC: 23 MG/DL (ref 5–25)
CALCIUM SERPL-MCNC: 9.8 MG/DL (ref 8.4–10.2)
CHLORIDE SERPL-SCNC: 107 MMOL/L (ref 96–108)
CO2 SERPL-SCNC: 27 MMOL/L (ref 21–32)
CREAT SERPL-MCNC: 0.44 MG/DL (ref 0.6–1.3)
EOSINOPHIL # BLD AUTO: 0.37 THOUSAND/ÂΜL (ref 0–0.61)
EOSINOPHIL NFR BLD AUTO: 3 % (ref 0–6)
ERYTHROCYTE [DISTWIDTH] IN BLOOD BY AUTOMATED COUNT: 13.5 % (ref 11.6–15.1)
GFR SERPL CREATININE-BSD FRML MDRD: 99 ML/MIN/1.73SQ M
GLUCOSE SERPL-MCNC: 171 MG/DL (ref 65–140)
GLUCOSE SERPL-MCNC: 181 MG/DL (ref 65–140)
GLUCOSE SERPL-MCNC: 182 MG/DL (ref 65–140)
GLUCOSE SERPL-MCNC: 189 MG/DL (ref 65–140)
GLUCOSE SERPL-MCNC: 223 MG/DL (ref 65–140)
HCT VFR BLD AUTO: 28.9 % (ref 34.8–46.1)
HGB BLD-MCNC: 9.1 G/DL (ref 11.5–15.4)
IMM GRANULOCYTES # BLD AUTO: 0.08 THOUSAND/UL (ref 0–0.2)
IMM GRANULOCYTES NFR BLD AUTO: 1 % (ref 0–2)
LYMPHOCYTES # BLD AUTO: 1.97 THOUSANDS/ÂΜL (ref 0.6–4.47)
LYMPHOCYTES NFR BLD AUTO: 18 % (ref 14–44)
MCH RBC QN AUTO: 29.9 PG (ref 26.8–34.3)
MCHC RBC AUTO-ENTMCNC: 31.5 G/DL (ref 31.4–37.4)
MCV RBC AUTO: 95 FL (ref 82–98)
MONOCYTES # BLD AUTO: 1.19 THOUSAND/ÂΜL (ref 0.17–1.22)
MONOCYTES NFR BLD AUTO: 11 % (ref 4–12)
NEUTROPHILS # BLD AUTO: 7.24 THOUSANDS/ÂΜL (ref 1.85–7.62)
NEUTS SEG NFR BLD AUTO: 66 % (ref 43–75)
NRBC BLD AUTO-RTO: 0 /100 WBCS
PLATELET # BLD AUTO: 417 THOUSANDS/UL (ref 149–390)
PMV BLD AUTO: 10.3 FL (ref 8.9–12.7)
POTASSIUM SERPL-SCNC: 4 MMOL/L (ref 3.5–5.3)
RBC # BLD AUTO: 3.04 MILLION/UL (ref 3.81–5.12)
SODIUM SERPL-SCNC: 142 MMOL/L (ref 135–147)
WBC # BLD AUTO: 10.9 THOUSAND/UL (ref 4.31–10.16)

## 2025-05-01 PROCEDURE — 80048 BASIC METABOLIC PNL TOTAL CA: CPT | Performed by: NURSE PRACTITIONER

## 2025-05-01 PROCEDURE — 97530 THERAPEUTIC ACTIVITIES: CPT

## 2025-05-01 PROCEDURE — 97110 THERAPEUTIC EXERCISES: CPT

## 2025-05-01 PROCEDURE — 99232 SBSQ HOSP IP/OBS MODERATE 35: CPT | Performed by: NURSE PRACTITIONER

## 2025-05-01 PROCEDURE — 82948 REAGENT STRIP/BLOOD GLUCOSE: CPT

## 2025-05-01 PROCEDURE — 85025 COMPLETE CBC W/AUTO DIFF WBC: CPT | Performed by: NURSE PRACTITIONER

## 2025-05-01 PROCEDURE — 99233 SBSQ HOSP IP/OBS HIGH 50: CPT | Performed by: STUDENT IN AN ORGANIZED HEALTH CARE EDUCATION/TRAINING PROGRAM

## 2025-05-01 RX ORDER — GABAPENTIN 100 MG/1
100 CAPSULE ORAL 3 TIMES DAILY
Status: DISCONTINUED | OUTPATIENT
Start: 2025-05-01 | End: 2025-05-12 | Stop reason: HOSPADM

## 2025-05-01 RX ORDER — SPIRONOLACTONE 25 MG/1
25 TABLET ORAL DAILY
Status: DISCONTINUED | OUTPATIENT
Start: 2025-05-02 | End: 2025-05-12 | Stop reason: HOSPADM

## 2025-05-01 RX ADMIN — SACUBITRIL AND VALSARTAN 1 TABLET: 97; 103 TABLET, FILM COATED ORAL at 22:38

## 2025-05-01 RX ADMIN — SACUBITRIL AND VALSARTAN 1 TABLET: 97; 103 TABLET, FILM COATED ORAL at 08:08

## 2025-05-01 RX ADMIN — INSULIN LISPRO 1 UNITS: 100 INJECTION, SOLUTION INTRAVENOUS; SUBCUTANEOUS at 11:24

## 2025-05-01 RX ADMIN — ASPIRIN 325 MG ORAL TABLET 325 MG: 325 PILL ORAL at 08:08

## 2025-05-01 RX ADMIN — Medication 1000 UNITS: at 08:08

## 2025-05-01 RX ADMIN — ENOXAPARIN SODIUM 30 MG: 30 INJECTION SUBCUTANEOUS at 08:08

## 2025-05-01 RX ADMIN — ENOXAPARIN SODIUM 30 MG: 30 INJECTION SUBCUTANEOUS at 22:37

## 2025-05-01 RX ADMIN — INSULIN LISPRO 3 UNITS: 100 INJECTION, SOLUTION INTRAVENOUS; SUBCUTANEOUS at 17:15

## 2025-05-01 RX ADMIN — B-COMPLEX W/ C & FOLIC ACID TAB 1 TABLET: TAB at 08:08

## 2025-05-01 RX ADMIN — OXYCODONE HYDROCHLORIDE 5 MG: 5 TABLET ORAL at 17:15

## 2025-05-01 RX ADMIN — OXYCODONE HYDROCHLORIDE 5 MG: 5 TABLET ORAL at 08:08

## 2025-05-01 RX ADMIN — ACETAMINOPHEN 975 MG: 325 TABLET, FILM COATED ORAL at 05:43

## 2025-05-01 RX ADMIN — INSULIN LISPRO 3 UNITS: 100 INJECTION, SOLUTION INTRAVENOUS; SUBCUTANEOUS at 08:12

## 2025-05-01 RX ADMIN — GABAPENTIN 100 MG: 100 CAPSULE ORAL at 08:21

## 2025-05-01 RX ADMIN — METFORMIN ER 500 MG 500 MG: 500 TABLET ORAL at 17:15

## 2025-05-01 RX ADMIN — INSULIN LISPRO 3 UNITS: 100 INJECTION, SOLUTION INTRAVENOUS; SUBCUTANEOUS at 11:25

## 2025-05-01 RX ADMIN — ACETAMINOPHEN 975 MG: 325 TABLET, FILM COATED ORAL at 22:37

## 2025-05-01 RX ADMIN — Medication 1 CAPSULE: at 22:37

## 2025-05-01 RX ADMIN — INSULIN LISPRO 2 UNITS: 100 INJECTION, SOLUTION INTRAVENOUS; SUBCUTANEOUS at 17:15

## 2025-05-01 RX ADMIN — INSULIN LISPRO 1 UNITS: 100 INJECTION, SOLUTION INTRAVENOUS; SUBCUTANEOUS at 22:38

## 2025-05-01 RX ADMIN — FAMOTIDINE 10 MG: 20 TABLET, FILM COATED ORAL at 08:08

## 2025-05-01 RX ADMIN — Medication 1 CAPSULE: at 08:13

## 2025-05-01 RX ADMIN — ACETAMINOPHEN 975 MG: 325 TABLET, FILM COATED ORAL at 13:05

## 2025-05-01 RX ADMIN — SPIRONOLACTONE 12.5 MG: 25 TABLET, FILM COATED ORAL at 08:21

## 2025-05-01 RX ADMIN — GABAPENTIN 100 MG: 100 CAPSULE ORAL at 22:37

## 2025-05-01 RX ADMIN — OXYCODONE HYDROCHLORIDE 5 MG: 5 TABLET ORAL at 13:05

## 2025-05-01 RX ADMIN — INSULIN LISPRO 1 UNITS: 100 INJECTION, SOLUTION INTRAVENOUS; SUBCUTANEOUS at 08:08

## 2025-05-01 RX ADMIN — METHOCARBAMOL 500 MG: 500 TABLET ORAL at 16:17

## 2025-05-01 RX ADMIN — GABAPENTIN 100 MG: 100 CAPSULE ORAL at 16:17

## 2025-05-01 RX ADMIN — PANTOPRAZOLE SODIUM 40 MG: 40 TABLET, DELAYED RELEASE ORAL at 05:43

## 2025-05-01 NOTE — PCC NURSING
Pt is a 75 year old female with PMH of Chronic systolic congestive heart failure, HTN, History Abnormal heart rhythm, DM2, Esophageal dysphagia, Chronic right-sided low back pain without sciatica, Neurogenic claudication due to lumbar spinal stenosis, Hx of  CVA, Mild intermittent asthma without complication, LUCIA (obstructive sleep apnea) who presented to [facility] on 4/24 as a trauma C to the emergency department after mechanical fall with head strike and left hip pain, takes daily ASA.  Patient states she was walking at home, tripped over something and fell on her left side with immediate left-sided hip pain.  Patient also states that she hit her head on a cabinet, denies any LOC.  CT head and C-spine negative for traumatic injuries. L LE imaging showed Comminuted intertrochanteric fracture of the left femur. Orth was consulted. Pt is s/p IMN on 4/24. Maintain weightbearing as tolerated status on the left lower extremity . Adams catheter was placed 4/25 due to urinary retention, removed on 4/28. Urinary retention protocol. UA negative for bacteria on 4/25.     This week we will encourage independence with ADLs.  We will monitor labs and vital signs.  We will educate pt/family about repositioning to prevent skin breakdown.  We will assist w repositioning and perform routine skin checks.  We will monitor for adequate pain control.  We will monitor for constipation and medicate pt as ordered.  We will increase safety awareness and keep pt free from falls.    5/1: Pt rings approp. QID blood sugar. Metformin was restarted on 4/30, lantus was discontinued, L hip incision covered with 3 silver dressings. WBAT LLE. 2 week follow up will be on 5/8. Start to wean off pepcid, was reduced to 10mg 4/30. Continent of bowel and bladder. Last bowel movement was 4/29. Blister on R sacrum - mepilex due 5/1. Pain is being managed by scheduled tylenol, gabapentin, & PRN robaxin and oxy. Ax2 stand pivot with RW.     5/8: Pt  had 2 week follow-up XRAYs on 5/7, which are pending. Ortho will see pt 5/8 for 2 week follow up. 3 silver dressings are still in place. 1 time dose of torsemide given 5/7 with a repeat BMP on 5/8. Teds for edema. Scheduled lispro with meals were stopped. Remains QID blood sugar. Pepcid was stopped 5/5. Sacrum mepilex for blister due 5/8. Continent of bowel and bladder. Last bowel movement was 5/7. Daytime bathroom privileges. D/C scheduled for 5/12.

## 2025-05-01 NOTE — ASSESSMENT & PLAN NOTE
HbA1C 7.8 on 4/29/25  Has a glucometer at home  Home: Metformin  mg daily with dinner  Here:  Lantus 8U qhs/Lispro 3U TID  Continue QID Accuchecks/SSI (Algo 3/1) and DM diet  Restarted Metformin  mg with dinner 4/30/25 and stopped the Lantus; keeping the Lispro at 3U TID with meals  No other changes today 5/1/25

## 2025-05-01 NOTE — PROGRESS NOTES
Progress Note - PMR   Name: Kamini Martines 75 y.o. female I MRN: [id number]  Unit/Bed#: -01 I Date of Admission: 4/28/2025   Date of Service: 5/1/2025 I Hospital Day: 3     Assessment & Plan  Closed left hip fracture, initial encounter ([facility])  Patient presents on 4/24 to [facility] with left hip pain after a fall  And have a comminuted intertrochanteric fracture of the left femur  Status post ORIF with IM nailing by Dr. Parry on 4/24    Weightbearing as tolerated to the left lower extremity  Physical and Occupational Therapy  Pain control with Tylenol, gabapentin, Robaxin and as needed oxycodone > Increasing gabapentin from 100 mg BID to TID for better pain control on 5/1  DVT prophylaxis.  Is recommended for 81 mg aspirin twice daily at the time of discharge to complete the 28 days however is on aspirin 325 mg daily currently  HTN (hypertension)  Currently on a regimen of Entresto and spironolactone for the heart failure  Was on as needed Demadex at home in the past  Monitor pressures during therapy as well as during routine vitals  CVA (cerebral vascular accident) ([facility])  History of stroke in the past and was placed on aspirin 325 mg daily  Is also not on a statin at home  After history of stroke did not follow-up with neurology and refused follow-up with Dr. Jones.  Is not on the appropriate regimen for secondary stroke prophylaxis.  Was on 81 mg previously but stated her cardiologist increased it to 325 mg.  She endorsed that she did not start her statin.  Mild intermittent asthma without complication  Currently on a as needed albuterol inhaler  Continue monitor on physical examination as well as during routine vitals, therapy vitals  LUCIA (obstructive sleep apnea)  Was noncompliant with the CPAP due to overall comfort with strap and discontinued its use  Follow-up with sleep medicine as an outpatient  Class 2 obesity in adult  BMI of 36.87 on arrival  Continue to recommend lifestyle  modification, dietary changes, weight loss counseling especially in the setting of recent fracture.  Type 2 diabetes mellitus without complication, without long-term current use of insulin ([facility])  Lab Results   Component Value Date    HGBA1C 7.8 (H) 04/29/2025       Recent Labs     04/30/25  1538 04/30/25  2059 05/01/25  0553 05/01/25  1049   POCGLU 234* 146* 181* 182*     Currently on regimen of Lantus 8 units at bedtime +3 units of lispro with meals as well as sliding scale insulin, Accu-Cheks 4 times daily  Continue monitoring blood glucose levels and adjust per recommendations by internal medicine  Continue carb controlled diet level 2  Urinary retention  Had indwelling Adams catheter placed 4/25 which was removed on 4/28 and did require initial straight catheterization  5/1- PVRs x3 low. Now voiding and continent  Impaired mobility and activities of daily living  Patient was evaluated by the rehabilitation team MD and an appropriate candidate for acute inpatient rehabilitation program at this time.  The patient will tolerate 3 hours/day 5 to 7 days/week of intensive physical, occupational therapy in order to obtain goals for community discharge  Due to the patient's functional Compared to their baseline level of function in addition to their ongoing medical needs, the patient would benefit from daily supervision from a rehabilitation physician as well as rehabilitation nursing to implement and adjust the medical as well as functional plan of care in order to meet the patient's goals.    HFrEF (heart failure with reduced ejection fraction) ([facility])  Wt Readings from Last 3 Encounters:   04/30/25 101 kg (221 lb 9 oz)   04/24/25 99.3 kg (219 lb)   02/20/25 99.3 kg (219 lb)     Echocardiogram most recently with a cardiac MRI showing EF of 41%  Nonischemic cardiomyopathy on Entresto twice daily at home as well as Aldactone.  Aldactone is being restarted today per internal medicine  Does not tolerate beta-blockers in the  past  Monitor intake and output as well as weights  Education on diet and activity  GERD (gastroesophageal reflux disease)  Continue Pepcid and Protonix and as needed Maalox  CAD (coronary artery disease)  Currently on aspirin  Noted to have nonobstructive CAD on cardiac catheterization  Follow-up with outpatient cardiology  Thyroid nodule    Buttock wound, right, sequela  Noted on admission  Consult wound care  Anemia  Preoperative hemoglobin of 12.1 now currently 9.1 qualifying for acute blood loss anemia  5/1- Hgb stable at 9.1    Continue to monitor hemoglobin on bio weekly CBC or sooner if quickly indicated  Leukocytosis  Most recent WBC count of 12.95 which is down from postoperative 18.0  5/1- WBC downtrending to 10.9    Continue to monitor on biweekly CBC or sooner if indicated, monitor for signs of infection at the surgical site    Subjective   75 y.o. female with history of chronic heart failure, nonischemic cardiomyopathy, CAD, hypertension, LUCIA not compliant with CPAP, lumbar spinal stenosis, multijoint osteoarthritis who presented to the [facility] on 4/24 with left hip pain after having a fall. She was found to have comminuted intertrochanteric fracture of the left femur and underwent ORIF and IM nailing on 4/24 with Dr. Parry. She had a Adams catheter placed for urinary retention which was later removed on 4/28.    Chief Complaint: f/u fracture and f/u ambulatory dysfunction    Interval: Patient seen and examined. No acute overnight events. Staff reports that patient is very self-limiting and notes that pain is her biggest barrier to full participation in therapy. Discussed with patient and she is agreeable to increasing gabapentin for better control of pain. Otherwise, denies any fevers, chills, chest pain, sob, abdominal pain, nausea/vomiting, lightheadedness or dizziness. Eating well. Last BM 4/29. Also states she is not sleeping well due to nightmares; advised that we  will review MAR. Patient informed of discharge date after team meeting and she is agreeable with it. Labs today reviewed and notable for: WBC 10.90, Hgb 9.1    Objective :  Temp:  [97.6 °F (36.4 °C)-98.3 °F (36.8 °C)] 98.3 °F (36.8 °C)  HR:  [79-95] 95  BP: (145-177)/(65-89) 146/65  Resp:  [17-18] 18  SpO2:  [95 %-97 %] 95 %  O2 Device: None (Room air)    Functional Update:  Mobility: Mod to max assist for transfers and mod to max assist for sit to stand  ADLs: Will assist for bathing, for dressing and footwear management, toileting    Physical Exam  Vitals reviewed.   Constitutional:       General: She is not in acute distress.     Appearance: Normal appearance.   HENT:      Head: Normocephalic and atraumatic.      Right Ear: External ear normal.      Left Ear: External ear normal.      Nose: Nose normal.      Mouth/Throat:      Mouth: Mucous membranes are moist.   Eyes:      Conjunctiva/sclera: Conjunctivae normal.   Cardiovascular:      Rate and Rhythm: Normal rate and regular rhythm.      Heart sounds: Normal heart sounds.   Pulmonary:      Effort: Pulmonary effort is normal. No respiratory distress.      Breath sounds: Normal breath sounds. No wheezing.   Abdominal:      General: Bowel sounds are normal.      Palpations: Abdomen is soft.   Musculoskeletal:      Right lower leg: Edema present.      Left lower leg: Edema present.      Comments: ACE wraps on bilateral LE's   Skin:     General: Skin is warm and dry.   Neurological:      Mental Status: She is alert. Mental status is at baseline.   Psychiatric:         Mood and Affect: Mood normal.         Behavior: Behavior normal.           Scheduled Meds:  Current Facility-Administered Medications   Medication Dose Route Frequency Provider Last Rate    acetaminophen  975 mg Oral Q8H [facility] Tammy Pradhan MD      albuterol  2 puff Inhalation Q6H PRN Tammy Pradhan MD      aluminum-magnesium hydroxide-simethicone  30 mL Oral Q4H PRN Tammy Pradhan MD       aspirin  325 mg Oral Daily Tammy Pradhan MD      Cholecalciferol  1,000 Units Oral Daily Tammy Pradhan MD      enoxaparin  30 mg Subcutaneous Q12H Tammy Pradhan MD      famotidine  10 mg Oral Daily STEFFANY Nelson      gabapentin  100 mg Oral TID Gladys Carvalho DO      insulin lispro  1-5 Units Subcutaneous HS Tammy Pradhan MD      insulin lispro  1-6 Units Subcutaneous TID AC Tammy Pradhan MD      insulin lispro  3 Units Subcutaneous TID With Meals Tammy Pradhan MD      metFORMIN  500 mg Oral Daily With Dinner STEFFANY Nelson      methocarbamol  500 mg Oral Q6H PRN Tammy Pradhan MD      multivitamin stress formula  1 tablet Oral Daily Tammy Pradhan MD      naloxone  0.04 mg Intravenous Q1MIN PRN Tammy Pradhan MD      oxyCODONE  2.5 mg Oral Q4H PRN Tammy Pradhan MD      Or    oxyCODONE  5 mg Oral Q4H PRN Tammy Pradhan MD      pantoprazole  40 mg Oral Early Morning Tammy Pradhan MD      polyethylene glycol  17 g Oral Daily Tammy Pradhan MD      PreserVision AREDS 2  1 capsule Oral BID STEFFANY Nelson      sacubitril-valsartan  1 tablet Oral BID Tammy Pradhan MD      spironolactone  12.5 mg Oral Daily Tammy Pradhan MD           Lab Results: I have reviewed the following results:  Results from last 7 days   Lab Units 05/01/25  0547 04/27/25 0449 04/26/25  0502   HEMOGLOBIN g/dL 9.1* 9.1* 9.4*   HEMATOCRIT % 28.9* 27.7* 29.4*   WBC Thousand/uL 10.90* 12.95* 12.16*   PLATELETS Thousands/uL 417* 254 244     Results from last 7 days   Lab Units 05/01/25  0547 04/27/25  0449 04/26/25  0502   BUN mg/dL 23 27* 22   SODIUM mmol/L 142 138 139   POTASSIUM mmol/L 4.0 4.1 4.1   CHLORIDE mmol/L 107 105 106   CREATININE mg/dL 0.44* 0.55* 0.52*

## 2025-05-01 NOTE — ASSESSMENT & PLAN NOTE
NICM  EF 41% on cardiac MRI (2019)/ECHO 40-45% 2/4/25  Follows with Dr Worley as an OP  Home: Entresto  mg BID/Aldactone 25mg qd/Torsemide 10mg qd prn edema  Here:  Entresto  mg BID/Aldactone as below  Does not tolerate BB = has tried Coreg and Metoprolol in past per Dr Worley's office note  Volume status stable  Restarted Aldactone at 12.5mg qd on 4/29.  Will increase back up to 25mg qd to start 5/2/25  BMP 5/1/25 = stable

## 2025-05-01 NOTE — CASE MANAGEMENT
"Met w/pt briefly while richelle qureshi and judy were rounding. Reviewed team mtg update and the goal of dc for 5/12. Pt confirmed today's date and then stated \"its a goal\".  Recommendations were not yet discussed.   "

## 2025-05-01 NOTE — PROGRESS NOTES
Progress Note - Hospitalist   Name: Kamini Martines 75 y.o. female I MRN: [id number]  Unit/Bed#: -01 I Date of Admission: 4/28/2025   Date of Service: 5/1/2025 I Hospital Day: 3    Assessment & Plan  Closed left hip fracture, initial encounter ([facility])  Presented to [facility] on 4/24/25 with left hip pain following a mechanical fall  Noted to have a comminuted intertrochanteric fracture of the left femur   S/p ORIF/IMN 4/24   Weightbearing as tolerated  PT/OT, pain control per PMR  Is having muscle spasms that Robaxin 500 mg q6hrs prn is helping.  She has taken none today so far (4/29)  Two week followup will be 5/8/25  HFrEF (heart failure with reduced ejection fraction) ([facility])  NICM  EF 41% on cardiac MRI (2019)/ECHO 40-45% 2/4/25  Follows with Dr Worley as an OP  Home: Entresto  mg BID/Aldactone 25mg qd/Torsemide 10mg qd prn edema  Here:  Entresto  mg BID/Aldactone as below  Does not tolerate BB = has tried Coreg and Metoprolol in past per Dr Worley's office note  Volume status stable  Restarted Aldactone at 12.5mg qd on 4/29.  Will increase back up to 25mg qd to start 5/2/25  BMP 5/1/25 = stable  Type 2 diabetes mellitus without complication, without long-term current use of insulin ([facility])  HbA1C 7.8 on 4/29/25  Has a glucometer at home  Home: Metformin  mg daily with dinner  Here:  Lantus 8U qhs/Lispro 3U TID  Continue QID Accuchecks/SSI (Algo 3/1) and DM diet  Restarted Metformin  mg with dinner 4/30/25 and stopped the Lantus; keeping the Lispro at 3U TID with meals  No other changes today 5/1/25  CVA (cerebral vascular accident) ([facility])  Continue  mg qd as at home  Not on statins at home because she didn't want to take in past  Didn't followup with Neuro after stroke  LUCIA (obstructive sleep apnea)  Stopped using CPAP few months ago as strap was causing neck pain  Outpatient sleep f/u  Mild intermittent asthma without complication  No exacerbation  Continue Albuterol  "prn  Class 2 obesity in adult  BMI 36.87  Affects all aspects of care  Lifestyle modification  GERD (gastroesophageal reflux disease)  Takes Prilosec 40mg qd at home  Currently on Protonix 40mg qd and Pepcid 20mg qd here  Will start weaning Pepcid and reduce to 10mg qd 4/30/25  CAD (coronary artery disease)  Nonobstructive coronary artery disease on cardiac cath  On  mg daily for hx CVA  Urinary retention  Adams was placed on 4/25 and removed on 4/28  She takes Vesicare at home  Per PMR  Thyroid nodule  Found on CT cervical spine  \"1.1 cm right thyroid nodule. Incidental discovery of one or more thyroid nodule(s) measuring less than 1.5 cm and without suspicious features is noted in this patient who is above 35 years old; according to guidelines published in the February 2015 white paper on incidental thyroid nodules in the Journal of the American College of Radiology (JACR), no further evaluation is recommended.\"  Buttock wound, right, sequela  POA  Per PMR  Anemia  Mild  stable  Leukocytosis  Mild  Likely reactive  No fever  Will watch    The above assessment and plan was reviewed and updated as determined by my evaluation of the patient on 5/1/2025.    History of Present Illness   Patient seen and examined. Patients overnight issues or events were reviewed with nursing staff. New or overnight issues include the following:   No new or overnight issues.  Offers no complaints    Review of Systems   All other systems reviewed and are negative.      Objective :  Temp:  [97.6 °F (36.4 °C)-98.3 °F (36.8 °C)] 98.3 °F (36.8 °C)  HR:  [79-95] 95  BP: (145-177)/(65-89) 146/65  Resp:  [17-18] 18  SpO2:  [95 %-97 %] 95 %  O2 Device: None (Room air)    Invasive Devices       Drain  Duration             Ureteral Internal Stent Right ureter 6 Fr. 302 days                    Physical Exam  General Appearance: no distress, nontoxic appearing  HEENT:  External ear normal.  Nose normal w/o drainage. Mucous membranes are " moist. Oropharynx is clear. Conjunctiva clear w/o icterus or redness.  Neck:  Supple, normal ROM  Lungs: BBS without crackles/wheeze/rhonchi; respirations unlabored with normal inspiratory/expiratory effort.  No retractions noted.  On RA  CV: regular rate and rhythm; no rubs/murmurs/gallops, PMI normal   ABD: Abdomen is soft.  Bowel sounds all quadrants.  Nontender with no distention.    EXT: no edema  Skin: normal turgor, normal texture  Psych: affect normal, mood normal  Neuro: AAO       The above physical exam was reviewed and updated as determined by my evaluation of the patient on 5/1/2025.      Lab Results: I have reviewed the following results:  Results from last 7 days   Lab Units 05/01/25  0547 04/27/25  0449   WBC Thousand/uL 10.90* 12.95*   HEMOGLOBIN g/dL 9.1* 9.1*   HEMATOCRIT % 28.9* 27.7*   PLATELETS Thousands/uL 417* 254     Results from last 7 days   Lab Units 05/01/25  0547 04/27/25  0449   SODIUM mmol/L 142 138   POTASSIUM mmol/L 4.0 4.1   CHLORIDE mmol/L 107 105   CO2 mmol/L 27 24   BUN mg/dL 23 27*   CREATININE mg/dL 0.44* 0.55*   CALCIUM mg/dL 9.8 9.1     Results from last 7 days   Lab Units 04/29/25  0713   HEMOGLOBIN A1C % 7.8*         Results from last 7 days   Lab Units 05/01/25  1049 05/01/25  0553 04/30/25  2059   POC GLUCOSE mg/dl 182* 181* 146*       Imaging Results Review: No pertinent imaging studies reviewed.  Other Study Results Review: No additional pertinent studies reviewed.    Review of Scheduled Meds: Medications  reviewed and reconciled as needed  Current Facility-Administered Medications   Medication Dose Route Frequency Provider Last Rate    acetaminophen  975 mg Oral Q8H [facility] Tammy Pradhan MD      albuterol  2 puff Inhalation Q6H PRN Tammy Pradhan MD      aluminum-magnesium hydroxide-simethicone  30 mL Oral Q4H PRN Tammy Pradhan MD      aspirin  325 mg Oral Daily Tammy Pradhan MD      Cholecalciferol  1,000 Units Oral Daily Tammy Pradhan MD       enoxaparin  30 mg Subcutaneous Q12H Tammy Pradhan MD      famotidine  10 mg Oral Daily STEFFANY Nelson      gabapentin  100 mg Oral TID Gladys Carvalho DO      insulin lispro  1-5 Units Subcutaneous HS Tammy Pradhan MD      insulin lispro  1-6 Units Subcutaneous TID AC Tammy Pradhan MD      insulin lispro  3 Units Subcutaneous TID With Meals Tammy Pradhan MD      metFORMIN  500 mg Oral Daily With Dinner STEFFANY Nelson      methocarbamol  500 mg Oral Q6H PRN Tammy Pradhan MD      multivitamin stress formula  1 tablet Oral Daily Tammy Pradhan MD      naloxone  0.04 mg Intravenous Q1MIN PRN Tammy Pradhan MD      oxyCODONE  2.5 mg Oral Q4H PRN Tammy Pradhan MD      Or    oxyCODONE  5 mg Oral Q4H PRN Tammy Pradhan MD      pantoprazole  40 mg Oral Early Morning Tammy Pradhan MD      polyethylene glycol  17 g Oral Daily Tammy Pradhan MD      PreserVision AREDS 2  1 capsule Oral BID STEFFANY Nelson      sacubitril-valsartan  1 tablet Oral BID Tammy Pradhan MD      spironolactone  12.5 mg Oral Daily Tammy Pradhan MD         VTE Pharmacologic Prophylaxis: Lovenox  Code Status: Level 1 - Full Code  Current Length of Stay: 3 day(s)    Administrative Statements     ** Please Note:  voice to text software may have been used in the creation of this document. Although proof errors in transcription or interpretation are a potential of such software**

## 2025-05-01 NOTE — ASSESSMENT & PLAN NOTE
Preoperative hemoglobin of 12.1 now currently 9.1 qualifying for acute blood loss anemia  5/1- Hgb stable at 9.1    Continue to monitor hemoglobin on bio weekly CBC or sooner if quickly indicated

## 2025-05-01 NOTE — ASSESSMENT & PLAN NOTE
Lab Results   Component Value Date    HGBA1C 7.8 (H) 04/29/2025       Recent Labs     04/30/25  1538 04/30/25 2059 05/01/25  0553 05/01/25  1049   POCGLU 234* 146* 181* 182*     Currently on regimen of Lantus 8 units at bedtime +3 units of lispro with meals as well as sliding scale insulin, Accu-Cheks 4 times daily  Continue monitoring blood glucose levels and adjust per recommendations by internal medicine  Continue carb controlled diet level 2

## 2025-05-01 NOTE — ASSESSMENT & PLAN NOTE
Most recent WBC count of 12.95 which is down from postoperative 18.0  5/1- WBC downtrending to 10.9    Continue to monitor on biweekly CBC or sooner if indicated, monitor for signs of infection at the surgical site

## 2025-05-01 NOTE — ASSESSMENT & PLAN NOTE
Patient presents on 4/24 to [facility] with left hip pain after a fall  And have a comminuted intertrochanteric fracture of the left femur  Status post ORIF with IM nailing by Dr. Parry on 4/24    Weightbearing as tolerated to the left lower extremity  Physical and Occupational Therapy  Pain control with Tylenol, gabapentin, Robaxin and as needed oxycodone > Increasing gabapentin from 100 mg BID to TID for better pain control on 5/1  DVT prophylaxis.  Is recommended for 81 mg aspirin twice daily at the time of discharge to complete the 28 days however is on aspirin 325 mg daily currently

## 2025-05-01 NOTE — ASSESSMENT & PLAN NOTE
Had indwelling Adams catheter placed 4/25 which was removed on 4/28 and did require initial straight catheterization  5/1- PVRs x3 low. Now voiding and continent

## 2025-05-01 NOTE — PROGRESS NOTES
05/01/25 0830   Pain Assessment   Pain Assessment Tool 0-10   Pain Score 7   Pain Location/Orientation Orientation: Left;Location: Hip   Restrictions/Precautions   Precautions Fall Risk;Pain   Weight Bearing Restrictions Yes   LLE Weight Bearing Per Order WBAT   Cognition   Overall Cognitive Status WFL   Arousal/Participation Alert;Cooperative   Attention Attends with cues to redirect   Orientation Level Oriented X4   Memory Within functional limits   Following Commands Follows one step commands without difficulty   Sit to Lying   Type of Assistance Needed Physical assistance   Physical Assistance Level 51%-75%   Comment modA for LE management   Sit to Lying CARE Score 2   Lying to Sitting on Side of Bed   Type of Assistance Needed Physical assistance   Physical Assistance Level 76% or more   Comment MaxA for trunk management   Lying to Sitting on Side of Bed CARE Score 2   Sit to Stand   Type of Assistance Needed Physical assistance   Physical Assistance Level 25% or less   Comment Nadine with RW   Sit to Stand CARE Score 3   Bed-Chair Transfer   Type of Assistance Needed Physical assistance   Physical Assistance Level 26%-50%   Comment with RW, pivots on feet/doesn't lift feet off the ground, VCs to remain in RW frame vs pushing it away   Chair/Bed-to-Chair Transfer CARE Score 3   Walk 10 Feet   Comment limited by pain and reports of lightheadness but vitals stable. /70   Reason if not Attempted Safety concerns   Walk 10 Feet CARE Score 88   Walk 50 Feet with Two Turns   Reason if not Attempted Safety concerns   Walk 50 Feet with Two Turns CARE Score 88   Walk 150 Feet   Reason if not Attempted Safety concerns   Walk 150 Feet CARE Score 88   Walking 10 Feet on Uneven Surfaces   Reason if not Attempted Safety concerns   Walking 10 Feet on Uneven Surfaces CARE Score 88   Ambulation   Primary Mode of Locomotion Prior to Admission Walk   Distance Walked (feet)   (6 steps x2 trials)   Assist Device Roller Walker    Gait Pattern Antalgic;Decreased foot clearance;Shuffle;Decreased L stance;Slow Marika   Limitations Noted In Balance;Device Management;Endurance;Heel Strike;Safety;Speed;Strength;Swing;Sequencing   Provided Assistance with: Balance;Weight Shift   Does the patient walk? 2. Yes   Curb or Single Stair   Comment unable to bear enough weight through LLE to trial   Reason if not Attempted Safety concerns   1 Step (Curb) CARE Score 88   Therapeutic Interventions   Strengthening supine SAQ LLE 0# 3x10, 2# 3x10 RLE, supine bridges 3x10 - slight lift off the mat but unable to clear buttock, supine heel slide started with active assisted ROM 2x10. Standing L Hip flexion - trialed but unable to lift LE off the floor, therefore trialed standing ball kicks to encourage hip flexion. Kicked ball with straight leg   Other Comments   Comments Pt c/o of increased swelling in LLE. Reached out to physician regarding use of ROSELINE stockings or ACE wrap for edema management. Orders placed. ACE wrap placed on LLE.   Assessment   Treatment Assessment Patient participated in 90 minutes of skilled physical therapy focusing on improving LLE strength and ROM as well as weight bearing through LLE. Biggest barriers to patient returning home at this time is stair management and pain. Patient will continue to benefit from skilled physical therapy services to maximize safety and independence with functional mobility.   Problem List Decreased strength;Decreased endurance;Impaired balance;Decreased mobility;Pain;Orthopedic restrictions;Decreased skin integrity;Obesity   Barriers to Discharge Inaccessible home environment   PT Barriers   Physical Impairment Decreased strength;Decreased range of motion;Decreased endurance;Impaired balance;Decreased mobility;Pain;Obesity   Functional Limitation Car transfers;Stair negotiation;Standing;Transfers;Walking   Plan   Treatment/Interventions Functional transfer training;LE strengthening/ROM;Therapeutic  exercise;Endurance training;Patient/family training;Equipment eval/education;Bed mobility;Gait training   Progress Progressing toward goals   Discharge Recommendation   Rehab Resource Intensity Level, PT   (HHPT)   PT Therapy Minutes   PT Time In 0830   PT Time Out 1000   PT Total Time (minutes) 90   PT Mode of treatment - Individual (minutes) 90   PT Mode of treatment - Concurrent (minutes) 0   PT Mode of treatment - Group (minutes) 0   PT Mode of treatment - Co-treat (minutes) 0   PT Mode of Treatment - Total time(minutes) 90 minutes   PT Cumulative Minutes 270     Leonela Naik, PT, DPT

## 2025-05-01 NOTE — ASSESSMENT & PLAN NOTE
Takes Prilosec 40mg qd at home  Currently on Protonix 40mg qd and Pepcid 20mg qd here  Will start weaning Pepcid and reduce to 10mg qd 4/30/25

## 2025-05-01 NOTE — TEAM CONFERENCE
Acute RehabilitationTeam Conference Note  Date: 5/1/2025   Time: 10:55 AM       Patient Name:  Kamini Martines       Medical Record Number: [id number]   YOB: 1950  Sex: Female          Room/Bed:  [place] [address]/[place] [address]-01  Payor Info:  Payor: MEDICARE / Plan: MEDICARE A AND B / Product Type: Medicare A & B Fee for Service /      Admitting Diagnosis: Fracture of left femur ([facility]) [S72.92XA]   Admit Date/Time:  4/28/2025  5:35 PM  Admission Comments: No comment available     Primary Diagnosis:  Closed left hip fracture, initial encounter ([facility])  Principal Problem: Closed left hip fracture, initial encounter ([facility])    Patient Active Problem List    Diagnosis Date Noted    Thyroid nodule 04/29/2025    Buttock wound, right, sequela 04/29/2025    Anemia 04/29/2025    Leukocytosis 04/29/2025    HFrEF (heart failure with reduced ejection fraction) ([facility]) 04/28/2025    GERD (gastroesophageal reflux disease) 04/28/2025    CAD (coronary artery disease) 04/28/2025    Fall 04/24/2025    Closed left hip fracture, initial encounter ([facility]) 04/24/2025    Urinary retention 04/24/2025    Frailty 04/24/2025    At risk for delirium 04/24/2025    Impaired mobility and activities of daily living 04/24/2025    Cervical spondylosis 04/16/2025    Opioid dependence, uncomplicated ([facility]) 01/29/2025    Esophageal dysphagia 09/02/2024    Functional diarrhea 08/29/2024    Trochanteric bursitis of left hip 08/23/2024    Lumbar spondylosis 10/06/2022    Type 2 diabetes mellitus without complication, without long-term current use of insulin ([facility]) 08/17/2020    Class 2 obesity in adult 08/16/2020    Chronic pain syndrome 02/25/2020    Intervertebral disc disorder with radiculopathy of lumbar region 02/25/2020    Lumbar post-laminectomy syndrome 02/25/2020    Neurogenic claudication due to lumbar spinal stenosis 02/25/2020    Postlaminectomy syndrome, lumbar region 02/25/2020    Chronic right-sided low back pain without sciatica 12/11/2019    TIA  (transient ischemic attack) 11/21/2019    Chronic systolic congestive heart failure (HCC) 09/09/2019    Vitamin D deficiency 08/15/2019    Lumbar herniated disc 08/15/2019    Dilated cardiomyopathy (HCC) 06/05/2019    LUCIA (obstructive sleep apnea)     Morbid (severe) obesity with alveolar hypoventilation ([facility]) 05/21/2019    Mild intermittent asthma without complication 05/21/2019    HTN (hypertension) 02/12/2019    History Abnormal heart rhythm 02/12/2019    Adrenal adenoma 02/12/2019    Pericardial effusion 02/12/2019    CVA (cerebral vascular accident) ([facility]) 02/12/2019       Physical Therapy:    Weight Bearing Status: Weight Bearing as Tolerated  Transfers: Maximum Assistance  Bed Mobility: Maximum Assistance  Amulation Distance (ft): 2 feet  Ambulation: Maximum Assistance  Assistive Device for Ambulation: Roller Walker  Wheelchair Mobility Distance: 0 ft  Number of Stairs: 0  Discharge Recommendations: Home with:  DC Home with:: Family Support, Home Physical Therapy    Barriers to discharge home at this time include: limited caregiver support/ inaccessible home environment/high risk for falls/pain. Patient is a good rehabilitation candidate. Patient's estimated length of stay is 2 weeks with goals set for Mod I/Supervision . PT plan of care to focus on bed mobility/transfers/gait training/stair navigation/strength training/balance training/improving activity tolerance.     Occupational Therapy:  Eating: Independent  Grooming: Supervision  Bathing: Moderate Assistance  Bathing: Moderate Assistance  Upper Body Dressing: Supervision  Lower Body Dressing: Moderate Assistance  Toileting: Total Assistance  Toilet Transfer: Moderate Assistance  Cognition: Within Defined Limits  Orientation: Place, Person, Time, Situation  Discharge Recommendations: Home with:  DC Home with:: Family Support       Occupational Therapy Weekly Team Note    Pt continues to make good progress with skilled occupational therapy intervention and  is progressing toward long term goals for ADL, IADL's, and functional transfers/mobility. Pts long term goals for ADLs are Independent with Rolling Walker. Pt continues to present with impairments in Impaired standing balance, Impaired righting reactions, Unilateral weakness or paralysis of Lower limb , limited activity tolerance, and Pain. Occupational performance remains limited by personal factors: co morbidities/Other health conditions, Habits and past and current behavioral patterns, Body habitus, and Orthopedic restrictions. Family training/education will not be required prior to D/C.     Pt will continue to benefit from skilled acute rehab OT services to address above mentioned barriers and maximize functional independence in baseline areas of occupation to meet established treatment goals with overall decreased burden of care. Plan of care to continue to focus on ADL Retraining ,  LHAE education/training, Functional Transfers, DME training/education, and Energy conservation training/education. Goals for the upcoming week are: basic ADL participation , Out of bed tolerance, establish DME needs, and continue to progress assess for progression to independent goals in the room  Anticipate Discharge date to be set. . Home with family/friends support              Speech Therapy:           No notes on file    Nursing Notes:     Diet Type: Diabetic                      Diet Patient/Family Education Complete: No                            Bladder: Continent     Bladder Patient/Family Education: No  Bowel: Continent     Bowel Patient/Family Education: No  Pain Location/Orientation: Orientation: Left, Location: Hip  Pain Score: 7                       Hospital Pain Intervention(s): Cold applied  Pain Patient/Family Education: No  Medication Management/Safety  Injectable: Lovenox (insulin)  Safe Administration: Yes  Medication Patient/Family Education Complete: No    Pt is a 75 year old female with PMH of Chronic  systolic congestive heart failure, HTN, History Abnormal heart rhythm, DM2, Esophageal dysphagia, Chronic right-sided low back pain without sciatica, Neurogenic claudication due to lumbar spinal stenosis, Hx of  CVA, Mild intermittent asthma without complication, LUCIA (obstructive sleep apnea) who presented to [facility] on 4/24 as a trauma C to the emergency department after mechanical fall with head strike and left hip pain, takes daily ASA.  Patient states she was walking at home, tripped over something and fell on her left side with immediate left-sided hip pain.  Patient also states that she hit her head on a cabinet, denies any LOC.  CT head and C-spine negative for traumatic injuries. L LE imaging showed Comminuted intertrochanteric fracture of the left femur. Orth was consulted. Pt is s/p IMN on 4/24. Maintain weightbearing as tolerated status on the left lower extremity . Adams catheter was placed 4/25 due to urinary retention, removed on 4/28. Urinary retention protocol. UA negative for bacteria on 4/25.     This week we will encourage independence with ADLs.  We will monitor labs and vital signs.  We will educate pt/family about repositioning to prevent skin breakdown.  We will assist w repositioning and perform routine skin checks.  We will monitor for adequate pain control.  We will monitor for constipation and medicate pt as ordered.  We will increase safety awareness and keep pt free from falls.    5/1: Pt rings approp. QID blood sugar. Metformin was restarted on 4/30, lantus was discontinued, L hip incision covered with 3 silver dressings. WBAT LLE. 2 week follow up will be on 5/8. Start to wean off pepcid, was reduced to 10mg 4/30. Continent of bowel and bladder. Last bowel movement was 4/29. Blister on R sacrum - mepilex due 5/1. Pain is being managed by scheduled tylenol, gabapentin, & PRN robaxin and oxy. Ax2 stand pivot with RW.     Case Management:     Discharge Planning  Living  Arrangements: Lives w/ Spouse/significant other  Support Systems: Self, Spouse/significant other  Assistance Needed: TBD  Type of Current Residence: Private residence  Current Home Care Services: No  Pt admitted s/p fall resulting in hip fx. Pt resides with her  and she is expected to return home. Pt has been made aware of the potential for contd therapy services on dc. A two week los is expected.     Is the patient actively participating in therapies? yes  List any modifications to the treatment plan:     Barriers Interventions   stairs Therapy stair training   Sacrum blister Nursing to assess/family education   Pain  Medication, repositioning   Fearful of weight bearing, strength, activity tolerance endurance Therapy exercises, use of device, endurance training, compensatory strategies, lhae training,coping mechanisms,          Is the patient making expected progress toward goals? yes  List any update or changes to goals:     Medical Goals: Patient will be medically stable for discharge to [facility] upon completion of rehab program and Patient will be able to manage medical conditions and comorbid conditions with medications and follow up upon completion of rehab program    Weekly Team Goals:   Rehab Team Goals  ADL Team Goal: Patient will be independent with ADLs with least restrictive device upon completion of rehab program    Discussion: pt presents with the above barriers and is benefiting from stated interventions to achieve goals of independent at dc. Pt lives with spouse but there is concern he will not be able to physically assist. Pt has steps to enter and has not yet attempted. Pt has only managed 6 steps with therapy. Pt is fearful of bearing weight for functional gain.  Estimated los two weeks.     Anticipated Discharge Date: 5/12/25  [facility] Team Members Present:The following team members are supervising care for this patient and were present during this Weekly Team  Conference.    Physician: Dr. Chad DO  : Leeann Ayala MSW  Registered Nurse: Ruchi Briggs RN  Physical Therapist: Leonela Naik DPT  Occupational Therapist: Hattie Freye, MS, OTR/L  Speech Therapist:

## 2025-05-01 NOTE — ASSESSMENT & PLAN NOTE
Presented to [facility] on 4/24/25 with left hip pain following a mechanical fall  Noted to have a comminuted intertrochanteric fracture of the left femur   S/p ORIF/IMN 4/24   Weightbearing as tolerated  PT/OT, pain control per PMR  Is having muscle spasms that Robaxin 500 mg q6hrs prn is helping.  She has taken none today so far (4/29)  Two week followup will be 5/8/25

## 2025-05-02 LAB
GLUCOSE SERPL-MCNC: 169 MG/DL (ref 65–140)
GLUCOSE SERPL-MCNC: 175 MG/DL (ref 65–140)
GLUCOSE SERPL-MCNC: 187 MG/DL (ref 65–140)
GLUCOSE SERPL-MCNC: 202 MG/DL (ref 65–140)

## 2025-05-02 PROCEDURE — 99232 SBSQ HOSP IP/OBS MODERATE 35: CPT | Performed by: NURSE PRACTITIONER

## 2025-05-02 PROCEDURE — 82948 REAGENT STRIP/BLOOD GLUCOSE: CPT

## 2025-05-02 PROCEDURE — 97110 THERAPEUTIC EXERCISES: CPT

## 2025-05-02 PROCEDURE — 99232 SBSQ HOSP IP/OBS MODERATE 35: CPT | Performed by: STUDENT IN AN ORGANIZED HEALTH CARE EDUCATION/TRAINING PROGRAM

## 2025-05-02 PROCEDURE — 97530 THERAPEUTIC ACTIVITIES: CPT

## 2025-05-02 PROCEDURE — 97116 GAIT TRAINING THERAPY: CPT

## 2025-05-02 PROCEDURE — 97535 SELF CARE MNGMENT TRAINING: CPT

## 2025-05-02 RX ADMIN — ENOXAPARIN SODIUM 30 MG: 30 INJECTION SUBCUTANEOUS at 08:24

## 2025-05-02 RX ADMIN — Medication 1 CAPSULE: at 21:21

## 2025-05-02 RX ADMIN — INSULIN LISPRO 1 UNITS: 100 INJECTION, SOLUTION INTRAVENOUS; SUBCUTANEOUS at 17:28

## 2025-05-02 RX ADMIN — ACETAMINOPHEN 975 MG: 325 TABLET, FILM COATED ORAL at 21:19

## 2025-05-02 RX ADMIN — FAMOTIDINE 10 MG: 20 TABLET, FILM COATED ORAL at 08:24

## 2025-05-02 RX ADMIN — INSULIN LISPRO 3 UNITS: 100 INJECTION, SOLUTION INTRAVENOUS; SUBCUTANEOUS at 17:29

## 2025-05-02 RX ADMIN — INSULIN LISPRO 1 UNITS: 100 INJECTION, SOLUTION INTRAVENOUS; SUBCUTANEOUS at 11:57

## 2025-05-02 RX ADMIN — OXYCODONE HYDROCHLORIDE 5 MG: 5 TABLET ORAL at 07:34

## 2025-05-02 RX ADMIN — INSULIN LISPRO 2 UNITS: 100 INJECTION, SOLUTION INTRAVENOUS; SUBCUTANEOUS at 21:20

## 2025-05-02 RX ADMIN — PANTOPRAZOLE SODIUM 40 MG: 40 TABLET, DELAYED RELEASE ORAL at 05:08

## 2025-05-02 RX ADMIN — ENOXAPARIN SODIUM 30 MG: 30 INJECTION SUBCUTANEOUS at 21:19

## 2025-05-02 RX ADMIN — GABAPENTIN 100 MG: 100 CAPSULE ORAL at 21:19

## 2025-05-02 RX ADMIN — B-COMPLEX W/ C & FOLIC ACID TAB 1 TABLET: TAB at 08:24

## 2025-05-02 RX ADMIN — GABAPENTIN 100 MG: 100 CAPSULE ORAL at 17:00

## 2025-05-02 RX ADMIN — INSULIN LISPRO 3 UNITS: 100 INJECTION, SOLUTION INTRAVENOUS; SUBCUTANEOUS at 11:58

## 2025-05-02 RX ADMIN — ACETAMINOPHEN 975 MG: 325 TABLET, FILM COATED ORAL at 13:57

## 2025-05-02 RX ADMIN — Medication 1000 UNITS: at 08:25

## 2025-05-02 RX ADMIN — ASPIRIN 325 MG ORAL TABLET 325 MG: 325 PILL ORAL at 08:25

## 2025-05-02 RX ADMIN — SPIRONOLACTONE 25 MG: 25 TABLET ORAL at 08:27

## 2025-05-02 RX ADMIN — GABAPENTIN 100 MG: 100 CAPSULE ORAL at 08:25

## 2025-05-02 RX ADMIN — INSULIN LISPRO 1 UNITS: 100 INJECTION, SOLUTION INTRAVENOUS; SUBCUTANEOUS at 07:33

## 2025-05-02 RX ADMIN — METFORMIN ER 500 MG 500 MG: 500 TABLET ORAL at 17:28

## 2025-05-02 RX ADMIN — INSULIN LISPRO 3 UNITS: 100 INJECTION, SOLUTION INTRAVENOUS; SUBCUTANEOUS at 07:33

## 2025-05-02 RX ADMIN — ACETAMINOPHEN 975 MG: 325 TABLET, FILM COATED ORAL at 05:08

## 2025-05-02 RX ADMIN — OXYCODONE HYDROCHLORIDE 5 MG: 5 TABLET ORAL at 13:57

## 2025-05-02 RX ADMIN — SACUBITRIL AND VALSARTAN 1 TABLET: 97; 103 TABLET, FILM COATED ORAL at 21:21

## 2025-05-02 RX ADMIN — SACUBITRIL AND VALSARTAN 1 TABLET: 97; 103 TABLET, FILM COATED ORAL at 08:27

## 2025-05-02 RX ADMIN — Medication 1 CAPSULE: at 08:27

## 2025-05-02 NOTE — PLAN OF CARE
Problem: PAIN - ADULT  Goal: Verbalizes/displays adequate comfort level or baseline comfort level  Description: Interventions:- Encourage patient to monitor pain and request assistance- Assess pain using appropriate pain scale- Administer analgesics based on type and severity of pain and evaluate response- Implement non-pharmacological measures as appropriate and evaluate response- Consider cultural and social influences on pain and pain management- Notify physician/advanced practitioner if interventions unsuccessful or patient reports new pain  Outcome: Progressing     Problem: INFECTION - ADULT  Goal: Absence or prevention of progression during hospitalization  Description: INTERVENTIONS:- Assess and monitor for signs and symptoms of infection- Monitor lab/diagnostic results- Monitor all insertion sites, i.e. indwelling lines, tubes, and drains- Monitor endotracheal if appropriate and nasal secretions for changes in amount and color- [place] appropriate cooling/warming therapies per order- Administer medications as ordered- Instruct and encourage patient and family to use good hand hygiene technique- Identify and instruct in appropriate isolation precautions for identified infection/condition  Outcome: Progressing     Problem: SAFETY ADULT  Goal: Patient will remain free of falls  Description: INTERVENTIONS:- Educate patient/family on patient safety including physical limitations- Instruct patient to call for assistance with activity - Consult OT/PT to assist with strengthening/mobility - Keep Call bell within reach- Keep bed low and locked with side rails adjusted as appropriate- Keep care items and personal belongings within reach- Initiate and maintain comfort rounds- Make Fall Risk Sign visible to staff- Offer Toileting every 2 Hours, in advance of need- Initiate/Maintain alarm- Obtain necessary fall risk management equipment: - Apply yellow socks and bracelet for high fall risk patients- Consider moving  patient to room near nurses station  Outcome: Progressing  Goal: Maintain or return to baseline ADL function  Description: INTERVENTIONS:-  Assess patient's ability to carry out ADLs; assess patient's baseline for ADL function and identify physical deficits which impact ability to perform ADLs (bathing, care of mouth/teeth, toileting, grooming, dressing, etc.)- Assess/evaluate cause of self-care deficits - Assess range of motion- Assess patient's mobility; develop plan if impaired- Assess patient's need for assistive devices and provide as appropriate- Encourage maximum independence but intervene and supervise when necessary- Involve family in performance of ADLs- Assess for home care needs following discharge - Consider OT consult to assist with ADL evaluation and planning for discharge- Provide patient education as appropriate  Outcome: Progressing  Goal: Maintains/Returns to pre admission functional level  Description: INTERVENTIONS:- Perform AM-PAC 6 Click Basic Mobility/ Daily Activity assessment daily.- Set and communicate daily mobility goal to care team and patient/family/caregiver. - Collaborate with rehabilitation services on mobility goals if consulted- Perform Range of Motion 3 times a day.- Reposition patient every 2 hours.- Dangle patient 3 times a day- Stand patient 3 times a day- Ambulate patient 3 times a day- Out of bed to chair 3 times a day - Out of bed for meals 3 times a day- Out of bed for toileting- Record patient progress and toleration of activity level   Outcome: Progressing     Problem: DISCHARGE PLANNING  Goal: Discharge to home or other facility with appropriate resources  Description: INTERVENTIONS:- Identify barriers to discharge w/patient and caregiver- Arrange for needed discharge resources and transportation as appropriate- Identify discharge learning needs (meds, wound care, etc.)- Arrange for interpretive services to assist at discharge as needed- Refer to Case Management  Department for coordinating discharge planning if the patient needs post-hospital services based on physician/advanced practitioner order or complex needs related to functional status, cognitive ability, or social support system  Outcome: Progressing     Problem: Prexisting or High Potential for Compromised Skin Integrity  Goal: Skin integrity is maintained or improved  Description: INTERVENTIONS:- Identify patients at risk for skin breakdown- Assess and monitor skin integrity- Assess and monitor nutrition and hydration status- Monitor labs - Assess for incontinence - Turn and reposition patient- Assist with mobility/ambulation- Relieve pressure over bony prominences- Avoid friction and shearing- Provide appropriate hygiene as needed including keeping skin clean and dry- Evaluate need for skin moisturizer/barrier cream- Collaborate with interdisciplinary team - Patient/family teaching- Consider wound care consult   Outcome: Progressing

## 2025-05-02 NOTE — PROGRESS NOTES
Physical Therapy Treatment Note    Patient's Name: Kamini Martines  : 1950     05/02/25 1100   Pain Assessment   Pain Assessment Tool 0-10   Pain Score 5   Pain Location/Orientation Orientation: Left;Location: Hip   Hospital Pain Intervention(s) Cold applied   Restrictions/Precautions   Precautions Fall Risk;Pain   Weight Bearing Restrictions Yes   LLE Weight Bearing Per Order WBAT   Subjective   Subjective Agreeable to mobilize.   Sit to Stand   Type of Assistance Needed Incidental touching;Verbal cues   Physical Assistance Level No physical assistance   Comment CGA RW this session   Sit to Stand CARE Score 4   Bed-Chair Transfer   Type of Assistance Needed Physical assistance;Verbal cues   Physical Assistance Level 25% or less   Comment RW w/ max cues for improved foot clearance   Chair/Bed-to-Chair Transfer CARE Score 3   Walk 10 Feet   Reason if not Attempted Safety concerns   Walk 10 Feet CARE Score 88   Walk 50 Feet with Two Turns   Reason if not Attempted Safety concerns   Walk 50 Feet with Two Turns CARE Score 88   Walk 150 Feet   Reason if not Attempted Safety concerns   Walk 150 Feet CARE Score 88   Walking 10 Feet on Uneven Surfaces   Reason if not Attempted Safety concerns   Walking 10 Feet on Uneven Surfaces CARE Score 88   Ambulation   Primary Mode of Locomotion Prior to Admission Walk   Distance Walked (feet) 6 ft  (x2)   Assist Device Roller Walker   Gait Pattern Antalgic;Slow Marika;Decreased foot clearance;R foot drag;Shuffle;Step to;Improper weight shift   Limitations Noted In Device Management;Endurance;Heel Strike;Speed;Strength;Swing   Provided Assistance with: Weight Shift   Walk Assist Level Minimum Assist   Findings MinAx1 w/ RW + W/C follow, cues required for sequencing, BUE depression into RW, + improved foot clearance; lowered height of RW by 1 setting for improved BUE depression   Does the patient walk? 2. Yes   Wheel 50 Feet with Two Turns   Reason if not Attempted Activity not  applicable   Wheel 50 Feet with Two Turns CARE Score 9   Wheel 150 Feet   Reason if not Attempted Activity not applicable   Wheel 150 Feet CARE Score 9   Curb or Single Stair   Reason if not Attempted Safety concerns   1 Step (Curb) CARE Score 88   4 Steps   Reason if not Attempted Safety concerns   4 Steps CARE Score 88   12 Steps   Reason if not Attempted Safety concerns   12 Steps CARE Score 88   Picking Up Object   Reason if not Attempted Safety concerns   Picking Up Object CARE Score 88   Therapeutic Interventions   Strengthening Instr in seated ther ex, 2# RLE, AROM LLE, LAQ 3x10, marches 2x10 sitting RLE, standing w/ RW LLE, hip ABD w/ yellow T-band.   Assessment   Treatment Assessment Pt participated in 30 min skilled PT treatment session w/ interventions consisting of t/f training, gait training, + LE ther ex instruction. Max cues required for improved foot clearance during surface<>surface t/f and ambulation. In addition to L hip pain pt also limited by B shoulder pain w/ reported hx of B RTC tears. L hip very weak (2/5), limited hip flexion + limited hip ABD LLE. Encouraged pt to ask for pain meds prior to p.m. PT session.   Family/Caregiver Present no   Problem List Decreased strength;Decreased range of motion;Decreased endurance;Impaired balance;Decreased mobility;Pain;Orthopedic restrictions;Decreased skin integrity;Obesity   Barriers to Discharge Inaccessible home environment   PT Barriers   Physical Impairment Decreased strength;Decreased range of motion;Decreased endurance;Impaired balance;Decreased mobility;Pain;Obesity   Functional Limitation Car transfers;Stair negotiation;Standing;Transfers;Walking   Plan   Treatment/Interventions Functional transfer training;LE strengthening/ROM;Elevations;Therapeutic exercise;Endurance training;Patient/family training;Equipment eval/education;Bed mobility;Gait training;Compensatory technique education   Progress Progressing toward goals   PT Therapy Minutes    PT Time In 1100   PT Time Out 1130   PT Total Time (minutes) 30   PT Mode of treatment - Individual (minutes) 30   PT Mode of treatment - Concurrent (minutes) 0   PT Mode of treatment - Group (minutes) 0   PT Mode of treatment - Co-treat (minutes) 0   PT Mode of Treatment - Total time(minutes) 30 minutes   PT Cumulative Minutes 300   Therapy Time missed   Time missed? No     Stacy Funes, PT, DPT

## 2025-05-02 NOTE — PROGRESS NOTES
05/02/25 1435   Pain Assessment   Pain Assessment Tool 0-10   Pain Score 5   Pain Location/Orientation Orientation: Left;Location: Hip   Restrictions/Precautions   Precautions Fall Risk;Pain   Weight Bearing Restrictions Yes   LLE Weight Bearing Per Order WBAT   General   Change In Medical/Functional Status Progressed patient to ModAx1 [place] [facility] [place] whiteboard in room and communicated with RN   Cognition   Overall Cognitive Status WFL   Arousal/Participation Alert;Cooperative   Attention Attends with cues to redirect   Orientation Level Oriented X4   Memory Within functional limits   Following Commands Follows one step commands without difficulty   Sit to Lying   Type of Assistance Needed Physical assistance   Physical Assistance Level 51%-75%   Comment ModA for LE management   Sit to Lying CARE Score 2   Lying to Sitting on Side of Bed   Type of Assistance Needed Physical assistance   Physical Assistance Level Total assistance   Comment Assist for trunk and LE management   Lying to Sitting on Side of Bed CARE Score 1   Sit to Stand   Type of Assistance Needed Incidental touching;Adaptive equipment   Comment CGA with RW   Sit to Stand CARE Score 4   Bed-Chair Transfer   Type of Assistance Needed Incidental touching;Adaptive equipment;Physical assistance   Physical Assistance Level 25% or less   Comment Nadine/CGA with RW, VCs stay within RW frame when turning, improved clearance of LLE by end of session   Chair/Bed-to-Chair Transfer CARE Score 3   Walk 10 Feet   Type of Assistance Needed Physical assistance   Physical Assistance Level 25% or less   Comment Nadine with RW   Walk 10 Feet CARE Score 3   Walk 50 Feet with Two Turns   Comment limited by L hip pain   Reason if not Attempted Safety concerns   Walk 50 Feet with Two Turns CARE Score 88   Walk 150 Feet   Reason if not Attempted Safety concerns   Walk 150 Feet CARE Score 88   Ambulation   Primary Mode of Locomotion Prior to Admission Walk    Distance Walked (feet) 23 ft  (10 ft)   Assist Device Roller Walker   Gait Pattern Antalgic;Slow Marika;Decreased foot clearance;L foot drag;Decreased L stance;Improper weight shift;Step to;Shuffle   Limitations Noted In Device Management;Endurance;Speed;Strength;Swing   Provided Assistance with: Weight Shift   Walk Assist Level Minimum Assist   Findings Walked from recliner to door in pt room. Poor weight shifitng onto LLE, would drag LLE when advancing the leg. Pt then tried to compensate by swining L leg back and foth to advance. After completing supine therex, pt was able to more easily advance LLE with some foot clearance noted   Does the patient walk? 2. Yes   Curb or Single Stair   Comment (S)  pt able to perform 4 inch step up with bilateral UE support via bilateral handrails- ModA, required assistance to clear LLE. Performed 2 trials. continue to work on stair management over the weekend   4 Steps   Reason if not Attempted Safety concerns   4 Steps CARE Score 88   12 Steps   Reason if not Attempted Safety concerns   12 Steps CARE Score 88   Therapeutic Interventions   Strengthening supine heel slides 2x10, supine bridges 1x5, 1x8 - improved clearance of buttock, supine hip abduction 2x10   Assessment   Treatment Assessment Patient participated in 60 minute skilled physical therapy session. Patient ambulated the furtherest distance to date and demonstrates improvement in L foot clearance. Pt was able to clear LLE better after completing supine therex. Pt does require encouragement to push herself when ambulating. Biggest barriers to pt returning home include stairs and limited ambulation distance. Patient will continue to benefit from skilled physical therapy services to maximize safety and independence with functional mobility   Problem List Decreased strength;Decreased endurance;Impaired balance;Decreased mobility;Pain;Obesity   Barriers to Discharge Inaccessible home environment   PT Barriers    Functional Limitation Car transfers;Stair negotiation;Standing;Transfers;Walking   Plan   Treatment/Interventions Functional transfer training;LE strengthening/ROM;Therapeutic exercise;Endurance training;Patient/family training;Bed mobility;Gait training;Compensatory technique education   Progress Progressing toward goals   Discharge Recommendation   Rehab Resource Intensity Level, PT   (HH PT)   PT Therapy Minutes   PT Time In 1435   PT Time Out 1535   PT Total Time (minutes) 60   PT Mode of treatment - Individual (minutes) 60   PT Mode of treatment - Concurrent (minutes) 0   PT Mode of treatment - Group (minutes) 0   PT Mode of treatment - Co-treat (minutes) 0   PT Mode of Treatment - Total time(minutes) 60 minutes   PT Cumulative Minutes 360     Leonela Naik, PT, DPT

## 2025-05-02 NOTE — ASSESSMENT & PLAN NOTE
Lab Results   Component Value Date    HGBA1C 7.8 (H) 04/29/2025       Recent Labs     05/01/25  1537 05/01/25  2042 05/02/25  0632 05/02/25  1048   POCGLU 223* 171* 169* 187*     Currently on regimen of Lantus 8 units at bedtime +3 units of lispro with meals as well as sliding scale insulin, Accu-Cheks 4 times daily  Continue monitoring blood glucose levels and adjust per recommendations by internal medicine  Continue carb controlled diet level 2

## 2025-05-02 NOTE — PROGRESS NOTES
05/02/25 0830   Pain Assessment   Pain Assessment Tool 0-10   Pain Score 4   Pain Location/Orientation Orientation: Left;Location: Hip   Restrictions/Precautions   Precautions Fall Risk;Pain   Weight Bearing Restrictions Yes   LLE Weight Bearing Per Order WBAT   Oral Hygiene   Type of Assistance Needed Supervision   Physical Assistance Level No physical assistance   Comment seated due to decreased standing yaw with UE release   Oral Hygiene CARE Score 4   Shower/Bathe Self   Type of Assistance Needed Physical assistance   Physical Assistance Level 51%-75%   Comment sponge bathing routine completed as pt does not have active shower orders. completed seated in recliner chair. pt requires assist to bathe B feet and buttocks.   Shower/Bathe Self CARE Score 2   Upper Body Dressing   Type of Assistance Needed Set-up / clean-up   Physical Assistance Level No physical assistance   Comment seated   Upper Body Dressing CARE Score 5   Lower Body Dressing   Type of Assistance Needed Physical assistance;Adaptive equipment   Physical Assistance Level 26%-50%   Comment pt able to thread underwear/pants over feet using LHAE; stands with CG with pt assisting with CM on R side, assist for CM on L side   Lower Body Dressing CARE Score 3   Putting On/Taking Off Footwear   Type of Assistance Needed Physical assistance;Adaptive equipment   Physical Assistance Level 25% or less   Comment pt able to doff socks using dressing stick, dons socks using sock aid; due to edema, did not don slip on sneakers but would anticipate pt requiring assistance.   Putting On/Taking Off Footwear CARE Score 3   Sit to Stand   Type of Assistance Needed Incidental touching   Physical Assistance Level No physical assistance   Comment CG with RW   Sit to Stand CARE Score 4   Bed-Chair Transfer   Type of Assistance Needed Physical assistance   Physical Assistance Level 25% or less   Comment SPT with RW; pt does not lift/clear LLE due to pain and poor WBing  tolerance during weight shifitng.   Chair/Bed-to-Chair Transfer CARE Score 3   Cognition   Overall Cognitive Status WFL   Arousal/Participation Alert;Cooperative   Attention Attends with cues to redirect   Orientation Level Oriented X4   Memory Within functional limits   Following Commands Follows one step commands without difficulty   Additional Activities   Additional Activities Comments in an attempt to increase foot clearance during transfers, pt engages in standing mini marches with LLE. pt instructed to lift LLE foot off ground at hip which pt was able to complete, then instructed to complete more knee flexion to clear foot however pt experiences more pain when attempting to lift her knee but was still able to complete x3 trials.   Activity Tolerance   Activity Tolerance Patient tolerated treatment well   Assessment   Treatment Assessment pt engages in 90 minute skilled OT Session focusing on ADL retraining with use of LHAE, func transfers with RW. see above for full func details. pt continues to demo slow progress toward OT goals with pain being large barrier at this time. pt also with decreased func reach due to pain. continues to demo poor LLE foot clearance during transfers, more so swivels on foot to get to end destination. recommend continued skilled care to focus on ADL retraining, func transfers, standing yaw/bal, light UE strengthening/endurance, in order to decrease burden of care at d/c.   Prognosis Good   Problem List Decreased strength;Decreased range of motion;Decreased endurance;Impaired balance;Decreased mobility;Pain;Orthopedic restrictions;Decreased skin integrity;Obesity   Plan   Treatment/Interventions ADL retraining;Functional transfer training;Therapeutic exercise;Endurance training;Patient/family training;Equipment eval/education;Compensatory technique education   OT Therapy Minutes   OT Time In 0830   OT Time Out 1000   OT Total Time (minutes) 90   OT Mode of treatment - Individual  (minutes) 90   OT Mode of treatment - Concurrent (minutes) 0   OT Mode of treatment - Group (minutes) 0   OT Mode of treatment - Co-treat (minutes) 0   OT Mode of Treatment - Total time(minutes) 90 minutes   OT Cumulative Minutes 360   Therapy Time missed   Time missed? No

## 2025-05-02 NOTE — ASSESSMENT & PLAN NOTE
NICM  EF 41% on cardiac MRI (2019)/ECHO 40-45% 2/4/25  Follows with Dr Worley as an OP  Home: Entresto  mg BID/Aldactone 25mg qd/Torsemide 10mg qd prn edema  Here:  Entresto  mg BID/Aldactone as below  Does not tolerate BB = has tried Coreg and Metoprolol in past per Dr Worley's office note  Volume status stable  Restarted Aldactone at 12.5mg qd on 4/29 and increased back up to 25mg qd to start 5/2/25  BMP 5/1/25 = stable

## 2025-05-02 NOTE — ASSESSMENT & PLAN NOTE
Patient presents on 4/24 to [facility] with left hip pain after a fall  And have a comminuted intertrochanteric fracture of the left femur  Status post ORIF with IM nailing by Dr. Parry on 4/24 5/2- 2 week post-op XR ordered for 5/8 - will consult ortho next week    Weightbearing as tolerated to the left lower extremity  Physical and Occupational Therapy  Pain control with Tylenol, gabapentin, Robaxin and as needed oxycodone > Increasing gabapentin from 100 mg BID to TID for better pain control on 5/1  DVT prophylaxis.  Is recommended for 81 mg aspirin twice daily at the time of discharge to complete the 28 days however is on aspirin 325 mg daily currently

## 2025-05-02 NOTE — PROGRESS NOTES
Progress Note - Hospitalist   Name: Kamini Martines 75 y.o. female I MRN: [id number]  Unit/Bed#: -01 I Date of Admission: 4/28/2025   Date of Service: 5/2/2025 I Hospital Day: 4    Assessment & Plan  Closed left hip fracture, initial encounter ([facility])  Presented to [facility] on 4/24/25 with left hip pain following a mechanical fall  Noted to have a comminuted intertrochanteric fracture of the left femur   S/p ORIF/IMN 4/24   Weightbearing as tolerated  PT/OT, pain control per PMR  Is having muscle spasms that Robaxin 500 mg q6hrs prn is helping.  She has taken none today so far (4/29)  Two week followup will be 5/8/25  HFrEF (heart failure with reduced ejection fraction) ([facility])  NICM  EF 41% on cardiac MRI (2019)/ECHO 40-45% 2/4/25  Follows with Dr Worley as an OP  Home: Entresto  mg BID/Aldactone 25mg qd/Torsemide 10mg qd prn edema  Here:  Entresto  mg BID/Aldactone as below  Does not tolerate BB = has tried Coreg and Metoprolol in past per Dr Worley's office note  Volume status stable  Restarted Aldactone at 12.5mg qd on 4/29 and increased back up to 25mg qd to start 5/2/25  BMP 5/1/25 = stable  Type 2 diabetes mellitus without complication, without long-term current use of insulin ([facility])  HbA1C 7.8 on 4/29/25  Has a glucometer at home  Home: Metformin  mg daily with dinner  Here:  Lantus 8U qhs/Lispro 3U TID  Continue QID Accuchecks/SSI (Algo 3/1) and DM diet  Restarted Metformin  mg with dinner 4/30/25 and stopped the Lantus; keeping the Lispro at 3U TID with meals  No other changes today 5/1/25 but may need to increase Metformin  CVA (cerebral vascular accident) ([facility])  Continue  mg qd as at home  Not on statins at home because she didn't want to take in past  Didn't followup with Neuro after stroke  LUCIA (obstructive sleep apnea)  Stopped using CPAP few months ago as strap was causing neck pain  Outpatient sleep f/u  Mild intermittent asthma without complication  No  "exacerbation  Continue Albuterol prn  Class 2 obesity in adult  BMI 36.87  Affects all aspects of care  Lifestyle modification  GERD (gastroesophageal reflux disease)  Takes Prilosec 40mg qd at home  Currently on Protonix 40mg qd and Pepcid 20mg qd here  Started weaning Pepcid and reduced to 10mg qd 4/30/25 --> will stop on Monday 5/5/25  CAD (coronary artery disease)  Nonobstructive coronary artery disease on cardiac cath  On  mg daily for hx CVA  Urinary retention  Adams was placed on 4/25 and removed on 4/28  She takes Vesicare at home  Per PMR  Thyroid nodule  Found on CT cervical spine  \"1.1 cm right thyroid nodule. Incidental discovery of one or more thyroid nodule(s) measuring less than 1.5 cm and without suspicious features is noted in this patient who is above 35 years old; according to guidelines published in the February 2015 white paper on incidental thyroid nodules in the Journal of the American College of Radiology (JACR), no further evaluation is recommended.\"  Buttock wound, right, sequela  POA  Per PMR  Anemia  Mild  stable  Leukocytosis  Mild  Likely reactive  No fever  Will watch    The above assessment and plan was reviewed and updated as determined by my evaluation of the patient on 5/2/2025.    History of Present Illness   Patient seen and examined. Patients overnight issues or events were reviewed with nursing staff. New or overnight issues include the following:   No new or overnight issues.  Offers no complaints    Review of Systems    Objective :  Temp:  [97.5 °F (36.4 °C)-97.9 °F (36.6 °C)] 97.5 °F (36.4 °C)  HR:  [77-84] 77  BP: (143-155)/(47-59) 155/59  Resp:  [16-17] 17  SpO2:  [97 %-98 %] 98 %  O2 Device: None (Room air)    Invasive Devices       Drain  Duration             Ureteral Internal Stent Right ureter 6 Fr. 303 days                    Physical Exam  General Appearance: no distress, non toxic appearing  HEENT: PERRLA, conjuctiva normal; oropharynx clear; mucous membranes " moist   Neck:  Supple, normal ROM  Lungs: CTA, normal respiratory effort, no retractions, expiratory effort normal  CV: regular rate and rhythm; no rubs/murmurs/gallops, PMI normal   ABD: soft; ND/NT; +BS  EXT: + LLE edema  Skin: normal turgor, normal texture  Psych: affect normal, mood normal  Neuro: AAO        The above physical exam was reviewed and updated as determined by my evaluation of the patient on 5/2/2025.      Lab Results: I have reviewed the following results:  Results from last 7 days   Lab Units 05/01/25  0547 04/27/25  0449   WBC Thousand/uL 10.90* 12.95*   HEMOGLOBIN g/dL 9.1* 9.1*   HEMATOCRIT % 28.9* 27.7*   PLATELETS Thousands/uL 417* 254     Results from last 7 days   Lab Units 05/01/25  0547 04/27/25  0449   SODIUM mmol/L 142 138   POTASSIUM mmol/L 4.0 4.1   CHLORIDE mmol/L 107 105   CO2 mmol/L 27 24   BUN mg/dL 23 27*   CREATININE mg/dL 0.44* 0.55*   CALCIUM mg/dL 9.8 9.1     Results from last 7 days   Lab Units 04/29/25  0713   HEMOGLOBIN A1C % 7.8*         Results from last 7 days   Lab Units 05/02/25  1048 05/02/25  0632 05/01/25  2042   POC GLUCOSE mg/dl 187* 169* 171*       Imaging Results Review: No pertinent imaging studies reviewed.  Other Study Results Review: No additional pertinent studies reviewed.    Review of Scheduled Meds: Medications  reviewed and reconciled as needed  Current Facility-Administered Medications   Medication Dose Route Frequency Provider Last Rate    acetaminophen  975 mg Oral Q8H [facility] Tammy Pradhan MD      albuterol  2 puff Inhalation Q6H PRN Tammy Pradhan MD      aluminum-magnesium hydroxide-simethicone  30 mL Oral Q4H PRN Tammy Pradhan MD      aspirin  325 mg Oral Daily Tammy Pradhan MD      Cholecalciferol  1,000 Units Oral Daily Tammy Pradhan MD      enoxaparin  30 mg Subcutaneous Q12H Tammy Pradhan MD      famotidine  10 mg Oral Daily STEFFANY Nelson      gabapentin  100 mg Oral TID Gladys Carvalho DO      insulin lispro   1-5 Units Subcutaneous HS Tammy Pradhan MD      insulin lispro  1-6 Units Subcutaneous TID AC Tammy Pradhan MD      insulin lispro  3 Units Subcutaneous TID With Meals Tammy Pradhan MD      metFORMIN  500 mg Oral Daily With Dinner STEFFANY Nelson      methocarbamol  500 mg Oral Q6H PRN Tammy Pradhan MD      multivitamin stress formula  1 tablet Oral Daily Tammy Pradhan MD      naloxone  0.04 mg Intravenous Q1MIN PRN Tammy Pradhan MD      oxyCODONE  2.5 mg Oral Q4H PRN Tammy Pradahn MD      Or    oxyCODONE  5 mg Oral Q4H PRN Tammy Pradhan MD      pantoprazole  40 mg Oral Early Morning Tammy Pradhan MD      polyethylene glycol  17 g Oral Daily Tammy Pradhan MD      PreserVision AREDS 2  1 capsule Oral BID STEFFANY Nelson      sacubitril-valsartan  1 tablet Oral BID Tammy Pradhan MD      spironolactone  25 mg Oral Daily STEFFANY Nelson         VTE Pharmacologic Prophylaxis: Lovenox  Code Status: Level 1 - Full Code  Current Length of Stay: 4 day(s)    Administrative Statements     ** Please Note:  voice to text software may have been used in the creation of this document. Although proof errors in transcription or interpretation are a potential of such software**

## 2025-05-02 NOTE — ASSESSMENT & PLAN NOTE
HbA1C 7.8 on 4/29/25  Has a glucometer at home  Home: Metformin  mg daily with dinner  Here:  Lantus 8U qhs/Lispro 3U TID  Continue QID Accuchecks/SSI (Algo 3/1) and DM diet  Restarted Metformin  mg with dinner 4/30/25 and stopped the Lantus; keeping the Lispro at 3U TID with meals  No other changes today 5/1/25 but may need to increase Metformin

## 2025-05-02 NOTE — PROGRESS NOTES
Progress Note - PMR   Name: Kamini Martines 75 y.o. female I MRN: [id number]  Unit/Bed#: -01 I Date of Admission: 4/28/2025   Date of Service: 5/2/2025 I Hospital Day: 4     Assessment & Plan  Closed left hip fracture, initial encounter (HCC)  Patient presents on 4/24 to [facility] with left hip pain after a fall  And have a comminuted intertrochanteric fracture of the left femur  Status post ORIF with IM nailing by Dr. Parry on 4/24 5/2- 2 week post-op XR ordered for 5/8 - will consult ortho next week    Weightbearing as tolerated to the left lower extremity  Physical and Occupational Therapy  Pain control with Tylenol, gabapentin, Robaxin and as needed oxycodone > Increasing gabapentin from 100 mg BID to TID for better pain control on 5/1  DVT prophylaxis.  Is recommended for 81 mg aspirin twice daily at the time of discharge to complete the 28 days however is on aspirin 325 mg daily currently  Impaired mobility and activities of daily living  Patient was evaluated by the rehabilitation team MD and an appropriate candidate for acute inpatient rehabilitation program at this time.  The patient will tolerate 3 hours/day 5 to 7 days/week of intensive physical, occupational therapy in order to obtain goals for community discharge  Due to the patient's functional Compared to their baseline level of function in addition to their ongoing medical needs, the patient would benefit from daily supervision from a rehabilitation physician as well as rehabilitation nursing to implement and adjust the medical as well as functional plan of care in order to meet the patient's goals.    HTN (hypertension)  Currently on a regimen of Entresto and spironolactone for the heart failure  Was on as needed Demadex at home in the past  Monitor pressures during therapy as well as during routine vitals  CVA (cerebral vascular accident) (HCC)  History of stroke in the past and was placed on aspirin 325 mg daily  Is also not on  a statin at home  After history of stroke did not follow-up with neurology and refused follow-up with Dr. Jones.  Is not on the appropriate regimen for secondary stroke prophylaxis.  Was on 81 mg previously but stated her cardiologist increased it to 325 mg.  She endorsed that she did not start her statin.  Mild intermittent asthma without complication  Currently on a as needed albuterol inhaler  Continue monitor on physical examination as well as during routine vitals, therapy vitals  LUCIA (obstructive sleep apnea)  Was noncompliant with the CPAP due to overall comfort with strap and discontinued its use  Follow-up with sleep medicine as an outpatient  Class 2 obesity in adult  BMI of 36.87 on arrival  Continue to recommend lifestyle modification, dietary changes, weight loss counseling especially in the setting of recent fracture.  Type 2 diabetes mellitus without complication, without long-term current use of insulin ([facility])  Lab Results   Component Value Date    HGBA1C 7.8 (H) 04/29/2025       Recent Labs     05/01/25  1537 05/01/25  2042 05/02/25  0632 05/02/25  1048   POCGLU 223* 171* 169* 187*     Currently on regimen of Lantus 8 units at bedtime +3 units of lispro with meals as well as sliding scale insulin, Accu-Cheks 4 times daily  Continue monitoring blood glucose levels and adjust per recommendations by internal medicine  Continue carb controlled diet level 2  Urinary retention  Had indwelling Adams catheter placed 4/25 which was removed on 4/28 and did require initial straight catheterization  5/1- PVRs x3 low. Now voiding and continent  HFrEF (heart failure with reduced ejection fraction) ([facility])  Wt Readings from Last 3 Encounters:   04/30/25 101 kg (221 lb 9 oz)   04/24/25 99.3 kg (219 lb)   02/20/25 99.3 kg (219 lb)     Echocardiogram most recently with a cardiac MRI showing EF of 41%  Nonischemic cardiomyopathy on Entresto twice daily at home as well as Aldactone.  Aldactone is being restarted today  per internal medicine  Does not tolerate beta-blockers in the past  Monitor intake and output as well as weights  Education on diet and activity  GERD (gastroesophageal reflux disease)  Continue Pepcid and Protonix and as needed Maalox  CAD (coronary artery disease)  Currently on aspirin  Noted to have nonobstructive CAD on cardiac catheterization  Follow-up with outpatient cardiology  Thyroid nodule    Buttock wound, right, sequela  Noted on admission  Consult wound care  Anemia  Preoperative hemoglobin of 12.1 now currently 9.1 qualifying for acute blood loss anemia  5/1- Hgb stable at 9.1    Continue to monitor hemoglobin on bio weekly CBC or sooner if quickly indicated  Leukocytosis  Most recent WBC count of 12.95 which is down from postoperative 18.0  5/1- WBC downtrending to 10.9    Continue to monitor on biweekly CBC or sooner if indicated, monitor for signs of infection at the surgical site    Subjective   75 y.o. female with history of chronic heart failure, nonischemic cardiomyopathy, CAD, hypertension, LUCIA not compliant with CPAP, lumbar spinal stenosis, multijoint osteoarthritis who presented to the [facility] on 4/24 with left hip pain after having a fall. She was found to have comminuted intertrochanteric fracture of the left femur and underwent ORIF and IM nailing on 4/24 with Dr. Parry. She had a Adams catheter placed for urinary retention which was later removed on 4/28.     Chief Complaint: f/u fracture and f/u ambulatory dysfunction    Interval: Patient seen and examined. No acute overnight events. States she slept well last night with no nightmares. Eating well and last BM 5/2. Inquiring if her small mando dog can come visit, which she can because dog has all its vaccines. Informed patient that her 2 week post-op check will be done while here next week. Otherwise, denies any fevers, chills, chest pain, sob, abdominal pain, nausea/vomiting, lightheadedness or  dizziness. No new labs to review.      Objective :  Temp:  [97.5 °F (36.4 °C)-97.9 °F (36.6 °C)] 97.5 °F (36.4 °C)  HR:  [77-84] 77  BP: (143-155)/(47-59) 155/59  Resp:  [16-17] 17  SpO2:  [97 %-98 %] 98 %  O2 Device: None (Room air)    Functional Update:  Mobility: maxA bed mobility, maxA ambulation with RW (2')  Transfers: maxA transfers with RW  ADLs: Ind eating, Sup eating/UBD, modA bathing/LBD, totalA toileting    Physical Exam  Vitals reviewed.   Constitutional:       Appearance: Normal appearance.   HENT:      Head: Normocephalic and atraumatic.      Right Ear: External ear normal.      Left Ear: External ear normal.      Nose: Nose normal.      Mouth/Throat:      Mouth: Mucous membranes are moist.      Pharynx: Oropharynx is clear.   Eyes:      Conjunctiva/sclera: Conjunctivae normal.   Cardiovascular:      Rate and Rhythm: Normal rate and regular rhythm.      Heart sounds: Normal heart sounds.   Pulmonary:      Effort: Pulmonary effort is normal. No respiratory distress.      Breath sounds: Normal breath sounds. No wheezing.   Abdominal:      General: Bowel sounds are normal.      Palpations: Abdomen is soft.   Musculoskeletal:      Cervical back: Normal range of motion.   Skin:     General: Skin is warm and dry.   Neurological:      Mental Status: She is alert and oriented to person, place, and time. Mental status is at baseline.   Psychiatric:         Mood and Affect: Mood normal.         Behavior: Behavior normal.           Scheduled Meds:  Current Facility-Administered Medications   Medication Dose Route Frequency Provider Last Rate    acetaminophen  975 mg Oral Q8H [facility] Tammy Pradhan MD      albuterol  2 puff Inhalation Q6H PRN Tammy Pradhan MD      aluminum-magnesium hydroxide-simethicone  30 mL Oral Q4H PRN Tammy Pradhan MD      aspirin  325 mg Oral Daily Tammy Pradhan MD      Cholecalciferol  1,000 Units Oral Daily Tammy Pradhan MD      enoxaparin  30 mg Subcutaneous Q12H  Tammy Pradhan MD      famotidine  10 mg Oral Daily STEFFANY Nelson      gabapentin  100 mg Oral TID Gladys Carvalho DO      insulin lispro  1-5 Units Subcutaneous HS Tammy Pradhan MD      insulin lispro  1-6 Units Subcutaneous TID AC Tammy Pradhan MD      insulin lispro  3 Units Subcutaneous TID With Meals Tammy Pradhan MD      metFORMIN  500 mg Oral Daily With Dinner STEFFANY Nelson      methocarbamol  500 mg Oral Q6H PRN Tammy Pradhan MD      multivitamin stress formula  1 tablet Oral Daily Tammy Pradhan MD      naloxone  0.04 mg Intravenous Q1MIN PRN Tammy Pradhan MD      oxyCODONE  2.5 mg Oral Q4H PRN Tammy Pradhan MD      Or    oxyCODONE  5 mg Oral Q4H PRN Tammy Pradhan MD      pantoprazole  40 mg Oral Early Morning Tammy Pradhan MD      polyethylene glycol  17 g Oral Daily Tammy Pradhan MD      PreserVision AREDS 2  1 capsule Oral BID STEFFANY Nelson      sacubitril-valsartan  1 tablet Oral BID Tammy Pradhan MD      spironolactone  25 mg Oral Daily STEFFANY Nelson           Lab Results: I have reviewed the following results:  Results from last 7 days   Lab Units 05/01/25  0547 04/27/25  0449 04/26/25  0502   HEMOGLOBIN g/dL 9.1* 9.1* 9.4*   HEMATOCRIT % 28.9* 27.7* 29.4*   WBC Thousand/uL 10.90* 12.95* 12.16*   PLATELETS Thousands/uL 417* 254 244     Results from last 7 days   Lab Units 05/01/25  0547 04/27/25  0449 04/26/25  0502   BUN mg/dL 23 27* 22   SODIUM mmol/L 142 138 139   POTASSIUM mmol/L 4.0 4.1 4.1   CHLORIDE mmol/L 107 105 106   CREATININE mg/dL 0.44* 0.55* 0.52*

## 2025-05-02 NOTE — ASSESSMENT & PLAN NOTE
Takes Prilosec 40mg qd at home  Currently on Protonix 40mg qd and Pepcid 20mg qd here  Started weaning Pepcid and reduced to 10mg qd 4/30/25 --> will stop on Monday 5/5/25

## 2025-05-03 ENCOUNTER — APPOINTMENT (INPATIENT)
Dept: NON INVASIVE DIAGNOSTICS | Facility: HOSPITAL | Age: 75
DRG: 560 | End: 2025-05-03
Payer: MEDICARE

## 2025-05-03 PROBLEM — M79.89 LEFT LEG SWELLING: Status: ACTIVE | Noted: 2025-05-03

## 2025-05-03 LAB
GLUCOSE SERPL-MCNC: 129 MG/DL (ref 65–140)
GLUCOSE SERPL-MCNC: 171 MG/DL (ref 65–140)
GLUCOSE SERPL-MCNC: 189 MG/DL (ref 65–140)
GLUCOSE SERPL-MCNC: 256 MG/DL (ref 65–140)

## 2025-05-03 PROCEDURE — 97530 THERAPEUTIC ACTIVITIES: CPT

## 2025-05-03 PROCEDURE — 93971 EXTREMITY STUDY: CPT

## 2025-05-03 PROCEDURE — 99232 SBSQ HOSP IP/OBS MODERATE 35: CPT | Performed by: PHYSICIAN ASSISTANT

## 2025-05-03 PROCEDURE — 93971 EXTREMITY STUDY: CPT | Performed by: SURGERY

## 2025-05-03 PROCEDURE — 97110 THERAPEUTIC EXERCISES: CPT

## 2025-05-03 PROCEDURE — 82948 REAGENT STRIP/BLOOD GLUCOSE: CPT

## 2025-05-03 RX ORDER — METFORMIN HYDROCHLORIDE 500 MG/1
1000 TABLET, EXTENDED RELEASE ORAL
Status: DISCONTINUED | OUTPATIENT
Start: 2025-05-03 | End: 2025-05-11

## 2025-05-03 RX ORDER — LOPERAMIDE HYDROCHLORIDE 2 MG/1
2 CAPSULE ORAL ONCE
Status: COMPLETED | OUTPATIENT
Start: 2025-05-03 | End: 2025-05-03

## 2025-05-03 RX ADMIN — PANTOPRAZOLE SODIUM 40 MG: 40 TABLET, DELAYED RELEASE ORAL at 06:16

## 2025-05-03 RX ADMIN — GABAPENTIN 100 MG: 100 CAPSULE ORAL at 21:51

## 2025-05-03 RX ADMIN — Medication 1000 UNITS: at 08:00

## 2025-05-03 RX ADMIN — Medication 1 CAPSULE: at 21:52

## 2025-05-03 RX ADMIN — ENOXAPARIN SODIUM 30 MG: 30 INJECTION SUBCUTANEOUS at 08:00

## 2025-05-03 RX ADMIN — GABAPENTIN 100 MG: 100 CAPSULE ORAL at 08:00

## 2025-05-03 RX ADMIN — SPIRONOLACTONE 25 MG: 25 TABLET ORAL at 08:03

## 2025-05-03 RX ADMIN — ASPIRIN 325 MG ORAL TABLET 325 MG: 325 PILL ORAL at 08:00

## 2025-05-03 RX ADMIN — INSULIN LISPRO 3 UNITS: 100 INJECTION, SOLUTION INTRAVENOUS; SUBCUTANEOUS at 12:00

## 2025-05-03 RX ADMIN — INSULIN LISPRO 3 UNITS: 100 INJECTION, SOLUTION INTRAVENOUS; SUBCUTANEOUS at 16:42

## 2025-05-03 RX ADMIN — SACUBITRIL AND VALSARTAN 1 TABLET: 97; 103 TABLET, FILM COATED ORAL at 21:53

## 2025-05-03 RX ADMIN — B-COMPLEX W/ C & FOLIC ACID TAB 1 TABLET: TAB at 08:00

## 2025-05-03 RX ADMIN — ACETAMINOPHEN 975 MG: 325 TABLET, FILM COATED ORAL at 06:16

## 2025-05-03 RX ADMIN — LOPERAMIDE HYDROCHLORIDE 2 MG: 2 CAPSULE ORAL at 11:59

## 2025-05-03 RX ADMIN — METFORMIN ER 500 MG 1000 MG: 500 TABLET ORAL at 16:41

## 2025-05-03 RX ADMIN — ENOXAPARIN SODIUM 30 MG: 30 INJECTION SUBCUTANEOUS at 21:51

## 2025-05-03 RX ADMIN — GABAPENTIN 100 MG: 100 CAPSULE ORAL at 16:41

## 2025-05-03 RX ADMIN — INSULIN LISPRO 1 UNITS: 100 INJECTION, SOLUTION INTRAVENOUS; SUBCUTANEOUS at 21:53

## 2025-05-03 RX ADMIN — OXYCODONE HYDROCHLORIDE 5 MG: 5 TABLET ORAL at 11:59

## 2025-05-03 RX ADMIN — INSULIN LISPRO 1 UNITS: 100 INJECTION, SOLUTION INTRAVENOUS; SUBCUTANEOUS at 07:59

## 2025-05-03 RX ADMIN — Medication 1 CAPSULE: at 08:04

## 2025-05-03 RX ADMIN — INSULIN LISPRO 3 UNITS: 100 INJECTION, SOLUTION INTRAVENOUS; SUBCUTANEOUS at 07:59

## 2025-05-03 RX ADMIN — SACUBITRIL AND VALSARTAN 1 TABLET: 97; 103 TABLET, FILM COATED ORAL at 08:03

## 2025-05-03 RX ADMIN — FAMOTIDINE 10 MG: 20 TABLET, FILM COATED ORAL at 08:00

## 2025-05-03 RX ADMIN — ACETAMINOPHEN 975 MG: 325 TABLET, FILM COATED ORAL at 14:39

## 2025-05-03 RX ADMIN — ACETAMINOPHEN 975 MG: 325 TABLET, FILM COATED ORAL at 21:51

## 2025-05-03 NOTE — ASSESSMENT & PLAN NOTE
Presented to [facility] on 4/24/25 with left hip pain following a mechanical fall  Noted to have a comminuted intertrochanteric fracture of the left femur   S/p ORIF/IMN 4/24   Weightbearing as tolerated  PT/OT, pain control per PMR  Is having muscle spasms that Robaxin 500 mg q6hrs prn is helping.  She has taken none today so far (4/29)  Two week followup will be 5/8/25

## 2025-05-03 NOTE — PROGRESS NOTES
Progress Note - Hospitalist   Name: Kamini Martines 75 y.o. female I MRN: [id number]  Unit/Bed#: -01 I Date of Admission: 4/28/2025   Date of Service: 5/3/2025 I Hospital Day: 5    Assessment & Plan  Closed left hip fracture, initial encounter ([facility])  Presented to [facility] on 4/24/25 with left hip pain following a mechanical fall  Noted to have a comminuted intertrochanteric fracture of the left femur   S/p ORIF/IMN 4/24   Weightbearing as tolerated  PT/OT, pain control per PMR  Is having muscle spasms that Robaxin 500 mg q6hrs prn is helping.  She has taken none today so far (4/29)  Two week followup will be 5/8/25  HFrEF (heart failure with reduced ejection fraction) ([facility])  NICM  EF 41% on cardiac MRI (2019)/ECHO 40-45% 2/4/25  Follows with Dr Worley as an OP  Home: Entresto  mg BID/Aldactone 25mg qd/Torsemide 10mg qd prn edema  Here:  Entresto  mg BID/Aldactone as below  Does not tolerate BB = has tried Coreg and Metoprolol in past per Dr Worley's office note  Volume status stable  Restarted Aldactone at 12.5mg qd on 4/29 and increased back up to 25mg qd to start 5/2/25  BMP 5/1/25 = stable  Type 2 diabetes mellitus without complication, without long-term current use of insulin ([facility])  HbA1C 7.8 on 4/29/25  Has a glucometer at home  Home: Metformin  mg daily with dinner  Here:  Lantus 8U qhs/Lispro 3U TID  Continue QID Accuchecks/SSI (Algo 3/1) and DM diet  Restarted Metformin  mg with dinner 4/30/25 and stopped the Lantus; keeping the Lispro at 3U TID with meals  Patients blood sugars have been consistently high. Will increase her metformin dose to 1000mg daily with dinner.   CVA (cerebral vascular accident) ([facility])  Continue  mg qd as at home  Not on statins at home because she didn't want to take in past  Didn't followup with Neuro after stroke  LUCIA (obstructive sleep apnea)  Stopped using CPAP few months ago as strap was causing neck pain  Outpatient sleep f/u  Mild  "intermittent asthma without complication  No exacerbation  Continue Albuterol prn  Class 2 obesity in adult  BMI 36.87  Affects all aspects of care  Lifestyle modification  GERD (gastroesophageal reflux disease)  Takes Prilosec 40mg qd at home  Currently on Protonix 40mg qd and Pepcid 20mg qd here  Started weaning Pepcid and reduced to 10mg qd 4/30/25 --> will stop on Monday 5/5/25  CAD (coronary artery disease)  Nonobstructive coronary artery disease on cardiac cath  On  mg daily for hx CVA  Urinary retention  Adams was placed on 4/25 and removed on 4/28  She takes Vesicare at home  Per PMR  Thyroid nodule  Found on CT cervical spine  \"1.1 cm right thyroid nodule. Incidental discovery of one or more thyroid nodule(s) measuring less than 1.5 cm and without suspicious features is noted in this patient who is above 35 years old; according to guidelines published in the February 2015 white paper on incidental thyroid nodules in the Journal of the American College of Radiology (JACR), no further evaluation is recommended.\"  Buttock wound, right, sequela  POA  Per PMR  Anemia  Mild  stable  Leukocytosis  Mild  Likely reactive  No fever  Will watch  Left leg swelling  Likely due to post op swelling but she is having left calf tenderness  Will order duplex to R/O DVT    The above assessment and plan was reviewed and updated as determined by my evaluation of the patient on 5/3/2025.    History of Present Illness   Patient seen and examined. Patients overnight issues or events were reviewed with nursing staff. New or overnight issues include the following:   Patient sitting up comfortably in chair. Complains of left leg swelling and tenderness.     Review of Systems    Objective :  Temp:  [97.7 °F (36.5 °C)-98.2 °F (36.8 °C)] 98.1 °F (36.7 °C)  HR:  [] 90  BP: (119-136)/(60-70) 136/70  Resp:  [18-20] 20  SpO2:  [95 %-99 %] 95 %  O2 Device: None (Room air)    Invasive Devices       Drain  Duration             " Ureteral Internal Stent Right ureter 6 Fr. 304 days                    Physical Exam  General Appearance: NAD; pleasant  HEENT: PERRLA, conjuctiva normal; mucous membranes moist; face symmetrical  Neck:  Supple  Lungs: clear bilaterally, normal respiratory effort, no retractions, expiratory effort normal, on room air  CV: regular rate and rhythm, no murmurs rubs or gallops noted   ABD: soft non tender, +BS x4  EXT: DP pulses intact, no lymphadenopathy, significant left leg edema and calf tenderness  Skin: normal turgor, normal texture, no rash  Psych: affect normal, mood normal  Neuro: AAOx3    The above physical exam was reviewed and updated as determined by my evaluation of the patient on 5/3/2025.      Lab Results: I have reviewed the following results:  Results from last 7 days   Lab Units 05/01/25  0547 04/27/25  0449   WBC Thousand/uL 10.90* 12.95*   HEMOGLOBIN g/dL 9.1* 9.1*   HEMATOCRIT % 28.9* 27.7*   PLATELETS Thousands/uL 417* 254     Results from last 7 days   Lab Units 05/01/25  0547 04/27/25  0449   SODIUM mmol/L 142 138   POTASSIUM mmol/L 4.0 4.1   CHLORIDE mmol/L 107 105   CO2 mmol/L 27 24   BUN mg/dL 23 27*   CREATININE mg/dL 0.44* 0.55*   CALCIUM mg/dL 9.8 9.1     Results from last 7 days   Lab Units 04/29/25  0713   HEMOGLOBIN A1C % 7.8*         Results from last 7 days   Lab Units 05/03/25  1038 05/03/25  0618 05/02/25  2045   POC GLUCOSE mg/dl 256* 189* 202*           Review of Scheduled Meds: Medications  reviewed and reconciled as needed  Current Facility-Administered Medications   Medication Dose Route Frequency Provider Last Rate    acetaminophen  975 mg Oral Q8H [facility] Tammy Pradhan MD      albuterol  2 puff Inhalation Q6H PRN Tammy Pradhan MD      aluminum-magnesium hydroxide-simethicone  30 mL Oral Q4H PRN Tammy Pradhan MD      aspirin  325 mg Oral Daily Tammy Pradhan MD      Cholecalciferol  1,000 Units Oral Daily Tammy Pradhan MD      enoxaparin  30 mg Subcutaneous Q12H  Tammy Pradhan MD      famotidine  10 mg Oral Daily STEFFANY Nelson      gabapentin  100 mg Oral TID Gladys Carvalho DO      insulin lispro  1-5 Units Subcutaneous HS Tammy Pradhan MD      insulin lispro  1-6 Units Subcutaneous TID AC Tammy Pradhan MD      insulin lispro  3 Units Subcutaneous TID With Meals Tammy Pradhan MD      metFORMIN  1,000 mg Oral Daily With Dinner Rashmi Ibrahim PA-C      methocarbamol  500 mg Oral Q6H PRN Tammy Pradhan MD      multivitamin stress formula  1 tablet Oral Daily Tammy Pradhan MD      naloxone  0.04 mg Intravenous Q1MIN PRN Tammy Pradhan MD      oxyCODONE  2.5 mg Oral Q4H PRN Tammy Pradhan MD      Or    oxyCODONE  5 mg Oral Q4H PRN Tammy Pradhan MD      pantoprazole  40 mg Oral Early Morning Tammy Pradhan MD      [Held by provider] polyethylene glycol  17 g Oral Daily Tammy Pradhan MD      PreserVision AREDS 2  1 capsule Oral BID STEFFANY Nelson      sacubitril-valsartan  1 tablet Oral BID Tammy Pradhan MD      spironolactone  25 mg Oral Daily STEFFANY Nelson         VTE Pharmacologic Prophylaxis: lovenox  Code Status: Level 1 - Full Code  Current Length of Stay: 5 day(s)    Administrative Statements     ** Please Note:  voice to text software may have been used in the creation of this document. Although proof errors in transcription or interpretation are a potential of such software**

## 2025-05-03 NOTE — ASSESSMENT & PLAN NOTE
HbA1C 7.8 on 4/29/25  Has a glucometer at home  Home: Metformin  mg daily with dinner  Here:  Lantus 8U qhs/Lispro 3U TID  Continue QID Accuchecks/SSI (Algo 3/1) and DM diet  Restarted Metformin  mg with dinner 4/30/25 and stopped the Lantus; keeping the Lispro at 3U TID with meals  Patients blood sugars have been consistently high. Will increase her metformin dose to 1000mg daily with dinner.

## 2025-05-03 NOTE — ASSESSMENT & PLAN NOTE
Likely due to post op swelling but she is having left calf tenderness  Will order duplex to R/O DVT

## 2025-05-03 NOTE — PROGRESS NOTES
05/03/25 0130   Pain Assessment   Pain Assessment Tool 0-10   Pain Score 5   Pain Location/Orientation Orientation: Left;Location: Leg   Pain Onset/Description Onset: Ongoing   Effect of Pain on Daily Activities limits pace of mobility   Patient's Stated Pain Goal No pain   Hospital Pain Intervention(s) Ambulation/increased activity;Repositioned;Emotional support   Multiple Pain Sites No   Pain Rating: FLACC (Rest) - Face 0   Pain Rating: FLACC (Rest) - Legs 0   Pain Rating: FLACC (Rest) - Activity 0   Pain Rating: FLACC (Rest) - Cry 0   Pain Rating: FLACC (Rest) - Consolability 0   Score: FLACC (Rest) 0   Pain Rating: FLACC (Activity) - Face 0   Pain Rating: FLACC (Activity) - Legs 1   Pain Rating: FLACC (Activity) - Activity 1   Pain Rating: FLACC (Activity) - Cry 0   Pain Rating: FLACC (Activity) - Consolability 0   Score: FLACC (Activity) 2   Restrictions/Precautions   Precautions Fall Risk;Pain   Weight Bearing Restrictions Yes   LLE Weight Bearing Per Order WBAT   Cognition   Overall Cognitive Status WFL   Arousal/Participation Alert;Cooperative   Attention Attends with cues to redirect   Orientation Level Oriented X4   Memory Within functional limits   Following Commands Follows one step commands without difficulty   Subjective   Subjective Ready to get moving   Roll Left and Right   Type of Assistance Needed Physical assistance   Physical Assistance Level 26%-50%   Comment ModA w/ use of HR   Roll Left and Right CARE Score 3   Sit to Lying   Type of Assistance Needed Physical assistance   Physical Assistance Level 51%-75%   Comment MaxA for LE   Sit to Lying CARE Score 2   Sit to Stand   Type of Assistance Needed Supervision   Physical Assistance Level No physical assistance   Comment w/ RW   Sit to Stand CARE Score 4   Walk 10 Feet   Type of Assistance Needed Physical assistance   Physical Assistance Level 25% or less   Comment Nadine w/ RW   Walk 10 Feet CARE Score 3   Walk 50 Feet with Two Turns   Reason  if not Attempted Safety concerns   Walk 50 Feet with Two Turns CARE Score 88   Walk 150 Feet   Reason if not Attempted Safety concerns   Walk 150 Feet CARE Score 88   Walking 10 Feet on Uneven Surfaces   Reason if not Attempted Safety concerns   Walking 10 Feet on Uneven Surfaces CARE Score 88   Ambulation   Primary Mode of Locomotion Prior to Admission Walk   Distance Walked (feet) 18 ft   Assist Device Roller Walker   Gait Pattern Inconsistant Marika;Slow Marika;Decreased foot clearance;Step to   Limitations Noted In Device Management;Endurance;Safety   Provided Assistance with: Balance   Walk Assist Level Minimum Assist   Does the patient walk? 2. Yes   Curb or Single Stair   Style negotiated Single stair   Type of Assistance Needed Physical assistance   Physical Assistance Level 26%-50%   Comment ModA on the 4 inch side of the  steps B/L HR   1 Step (Curb) CARE Score 3   4 Steps   Type of Assistance Needed Physical assistance   Physical Assistance Level 26%-50%   Comment ModA on the 4 inch side of the  steps B/L HR   4 Steps CARE Score 3   12 Steps   Reason if not Attempted Safety concerns   12 Steps CARE Score 88   Stairs   Type Stairs   # of Steps 6  (2x)   Assist Devices Bilateral Rail   Findings Performed on  steps on the 4 inch side, retro descending. Improved performance after first trial (likely confidence increased)   Therapeutic Interventions   Balance Able to stand upright with table in front + play cards games about 8 mins total with good tolerance   Other L LE ACE Wrap for swelling - edu on any contraindications   Assessment   Treatment Assessment Patient demonstrated fair ability to participate in the session. She was able to completed lengthened standing with no increase in pain. She responds well to external distractions to enable her to continue with exercises. Demonstrated by card game. Patient illustrated improved confidence with the stairs after second round. Patient  will continue to benefit from skilled PT to enable her to reach her maximal level of function.   Problem List Decreased strength;Decreased endurance;Impaired balance;Decreased mobility;Pain;Obesity   Barriers to Discharge Inaccessible home environment   Plan   Treatment/Interventions Functional transfer training;LE strengthening/ROM;Elevations;Therapeutic exercise;Endurance training;Equipment eval/education;Bed mobility;Gait training   Progress Progressing toward goals   PT Therapy Minutes   PT Time In 1330   PT Time Out 1430   PT Total Time (minutes) 60   PT Mode of treatment - Individual (minutes) 60   PT Mode of treatment - Concurrent (minutes) 0   PT Mode of treatment - Group (minutes) 0   PT Mode of treatment - Co-treat (minutes) 0   PT Mode of Treatment - Total time(minutes) 60 minutes   PT Cumulative Minutes 420

## 2025-05-03 NOTE — PLAN OF CARE
Problem: PAIN - ADULT  Goal: Verbalizes/displays adequate comfort level or baseline comfort level  Description: Interventions:- Encourage patient to monitor pain and request assistance- Assess pain using appropriate pain scale- Administer analgesics based on type and severity of pain and evaluate response- Implement non-pharmacological measures as appropriate and evaluate response- Consider cultural and social influences on pain and pain management- Notify physician/advanced practitioner if interventions unsuccessful or patient reports new pain  5/3/2025 0737 by Andrews Cade RN  Outcome: Progressing  5/3/2025 0737 by Andrews Cade RN  Outcome: Progressing     Problem: INFECTION - ADULT  Goal: Absence or prevention of progression during hospitalization  Description: INTERVENTIONS:- Assess and monitor for signs and symptoms of infection- Monitor lab/diagnostic results- Monitor all insertion sites, i.e. indwelling lines, tubes, and drains- Monitor endotracheal if appropriate and nasal secretions for changes in amount and color- [place] appropriate cooling/warming therapies per order- Administer medications as ordered- Instruct and encourage patient and family to use good hand hygiene technique- Identify and instruct in appropriate isolation precautions for identified infection/condition  5/3/2025 0737 by Andrews Cade RN  Outcome: Progressing  5/3/2025 0737 by Andrews Cade RN  Outcome: Progressing     Problem: SAFETY ADULT  Goal: Patient will remain free of falls  Description: INTERVENTIONS:- Educate patient/family on patient safety including physical limitations- Instruct patient to call for assistance with activity - Consult OT/PT to assist with strengthening/mobility - Keep Call bell within reach- Keep bed low and locked with side rails adjusted as appropriate- Keep care items and personal belongings within reach- Initiate and maintain comfort rounds- Make Fall Risk Sign visible to staff- Offer Toileting  every 2 Hours, in advance of need- Initiate/Maintain bed alarm- Obtain necessary fall risk management equipment:  - Apply yellow socks and bracelet for high fall risk patients- Consider moving patient to room near nurses station  5/3/2025 0737 by Andrews Cade RN  Outcome: Progressing  5/3/2025 0737 by Andrews Cade RN  Outcome: Progressing  Goal: Maintain or return to baseline ADL function  Description: INTERVENTIONS:-  Assess patient's ability to carry out ADLs; assess patient's baseline for ADL function and identify physical deficits which impact ability to perform ADLs (bathing, care of mouth/teeth, toileting, grooming, dressing, etc.)- Assess/evaluate cause of self-care deficits - Assess range of motion- Assess patient's mobility; develop plan if impaired- Assess patient's need for assistive devices and provide as appropriate- Encourage maximum independence but intervene and supervise when necessary- Involve family in performance of ADLs- Assess for home care needs following discharge - Consider OT consult to assist with ADL evaluation and planning for discharge- Provide patient education as appropriate  5/3/2025 0737 by Andrews Cade RN  Outcome: Progressing  5/3/2025 0737 by Andrews Cade RN  Outcome: Progressing  Goal: Maintains/Returns to pre admission functional level  Description: INTERVENTIONS:- Perform AM-PAC 6 Click Basic Mobility/ Daily Activity assessment daily.- Set and communicate daily mobility goal to care team and patient/family/caregiver. - Collaborate with rehabilitation services on mobility goals if consulted- Perform Range of Motion 3 times a day.- Reposition patient every 2 hours.- Dangle patient 3 times a day- Stand patient 3 times a day- Ambulate patient 3 times a day- Out of bed to chair for meals  5/3/2025 0737 by Andrews Cade RN  Outcome: Progressing  5/3/2025 0737 by Andrews Cade RN  Outcome: Progressing     Problem: DISCHARGE PLANNING  Goal: Discharge to home or other  facility with appropriate resources  Description: INTERVENTIONS:- Identify barriers to discharge w/patient and caregiver- Arrange for needed discharge resources and transportation as appropriate- Identify discharge learning needs (meds, wound care, etc.)- Arrange for interpretive services to assist at discharge as needed- Refer to Case Management Department for coordinating discharge planning if the patient needs post-hospital services based on physician/advanced practitioner order or complex needs related to functional status, cognitive ability, or social support system  5/3/2025 0737 by Andrews Cade RN  Outcome: Progressing  5/3/2025 0737 by Andrews Cade RN  Outcome: Progressing     Problem: Prexisting or High Potential for Compromised Skin Integrity  Goal: Skin integrity is maintained or improved  Description: INTERVENTIONS:- Identify patients at risk for skin breakdown- Assess and monitor skin integrity- Assess and monitor nutrition and hydration status- Monitor labs - Assess for incontinence - Turn and reposition patient- Assist with mobility/ambulation- Relieve pressure over bony prominences- Avoid friction and shearing- Provide appropriate hygiene as needed including keeping skin clean and dry- Evaluate need for skin moisturizer/barrier cream- Collaborate with interdisciplinary team - Patient/family teaching- Consider wound care consult   5/3/2025 0737 by Andrews Cade RN  Outcome: Progressing  5/3/2025 0737 by Andrews Cade RN  Outcome: Progressing

## 2025-05-04 LAB
GLUCOSE SERPL-MCNC: 139 MG/DL (ref 65–140)
GLUCOSE SERPL-MCNC: 144 MG/DL (ref 65–140)
GLUCOSE SERPL-MCNC: 185 MG/DL (ref 65–140)
GLUCOSE SERPL-MCNC: 285 MG/DL (ref 65–140)

## 2025-05-04 PROCEDURE — 97110 THERAPEUTIC EXERCISES: CPT

## 2025-05-04 PROCEDURE — 97530 THERAPEUTIC ACTIVITIES: CPT

## 2025-05-04 PROCEDURE — 97535 SELF CARE MNGMENT TRAINING: CPT

## 2025-05-04 PROCEDURE — 97116 GAIT TRAINING THERAPY: CPT

## 2025-05-04 PROCEDURE — 82948 REAGENT STRIP/BLOOD GLUCOSE: CPT

## 2025-05-04 PROCEDURE — 99232 SBSQ HOSP IP/OBS MODERATE 35: CPT | Performed by: PHYSICIAN ASSISTANT

## 2025-05-04 RX ORDER — LOPERAMIDE HYDROCHLORIDE 2 MG/1
2 CAPSULE ORAL EVERY 4 HOURS PRN
Status: DISCONTINUED | OUTPATIENT
Start: 2025-05-04 | End: 2025-05-12 | Stop reason: HOSPADM

## 2025-05-04 RX ORDER — POLYETHYLENE GLYCOL 3350 17 G/17G
17 POWDER, FOR SOLUTION ORAL DAILY PRN
Status: DISCONTINUED | OUTPATIENT
Start: 2025-05-04 | End: 2025-05-12 | Stop reason: HOSPADM

## 2025-05-04 RX ADMIN — INSULIN LISPRO 4 UNITS: 100 INJECTION, SOLUTION INTRAVENOUS; SUBCUTANEOUS at 12:24

## 2025-05-04 RX ADMIN — Medication 1000 UNITS: at 08:52

## 2025-05-04 RX ADMIN — ASPIRIN 325 MG ORAL TABLET 325 MG: 325 PILL ORAL at 08:52

## 2025-05-04 RX ADMIN — GABAPENTIN 100 MG: 100 CAPSULE ORAL at 08:51

## 2025-05-04 RX ADMIN — B-COMPLEX W/ C & FOLIC ACID TAB 1 TABLET: TAB at 08:51

## 2025-05-04 RX ADMIN — INSULIN LISPRO 1 UNITS: 100 INJECTION, SOLUTION INTRAVENOUS; SUBCUTANEOUS at 08:54

## 2025-05-04 RX ADMIN — LOPERAMIDE HYDROCHLORIDE 2 MG: 2 CAPSULE ORAL at 10:13

## 2025-05-04 RX ADMIN — GABAPENTIN 100 MG: 100 CAPSULE ORAL at 16:31

## 2025-05-04 RX ADMIN — ACETAMINOPHEN 975 MG: 325 TABLET, FILM COATED ORAL at 21:18

## 2025-05-04 RX ADMIN — ENOXAPARIN SODIUM 30 MG: 30 INJECTION SUBCUTANEOUS at 08:52

## 2025-05-04 RX ADMIN — LOPERAMIDE HYDROCHLORIDE 2 MG: 2 CAPSULE ORAL at 14:12

## 2025-05-04 RX ADMIN — ACETAMINOPHEN 975 MG: 325 TABLET, FILM COATED ORAL at 13:19

## 2025-05-04 RX ADMIN — SACUBITRIL AND VALSARTAN 1 TABLET: 97; 103 TABLET, FILM COATED ORAL at 21:20

## 2025-05-04 RX ADMIN — ENOXAPARIN SODIUM 30 MG: 30 INJECTION SUBCUTANEOUS at 21:19

## 2025-05-04 RX ADMIN — INSULIN LISPRO 3 UNITS: 100 INJECTION, SOLUTION INTRAVENOUS; SUBCUTANEOUS at 16:31

## 2025-05-04 RX ADMIN — METHOCARBAMOL 500 MG: 500 TABLET ORAL at 08:52

## 2025-05-04 RX ADMIN — Medication 1 CAPSULE: at 08:54

## 2025-05-04 RX ADMIN — OXYCODONE HYDROCHLORIDE 5 MG: 5 TABLET ORAL at 08:52

## 2025-05-04 RX ADMIN — GABAPENTIN 100 MG: 100 CAPSULE ORAL at 21:19

## 2025-05-04 RX ADMIN — Medication 1 CAPSULE: at 21:21

## 2025-05-04 RX ADMIN — Medication 30 ML: at 13:19

## 2025-05-04 RX ADMIN — INSULIN LISPRO 3 UNITS: 100 INJECTION, SOLUTION INTRAVENOUS; SUBCUTANEOUS at 12:24

## 2025-05-04 RX ADMIN — PANTOPRAZOLE SODIUM 40 MG: 40 TABLET, DELAYED RELEASE ORAL at 07:00

## 2025-05-04 RX ADMIN — INSULIN LISPRO 3 UNITS: 100 INJECTION, SOLUTION INTRAVENOUS; SUBCUTANEOUS at 08:54

## 2025-05-04 RX ADMIN — METFORMIN ER 500 MG 1000 MG: 500 TABLET ORAL at 16:31

## 2025-05-04 RX ADMIN — SACUBITRIL AND VALSARTAN 1 TABLET: 97; 103 TABLET, FILM COATED ORAL at 08:54

## 2025-05-04 RX ADMIN — FAMOTIDINE 10 MG: 20 TABLET, FILM COATED ORAL at 08:52

## 2025-05-04 RX ADMIN — SPIRONOLACTONE 25 MG: 25 TABLET ORAL at 08:54

## 2025-05-04 NOTE — PROGRESS NOTES
05/04/25 1000   Pain Assessment   Pain Assessment Tool 0-10   Pain Score 5   Pain Location/Orientation Location: Hip   Restrictions/Precautions   Precautions Fall Risk;Pain   Weight Bearing Restrictions Yes   LLE Weight Bearing Per Order WBAT   Subjective   Subjective pt reports feeling okay and agreeable to perform skilled PT   Sit to Stand   Type of Assistance Needed Supervision;Adaptive equipment   Sit to Stand CARE Score 4   Bed-Chair Transfer   Type of Assistance Needed Incidental touching;Adaptive equipment   Comment CgA RW   Chair/Bed-to-Chair Transfer CARE Score 4   Transfer Bed/Chair/Wheelchair   Limitations Noted In Pain Management;LE Strength;Endurance;Confidence   Adaptive Equipment Roller Walker   Walk 10 Feet   Type of Assistance Needed Physical assistance;Adaptive equipment   Physical Assistance Level 25% or less   Comment Nadine w/ RW   Walk 10 Feet CARE Score 3   Walk 50 Feet with Two Turns   Reason if not Attempted Safety concerns   Walk 50 Feet with Two Turns CARE Score 88   Walk 150 Feet   Reason if not Attempted Safety concerns   Walk 150 Feet CARE Score 88   Walking 10 Feet on Uneven Surfaces   Reason if not Attempted Safety concerns   Walking 10 Feet on Uneven Surfaces CARE Score 88   Ambulation   Primary Mode of Locomotion Prior to Admission Walk   Distance Walked (feet) 25 ft   Assist Device Roller Walker   Gait Pattern Inconsistant Marika;Slow Marika;Decreased foot clearance;Step to   Does the patient walk? 2. Yes   Wheel 50 Feet with Two Turns   Reason if not Attempted Activity not applicable   Wheel 50 Feet with Two Turns CARE Score 9   Wheel 150 Feet   Reason if not Attempted Activity not applicable   Wheel 150 Feet CARE Score 9   Curb or Single Stair   Style negotiated Single stair   Type of Assistance Needed Physical assistance   Physical Assistance Level 25% or less   Comment ModA on the 4 inch side of the  steps B/L HR   1 Step (Curb) CARE Score 3   4 Steps   Type of  Assistance Needed Physical assistance   Physical Assistance Level 26%-50%   Comment ModA on the 4 inch side of the  steps B/L HR   4 Steps CARE Score 3   12 Steps   Reason if not Attempted Safety concerns   12 Steps CARE Score 88   Stairs   Type Stairs   # of Steps 6   Assist Devices Bilateral Rail   Findings Performed on  steps on the 4 inch side, retro descending and next session trail 6 inch step if possible   Therapeutic Interventions   Strengthening seated LAQ AP heel slide seated with towel hip add and gluts 10-20 reps x2 sets each   Flexibility manual stretch LE seated   Balance standing balance with 4 inch step for toe tapping   Assessment   Treatment Assessment pt engaged in skilled PT for 90 min and overall able to ambulate with RW better today with gait pattern and off load LLE During step thur- .Pt also better heel strike and toe off during gait pattern using RW . Pt perform LE strengthening Ex's. Pt will cont to need to perform steps andmore stairs d/t 2nd level in home , Cont working on JONNY with RW and  steps  and more WB on LLE and getting her hip and knee to increase in flexion. Cont POC   Problem List Decreased strength;Decreased endurance;Impaired balance;Decreased mobility;Pain   Barriers to Discharge Inaccessible home environment   Plan   Treatment/Interventions Functional transfer training;LE strengthening/ROM;Elevations;Therapeutic exercise;Endurance training;Equipment eval/education;Bed mobility;Gait training   Progress Progressing toward goals   Discharge Recommendation   Rehab Resource Intensity Level, PT   (HH PT)   PT Therapy Minutes   PT Time In 1000   PT Time Out 1138   PT Total Time (minutes) 98   PT Mode of treatment - Individual (minutes) 98   PT Mode of treatment - Concurrent (minutes) 0   PT Mode of treatment - Group (minutes) 0   PT Mode of treatment - Co-treat (minutes) 0   PT Mode of Treatment - Total time(minutes) 98 minutes   PT Cumulative Minutes 518    Therapy Time missed   Time missed? No

## 2025-05-04 NOTE — ASSESSMENT & PLAN NOTE
NICM  EF 41% on cardiac MRI (2019)/ECHO 40-45% 2/4/25  Follows with Dr Worley as an OP  Home: Entresto  mg BID/Aldactone 25mg qd/Torsemide 10mg qd prn edema  Here:  Entresto  mg BID/Aldactone as below  Does not tolerate BB = has tried Coreg and Metoprolol in past per Dr Worley's office note  Volume status stable  Restarted Aldactone at 12.5mg qd on 4/29 and increased back up to 25mg qd to start 5/2/25  BMP 5/1/25 = stable  Having significant don LE edema, duplex negative. Takes torsemide PRN at home. Will repeat labs in AM, if normal would consider giving torsemide for short course to help swelling

## 2025-05-04 NOTE — PLAN OF CARE
Problem: PAIN - ADULT  Goal: Verbalizes/displays adequate comfort level or baseline comfort level  Description: Interventions:- Encourage patient to monitor pain and request assistance- Assess pain using appropriate pain scale- Administer analgesics based on type and severity of pain and evaluate response- Implement non-pharmacological measures as appropriate and evaluate response- Consider cultural and social influences on pain and pain management- Notify physician/advanced practitioner if interventions unsuccessful or patient reports new pain  Outcome: Progressing     Problem: INFECTION - ADULT  Goal: Absence or prevention of progression during hospitalization  Description: INTERVENTIONS:- Assess and monitor for signs and symptoms of infection- Monitor lab/diagnostic results- Monitor all insertion sites, i.e. indwelling lines, tubes, and drains- Monitor endotracheal if appropriate and nasal secretions for changes in amount and color- [place] appropriate cooling/warming therapies per order- Administer medications as ordered- Instruct and encourage patient and family to use good hand hygiene technique- Identify and instruct in appropriate isolation precautions for identified infection/condition  Outcome: Progressing     Problem: SAFETY ADULT  Goal: Patient will remain free of falls  Description: INTERVENTIONS:- Educate patient/family on patient safety including physical limitations- Instruct patient to call for assistance with activity - Consult OT/PT to assist with strengthening/mobility - Keep Call bell within reach- Keep bed low and locked with side rails adjusted as appropriate- Keep care items and personal belongings within reach- Initiate and maintain comfort rounds- Make Fall Risk Sign visible to staff- Offer Toileting every 2 Hours, in advance of need- Initiate/Maintain bed alarm- Obtain necessary fall risk management equipment:  - Apply yellow socks and bracelet for high fall risk patients- Consider  moving patient to room near nurses station  Outcome: Progressing  Goal: Maintain or return to baseline ADL function  Description: INTERVENTIONS:-  Assess patient's ability to carry out ADLs; assess patient's baseline for ADL function and identify physical deficits which impact ability to perform ADLs (bathing, care of mouth/teeth, toileting, grooming, dressing, etc.)- Assess/evaluate cause of self-care deficits - Assess range of motion- Assess patient's mobility; develop plan if impaired- Assess patient's need for assistive devices and provide as appropriate- Encourage maximum independence but intervene and supervise when necessary- Involve family in performance of ADLs- Assess for home care needs following discharge - Consider OT consult to assist with ADL evaluation and planning for discharge- Provide patient education as appropriate  Outcome: Progressing  Goal: Maintains/Returns to pre admission functional level  Description: INTERVENTIONS:- Perform AM-PAC 6 Click Basic Mobility/ Daily Activity assessment daily.- Set and communicate daily mobility goal to care team and patient/family/caregiver. - Collaborate with rehabilitation services on mobility goals if consulted- Perform Range of Motion 3 times a day.- Reposition patient every 2 hours.- Dangle patient 3 times a day- Stand patient 3 times a day- Ambulate patient 3 times a day- Out of bed to chair for meals  Outcome: Progressing     Problem: DISCHARGE PLANNING  Goal: Discharge to home or other facility with appropriate resources  Description: INTERVENTIONS:- Identify barriers to discharge w/patient and caregiver- Arrange for needed discharge resources and transportation as appropriate- Identify discharge learning needs (meds, wound care, etc.)- Arrange for interpretive services to assist at discharge as needed- Refer to Case Management Department for coordinating discharge planning if the patient needs post-hospital services based on physician/advanced  practitioner order or complex needs related to functional status, cognitive ability, or social support system  Outcome: Progressing     Problem: Prexisting or High Potential for Compromised Skin Integrity  Goal: Skin integrity is maintained or improved  Description: INTERVENTIONS:- Identify patients at risk for skin breakdown- Assess and monitor skin integrity- Assess and monitor nutrition and hydration status- Monitor labs - Assess for incontinence - Turn and reposition patient- Assist with mobility/ambulation- Relieve pressure over bony prominences- Avoid friction and shearing- Provide appropriate hygiene as needed including keeping skin clean and dry- Evaluate need for skin moisturizer/barrier cream- Collaborate with interdisciplinary team - Patient/family teaching- Consider wound care consult   Outcome: Progressing

## 2025-05-04 NOTE — ASSESSMENT & PLAN NOTE
Likely due to post op swelling but she is having left calf tenderness  Also with hx of CHF which may be contributing- see above  Duplex negative for DVT  Consider short course of torsemide as above

## 2025-05-04 NOTE — PROGRESS NOTES
05/04/25 0700   Pain Assessment   Pain Assessment Tool 0-10   Pain Score 6   Pain Location/Orientation Orientation: Left;Location: Hip   Hospital Pain Intervention(s) Repositioned;Emotional support;Rest   Restrictions/Precautions   Precautions Fall Risk;Pain   Weight Bearing Restrictions Yes   LLE Weight Bearing Per Order WBAT   Oral Hygiene   Type of Assistance Needed Supervision   Physical Assistance Level No physical assistance   Comment Set-up seated in bedside recliner due to decreased standing tolerance with UE release.   Oral Hygiene CARE Score 4   Shower/Bathe Self   Type of Assistance Needed Physical assistance   Physical Assistance Level 51%-75%   Comment Pt participated in sponge bath routine seated on bedside recliner chair. Pt able to bathe 7/10 body parts. Pt introduced to utilizing  reacher to wash bilateral LE below knees with pt able to complete with verbal cues and demonstration however pt required assistance for thouroughness. Pt TA for washing buttocks in stance.   Shower/Bathe Self CARE Score 2   Bathing   Assessed Bath Style Sponge Bath   Tub/Shower Transfer   Reason Not Assessed Sponge Bath;Medical  (no active shower orders.)   Upper Body Dressing   Type of Assistance Needed Set-up / clean-up   Physical Assistance Level No physical assistance   Comment Set-up seated in bedside recliner   Upper Body Dressing CARE Score 5   Lower Body Dressing   Type of Assistance Needed Physical assistance   Physical Assistance Level 26%-50%   Comment Pt participated in LB dressing routine able to thread underwear and pants over feet utilizing  reacher. Pt participates in CM tasks in stance with Min A for balance in stance with pt able to don clothing over hips.   Lower Body Dressing CARE Score 3   Putting On/Taking Off Footwear   Type of Assistance Needed Physical assistance   Physical Assistance Level 26%-50%   Comment Pt able to doff socks with  dressing stick seated at Sup level. Pt able to patrick  non-skid slipper socks with sock aid at Min A level seated. Pt Dep for bilateral LE ace wraps.   Putting On/Taking Off Footwear CARE Score 3   Sit to Stand   Type of Assistance Needed Incidental touching   Physical Assistance Level No physical assistance   Comment CGA with RW   Sit to Stand CARE Score 4   Bed-Chair Transfer   Type of Assistance Needed Physical assistance   Physical Assistance Level 25% or less   Comment Min A/CGA with RW with verbal cues for hand placement   Chair/Bed-to-Chair Transfer CARE Score 3   Toileting Hygiene   Type of Assistance Needed Physical assistance   Physical Assistance Level Total assistance   Comment TA for wiping following BM. Pt reports having bidet at home.   Toileting Hygiene CARE Score 1   Toilet Transfer   Type of Assistance Needed Physical assistance   Physical Assistance Level 25% or less   Comment Min A/CGA with RW with verbal cues for hand placement from bed to wide base drop arm BSC- wide base drop arm BSC to bed. Decreased pain noted with pt seated on wide base drop arm BSC with pt placing wash cloth under L thigh for decreased pain.   Toilet Transfer CARE Score 3   Cognition   Overall Cognitive Status WFL   Arousal/Participation Alert;Cooperative   Attention Attends with cues to redirect   Orientation Level Oriented X4   Memory Within functional limits   Following Commands Follows one step commands without difficulty   Activity Tolerance   Activity Tolerance Patient tolerated treatment well   Assessment   Treatment Assessment Pt participated in skilled OT session 90min with focus on ADL training. Pt seated supported in bed upon therapist entering room. Pt agreeable to OT session. Pt requesting to utilize wide base drop arm BSC. Pt participated in toileting routine at Min A/CGA level with exception of toileting hygiene. See above note for further details. Pt participated in self-care ADL routine. Pt participated in sponge bath routine seated in bedside recliner. See  above note for further details. Pt continues to require verbal cues and assistance with LHAE training. Pt continues to require assistance with LB bathing/dressing with LHAE. LHAE handout given to pt with pt and OTR discussing pt need/recommendation at d/c. Pt requires increased timeliness to complete ADL routine with assistance needed when in stance due to decreased standing balance/tolerance and assistance with managing/identifying appropriate LHAE. Pt seated in bedside recliner at end of therapy session with call bell in hand, all needed items in reach, and no further OT needs. Pt would benefit from continued OT services to progress pt with ADL training with LHAE, functional transfers, standing balance/tolernace, and light UE strength/endurance training for increased IND in daily tasks and to decreased burden of care at discharge. Continue with established POC at this time.   Prognosis Good   Problem List Decreased strength;Decreased endurance;Impaired balance;Decreased mobility;Pain;Obesity   Barriers to Discharge Inaccessible home environment   Plan   Treatment/Interventions ADL retraining;Functional transfer training;Therapeutic exercise;Endurance training;Patient/family training;Equipment eval/education;Bed mobility;Compensatory technique education   OT Therapy Minutes   OT Time In 0700   OT Time Out 0830   OT Total Time (minutes) 90   OT Mode of treatment - Individual (minutes) 90   OT Mode of treatment - Concurrent (minutes) 0   OT Mode of treatment - Group (minutes) 0   OT Mode of treatment - Co-treat (minutes) 0   OT Mode of Treatment - Total time(minutes) 90 minutes   OT Cumulative Minutes 450   Therapy Time missed   Time missed? No

## 2025-05-04 NOTE — ASSESSMENT & PLAN NOTE
Presented to [facility] on 4/24/25 with left hip pain following a mechanical fall  Noted to have a comminuted intertrochanteric fracture of the left femur   S/p ORIF/IMN 4/24   Weightbearing as tolerated  PT/OT, pain control per PMR  Is having muscle spasms that Robaxin 500 mg q6hrs prn is helping.    Two week followup will be 5/8/25

## 2025-05-04 NOTE — ASSESSMENT & PLAN NOTE
Continue  mg qd as at home  Not on statins at home because she didn't want to take in past  Didn't followup with Neuro after stroke   Nutrition Assessment   Assessment Type: Initial assessment  Reason for Visit: MST  Referral Requested By: Nurse  Chart Medications Lab Results Reviewed:  Yes    Nutritional Risk Factors: Unintentional weight loss and fair appetite    Current Diet Order: Regular  Nutrition Supplement: n/a  Diet Order Tolerance: tolerating  Food Allergies: Yes, ORANGE  Priority Points: Status 2    Demographic/Anthropometrics Information  Gender: female   Patient Age: 70 year old  Height:    Ht Readings from Last 1 Encounters:   06/07/22 5' 7\" (1.702 m)      Weight:   Wt Readings from Last 1 Encounters:   06/07/22 66.1 kg      BMI:   BMI Readings from Last 1 Encounters:   06/07/22 22.82 kg/m²     Ideal Body Weight: 61.3 kg (107%)  Usual Body Weight: 71.1 kg per EMR and pt  % Weight Change: -7% x 1 month  Reason for Weight Change: Other:decreased intake and COVID    Weight Classification: Normal weight (BMI 18.5-24.9)    Estimated Nutritional Needs  Assessment Weight: 66.1 kg  Energy Needs: 30-35 kcal/kg = 3008-9364 kcals/day  Protein Needs: 1.0 g/kg = 66 grams/day  Fluid Needs: per medical team    Nutrition Diagnosis (PES)  Inadequate oral intake related to Decreased intake as evidenced by Consuming <50% estimated needs    Nutrition Plan  Recommended Nutrition Intervention: Coordination of nutrition care by a nutrition professional and nutrition supplemental therapy  Monitor: Biochemical data, medical tests, procedures and Food and beverage intake    Discharge Needs: Pending  Care Plan Discussed With: Patient  Goals: Meet >/= 75% of estimated needs  Goal Progress: Initiated  Timeframe to Achieve Goal: 3-5 days    Dietitian Notes/Impressions/Recommendations:  MST triggered for 14-23 lb weight loss and decreased appetite. Patient has been COVID positive since April 2022. Admitted for LAURYN. PMH includes recent UTI, COPD on oxygen at baseline, CKD 3, chronic diarrhea, GERD, high cholesterol, Wegener's vasculitis.     Spoke to patient via  phone due to isolation. She stated she has been trying to eat more, but gets full after a certain point and can't force any more food down. Fair and poor po intake documented in ADL's. Amenable to chocolate flavored ONS, however elevated Cr and potassium noted. Stated  lbs, significant decrease over 1 month. Receiving vitamin C, D, B12, solumedrol, remeron and antibiotic. Patient stated diarrhea essentially resolved, recommend Banatrol once daily for pre- and probiotic benefit.        NFPE: unable to complete due to COVID isolation and to prevent team member exposure     TREATMENT PLAN: Monitoring & Interventions   1. Diet: Recommend continue regular diet  2. Nutrition Supplement: Recommend one Ensure HP chocolate at lunch (lowest protein option), and try Suplena vanilla at dinner.   3. RD monitoring intake trends, weight, labs. Further recommendations based on clinical course.     Malnutrition Status: Non-Severe malnutrition related to acute illness as evidenced by 7% weight decrease in 1 month, under 75% of needs being met via po intake

## 2025-05-04 NOTE — ASSESSMENT & PLAN NOTE
HbA1C 7.8 on 4/29/25  Has a glucometer at home  Home: Metformin  mg daily with dinner  Here:  Lantus 8U qhs/Lispro 3U TID  Continue QID Accuchecks/SSI (Algo 3/1) and DM diet  Restarted Metformin  mg with dinner 4/30/25 and stopped the Lantus; keeping the Lispro at 3U TID with meals  5/3- Patients blood sugars have been consistently high. Will increase her metformin dose to 1000mg daily with dinner.   5/4- blood sugars still high but only got 1 dose of the higher dose metformin so far so will hold off on any additional changes

## 2025-05-04 NOTE — PROGRESS NOTES
Progress Note - Hospitalist   Name: Kamini Martines 75 y.o. female I MRN: [id number]  Unit/Bed#: -01 I Date of Admission: 4/28/2025   Date of Service: 5/4/2025 I Hospital Day: 6    Assessment & Plan  Closed left hip fracture, initial encounter ([facility])  Presented to [facility] on 4/24/25 with left hip pain following a mechanical fall  Noted to have a comminuted intertrochanteric fracture of the left femur   S/p ORIF/IMN 4/24   Weightbearing as tolerated  PT/OT, pain control per PMR  Is having muscle spasms that Robaxin 500 mg q6hrs prn is helping.    Two week followup will be 5/8/25  HFrEF (heart failure with reduced ejection fraction) ([facility])  NICM  EF 41% on cardiac MRI (2019)/ECHO 40-45% 2/4/25  Follows with Dr Worley as an OP  Home: Entresto  mg BID/Aldactone 25mg qd/Torsemide 10mg qd prn edema  Here:  Entresto  mg BID/Aldactone as below  Does not tolerate BB = has tried Coreg and Metoprolol in past per Dr Worley's office note  Volume status stable  Restarted Aldactone at 12.5mg qd on 4/29 and increased back up to 25mg qd to start 5/2/25  BMP 5/1/25 = stable  Having significant don LE edema, duplex negative. Takes torsemide PRN at home. Will repeat labs in AM, if normal would consider giving torsemide for short course to help swelling  Type 2 diabetes mellitus without complication, without long-term current use of insulin ([facility])  HbA1C 7.8 on 4/29/25  Has a glucometer at home  Home: Metformin  mg daily with dinner  Here:  Lantus 8U qhs/Lispro 3U TID  Continue QID Accuchecks/SSI (Algo 3/1) and DM diet  Restarted Metformin  mg with dinner 4/30/25 and stopped the Lantus; keeping the Lispro at 3U TID with meals  5/3- Patients blood sugars have been consistently high. Will increase her metformin dose to 1000mg daily with dinner.   5/4- blood sugars still high but only got 1 dose of the higher dose metformin so far so will hold off on any additional changes   CVA (cerebral vascular  "accident) (HCC)  Continue  mg qd as at home  Not on statins at home because she didn't want to take in past  Didn't followup with Neuro after stroke  LUCIA (obstructive sleep apnea)  Stopped using CPAP few months ago as strap was causing neck pain  Outpatient sleep f/u  Mild intermittent asthma without complication  No exacerbation  Continue Albuterol prn  Class 2 obesity in adult  BMI 36.87  Affects all aspects of care  Lifestyle modification  GERD (gastroesophageal reflux disease)  Takes Prilosec 40mg qd at home  Currently on Protonix 40mg qd and Pepcid 20mg qd here  Started weaning Pepcid and reduced to 10mg qd 4/30/25 --> will stop on Monday 5/5/25  CAD (coronary artery disease)  Nonobstructive coronary artery disease on cardiac cath  On  mg daily for hx CVA  Urinary retention  Adams was placed on 4/25 and removed on 4/28  She takes Vesicare at home  Per PMR  Thyroid nodule  Found on CT cervical spine  \"1.1 cm right thyroid nodule. Incidental discovery of one or more thyroid nodule(s) measuring less than 1.5 cm and without suspicious features is noted in this patient who is above 35 years old; according to guidelines published in the February 2015 white paper on incidental thyroid nodules in the Journal of the American College of Radiology (JACR), no further evaluation is recommended.\"  Buttock wound, right, sequela  POA  Per PMR  Anemia  Post op  stable  Leukocytosis  Mild  Likely reactive  No fever  Will watch  Left leg swelling  Likely due to post op swelling but she is having left calf tenderness  Also with hx of CHF which may be contributing- see above  Duplex negative for DVT  Consider short course of torsemide as above      The above assessment and plan was reviewed and updated as determined by my evaluation of the patient on 5/4/2025.    History of Present Illness   Patient seen and examined. Patients overnight issues or events were reviewed with nursing staff. New or overnight issues include " the following:   Patient still with LE edema, now right leg swollen as well. No CP/SOB.     Review of Systems    Objective :  Temp:  [97.5 °F (36.4 °C)-97.8 °F (36.6 °C)] 97.8 °F (36.6 °C)  HR:  [79-96] 86  BP: (128-159)/(54-60) 132/60  Resp:  [16-20] 16  SpO2:  [94 %-97 %] 94 %  O2 Device: None (Room air)    Invasive Devices       Drain  Duration             Ureteral Internal Stent Right ureter 6 Fr. 305 days                    Physical Exam  General Appearance: NAD; pleasant  HEENT: PERRLA, conjuctiva normal; mucous membranes moist; face symmetrical  Neck:  Supple  Lungs: clear bilaterally, normal respiratory effort, no retractions, expiratory effort normal, on room air  CV: regular rate and rhythm, no murmurs rubs or gallops noted   ABD: soft non tender, +BS x4  EXT: DP pulses intact, no lymphadenopathy, moderate don LEedema  Skin: normal turgor, normal texture, no rash  Psych: affect normal, mood normal  Neuro: AAOx3    The above physical exam was reviewed and updated as determined by my evaluation of the patient on 5/4/2025.      Lab Results: I have reviewed the following results:  Results from last 7 days   Lab Units 05/01/25  0547   WBC Thousand/uL 10.90*   HEMOGLOBIN g/dL 9.1*   HEMATOCRIT % 28.9*   PLATELETS Thousands/uL 417*     Results from last 7 days   Lab Units 05/01/25  0547   SODIUM mmol/L 142   POTASSIUM mmol/L 4.0   CHLORIDE mmol/L 107   CO2 mmol/L 27   BUN mg/dL 23   CREATININE mg/dL 0.44*   CALCIUM mg/dL 9.8     Results from last 7 days   Lab Units 04/29/25  0713   HEMOGLOBIN A1C % 7.8*         Results from last 7 days   Lab Units 05/04/25  1056 05/04/25  0605 05/03/25  2037   POC GLUCOSE mg/dl 285* 185* 171*           Review of Scheduled Meds: Medications  reviewed and reconciled as needed  Current Facility-Administered Medications   Medication Dose Route Frequency Provider Last Rate    acetaminophen  975 mg Oral Q8H [facility] Tammy Pradhan MD      albuterol  2 puff Inhalation Q6H ANNETTA Munoz  MD Lorne      aluminum-magnesium hydroxide-simethicone  30 mL Oral Q4H PRN Tammy Pradhan MD      aspirin  325 mg Oral Daily Tammy Pradhan MD      Cholecalciferol  1,000 Units Oral Daily Tammy Pradhan MD      enoxaparin  30 mg Subcutaneous Q12H Tammy Pradhan MD      famotidine  10 mg Oral Daily STEFFANY Nelson      gabapentin  100 mg Oral TID Gladys Carvalho, DO      insulin lispro  1-5 Units Subcutaneous HS Tammy Pradhan MD      insulin lispro  1-6 Units Subcutaneous TID AC Tammy Pradhan MD      insulin lispro  3 Units Subcutaneous TID With Meals Tammy Pradhan MD      loperamide  2 mg Oral Q4H PRN Liliana Flores MD      metFORMIN  1,000 mg Oral Daily With Dinner Rashmi Ibrahim PA-C      methocarbamol  500 mg Oral Q6H PRN Tammy Pradhan MD      multivitamin stress formula  1 tablet Oral Daily Tammy Pradhan MD      naloxone  0.04 mg Intravenous Q1MIN PRN Tammy Pradhan MD      oxyCODONE  2.5 mg Oral Q4H PRN Tammy Pradhan MD      Or    oxyCODONE  5 mg Oral Q4H PRN Tammy Pradhan MD      pantoprazole  40 mg Oral Early Morning Tammy Pradhan MD      [Held by provider] polyethylene glycol  17 g Oral Daily PRN Liliana Flores MD      PreserVision AREDS 2  1 capsule Oral BID STEFFANY Nelson      sacubitril-valsartan  1 tablet Oral BID Tammy Pradhan MD      spironolactone  25 mg Oral Daily STEFFANY Nelson         VTE Pharmacologic Prophylaxis: lovenox  Code Status: Level 1 - Full Code  Current Length of Stay: 6 day(s)    Administrative Statements     ** Please Note:  voice to text software may have been used in the creation of this document. Although proof errors in transcription or interpretation are a potential of such software**

## 2025-05-05 PROBLEM — R60.0 BILATERAL LEG EDEMA: Status: ACTIVE | Noted: 2025-05-05

## 2025-05-05 LAB
ANION GAP SERPL CALCULATED.3IONS-SCNC: 7 MMOL/L (ref 4–13)
BASOPHILS # BLD AUTO: 0.05 THOUSANDS/ÂΜL (ref 0–0.1)
BASOPHILS NFR BLD AUTO: 1 % (ref 0–1)
BUN SERPL-MCNC: 19 MG/DL (ref 5–25)
CALCIUM SERPL-MCNC: 9.7 MG/DL (ref 8.4–10.2)
CHLORIDE SERPL-SCNC: 106 MMOL/L (ref 96–108)
CO2 SERPL-SCNC: 27 MMOL/L (ref 21–32)
CREAT SERPL-MCNC: 0.49 MG/DL (ref 0.6–1.3)
EOSINOPHIL # BLD AUTO: 0.31 THOUSAND/ÂΜL (ref 0–0.61)
EOSINOPHIL NFR BLD AUTO: 3 % (ref 0–6)
ERYTHROCYTE [DISTWIDTH] IN BLOOD BY AUTOMATED COUNT: 14.2 % (ref 11.6–15.1)
GFR SERPL CREATININE-BSD FRML MDRD: 95 ML/MIN/1.73SQ M
GLUCOSE P FAST SERPL-MCNC: 171 MG/DL (ref 65–99)
GLUCOSE SERPL-MCNC: 146 MG/DL (ref 65–140)
GLUCOSE SERPL-MCNC: 150 MG/DL (ref 65–140)
GLUCOSE SERPL-MCNC: 170 MG/DL (ref 65–140)
GLUCOSE SERPL-MCNC: 171 MG/DL (ref 65–140)
GLUCOSE SERPL-MCNC: 307 MG/DL (ref 65–140)
HCT VFR BLD AUTO: 29.1 % (ref 34.8–46.1)
HGB BLD-MCNC: 8.9 G/DL (ref 11.5–15.4)
IMM GRANULOCYTES # BLD AUTO: 0.06 THOUSAND/UL (ref 0–0.2)
IMM GRANULOCYTES NFR BLD AUTO: 1 % (ref 0–2)
LYMPHOCYTES # BLD AUTO: 1.95 THOUSANDS/ÂΜL (ref 0.6–4.47)
LYMPHOCYTES NFR BLD AUTO: 21 % (ref 14–44)
MCH RBC QN AUTO: 29.3 PG (ref 26.8–34.3)
MCHC RBC AUTO-ENTMCNC: 30.6 G/DL (ref 31.4–37.4)
MCV RBC AUTO: 96 FL (ref 82–98)
MONOCYTES # BLD AUTO: 0.86 THOUSAND/ÂΜL (ref 0.17–1.22)
MONOCYTES NFR BLD AUTO: 9 % (ref 4–12)
NEUTROPHILS # BLD AUTO: 6.1 THOUSANDS/ÂΜL (ref 1.85–7.62)
NEUTS SEG NFR BLD AUTO: 65 % (ref 43–75)
NRBC BLD AUTO-RTO: 0 /100 WBCS
PLATELET # BLD AUTO: 510 THOUSANDS/UL (ref 149–390)
PMV BLD AUTO: 9.8 FL (ref 8.9–12.7)
POTASSIUM SERPL-SCNC: 4.4 MMOL/L (ref 3.5–5.3)
RBC # BLD AUTO: 3.04 MILLION/UL (ref 3.81–5.12)
SODIUM SERPL-SCNC: 140 MMOL/L (ref 135–147)
WBC # BLD AUTO: 9.33 THOUSAND/UL (ref 4.31–10.16)

## 2025-05-05 PROCEDURE — 82948 REAGENT STRIP/BLOOD GLUCOSE: CPT

## 2025-05-05 PROCEDURE — 97530 THERAPEUTIC ACTIVITIES: CPT

## 2025-05-05 PROCEDURE — 99232 SBSQ HOSP IP/OBS MODERATE 35: CPT | Performed by: NURSE PRACTITIONER

## 2025-05-05 PROCEDURE — 99232 SBSQ HOSP IP/OBS MODERATE 35: CPT | Performed by: STUDENT IN AN ORGANIZED HEALTH CARE EDUCATION/TRAINING PROGRAM

## 2025-05-05 PROCEDURE — 97116 GAIT TRAINING THERAPY: CPT

## 2025-05-05 PROCEDURE — 97535 SELF CARE MNGMENT TRAINING: CPT

## 2025-05-05 PROCEDURE — 80048 BASIC METABOLIC PNL TOTAL CA: CPT | Performed by: NURSE PRACTITIONER

## 2025-05-05 PROCEDURE — 97110 THERAPEUTIC EXERCISES: CPT

## 2025-05-05 PROCEDURE — 85025 COMPLETE CBC W/AUTO DIFF WBC: CPT | Performed by: NURSE PRACTITIONER

## 2025-05-05 RX ORDER — TORSEMIDE 10 MG/1
10 TABLET ORAL ONCE
Status: COMPLETED | OUTPATIENT
Start: 2025-05-05 | End: 2025-05-05

## 2025-05-05 RX ADMIN — Medication 1 CAPSULE: at 08:44

## 2025-05-05 RX ADMIN — OXYCODONE HYDROCHLORIDE 5 MG: 5 TABLET ORAL at 23:35

## 2025-05-05 RX ADMIN — LOPERAMIDE HYDROCHLORIDE 2 MG: 2 CAPSULE ORAL at 11:08

## 2025-05-05 RX ADMIN — ACETAMINOPHEN 975 MG: 325 TABLET, FILM COATED ORAL at 14:11

## 2025-05-05 RX ADMIN — GABAPENTIN 100 MG: 100 CAPSULE ORAL at 08:44

## 2025-05-05 RX ADMIN — FAMOTIDINE 10 MG: 20 TABLET, FILM COATED ORAL at 08:44

## 2025-05-05 RX ADMIN — Medication 1000 UNITS: at 08:44

## 2025-05-05 RX ADMIN — ASPIRIN 325 MG ORAL TABLET 325 MG: 325 PILL ORAL at 08:44

## 2025-05-05 RX ADMIN — INSULIN LISPRO 4 UNITS: 100 INJECTION, SOLUTION INTRAVENOUS; SUBCUTANEOUS at 11:26

## 2025-05-05 RX ADMIN — ACETAMINOPHEN 975 MG: 325 TABLET, FILM COATED ORAL at 21:35

## 2025-05-05 RX ADMIN — GABAPENTIN 100 MG: 100 CAPSULE ORAL at 21:35

## 2025-05-05 RX ADMIN — B-COMPLEX W/ C & FOLIC ACID TAB 1 TABLET: TAB at 08:44

## 2025-05-05 RX ADMIN — METHOCARBAMOL 500 MG: 500 TABLET ORAL at 04:06

## 2025-05-05 RX ADMIN — ENOXAPARIN SODIUM 30 MG: 30 INJECTION SUBCUTANEOUS at 21:35

## 2025-05-05 RX ADMIN — INSULIN LISPRO 3 UNITS: 100 INJECTION, SOLUTION INTRAVENOUS; SUBCUTANEOUS at 16:25

## 2025-05-05 RX ADMIN — SACUBITRIL AND VALSARTAN 1 TABLET: 97; 103 TABLET, FILM COATED ORAL at 21:34

## 2025-05-05 RX ADMIN — ACETAMINOPHEN 975 MG: 325 TABLET, FILM COATED ORAL at 05:25

## 2025-05-05 RX ADMIN — INSULIN LISPRO 1 UNITS: 100 INJECTION, SOLUTION INTRAVENOUS; SUBCUTANEOUS at 16:25

## 2025-05-05 RX ADMIN — TORSEMIDE 10 MG: 10 TABLET ORAL at 14:11

## 2025-05-05 RX ADMIN — METHOCARBAMOL 500 MG: 500 TABLET ORAL at 18:13

## 2025-05-05 RX ADMIN — Medication 1 CAPSULE: at 21:34

## 2025-05-05 RX ADMIN — OXYCODONE HYDROCHLORIDE 5 MG: 5 TABLET ORAL at 04:06

## 2025-05-05 RX ADMIN — SACUBITRIL AND VALSARTAN 1 TABLET: 97; 103 TABLET, FILM COATED ORAL at 08:44

## 2025-05-05 RX ADMIN — ENOXAPARIN SODIUM 30 MG: 30 INJECTION SUBCUTANEOUS at 08:44

## 2025-05-05 RX ADMIN — INSULIN LISPRO 3 UNITS: 100 INJECTION, SOLUTION INTRAVENOUS; SUBCUTANEOUS at 11:26

## 2025-05-05 RX ADMIN — GABAPENTIN 100 MG: 100 CAPSULE ORAL at 16:25

## 2025-05-05 RX ADMIN — INSULIN LISPRO 3 UNITS: 100 INJECTION, SOLUTION INTRAVENOUS; SUBCUTANEOUS at 08:43

## 2025-05-05 RX ADMIN — OXYCODONE HYDROCHLORIDE 5 MG: 5 TABLET ORAL at 11:27

## 2025-05-05 RX ADMIN — METFORMIN ER 500 MG 1000 MG: 500 TABLET ORAL at 16:25

## 2025-05-05 RX ADMIN — INSULIN LISPRO 1 UNITS: 100 INJECTION, SOLUTION INTRAVENOUS; SUBCUTANEOUS at 08:43

## 2025-05-05 RX ADMIN — SPIRONOLACTONE 25 MG: 25 TABLET ORAL at 08:44

## 2025-05-05 RX ADMIN — PANTOPRAZOLE SODIUM 40 MG: 40 TABLET, DELAYED RELEASE ORAL at 05:25

## 2025-05-05 NOTE — ASSESSMENT & PLAN NOTE
HbA1C 7.8 on 4/29/25  Has a glucometer at home  Home: Metformin  mg daily with dinner  Here:  Metformin XR 1000 mg daily dinner/Lispro 3U TID  Continue QID Accuchecks/SSI (Algo 3/1) and DM diet  Restarted Metformin  mg with dinner 4/30/25, stopped the Lantus; kept the Lispro at 3U TID with meals  5/3/25: increased Metformin 1000mg daily with dinner.   5/5/25: no changes

## 2025-05-05 NOTE — ASSESSMENT & PLAN NOTE
Presented to [facility] on 4/24/25 with left hip pain following a mechanical fall  Noted to have a comminuted intertrochanteric fracture of the left femur   S/p ORIF/IMN 4/24   Weightbearing as tolerated  PT/OT, pain control per PMR  Is having muscle spasms that Robaxin 500 mg q6hrs prn is helping.    Two week followup will be 5/8/25

## 2025-05-05 NOTE — PROGRESS NOTES
Progress Note - PMR   Name: Kamini Martines 75 y.o. female I MRN: [id number]  Unit/Bed#: -01 I Date of Admission: 4/28/2025   Date of Service: 5/5/2025 I Hospital Day: 7     Assessment & Plan  Closed left hip fracture, initial encounter (HCC)  Patient presents on 4/24 to [facility] with left hip pain after a fall  And have a comminuted intertrochanteric fracture of the left femur  Status post ORIF with IM nailing by Dr. Parry on 4/24 5/2- 2 week post-op XR ordered for 5/8 - will consult ortho next week    Weightbearing as tolerated to the left lower extremity  Physical and Occupational Therapy  Pain control with Tylenol, gabapentin, Robaxin and as needed oxycodone > Increasing gabapentin from 100 mg BID to TID for better pain control on 5/1  DVT prophylaxis.  Is recommended for 81 mg aspirin twice daily at the time of discharge to complete the 28 days however is on aspirin 325 mg daily currently  Impaired mobility and activities of daily living  Patient was evaluated by the rehabilitation team MD and an appropriate candidate for acute inpatient rehabilitation program at this time.  The patient will tolerate 3 hours/day 5 to 7 days/week of intensive physical, occupational therapy in order to obtain goals for community discharge  Due to the patient's functional Compared to their baseline level of function in addition to their ongoing medical needs, the patient would benefit from daily supervision from a rehabilitation physician as well as rehabilitation nursing to implement and adjust the medical as well as functional plan of care in order to meet the patient's goals.    HTN (hypertension)  Currently on a regimen of Entresto and spironolactone for the heart failure  Was on as needed Demadex at home in the past  Monitor pressures during therapy as well as during routine vitals  CVA (cerebral vascular accident) (HCC)  History of stroke in the past and was placed on aspirin 325 mg daily  Is also not on  a statin at home  After history of stroke did not follow-up with neurology and refused follow-up with Dr. Jones.  Is not on the appropriate regimen for secondary stroke prophylaxis.  Was on 81 mg previously but stated her cardiologist increased it to 325 mg.  She endorsed that she did not start her statin.  Mild intermittent asthma without complication  Currently on a as needed albuterol inhaler  Continue monitor on physical examination as well as during routine vitals, therapy vitals  LUCIA (obstructive sleep apnea)  Was noncompliant with the CPAP due to overall comfort with strap and discontinued its use  Follow-up with sleep medicine as an outpatient  Class 2 obesity in adult  BMI of 36.87 on arrival  Continue to recommend lifestyle modification, dietary changes, weight loss counseling especially in the setting of recent fracture.  Type 2 diabetes mellitus without complication, without long-term current use of insulin ([facility])  Lab Results   Component Value Date    HGBA1C 7.8 (H) 04/29/2025       Recent Labs     05/04/25  1527 05/04/25  2124 05/05/25  0547 05/05/25  1049   POCGLU 139 144* 170* 307*     Currently on regimen of Lantus 8 units at bedtime +3 units of lispro with meals as well as sliding scale insulin, Accu-Cheks 4 times daily  Continue monitoring blood glucose levels and adjust per recommendations by internal medicine  Continue carb controlled diet level 2  Urinary retention  Had indwelling Adams catheter placed 4/25 which was removed on 4/28 and did require initial straight catheterization  5/1- PVRs x3 low. Now voiding and continent  HFrEF (heart failure with reduced ejection fraction) ([facility])  Wt Readings from Last 3 Encounters:   04/30/25 101 kg (221 lb 9 oz)   04/24/25 99.3 kg (219 lb)   02/20/25 99.3 kg (219 lb)     Echocardiogram most recently with a cardiac MRI showing EF of 41%  Nonischemic cardiomyopathy on Entresto twice daily at home as well as Aldactone.  Aldactone is being restarted today  per internal medicine  Does not tolerate beta-blockers in the past  Monitor intake and output as well as weights  Education on diet and activity  GERD (gastroesophageal reflux disease)  Continue Pepcid and Protonix and as needed Maalox  CAD (coronary artery disease)  Currently on aspirin  Noted to have nonobstructive CAD on cardiac catheterization  Follow-up with outpatient cardiology  Thyroid nodule    Buttock wound, right, sequela  Noted on admission  Consult wound care  Anemia  Preoperative hemoglobin of 12.1 now currently 9.1 qualifying for acute blood loss anemia  5/5- Hgb stable at 8.9    Continue to monitor hemoglobin on bio weekly CBC or sooner if quickly indicated  Leukocytosis  Most recent WBC count of 12.95 which is down from postoperative 18.0  5/5- WBC downtrending to 9.3 (10.9)    Continue to monitor on biweekly CBC or sooner if indicated, monitor for signs of infection at the surgical site  Left leg swelling      Subjective   5-year-old female with history of chronic heart failure, nonischemic cardiomyopathy, CAD, hypertension, LUCIA noncompliant with CPAP, lumbar spinal stenosis, arthritis presenting 4/24 for left hip pain after a fall and a very comminuted intertrochanteric fracture of the left femur underwent IM nailing with Dr. Winkler on 4/24. Postoperative course complicated by urinary retention requiring a Adams catheter     Chief Complaint: f/u fracture and f/u ambulatory dysfunction    Interval: Seen and evaluated in room without any acute issues over the weekend.  Has made some significant functional progress in the last 48 to 72 hours which is been very uplifting with regards to her overall confidence.  She is still requiring some assistance but went from max assist for most mobility tasks to a min assist with a rolling walker.  Pain is relatively controlled on the current regimen. Patient denies fever, chills, nausea, emesis, cough, shortness of breath, diarrhea, or constipation. Sleep was  fine, mood stable.  Labs reviewed today showing a stable hemoglobin and overall resolution of the leukocytosis.  Completing 100% meals, voiding without incontinence and last bowel movement on 5/4.      Objective :  Temp:  [97.8 °F (36.6 °C)-97.9 °F (36.6 °C)] 97.9 °F (36.6 °C)  HR:  [85-86] 85  BP: (127-145)/(56-60) 127/60  Resp:  [16] 16  SpO2:  [94 %-95 %] 95 %  O2 Device: None (Room air)    Functional Update:  Physical Therapy Occupational Therapy Speech Therapy   Weight Bearing Status: Weight Bearing as Tolerated  Transfers: Maximum Assistance  Bed Mobility: Maximum Assistance  Amulation Distance (ft): 2 feet  Ambulation: Maximum Assistance  Assistive Device for Ambulation: Roller Walker  Wheelchair Mobility Distance: 0 ft  Number of Stairs: 0  Discharge Recommendations: Home with:  DC Home with:: Family Support, Home Physical Therapy   Eating: Independent  Grooming: Supervision  Bathing: Moderate Assistance  Bathing: Moderate Assistance  Upper Body Dressing: Supervision  Lower Body Dressing: Moderate Assistance  Toileting: Total Assistance  Toilet Transfer: Moderate Assistance  Cognition: Within Defined Limits  Orientation: Place, Person, Time, Situation                 Physical Exam  Vitals reviewed.   Constitutional:       General: She is not in acute distress.     Appearance: She is obese.   HENT:      Head: Normocephalic and atraumatic.      Right Ear: External ear normal.      Left Ear: External ear normal.      Nose: Nose normal. No rhinorrhea.      Mouth/Throat:      Mouth: Mucous membranes are moist.      Pharynx: Oropharynx is clear.   Eyes:      General: No scleral icterus.  Cardiovascular:      Rate and Rhythm: Normal rate.   Pulmonary:      Effort: Pulmonary effort is normal. No respiratory distress.   Abdominal:      General: There is no distension.      Palpations: Abdomen is soft.   Skin:     General: Skin is warm and dry.      Comments: Mild ecchymosis through the left lateral thigh    Neurological:      Mental Status: She is alert and oriented to person, place, and time.   Psychiatric:         Mood and Affect: Mood normal.         Behavior: Behavior normal.           Scheduled Meds:  Current Facility-Administered Medications   Medication Dose Route Frequency Provider Last Rate    acetaminophen  975 mg Oral Q8H [facility] Tammy Pradhan MD      albuterol  2 puff Inhalation Q6H PRN Tammy Pradhan MD      aluminum-magnesium hydroxide-simethicone  30 mL Oral Q4H PRN Tammy Pradhan MD      aspirin  325 mg Oral Daily Tammy Pradhan MD      Cholecalciferol  1,000 Units Oral Daily Tammy Pradhan MD      enoxaparin  30 mg Subcutaneous Q12H Tammy Pradhan MD      famotidine  10 mg Oral Daily STEFFANY Nelson      gabapentin  100 mg Oral TID Gladys Carvalho DO      insulin lispro  1-5 Units Subcutaneous HS Tammy Pradhan MD      insulin lispro  1-6 Units Subcutaneous TID AC Tammy Pradhan MD      insulin lispro  3 Units Subcutaneous TID With Meals Tammy Pradhan MD      loperamide  2 mg Oral Q4H PRN Liliana Flores MD      metFORMIN  1,000 mg Oral Daily With Dinner Rashmi Ibrahim PA-C      methocarbamol  500 mg Oral Q6H PRN Tammy Pradhan MD      multivitamin stress formula  1 tablet Oral Daily Tammy Pradhan MD      naloxone  0.04 mg Intravenous Q1MIN PRN Tammy Pradhan MD      oxyCODONE  2.5 mg Oral Q4H PRN Tammy Pradhan MD      Or    oxyCODONE  5 mg Oral Q4H PRN Tammy Pradhan MD      pantoprazole  40 mg Oral Early Morning Tammy Pradhan MD      [Held by provider] polyethylene glycol  17 g Oral Daily PRN Liliana Flores MD      PreserVision AREDS 2  1 capsule Oral BID STEFFANY Nelson      sacubitril-valsartan  1 tablet Oral BID Tammy Pradhan MD      spironolactone  25 mg Oral Daily STEFFANY Nelson           Lab Results: I have reviewed the following results:  Results from last 7 days   Lab Units 05/05/25  0549 05/01/25  0541    HEMOGLOBIN g/dL 8.9* 9.1*   HEMATOCRIT % 29.1* 28.9*   WBC Thousand/uL 9.33 10.90*   PLATELETS Thousands/uL 510* 417*     Results from last 7 days   Lab Units 05/05/25  0549 05/01/25  0547   BUN mg/dL 19 23   SODIUM mmol/L 140 142   POTASSIUM mmol/L 4.4 4.0   CHLORIDE mmol/L 106 107   CREATININE mg/dL 0.49* 0.44*                Lexa Bonilla, DO  Physical Medicine and Rehabilitation  [facility]    I have spent a total time of 35 minutes in caring for this patient on the day of the visit/encounter including Counseling / Coordination of care, Documenting in the medical record, Reviewing/placing orders in the medical record (including tests, medications, and/or procedures), and Communicating with other healthcare professionals .

## 2025-05-05 NOTE — ASSESSMENT & PLAN NOTE
Preoperative hemoglobin of 12.1 now currently 9.1 qualifying for acute blood loss anemia  5/5- Hgb stable at 8.9    Continue to monitor hemoglobin on bio weekly CBC or sooner if quickly indicated

## 2025-05-05 NOTE — ASSESSMENT & PLAN NOTE
Most recent WBC count of 12.95 which is down from postoperative 18.0  5/5- WBC downtrending to 9.3 (10.9)    Continue to monitor on biweekly CBC or sooner if indicated, monitor for signs of infection at the surgical site

## 2025-05-05 NOTE — ASSESSMENT & PLAN NOTE
NICM  EF 41% on cardiac MRI (2019)/ECHO 40-45% 2/4/25  Follows with Dr Worley as an OP  Home: Entresto  mg BID/Aldactone 25mg qd/Torsemide 10mg qd prn edema  Here:  Entresto  mg BID/Aldactone as below  Does not tolerate BB = has tried Coreg and Metoprolol in past per Dr Worley's office note  Volume status stable  Restarted Aldactone at 12.5mg qd on 4/29 and increased back up to 25mg qd to start 5/2/25  BMP 5/1/25 = stable  Having significant bilateral LE edema, duplex negative. Takes torsemide PRN at home.   Will consider giving torsemide for short course to help swelling

## 2025-05-05 NOTE — ASSESSMENT & PLAN NOTE
Takes Prilosec 40mg qd at home  Currently on Protonix 40mg qd and Pepcid qd here  Started weaning Pepcid and reduced to 10mg qd 4/30/25 -->  stopped 5/5/25

## 2025-05-05 NOTE — ASSESSMENT & PLAN NOTE
Patient presents on 4/24 to [facility] with left hip pain after a fall  And have a comminuted intertrochanteric fracture of the left femur  Status post ORIF with IM nailing by Dr. Parry on 4/24 5/2- 2 week post-op XR ordered for 5/8 - will consult ortho next week    Weightbearing as tolerated to the left lower extremity  Physical and Occupational Therapy  Pain control with Tylenol, gabapentin, Robaxin and as needed oxycodone > Increasing gabapentin from 100 mg BID to TID for better pain control on 5/1  DVT prophylaxis.  Is recommended for 81 mg aspirin twice daily at the time of discharge to complete the 28 days however is on aspirin 325 mg daily currently

## 2025-05-05 NOTE — PROGRESS NOTES
"[facility] Occupational Therapy Daily Note    Occupational Therapy LTG's  Eating: Eating Goal: 06. Independent - Patient completes the activity by him/herself with no assistance from a helper.   Oral Care: Oral Hygiene Goal: 06. Independent - Patient completes the activity by him/herself with no assistance from a helper.   Bathing: Shower/bathe self Goal: 06. Independent - Patient completes the activity by him/herself with no assistance from a helper.   UB Dressing: Upper body dressing Goal: 06. Independent - Patient completes the activity by him/herself with no assistance from a helper.   LB dressing: Lower body dressing Goal: 06. Independent - Patient completes the activity by him/herself with no assistance from a helper.   Footwear: Putting on/taking off footwear Goal: 06. Independent - Patient completes the activity by him/herself with no assistance from a helper.   Toileting: Toileting hygiene Goal: 06. Independent - Patient completes the activity by him/herself with no assistance from a helper.   Toilet Transfer:  Toilet transfer Goal: 06. Independent - Patient completes the activity by him/herself with no assistance from a helper.   IADL's: Assist Level: Independent (basic meal prep)   Medication management: Assist Level: Independent     Discharge Plan:  Home with family/friends support   DME:   Pt owns: Available Equipment: Roller Walker, Single Point Cane, Shower Chair, toilet bidet,     Recommended: LHAE Kit  Need to order: no DME/AD   Ordered: no DME/AD      05/05/25 0700   Pain Assessment   Pain Assessment Tool 0-10   Pain Score 8   Pain Location/Orientation Orientation: Left;Location: Hip;Location: Incision   Hospital Pain Intervention(s) Repositioned   Restrictions/Precautions   Precautions Fall Risk;Pain   LLE Weight Bearing Per Order WBAT   Lifestyle   Autonomy \"I am going to sit to do it.\"   Eating   Type of Assistance Needed Independent   Eating CARE Score 6   Oral Hygiene   Type of Assistance Needed " Set-up / clean-up   Comment despite education on standing tolerance at about 8 min yesterday pt refuses to complete oral care in stance at sink to simulate what she would do at home pt still prefers to compelte in sitting   Oral Hygiene CARE Score 5   Shower/Bathe Self   Type of Assistance Needed Physical assistance   Physical Assistance Level 26%-50%   Comment today discussions about buttocks bathing in shower. pt uses long net sponge with floss style bathing. pt will have floor based foot scrubber for bathing feet bottom. pt is looking into LH sponge. with avalable AE that she has pt is able to complete bathing all areas. assistance provided 2* enviromental limitations with equipment.   Shower/Bathe Self CARE Score 3   Upper Body Dressing   Type of Assistance Needed Set-up / clean-up   Upper Body Dressing CARE Score 5   Lower Body Dressing   Type of Assistance Needed Set-up / clean-up   Comment did progress well with Dressing taskss today. use  LH reacher. sup to don over hips in stance with no UE support.   Lower Body Dressing CARE Score 5   Putting On/Taking Off Footwear   Type of Assistance Needed Physical assistance   Physical Assistance Level 26%-50%   Comment b/l ACE wraps applied per orders. socks managed with AE   Putting On/Taking Off Footwear CARE Score 3   Sit to Stand   Type of Assistance Needed Supervision   Sit to Stand CARE Score 4   Bed-Chair Transfer   Type of Assistance Needed Set-up / clean-up;Supervision   Comment w/ RW   Chair/Bed-to-Chair Transfer CARE Score 4   Toileting Hygiene   Type of Assistance Needed Supervision   Comment able to manage jesus hygiene in semi stance with GB   Toileting Hygiene CARE Score 4   Toilet Transfer   Type of Assistance Needed Supervision   Comment walk to/from toilet,.   Toilet Transfer CARE Score 4   Assessment   Treatment Assessment Pt participated in skilled OT session focusing on functional ADL retraining, activity tolerance, activity modification, use of  AE, and functional transfers. See above for details on ADL function. Pt continues to demonstrate limited engagement in practices to progress her ADL status despite encouragement and education. Pt prefers to complete tasks in a manor that do not benefit her progression and practice towards her long term goals. Today did make progress with LB dressing tasks with use of LHAE. Pt feeling more confident with standing without UE support to manage her clothing and did complete func mob to/from bathroom. Pt now inquiring about progressing to complete independently in her room. Discussed with team PT for consistency. May progress tomorrow with day time bathroom privileges after cont assessment. Pt is currently limited by the following deficits: Impaired righting reactions, Unilateral weakness or paralysis of Lower limb , and Pain. Pt continues to require skilled acute rehab OT services to increase overall functional independence and safety w/ I/ADLs and functional transfers. Continued plan of care for OT sessions to focus on the following areas:  ADL Retraining , LB Dressing,  LHAE education/training, Functional Transfers, Standing tolerance, Standing balance , DME training/education, Family training/education, and Energy conservation training/education. Following OT sessions to cont to focus on: basic ADL participation/progression, standing for oral care, ADL at sink, ADL full shower once orders are placed, and standing balance/tolerance activities , light IADL tasks/item transport, donning personal footwear.   Prognosis Good   Plan   Progress Slow progress, decreased activity tolerance   OT Therapy Minutes   OT Time In 0700   OT Time Out 0830   OT Total Time (minutes) 90   OT Mode of treatment - Individual (minutes) 90   OT Mode of treatment - Concurrent (minutes) 0   OT Mode of treatment - Group (minutes) 0   OT Mode of treatment - Co-treat (minutes) 0   OT Mode of Treatment - Total time(minutes) 90 minutes   OT  Cumulative Minutes 540

## 2025-05-05 NOTE — PROGRESS NOTES
Progress Note - Hospitalist   Name: Kamini Martines 75 y.o. female I MRN: [id number]  Unit/Bed#: -01 I Date of Admission: 4/28/2025   Date of Service: 5/5/2025 I Hospital Day: 7    Assessment & Plan  Closed left hip fracture, initial encounter ([facility])  Presented to [facility] on 4/24/25 with left hip pain following a mechanical fall  Noted to have a comminuted intertrochanteric fracture of the left femur   S/p ORIF/IMN 4/24   Weightbearing as tolerated  PT/OT, pain control per PMR  Is having muscle spasms that Robaxin 500 mg q6hrs prn is helping.    Two week followup will be 5/8/25  HFrEF (heart failure with reduced ejection fraction) ([facility])  NICM  EF 41% on cardiac MRI (2019)/ECHO 40-45% 2/4/25  Follows with Dr Worley as an OP  Home: Entresto  mg BID/Aldactone 25mg qd/Torsemide 10mg qd prn edema  Here:  Entresto  mg BID/Aldactone as below  Does not tolerate BB = has tried Coreg and Metoprolol in past per Dr Worley's office note  Volume status stable  Restarted Aldactone at 12.5mg qd on 4/29 and increased back up to 25mg qd to start 5/2/25  BMP 5/1/25 = stable  Having significant bilateral LE edema, duplex negative. Takes torsemide PRN at home.   Will consider giving torsemide for short course to help swelling  Type 2 diabetes mellitus without complication, without long-term current use of insulin ([facility])  HbA1C 7.8 on 4/29/25  Has a glucometer at home  Home: Metformin  mg daily with dinner  Here:  Metformin XR 1000 mg daily dinner/Lispro 3U TID  Continue QID Accuchecks/SSI (Algo 3/1) and DM diet  Restarted Metformin  mg with dinner 4/30/25, stopped the Lantus; kept the Lispro at 3U TID with meals  5/3/25: increased Metformin 1000mg daily with dinner.   5/5/25: no changes   CVA (cerebral vascular accident) ([facility])  Continue  mg qd as at home  Not on statins at home because she didn't want to take in past  Didn't followup with Neuro after stroke  LUCIA (obstructive sleep  "apnea)  Stopped using CPAP few months ago as strap was causing neck pain  Outpatient sleep f/u  Mild intermittent asthma without complication  No exacerbation  Continue Albuterol prn  Class 2 obesity in adult  BMI 36.87  Affects all aspects of care  Lifestyle modification  GERD (gastroesophageal reflux disease)  Takes Prilosec 40mg qd at home  Currently on Protonix 40mg qd and Pepcid qd here  Started weaning Pepcid and reduced to 10mg qd 4/30/25 -->  stopped 5/5/25  CAD (coronary artery disease)  Nonobstructive coronary artery disease on cardiac cath  On  mg daily for hx CVA  Urinary retention  Adams was placed on 4/25 and removed on 4/28  She takes Vesicare at home  Per PMR  Thyroid nodule  Found on CT cervical spine  \"1.1 cm right thyroid nodule. Incidental discovery of one or more thyroid nodule(s) measuring less than 1.5 cm and without suspicious features is noted in this patient who is above 35 years old; according to guidelines published in the February 2015 white paper on incidental thyroid nodules in the Journal of the American College of Radiology (JACR), no further evaluation is recommended.\"  Buttock wound, right, sequela  POA  Per PMR  Anemia  Post op  stable  Leukocytosis  Mild  Likely reactive  No fever  Will watch  Bilateral leg edema  Hx of CHF which may be contributing- see above  Duplex negative for DVT  Will give Torsemide 10mg PO x 1 now 5/5/25    The above assessment and plan was reviewed and updated as determined by my evaluation of the patient on 5/5/2025.    History of Present Illness   Patient seen and examined. Patients overnight issues or events were reviewed with nursing staff. New or overnight issues include the following:   No new or overnight issues.  Offers no complaints    Review of Systems   All other systems reviewed and are negative.      Objective :  Temp:  [97.8 °F (36.6 °C)-97.9 °F (36.6 °C)] 97.9 °F (36.6 °C)  HR:  [85-86] 85  BP: (127-145)/(56-60) 127/60  Resp:  [16] " 16  SpO2:  [94 %-95 %] 95 %  O2 Device: None (Room air)    Invasive Devices       Drain  Duration             Ureteral Internal Stent Right ureter 6 Fr. 306 days                    Physical Exam  General Appearance: no distress, non toxic appearing  HEENT: PERRLA, conjuctiva normal; oropharynx clear; mucous membranes moist   Neck:  Supple, normal ROM  Lungs: CTA, normal respiratory effort, no retractions, expiratory effort normal  CV: regular rate and rhythm; no rubs/murmurs/gallops, PMI normal   ABD: soft; ND/NT; +BS  EXT: + bilateral leg edema  Skin: normal turgor, normal texture  Psych: affect normal, mood normal  Neuro: AAO        The above physical exam was reviewed and updated as determined by my evaluation of the patient on 5/5/2025.      Lab Results: I have reviewed the following results:  Results from last 7 days   Lab Units 05/05/25  0549 05/01/25  0547   WBC Thousand/uL 9.33 10.90*   HEMOGLOBIN g/dL 8.9* 9.1*   HEMATOCRIT % 29.1* 28.9*   PLATELETS Thousands/uL 510* 417*     Results from last 7 days   Lab Units 05/05/25  0549 05/01/25  0547   SODIUM mmol/L 140 142   POTASSIUM mmol/L 4.4 4.0   CHLORIDE mmol/L 106 107   CO2 mmol/L 27 27   BUN mg/dL 19 23   CREATININE mg/dL 0.49* 0.44*   CALCIUM mg/dL 9.7 9.8     Results from last 7 days   Lab Units 04/29/25  0713   HEMOGLOBIN A1C % 7.8*         Results from last 7 days   Lab Units 05/05/25  1049 05/05/25  0547 05/04/25  2124   POC GLUCOSE mg/dl 307* 170* 144*       Imaging Results Review: No pertinent imaging studies reviewed.  Other Study Results Review: No additional pertinent studies reviewed.    Review of Scheduled Meds: Medications  reviewed and reconciled as needed  Current Facility-Administered Medications   Medication Dose Route Frequency Provider Last Rate    acetaminophen  975 mg Oral Q8H [facility] Tammy Pradhan MD      albuterol  2 puff Inhalation Q6H PRN Tammy Pradhan MD      aluminum-magnesium hydroxide-simethicone  30 mL Oral Q4H PRN  Tammy Pradhan MD      aspirin  325 mg Oral Daily Tammy Pradhan MD      Cholecalciferol  1,000 Units Oral Daily Tammy Pradhan MD      enoxaparin  30 mg Subcutaneous Q12H Tammy Pradhan MD      famotidine  10 mg Oral Daily STEFFANY Nelson      gabapentin  100 mg Oral TID Gladys Carvalho DO      insulin lispro  1-5 Units Subcutaneous HS Tammy Pradhan MD      insulin lispro  1-6 Units Subcutaneous TID AC Tammy Pradhan MD      insulin lispro  3 Units Subcutaneous TID With Meals Tammy Pradhan MD      loperamide  2 mg Oral Q4H PRN Liliana Flores MD      metFORMIN  1,000 mg Oral Daily With Dinner Rashmi Ibrahim PA-C      methocarbamol  500 mg Oral Q6H PRN Tammy Pradhan MD      multivitamin stress formula  1 tablet Oral Daily Tammy Pradhan MD      naloxone  0.04 mg Intravenous Q1MIN PRN Tammy Pradhan MD      oxyCODONE  2.5 mg Oral Q4H PRN Tammy Pradhan MD      Or    oxyCODONE  5 mg Oral Q4H PRN Tammy Pradhan MD      pantoprazole  40 mg Oral Early Morning Tammy Pradhan MD      [Held by provider] polyethylene glycol  17 g Oral Daily PRN Liliana Flores MD      PreserVision AREDS 2  1 capsule Oral BID STEFFANY Nelson      sacubitril-valsartan  1 tablet Oral BID Tammy Pradhan MD      spironolactone  25 mg Oral Daily STEFFANY Nelson         VTE Pharmacologic Prophylaxis: Lovenox  Code Status: Level 1 - Full Code  Current Length of Stay: 7 day(s)    Administrative Statements     ** Please Note:  voice to text software may have been used in the creation of this document. Although proof errors in transcription or interpretation are a potential of such software**

## 2025-05-05 NOTE — ASSESSMENT & PLAN NOTE
Hx of CHF which may be contributing- see above  Duplex negative for DVT  Will give Torsemide 10mg PO x 1 now 5/5/25   show

## 2025-05-05 NOTE — PROGRESS NOTES
05/05/25 1230   Pain Assessment   Pain Assessment Tool 0-10   Pain Score 6   Pain Location/Orientation Orientation: Left;Location: Hip   Restrictions/Precautions   Precautions Fall Risk;Pain   LLE Weight Bearing Per Order WBAT   Cognition   Overall Cognitive Status WFL   Arousal/Participation Alert;Cooperative   Attention Attends with cues to redirect   Orientation Level Oriented X4   Memory Within functional limits   Following Commands Follows one step commands without difficulty   Sit to Lying   Comment (S)  reassess tomorrow. Pt plans to rent hospital bed at home for first floor set up. Trial use of leg    Lying to Sitting on Side of Bed   Comment (S)  reassess tomorrow   Sit to Stand   Type of Assistance Needed Supervision;Adaptive equipment   Comment CS with RW   Sit to Stand CARE Score 4   Bed-Chair Transfer   Type of Assistance Needed Supervision;Adaptive equipment   Comment CS with RW   Chair/Bed-to-Chair Transfer CARE Score 4   Car Transfer   Comment assess tomorrow. May need to mimic on exercise mat. Did schedule car transfer training with personal car for Thursday 5/8 at 2:30 pm   Walk 10 Feet   Type of Assistance Needed Supervision;Adaptive equipment   Comment CS with RW   Walk 10 Feet CARE Score 4   Walk 50 Feet with Two Turns   Type of Assistance Needed Incidental touching;Adaptive equipment   Comment CGA with RW   Walk 50 Feet with Two Turns CARE Score 4   Walk 150 Feet   Comment limited by L hip pain   Reason if not Attempted Safety concerns   Walk 150 Feet CARE Score 88   Walking 10 Feet on Uneven Surfaces   Comment (S)  assess tomorrow since ambulation has improved. Pt may have to walk across grass at [place]. Should go outside in the future   Ambulation   Primary Mode of Locomotion Prior to Admission Walk   Distance Walked (feet) 55 ft  (x2, 50 ft)   Assist Device Roller Walker   Gait Pattern Antalgic;Inconsistant Marika;Decreased foot clearance;Decreased L stance;Improper weight shift;Step  to   Limitations Noted In Device Management;Endurance;Heel Strike;Safety;Strength;Swing;Speed   Findings Improved clearance of LLE but L hip flexion and knee flexion limited during swing phase. heavy reliance on UEs when walking   Does the patient walk? 2. Yes   Wheel 50 Feet with Two Turns   Reason if not Attempted Activity not applicable   Wheel 50 Feet with Two Turns CARE Score 9   Wheel 150 Feet   Reason if not Attempted Activity not applicable   Wheel 150 Feet CARE Score 9   Curb or Single Stair   Style negotiated Curb   Type of Assistance Needed Physical assistance   Physical Assistance Level Total assistance   Comment 8 inch curb step with RW, ModAx1 + Nadine of second person.   1 Step (Curb) CARE Score 1   4 Steps   Type of Assistance Needed Physical assistance   Physical Assistance Level 26%-50%   Comment ascend/descend  steps with bilateral handrails   4 Steps CARE Score 3   12 Steps   Reason if not Attempted Safety concerns   12 Steps CARE Score 88   Stairs   Findings Patient reports she is installing second handrail for full flight of stairs at home, but will likely not be done by time of discharge. First floor set up recommended. Pt did show outside stair situation via ring camera and discussed all the possible options of getting patient into the house. Did discuss with pt and  option of placing a transport chair on the top of each step/landing so pt could sit down and then back up in transport chair if stairs continue to be a barrier. Pt to reach out to fmaily member to see if they have transport chair she could borrow.   Therapeutic Interventions   Strengthening Seated L hip flexion 3x5   Flexibility hamstring stretch LLE 3x 60 seconds, calf stretch 3x 60 seconds LLE   Assessment   Treatment Assessment Patient participated in 90 minute skilled physical therapy session focusing on gait training with RW and stair management. Stair Manangement is patient's biggest barrier to returning home at  this time. Patient with significant improvement in quality of gait and distance walked. She does continue to be limited by L hip/groin pain resulted in decreased L hip and knee flexion. Patient will continue to benefit from skilled physical therapy services to maximize safety and independence with functional mobility. Stairs should be a main focus of each therapy session   Problem List Decreased strength;Decreased range of motion;Decreased endurance;Impaired balance;Decreased mobility;Obesity;Pain   Barriers to Discharge Inaccessible home environment   PT Barriers   Functional Limitation Car transfers;Stair negotiation;Transfers;Walking   Plan   Treatment/Interventions Functional transfer training;LE strengthening/ROM;Therapeutic exercise;Endurance training;Patient/family training;Bed mobility;Gait training;Equipment eval/education   Progress Progressing toward goals   PT Therapy Minutes   PT Time In 1230   PT Time Out 1400   PT Total Time (minutes) 90   PT Mode of treatment - Individual (minutes) 90   PT Mode of treatment - Concurrent (minutes) 0   PT Mode of treatment - Group (minutes) 0   PT Mode of treatment - Co-treat (minutes) 0   PT Mode of Treatment - Total time(minutes) 90 minutes   PT Cumulative Minutes 608     Leonela Naik, PT, DPT

## 2025-05-05 NOTE — ASSESSMENT & PLAN NOTE
Lab Results   Component Value Date    HGBA1C 7.8 (H) 04/29/2025       Recent Labs     05/04/25  1527 05/04/25  2124 05/05/25  0547 05/05/25  1049   POCGLU 139 144* 170* 307*     Currently on regimen of Lantus 8 units at bedtime +3 units of lispro with meals as well as sliding scale insulin, Accu-Cheks 4 times daily  Continue monitoring blood glucose levels and adjust per recommendations by internal medicine  Continue carb controlled diet level 2

## 2025-05-06 LAB
GLUCOSE SERPL-MCNC: 141 MG/DL (ref 65–140)
GLUCOSE SERPL-MCNC: 162 MG/DL (ref 65–140)
GLUCOSE SERPL-MCNC: 174 MG/DL (ref 65–140)
GLUCOSE SERPL-MCNC: 197 MG/DL (ref 65–140)

## 2025-05-06 PROCEDURE — 99232 SBSQ HOSP IP/OBS MODERATE 35: CPT | Performed by: STUDENT IN AN ORGANIZED HEALTH CARE EDUCATION/TRAINING PROGRAM

## 2025-05-06 PROCEDURE — 97110 THERAPEUTIC EXERCISES: CPT

## 2025-05-06 PROCEDURE — 97530 THERAPEUTIC ACTIVITIES: CPT

## 2025-05-06 PROCEDURE — 82948 REAGENT STRIP/BLOOD GLUCOSE: CPT

## 2025-05-06 PROCEDURE — 99232 SBSQ HOSP IP/OBS MODERATE 35: CPT | Performed by: NURSE PRACTITIONER

## 2025-05-06 RX ADMIN — ACETAMINOPHEN 975 MG: 325 TABLET, FILM COATED ORAL at 21:01

## 2025-05-06 RX ADMIN — PANTOPRAZOLE SODIUM 40 MG: 40 TABLET, DELAYED RELEASE ORAL at 06:08

## 2025-05-06 RX ADMIN — INSULIN LISPRO 3 UNITS: 100 INJECTION, SOLUTION INTRAVENOUS; SUBCUTANEOUS at 11:19

## 2025-05-06 RX ADMIN — INSULIN LISPRO 1 UNITS: 100 INJECTION, SOLUTION INTRAVENOUS; SUBCUTANEOUS at 06:51

## 2025-05-06 RX ADMIN — ACETAMINOPHEN 975 MG: 325 TABLET, FILM COATED ORAL at 13:14

## 2025-05-06 RX ADMIN — ACETAMINOPHEN 975 MG: 325 TABLET, FILM COATED ORAL at 06:08

## 2025-05-06 RX ADMIN — ASPIRIN 325 MG ORAL TABLET 325 MG: 325 PILL ORAL at 08:09

## 2025-05-06 RX ADMIN — SACUBITRIL AND VALSARTAN 1 TABLET: 97; 103 TABLET, FILM COATED ORAL at 21:01

## 2025-05-06 RX ADMIN — METFORMIN ER 500 MG 1000 MG: 500 TABLET ORAL at 16:46

## 2025-05-06 RX ADMIN — SPIRONOLACTONE 25 MG: 25 TABLET ORAL at 08:11

## 2025-05-06 RX ADMIN — GABAPENTIN 100 MG: 100 CAPSULE ORAL at 20:58

## 2025-05-06 RX ADMIN — B-COMPLEX W/ C & FOLIC ACID TAB 1 TABLET: TAB at 08:09

## 2025-05-06 RX ADMIN — METHOCARBAMOL 500 MG: 500 TABLET ORAL at 20:58

## 2025-05-06 RX ADMIN — METHOCARBAMOL 500 MG: 500 TABLET ORAL at 10:35

## 2025-05-06 RX ADMIN — Medication 1 CAPSULE: at 21:00

## 2025-05-06 RX ADMIN — INSULIN LISPRO 3 UNITS: 100 INJECTION, SOLUTION INTRAVENOUS; SUBCUTANEOUS at 16:46

## 2025-05-06 RX ADMIN — INSULIN LISPRO 1 UNITS: 100 INJECTION, SOLUTION INTRAVENOUS; SUBCUTANEOUS at 11:19

## 2025-05-06 RX ADMIN — GABAPENTIN 100 MG: 100 CAPSULE ORAL at 08:09

## 2025-05-06 RX ADMIN — SACUBITRIL AND VALSARTAN 1 TABLET: 97; 103 TABLET, FILM COATED ORAL at 08:11

## 2025-05-06 RX ADMIN — ENOXAPARIN SODIUM 30 MG: 30 INJECTION SUBCUTANEOUS at 20:59

## 2025-05-06 RX ADMIN — Medication 1 CAPSULE: at 08:11

## 2025-05-06 RX ADMIN — Medication 1000 UNITS: at 08:09

## 2025-05-06 RX ADMIN — OXYCODONE HYDROCHLORIDE 5 MG: 5 TABLET ORAL at 13:15

## 2025-05-06 RX ADMIN — GABAPENTIN 100 MG: 100 CAPSULE ORAL at 16:46

## 2025-05-06 RX ADMIN — ENOXAPARIN SODIUM 30 MG: 30 INJECTION SUBCUTANEOUS at 08:08

## 2025-05-06 RX ADMIN — INSULIN LISPRO 1 UNITS: 100 INJECTION, SOLUTION INTRAVENOUS; SUBCUTANEOUS at 21:03

## 2025-05-06 RX ADMIN — OXYCODONE HYDROCHLORIDE 5 MG: 5 TABLET ORAL at 06:54

## 2025-05-06 RX ADMIN — INSULIN LISPRO 3 UNITS: 100 INJECTION, SOLUTION INTRAVENOUS; SUBCUTANEOUS at 06:51

## 2025-05-06 NOTE — ASSESSMENT & PLAN NOTE
Lab Results   Component Value Date    HGBA1C 7.8 (H) 04/29/2025       Recent Labs     05/05/25  1049 05/05/25  1558 05/05/25  2102 05/06/25  0553   POCGLU 307* 150* 146* 174*     Currently on regimen of Lantus 8 units at bedtime +3 units of lispro with meals as well as sliding scale insulin, Accu-Cheks 4 times daily  Continue monitoring blood glucose levels and adjust per recommendations by internal medicine  Continue carb controlled diet level 2

## 2025-05-06 NOTE — PROGRESS NOTES
05/06/25 0900   Pain Assessment   Pain Assessment Tool 0-10   Pain Score No Pain   Restrictions/Precautions   Precautions Fall Risk;Pain   LLE Weight Bearing Per Order WBAT   Subjective   Subjective pt agreeable to perform skilled PT   Roll Left and Right   Type of Assistance Needed Incidental touching   Comment CgA   Roll Left and Right CARE Score 4   Sit to Lying   Type of Assistance Needed Incidental touching   Comment leg    Sit to Lying CARE Score 4   Lying to Sitting on Side of Bed   Type of Assistance Needed Incidental touching   Comment leg    Lying to Sitting on Side of Bed CARE Score 4   Sit to Stand   Type of Assistance Needed Supervision;Adaptive equipment   Comment RW   Sit to Stand CARE Score 4   Bed-Chair Transfer   Type of Assistance Needed Supervision;Adaptive equipment   Comment RW   Chair/Bed-to-Chair Transfer CARE Score 4   Transfer Bed/Chair/Wheelchair   Limitations Noted In Pain Management;LE Strength;Endurance;Confidence   Adaptive Equipment Roller Walker   Car Transfer   Type of Assistance Needed Incidental touching;Adaptive equipment   Comment RW assistance LE   Car Transfer CARE Score 4   Walk 10 Feet   Type of Assistance Needed Supervision   Comment RW   Walk 10 Feet CARE Score 4   Walk 50 Feet with Two Turns   Type of Assistance Needed Incidental touching;Adaptive equipment   Comment RW   Walk 50 Feet with Two Turns CARE Score 4   Walk 150 Feet   Comment limited by L hip pain   Walking 10 Feet on Uneven Surfaces   Type of Assistance Needed Incidental touching;Adaptive equipment   Comment floor mat RW   Walking 10 Feet on Uneven Surfaces CARE Score 4   Ambulation   Primary Mode of Locomotion Prior to Admission Walk   Distance Walked (feet) 80 ft   Assist Device Roller Walker   Gait Pattern Antalgic;Inconsistant Marika;Decreased foot clearance;Decreased L stance;Improper weight shift;Step to   Walk Assist Level Supervision   Does the patient walk? 2. Yes   Wheel 50 Feet with  Two Turns   Reason if not Attempted Activity not applicable   Wheel 50 Feet with Two Turns CARE Score 9   Wheel 150 Feet   Reason if not Attempted Activity not applicable   Wheel 150 Feet CARE Score 9   Curb or Single Stair   Style negotiated Curb   Type of Assistance Needed Physical assistance   Physical Assistance Level Total assistance   Comment 8 inch curb step with RW, ModAx1 + Nadine of second person.   1 Step (Curb) CARE Score 1   4 Steps   Type of Assistance Needed Physical assistance   Physical Assistance Level 25% or less   Comment ascend/descend  steps with bilateral handrails   4 Steps CARE Score 3   12 Steps   Reason if not Attempted Safety concerns   12 Steps CARE Score 88   Stairs   Type Stairs   # of Steps 6   Assist Devices Bilateral Rail   Toilet Transfer   Type of Assistance Needed Supervision;Adaptive equipment   Comment RW   Toilet Transfer CARE Score 4   Toilet Transfer   Surface Assessed Raised Toilet   Therapeutic Interventions   Strengthening seated LAQ marching AP with pt sitting on ice 10-30 reps each   Flexibility hamstring stretch LLE 3x 60 seconds, calf stretch 3x 60 seconds LLE   Other LE wrapped   Assessment   Treatment Assessment pt agreeable to perform skilled PT focus on 90 min working on bed mobility with leg  at Nadine / CgA and perform  steps BL HR Nadine and 8 inch curb step w RW ModA x1-2 person. Pt has diffuclty putting WB thru-out LLE to advance RLE onto 8 inch step. Pt cont to make good gains and progressing with ambulation S lvl short distance but increase longer distance today .Pt gait pattern improving with step thru with RLE to advance more weight on her RLE.Pt will cont to need skilled PT to meet DC goals   Barriers to Discharge Inaccessible home environment   Plan   Progress Progressing toward goals   Discharge Recommendation   Rehab Resource Intensity Level, PT   (HH PT)   Equipment Recommended Walker   PT Therapy Minutes   PT Time In 0900   PT Time Out  1030   PT Total Time (minutes) 90   PT Mode of treatment - Individual (minutes) 90   PT Mode of treatment - Concurrent (minutes) 0   PT Mode of treatment - Group (minutes) 0   PT Mode of treatment - Co-treat (minutes) 0   PT Mode of Treatment - Total time(minutes) 90 minutes   PT Cumulative Minutes 698   Therapy Time missed   Time missed? No

## 2025-05-06 NOTE — ASSESSMENT & PLAN NOTE
NICM  EF 41% on cardiac MRI (2019)/ECHO 40-45% 2/4/25  Follows with Dr Worley as an OP  Home: Entresto  mg BID/Aldactone 25mg qd/Torsemide 10mg qd prn edema  Here:  Entresto  mg BID/Aldactone as below  Does not tolerate BB = has tried Coreg and Metoprolol in past per Dr Worley's office note  Volume status stable  Restarted Aldactone at 12.5mg qd on 4/29 and increased back up to 25mg qd to start 5/2/25  BMP 5/1/25 = stable  Having significant bilateral LE edema, duplex negative. Takes Torsemide 10mg PRN at home.   Torsemide 10 mg x 1 given 5/5/25  Edema improved today 5/6/25 and will likely give another dose tomorrow 5/7/25

## 2025-05-06 NOTE — PROGRESS NOTES
"   05/06/25 1230   Pain Assessment   Pain Assessment Tool 0-10   Pain Score 6   Pain Location/Orientation Orientation: Left;Location: Hip;Location: Knee   Hospital Pain Intervention(s) Medication (See MAR);Repositioned;Rest   Restrictions/Precautions   Precautions Fall Risk;Pain   LLE Weight Bearing Per Order WBAT   Lifestyle   Autonomy \"I can't, my leg is just so tired and weak\"   Oral Hygiene   Type of Assistance Needed Supervision   Physical Assistance Level No physical assistance   Comment in stance at sink. does demo' forearm leaning at times to accomodate for decreased general strengthening.   Oral Hygiene CARE Score 4   Putting On/Taking Off Footwear   Type of Assistance Needed Physical assistance   Physical Assistance Level Total assistance   Comment TA to don TEDS   Putting On/Taking Off Footwear CARE Score 1   Sit to Stand   Type of Assistance Needed Supervision   Physical Assistance Level No physical assistance   Comment RW with increased time   Sit to Stand CARE Score 4   Bed-Chair Transfer   Type of Assistance Needed Supervision   Physical Assistance Level No physical assistance   Comment with RW   Chair/Bed-to-Chair Transfer CARE Score 4   Toileting Hygiene   Type of Assistance Needed Supervision   Physical Assistance Level No physical assistance   Comment for bladder hygiene and CM   Toileting Hygiene CARE Score 4   Toilet Transfer   Type of Assistance Needed Supervision   Physical Assistance Level No physical assistance   Comment RW to BSC over toilet. does utilize GB   Toilet Transfer CARE Score 4   Exercise Tools   Other Exercise Tool 1 to increase UE strengthening, pt engages in seated UE strengthening completing [id number] each of alternating UE elbow flexion and chest press using 2# DB for increased BUE strength during fxl STS/transfers. Pt tolerated well, with brief rest breaks between sets to manage fatigue.   Cognition   Overall Cognitive Status WFL   Arousal/Participation Alert;Cooperative " "  Attention Attends with cues to redirect   Orientation Level Oriented X4   Memory Within functional limits   Following Commands Follows one step commands without difficulty   Additional Activities   Additional Activities Comments to further increase LLE ROM for ease of transfers, shower transfers, car transfers, etc, instructed pt to perform standing hip flexion but pt noted to be quite self limiting stating, \"I can't, my leg is just so tired and weak\". pt agreeable to modified task however when asked then to complete full task, pt reporting increased fatigue and pain and unable to complete.   Activity Tolerance   Activity Tolerance Patient tolerated treatment well   Medical Staff Made Aware requested aqua K pad order from Dr. Bonilla; also asked for volteran gel order as pt reports using [employer] PTA.   Assessment   Treatment Assessment pt engages in 90 minute skilled OT session focusing on in room func transfers with RW, toileting, standing yaw/bal, UE strengthening. see above for full func details. order placed for ROSELINE stockings to assist in edema management as ace wrapping noted to not really assist in edema management. pt reports increased fatigue and pain from morning PT session; then demo'ing self limiting behaviors during OT Session with limiting in room func mobility and limiting standing tasks. recommend continued skilled care to focus on ADL retraining, func transfers, standing yaw/bal with UE release, strengthening/endurance, in order to decrease burden of care at d/c.   Prognosis Good   Problem List Decreased strength;Decreased range of motion;Decreased endurance;Impaired balance;Decreased mobility;Obesity;Pain   Plan   Treatment/Interventions ADL retraining;Functional transfer training;Therapeutic exercise;Endurance training;Patient/family training;Equipment eval/education;Compensatory technique education   OT Therapy Minutes   OT Time In 1230   OT Time Out 1400   OT Total Time (minutes) 90   OT Mode " of treatment - Individual (minutes) 90   OT Mode of treatment - Concurrent (minutes) 0   OT Mode of treatment - Group (minutes) 0   OT Mode of treatment - Co-treat (minutes) 0   OT Mode of Treatment - Total time(minutes) 90 minutes   OT Cumulative Minutes 630   Therapy Time missed   Time missed? No

## 2025-05-06 NOTE — PLAN OF CARE
Problem: PAIN - ADULT  Goal: Verbalizes/displays adequate comfort level or baseline comfort level  Description: Interventions:- Encourage patient to monitor pain and request assistance- Assess pain using appropriate pain scale- Administer analgesics based on type and severity of pain and evaluate response- Implement non-pharmacological measures as appropriate and evaluate response- Consider cultural and social influences on pain and pain management- Notify physician/advanced practitioner if interventions unsuccessful or patient reports new pain  Outcome: Progressing     Problem: INFECTION - ADULT  Goal: Absence or prevention of progression during hospitalization  Description: INTERVENTIONS:- Assess and monitor for signs and symptoms of infection- Monitor lab/diagnostic results- Monitor all insertion sites, i.e. indwelling lines, tubes, and drains- Monitor endotracheal if appropriate and nasal secretions for changes in amount and color- [place] appropriate cooling/warming therapies per order- Administer medications as ordered- Instruct and encourage patient and family to use good hand hygiene technique- Identify and instruct in appropriate isolation precautions for identified infection/condition  Outcome: Progressing     Problem: SAFETY ADULT  Goal: Patient will remain free of falls  Description: INTERVENTIONS:- Educate patient/family on patient safety including physical limitations- Instruct patient to call for assistance with activity - Consult OT/PT to assist with strengthening/mobility - Keep Call bell within reach- Keep bed low and locked with side rails adjusted as appropriate- Keep care items and personal belongings within reach- Initiate and maintain comfort rounds- Make Fall Risk Sign visible to staff- Offer Toileting every 2 Hours, in advance of need- Initiate/Maintain bed alarm- Obtain necessary fall risk management equipment:  - Apply yellow socks and bracelet for high fall risk patients- Consider  moving patient to room near nurses station  Outcome: Progressing  Goal: Maintain or return to baseline ADL function  Description: INTERVENTIONS:-  Assess patient's ability to carry out ADLs; assess patient's baseline for ADL function and identify physical deficits which impact ability to perform ADLs (bathing, care of mouth/teeth, toileting, grooming, dressing, etc.)- Assess/evaluate cause of self-care deficits - Assess range of motion- Assess patient's mobility; develop plan if impaired- Assess patient's need for assistive devices and provide as appropriate- Encourage maximum independence but intervene and supervise when necessary- Involve family in performance of ADLs- Assess for home care needs following discharge - Consider OT consult to assist with ADL evaluation and planning for discharge- Provide patient education as appropriate  Outcome: Progressing  Goal: Maintains/Returns to pre admission functional level  Description: INTERVENTIONS:- Perform AM-PAC 6 Click Basic Mobility/ Daily Activity assessment daily.- Set and communicate daily mobility goal to care team and patient/family/caregiver. - Collaborate with rehabilitation services on mobility goals if consulted- Perform Range of Motion 3 times a day.- Reposition patient every 2 hours.- Dangle patient 3 times a day- Stand patient 3 times a day- Ambulate patient 3 times a day- Out of bed to chair for meals  Outcome: Progressing     Problem: DISCHARGE PLANNING  Goal: Discharge to home or other facility with appropriate resources  Description: INTERVENTIONS:- Identify barriers to discharge w/patient and caregiver- Arrange for needed discharge resources and transportation as appropriate- Identify discharge learning needs (meds, wound care, etc.)- Arrange for interpretive services to assist at discharge as needed- Refer to Case Management Department for coordinating discharge planning if the patient needs post-hospital services based on physician/advanced  practitioner order or complex needs related to functional status, cognitive ability, or social support system  Outcome: Progressing     Problem: Prexisting or High Potential for Compromised Skin Integrity  Goal: Skin integrity is maintained or improved  Description: INTERVENTIONS:- Identify patients at risk for skin breakdown- Assess and monitor skin integrity- Assess and monitor nutrition and hydration status- Monitor labs - Assess for incontinence - Turn and reposition patient- Assist with mobility/ambulation- Relieve pressure over bony prominences- Avoid friction and shearing- Provide appropriate hygiene as needed including keeping skin clean and dry- Evaluate need for skin moisturizer/barrier cream- Collaborate with interdisciplinary team - Patient/family teaching- Consider wound care consult   Outcome: Progressing     Problem: Nutrition/Hydration-ADULT  Goal: Nutrient/Hydration intake appropriate for improving, restoring or maintaining nutritional needs  Description: Monitor and assess patient's nutrition/hydration status for malnutrition. Collaborate with interdisciplinary team and initiate plan and interventions as ordered.  Monitor patient's weight and dietary intake as ordered or per policy. Utilize nutrition screening tool and intervene as necessary. Determine patient's food preferences and provide high-protein, high-caloric foods as appropriate. INTERVENTIONS:- Monitor oral intake, urinary output, labs, and treatment plans- Assess nutrition and hydration status and recommend course of action- Evaluate amount of meals eaten- Assist patient with eating if necessary - Allow adequate time for meals- Recommend/ encourage appropriate diets, oral nutritional supplements, and vitamin/mineral supplements- Order, calculate, and assess calorie counts as needed- Recommend, monitor, and adjust tube feedings and TPN/PPN based on assessed needs- Assess need for intravenous fluids- Provide specific nutrition/hydration  education as appropriate- Include patient/family/caregiver in decisions related to nutrition  Outcome: Progressing

## 2025-05-06 NOTE — ASSESSMENT & PLAN NOTE
HbA1C 7.8 on 4/29/25  Has a glucometer at home  Home: Metformin  mg daily with dinner  Here:  Metformin XR 1000 mg daily dinner/Lispro 3U TID  Continue QID Accuchecks/SSI (Algo 3/1) and DM diet  Restarted Metformin  mg with dinner 4/30/25, stopped the Lantus; kept the Lispro at 3U TID with meals  5/3/25: increased Metformin 1000mg daily with dinner.   5/6/25: no changes today

## 2025-05-06 NOTE — ASSESSMENT & PLAN NOTE
Hx of CHF which may be contributing- see above  Duplex negative for DVT  Gave Torsemide 10mg PO x 1 on 5/5/25

## 2025-05-06 NOTE — PLAN OF CARE
Problem: PAIN - ADULT  Goal: Verbalizes/displays adequate comfort level or baseline comfort level  Description: Interventions:- Encourage patient to monitor pain and request assistance- Assess pain using appropriate pain scale- Administer analgesics based on type and severity of pain and evaluate response- Implement non-pharmacological measures as appropriate and evaluate response- Consider cultural and social influences on pain and pain management- Notify physician/advanced practitioner if interventions unsuccessful or patient reports new pain  Outcome: Progressing     Problem: SAFETY ADULT  Goal: Patient will remain free of falls  Description: INTERVENTIONS:- Educate patient/family on patient safety including physical limitations- Instruct patient to call for assistance with activity - Consult OT/PT to assist with strengthening/mobility - Keep Call bell within reach- Keep bed low and locked with side rails adjusted as appropriate- Keep care items and personal belongings within reach- Initiate and maintain comfort rounds- Make Fall Risk Sign visible to staff- Offer Toileting every 2 Hours, in advance of need- Initiate/Maintain bed alarm- Obtain necessary fall risk management equipment:  - Apply yellow socks and bracelet for high fall risk patients- Consider moving patient to room near nurses station  Outcome: Progressing

## 2025-05-06 NOTE — ASSESSMENT & PLAN NOTE
Presented to [facility] on 4/24/25 with left hip pain following a mechanical fall  Noted to have a comminuted intertrochanteric fracture of the left femur   S/p ORIF/IMN 4/24   Weightbearing as tolerated  PT/OT, pain control per PMR  Is having muscle spasms that Robaxin 500 mg q6hrs prn is helping.    Two week followup will be 5/8/25

## 2025-05-06 NOTE — PROGRESS NOTES
Progress Note - PMR   Name: Kamini Martines 75 y.o. female I MRN: [id number]  Unit/Bed#: -01 I Date of Admission: 4/28/2025   Date of Service: 5/6/2025 I Hospital Day: 8     Assessment & Plan  Closed left hip fracture, initial encounter (HCC)  Patient presents on 4/24 to [facility] with left hip pain after a fall  And have a comminuted intertrochanteric fracture of the left femur  Status post ORIF with IM nailing by Dr. Parry on 4/24 5/2- 2 week post-op XR ordered for 5/8 - will consult ortho next week    Weightbearing as tolerated to the left lower extremity  Physical and Occupational Therapy  Pain control with Tylenol, gabapentin, Robaxin and as needed oxycodone > Increasing gabapentin from 100 mg BID to TID for better pain control on 5/1  DVT prophylaxis.  Is recommended for 81 mg aspirin twice daily at the time of discharge to complete the 28 days however is on aspirin 325 mg daily currently  Impaired mobility and activities of daily living  Patient was evaluated by the rehabilitation team MD and an appropriate candidate for acute inpatient rehabilitation program at this time.  The patient will tolerate 3 hours/day 5 to 7 days/week of intensive physical, occupational therapy in order to obtain goals for community discharge  Due to the patient's functional Compared to their baseline level of function in addition to their ongoing medical needs, the patient would benefit from daily supervision from a rehabilitation physician as well as rehabilitation nursing to implement and adjust the medical as well as functional plan of care in order to meet the patient's goals.    HTN (hypertension)  Currently on a regimen of Entresto and spironolactone for the heart failure  Was on as needed Demadex at home in the past  Monitor pressures during therapy as well as during routine vitals  CVA (cerebral vascular accident) (HCC)  History of stroke in the past and was placed on aspirin 325 mg daily  Is also not on  a statin at home  After history of stroke did not follow-up with neurology and refused follow-up with Dr. Jones.  Is not on the appropriate regimen for secondary stroke prophylaxis.  Was on 81 mg previously but stated her cardiologist increased it to 325 mg.  She endorsed that she did not start her statin.  Mild intermittent asthma without complication  Currently on a as needed albuterol inhaler  Continue monitor on physical examination as well as during routine vitals, therapy vitals  LUCIA (obstructive sleep apnea)  Was noncompliant with the CPAP due to overall comfort with strap and discontinued its use  Follow-up with sleep medicine as an outpatient  Class 2 obesity in adult  BMI of 36.87 on arrival  Continue to recommend lifestyle modification, dietary changes, weight loss counseling especially in the setting of recent fracture.  Type 2 diabetes mellitus without complication, without long-term current use of insulin ([facility])  Lab Results   Component Value Date    HGBA1C 7.8 (H) 04/29/2025       Recent Labs     05/05/25  1049 05/05/25  1558 05/05/25  2102 05/06/25  0553   POCGLU 307* 150* 146* 174*     Currently on regimen of Lantus 8 units at bedtime +3 units of lispro with meals as well as sliding scale insulin, Accu-Cheks 4 times daily  Continue monitoring blood glucose levels and adjust per recommendations by internal medicine  Continue carb controlled diet level 2  Urinary retention  Had indwelling Adams catheter placed 4/25 which was removed on 4/28 and did require initial straight catheterization  5/1- PVRs x3 low. Now voiding and continent  HFrEF (heart failure with reduced ejection fraction) ([facility])  Wt Readings from Last 3 Encounters:   04/30/25 101 kg (221 lb 9 oz)   04/24/25 99.3 kg (219 lb)   02/20/25 99.3 kg (219 lb)     Echocardiogram most recently with a cardiac MRI showing EF of 41%  Nonischemic cardiomyopathy on Entresto twice daily at home as well as Aldactone.  Aldactone is being restarted today  per internal medicine  Does not tolerate beta-blockers in the past  Monitor intake and output as well as weights  Education on diet and activity  GERD (gastroesophageal reflux disease)  Continue Pepcid and Protonix and as needed Maalox  CAD (coronary artery disease)  Currently on aspirin  Noted to have nonobstructive CAD on cardiac catheterization  Follow-up with outpatient cardiology  Thyroid nodule    Buttock wound, right, sequela  Noted on admission  Consult wound care  Anemia  Preoperative hemoglobin of 12.1 now currently 9.1 qualifying for acute blood loss anemia  5/5- Hgb stable at 8.9    Continue to monitor hemoglobin on bio weekly CBC or sooner if quickly indicated  Leukocytosis  Most recent WBC count of 12.95 which is down from postoperative 18.0  5/5- WBC downtrending to 9.3 (10.9)    Continue to monitor on biweekly CBC or sooner if indicated, monitor for signs of infection at the surgical site  Left leg swelling    Bilateral leg edema      Subjective   75-year-old female with history of chronic heart failure, nonischemic cardiomyopathy, CAD, hypertension, LUCIA noncompliant with CPAP, lumbar spinal stenosis, arthritis presenting 4/24 for left hip pain after a fall and a very comminuted intertrochanteric fracture of the left femur underwent IM nailing with Dr. Winkler on 4/24. Postoperative course complicated by urinary retention requiring a Adams catheter     Chief Complaint: f/u ambulatory dysfunction    Interval: Patient seen and evaluated in room with no acute issues overnight.  Participating in therapy. Discharge planning activities and setting up family training. Patient denies fever, chills, nausea, emesis, cough, shortness of breath, diarrhea, or constipation. Sleep was fine, mood stable. Pain is controlled.  Completing meals, voiding without incontinence. Last bowel movement on 5/16.  Will be getting x-rays on Thursday and will reach out to orthopedics for 2-week follow-up.  Additionally she would  like a hospital bed but does not have a qualifying diagnosis but will speak with case management about reaching out to the patient about rentals.  Patient does have a small wound on the right buttocks but is healing.      Objective :  Temp:  [97.6 °F (36.4 °C)-98 °F (36.7 °C)] 98 °F (36.7 °C)  HR:  [77-95] 95  BP: (119-143)/(43-65) 119/53  Resp:  [16-18] 18  SpO2:  [96 %-98 %] 98 %  O2 Device: None (Room air)    Functional Update:  Physical Therapy Occupational Therapy Speech Therapy   Weight Bearing Status: Weight Bearing as Tolerated  Transfers: Maximum Assistance  Bed Mobility: Maximum Assistance  Amulation Distance (ft): 2 feet  Ambulation: Maximum Assistance  Assistive Device for Ambulation: Roller Walker  Wheelchair Mobility Distance: 0 ft  Number of Stairs: 0  Discharge Recommendations: Home with:  DC Home with:: Family Support, Home Physical Therapy   Eating: Independent  Grooming: Supervision  Bathing: Moderate Assistance  Bathing: Moderate Assistance  Upper Body Dressing: Supervision  Lower Body Dressing: Moderate Assistance  Toileting: Total Assistance  Toilet Transfer: Moderate Assistance  Cognition: Within Defined Limits  Orientation: Place, Person, Time, Situation                 Physical Exam  Vitals reviewed.   Constitutional:       General: She is not in acute distress.     Appearance: She is obese.   HENT:      Head: Normocephalic and atraumatic.      Right Ear: External ear normal.      Left Ear: External ear normal.      Nose: Nose normal. No rhinorrhea.      Mouth/Throat:      Mouth: Mucous membranes are moist.      Pharynx: Oropharynx is clear.   Eyes:      General: No scleral icterus.  Cardiovascular:      Rate and Rhythm: Normal rate.   Pulmonary:      Effort: Pulmonary effort is normal. No respiratory distress.   Abdominal:      General: There is no distension.      Palpations: Abdomen is soft.   Skin:     General: Skin is warm and dry.      Comments: Mild ecchymosis throughout the bilateral  left proximal thigh   Neurological:      Mental Status: She is alert and oriented to person, place, and time.   Psychiatric:         Mood and Affect: Mood normal.         Behavior: Behavior normal.             Scheduled Meds:  Current Facility-Administered Medications   Medication Dose Route Frequency Provider Last Rate    acetaminophen  975 mg Oral Q8H [facility] Tammy Pradhan MD      albuterol  2 puff Inhalation Q6H PRN Tamym Pradhan MD      aluminum-magnesium hydroxide-simethicone  30 mL Oral Q4H PRN Tammy Pradhan MD      aspirin  325 mg Oral Daily Tammy Pradhan MD      Cholecalciferol  1,000 Units Oral Daily Tammy Pradhan MD      enoxaparin  30 mg Subcutaneous Q12H Tammy Pradhan MD      gabapentin  100 mg Oral TID Gladys Carvalho DO      insulin lispro  1-5 Units Subcutaneous HS Tammy Pradhan MD      insulin lispro  1-6 Units Subcutaneous TID AC Tammy Pradhan MD      insulin lispro  3 Units Subcutaneous TID With Meals Tammy Pradhan MD      loperamide  2 mg Oral Q4H PRN Liliana Flores MD      metFORMIN  1,000 mg Oral Daily With Dinner Rashmi Ibrahim PA-C      methocarbamol  500 mg Oral Q6H PRN Tammy Pradhan MD      multivitamin stress formula  1 tablet Oral Daily Tammy Pradhan MD      naloxone  0.04 mg Intravenous Q1MIN PRN Tammy Pradhan MD      oxyCODONE  2.5 mg Oral Q4H PRN Tammy Pradhan MD      Or    oxyCODONE  5 mg Oral Q4H PRN Tammy Pradhan MD      pantoprazole  40 mg Oral Early Morning Tammy Pradhan MD      [Held by provider] polyethylene glycol  17 g Oral Daily PRN Liliana Flores MD      PreserVision AREDS 2  1 capsule Oral BID STEFFANY Nelson      sacubitril-valsartan  1 tablet Oral BID Tammy Pradhan MD      spironolactone  25 mg Oral Daily STEFFANY Nelson           Lab Results: I have reviewed the following results:  Results from last 7 days   Lab Units 05/05/25  0549 05/01/25  0547   HEMOGLOBIN g/dL 8.9* 9.1*   HEMATOCRIT %  29.1* 28.9*   WBC Thousand/uL 9.33 10.90*   PLATELETS Thousands/uL 510* 417*     Results from last 7 days   Lab Units 05/05/25  0549 05/01/25  0547   BUN mg/dL 19 23   SODIUM mmol/L 140 142   POTASSIUM mmol/L 4.4 4.0   CHLORIDE mmol/L 106 107   CREATININE mg/dL 0.49* 0.44*              Lexa Bonilla, DO  Physical Medicine and Rehabilitation  [facility]    I have spent a total time of 35 minutes in caring for this patient on the day of the visit/encounter including Counseling / Coordination of care, Documenting in the medical record, and Communicating with other healthcare professionals .

## 2025-05-06 NOTE — PROGRESS NOTES
Progress Note - Hospitalist   Name: Kamini Martines 75 y.o. female I MRN: [id number]  Unit/Bed#: -01 I Date of Admission: 4/28/2025   Date of Service: 5/6/2025 I Hospital Day: 8    Assessment & Plan  Closed left hip fracture, initial encounter ([facility])  Presented to [facility] on 4/24/25 with left hip pain following a mechanical fall  Noted to have a comminuted intertrochanteric fracture of the left femur   S/p ORIF/IMN 4/24   Weightbearing as tolerated  PT/OT, pain control per PMR  Is having muscle spasms that Robaxin 500 mg q6hrs prn is helping.    Two week followup will be 5/8/25  HFrEF (heart failure with reduced ejection fraction) ([facility])  NICM  EF 41% on cardiac MRI (2019)/ECHO 40-45% 2/4/25  Follows with Dr Worley as an OP  Home: Entresto  mg BID/Aldactone 25mg qd/Torsemide 10mg qd prn edema  Here:  Entresto  mg BID/Aldactone as below  Does not tolerate BB = has tried Coreg and Metoprolol in past per Dr Worley's office note  Volume status stable  Restarted Aldactone at 12.5mg qd on 4/29 and increased back up to 25mg qd to start 5/2/25  BMP 5/1/25 = stable  Having significant bilateral LE edema, duplex negative. Takes Torsemide 10mg PRN at home.   Torsemide 10 mg x 1 given 5/5/25  Edema improved today 5/6/25 and will likely give another dose tomorrow 5/7/25  Type 2 diabetes mellitus without complication, without long-term current use of insulin ([facility])  HbA1C 7.8 on 4/29/25  Has a glucometer at home  Home: Metformin  mg daily with dinner  Here:  Metformin XR 1000 mg daily dinner/Lispro 3U TID  Continue QID Accuchecks/SSI (Algo 3/1) and DM diet  Restarted Metformin  mg with dinner 4/30/25, stopped the Lantus; kept the Lispro at 3U TID with meals  5/3/25: increased Metformin 1000mg daily with dinner.   5/6/25: no changes today   CVA (cerebral vascular accident) (HCC)  Continue  mg qd as at home  Not on statins at home because she didn't want to take in past  Didn't followup with  "Neuro after stroke  LUCIA (obstructive sleep apnea)  Stopped using CPAP few months ago as strap was causing neck pain  Outpatient sleep f/u  Mild intermittent asthma without complication  No exacerbation  Continue Albuterol prn  Class 2 obesity in adult  BMI 36.87  Affects all aspects of care  Lifestyle modification  GERD (gastroesophageal reflux disease)  Takes Prilosec 40mg qd at home  Currently on Protonix 40mg qd and Pepcid qd here  Started weaning Pepcid and reduced to 10mg qd 4/30/25 -->  stopped 5/5/25  CAD (coronary artery disease)  Nonobstructive coronary artery disease on cardiac cath  On  mg daily for hx CVA  Urinary retention  Adams was placed on 4/25 and removed on 4/28  She takes Vesicare at home  Per PMR  Thyroid nodule  Found on CT cervical spine  \"1.1 cm right thyroid nodule. Incidental discovery of one or more thyroid nodule(s) measuring less than 1.5 cm and without suspicious features is noted in this patient who is above 35 years old; according to guidelines published in the February 2015 white paper on incidental thyroid nodules in the Journal of the American College of Radiology (JACR), no further evaluation is recommended.\"  Buttock wound, right, sequela  POA  Per PMR  Anemia  Post op  stable  Leukocytosis  Mild  Likely reactive  No fever  Will watch  Bilateral leg edema  Hx of CHF which may be contributing- see above  Duplex negative for DVT  Gave Torsemide 10mg PO x 1 on 5/5/25    The above assessment and plan was reviewed and updated as determined by my evaluation of the patient on 5/6/2025.    History of Present Illness   Patient seen and examined. Patients overnight issues or events were reviewed with nursing staff. New or overnight issues include the following:   No new or overnight issues.  Offers no complaints    Review of Systems    Objective :  Temp:  [97.6 °F (36.4 °C)-98 °F (36.7 °C)] 98 °F (36.7 °C)  HR:  [77-95] 95  BP: (119-143)/(43-65) 119/53  Resp:  [16-18] 18  SpO2:  [96 " %-98 %] 98 %  O2 Device: None (Room air)    Invasive Devices       Drain  Duration             Ureteral Internal Stent Right ureter 6 Fr. 307 days                    Physical Exam  General Appearance: no distress, nontoxic appearing  HEENT:  External ear normal.  Nose normal w/o drainage. Mucous membranes are moist. Oropharynx is clear. Conjunctiva clear w/o icterus or redness.  Neck:  Supple, normal ROM  Lungs: BBS without crackles/wheeze/rhonchi; respirations unlabored with normal inspiratory/expiratory effort.  No retractions noted.  On RA  CV: regular rate and rhythm; no rubs/murmurs/gallops, PMI normal   ABD: Abdomen is soft.  Bowel sounds all quadrants.  Nontender with no distention.    EXT: +LE edema L>R but has decreased since yesterday  Skin: normal turgor, normal texture  Psych: affect normal, mood normal  Neuro: AAO       The above physical exam was reviewed and updated as determined by my evaluation of the patient on 5/6/2025.      Lab Results: I have reviewed the following results:  Results from last 7 days   Lab Units 05/05/25  0549 05/01/25  0547   WBC Thousand/uL 9.33 10.90*   HEMOGLOBIN g/dL 8.9* 9.1*   HEMATOCRIT % 29.1* 28.9*   PLATELETS Thousands/uL 510* 417*     Results from last 7 days   Lab Units 05/05/25  0549 05/01/25  0547   SODIUM mmol/L 140 142   POTASSIUM mmol/L 4.4 4.0   CHLORIDE mmol/L 106 107   CO2 mmol/L 27 27   BUN mg/dL 19 23   CREATININE mg/dL 0.49* 0.44*   CALCIUM mg/dL 9.7 9.8             Results from last 7 days   Lab Units 05/06/25  0553 05/05/25  2102 05/05/25  1558   POC GLUCOSE mg/dl 174* 146* 150*       Imaging Results Review: No pertinent imaging studies reviewed.  Other Study Results Review: No additional pertinent studies reviewed.    Review of Scheduled Meds: Medications  reviewed and reconciled as needed  Current Facility-Administered Medications   Medication Dose Route Frequency Provider Last Rate    acetaminophen  975 mg Oral Q8H MAHSA Pradhan MD       albuterol  2 puff Inhalation Q6H PRN Tammy Pradhan MD      aluminum-magnesium hydroxide-simethicone  30 mL Oral Q4H PRN Tammy Pradhan MD      aspirin  325 mg Oral Daily Tammy Pradhan MD      Cholecalciferol  1,000 Units Oral Daily Tammy Pradhan MD      enoxaparin  30 mg Subcutaneous Q12H Tammy Pradhan MD      gabapentin  100 mg Oral TID Gladys Carvalho DO      insulin lispro  1-5 Units Subcutaneous HS Tammy Pradhan MD      insulin lispro  1-6 Units Subcutaneous TID AC Tammy Pradhan MD      insulin lispro  3 Units Subcutaneous TID With Meals Tammy Pradhan MD      loperamide  2 mg Oral Q4H PRN Liliana Flores MD      metFORMIN  1,000 mg Oral Daily With Dinner Rashmi Ibrahim PA-C      methocarbamol  500 mg Oral Q6H PRN Tammy Pradhan MD      multivitamin stress formula  1 tablet Oral Daily Tammy Pradhan MD      naloxone  0.04 mg Intravenous Q1MIN PRN Tammy Pradhan MD      oxyCODONE  2.5 mg Oral Q4H PRN Tammy Pradhan MD      Or    oxyCODONE  5 mg Oral Q4H PRN Tammy Pradhan MD      pantoprazole  40 mg Oral Early Morning Tammy Pradhan MD      [Held by provider] polyethylene glycol  17 g Oral Daily PRN Liliana Flores MD      PreserVision AREDS 2  1 capsule Oral BID STEFFANY Nelson      sacubitril-valsartan  1 tablet Oral BID Tammy Pradhan MD      spironolactone  25 mg Oral Daily STEFFANY Nelson         VTE Pharmacologic Prophylaxis: Lovenox  Code Status: Level 1 - Full Code  Current Length of Stay: 8 day(s)    Administrative Statements     ** Please Note:  voice to text software may have been used in the creation of this document. Although proof errors in transcription or interpretation are a potential of such software**

## 2025-05-07 ENCOUNTER — APPOINTMENT (INPATIENT)
Dept: RADIOLOGY | Facility: HOSPITAL | Age: 75
DRG: 560 | End: 2025-05-07
Payer: MEDICARE

## 2025-05-07 LAB
GLUCOSE SERPL-MCNC: 161 MG/DL (ref 65–140)
GLUCOSE SERPL-MCNC: 169 MG/DL (ref 65–140)
GLUCOSE SERPL-MCNC: 189 MG/DL (ref 65–140)
GLUCOSE SERPL-MCNC: 220 MG/DL (ref 65–140)

## 2025-05-07 PROCEDURE — 99232 SBSQ HOSP IP/OBS MODERATE 35: CPT | Performed by: NURSE PRACTITIONER

## 2025-05-07 PROCEDURE — 97110 THERAPEUTIC EXERCISES: CPT

## 2025-05-07 PROCEDURE — 82948 REAGENT STRIP/BLOOD GLUCOSE: CPT

## 2025-05-07 PROCEDURE — 97530 THERAPEUTIC ACTIVITIES: CPT

## 2025-05-07 PROCEDURE — 97535 SELF CARE MNGMENT TRAINING: CPT

## 2025-05-07 PROCEDURE — 73552 X-RAY EXAM OF FEMUR 2/>: CPT

## 2025-05-07 PROCEDURE — 97116 GAIT TRAINING THERAPY: CPT

## 2025-05-07 PROCEDURE — 99232 SBSQ HOSP IP/OBS MODERATE 35: CPT | Performed by: STUDENT IN AN ORGANIZED HEALTH CARE EDUCATION/TRAINING PROGRAM

## 2025-05-07 RX ORDER — TORSEMIDE 10 MG/1
10 TABLET ORAL ONCE
Status: COMPLETED | OUTPATIENT
Start: 2025-05-07 | End: 2025-05-07

## 2025-05-07 RX ADMIN — ACETAMINOPHEN 975 MG: 325 TABLET, FILM COATED ORAL at 13:55

## 2025-05-07 RX ADMIN — GABAPENTIN 100 MG: 100 CAPSULE ORAL at 08:56

## 2025-05-07 RX ADMIN — ACETAMINOPHEN 975 MG: 325 TABLET, FILM COATED ORAL at 05:50

## 2025-05-07 RX ADMIN — TORSEMIDE 10 MG: 10 TABLET ORAL at 12:00

## 2025-05-07 RX ADMIN — SACUBITRIL AND VALSARTAN 1 TABLET: 97; 103 TABLET, FILM COATED ORAL at 21:22

## 2025-05-07 RX ADMIN — Medication 1000 UNITS: at 08:56

## 2025-05-07 RX ADMIN — B-COMPLEX W/ C & FOLIC ACID TAB 1 TABLET: TAB at 08:56

## 2025-05-07 RX ADMIN — ENOXAPARIN SODIUM 30 MG: 30 INJECTION SUBCUTANEOUS at 21:26

## 2025-05-07 RX ADMIN — METFORMIN ER 500 MG 1000 MG: 500 TABLET ORAL at 16:43

## 2025-05-07 RX ADMIN — Medication 1 CAPSULE: at 21:22

## 2025-05-07 RX ADMIN — OXYCODONE HYDROCHLORIDE 5 MG: 5 TABLET ORAL at 05:55

## 2025-05-07 RX ADMIN — OXYCODONE HYDROCHLORIDE 5 MG: 5 TABLET ORAL at 16:42

## 2025-05-07 RX ADMIN — GABAPENTIN 100 MG: 100 CAPSULE ORAL at 21:21

## 2025-05-07 RX ADMIN — METHOCARBAMOL 500 MG: 500 TABLET ORAL at 21:25

## 2025-05-07 RX ADMIN — INSULIN LISPRO 2 UNITS: 100 INJECTION, SOLUTION INTRAVENOUS; SUBCUTANEOUS at 12:02

## 2025-05-07 RX ADMIN — OXYCODONE HYDROCHLORIDE 5 MG: 5 TABLET ORAL at 01:55

## 2025-05-07 RX ADMIN — INSULIN LISPRO 1 UNITS: 100 INJECTION, SOLUTION INTRAVENOUS; SUBCUTANEOUS at 08:58

## 2025-05-07 RX ADMIN — SPIRONOLACTONE 25 MG: 25 TABLET ORAL at 09:01

## 2025-05-07 RX ADMIN — OXYCODONE HYDROCHLORIDE 5 MG: 5 TABLET ORAL at 12:00

## 2025-05-07 RX ADMIN — PANTOPRAZOLE SODIUM 40 MG: 40 TABLET, DELAYED RELEASE ORAL at 05:50

## 2025-05-07 RX ADMIN — GABAPENTIN 100 MG: 100 CAPSULE ORAL at 16:42

## 2025-05-07 RX ADMIN — ACETAMINOPHEN 975 MG: 325 TABLET, FILM COATED ORAL at 21:21

## 2025-05-07 RX ADMIN — SACUBITRIL AND VALSARTAN 1 TABLET: 97; 103 TABLET, FILM COATED ORAL at 09:01

## 2025-05-07 RX ADMIN — INSULIN LISPRO 3 UNITS: 100 INJECTION, SOLUTION INTRAVENOUS; SUBCUTANEOUS at 09:00

## 2025-05-07 RX ADMIN — Medication 1 CAPSULE: at 09:00

## 2025-05-07 RX ADMIN — ENOXAPARIN SODIUM 30 MG: 30 INJECTION SUBCUTANEOUS at 08:56

## 2025-05-07 RX ADMIN — ASPIRIN 325 MG ORAL TABLET 325 MG: 325 PILL ORAL at 08:57

## 2025-05-07 RX ADMIN — INSULIN LISPRO 1 UNITS: 100 INJECTION, SOLUTION INTRAVENOUS; SUBCUTANEOUS at 21:26

## 2025-05-07 RX ADMIN — INSULIN LISPRO 1 UNITS: 100 INJECTION, SOLUTION INTRAVENOUS; SUBCUTANEOUS at 16:43

## 2025-05-07 NOTE — ASSESSMENT & PLAN NOTE
Lab Results   Component Value Date    HGBA1C 7.8 (H) 04/29/2025       Recent Labs     05/06/25  1610 05/06/25  2102 05/07/25  0602 05/07/25  1036   POCGLU 141* 197* 161* 220*     Currently on regimen of Lantus 8 units at bedtime +3 units of lispro with meals as well as sliding scale insulin, Accu-Cheks 4 times daily  Continue monitoring blood glucose levels and adjust per recommendations by internal medicine  Continue carb controlled diet level 2

## 2025-05-07 NOTE — ASSESSMENT & PLAN NOTE
Patient presents on 4/24 to [facility] with left hip pain after a fall  And have a comminuted intertrochanteric fracture of the left femur  Status post ORIF with IM nailing by Dr. Parry on 4/24 5/2- 2 week post-op XR ordered for 5/8 - will consult ortho    Weightbearing as tolerated to the left lower extremity  Physical and Occupational Therapy  Pain control with Tylenol, gabapentin, Robaxin and as needed oxycodone > Increasing gabapentin from 100 mg BID to TID for better pain control on 5/1  DVT prophylaxis.  Is recommended for 81 mg aspirin twice daily at the time of discharge to complete the 28 days however is on aspirin 325 mg daily currently

## 2025-05-07 NOTE — PROGRESS NOTES
"   05/07/25 0700   Restrictions/Precautions   Precautions Fall Risk;Pain   LLE Weight Bearing Per Order WBAT   Lifestyle   Autonomy \"I need to try...hand me that \"   Eating   Type of Assistance Needed Independent   Physical Assistance Level No physical assistance   Comment seated   Eating CARE Score 6   Oral Hygiene   Type of Assistance Needed Supervision   Physical Assistance Level No physical assistance   Comment prefers to sit but can complete in stance at supervision   Oral Hygiene CARE Score 4   Shower/Bathe Self   Type of Assistance Needed Physical assistance   Physical Assistance Level 25% or less   Comment sponge bathing routine--no active shower orders. pt able to bathe all parts with assist required for bathing rear. pt reports that she will be able to purchase a long handled sponge if needed at time of d/c but does have a floor based foot scrubber.   Shower/Bathe Self CARE Score 3   Upper Body Dressing   Type of Assistance Needed Set-up / clean-up   Physical Assistance Level No physical assistance   Comment seated   Upper Body Dressing CARE Score 5   Lower Body Dressing   Type of Assistance Needed Supervision   Physical Assistance Level No physical assistance   Comment did not need use of LHAE this session. stands with supervision for CM   Lower Body Dressing CARE Score 4   Putting On/Taking Off Footwear   Type of Assistance Needed Physical assistance;Adaptive equipment   Physical Assistance Level 26%-50%   Comment pt able to doff socks using dressing stick. dons socks using sock aid. requires TA for donning TEDS. assist to don shoes--unable to complete due to edema   Putting On/Taking Off Footwear CARE Score 3   Sit to Stand   Type of Assistance Needed Set-up / clean-up   Physical Assistance Level No physical assistance   Comment RW   Sit to Stand CARE Score 5   Bed-Chair Transfer   Type of Assistance Needed Supervision   Physical Assistance Level No physical assistance   Comment DS RW " "  Chair/Bed-to-Chair Transfer CARE Score 4   Toileting Hygiene   Type of Assistance Needed Supervision   Physical Assistance Level No physical assistance   Comment for bladder hygiene and CM   Toileting Hygiene CARE Score 4   Toilet Transfer   Type of Assistance Needed Supervision   Physical Assistance Level No physical assistance   Comment DS to BSC over toilet. pt reports having a angled GB on R side of toilet at home   Toilet Transfer CARE Score 4   Cognition   Overall Cognitive Status WFL   Arousal/Participation Alert;Cooperative   Attention Attends with cues to redirect   Orientation Level Oriented X4   Memory Within functional limits   Following Commands Follows one step commands without difficulty   Activity Tolerance   Activity Tolerance Patient tolerated treatment well   Assessment   Treatment Assessment pt engages in 90 minute skilled OT session focusing on ADL retraining, func transfers, activity tolerance. see above for full func details. pt reports rough night last night due to increased pain, agreeable to ADL session. also reports that she has not been taking pain meds \"because she doesn't want to if she doesn't need to\" so edu provided on taking pain meds prior to therapy sessions at least in order to assist in pain management. pt continues to demo self limiting behaviors with bed mobility and standing self care tasks--heavily relies on external structures despite not having access to them at home (elevating bed, use of bed rail) and when instructed to simulate to home set up pt continues to rely on external surfaces vs edu/recommendations. pt CAN complete grooming tasks in stance however continues to request to complete from seated position as she has all her items in her bedside table--when asked if she has a similar set up at home, pt reports, \"my bathroom is tiny\" providing no further indication of how she plans on completing grooming tasks. does well with LHAE for LE self care tasks with good " carryover. recommend continued skilled care to focus on ADL retraining, func transfers, standing yaw/bal, light UE strengthening, in order to decrease burden of care at d/c.   Prognosis Good   Problem List Decreased strength;Decreased range of motion;Decreased endurance;Impaired balance;Decreased mobility;Obesity;Pain   Plan   Treatment/Interventions ADL retraining;Functional transfer training;Therapeutic exercise;Endurance training;Patient/family training;Equipment eval/education;Compensatory technique education   OT Therapy Minutes   OT Time In 0700   OT Time Out 0830   OT Total Time (minutes) 90   OT Mode of treatment - Individual (minutes) 90   OT Mode of treatment - Concurrent (minutes) 0   OT Mode of treatment - Group (minutes) 0   OT Mode of treatment - Co-treat (minutes) 0   OT Mode of Treatment - Total time(minutes) 90 minutes   OT Cumulative Minutes 720   Therapy Time missed   Time missed? No

## 2025-05-07 NOTE — ASSESSMENT & PLAN NOTE
NICM  EF 41% on cardiac MRI (2019)/ECHO 40-45% 2/4/25  Follows with Dr Worley as an OP  Home: Entresto  mg BID/Aldactone 25mg qd/Torsemide 10mg qd prn edema  Here:  Entresto  mg BID/Aldactone as below  Does not tolerate BB = has tried Coreg and Metoprolol in past per Dr Worley's office note  Volume status stable  Restarted Aldactone at 12.5mg qd on 4/29 and increased back up to 25mg qd to start 5/2/25  BMP 5/1/25 = stable  Was having significant bilateral LE edema, duplex negative for DVT. Takes Torsemide 10mg PRN at home.   Torsemide 10 mg x 1 given 5/5/25  Edema improved on 5/6/25 and will give another dose today 5/7/25  BMP 5/8/25

## 2025-05-07 NOTE — PROGRESS NOTES
"Progress Note - Hospitalist   Name: Kamini Martines 75 y.o. female I MRN: [id number]  Unit/Bed#: -01 I Date of Admission: 4/28/2025   Date of Service: 5/7/2025 I Hospital Day: 9    Assessment & Plan  Closed left hip fracture, initial encounter ([facility])  Presented to [facility] on 4/24/25 with left hip pain following a mechanical fall  Noted to have a comminuted intertrochanteric fracture of the left femur   S/p ORIF/IMN 4/24   Weightbearing as tolerated  PT/OT, pain control per PMR  Is having muscle spasms that Robaxin 500 mg q6hrs prn is helping.    Per Ortho note 4/28: \"DVT ppx: recommend lovenox while in house, d/c on aspirin 81mg BID x 6 weeks\".   However she is already on 325mg qd as at home anyway  Two week followup will be 5/8/25  HFrEF (heart failure with reduced ejection fraction) ([facility])  NICM  EF 41% on cardiac MRI (2019)/ECHO 40-45% 2/4/25  Follows with Dr Worley as an OP  Home: Entresto  mg BID/Aldactone 25mg qd/Torsemide 10mg qd prn edema  Here:  Entresto  mg BID/Aldactone as below  Does not tolerate BB = has tried Coreg and Metoprolol in past per Dr Worley's office note  Volume status stable  Restarted Aldactone at 12.5mg qd on 4/29 and increased back up to 25mg qd to start 5/2/25  BMP 5/1/25 = stable  Was having significant bilateral LE edema, duplex negative for DVT. Takes Torsemide 10mg PRN at home.   Torsemide 10 mg x 1 given 5/5/25  Edema improved on 5/6/25 and will give another dose today 5/7/25  BMP 5/8/25  Type 2 diabetes mellitus without complication, without long-term current use of insulin ([facility])  HbA1C 7.8 on 4/29/25  Has a glucometer at home  Home: Metformin  mg daily with dinner  Here:  Metformin XR 1000 mg daily dinner/Lispro 3U TID  Continue QID Accuchecks/SSI (Algo 3/1) and DM diet  Restarted Metformin  mg with dinner 4/30/25, stopped the Lantus; kept the Lispro at 3U TID with meals  5/3/25: increased Metformin 1000mg daily with dinner.   5/7/25: will keep " "Metformin same and stop Lispro WM  CVA (cerebral vascular accident) (HCC)  Continue  mg qd as at home  Not on statins at home because she didn't want to take in past  Didn't followup with Neuro after stroke  LUCIA (obstructive sleep apnea)  Stopped using CPAP few months ago as strap was causing neck pain  Outpatient sleep f/u  Mild intermittent asthma without complication  No exacerbation  Continue Albuterol prn  Class 2 obesity in adult  BMI 36.87  Affects all aspects of care  Lifestyle modification  GERD (gastroesophageal reflux disease)  Takes Prilosec 40mg qd at home  Currently on Protonix 40mg qd and Pepcid qd here  Started weaning Pepcid and reduced to 10mg qd 4/30/25 -->  stopped 5/5/25  CAD (coronary artery disease)  Nonobstructive coronary artery disease on cardiac cath  On  mg daily for hx CVA  Urinary retention  Adams was placed on 4/25 and removed on 4/28  She takes Vesicare at home   Per PMR  Thyroid nodule  Found on CT cervical spine  \"1.1 cm right thyroid nodule. Incidental discovery of one or more thyroid nodule(s) measuring less than 1.5 cm and without suspicious features is noted in this patient who is above 35 years old; according to guidelines published in the February 2015 white paper on incidental thyroid nodules in the Journal of the American College of Radiology (JACR), no further evaluation is recommended.\"  Buttock wound, right, sequela  POA  Per PMR  Anemia  Post op  stable  Leukocytosis  Mild  Likely reactive  No fever  Will watch  Bilateral leg edema  Hx of CHF which may be contributing- see above  Duplex negative for DVT  Gave Torsemide 10mg PO x 1 on 5/5/25 with improvement.  Will give another dose Torsemide 10mg x 1 today 5/7/25    The above assessment and plan was reviewed and updated as determined by my evaluation of the patient on 5/7/2025.    History of Present Illness   Patient seen and examined. Patients overnight issues or events were reviewed with nursing staff. " New or overnight issues include the following:   No new or overnight issues.  Offers no complaints    Review of Systems   All other systems reviewed and are negative.      Objective :  Temp:  [97.8 °F (36.6 °C)-98.2 °F (36.8 °C)] 97.9 °F (36.6 °C)  HR:  [76-81] 76  BP: (125-137)/(58-60) 128/60  Resp:  [16-18] 18  SpO2:  [95 %-97 %] 97 %  O2 Device: None (Room air)    Invasive Devices       None                   Physical Exam  General Appearance: no distress, non toxic appearing  HEENT: PERRLA, conjuctiva normal; oropharynx clear; mucous membranes moist   Neck:  Supple, normal ROM  Lungs: CTA, normal respiratory effort, no retractions, expiratory effort normal  CV: regular rate and rhythm; no rubs/murmurs/gallops, PMI normal   ABD: soft; ND/NT; +BS  EXT: + lower leg edema L>R  Skin: normal turgor, normal texture  Psych: affect normal, mood normal  Neuro: AAO        The above physical exam was reviewed and updated as determined by my evaluation of the patient on 5/7/2025.      Lab Results: I have reviewed the following results:  Results from last 7 days   Lab Units 05/05/25  0549 05/01/25  0547   WBC Thousand/uL 9.33 10.90*   HEMOGLOBIN g/dL 8.9* 9.1*   HEMATOCRIT % 29.1* 28.9*   PLATELETS Thousands/uL 510* 417*     Results from last 7 days   Lab Units 05/05/25  0549 05/01/25  0547   SODIUM mmol/L 140 142   POTASSIUM mmol/L 4.4 4.0   CHLORIDE mmol/L 106 107   CO2 mmol/L 27 27   BUN mg/dL 19 23   CREATININE mg/dL 0.49* 0.44*   CALCIUM mg/dL 9.7 9.8             Results from last 7 days   Lab Units 05/07/25  1036 05/07/25  0602 05/06/25  2102   POC GLUCOSE mg/dl 220* 161* 197*       Imaging Results Review: No pertinent imaging studies reviewed.  Other Study Results Review: No additional pertinent studies reviewed.    Review of Scheduled Meds: Medications  reviewed and reconciled as needed  Current Facility-Administered Medications   Medication Dose Route Frequency Provider Last Rate    acetaminophen  975 mg Oral Q8H MAHSA  Tammy Pradhan MD      albuterol  2 puff Inhalation Q6H PRN Tammy Pradhan MD      aluminum-magnesium hydroxide-simethicone  30 mL Oral Q4H PRN Tammy Pradhan MD      aspirin  325 mg Oral Daily Tammy Pradhan MD      Cholecalciferol  1,000 Units Oral Daily Tammy Pradhan MD      enoxaparin  30 mg Subcutaneous Q12H Tammy Pradhan MD      gabapentin  100 mg Oral TID Gladys Carvalho DO      insulin lispro  1-5 Units Subcutaneous HS Tammy Pradhan MD      insulin lispro  1-6 Units Subcutaneous TID AC Tammy Pradhan MD      insulin lispro  3 Units Subcutaneous TID With Meals Tammy Pradhan MD      loperamide  2 mg Oral Q4H PRN Liliana Flores MD      metFORMIN  1,000 mg Oral Daily With Dinner Rashmi Ibrahim PA-C      methocarbamol  500 mg Oral Q6H PRN Tammy Pradhan MD      multivitamin stress formula  1 tablet Oral Daily Tammy Pradhan MD      naloxone  0.04 mg Intravenous Q1MIN PRN Tammy Pradhan MD      oxyCODONE  2.5 mg Oral Q4H PRN Tammy Pradhan MD      Or    oxyCODONE  5 mg Oral Q4H PRN Tammy Pradhan MD      pantoprazole  40 mg Oral Early Morning Tammy Pradhan MD      [Held by provider] polyethylene glycol  17 g Oral Daily PRN Liliana Flores MD      PreserVision AREDS 2  1 capsule Oral BID STEFFANY Nelson      sacubitril-valsartan  1 tablet Oral BID Tammy Pradhan MD      spironolactone  25 mg Oral Daily STEFFANY Nelson         VTE Pharmacologic Prophylaxis: Lovenox  Code Status: Level 1 - Full Code  Current Length of Stay: 9 day(s)    Administrative Statements     ** Please Note:  voice to text software may have been used in the creation of this document. Although proof errors in transcription or interpretation are a potential of such software**

## 2025-05-07 NOTE — ASSESSMENT & PLAN NOTE
"Presented to [facility] on 4/24/25 with left hip pain following a mechanical fall  Noted to have a comminuted intertrochanteric fracture of the left femur   S/p ORIF/IMN 4/24   Weightbearing as tolerated  PT/OT, pain control per PMR  Is having muscle spasms that Robaxin 500 mg q6hrs prn is helping.    Per Ortho note 4/28: \"DVT ppx: recommend lovenox while in house, d/c on aspirin 81mg BID x 6 weeks\".   However she is already on 325mg qd as at home anyway  Two week followup will be 5/8/25  "

## 2025-05-07 NOTE — PROGRESS NOTES
Progress Note - PMR   Name: Kamini Martines 75 y.o. female I MRN: [id number]  Unit/Bed#: -01 I Date of Admission: 4/28/2025   Date of Service: 5/7/2025 I Hospital Day: 9     Assessment & Plan  Closed left hip fracture, initial encounter (HCC)  Patient presents on 4/24 to [facility] with left hip pain after a fall  And have a comminuted intertrochanteric fracture of the left femur  Status post ORIF with IM nailing by Dr. Parry on 4/24 5/2- 2 week post-op XR ordered for 5/8 - will consult ortho    Weightbearing as tolerated to the left lower extremity  Physical and Occupational Therapy  Pain control with Tylenol, gabapentin, Robaxin and as needed oxycodone > Increasing gabapentin from 100 mg BID to TID for better pain control on 5/1  DVT prophylaxis.  Is recommended for 81 mg aspirin twice daily at the time of discharge to complete the 28 days however is on aspirin 325 mg daily currently  Impaired mobility and activities of daily living  Patient was evaluated by the rehabilitation team MD and an appropriate candidate for acute inpatient rehabilitation program at this time.  The patient will tolerate 3 hours/day 5 to 7 days/week of intensive physical, occupational therapy in order to obtain goals for community discharge  Due to the patient's functional Compared to their baseline level of function in addition to their ongoing medical needs, the patient would benefit from daily supervision from a rehabilitation physician as well as rehabilitation nursing to implement and adjust the medical as well as functional plan of care in order to meet the patient's goals.    HTN (hypertension)  Currently on a regimen of Entresto and spironolactone for the heart failure  Was on as needed Demadex at home in the past  Monitor pressures during therapy as well as during routine vitals  CVA (cerebral vascular accident) (HCC)  History of stroke in the past and was placed on aspirin 325 mg daily  Is also not on a statin  at home  After history of stroke did not follow-up with neurology and refused follow-up with Dr. Jones.  Is not on the appropriate regimen for secondary stroke prophylaxis.  Was on 81 mg previously but stated her cardiologist increased it to 325 mg.  She endorsed that she did not start her statin.  Mild intermittent asthma without complication  Currently on a as needed albuterol inhaler  Continue monitor on physical examination as well as during routine vitals, therapy vitals  LUCIA (obstructive sleep apnea)  Was noncompliant with the CPAP due to overall comfort with strap and discontinued its use  Follow-up with sleep medicine as an outpatient  Class 2 obesity in adult  BMI of 36.87 on arrival  Continue to recommend lifestyle modification, dietary changes, weight loss counseling especially in the setting of recent fracture.  Type 2 diabetes mellitus without complication, without long-term current use of insulin (Formerly [facility])  Lab Results   Component Value Date    HGBA1C 7.8 (H) 04/29/2025       Recent Labs     05/06/25  1610 05/06/25  2102 05/07/25  0602 05/07/25  1036   POCGLU 141* 197* 161* 220*     Currently on regimen of Lantus 8 units at bedtime +3 units of lispro with meals as well as sliding scale insulin, Accu-Cheks 4 times daily  Continue monitoring blood glucose levels and adjust per recommendations by internal medicine  Continue carb controlled diet level 2  Urinary retention  Had indwelling Adams catheter placed 4/25 which was removed on 4/28 and did require initial straight catheterization  5/1- PVRs x3 low. Now voiding and continent  HFrEF (heart failure with reduced ejection fraction) ([facility])  Wt Readings from Last 3 Encounters:   05/07/25 100 kg (220 lb 7.4 oz)   04/24/25 99.3 kg (219 lb)   02/20/25 99.3 kg (219 lb)     Echocardiogram most recently with a cardiac MRI showing EF of 41%  Nonischemic cardiomyopathy on Entresto twice daily at home as well as Aldactone.  Aldactone is being restarted today per  internal medicine  Does not tolerate beta-blockers in the past  Monitor intake and output as well as weights  Education on diet and activity  GERD (gastroesophageal reflux disease)  Continue Pepcid and Protonix and as needed Maalox  CAD (coronary artery disease)  Currently on aspirin  Noted to have nonobstructive CAD on cardiac catheterization  Follow-up with outpatient cardiology  Thyroid nodule    Buttock wound, right, sequela  Noted on admission  Consult wound care  Anemia  Preoperative hemoglobin of 12.1 now currently 9.1 qualifying for acute blood loss anemia  5/5- Hgb stable at 8.9    Continue to monitor hemoglobin on bio weekly CBC or sooner if quickly indicated  Leukocytosis  Most recent WBC count of 12.95 which is down from postoperative 18.0  5/5- WBC downtrending to 9.3 (10.9)    Continue to monitor on biweekly CBC or sooner if indicated, monitor for signs of infection at the surgical site  Left leg swelling  Wrapped the leg  Bilateral leg edema  Wrap the bilateral lower extremities    Subjective   75-year-old female with history of chronic heart failure, nonischemic cardiomyopathy, CAD, hypertension, LUCIA noncompliant with CPAP, lumbar spinal stenosis, arthritis presenting 4/24 for left hip pain after a fall and a very comminuted intertrochanteric fracture of the left femur underwent IM nailing with Dr. Winkler on 4/24. Postoperative course complicated by urinary retention requiring a Adams catheter     Chief Complaint: f/u fracture and f/u ambulatory dysfunction    Interval: Patient seen and evaluated in therapy without any acute issues overnight.  She is participating therapy and her dog was able to come in today and her mood is good.  She continues to perseverate on her 2-week follow-up which we had told her would occur via x-rays on Thursday 5/8 and a follow-up with orthopedic surgery usually the same day with the following day.  She is for discharge on Monday.  All questions were answered today.   Pain is under fairly good control and her overall functional status is improved drastically over the last 5 to 7 days. Patient denies fever, chills, nausea, emesis, cough, shortness of breath, diarrhea, or constipation. Sleep was fine, mood stable. Pain is controlled.      Objective :  Temp:  [97.9 °F (36.6 °C)-98.3 °F (36.8 °C)] 98.3 °F (36.8 °C)  HR:  [68-81] 68  BP: (121-137)/(55-60) 121/55  Resp:  [18] 18  SpO2:  [94 %-97 %] 94 %  O2 Device: None (Room air)    Functional Update:  Physical Therapy Occupational Therapy Speech Therapy   Weight Bearing Status: Weight Bearing as Tolerated  Transfers: Independent  Bed Mobility: Moderate Assistance  Amulation Distance (ft): 55 feet  Ambulation: Independent, Supervision  Assistive Device for Ambulation: Roller Walker  Wheelchair Mobility Distance: 0 ft  Number of Stairs: 2  Assistive Device for Stairs: Walker (RW for curb step, bilateral handrail for stairs)  Stair Assistance: Moderate Assistance (Min/ModA for curb step, Min/ModA for stairs)  Discharge Recommendations: Home with:  DC Home with:: Family Support, First Floor Setup, Home Physical Therapy   Eating: Independent  Grooming: Supervision  Bathing: Minimal Assistance  Bathing: Minimal Assistance  Upper Body Dressing: Supervision  Lower Body Dressing: Minimal Assistance  Toileting: Supervision  Toilet Transfer: Supervision  Cognition: Within Defined Limits  Orientation: Person, Place, Time, Situation                 Physical Exam  Vitals reviewed.   Constitutional:       General: She is not in acute distress.     Appearance: She is obese.   HENT:      Head: Normocephalic and atraumatic.      Right Ear: External ear normal.      Left Ear: External ear normal.      Nose: Nose normal. No rhinorrhea.      Mouth/Throat:      Mouth: Mucous membranes are moist.      Pharynx: Oropharynx is clear.   Eyes:      General: No scleral icterus.  Cardiovascular:      Rate and Rhythm: Normal rate.   Pulmonary:      Effort:  Pulmonary effort is normal. No respiratory distress.   Abdominal:      General: There is no distension.      Palpations: Abdomen is soft.   Skin:     General: Skin is warm.      Comments: Mild ecchymosis on the left proximal thigh   Neurological:      Mental Status: She is alert and oriented to person, place, and time.   Psychiatric:         Mood and Affect: Mood normal.         Behavior: Behavior normal.             Scheduled Meds:  Current Facility-Administered Medications   Medication Dose Route Frequency Provider Last Rate    acetaminophen  975 mg Oral Q8H MAHSA Tammy Pradhan MD      albuterol  2 puff Inhalation Q6H PRN Tammy Pradhan MD      aluminum-magnesium hydroxide-simethicone  30 mL Oral Q4H PRN Tammy Pradhan MD      aspirin  325 mg Oral Daily Tammy Pradhan MD      Cholecalciferol  1,000 Units Oral Daily Tammy Pradhan MD      enoxaparin  30 mg Subcutaneous Q12H Tammy Pradhan MD      gabapentin  100 mg Oral TID Gladys Carvalho DO      insulin lispro  1-5 Units Subcutaneous HS Tammy Pradhan MD      insulin lispro  1-6 Units Subcutaneous TID AC Tammy Pradhan MD      loperamide  2 mg Oral Q4H PRN Liliana Flores MD      metFORMIN  1,000 mg Oral Daily With Dinner Rashmi Ibrahim PA-C      methocarbamol  500 mg Oral Q6H PRN Tammy Pradhan MD      multivitamin stress formula  1 tablet Oral Daily Tammy Pradhan MD      naloxone  0.04 mg Intravenous Q1MIN PRN Tammy Pradhan MD      oxyCODONE  2.5 mg Oral Q4H PRN Tammy Pradhan MD      Or    oxyCODONE  5 mg Oral Q4H PRN Tammy Pradhan MD      pantoprazole  40 mg Oral Early Morning Tammy Pradhan MD      [Held by provider] polyethylene glycol  17 g Oral Daily PRN Liliana Flores MD      PreserVision AREDS 2  1 capsule Oral BID STEFFANY Nelson      sacubitril-valsartan  1 tablet Oral BID Tammy Pradhan MD      spironolactone  25 mg Oral Daily STEFFANY Nelson           Lab Results: I have reviewed  the following results:  Results from last 7 days   Lab Units 05/05/25  0549 05/01/25  0547   HEMOGLOBIN g/dL 8.9* 9.1*   HEMATOCRIT % 29.1* 28.9*   WBC Thousand/uL 9.33 10.90*   PLATELETS Thousands/uL 510* 417*     Results from last 7 days   Lab Units 05/05/25  0549 05/01/25  0547   BUN mg/dL 19 23   SODIUM mmol/L 140 142   POTASSIUM mmol/L 4.4 4.0   CHLORIDE mmol/L 106 107   CREATININE mg/dL 0.49* 0.44*              Lexa Bonilla, DO  Physical Medicine and Rehabilitation  [facility]    I have spent a total time of 35 minutes in caring for this patient on the day of the visit/encounter including Counseling / Coordination of care, Documenting in the medical record, and Communicating with other healthcare professionals .

## 2025-05-07 NOTE — ASSESSMENT & PLAN NOTE
Wt Readings from Last 3 Encounters:   05/07/25 100 kg (220 lb 7.4 oz)   04/24/25 99.3 kg (219 lb)   02/20/25 99.3 kg (219 lb)     Echocardiogram most recently with a cardiac MRI showing EF of 41%  Nonischemic cardiomyopathy on Entresto twice daily at home as well as Aldactone.  Aldactone is being restarted today per internal medicine  Does not tolerate beta-blockers in the past  Monitor intake and output as well as weights  Education on diet and activity

## 2025-05-07 NOTE — ASSESSMENT & PLAN NOTE
Hx of CHF which may be contributing- see above  Duplex negative for DVT  Gave Torsemide 10mg PO x 1 on 5/5/25 with improvement.  Will give another dose Torsemide 10mg x 1 today 5/7/25

## 2025-05-07 NOTE — ASSESSMENT & PLAN NOTE
HbA1C 7.8 on 4/29/25  Has a glucometer at home  Home: Metformin  mg daily with dinner  Here:  Metformin XR 1000 mg daily dinner/Lispro 3U TID  Continue QID Accuchecks/SSI (Algo 3/1) and DM diet  Restarted Metformin  mg with dinner 4/30/25, stopped the Lantus; kept the Lispro at 3U TID with meals  5/3/25: increased Metformin 1000mg daily with dinner.   5/7/25: will keep Metformin same and stop Lispro WM

## 2025-05-08 LAB
ANION GAP SERPL CALCULATED.3IONS-SCNC: 7 MMOL/L (ref 4–13)
BASOPHILS # BLD AUTO: 0.05 THOUSANDS/ÂΜL (ref 0–0.1)
BASOPHILS NFR BLD AUTO: 1 % (ref 0–1)
BUN SERPL-MCNC: 23 MG/DL (ref 5–25)
CALCIUM SERPL-MCNC: 9.9 MG/DL (ref 8.4–10.2)
CHLORIDE SERPL-SCNC: 103 MMOL/L (ref 96–108)
CO2 SERPL-SCNC: 29 MMOL/L (ref 21–32)
CREAT SERPL-MCNC: 0.64 MG/DL (ref 0.6–1.3)
EOSINOPHIL # BLD AUTO: 0.29 THOUSAND/ÂΜL (ref 0–0.61)
EOSINOPHIL NFR BLD AUTO: 4 % (ref 0–6)
ERYTHROCYTE [DISTWIDTH] IN BLOOD BY AUTOMATED COUNT: 14.5 % (ref 11.6–15.1)
GFR SERPL CREATININE-BSD FRML MDRD: 87 ML/MIN/1.73SQ M
GLUCOSE P FAST SERPL-MCNC: 182 MG/DL (ref 65–99)
GLUCOSE SERPL-MCNC: 182 MG/DL (ref 65–140)
GLUCOSE SERPL-MCNC: 195 MG/DL (ref 65–140)
GLUCOSE SERPL-MCNC: 196 MG/DL (ref 65–140)
GLUCOSE SERPL-MCNC: 203 MG/DL (ref 65–140)
GLUCOSE SERPL-MCNC: 205 MG/DL (ref 65–140)
HCT VFR BLD AUTO: 30.9 % (ref 34.8–46.1)
HGB BLD-MCNC: 9.3 G/DL (ref 11.5–15.4)
IMM GRANULOCYTES # BLD AUTO: 0.06 THOUSAND/UL (ref 0–0.2)
IMM GRANULOCYTES NFR BLD AUTO: 1 % (ref 0–2)
LYMPHOCYTES # BLD AUTO: 2.05 THOUSANDS/ÂΜL (ref 0.6–4.47)
LYMPHOCYTES NFR BLD AUTO: 25 % (ref 14–44)
MCH RBC QN AUTO: 28.9 PG (ref 26.8–34.3)
MCHC RBC AUTO-ENTMCNC: 30.1 G/DL (ref 31.4–37.4)
MCV RBC AUTO: 96 FL (ref 82–98)
MONOCYTES # BLD AUTO: 0.73 THOUSAND/ÂΜL (ref 0.17–1.22)
MONOCYTES NFR BLD AUTO: 9 % (ref 4–12)
NEUTROPHILS # BLD AUTO: 5.13 THOUSANDS/ÂΜL (ref 1.85–7.62)
NEUTS SEG NFR BLD AUTO: 60 % (ref 43–75)
NRBC BLD AUTO-RTO: 0 /100 WBCS
PLATELET # BLD AUTO: 578 THOUSANDS/UL (ref 149–390)
PMV BLD AUTO: 9.8 FL (ref 8.9–12.7)
POTASSIUM SERPL-SCNC: 4.8 MMOL/L (ref 3.5–5.3)
RBC # BLD AUTO: 3.22 MILLION/UL (ref 3.81–5.12)
SODIUM SERPL-SCNC: 139 MMOL/L (ref 135–147)
WBC # BLD AUTO: 8.31 THOUSAND/UL (ref 4.31–10.16)

## 2025-05-08 PROCEDURE — 97530 THERAPEUTIC ACTIVITIES: CPT

## 2025-05-08 PROCEDURE — 82948 REAGENT STRIP/BLOOD GLUCOSE: CPT

## 2025-05-08 PROCEDURE — 99233 SBSQ HOSP IP/OBS HIGH 50: CPT | Performed by: STUDENT IN AN ORGANIZED HEALTH CARE EDUCATION/TRAINING PROGRAM

## 2025-05-08 PROCEDURE — 99232 SBSQ HOSP IP/OBS MODERATE 35: CPT | Performed by: NURSE PRACTITIONER

## 2025-05-08 PROCEDURE — 97535 SELF CARE MNGMENT TRAINING: CPT

## 2025-05-08 PROCEDURE — 85025 COMPLETE CBC W/AUTO DIFF WBC: CPT | Performed by: NURSE PRACTITIONER

## 2025-05-08 PROCEDURE — 80048 BASIC METABOLIC PNL TOTAL CA: CPT | Performed by: NURSE PRACTITIONER

## 2025-05-08 PROCEDURE — 97110 THERAPEUTIC EXERCISES: CPT

## 2025-05-08 PROCEDURE — NC001 PR NO CHARGE: Performed by: STUDENT IN AN ORGANIZED HEALTH CARE EDUCATION/TRAINING PROGRAM

## 2025-05-08 RX ORDER — TORSEMIDE 10 MG/1
10 TABLET ORAL ONCE
Status: COMPLETED | OUTPATIENT
Start: 2025-05-08 | End: 2025-05-08

## 2025-05-08 RX ADMIN — B-COMPLEX W/ C & FOLIC ACID TAB 1 TABLET: TAB at 08:33

## 2025-05-08 RX ADMIN — INSULIN LISPRO 2 UNITS: 100 INJECTION, SOLUTION INTRAVENOUS; SUBCUTANEOUS at 17:06

## 2025-05-08 RX ADMIN — OXYCODONE HYDROCHLORIDE 5 MG: 5 TABLET ORAL at 21:21

## 2025-05-08 RX ADMIN — PANTOPRAZOLE SODIUM 40 MG: 40 TABLET, DELAYED RELEASE ORAL at 05:27

## 2025-05-08 RX ADMIN — Medication 1 CAPSULE: at 08:34

## 2025-05-08 RX ADMIN — INSULIN LISPRO 1 UNITS: 100 INJECTION, SOLUTION INTRAVENOUS; SUBCUTANEOUS at 21:25

## 2025-05-08 RX ADMIN — SACUBITRIL AND VALSARTAN 1 TABLET: 97; 103 TABLET, FILM COATED ORAL at 21:22

## 2025-05-08 RX ADMIN — Medication 1 CAPSULE: at 21:22

## 2025-05-08 RX ADMIN — GABAPENTIN 100 MG: 100 CAPSULE ORAL at 08:33

## 2025-05-08 RX ADMIN — SACUBITRIL AND VALSARTAN 1 TABLET: 97; 103 TABLET, FILM COATED ORAL at 08:34

## 2025-05-08 RX ADMIN — ACETAMINOPHEN 975 MG: 325 TABLET, FILM COATED ORAL at 13:07

## 2025-05-08 RX ADMIN — SPIRONOLACTONE 25 MG: 25 TABLET ORAL at 08:33

## 2025-05-08 RX ADMIN — Medication 1000 UNITS: at 08:33

## 2025-05-08 RX ADMIN — METHOCARBAMOL 500 MG: 500 TABLET ORAL at 21:21

## 2025-05-08 RX ADMIN — ENOXAPARIN SODIUM 30 MG: 30 INJECTION SUBCUTANEOUS at 08:33

## 2025-05-08 RX ADMIN — GABAPENTIN 100 MG: 100 CAPSULE ORAL at 21:21

## 2025-05-08 RX ADMIN — OXYCODONE HYDROCHLORIDE 5 MG: 5 TABLET ORAL at 13:07

## 2025-05-08 RX ADMIN — GABAPENTIN 100 MG: 100 CAPSULE ORAL at 17:04

## 2025-05-08 RX ADMIN — ENOXAPARIN SODIUM 30 MG: 30 INJECTION SUBCUTANEOUS at 21:25

## 2025-05-08 RX ADMIN — ACETAMINOPHEN 975 MG: 325 TABLET, FILM COATED ORAL at 05:27

## 2025-05-08 RX ADMIN — INSULIN LISPRO 2 UNITS: 100 INJECTION, SOLUTION INTRAVENOUS; SUBCUTANEOUS at 07:01

## 2025-05-08 RX ADMIN — ACETAMINOPHEN 975 MG: 325 TABLET, FILM COATED ORAL at 21:21

## 2025-05-08 RX ADMIN — ASPIRIN 325 MG ORAL TABLET 325 MG: 325 PILL ORAL at 08:33

## 2025-05-08 RX ADMIN — TORSEMIDE 10 MG: 10 TABLET ORAL at 11:37

## 2025-05-08 RX ADMIN — METFORMIN ER 500 MG 1000 MG: 500 TABLET ORAL at 17:04

## 2025-05-08 RX ADMIN — INSULIN LISPRO 2 UNITS: 100 INJECTION, SOLUTION INTRAVENOUS; SUBCUTANEOUS at 11:29

## 2025-05-08 RX ADMIN — OXYCODONE HYDROCHLORIDE 5 MG: 5 TABLET ORAL at 08:42

## 2025-05-08 NOTE — ASSESSMENT & PLAN NOTE
HbA1C 7.8 on 4/29/25  Has a glucometer at home  Home: Metformin  mg daily with dinner  Here:  Metformin XR 1000 mg daily dinner/Lispro 3U TID  Continue QID Accuchecks/SSI (Algo 3/1) and DM diet  Restarted Metformin  mg with dinner 4/30/25, stopped the Lantus; kept the Lispro at 3U TID with meals  5/3/25: increased Metformin 1000mg daily with dinner.   5/7/25: kept Metformin same and stopped Lispro 3U WM but BSs are still high.  She is reluctant to add more meds but I emphasized needs good BS control to avoid wound infection  Will ask Endo to see

## 2025-05-08 NOTE — ASSESSMENT & PLAN NOTE
NICM  EF 41% on cardiac MRI (2019)/ECHO 40-45% 2/4/25  Follows with Dr Worley as an OP  Home: Entresto  mg BID/Aldactone 25mg qd/Torsemide 10mg qd prn edema  Here:  Entresto  mg BID/Aldactone as below  Does not tolerate BB = has tried Coreg and Metoprolol in past per Dr Worley's office note  Volume status stable  Restarted Aldactone at 12.5mg qd on 4/29 and increased back up to 25mg qd to start 5/2/25  BMP 5/1/25 = stable  Was having significant bilateral LE edema, duplex negative for DVT. Takes Torsemide 10mg PRN at home.   Torsemide 10 mg x 1 given 5/5/25, 5/7/25 and will give another dose today 5/8/25  BMP 5/8/25 = stable.

## 2025-05-08 NOTE — ASSESSMENT & PLAN NOTE
Hx of CHF which may be contributing- see above  Duplex negative for DVT  Gave Torsemide 10mg PO x 1 on 5/5/25 with improvement.  Will give another dose Torsemide 10mg x 1 today 5/8/25
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
## 2025-05-08 NOTE — PROGRESS NOTES
"   05/08/25 0700   Pain Assessment   Pain Assessment Tool 0-10   Pain Score 7   Pain Location/Orientation Orientation: Left;Location: Hip   Hospital Pain Intervention(s) Repositioned;Shower/Bath   Restrictions/Precautions   Precautions Fall Risk;Pain   Weight Bearing Restrictions Yes   LLE Weight Bearing Per Order WBAT   Lifestyle   Autonomy \"I was thinking about those comfy shoes\"   Eating   Type of Assistance Needed Independent   Physical Assistance Level No physical assistance   Eating CARE Score 6   Oral Hygiene   Type of Assistance Needed Independent   Physical Assistance Level No physical assistance   Comment can complete in stance but prefers to sit to complete   Oral Hygiene CARE Score 6   Shower/Bathe Self   Type of Assistance Needed Set-up / clean-up   Physical Assistance Level No physical assistance   Comment seated in recliner to complete sponge bathing routine. plan to complete shower routine prior to d/c on monday once staples removed and shower orders placed.   Shower/Bathe Self CARE Score 5   Upper Body Dressing   Type of Assistance Needed Set-up / clean-up   Physical Assistance Level No physical assistance   Comment seated   Upper Body Dressing CARE Score 5   Lower Body Dressing   Type of Assistance Needed Independent   Physical Assistance Level No physical assistance   Comment seated   Lower Body Dressing CARE Score 6   Putting On/Taking Off Footwear   Type of Assistance Needed Physical assistance;Adaptive equipment   Physical Assistance Level 25% or less   Comment dressing stick used to doff socks. use of flexible sock aid to don TEDS with very minimal assist to adjust/guide as pt pulls sting of sock aid. attempted to don B pair of pt personal shoes however due to edema, could not fit L foot into either pair. took time to look on [employer] for \"Comfort shoes\" with velcro top strap with pt to self purchase on her own time. does have personal long handled shoe horn to assist in donning R shoe. "   Putting On/Taking Off Footwear CARE Score 3   Sit to Stand   Type of Assistance Needed Independent   Physical Assistance Level No physical assistance   Comment RW   Sit to Stand CARE Score 6   Bed-Chair Transfer   Type of Assistance Needed Independent   Physical Assistance Level No physical assistance   Comment RW   Chair/Bed-to-Chair Transfer CARE Score 6   Toileting Hygiene   Type of Assistance Needed Independent   Physical Assistance Level No physical assistance   Comment for hygiene and CM; pt reports having a bidet at home   Toileting Hygiene CARE Score 6   Toilet Transfer   Type of Assistance Needed Independent   Physical Assistance Level No physical assistance   Comment RW to BSC over toilet   Toilet Transfer CARE Score 6   Cognition   Overall Cognitive Status WFL   Arousal/Participation Alert;Cooperative   Attention Attends with cues to redirect   Orientation Level Oriented X4   Memory Within functional limits   Following Commands Follows one step commands without difficulty   Activity Tolerance   Activity Tolerance Patient tolerated treatment well   Assessment   Treatment Assessment pt engages in 90 minute skilled OT session focusing on ADL retraining, func transfers, standing yaw/bal. see above for full func details. pt continues to demo slow and steady progress toward OT goals. focused on footwear today with pt using flexible sock aid to don TEDS requiring very minimal assist to adjust ROSELINE over sock aid. pt reports ease of donning and will be beneficial for home use. pt continues to demo IND level for toileting, func transfers and LE Dressing with LHAE as needed. plan to complete full shower routine once staples removed and shower orders placed. continue to focus on skilled OT to focus on ADL retraining, func transfers, standing yaw/bal, UE strengthening/endurance, to decrease burden of care at d/c.   Prognosis Good   Problem List Decreased strength;Decreased range of motion;Decreased endurance;Impaired  balance;Decreased mobility;Obesity;Pain   Plan   Treatment/Interventions ADL retraining;Functional transfer training;Therapeutic exercise;Endurance training;Cognitive reorientation;Patient/family training;Equipment eval/education;Compensatory technique education   OT Therapy Minutes   OT Time In 0700   OT Time Out 0830   OT Total Time (minutes) 90   OT Mode of treatment - Individual (minutes) 90   OT Mode of treatment - Concurrent (minutes) 0   OT Mode of treatment - Group (minutes) 0   OT Mode of treatment - Co-treat (minutes) 0   OT Mode of Treatment - Total time(minutes) 90 minutes   OT Cumulative Minutes 810   Therapy Time missed   Time missed? No

## 2025-05-08 NOTE — ASSESSMENT & PLAN NOTE
Lab Results   Component Value Date    HGBA1C 7.8 (H) 04/29/2025       Recent Labs     05/07/25  1036 05/07/25  1612 05/07/25 2031 05/08/25  0618   POCGLU 220* 169* 189* 195*     Currently on regimen of Lantus 8 units at bedtime +3 units of lispro with meals as well as sliding scale insulin, Accu-Cheks 4 times daily  Continue monitoring blood glucose levels and adjust per recommendations by internal medicine  Continue carb controlled diet level 2

## 2025-05-08 NOTE — PLAN OF CARE
Problem: PAIN - ADULT  Goal: Verbalizes/displays adequate comfort level or baseline comfort level  Description: Interventions:- Encourage patient to monitor pain and request assistance- Assess pain using appropriate pain scale- Administer analgesics based on type and severity of pain and evaluate response- Implement non-pharmacological measures as appropriate and evaluate response- Consider cultural and social influences on pain and pain management- Notify physician/advanced practitioner if interventions unsuccessful or patient reports new pain  Outcome: Progressing     Problem: INFECTION - ADULT  Goal: Absence or prevention of progression during hospitalization  Description: INTERVENTIONS:- Assess and monitor for signs and symptoms of infection- Monitor lab/diagnostic results- Monitor all insertion sites, i.e. indwelling lines, tubes, and drains- Monitor endotracheal if appropriate and nasal secretions for changes in amount and color- [place] appropriate cooling/warming therapies per order- Administer medications as ordered- Instruct and encourage patient and family to use good hand hygiene technique- Identify and instruct in appropriate isolation precautions for identified infection/condition  Outcome: Progressing     Problem: SAFETY ADULT  Goal: Patient will remain free of falls  Description: INTERVENTIONS:- Educate patient/family on patient safety including physical limitations- Instruct patient to call for assistance with activity - Consult OT/PT to assist with strengthening/mobility - Keep Call bell within reach- Keep bed low and locked with side rails adjusted as appropriate- Keep care items and personal belongings within reach- Initiate and maintain comfort rounds- Make Fall Risk Sign visible to staff- Offer Toileting every 2 Hours, in advance of need- Initiate/Maintain bed alarm- Obtain necessary fall risk management equipment:  - Apply yellow socks and bracelet for high fall risk patients- Consider  moving patient to room near nurses station  Outcome: Progressing  Goal: Maintain or return to baseline ADL function  Description: INTERVENTIONS:-  Assess patient's ability to carry out ADLs; assess patient's baseline for ADL function and identify physical deficits which impact ability to perform ADLs (bathing, care of mouth/teeth, toileting, grooming, dressing, etc.)- Assess/evaluate cause of self-care deficits - Assess range of motion- Assess patient's mobility; develop plan if impaired- Assess patient's need for assistive devices and provide as appropriate- Encourage maximum independence but intervene and supervise when necessary- Involve family in performance of ADLs- Assess for home care needs following discharge - Consider OT consult to assist with ADL evaluation and planning for discharge- Provide patient education as appropriate  Outcome: Progressing  Goal: Maintains/Returns to pre admission functional level  Description: INTERVENTIONS:- Perform AM-PAC 6 Click Basic Mobility/ Daily Activity assessment daily.- Set and communicate daily mobility goal to care team and patient/family/caregiver. - Collaborate with rehabilitation services on mobility goals if consulted- Perform Range of Motion 3 times a day.- Reposition patient every 2 hours.- Dangle patient 3 times a day- Stand patient 3 times a day- Ambulate patient 3 times a day- Out of bed to chair for meals  Outcome: Progressing     Problem: DISCHARGE PLANNING  Goal: Discharge to home or other facility with appropriate resources  Description: INTERVENTIONS:- Identify barriers to discharge w/patient and caregiver- Arrange for needed discharge resources and transportation as appropriate- Identify discharge learning needs (meds, wound care, etc.)- Arrange for interpretive services to assist at discharge as needed- Refer to Case Management Department for coordinating discharge planning if the patient needs post-hospital services based on physician/advanced  practitioner order or complex needs related to functional status, cognitive ability, or social support system  Outcome: Progressing     Problem: Prexisting or High Potential for Compromised Skin Integrity  Goal: Skin integrity is maintained or improved  Description: INTERVENTIONS:- Identify patients at risk for skin breakdown- Assess and monitor skin integrity- Assess and monitor nutrition and hydration status- Monitor labs - Assess for incontinence - Turn and reposition patient- Assist with mobility/ambulation- Relieve pressure over bony prominences- Avoid friction and shearing- Provide appropriate hygiene as needed including keeping skin clean and dry- Evaluate need for skin moisturizer/barrier cream- Collaborate with interdisciplinary team - Patient/family teaching- Consider wound care consult   Outcome: Progressing     Problem: Nutrition/Hydration-ADULT  Goal: Nutrient/Hydration intake appropriate for improving, restoring or maintaining nutritional needs  Description: Monitor and assess patient's nutrition/hydration status for malnutrition. Collaborate with interdisciplinary team and initiate plan and interventions as ordered.  Monitor patient's weight and dietary intake as ordered or per policy. Utilize nutrition screening tool and intervene as necessary. Determine patient's food preferences and provide high-protein, high-caloric foods as appropriate. INTERVENTIONS:- Monitor oral intake, urinary output, labs, and treatment plans- Assess nutrition and hydration status and recommend course of action- Evaluate amount of meals eaten- Assist patient with eating if necessary - Allow adequate time for meals- Recommend/ encourage appropriate diets, oral nutritional supplements, and vitamin/mineral supplements- Order, calculate, and assess calorie counts as needed- Recommend, monitor, and adjust tube feedings and TPN/PPN based on assessed needs- Assess need for intravenous fluids- Provide specific nutrition/hydration  education as appropriate- Include patient/family/caregiver in decisions related to nutrition  Outcome: Progressing

## 2025-05-08 NOTE — CASE MANAGEMENT
Met w/pt and spouse and reviewed team update with planned dc for Monday 5/12. Cm confirmed dme order was received from therapy and it would be placed with expected delivery Monday. Cm reviewed dc time of noon on Monday and pt was concerned about the hospital bed delivery. Cm explained the bed can be delivered first thing in the morning or between 2 and 5.  Cm reviewed recommendations for hhc and pt in agreement with [facility] for pt and ot. Spouse available to provide transport. Pt inquired about medications and cm deferred to the medical team who will review with her either tomorrow or Monday morning.

## 2025-05-08 NOTE — DISCHARGE INSTR - AVS FIRST PAGE
DISCHARGE INSTRUCTIONS: [facility] ACUTE REHABILITATION [place]    Bring these instructions with you to your Outpatient Physician appointments so they can order and follow-up any additional lab work or imaging recommended at time of discharge.    Resume follow-up with all your prior providers that you have established care prior to this hospitalization.  Discuss with primary care physician (PCP) if you have additional questions.     It  is you or your caregivers responsibility to obtain follow-up MEDICATION REFILLS  As indicated through your Primary Care Physician (PCP) and other outpatient specialty provider(s) after discharge.  Please follow-up with your PCP as soon as possible after discharge to set-up follow-up management and when appropriate refills.      You remain a fall and injury risk which could be severe.  Your risk of fall has decreased however since admission to acute rehab.  Caregiver training has been completed with our staff.  Appropriate supervision +/- assistance as instructed during your rehab course is recommended to decrease risk of fall and injury.    Continue skilled therapy as discussed after discharge to further decrease this risk    If you (or your health care proxy) have any questions or concerns regarding your acute rehabilitation stay including issues with medications, rehabilitation, and follow-up plan, please call:          [facility] Acute Rehabilitation Unit in [place] at [phone] or [phone].      Should you develop fevers, chills, new weakness, changes in sensation, difficulty speaking, facial weakness, confusion, shortness of breath, chest pain, or other concerning symptoms please call 911 and/or obtain transportation to nearest ER immediately.    Should you develop worsening pain, swelling, or drainage notify your surgeon right away or obtain transportation to nearest ER for evaluation.    Should you develop feelings of significant hopelessness,  "severe depression, severe anxiety, or suicide you should call 911 or obtain transportation to nearest ER.    24-7 Nationwide suicide and crisis lifeline call \"997\"  National Suicide Prevention Lifeline is 1-233.115.2957 and is available 24 hrs/7 days a week.   [facility] Crisis [phone] is available 24 hrs/7 days for mental health  [facility] Crisis [phone] is available 24 hrs/7 days for mental health   [facility] Crisis [phone]    PHYSICIANS to see:  Please see your doctors listed in the follow up providers section of your discharge paperwork, and take the discharge paperwork with you to your appointments.    Home health has been ordered for you:  Your home health agency should reach out to you or your family soon to arrange follow-up.    (If [facility] home health is your provider their phone number is [phone])    If you are unable to get in touch with home health you may contact your  at [phone]. [place]      LAB WORK recommended after discharge:  Follow-up lab work at discretion of your outpatient physicians to be determined at time of your future appointments.    SKIN CARE INSTRUCTIONS to follow:  Monitor incision(s) for increased redness, swelling, pain/tenderness, discharge/pus and promptly notify your surgeon should these develop.  Should you develop significant pain, swelling, or drainage obtain transportation to nearest emergency room for immediate evaluation if unable to reach your surgeon promptly   Should you develop uncontrolled pain, fever, chills, sweats, changes in strength, sensation, or color of this area obtain transportation to nearest emergency room for immediate evaluation.      Monitor skin for increased redness or breadown and promptly notify your physician should these develop    If instructed while in [facility] - be sure you stand +/- walk every 1-2 hours and if advised use appropriate supervision/assistance to " optimally offload your buttock/sacral region.  While seated or lying in bed shift positions and from side to side often.  Can use barrier type cream such as Hydraguard 2 times per day and as needed.    Turn patient (yourself) fully every 2 hours while in bed.        Due to the following you are at increased risk of skin breakdown/wounds/worsening wounds:  Impaired mobility  Medical co-morbidities    Buttocks/Sacrum  Turn as full as possible off sacrum/buttocks every 2 hours  Use Cushion while in chair or wheelchair  Weight shift every 10-15 minutes while in chair  Keep skin clean and dry as possible.  Remove wet or soiled clothing/linens promptly  Monitor skin for increased breakdown which you are at risk of and notify nursing, PCP, or other physician providers should this occur right away    Heels/bony edges of feet  Float heels off edge of pillow for added pressure relief.  Monitor heels and bony protuberances and notify physician or nursing for any increased redness, bogginess, or breakdown        WEIGHTBEARING/ACTIVITY PRECAUTIONS to follow:  Weightbearing as tolerated.  Follow post-surgical restrictions as outlined by your surgeon.    Driving restrictions:    You are recommended against driving until cleared by an outpatient physician.       You should NOT operate a motor vehicle while under the influence of an opiate medication, muscle relaxant, or other sedative.  Doing so may lead to an accident resulting in serious injury or death to yourself or others.  You have agreed to avoid driving when under the influence of this medication.      Work restrictions:  You should NOT return to work (if working) until cleared by an outpatient physician.    You should not operate heavy machinery (if applicable) until cleared by an outpatient physician.      Alcohol restrictions:  You are recommended to not drink alcohol at this time unless cleared by an outpatient physician.     Smoking restrictions:  You are recommended  to not smoke nicotine.  Smoking increases your risk of heart attack, stroke, emphysema/COPD, and lung cancer.        MEDICATIONS:  Please see a full list of your medications outlined in the After Visit Summary that is attached to these Discharge Instructions.  Please note changes may have been made to your medications please refer to your discharge paperwork for your current medications and take this list with you to all your doctors appointments for your doctors to review.  Please do not resume a home medication unless the medication reconciliation sheet indicates to do so, please do not assume that a medication that you were given a prescription for is the same as a medication you have at home based on both medications having the same name as dosages and frequency may have changed.      Unless specifically noted in your medication list provided to you in your discharge paper work do not resume prior vitamins, minerals, or supplements you may have been taking prior to your hospitalization unless instructed by an outpatient physician in the future.  Discuss with your primary care at next visit if applicable.        Acetaminophen (Tylenol) Dosing Warning:    You may have up to 3000 mg of acetaminophen (Tylenol) from all sources spread out over a 24 hours period.  Do not have more than that, as this can increase your risk of liver injury which can be serious.      NSAID Warning:  Please avoid NSAID (including but not limited to advil, aleve, motrin, naproxen, ibuprofen, mobic, meloxicam, diclofenac etc) medications as NSAID medications may increase your risk of bleeding (which can be life-threatening), stroke, worsening kidney disease, heart attack, developing/worsening GI ulcers, worsening heart failure, worsening liver (cirrhosis) disease, potentially delay bone healing.         Aspirin instructions  TAKE aspirin 325 mg daily unless instructed otherwise by a doctor.    This medication will need to be managed by your  outpatient physician(s) after discharge.  Follow-up with the appropriate provider as soon as possible to ensure appropriate use.    This is a blood thinner.  It is being recommended for use by you (or your family) to decrease the risk of clotting which can be severely disabling and even life-threatening.  Even when provided as recommended it can cause severe disabling and even life-threatening bleeding.  Too much or too little of this blood thinner further increases your risk of this medication causing serious and even life-threatening complications such as severe bleeding, clots, strokes, and death.  With that said, at this time based on best available evidence and consensus agreement, your physicians recommend you take aspirin based on your overall risks and benefits in your specific medical situation.      If you (or your family/caregiver) notice black stools, bloody stools, vomit blood, develop new weakness, slurred speech, confusion or have any other concerning symptoms call 911 or obtain transportation to nearest emergency room immediately.       Sedating Medications with increased risk of complications:    There are risks associated with opioid medications, including dependence, addiction and tolerance. The patient understands and agrees to use these medications only as prescribed. Potential side effects of the medications include, but are not limited to, constipation, drowsiness, addiction, impaired judgment and risk of fatal overdose if not taken as prescribed. You should not drive after taking this medication.  The patient was warned against driving while taking sedation medications.  Sharing medications is a felony. At this point in time, the patient is showing no signs of addiction, abuse, diversion or suicidal ideation.    Tramadol (opiate) pain medication that you you was are using at home.  You had been on oxycodone 5 mg every 4 hours as needed or 2.5 mg every 4 hours as needed and had weaned down to  approximately 5 mg of oxycodone 3 times per day.  Based on our overall discussion you will switch back to the tramadol 4 times a day as needed at the time of discharge.    There are risks associated with opioid medications. They can increase your risk of falling, injury, and breathing difficulties which can be severe and even life-threatening.  Note it is advisable to limit use of opiate pain medications as they can become habit forming and lead to addiction.  Other potential side effects of the medication include, but are not limited to, constipation, drowsiness, impaired judgment and risk of fatal overdose if not taken as prescribed. You should not drive while taking this medication  The patient was warned against driving while taking sedation medications.  Sharing medications is a felony. At this point in time, the patient is showing no signs of addiction, abuse, diversion or suicidal ideation.  The patient (you/or relevant caregiver) understands and agrees to use this medication only as prescribed.      Methocarbamol (Robaxin) pain/muscle spasm medication has been used to help your acute pain and spasms.  You tolerated this medication adequately during your recent hospital stay.     - Do not take with alcohol (or marijuana/cannabis) while on this medication as this can cause increased confusion, breathing problems, falls, and severe injury.    Gabapentin has been used to help pain.  You tolerated this medication adequately during your recent hospital stay.     - Take 100 mg 3 times per day.  - Do not stop this medication abruptly as this can cause seizures.  - If stopping medication I would decrease by 1 capsule every 3 days.  With regards to weaning of pain medications the gabapentin can be weaned and tested to see if it is still helping.  I would recommend weaning this medication first over the Robaxin and then ultimately off of the Robaxin.  - Do not take with alcohol (or marijuana/cannabis) while on this  medication as this can cause increased confusion, breathing problems, falls, and severe injury.  - This medication can increase risk of confusion, fall, and injury although you did tolerate adequately during rehab course.      MEDICAL MANAGEMENT AT HOME specific to you:    Diabetes Management:  Please check your blood sugars 4 times daily before meals and at bedtime and record them to provide to your doctors, contact your family doctor as soon as possible for blood sugars higher than 220 or lower than 100.     Follow-up with PCP/family doctor regularly to ensure blood sugars remain adequately controlled.      Monitor for signs or symptoms of low blood sugar (hypoglycemia)- such as sweating, trembling, feeling hungry, worried, more tired (More severe signs of hypoglycemia could be trouble walking, confusion, passing out)  - If present obtain blood sugar    If blood sugar less than 70 take quick source of sugar such as:  - at least half cup of juice   - 4-5 saltine crackers  - 1 tablespoon of sugar or honey  - 3-4 glucose tablets  (Avoid foods that you have history of allergy)    Recheck blood sugar within 30 minutes.  If still low repeat providing additional quick sources of sugar.  If still not improving or symptoms worsening/not improving seek medical attention right away or obtain medical transport/call 911.    Hypertension Management:  Only take the medications prescribed for you at time of discharge - overly high or low blood pressure increases your risk for health complications    Follow-up with PCP/family doctor regularly to ensure blood pressure remains adequately controlled.      Please check your blood pressure prior to taking your blood pressure medications and keep a log that you will bring with you to your follow-up doctors' appointments.    Please contact your family doctor or cardiologist immediately for a blood pressure below 100/50 and do not take your blood pressure medications until speaking with  them.  Please contact your family doctor or cardiologist as soon as possible for blood pressure greater than 160/100.    Heart Failure Management:  Follow-up management by your cardiologist.  Please set-up follow-up appointment as soon as possible after your discharge.    Take your medications as prescribed at time of discharge unless changed by another physician in future.  You will need to obtain follow-up lab work as an outpatient to monitor effectiveness and safety of these medications.  Obtain follow-up lab orders from your outpatient providers at their discretion.    You should maintain a low sodium diet.    You should weigh yourself every day or every other day and keep a log that you bring to your physicians office.  Notify your physician if you start to gain more than 3 ponds in a day    These recommendations are meant to prevent build up of excessive fluid inside you which can make it harder to breath.    Follow-up with your outpatient physicians who may change these recommendations in the future.    Notify your physicians should you develop increased swelling, weight gain, or shortness of breath.  If you develop significant shortness of breath, chest pain, confusion, or other concerning signs or symptoms call 911 or obtain transportation to nearest emergency room right away.        Bowel management:  - Ideally you would have 1 well formed BM every 1-2 days.  Adjust bowel regimen based on that goal or as close to that as possible  - Start with taking miralax 1-2 times per day and senna twice daily  - If still constipated you can take either oral or by rectum dulcolax (but not at the same time)  - If you develop significant abdominal pain, nausea/vomiting, or other concerns seek medical attention right away.        For pain - try to use over the counter topicals (creams/gels) as discussed or acetaminophen 1-2 regular or extra-strength Tylenol up to 2-3 times per day.  Could consider heat or ice as well  maximum 20 minutes at a time.  Do not use heat or ice if using topicals at the same time.  Notify primary care and/or orthopedics for poorly controlled pain for additional recommendations.      Special Notes:  Metformin is now 1000 mg twice daily  Continue to wrap lower left leg with ACE wrap foot to knee, on AM/Off PM.  Elevate your legs as much as possible    Please note a summary of your hospital stay with relevant information for your doctors will try to be sent to them.  Please confirm with your doctors at your follow up visits that they have received this summary and have them contact [facility] Medical Records if they have not received them along with any other medical records they may require.     [facility] Phone Number:  [phone]      Discharge Instructions - Orthopedics  Kamini Martines 75 y.o. female MRN: [id number]  Unit/Bed#: -01    Weight Bearing Status:                                           You may continue to bear weight to your left lower extremity.     DVT prophylaxis:  Continue Aspirin 325 mg daily as you were taking at home prior to admission    Pain:  Continue analgesics as directed    Dressing Instructions:   Please keep clean, dry and intact until follow up     Appt Instructions:   If you do not have your appointment, please call the clinic at [phone]  Otherwise follow up as scheduled.    Contact the office sooner if you experience any increased numbness/tingling in the extremities.

## 2025-05-08 NOTE — ASSESSMENT & PLAN NOTE
75 y.o. female 2 weeks postoperatively from left femur cephalomedullary nail for left peritrochanteric femur fracture. Postoperative imaging demonstrates unchanged construct without evidence of loosening or hardware failure. Surgical incisions well healed. Glucose elevated, plan for tighter control with endocrinology consult pending.    Plan:  WBAT LLE  Staple removed and steri-strips placed  PT/OT  DVT ppx: recommend Lovenox while inpatient and aspirin 81 mg BID x 6 weeks from date of surgery  Rest of care per primary  Outpatient follow up in 4 weeks

## 2025-05-08 NOTE — ASSESSMENT & PLAN NOTE
Lab Results   Component Value Date    HGBA1C 7.8 (H) 04/29/2025       Recent Labs     05/07/25  1612 05/07/25  2031 05/08/25  0618 05/08/25  1059   POCGLU 169* 189* 195* 205*       Blood Sugar Average: Last 72 hrs:  (P) [id number]  Type 2 diabetes mellitus with mild hyperglycemia.  Home regimen: Metformin 500 mg with dinner  Current regimen: Metformin 1000 mg with dinner, correctional scale insulin algorithm 3 before meals and 1 at bedtime.  Blood sugars reviewed, patient noted to have mild postprandial hyperglycemia on the current regimen.    Plan:  Recommend increasing metformin to 1000 mg twice daily  Continue correctional scale insulin algorithm 3 before meals and 1 at bedtime while in the hospital.  Continue Accu-Cheks before meals and at bedtime  Monitor for hypoglycemia, treat per protocol  Goal blood sugars while in the [id number] mg/dL  Discharge recommendations: Recommend discharging patient on metformin 1000 mg twice daily.  Given her age, goal A1c is 7.5%.  Endocrinology will continue to follow
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
## 2025-05-08 NOTE — TEAM CONFERENCE
Acute RehabilitationTeam Conference Note  Date: 5/8/2025   Time: 11:46 AM       Patient Name:  Kamini Martines       Medical Record Number: [id number]   YOB: 1950  Sex: Female          Room/Bed:  [place] [address]/[place] [address]-01  Payor Info:  Payor: MEDICARE / Plan: MEDICARE A AND B / Product Type: Medicare A & B Fee for Service /      Admitting Diagnosis: Fracture of left femur ([facility]) [S72.92XA]   Admit Date/Time:  4/28/2025  5:35 PM  Admission Comments: No comment available     Primary Diagnosis:  Closed left hip fracture, initial encounter ([facility])  Principal Problem: Closed left hip fracture, initial encounter ([facility])    Patient Active Problem List    Diagnosis Date Noted    Bilateral leg edema 05/05/2025    Left leg swelling 05/03/2025    Thyroid nodule 04/29/2025    Buttock wound, right, sequela 04/29/2025    Anemia 04/29/2025    Leukocytosis 04/29/2025    HFrEF (heart failure with reduced ejection fraction) ([facility]) 04/28/2025    GERD (gastroesophageal reflux disease) 04/28/2025    CAD (coronary artery disease) 04/28/2025    Fall 04/24/2025    Closed left hip fracture, initial encounter ([facility]) 04/24/2025    Urinary retention 04/24/2025    Frailty 04/24/2025    At risk for delirium 04/24/2025    Impaired mobility and activities of daily living 04/24/2025    Cervical spondylosis 04/16/2025    Opioid dependence, uncomplicated ([facility]) 01/29/2025    Esophageal dysphagia 09/02/2024    Functional diarrhea 08/29/2024    Trochanteric bursitis of left hip 08/23/2024    Lumbar spondylosis 10/06/2022    Type 2 diabetes mellitus without complication, without long-term current use of insulin ([facility]) 08/17/2020    Class 2 obesity in adult 08/16/2020    Chronic pain syndrome 02/25/2020    Intervertebral disc disorder with radiculopathy of lumbar region 02/25/2020    Lumbar post-laminectomy syndrome 02/25/2020    Neurogenic claudication due to lumbar spinal stenosis 02/25/2020    Postlaminectomy syndrome, lumbar region 02/25/2020     Chronic right-sided low back pain without sciatica 12/11/2019    TIA (transient ischemic attack) 11/21/2019    Chronic systolic congestive heart failure (HCC) 09/09/2019    Vitamin D deficiency 08/15/2019    Lumbar herniated disc 08/15/2019    Dilated cardiomyopathy (HCC) 06/05/2019    LUCIA (obstructive sleep apnea)     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) 05/21/2019    Mild intermittent asthma without complication 05/21/2019    HTN (hypertension) 02/12/2019    History Abnormal heart rhythm 02/12/2019    Adrenal adenoma 02/12/2019    Pericardial effusion 02/12/2019    CVA (cerebral vascular accident) ([facility]) 02/12/2019       Physical Therapy:    Weight Bearing Status: Weight Bearing as Tolerated  Transfers: Independent  Bed Mobility: Moderate Assistance  Amulation Distance (ft): 55 feet  Ambulation: Independent, Supervision  Assistive Device for Ambulation: Roller Walker  Wheelchair Mobility Distance: 0 ft  Number of Stairs: 2  Assistive Device for Stairs: Walker (RW for curb step, bilateral handrail for stairs)  Stair Assistance: Moderate Assistance (Min/ModA for curb step, Min/ModA for stairs)  Discharge Recommendations: Home with:  DC Home with:: Family Support, First Floor Setup, Home Physical Therapy    Overall patient is making progress towards her goals. Patient is now independent with STS and SPT transfers with RW as well as short distance ambulation in her room with RW. Patient continues to require assistance for bed mobility and stair management. Family training is scheduled for 5/8 to complete training on bed mobility, stairs, and car transfer. Physical therapy to continue to utilized the following interventions to maximize safety adn independence: Nu Step for ROM/endurance, gait training, repetitive stair training using compensatory strategies, LE strengthening, gait training, and bed mobility. Pt will benefit from  services on discharge.     Occupational Therapy:  Eating:  Independent  Grooming: Supervision  Bathing: Minimal Assistance  Bathing: Minimal Assistance  Upper Body Dressing: Supervision  Lower Body Dressing: Minimal Assistance  Toileting: Supervision  Toilet Transfer: Supervision  Cognition: Within Defined Limits  Orientation: Person, Place, Time, Situation  Discharge Recommendations: Home with:  DC Home with:: Family Support       Occupational Therapy Weekly Team Note    Pt continues to make good progress with skilled occupational therapy intervention and is progressing toward long term goals for ADL, IADL's, and functional transfers/mobility. Pts long term goals for ADLs are Independent with Rolling Walker. Pt continues to present with impairments in Impaired standing balance, Impaired righting reactions, Unilateral weakness or paralysis of Lower limb , limited activity tolerance, and Pain. Occupational performance remains limited by personal factors: co morbidities/Other health conditions, Habits and past and current behavioral patterns, Body habitus, and Orthopedic restrictions. Family training/education will not be required prior to D/C.     Pt will continue to benefit from skilled acute rehab OT services to address above mentioned barriers and maximize functional independence in baseline areas of occupation to meet established treatment goals with overall decreased burden of care. Plan of care to continue to focus on ADL Retraining ,  LHAE education/training, Functional Transfers, DME training/education, and Energy conservation training/education. Goals for the upcoming week are: basic ADL participation , Out of bed tolerance, establish DME needs, and continue to progress assess for progression to independent goals in the room  Anticipate Discharge date to be set. . Home with family/friends support              5/7/25 update:    Pt continues to make good progress with skilled occupational therapy intervention and is progressing toward long term goals for ADL, IADL's,  and functional transfers/mobility. Pts long term goals for ADLs are Independent with Rolling Walker. Pt currently at grossly supervision level for basic ADLs except for footwear which she requires assist for mostly due to edema; supervision for toileting and basic func transfers with RW. Pt continues to present with impairments in Impaired standing balance, Impaired righting reactions, Unilateral weakness or paralysis of Lower limb , limited activity tolerance, and Pain. Occupational performance remains limited by personal factors: co morbidities/Other health conditions, Habits and past and current behavioral patterns, Body habitus, and Orthopedic restrictions. Family training/education will not be required prior to D/C.     Pt will continue to benefit from skilled acute rehab OT services to address above mentioned barriers and maximize functional independence in baseline areas of occupation to meet established treatment goals with overall decreased burden of care. Plan of care to continue to focus on ADL Retraining ,  LHAE education/training, Functional Transfers, DME training/education, and Energy conservation training/education. Goals for the upcoming week are: basic ADL participation , Out of bed tolerance, establish DME needs, and continue to progress assess for progression to independent goals in the room  Anticipate Discharge date of 5/12/25. Home with family/friends support       Speech Therapy:           No notes on file    Nursing Notes:  Appetite: Fair  Diet Type: Diabetic                      Diet Patient/Family Education Complete: Yes                            Bladder: Continent     Bladder Patient/Family Education: Yes  Bowel: Continent     Bowel Patient/Family Education: Yes  Pain Location/Orientation: Orientation: Left, Location: Hip  Pain Score: 5                       Hospital Pain Intervention(s): Medication (See MAR)  Pain Patient/Family Education: Yes  Medication Management/Safety  Injectable:  Lovenox (insulin)  Safe Administration: Yes  Medication Patient/Family Education Complete: Yes    Pt is a 75 year old female with PMH of Chronic systolic congestive heart failure, HTN, History Abnormal heart rhythm, DM2, Esophageal dysphagia, Chronic right-sided low back pain without sciatica, Neurogenic claudication due to lumbar spinal stenosis, Hx of  CVA, Mild intermittent asthma without complication, LUCIA (obstructive sleep apnea) who presented to [facility] on 4/24 as a trauma C to the emergency department after mechanical fall with head strike and left hip pain, takes daily ASA.  Patient states she was walking at home, tripped over something and fell on her left side with immediate left-sided hip pain.  Patient also states that she hit her head on a cabinet, denies any LOC.  CT head and C-spine negative for traumatic injuries. L LE imaging showed Comminuted intertrochanteric fracture of the left femur. Orth was consulted. Pt is s/p IMN on 4/24. Maintain weightbearing as tolerated status on the left lower extremity . Adams catheter was placed 4/25 due to urinary retention, removed on 4/28. Urinary retention protocol. UA negative for bacteria on 4/25.     This week we will encourage independence with ADLs.  We will monitor labs and vital signs.  We will educate pt/family about repositioning to prevent skin breakdown.  We will assist w repositioning and perform routine skin checks.  We will monitor for adequate pain control.  We will monitor for constipation and medicate pt as ordered.  We will increase safety awareness and keep pt free from falls.    5/1: Pt rings approp. QID blood sugar. Metformin was restarted on 4/30, lantus was discontinued, L hip incision covered with 3 silver dressings. WBAT LLE. 2 week follow up will be on 5/8. Start to wean off pepcid, was reduced to 10mg 4/30. Continent of bowel and bladder. Last bowel movement was 4/29. Blister on R sacrum - mepilex due 5/1. Pain is being  managed by scheduled tylenol, gabapentin, & PRN robaxin and oxy. Ax2 stand pivot with RW.     5/8: Pt had 2 week follow-up XRAYs on 5/7, which are pending. Ortho will see pt 5/8 for 2 week follow up. 3 silver dressings are still in place. 1 time dose of torsemide given 5/7 with a repeat BMP on 5/8. Teds for edema. Scheduled lispro with meals were stopped. Remains QID blood sugar. Pepcid was stopped 5/5. Sacrum mepilex for blister due 5/8. Continent of bowel and bladder. Last bowel movement was 5/7. Daytime bathroom privileges. D/C scheduled for 5/12.     Case Management:     Discharge Planning  Living Arrangements: Lives w/ Spouse/significant other  Support Systems: Self, Spouse/significant other  Assistance Needed: TBD  Type of Current Residence: Private residence  Current Home Care Services: No  Pt is participating with therapy and making progress. Dc has been scheduled for early next week. Cm following to assist w/dc planning and to complete dme order.     Is the patient actively participating in therapies? yes  List any modifications to the treatment plan:     Barriers Interventions   Car transfer, ramp mgmt Therapy education, family education                     Is the patient making expected progress toward goals? yes  List any update or changes to goals:     Medical Goals: Patient will be medically stable for discharge to [facility] upon completion of rehab program and Patient will be able to manage medical conditions and comorbid conditions with medications and follow up upon completion of rehab program    Weekly Team Goals:   Rehab Team Goals  ADL Team Goal: Patient will be independent with ADLs with least restrictive device upon completion of rehab program    Discussion: pt is  making good progress and is scheduled for dc 5/12/25. Pt is overall mod I for transfers and mobility, min to mod for bed mobility and set up for adls. Spouse to be educated on the need to assist with the above.  Pt is going to continue to benefit from therapy program until dc to improve her overall functional mobility. Recommendations are for contd [facility] pt and ot.     Anticipated Discharge Date:  5/12/25  [facility] Team Members Present:The following team members are supervising care for this patient and were present during this Weekly Team Conference.    Physician: Dr. Chad DO  : EID Tellez  Registered Nurse: Deedee Johns RN  Physical Therapist: Leonela Naik DPT  Occupational Therapist: Hattie Freye, MS, OTR/L  Speech Therapist:

## 2025-05-08 NOTE — ASSESSMENT & PLAN NOTE
"Presented to [facility] on 4/24/25 with left hip pain following a mechanical fall  Noted to have a comminuted intertrochanteric fracture of the left femur   S/p ORIF/IMN 4/24   Weightbearing as tolerated  PT/OT, pain control per PMR  Is having muscle spasms that Robaxin 500 mg q6hrs prn is helping.    Per Ortho note 4/28: \"DVT ppx: recommend lovenox while in house, d/c on aspirin 81mg BID x 6 weeks\".   However she is already on 325mg qd as at home anyway  Two week followup will be today 5/8/25  "

## 2025-05-08 NOTE — PROGRESS NOTES
"Progress Note - Hospitalist   Name: Kamini Martines 75 y.o. female I MRN: [id number]  Unit/Bed#: -01 I Date of Admission: 4/28/2025   Date of Service: 5/8/2025 I Hospital Day: 10    Assessment & Plan  Closed left hip fracture, initial encounter ([facility])  Presented to [facility] on 4/24/25 with left hip pain following a mechanical fall  Noted to have a comminuted intertrochanteric fracture of the left femur   S/p ORIF/IMN 4/24   Weightbearing as tolerated  PT/OT, pain control per PMR  Is having muscle spasms that Robaxin 500 mg q6hrs prn is helping.    Per Ortho note 4/28: \"DVT ppx: recommend lovenox while in house, d/c on aspirin 81mg BID x 6 weeks\".   However she is already on 325mg qd as at home anyway  Two week followup will be today 5/8/25  HFrEF (heart failure with reduced ejection fraction) ([facility])  NICM  EF 41% on cardiac MRI (2019)/ECHO 40-45% 2/4/25  Follows with Dr Worley as an OP  Home: Entresto  mg BID/Aldactone 25mg qd/Torsemide 10mg qd prn edema  Here:  Entresto  mg BID/Aldactone as below  Does not tolerate BB = has tried Coreg and Metoprolol in past per Dr Worley's office note  Volume status stable  Restarted Aldactone at 12.5mg qd on 4/29 and increased back up to 25mg qd to start 5/2/25  BMP 5/1/25 = stable  Was having significant bilateral LE edema, duplex negative for DVT. Takes Torsemide 10mg PRN at home.   Torsemide 10 mg x 1 given 5/5/25, 5/7/25 and will give another dose today 5/8/25  BMP 5/8/25 = stable.  Type 2 diabetes mellitus without complication, without long-term current use of insulin ([facility])  HbA1C 7.8 on 4/29/25  Has a glucometer at home  Home: Metformin  mg daily with dinner  Here:  Metformin XR 1000 mg daily dinner/Lispro 3U TID  Continue QID Accuchecks/SSI (Algo 3/1) and DM diet  Restarted Metformin  mg with dinner 4/30/25, stopped the Lantus; kept the Lispro at 3U TID with meals  5/3/25: increased Metformin 1000mg daily with dinner.   5/7/25: kept " "Metformin same and stopped Lispro 3U WM but BSs are still high.  She is reluctant to add more meds but I emphasized needs good BS control to avoid wound infection  Will ask Endo to see  CVA (cerebral vascular accident) (HCC)  Continue  mg qd as at home  Not on statins at home because she didn't want to take in past  Didn't followup with Neuro after stroke  LUCIA (obstructive sleep apnea)  Stopped using CPAP few months ago as strap was causing neck pain  Outpatient sleep f/u  Mild intermittent asthma without complication  No exacerbation  Continue Albuterol prn  Class 2 obesity in adult  BMI 36.87  Affects all aspects of care  Lifestyle modification  GERD (gastroesophageal reflux disease)  Takes Prilosec 40mg qd at home  Currently on Protonix 40mg qd and Pepcid qd here  Started weaning Pepcid and reduced to 10mg qd 4/30/25 -->  stopped 5/5/25  CAD (coronary artery disease)  Nonobstructive coronary artery disease on cardiac cath  On  mg daily for hx CVA  Urinary retention  Adams was placed on 4/25 and removed on 4/28  She takes Vesicare at home   Per PMR  Thyroid nodule  Found on CT cervical spine  \"1.1 cm right thyroid nodule. Incidental discovery of one or more thyroid nodule(s) measuring less than 1.5 cm and without suspicious features is noted in this patient who is above 35 years old; according to guidelines published in the February 2015 white paper on incidental thyroid nodules in the Journal of the American College of Radiology (JACR), no further evaluation is recommended.\"  Buttock wound, right, sequela  POA  Per PMR  Anemia  Post op  stable  Leukocytosis  Mild  Likely reactive  No fever  Will watch  Bilateral leg edema  Hx of CHF which may be contributing- see above  Duplex negative for DVT  Gave Torsemide 10mg PO x 1 on 5/5/25 with improvement.  Will give another dose Torsemide 10mg x 1 today 5/8/25    The above assessment and plan was reviewed and updated as determined by my evaluation of the " patient on 5/8/2025.    History of Present Illness   Patient seen and examined. Patients overnight issues or events were reviewed with nursing staff. New or overnight issues include the following:   No new or overnight issues.  Offers no complaints    Review of Systems   All other systems reviewed and are negative.      Objective :  Temp:  [97.8 °F (36.6 °C)-98.3 °F (36.8 °C)] 98 °F (36.7 °C)  HR:  [68-88] 88  BP: (109-128)/(55-58) 109/55  Resp:  [18-20] 18  SpO2:  [94 %-98 %] 95 %  O2 Device: None (Room air)    Invasive Devices       None                   Physical Exam  General Appearance: no distress, nontoxic appearing  HEENT:  External ear normal.  Nose normal w/o drainage. Mucous membranes are moist. Oropharynx is clear. Conjunctiva clear w/o icterus or redness.  Neck:  Supple, normal ROM  Lungs: BBS without crackles/wheeze/rhonchi; respirations unlabored with normal inspiratory/expiratory effort.  No retractions noted.  On RA  CV: regular rate and rhythm; no rubs/murmurs/gallops, PMI normal   ABD: Abdomen is soft.  Bowel sounds all quadrants.  Nontender with no distention.    EXT: + edema LE with L>R  Skin: normal turgor, normal texture  Psych: affect normal, mood normal  Neuro: AAO       The above physical exam was reviewed and updated as determined by my evaluation of the patient on 5/8/2025.      Lab Results: I have reviewed the following results:  Results from last 7 days   Lab Units 05/08/25  0619 05/05/25  0549   WBC Thousand/uL 8.31 9.33   HEMOGLOBIN g/dL 9.3* 8.9*   HEMATOCRIT % 30.9* 29.1*   PLATELETS Thousands/uL 578* 510*     Results from last 7 days   Lab Units 05/08/25  0619 05/05/25  0549   SODIUM mmol/L 139 140   POTASSIUM mmol/L 4.8 4.4   CHLORIDE mmol/L 103 106   CO2 mmol/L 29 27   BUN mg/dL 23 19   CREATININE mg/dL 0.64 0.49*   CALCIUM mg/dL 9.9 9.7             Results from last 7 days   Lab Units 05/08/25  1059 05/08/25  0618 05/07/25 2031   POC GLUCOSE mg/dl 205* 195* 189*       Imaging  Results Review: No pertinent imaging studies reviewed.  Other Study Results Review: No additional pertinent studies reviewed.    Review of Scheduled Meds: Medications  reviewed and reconciled as needed  Current Facility-Administered Medications   Medication Dose Route Frequency Provider Last Rate    acetaminophen  975 mg Oral Q8H [facility] Tammy Pradhan MD      albuterol  2 puff Inhalation Q6H PRN Tammy Pardhan MD      aluminum-magnesium hydroxide-simethicone  30 mL Oral Q4H PRN Tammy Pradhan MD      aspirin  325 mg Oral Daily Tammy Pradhan MD      Cholecalciferol  1,000 Units Oral Daily Tammy Pradhan MD      enoxaparin  30 mg Subcutaneous Q12H Tammy Pradhan MD      gabapentin  100 mg Oral TID Gladys Carvalho DO      insulin lispro  1-5 Units Subcutaneous HS Tammy Pradhan MD      insulin lispro  1-6 Units Subcutaneous TID AC Tammy Pradhan MD      loperamide  2 mg Oral Q4H PRN Liliana Flores MD      metFORMIN  1,000 mg Oral Daily With Dinner Rashmi Ibrahim PA-C      methocarbamol  500 mg Oral Q6H PRN Tammy Pradhan MD      multivitamin stress formula  1 tablet Oral Daily Tammy Pradhan MD      naloxone  0.04 mg Intravenous Q1MIN PRN Tammy Pradhan MD      oxyCODONE  2.5 mg Oral Q4H PRN Tammy Pradhan MD      Or    oxyCODONE  5 mg Oral Q4H PRN Tammy Pradhan MD      pantoprazole  40 mg Oral Early Morning Tammy Pradhan MD      polyethylene glycol  17 g Oral Daily PRN STEFFANY Nelson      PreserVision AREDS 2  1 capsule Oral BID STEFFANY Nelson      sacubitril-valsartan  1 tablet Oral BID Tammy Pradhan MD      spironolactone  25 mg Oral Daily STEFFANY Nelson      torsemide  10 mg Oral Once STEFFANY Nelson         VTE Pharmacologic Prophylaxis: lovenox  Code Status: Level 1 - Full Code  Current Length of Stay: 10 day(s)    Administrative Statements     ** Please Note:  voice to text software may have been used in the creation  of this document. Although proof errors in transcription or interpretation are a potential of such software**

## 2025-05-08 NOTE — ASSESSMENT & PLAN NOTE
Preoperative hemoglobin of 12.1 now currently 9.1 qualifying for acute blood loss anemia  5/8- Hgb stable at 9.3    Continue to monitor hemoglobin on bio weekly CBC or sooner if quickly indicated

## 2025-05-08 NOTE — PROGRESS NOTES
05/08/25 1400   Pain Assessment   Pain Assessment Tool 0-10   Pain Score 7   Restrictions/Precautions   Precautions Fall Risk;Pain   Weight Bearing Restrictions Yes   LLE Weight Bearing Per Order WBAT   Cognition   Overall Cognitive Status WFL   Arousal/Participation Alert;Cooperative   Attention Attends with cues to redirect   Orientation Level Oriented X4   Memory Within functional limits   Following Commands Follows one step commands without difficulty   Sit to Lying   Type of Assistance Needed Physical assistance   Physical Assistance Level 51%-75%   Comment ModA for LE management. assisted pt back into bed at end of session for ortho to take a look at her incision. Continue to practice use of leg  to improve overall independence.  present and states he can assist her with getting her legs into and out of bed as needed   Sit to Lying CARE Score 2   Sit to Stand   Type of Assistance Needed Independent;Adaptive equipment   Comment RW   Sit to Stand CARE Score 6   Bed-Chair Transfer   Type of Assistance Needed Independent;Adaptive equipment   Comment RW   Chair/Bed-to-Chair Transfer CARE Score 6   Car Transfer   Type of Assistance Needed Physical assistance   Physical Assistance Level 25% or less   Comment practice with patient's personal car - a [employer]. Pt utilized a 2 inch step stool to get into her car. Stepped backwards up and onto the step with RW then sat on the  seat and brought her legs into the car. Initially required Nadine to help get LLE into the car then next attempt pt was able to complete it on her own with CGA   Car Transfer CARE Score 3   Walk 10 Feet   Type of Assistance Needed Independent;Adaptive equipment   Comment mod I with RW   Walk 10 Feet CARE Score 6   Walking 10 Feet on Uneven Surfaces   Type of Assistance Needed Incidental touching;Supervision;Adaptive equipment   Comment CGA/CS with RW over paved outdoor surface and down/up curb cutout with RW. CGA with over grass as pt  will need to ambulate short distance over grass when she returns home. Instructed pt to  walker and place down then step vs push across the grass. She demonstrated good technique   Walking 10 Feet on Uneven Surfaces CARE Score 4   Ambulation   Primary Mode of Locomotion Prior to Admission Walk   Distance Walked (feet) 15 ft  (10 ft)   Assist Device Roller Walker   Gait Pattern Antalgic;Inconsistant Marika;Decreased L stance;Improper weight shift;Step through   Does the patient walk? 2. Yes   Wheel 50 Feet with Two Turns   Reason if not Attempted Activity not applicable   Wheel 50 Feet with Two Turns CARE Score 9   Wheel 150 Feet   Reason if not Attempted Activity not applicable   Wheel 150 Feet CARE Score 9   Curb or Single Stair   Style negotiated Curb   Type of Assistance Needed Incidental touching;Supervision;Adaptive equipment   Comment ascended 6 inch curb step backwards with RW , descended forwards. performed 2 trials. started CGA but progressed to CS. Pt did require VCs to recall what leg to lead with.  present and observed the curb management. Instructed him to provide her with reminders for what foot to use   1 Step (Curb) CARE Score 4   4 Steps   Comment not focus of today's session. Plan is for patient to have first floor set up initially   12 Steps   Comment not focus of today's session   Equipment Use   NuStep Level 1 x 10 minutes   Other Comments   Comments Pt's current foot wear does not fit. Assisted pt in measuring feet to ensure she purchased appropriate shoe that could also accomodate swelling. Showed her edema shows on [employer].   Assessment   Treatment Assessment Patient participated in 90 minute skilled physical therapy session focusing on family training in preparation for discharge home on Monday 5/12 with  services. family training went well and the  is willing and able to provide physical assist to the patient as needed.  confirmed that ramp was installed today.  Tomorrow, pt should practice ramp on the first floor. Patient will continue to benefit from skilled physical therapy services to maximize safety and independence with functional mobility.   Problem List Decreased strength;Decreased range of motion;Decreased endurance;Impaired balance;Decreased mobility;Pain   Barriers to Discharge Inaccessible home environment   PT Barriers   Functional Limitation Car transfers;Stair negotiation;Transfers;Walking   Plan   Treatment/Interventions Functional transfer training;LE strengthening/ROM;Therapeutic exercise;Patient/family training;Endurance training;Equipment eval/education;Bed mobility;Gait training   Progress Progressing toward goals   Discharge Recommendation   Rehab Resource Intensity Level, PT   ( PT)   PT Therapy Minutes   PT Time In 1400   PT Time Out 1530   PT Total Time (minutes) 90   PT Mode of treatment - Individual (minutes) 90   PT Mode of treatment - Concurrent (minutes) 0   PT Mode of treatment - Group (minutes) 0   PT Mode of treatment - Co-treat (minutes) 0   PT Mode of Treatment - Total time(minutes) 90 minutes   PT Cumulative Minutes 878     Leonela Naik, PT, DPT

## 2025-05-08 NOTE — ASSESSMENT & PLAN NOTE
Most recent WBC count of 12.95 which is down from postoperative 18.0  5/8- WBC downtrending to 8.3    Continue to monitor on biweekly CBC or sooner if indicated, monitor for signs of infection at the surgical site

## 2025-05-08 NOTE — ASSESSMENT & PLAN NOTE
Patient presents on 4/24 to [facility] with left hip pain after a fall  And have a comminuted intertrochanteric fracture of the left femur  Status post ORIF with IM nailing by Dr. Parry on 4/24  2 week post-op XR ordered for 5/8 and completed - will consult ortho    Weightbearing as tolerated to the left lower extremity  Physical and Occupational Therapy  Pain control with Tylenol, gabapentin, Robaxin and as needed oxycodone > Increasing gabapentin from 100 mg BID to TID for better pain control on 5/1  DVT prophylaxis.  Is recommended for 81 mg aspirin twice daily at the time of discharge to complete the 28 days however is on aspirin 325 mg daily currently

## 2025-05-08 NOTE — ASSESSMENT & PLAN NOTE
1.1 cm right thyroid nodule incidentally discovered on cervical spine.  Additional nodules measuring less than 1.5 cm without suspicious features also noted.  Recommend routine thyroid ultrasound as an outpatient.

## 2025-05-08 NOTE — PROGRESS NOTES
Progress Note - PMR   Name: Kamini Martines 75 y.o. female I MRN: [id number]  Unit/Bed#: -01 I Date of Admission: 4/28/2025   Date of Service: 5/8/2025 I Hospital Day: 10     Assessment & Plan  Closed left hip fracture, initial encounter (HCC)  Patient presents on 4/24 to [facility] with left hip pain after a fall  And have a comminuted intertrochanteric fracture of the left femur  Status post ORIF with IM nailing by Dr. Parry on 4/24  2 week post-op XR ordered for 5/8 and completed - will consult ortho    Weightbearing as tolerated to the left lower extremity  Physical and Occupational Therapy  Pain control with Tylenol, gabapentin, Robaxin and as needed oxycodone > Increasing gabapentin from 100 mg BID to TID for better pain control on 5/1  DVT prophylaxis.  Is recommended for 81 mg aspirin twice daily at the time of discharge to complete the 28 days however is on aspirin 325 mg daily currently  Impaired mobility and activities of daily living  Patient was evaluated by the rehabilitation team MD and an appropriate candidate for acute inpatient rehabilitation program at this time.  The patient will tolerate 3 hours/day 5 to 7 days/week of intensive physical, occupational therapy in order to obtain goals for community discharge  Due to the patient's functional Compared to their baseline level of function in addition to their ongoing medical needs, the patient would benefit from daily supervision from a rehabilitation physician as well as rehabilitation nursing to implement and adjust the medical as well as functional plan of care in order to meet the patient's goals.    HTN (hypertension)  Currently on a regimen of Entresto and spironolactone for the heart failure  Was on as needed Demadex at home in the past  Monitor pressures during therapy as well as during routine vitals  CVA (cerebral vascular accident) (HCC)  History of stroke in the past and was placed on aspirin 325 mg daily  Is also not on  a statin at home  After history of stroke did not follow-up with neurology and refused follow-up with Dr. Jones.  Is not on the appropriate regimen for secondary stroke prophylaxis.  Was on 81 mg previously but stated her cardiologist increased it to 325 mg.  She endorsed that she did not start her statin.  Mild intermittent asthma without complication  Currently on a as needed albuterol inhaler  Continue monitor on physical examination as well as during routine vitals, therapy vitals  LUCIA (obstructive sleep apnea)  Was noncompliant with the CPAP due to overall comfort with strap and discontinued its use  Follow-up with sleep medicine as an outpatient  Class 2 obesity in adult  BMI of 36.87 on arrival  Continue to recommend lifestyle modification, dietary changes, weight loss counseling especially in the setting of recent fracture.  Type 2 diabetes mellitus without complication, without long-term current use of insulin ([facility])  Lab Results   Component Value Date    HGBA1C 7.8 (H) 04/29/2025       Recent Labs     05/07/25  1036 05/07/25  1612 05/07/25  2031 05/08/25  0618   POCGLU 220* 169* 189* 195*     Currently on regimen of Lantus 8 units at bedtime +3 units of lispro with meals as well as sliding scale insulin, Accu-Cheks 4 times daily  Continue monitoring blood glucose levels and adjust per recommendations by internal medicine  Continue carb controlled diet level 2  Urinary retention  Had indwelling Adams catheter placed 4/25 which was removed on 4/28 and did require initial straight catheterization  5/1- PVRs x3 low. Now voiding and continent  HFrEF (heart failure with reduced ejection fraction) ([facility])  Wt Readings from Last 3 Encounters:   05/07/25 100 kg (220 lb 7.4 oz)   04/24/25 99.3 kg (219 lb)   02/20/25 99.3 kg (219 lb)     Echocardiogram most recently with a cardiac MRI showing EF of 41%  Nonischemic cardiomyopathy on Entresto twice daily at home as well as Aldactone.  Aldactone is being restarted  today per internal medicine  Does not tolerate beta-blockers in the past  Monitor intake and output as well as weights  Education on diet and activity  GERD (gastroesophageal reflux disease)  Continue Pepcid and Protonix and as needed Maalox  CAD (coronary artery disease)  Currently on aspirin  Noted to have nonobstructive CAD on cardiac catheterization  Follow-up with outpatient cardiology  Thyroid nodule    Buttock wound, right, sequela  Noted on admission  Consult wound care  Anemia  Preoperative hemoglobin of 12.1 now currently 9.1 qualifying for acute blood loss anemia  5/8- Hgb stable at 9.3    Continue to monitor hemoglobin on bio weekly CBC or sooner if quickly indicated  Leukocytosis  Most recent WBC count of 12.95 which is down from postoperative 18.0  5/8- WBC downtrending to 8.3    Continue to monitor on biweekly CBC or sooner if indicated, monitor for signs of infection at the surgical site  Left leg swelling  Wrapped the leg  Bilateral leg edema  Wrap the bilateral lower extremities    Subjective   75-year-old female with history of chronic heart failure, nonischemic cardiomyopathy, CAD, hypertension, LUCIA noncompliant with CPAP, lumbar spinal stenosis, arthritis presenting 4/24 for left hip pain after a fall and a very comminuted intertrochanteric fracture of the left femur underwent IM nailing with Dr. Parry on 4/24. Postoperative course complicated by urinary retention requiring a Adams catheter     Chief Complaint: f/u fracture and f/u ambulatory dysfunction    Interval: Patient seen and evaluated in room. No acute issues overnight, but did endorse significant discomfort with getting her follow up xrays based on positioning on the table, but was able to complete. Continues to ask about follow ups, home therapies, and follow ups after leaving the hospital. Labs were checked today including a CBC showing an improvement in hemoglobin up to 9.3, resolution of any leukocytosis and a normal BMP. This  patient was discussed by the interdisciplinary team in weekly case conference today. The care of the patient was extensively discussed with all care providers and an appropriate rehabilitation plan was formulated unique for this patient. Barriers were identified preventing progression of therapy and appropriate interventions were discussed with each discipline. Please see the team note for input from all disciplines regarding barriers, intervention, and discharge planning.      Objective :  Temp:  [97.8 °F (36.6 °C)-98.3 °F (36.8 °C)] 98 °F (36.7 °C)  HR:  [68-88] 88  BP: (109-128)/(55-58) 109/55  Resp:  [18-20] 18  SpO2:  [94 %-98 %] 95 %  O2 Device: None (Room air)    Functional Update:  Physical Therapy Occupational Therapy Speech Therapy   Weight Bearing Status: Weight Bearing as Tolerated  Transfers: Independent  Bed Mobility: Moderate Assistance  Amulation Distance (ft): 55 feet  Ambulation: Independent, Supervision  Assistive Device for Ambulation: Roller Walker  Wheelchair Mobility Distance: 0 ft  Number of Stairs: 2  Assistive Device for Stairs: Walker (RW for curb step, bilateral handrail for stairs)  Stair Assistance: Moderate Assistance (Min/ModA for curb step, Min/ModA for stairs)  Discharge Recommendations: Home with:  DC Home with:: Family Support, First Floor Setup, Home Physical Therapy   Eating: Independent  Grooming: Supervision  Bathing: Minimal Assistance  Bathing: Minimal Assistance  Upper Body Dressing: Supervision  Lower Body Dressing: Minimal Assistance  Toileting: Supervision  Toilet Transfer: Supervision  Cognition: Within Defined Limits  Orientation: Person, Place, Time, Situation                 Physical Exam  Vitals reviewed.   Constitutional:       General: She is not in acute distress.     Appearance: She is obese.   HENT:      Head: Normocephalic and atraumatic.      Right Ear: External ear normal.      Left Ear: External ear normal.      Nose: Nose normal. No rhinorrhea.       Mouth/Throat:      Mouth: Mucous membranes are moist.      Pharynx: Oropharynx is clear.   Eyes:      General: No scleral icterus.  Cardiovascular:      Rate and Rhythm: Normal rate.   Pulmonary:      Effort: Pulmonary effort is normal. No respiratory distress.   Abdominal:      General: There is no distension.      Palpations: Abdomen is soft.   Skin:     General: Skin is warm.      Comments: Mild thigh ecchymosis in stages of healing   Neurological:      Mental Status: She is alert and oriented to person, place, and time.   Psychiatric:         Mood and Affect: Mood normal.         Behavior: Behavior normal.           Scheduled Meds:  Current Facility-Administered Medications   Medication Dose Route Frequency Provider Last Rate    acetaminophen  975 mg Oral Q8H [facility] Tammy Pradhan MD      albuterol  2 puff Inhalation Q6H PRN Tammy Pradhan MD      aluminum-magnesium hydroxide-simethicone  30 mL Oral Q4H PRN Tammy Pradhan MD      aspirin  325 mg Oral Daily Tammy Pradhan MD      Cholecalciferol  1,000 Units Oral Daily Tammy Pradhan MD      enoxaparin  30 mg Subcutaneous Q12H Tammy Pradhan MD      gabapentin  100 mg Oral TID Gladys Carvalho DO      insulin lispro  1-5 Units Subcutaneous HS Tammy Pradhan MD      insulin lispro  1-6 Units Subcutaneous TID AC Tammy Pradhan MD      loperamide  2 mg Oral Q4H PRN Liliana Flores MD      metFORMIN  1,000 mg Oral Daily With Dinner Rashmi Ibrahim PA-C      methocarbamol  500 mg Oral Q6H PRN Tammy Pradhan MD      multivitamin stress formula  1 tablet Oral Daily Tammy Pradhan MD      naloxone  0.04 mg Intravenous Q1MIN PRN Tammy Pradhan MD      oxyCODONE  2.5 mg Oral Q4H PRN Tammy Pradhan MD      Or    oxyCODONE  5 mg Oral Q4H PRN Tammy Pradhan MD      pantoprazole  40 mg Oral Early Morning Tammy Pradhan MD      polyethylene glycol  17 g Oral Daily PRN STEFFANY Nelson      PreserVision AREDS 2  1 capsule Oral BID  STEFFANY Nelson      sacubitril-valsartan  1 tablet Oral BID Tammy Pradhan MD      spironolactone  25 mg Oral Daily STEFFANY Nelson           Lab Results: I have reviewed the following results:  Results from last 7 days   Lab Units 05/08/25  0619 05/05/25  0549   HEMOGLOBIN g/dL 9.3* 8.9*   HEMATOCRIT % 30.9* 29.1*   WBC Thousand/uL 8.31 9.33   PLATELETS Thousands/uL 578* 510*     Results from last 7 days   Lab Units 05/08/25  0619 05/05/25  0549   BUN mg/dL 23 19   SODIUM mmol/L 139 140   POTASSIUM mmol/L 4.8 4.4   CHLORIDE mmol/L 103 106   CREATININE mg/dL 0.64 0.49*                Lexa Bonilla,   Physical Medicine and Rehabilitation  [facility]    I have spent a total time of 50 minutes in caring for this patient on the day of the visit/encounter including Counseling / Coordination of care, Documenting in the medical record, Reviewing/placing orders in the medical record (including tests, medications, and/or procedures), and Communicating with other healthcare professionals .

## 2025-05-09 PROBLEM — M80.00XA OSTEOPOROSIS WITH CURRENT PATHOLOGICAL FRACTURE: Status: ACTIVE | Noted: 2025-05-09

## 2025-05-09 PROBLEM — E27.9 ADRENAL NODULE (HCC): Status: ACTIVE | Noted: 2025-05-09

## 2025-05-09 LAB
DME PARACHUTE DELIVERY DATE EXPECTED: NORMAL
DME PARACHUTE DELIVERY DATE REQUESTED: NORMAL
DME PARACHUTE DELIVERY NOTE: NORMAL
DME PARACHUTE ITEM DESCRIPTION: NORMAL
DME PARACHUTE ORDER STATUS: NORMAL
DME PARACHUTE SUPPLIER NAME: NORMAL
DME PARACHUTE SUPPLIER PHONE: NORMAL
GLUCOSE SERPL-MCNC: 150 MG/DL (ref 65–140)
GLUCOSE SERPL-MCNC: 162 MG/DL (ref 65–140)
GLUCOSE SERPL-MCNC: 193 MG/DL (ref 65–140)
GLUCOSE SERPL-MCNC: 233 MG/DL (ref 65–140)

## 2025-05-09 PROCEDURE — 99233 SBSQ HOSP IP/OBS HIGH 50: CPT | Performed by: STUDENT IN AN ORGANIZED HEALTH CARE EDUCATION/TRAINING PROGRAM

## 2025-05-09 PROCEDURE — 82948 REAGENT STRIP/BLOOD GLUCOSE: CPT

## 2025-05-09 PROCEDURE — 99232 SBSQ HOSP IP/OBS MODERATE 35: CPT | Performed by: NURSE PRACTITIONER

## 2025-05-09 PROCEDURE — 97530 THERAPEUTIC ACTIVITIES: CPT

## 2025-05-09 PROCEDURE — 99222 1ST HOSP IP/OBS MODERATE 55: CPT | Performed by: INTERNAL MEDICINE

## 2025-05-09 PROCEDURE — 97116 GAIT TRAINING THERAPY: CPT

## 2025-05-09 PROCEDURE — 97535 SELF CARE MNGMENT TRAINING: CPT

## 2025-05-09 PROCEDURE — 97110 THERAPEUTIC EXERCISES: CPT

## 2025-05-09 RX ORDER — TORSEMIDE 10 MG/1
10 TABLET ORAL ONCE
Status: COMPLETED | OUTPATIENT
Start: 2025-05-09 | End: 2025-05-09

## 2025-05-09 RX ORDER — POLYETHYLENE GLYCOL 3350 17 G/17G
17 POWDER, FOR SOLUTION ORAL DAILY PRN
Start: 2025-05-09 | End: 2025-05-19

## 2025-05-09 RX ORDER — GABAPENTIN 100 MG/1
100 CAPSULE ORAL 3 TIMES DAILY
Qty: 90 CAPSULE | Refills: 0 | Status: SHIPPED | OUTPATIENT
Start: 2025-05-09

## 2025-05-09 RX ORDER — ACETAMINOPHEN 325 MG/1
650 TABLET ORAL EVERY 6 HOURS PRN
Start: 2025-05-09

## 2025-05-09 RX ORDER — METHOCARBAMOL 500 MG/1
500 TABLET, FILM COATED ORAL 3 TIMES DAILY PRN
Qty: 90 TABLET | Refills: 0 | Status: SHIPPED | OUTPATIENT
Start: 2025-05-09

## 2025-05-09 RX ADMIN — GABAPENTIN 100 MG: 100 CAPSULE ORAL at 08:26

## 2025-05-09 RX ADMIN — ACETAMINOPHEN 975 MG: 325 TABLET, FILM COATED ORAL at 05:26

## 2025-05-09 RX ADMIN — TORSEMIDE 10 MG: 10 TABLET ORAL at 13:27

## 2025-05-09 RX ADMIN — ACETAMINOPHEN 975 MG: 325 TABLET, FILM COATED ORAL at 21:28

## 2025-05-09 RX ADMIN — OXYCODONE HYDROCHLORIDE 5 MG: 5 TABLET ORAL at 09:10

## 2025-05-09 RX ADMIN — Medication 1 CAPSULE: at 08:26

## 2025-05-09 RX ADMIN — METFORMIN ER 500 MG 1000 MG: 500 TABLET ORAL at 17:10

## 2025-05-09 RX ADMIN — ASPIRIN 325 MG ORAL TABLET 325 MG: 325 PILL ORAL at 08:30

## 2025-05-09 RX ADMIN — SACUBITRIL AND VALSARTAN 1 TABLET: 97; 103 TABLET, FILM COATED ORAL at 08:25

## 2025-05-09 RX ADMIN — GABAPENTIN 100 MG: 100 CAPSULE ORAL at 21:28

## 2025-05-09 RX ADMIN — Medication 1 CAPSULE: at 21:29

## 2025-05-09 RX ADMIN — ACETAMINOPHEN 975 MG: 325 TABLET, FILM COATED ORAL at 13:26

## 2025-05-09 RX ADMIN — INSULIN LISPRO 3 UNITS: 100 INJECTION, SOLUTION INTRAVENOUS; SUBCUTANEOUS at 11:53

## 2025-05-09 RX ADMIN — INSULIN LISPRO 1 UNITS: 100 INJECTION, SOLUTION INTRAVENOUS; SUBCUTANEOUS at 21:30

## 2025-05-09 RX ADMIN — GABAPENTIN 100 MG: 100 CAPSULE ORAL at 17:09

## 2025-05-09 RX ADMIN — OXYCODONE HYDROCHLORIDE 5 MG: 5 TABLET ORAL at 19:18

## 2025-05-09 RX ADMIN — SPIRONOLACTONE 25 MG: 25 TABLET ORAL at 08:27

## 2025-05-09 RX ADMIN — Medication 1000 UNITS: at 08:26

## 2025-05-09 RX ADMIN — B-COMPLEX W/ C & FOLIC ACID TAB 1 TABLET: TAB at 08:25

## 2025-05-09 RX ADMIN — SACUBITRIL AND VALSARTAN 1 TABLET: 97; 103 TABLET, FILM COATED ORAL at 21:29

## 2025-05-09 RX ADMIN — ENOXAPARIN SODIUM 30 MG: 30 INJECTION SUBCUTANEOUS at 08:26

## 2025-05-09 RX ADMIN — ENOXAPARIN SODIUM 30 MG: 30 INJECTION SUBCUTANEOUS at 21:28

## 2025-05-09 RX ADMIN — PANTOPRAZOLE SODIUM 40 MG: 40 TABLET, DELAYED RELEASE ORAL at 05:26

## 2025-05-09 RX ADMIN — INSULIN LISPRO 2 UNITS: 100 INJECTION, SOLUTION INTRAVENOUS; SUBCUTANEOUS at 17:11

## 2025-05-09 RX ADMIN — INSULIN LISPRO 1 UNITS: 100 INJECTION, SOLUTION INTRAVENOUS; SUBCUTANEOUS at 07:46

## 2025-05-09 RX ADMIN — METHOCARBAMOL 500 MG: 500 TABLET ORAL at 19:18

## 2025-05-09 NOTE — ASSESSMENT & PLAN NOTE
Noted to have a 1.4 cm right and 1.6 cm left adrenal nodules noted on imaging in 2019, imaging of both consistent with benign adenoma.  No prior workup has been performed for those nodules.

## 2025-05-09 NOTE — PROGRESS NOTES
"Progress Note - Hospitalist   Name: Kamini Martines 75 y.o. female I MRN: [id number]  Unit/Bed#: -01 I Date of Admission: 4/28/2025   Date of Service: 5/10/2025 I Hospital Day: 12    Assessment & Plan  Closed left hip fracture, initial encounter ([facility])  Presented to [facility] on 4/24/25 with left hip pain following a mechanical fall  Noted to have a comminuted intertrochanteric fracture of the left femur   S/p ORIF/IMN 4/24   Weightbearing as tolerated  PT/OT, pain control per PMR  Is having muscle spasms that Robaxin 500 mg q6hrs prn is helping.    Per Ortho note 4/28: \"DVT ppx: recommend lovenox while in house, d/c on aspirin 81mg BID x 6 weeks\".   However she is already on 325mg qd as at home anyway  Two week followup was 5/8/25 = xray stable and staples removed  HFrEF (heart failure with reduced ejection fraction) ([facility])  NICM  EF 41% on cardiac MRI (2019)/ECHO 40-45% 2/4/25  Follows with Dr Worley as an OP  Home: Entresto  mg BID/Aldactone 25mg qd/Torsemide 10mg qd prn edema  Here:  Entresto  mg BID/Aldactone as below  Does not tolerate BB = has tried Coreg and Metoprolol in past per Dr Worley's office note  Volume status stable  Restarted Aldactone at 12.5mg qd on 4/29 and increased back up to 25mg qd to start 5/2/25  BMP 5/1/25 = stable  Was having significant bilateral LE edema, duplex negative for DVT. Takes Torsemide 10mg PRN at home.   Torsemide 10 mg x 1 given 5/5/25, 5/7/25, 5/8/25, 5/9/25.  Will give another dose today 5/10/25  BMP 5/8/25 = stable.  BMP 5/10/25 = stable  Type 2 diabetes mellitus without complication, without long-term current use of insulin ([facility])  HbA1C 7.8 on 4/29/25  Has a glucometer at home  Home: Metformin  mg daily with dinner  Here:  Metformin XR 1000 mg daily dinner/Lispro 3U TID  Continue QID Accuchecks/SSI (Algo 3/1) and DM diet  Restarted Metformin  mg with dinner 4/30/25, stopped the Lantus; kept the Lispro at 3U TID with meals  5/3/25: " "increased Metformin 1000mg daily with dinner.   5/7/25: kept Metformin same and stopped Lispro 3U WM but BSs are still high.  She is reluctant to add more meds but I emphasized needs good BS control to avoid wound infection  Endo saw and increased Metformin XR to 1000 mg BID.  They will see her in the office as an OP  CVA (cerebral vascular accident) (HCC)  Continue  mg qd as at home  Not on statins at home because she didn't want to take in past  Didn't followup with Neuro after stroke  LUCIA (obstructive sleep apnea)  Stopped using CPAP few months ago as strap was causing neck pain  Outpatient sleep f/u  Mild intermittent asthma without complication  No exacerbation  Continue Albuterol prn  Class 2 obesity in adult  BMI 36.87  Affects all aspects of care  Lifestyle modification  GERD (gastroesophageal reflux disease)  Takes Prilosec 40mg qd at home  Currently on Protonix 40mg qd and Pepcid qd here  Started weaning Pepcid and reduced to 10mg qd 4/30/25 -->  stopped 5/5/25  CAD (coronary artery disease)  Nonobstructive coronary artery disease on cardiac cath  On  mg daily for hx CVA  Urinary retention  Adams was placed on 4/25 and removed on 4/28  She takes Vesicare at home   Per PMR  Thyroid nodule  Found on CT cervical spine  \"1.1 cm right thyroid nodule. Incidental discovery of one or more thyroid nodule(s) measuring less than 1.5 cm and without suspicious features is noted in this patient who is above 35 years old; according to guidelines published in the February 2015 white paper on incidental thyroid nodules in the Journal of the American College of Radiology (JACR), no further evaluation is recommended.\"  Buttock wound, right, sequela  POA  Per PMR  Anemia  Post op  stable  Leukocytosis  Mild  Likely reactive  No fever  Will watch  Bilateral leg edema  Hx of CHF which may be contributing- see above  Duplex negative for DVT  Gave Torsemide 10mg PO x 1 on 5/5, 5/7, 5/8, 5/9 and will give another " dose today 5/10   The edema is mostly in left leg which is her operative leg so some may remain for a while but has improved with the Torsemide.  Will continue ACE wraps for now  Osteoporosis with current pathological fracture  Obtain secondary workup.   For OP follow-up for consideration of DEXA scan and pharmacologic therapy.   Osteoporosis concern in the setting of fragility fracture per Endo.   Adrenal nodule (HCC)  Outpatient follow-up with Endocrine to consider further workup such as hyperfunctioning workup.   Hypomagnesemia  Level today 5/10 is 1.6  From getting Torsemide likely  Will give MagOx 400 mg BID x 4 doses    The above assessment and plan was reviewed and updated as determined by my evaluation of the patient on 5/10/2025.    History of Present Illness   Patient seen and examined. Patients overnight issues or events were reviewed with nursing staff. New or overnight issues include the following:   No new or overnight issues.  Offers no complaints    Review of Systems   All other systems reviewed and are negative.      Objective :  Temp:  [97.5 °F (36.4 °C)-97.7 °F (36.5 °C)] 97.5 °F (36.4 °C)  HR:  [75-88] 88  BP: (125-140)/(50-57) 125/56  Resp:  [17-20] 20  SpO2:  [96 %-98 %] 96 %  O2 Device: None (Room air)    Invasive Devices       None                   Physical Exam  General Appearance: no distress, nontoxic appearing  HEENT:  External ear normal.  Nose normal w/o drainage. Mucous membranes are moist. Oropharynx is clear. Conjunctiva clear w/o icterus or redness.  Neck:  Supple, normal ROM  Lungs: BBS without crackles/wheeze/rhonchi; respirations unlabored with normal inspiratory/expiratory effort.  No retractions noted.  On RA  CV: regular rate and rhythm; no rubs/murmurs/gallops, PMI normal   ABD: Abdomen is soft.  Bowel sounds all quadrants.  Nontender with no distention.    EXT: +LLE edema  Skin: normal turgor, normal texture  Psych: affect normal, mood normal  Neuro: AAO       The above  physical exam was reviewed and updated as determined by my evaluation of the patient on 5/10/2025.      Lab Results: I have reviewed the following results:  Results from last 7 days   Lab Units 05/08/25  0619 05/05/25  0549   WBC Thousand/uL 8.31 9.33   HEMOGLOBIN g/dL 9.3* 8.9*   HEMATOCRIT % 30.9* 29.1*   PLATELETS Thousands/uL 578* 510*     Results from last 7 days   Lab Units 05/10/25  0544 05/08/25  0619   SODIUM mmol/L 138 139   POTASSIUM mmol/L 4.6 4.8   CHLORIDE mmol/L 105 103   CO2 mmol/L 26 29   BUN mg/dL 20 23   CREATININE mg/dL 0.51* 0.64   CALCIUM mg/dL 10.0 9.9             Results from last 7 days   Lab Units 05/10/25  0818 05/09/25  2108 05/09/25  1540   POC GLUCOSE mg/dl 266* 150* 193*       Imaging Results Review: No pertinent imaging studies reviewed.  Other Study Results Review: No additional pertinent studies reviewed.    Review of Scheduled Meds: Medications  reviewed and reconciled as needed  Current Facility-Administered Medications   Medication Dose Route Frequency Provider Last Rate    acetaminophen  975 mg Oral Q8H [facility] Tammy Pradhan MD      albuterol  2 puff Inhalation Q6H PRN Tammy Pradhan MD      aluminum-magnesium hydroxide-simethicone  30 mL Oral Q4H PRN Tammy Pradhan MD      aspirin  325 mg Oral Daily Tammy Pradhan MD      Cholecalciferol  1,000 Units Oral Daily Tammy Pradhan MD      enoxaparin  30 mg Subcutaneous Q12H Tammy Pradhan MD      gabapentin  100 mg Oral TID Gladys Carvalho DO      insulin lispro  1-5 Units Subcutaneous HS Tammy Pradhan MD      insulin lispro  1-6 Units Subcutaneous TID AC Tammy Pradhan MD      loperamide  2 mg Oral Q4H PRN Liliana Flores MD      metFORMIN  1,000 mg Oral Daily With Dinner Rashmi Ibrahim PA-C      methocarbamol  500 mg Oral Q6H PRN Tammy Pradhan MD      multivitamin stress formula  1 tablet Oral Daily Tammy Pradhan MD      naloxone  0.04 mg Intravenous Q1MIN PRN Tammy Pradhan MD      oxyCODONE  2.5  mg Oral Q4H PRN Tammy Pradhan MD      Or    oxyCODONE  5 mg Oral Q4H PRN Tammy Pradhan MD      pantoprazole  40 mg Oral Early Morning Tammy Pradhan MD      polyethylene glycol  17 g Oral Daily PRN STEFFANY Nelson      PreserVision AREDS 2  1 capsule Oral BID STEFFANY Nelson      sacubitril-valsartan  1 tablet Oral BID Tammy Pradhan MD      spironolactone  25 mg Oral Daily STEFFANY Nelson         VTE Pharmacologic Prophylaxis: Lovenox  Code Status: Level 1 - Full Code  Current Length of Stay: 12 day(s)    Administrative Statements     ** Please Note:  voice to text software may have been used in the creation of this document. Although proof errors in transcription or interpretation are a potential of such software**

## 2025-05-09 NOTE — PROGRESS NOTES
Progress Note - PMR   Name: Kamini Martines 75 y.o. female I MRN: [id number]  Unit/Bed#: -01 I Date of Admission: 4/28/2025   Date of Service: 5/9/2025 I Hospital Day: 11     Assessment & Plan  Closed left hip fracture, initial encounter (HCC)  Patient presents on 4/24 to [facility] with left hip pain after a fall  And have a comminuted intertrochanteric fracture of the left femur  Status post ORIF with IM nailing by Dr. Parry on 4/24  2 week post-op XR ordered for 5/8 and completed - will consult ortho  Ortho consult completed and sutures removed x-rays reviewed follow-up in 4 more weeks    Weightbearing as tolerated to the left lower extremity  Physical and Occupational Therapy  Pain control with Tylenol, gabapentin, Robaxin and as needed oxycodone > Increasing gabapentin from 100 mg BID to TID for better pain control on 5/1  DVT prophylaxis.  Is recommended for 81 mg aspirin twice daily at the time of discharge to complete the 28 days however is on aspirin 325 mg daily currently  Impaired mobility and activities of daily living  Patient was evaluated by the rehabilitation team MD and an appropriate candidate for acute inpatient rehabilitation program at this time.  The patient will tolerate 3 hours/day 5 to 7 days/week of intensive physical, occupational therapy in order to obtain goals for community discharge  Due to the patient's functional Compared to their baseline level of function in addition to their ongoing medical needs, the patient would benefit from daily supervision from a rehabilitation physician as well as rehabilitation nursing to implement and adjust the medical as well as functional plan of care in order to meet the patient's goals.    HTN (hypertension)  Currently on a regimen of Entresto and spironolactone for the heart failure  Was on as needed Demadex at home in the past  Monitor pressures during therapy as well as during routine vitals  CVA (cerebral vascular accident)  ([facility])  History of stroke in the past and was placed on aspirin 325 mg daily  Is also not on a statin at home  After history of stroke did not follow-up with neurology and refused follow-up with Dr. Jones.  Is not on the appropriate regimen for secondary stroke prophylaxis.  Was on 81 mg previously but stated her cardiologist increased it to 325 mg.  She endorsed that she did not start her statin.  Mild intermittent asthma without complication  Currently on a as needed albuterol inhaler  Continue monitor on physical examination as well as during routine vitals, therapy vitals  LUCIA (obstructive sleep apnea)  Was noncompliant with the CPAP due to overall comfort with strap and discontinued its use  Follow-up with sleep medicine as an outpatient  Class 2 obesity in adult  BMI of 36.87 on arrival  Continue to recommend lifestyle modification, dietary changes, weight loss counseling especially in the setting of recent fracture.  Type 2 diabetes mellitus without complication, without long-term current use of insulin ([facility])  Lab Results   Component Value Date    HGBA1C 7.8 (H) 04/29/2025       Recent Labs     05/08/25  1548 05/08/25  2036 05/09/25  0634 05/09/25  1035   POCGLU 196* 203* 162* 233*     Currently on regimen of Lantus 8 units at bedtime +3 units of lispro with meals as well as sliding scale insulin, Accu-Cheks 4 times daily  Continue monitoring blood glucose levels and adjust per recommendations by internal medicine  Continue carb controlled diet level 2  Urinary retention  Had indwelling Adams catheter placed 4/25 which was removed on 4/28 and did require initial straight catheterization  5/1- PVRs x3 low. Now voiding and continent  HFrEF (heart failure with reduced ejection fraction) ([facility])  Wt Readings from Last 3 Encounters:   05/07/25 100 kg (220 lb 7.4 oz)   04/24/25 99.3 kg (219 lb)   02/20/25 99.3 kg (219 lb)     Echocardiogram most recently with a cardiac MRI showing EF of 41%  Nonischemic  cardiomyopathy on Entresto twice daily at home as well as Aldactone.  Aldactone is being restarted today per internal medicine  Does not tolerate beta-blockers in the past  Monitor intake and output as well as weights  Education on diet and activity  GERD (gastroesophageal reflux disease)  Continue Pepcid and Protonix and as needed Maalox  CAD (coronary artery disease)  Currently on aspirin  Noted to have nonobstructive CAD on cardiac catheterization  Follow-up with outpatient cardiology  Thyroid nodule    Buttock wound, right, sequela  Noted on admission  Consult wound care  Anemia  Preoperative hemoglobin of 12.1 now currently 9.1 qualifying for acute blood loss anemia  5/8- Hgb stable at 9.3    Continue to monitor hemoglobin on bio weekly CBC or sooner if quickly indicated  Leukocytosis  Most recent WBC count of 12.95 which is down from postoperative 18.0  5/8- WBC downtrending to 8.3    Continue to monitor on biweekly CBC or sooner if indicated, monitor for signs of infection at the surgical site  Left leg swelling  Wrapped the leg  Bilateral leg edema  Wrap the bilateral lower extremities  Osteoporosis with current pathological fracture    Adrenal nodule (HCC)      Subjective   75-year-old female with history of chronic heart failure, nonischemic cardiomyopathy, CAD, hypertension, LUCIA noncompliant with CPAP, lumbar spinal stenosis, arthritis presenting 4/24 for left hip pain after a fall and a very comminuted intertrochanteric fracture of the left femur underwent IM nailing with Dr. Parry on 4/24. Postoperative course complicated by urinary retention requiring a Adams catheter     Chief Complaint: f/u ambulatory dysfunction    Interval: Patient seen and evaluated in room without any acute issues overnight.  She was seen by endocrinology this morning with regards to management of her diabetes as well as osteoporosis.  Appreciate their recommendations.  We talked about her overall discharge medications  including going over her main medications.  Will switch her Tylenol as she leaves to 650 mg every 6 hours as needed.  She will additionally stay on the gabapentin we will give her a weaning protocol as her pain improves.  She is also on oxycodone approximately 3 times per day on review of the MAR however has a pain contract and generally takes tramadol 50 mg 4 times a day through pain management.  She will likely transition to that when she goes home after our discussion.  Discharge medication reconciliation orders placed. Patient denies fever, chills, nausea, emesis, cough, shortness of breath, diarrhea, or constipation. Sleep was fine, mood stable. Pain is controlled.      Objective :  Temp:  [97.6 °F (36.4 °C)-98.4 °F (36.9 °C)] 97.6 °F (36.4 °C)  HR:  [80-88] 80  BP: (118-152)/(50-68) 128/50  Resp:  [17-20] 17  SpO2:  [91 %-98 %] 98 %  O2 Device: None (Room air)    Functional Update:  Physical Therapy Occupational Therapy Speech Therapy   Weight Bearing Status: Weight Bearing as Tolerated  Transfers: Independent  Bed Mobility: Moderate Assistance  Amulation Distance (ft): 55 feet  Ambulation: Independent, Supervision  Assistive Device for Ambulation: Roller Walker  Wheelchair Mobility Distance: 0 ft  Number of Stairs: 2  Assistive Device for Stairs: Walker (RW for curb step, bilateral handrail for stairs)  Stair Assistance: Moderate Assistance (Min/ModA for curb step, Min/ModA for stairs)  Discharge Recommendations: Home with:  DC Home with:: Family Support, First Floor Setup, Home Physical Therapy   Eating: Independent  Grooming: Supervision  Bathing: Minimal Assistance  Bathing: Minimal Assistance  Upper Body Dressing: Supervision  Lower Body Dressing: Minimal Assistance  Toileting: Supervision  Toilet Transfer: Supervision  Cognition: Within Defined Limits  Orientation: Person, Place, Time, Situation                 Physical Exam  Vitals reviewed.   Constitutional:       General: She is not in acute  distress.     Appearance: She is obese.   HENT:      Head: Normocephalic and atraumatic.      Right Ear: External ear normal.      Left Ear: External ear normal.      Nose: Nose normal. No rhinorrhea.      Mouth/Throat:      Mouth: Mucous membranes are moist.      Pharynx: Oropharynx is clear.   Eyes:      General: No scleral icterus.  Cardiovascular:      Rate and Rhythm: Normal rate.   Pulmonary:      Effort: Pulmonary effort is normal. No respiratory distress.   Abdominal:      General: There is no distension.      Palpations: Abdomen is soft.   Skin:     General: Skin is warm.      Comments: Mild by ecchymosis left, no sutures in place with Steri-Strips   Neurological:      Mental Status: She is alert and oriented to person, place, and time.   Psychiatric:         Mood and Affect: Mood normal.         Behavior: Behavior normal.           Scheduled Meds:  Current Facility-Administered Medications   Medication Dose Route Frequency Provider Last Rate    acetaminophen  975 mg Oral Q8H [facility] Tammy Pradhan MD      albuterol  2 puff Inhalation Q6H PRN Tammy Pradhan MD      aluminum-magnesium hydroxide-simethicone  30 mL Oral Q4H PRN Tammy Pradhan MD      aspirin  325 mg Oral Daily Tammy Pradhan MD      Cholecalciferol  1,000 Units Oral Daily Tammy Pradhan MD      enoxaparin  30 mg Subcutaneous Q12H Tammy Pradhan MD      gabapentin  100 mg Oral TID Gladys Carvalho DO      insulin lispro  1-5 Units Subcutaneous HS Tammy Pradhan MD      insulin lispro  1-6 Units Subcutaneous TID AC Tammy Pradhan MD      loperamide  2 mg Oral Q4H PRN Liliana Flores MD      metFORMIN  1,000 mg Oral Daily With Dinner Rashmi Ibrahim PA-C      methocarbamol  500 mg Oral Q6H PRN Tammy Pradhan MD      multivitamin stress formula  1 tablet Oral Daily Tammy Pradhan MD      naloxone  0.04 mg Intravenous Q1MIN PRN Tammy Pradhan MD      oxyCODONE  2.5 mg Oral Q4H PRN Tammy Pradhan MD      Or    oxyCODONE   5 mg Oral Q4H PRN Tammy Pradhan MD      pantoprazole  40 mg Oral Early Morning Tammy Pradhan MD      polyethylene glycol  17 g Oral Daily PRN STEFFANY Nelson      PreserVision AREDS 2  1 capsule Oral BID STEFFANY Nelson      sacubitril-valsartan  1 tablet Oral BID Tammy Pradhan MD      spironolactone  25 mg Oral Daily STEFFANY Nelson           Lab Results: I have reviewed the following results:  Results from last 7 days   Lab Units 05/08/25  0619 05/05/25  0549   HEMOGLOBIN g/dL 9.3* 8.9*   HEMATOCRIT % 30.9* 29.1*   WBC Thousand/uL 8.31 9.33   PLATELETS Thousands/uL 578* 510*     Results from last 7 days   Lab Units 05/08/25  0619 05/05/25  0549   BUN mg/dL 23 19   SODIUM mmol/L 139 140   POTASSIUM mmol/L 4.8 4.4   CHLORIDE mmol/L 103 106   CREATININE mg/dL 0.64 0.49*                Lexa Bonilla,   Physical Medicine and Rehabilitation  [facility]    I have spent a total time of 50 minutes in caring for this patient on the day of the visit/encounter including Counseling / Coordination of care, Documenting in the medical record, Reviewing/placing orders in the medical record (including tests, medications, and/or procedures), and Communicating with other healthcare professionals .

## 2025-05-09 NOTE — PROGRESS NOTES
"Progress Note - Hospitalist   Name: Kamini Martines 75 y.o. female I MRN: [id number]  Unit/Bed#: -01 I Date of Admission: 4/28/2025   Date of Service: 5/9/2025 I Hospital Day: 11    Assessment & Plan  Closed left hip fracture, initial encounter ([facility])  Presented to [facility] on 4/24/25 with left hip pain following a mechanical fall  Noted to have a comminuted intertrochanteric fracture of the left femur   S/p ORIF/IMN 4/24   Weightbearing as tolerated  PT/OT, pain control per PMR  Is having muscle spasms that Robaxin 500 mg q6hrs prn is helping.    Per Ortho note 4/28: \"DVT ppx: recommend lovenox while in house, d/c on aspirin 81mg BID x 6 weeks\".   However she is already on 325mg qd as at home anyway  Two week followup was 5/8/25 = xray stable and staples removed  HFrEF (heart failure with reduced ejection fraction) ([facility])  NICM  EF 41% on cardiac MRI (2019)/ECHO 40-45% 2/4/25  Follows with Dr Worley as an OP  Home: Entresto  mg BID/Aldactone 25mg qd/Torsemide 10mg qd prn edema  Here:  Entresto  mg BID/Aldactone as below  Does not tolerate BB = has tried Coreg and Metoprolol in past per Dr Worley's office note  Volume status stable  Restarted Aldactone at 12.5mg qd on 4/29 and increased back up to 25mg qd to start 5/2/25  BMP 5/1/25 = stable  Was having significant bilateral LE edema, duplex negative for DVT. Takes Torsemide 10mg PRN at home.   Torsemide 10 mg x 1 given 5/5/25, 5/7/25 and 5/8/25  BMP 5/8/25 = stable.  BMP 5/10/25  Type 2 diabetes mellitus without complication, without long-term current use of insulin ([facility])  HbA1C 7.8 on 4/29/25  Has a glucometer at home  Home: Metformin  mg daily with dinner  Here:  Metformin XR 1000 mg daily dinner/Lispro 3U TID  Continue QID Accuchecks/SSI (Algo 3/1) and DM diet  Restarted Metformin  mg with dinner 4/30/25, stopped the Lantus; kept the Lispro at 3U TID with meals  5/3/25: increased Metformin 1000mg daily with dinner.   5/7/25: " "kept Metformin same and stopped Lispro 3U WM but BSs are still high.  She is reluctant to add more meds but I emphasized needs good BS control to avoid wound infection  Endo saw and plan to increase Metformin to 1000 mg BID.  They will see her in the office as an OP  CVA (cerebral vascular accident) (HCC)  Continue  mg qd as at home  Not on statins at home because she didn't want to take in past  Didn't followup with Neuro after stroke  LUCIA (obstructive sleep apnea)  Stopped using CPAP few months ago as strap was causing neck pain  Outpatient sleep f/u  Mild intermittent asthma without complication  No exacerbation  Continue Albuterol prn  Class 2 obesity in adult  BMI 36.87  Affects all aspects of care  Lifestyle modification  GERD (gastroesophageal reflux disease)  Takes Prilosec 40mg qd at home  Currently on Protonix 40mg qd and Pepcid qd here  Started weaning Pepcid and reduced to 10mg qd 4/30/25 -->  stopped 5/5/25  CAD (coronary artery disease)  Nonobstructive coronary artery disease on cardiac cath  On  mg daily for hx CVA  Urinary retention  Adams was placed on 4/25 and removed on 4/28  She takes Vesicare at home   Per PMR  Thyroid nodule  Found on CT cervical spine  \"1.1 cm right thyroid nodule. Incidental discovery of one or more thyroid nodule(s) measuring less than 1.5 cm and without suspicious features is noted in this patient who is above 35 years old; according to guidelines published in the February 2015 white paper on incidental thyroid nodules in the Journal of the American College of Radiology (JACR), no further evaluation is recommended.\"  Buttock wound, right, sequela  POA  Per PMR  Anemia  Post op  stable  Leukocytosis  Mild  Likely reactive  No fever  Will watch  Bilateral leg edema  Hx of CHF which may be contributing- see above  Duplex negative for DVT  Gave Torsemide 10mg PO x 1 on 5/5, 5/7 and 5/8/25   Will give another dose today of Torsemide 10mg x 1 5/9/25  The edema is " mostly in left leg which is her operative leg so some may remain for a while.  She is uncomfortable with the edema.  Will continue ACE wraps for now    The above assessment and plan was reviewed and updated as determined by my evaluation of the patient on 5/9/2025.    History of Present Illness   Patient seen and examined. Patients overnight issues or events were reviewed with nursing staff. New or overnight issues include the following:   No new or overnight issues.  Offers no complaints    Review of Systems   All other systems reviewed and are negative.      Objective :  Temp:  [97.9 °F (36.6 °C)-98.4 °F (36.9 °C)] 98.4 °F (36.9 °C)  HR:  [80-88] 88  BP: (112-152)/(55-68) 125/55  Resp:  [17-20] 20  SpO2:  [91 %-96 %] 96 %  O2 Device: None (Room air)    Invasive Devices       None                   Physical Exam  General Appearance: no distress, non toxic appearing  HEENT: PERRLA, conjuctiva normal; oropharynx clear; mucous membranes moist   Neck:  Supple, normal ROM  Lungs: CTA, normal respiratory effort, no retractions, expiratory effort normal  CV: regular rate and rhythm; no rubs/murmurs/gallops, PMI normal   ABD: soft; ND/NT; +BS  EXT: tr edema lower right leg and 1+ left foot/mild pretibial area  Skin: normal turgor, normal texture  Psych: affect normal, mood normal  Neuro: AAO       The above physical exam was reviewed and updated as determined by my evaluation of the patient on 5/9/2025.      Lab Results: I have reviewed the following results:  Results from last 7 days   Lab Units 05/08/25  0619 05/05/25  0549   WBC Thousand/uL 8.31 9.33   HEMOGLOBIN g/dL 9.3* 8.9*   HEMATOCRIT % 30.9* 29.1*   PLATELETS Thousands/uL 578* 510*     Results from last 7 days   Lab Units 05/08/25  0619 05/05/25  0549   SODIUM mmol/L 139 140   POTASSIUM mmol/L 4.8 4.4   CHLORIDE mmol/L 103 106   CO2 mmol/L 29 27   BUN mg/dL 23 19   CREATININE mg/dL 0.64 0.49*   CALCIUM mg/dL 9.9 9.7             Results from last 7 days   Lab  Units 05/09/25  1035 05/09/25  0634 05/08/25  2036   POC GLUCOSE mg/dl 233* 162* 203*       Imaging Results Review: No pertinent imaging studies reviewed.  Other Study Results Review: No additional pertinent studies reviewed.    Review of Scheduled Meds: Medications  reviewed and reconciled as needed  Current Facility-Administered Medications   Medication Dose Route Frequency Provider Last Rate    acetaminophen  975 mg Oral Q8H [facility] Tammy Pradhan MD      albuterol  2 puff Inhalation Q6H PRN Tammy Pradhan MD      aluminum-magnesium hydroxide-simethicone  30 mL Oral Q4H PRN Tammy Pradhan MD      aspirin  325 mg Oral Daily Tammy Pradhan MD      Cholecalciferol  1,000 Units Oral Daily Tammy Pradhan MD      enoxaparin  30 mg Subcutaneous Q12H Tammy Pradhan MD      gabapentin  100 mg Oral TID Gladys Carvalho DO      insulin lispro  1-5 Units Subcutaneous HS Tammy Pradhan MD      insulin lispro  1-6 Units Subcutaneous TID AC Tammy Pradhan MD      loperamide  2 mg Oral Q4H PRN Liliana Flores MD      metFORMIN  1,000 mg Oral Daily With Dinner Rashmi Ibrahim PA-C      methocarbamol  500 mg Oral Q6H PRN Tammy Pradhan MD      multivitamin stress formula  1 tablet Oral Daily Tammy Pradhan MD      naloxone  0.04 mg Intravenous Q1MIN PRN Tammy Pradhan MD      oxyCODONE  2.5 mg Oral Q4H PRN Tammy Pradhan MD      Or    oxyCODONE  5 mg Oral Q4H PRN Tammy Pradhan MD      pantoprazole  40 mg Oral Early Morning Tammy Pradhan MD      polyethylene glycol  17 g Oral Daily PRN STEFFANY Nelson      PreserVision AREDS 2  1 capsule Oral BID STEFFANY Nelson      sacubitril-valsartan  1 tablet Oral BID Tammy Pradhan MD      spironolactone  25 mg Oral Daily STEFFANY Nelson         VTE Pharmacologic Prophylaxis: Lovenox  Code Status: Level 1 - Full Code  Current Length of Stay: 11 day(s)    Administrative Statements     ** Please Note:  voice to text  software may have been used in the creation of this document. Although proof errors in transcription or interpretation are a potential of such software**

## 2025-05-09 NOTE — ASSESSMENT & PLAN NOTE
Hx of CHF which may be contributing- see above  Duplex negative for DVT  Gave Torsemide 10mg PO x 1 on 5/5, 5/7, 5/8, 5/9 and will give another dose today 5/10   The edema is mostly in left leg which is her operative leg so some may remain for a while but has improved with the Torsemide.  Will continue ACE wraps for now

## 2025-05-09 NOTE — ASSESSMENT & PLAN NOTE
Lab Results   Component Value Date    HGBA1C 7.8 (H) 04/29/2025       Recent Labs     05/08/25  1548 05/08/25 2036 05/09/25  0634 05/09/25  1035   POCGLU 196* 203* 162* 233*     Currently on regimen of Lantus 8 units at bedtime +3 units of lispro with meals as well as sliding scale insulin, Accu-Cheks 4 times daily  Continue monitoring blood glucose levels and adjust per recommendations by internal medicine  Continue carb controlled diet level 2

## 2025-05-09 NOTE — ASSESSMENT & PLAN NOTE
Obtain secondary workup.   For OP follow-up for consideration of DEXA scan and pharmacologic therapy.   Osteoporosis concern in the setting of fragility fracture per Endo.

## 2025-05-09 NOTE — ASSESSMENT & PLAN NOTE
HbA1C 7.8 on 4/29/25  Has a glucometer at home  Home: Metformin  mg daily with dinner  Here:  Metformin XR 1000 mg daily dinner/Lispro 3U TID  Continue QID Accuchecks/SSI (Algo 3/1) and DM diet  Restarted Metformin  mg with dinner 4/30/25, stopped the Lantus; kept the Lispro at 3U TID with meals  5/3/25: increased Metformin 1000mg daily with dinner.   5/7/25: kept Metformin same and stopped Lispro 3U WM but BSs are still high.  She is reluctant to add more meds but I emphasized needs good BS control to avoid wound infection  Endo saw and increased Metformin XR to 1000 mg BID.  They will see her in the office as an OP

## 2025-05-09 NOTE — CASE MANAGEMENT
Referral made to [facility] via aidin for contd pt and ot services, awaiting determination.     0942 [facility] unable to accept referral due to shortage of staff.  Referral made to [facility] awaiting determination.     1005 [facility] accepted referral. Info placed on dc instructions. CM made aware by jeri miles, . That pts first name is spelled with a Z on her medicare card.  Cm followed up with pt who was aware of this however her name is spelled with an S. Pt has no intention of changing it.  Info provided to [facility] for billing purposes.     1145 order placed for a full electric hospital bed with [employer]. Husbands contact info provided for scheduling of delivery.

## 2025-05-09 NOTE — ASSESSMENT & PLAN NOTE
HbA1C 7.8 on 4/29/25  Has a glucometer at home  Home: Metformin  mg daily with dinner  Here:  Metformin XR 1000 mg daily dinner/Lispro 3U TID  Continue QID Accuchecks/SSI (Algo 3/1) and DM diet  Restarted Metformin  mg with dinner 4/30/25, stopped the Lantus; kept the Lispro at 3U TID with meals  5/3/25: increased Metformin 1000mg daily with dinner.   5/7/25: kept Metformin same and stopped Lispro 3U WM but BSs are still high.  She is reluctant to add more meds but I emphasized needs good BS control to avoid wound infection  Endo saw and plan to increase Metformin to 1000 mg BID.  They will see her in the office as an OP

## 2025-05-09 NOTE — ASSESSMENT & PLAN NOTE
Patient presents on 4/24 to [facility] with left hip pain after a fall  And have a comminuted intertrochanteric fracture of the left femur  Status post ORIF with IM nailing by Dr. Parry on 4/24  2 week post-op XR ordered for 5/8 and completed - will consult ortho  Ortho consult completed and sutures removed x-rays reviewed follow-up in 4 more weeks    Weightbearing as tolerated to the left lower extremity  Physical and Occupational Therapy  Pain control with Tylenol, gabapentin, Robaxin and as needed oxycodone > Increasing gabapentin from 100 mg BID to TID for better pain control on 5/1  DVT prophylaxis.  Is recommended for 81 mg aspirin twice daily at the time of discharge to complete the 28 days however is on aspirin 325 mg daily currently

## 2025-05-09 NOTE — PROGRESS NOTES
05/09/25 1000   Pain Assessment   Pain Assessment Tool 0-10   Pain Score 5   Pain Location/Orientation Orientation: Left;Location: Hip   Restrictions/Precautions   Precautions Pain;Fall Risk   Weight Bearing Restrictions Yes   LLE Weight Bearing Per Order WBAT   Cognition   Overall Cognitive Status WFL   Arousal/Participation Alert;Cooperative   Attention Attends with cues to redirect   Orientation Level Oriented X4   Memory Within functional limits   Following Commands Follows one step commands without difficulty   Sit to Lying   Type of Assistance Needed Set-up / clean-up   Comment with use of leg , bed rails and elevation features of hospital bed   Sit to Lying CARE Score 5   Lying to Sitting on Side of Bed   Type of Assistance Needed Set-up / clean-up   Comment with use of leg , bed rails and elevation features of hospital bed. pt is renting hospital bed for home   Lying to Sitting on Side of Bed CARE Score 5   Sit to Stand   Type of Assistance Needed Independent;Adaptive equipment   Comment RW   Sit to Stand CARE Score 6   Bed-Chair Transfer   Type of Assistance Needed Independent;Adaptive equipment   Comment RW   Chair/Bed-to-Chair Transfer CARE Score 6   Car Transfer   Type of Assistance Needed Incidental touching   Comment CGA with RW, mimic on mat table with 2 inch step. Pt steps backward onto the step then sit down in the car seat. depneding on the day sometimes needs assist to get LE into the car.  can assist if needed   Car Transfer CARE Score 4   Walk 10 Feet   Type of Assistance Needed Independent;Adaptive equipment   Comment RW   Walk 10 Feet CARE Score 6   Walk 50 Feet with Two Turns   Type of Assistance Needed Independent;Adaptive equipment   Comment RW   Walk 50 Feet with Two Turns CARE Score 6   Walk 150 Feet   Type of Assistance Needed Supervision;Adaptive equipment   Comment CS with RW   Walk 150 Feet CARE Score 4   Walking 10 Feet on Uneven Surfaces   Type of Assistance  Needed Incidental touching;Supervision;Adaptive equipment   Comment (S)  CS/CGA with RW. Please trial ramp next session   Walking 10 Feet on Uneven Surfaces CARE Score 4   Ambulation   Primary Mode of Locomotion Prior to Admission Walk   Distance Walked (feet) 150 ft   Assist Device Roller Walker   Gait Pattern Antalgic;Decreased L stance;Improper weight shift;Step to;Step through   Does the patient walk? 2. Yes   Wheel 50 Feet with Two Turns   Reason if not Attempted Activity not applicable   Wheel 50 Feet with Two Turns CARE Score 9   Wheel 150 Feet   Reason if not Attempted Activity not applicable   Wheel 150 Feet CARE Score 9   Curb or Single Stair   Style negotiated Curb   Type of Assistance Needed Supervision;Incidental touching;Adaptive equipment   Comment CS/CGA with RW, ascend 6 inch step backwards, descend forwards. VCs to lead with RLE ascending/descend with LLE   1 Step (Curb) CARE Score 4   4 Steps   Type of Assistance Needed Incidental touching;Adaptive equipment   Comment CGA to ascend/descend 4 steps with bilateral handrails with step to pattern, ascend forward/descend backwards   4 Steps CARE Score 4   12 Steps   Comment limited by pain/fatigue   Reason if not Attempted Safety concerns   12 Steps CARE Score 88   Picking Up Object   Type of Assistance Needed Independent;Adaptive equipment   Comment reacher + RW   Picking Up Object CARE Score 6   Toilet Transfer   Type of Assistance Needed Independent;Adaptive equipment   Comment RW   Toilet Transfer CARE Score 6   Therapeutic Interventions   Strengthening Standing Hip Abduction 2x10   Other Reviewed recommended DME and assisted pt in finding leg  to purchase   Equipment Use   NuStep Level 1x 10 minutes   Other Comments   Comments Pt complaining of soreness/pain along back of left calf due to the ROSELINE stockings. Bruises noted where top of ROSELINE stockings were - unsure if that was present previously. For edema management wrapped LEs with ACE wrap.  Informed RN   Assessment   Treatment Assessment Patient participated in 90 minute skilled physical therapy session. Pt will be ready for discharge on Monday 5/12. Patient is independent with transfers and ambulation with RW, but requires intermittent assistance for bed mobility and assist for stair management. family training was completed yesterday -  can assist patient as needed. Patient plans on having first floor set up at home with  PT services. Patient continues to have difficulty with stair management but that is not a barrier to her returning home. Will have to ascend/descend 1 step then has ramp into the house. Over the weekend, physical therapy to work on ramp management with RW, stair training, LE strengthening, bed mobility with use of compensatory strategies and gait training   Problem List Decreased strength;Decreased range of motion;Decreased endurance;Impaired balance;Decreased mobility;Pain   Barriers to Discharge Inaccessible home environment   PT Barriers   Functional Limitation Car transfers;Stair negotiation;Transfers;Walking   Plan   Treatment/Interventions LE strengthening/ROM;Functional transfer training;Therapeutic exercise;Endurance training;Patient/family training;Gait training;Bed mobility;Equipment eval/education   Progress Progressing toward goals   PT Therapy Minutes   PT Time In 1000   PT Time Out 1130   PT Total Time (minutes) 90   PT Mode of treatment - Individual (minutes) 90   PT Mode of treatment - Concurrent (minutes) 0   PT Mode of treatment - Group (minutes) 0   PT Mode of treatment - Co-treat (minutes) 0   PT Mode of Treatment - Total time(minutes) 90 minutes   PT Cumulative Minutes 968     Leonela Naik, PT, DPT

## 2025-05-09 NOTE — ASSESSMENT & PLAN NOTE
NICM  EF 41% on cardiac MRI (2019)/ECHO 40-45% 2/4/25  Follows with Dr Worley as an OP  Home: Entresto  mg BID/Aldactone 25mg qd/Torsemide 10mg qd prn edema  Here:  Entresto  mg BID/Aldactone as below  Does not tolerate BB = has tried Coreg and Metoprolol in past per Dr Worley's office note  Volume status stable  Restarted Aldactone at 12.5mg qd on 4/29 and increased back up to 25mg qd to start 5/2/25  BMP 5/1/25 = stable  Was having significant bilateral LE edema, duplex negative for DVT. Takes Torsemide 10mg PRN at home.   Torsemide 10 mg x 1 given 5/5/25, 5/7/25 and 5/8/25  BMP 5/8/25 = stable.  BMP 5/10/25

## 2025-05-09 NOTE — ASSESSMENT & PLAN NOTE
"Presented to [facility] on 4/24/25 with left hip pain following a mechanical fall  Noted to have a comminuted intertrochanteric fracture of the left femur   S/p ORIF/IMN 4/24   Weightbearing as tolerated  PT/OT, pain control per PMR  Is having muscle spasms that Robaxin 500 mg q6hrs prn is helping.    Per Ortho note 4/28: \"DVT ppx: recommend lovenox while in house, d/c on aspirin 81mg BID x 6 weeks\".   However she is already on 325mg qd as at home anyway  Two week followup was 5/8/25 = xray stable and staples removed  "

## 2025-05-09 NOTE — ASSESSMENT & PLAN NOTE
As evidenced by left hip fracture after a fall from standing height.  No prior DEXA available on file.  At this time contributing factors are postmenopausal state, age, history of smoking, possibly multiple glucocorticoid injections  Will order PTH, 25-OH/vitamin D, TSH, magnesium to evaluate for secondary causes.  Risk prevention with PT/OT evaluation while inpatient  Follow up with endocrinology for DXA scan as an outpatient and to discuss medications for osteoporosis in 6-8 weeks once healed

## 2025-05-09 NOTE — ASSESSMENT & PLAN NOTE
Hx of CHF which may be contributing- see above  Duplex negative for DVT  Gave Torsemide 10mg PO x 1 on 5/5, 5/7 and 5/8/25   Will give another dose today of Torsemide 10mg x 1 5/9/25  The edema is mostly in left leg which is her operative leg so some may remain for a while.  She is uncomfortable with the edema.  Will continue ACE wraps for now

## 2025-05-09 NOTE — PROGRESS NOTES
05/09/25 1230   Pain Assessment   Pain Assessment Tool 0-10   Pain Score 7   Pain Location/Orientation Orientation: Left;Location: Hip   Hospital Pain Intervention(s) Repositioned;Cold applied   Restrictions/Precautions   Precautions Fall Risk;Pain   LLE Weight Bearing Per Order WBAT   Putting On/Taking Off Footwear   Comment attempted to don sneakers with ace wraps and socks, unable to complete for comfort. pt reports edema shoes werew delivered and her  will drop them off today   Sit to Stand   Type of Assistance Needed Independent   Comment RW   Sit to Stand CARE Score 6   Bed-Chair Transfer   Type of Assistance Needed Independent   Chair/Bed-to-Chair Transfer CARE Score 6   Toileting Hygiene   Type of Assistance Needed Independent   Comment IND for jesus hygiene. has water bidet at home. still cont to score her performance based on AE use.   Toileting Hygiene CARE Score 6   Toilet Transfer   Type of Assistance Needed Independent   Toilet Transfer CARE Score 6   Kitchen Mobility   Kitchen Mobility Comments Pt engages in discussion about home meal prep and kitchen tasks at d/c. Pt describing her set up in detail. Pt will be able to complete sitting portions with set up eating area in kitchen. Pt describes area that she is able to stand and place items on table from counter top. Pt cont to report confidence with completion of these tasks and does not wish to participate/practice while she is here.   Exercise Tools   Other Exercise Tool 1 Pt participates in seated UE therapeutic exercise intended to enable the patient to Maintain ROM, increase flexibility, increase strength, promote Endurance in order to increase independence and safety w/ ADL's, daily occupations along with functional transfers. Pt is able to safely complete all exercises w/ no C/O pain and self perceived exertion within personal tolerance. Today completed elbow flexion,with 2lb dumbbell shoulder abduction with yellow band, series of  isometric shoulder there-ex with resistance ball 2* Hx of RTC injuries. Complete 3x10 reps with ample rest periods. Did complete 1 min on UBE ergometer, but resistance did aggravate her shoulders so it was terminated.   Assessment   Treatment Assessment Pt participated in skilled OT treatment session with treatment focus on IADL re-training, fxnl xfers, and UE strengthening. Pt tolerated session well, despite reports of high pain today. Pt reports that she has been doing well with her in room transfers and toileting, even getting washed and dressed herself. Tomorrow plan is for full shower per orders. Pt is prepared for d/c on Monday 5/12.   Prognosis Good   Plan   Progress Progressing toward goals   OT Therapy Minutes   OT Time In 1230   OT Time Out 1400   OT Total Time (minutes) 90   OT Mode of treatment - Individual (minutes) 90   OT Mode of treatment - Concurrent (minutes) 0   OT Mode of treatment - Group (minutes) 0   OT Mode of treatment - Co-treat (minutes) 0   OT Mode of Treatment - Total time(minutes) 90 minutes   OT Cumulative Minutes 900

## 2025-05-09 NOTE — ASSESSMENT & PLAN NOTE
NICM  EF 41% on cardiac MRI (2019)/ECHO 40-45% 2/4/25  Follows with Dr Worley as an OP  Home: Entresto  mg BID/Aldactone 25mg qd/Torsemide 10mg qd prn edema  Here:  Entresto  mg BID/Aldactone as below  Does not tolerate BB = has tried Coreg and Metoprolol in past per Dr Worley's office note  Volume status stable  Restarted Aldactone at 12.5mg qd on 4/29 and increased back up to 25mg qd to start 5/2/25  BMP 5/1/25 = stable  Was having significant bilateral LE edema, duplex negative for DVT. Takes Torsemide 10mg PRN at home.   Torsemide 10 mg x 1 given 5/5/25, 5/7/25, 5/8/25, 5/9/25.  Will give another dose today 5/10/25  BMP 5/8/25 = stable.  BMP 5/10/25 = stable

## 2025-05-10 PROBLEM — E83.42 HYPOMAGNESEMIA: Status: ACTIVE | Noted: 2025-05-10

## 2025-05-10 LAB
25(OH)D3 SERPL-MCNC: 32.1 NG/ML (ref 30–100)
ANION GAP SERPL CALCULATED.3IONS-SCNC: 7 MMOL/L (ref 4–13)
BUN SERPL-MCNC: 20 MG/DL (ref 5–25)
CALCIUM SERPL-MCNC: 10 MG/DL (ref 8.4–10.2)
CHLORIDE SERPL-SCNC: 105 MMOL/L (ref 96–108)
CO2 SERPL-SCNC: 26 MMOL/L (ref 21–32)
CREAT SERPL-MCNC: 0.51 MG/DL (ref 0.6–1.3)
GFR SERPL CREATININE-BSD FRML MDRD: 94 ML/MIN/1.73SQ M
GLUCOSE SERPL-MCNC: 142 MG/DL (ref 65–140)
GLUCOSE SERPL-MCNC: 157 MG/DL (ref 65–140)
GLUCOSE SERPL-MCNC: 174 MG/DL (ref 65–140)
GLUCOSE SERPL-MCNC: 176 MG/DL (ref 65–140)
GLUCOSE SERPL-MCNC: 266 MG/DL (ref 65–140)
MAGNESIUM SERPL-MCNC: 1.6 MG/DL (ref 1.9–2.7)
POTASSIUM SERPL-SCNC: 4.6 MMOL/L (ref 3.5–5.3)
PTH-INTACT SERPL-MCNC: 44.3 PG/ML (ref 12–88)
SODIUM SERPL-SCNC: 138 MMOL/L (ref 135–147)
TSH SERPL DL<=0.05 MIU/L-ACNC: 1.17 UIU/ML (ref 0.45–4.5)

## 2025-05-10 PROCEDURE — 80048 BASIC METABOLIC PNL TOTAL CA: CPT | Performed by: NURSE PRACTITIONER

## 2025-05-10 PROCEDURE — 83735 ASSAY OF MAGNESIUM: CPT

## 2025-05-10 PROCEDURE — 83970 ASSAY OF PARATHORMONE: CPT

## 2025-05-10 PROCEDURE — 82306 VITAMIN D 25 HYDROXY: CPT

## 2025-05-10 PROCEDURE — 97110 THERAPEUTIC EXERCISES: CPT

## 2025-05-10 PROCEDURE — 82948 REAGENT STRIP/BLOOD GLUCOSE: CPT

## 2025-05-10 PROCEDURE — 84443 ASSAY THYROID STIM HORMONE: CPT

## 2025-05-10 PROCEDURE — 97530 THERAPEUTIC ACTIVITIES: CPT

## 2025-05-10 PROCEDURE — 99232 SBSQ HOSP IP/OBS MODERATE 35: CPT | Performed by: NURSE PRACTITIONER

## 2025-05-10 RX ORDER — LANOLIN ALCOHOL/MO/W.PET/CERES
400 CREAM (GRAM) TOPICAL 2 TIMES DAILY
Status: COMPLETED | OUTPATIENT
Start: 2025-05-10 | End: 2025-05-11

## 2025-05-10 RX ORDER — TORSEMIDE 10 MG/1
10 TABLET ORAL ONCE
Status: COMPLETED | OUTPATIENT
Start: 2025-05-10 | End: 2025-05-10

## 2025-05-10 RX ADMIN — ACETAMINOPHEN 975 MG: 325 TABLET, FILM COATED ORAL at 21:17

## 2025-05-10 RX ADMIN — OXYCODONE HYDROCHLORIDE 5 MG: 5 TABLET ORAL at 10:29

## 2025-05-10 RX ADMIN — METFORMIN ER 500 MG 1000 MG: 500 TABLET ORAL at 16:38

## 2025-05-10 RX ADMIN — GABAPENTIN 100 MG: 100 CAPSULE ORAL at 08:32

## 2025-05-10 RX ADMIN — OXYCODONE HYDROCHLORIDE 5 MG: 5 TABLET ORAL at 06:20

## 2025-05-10 RX ADMIN — INSULIN LISPRO 1 UNITS: 100 INJECTION, SOLUTION INTRAVENOUS; SUBCUTANEOUS at 11:37

## 2025-05-10 RX ADMIN — METHOCARBAMOL 500 MG: 500 TABLET ORAL at 08:32

## 2025-05-10 RX ADMIN — INSULIN LISPRO 1 UNITS: 100 INJECTION, SOLUTION INTRAVENOUS; SUBCUTANEOUS at 21:19

## 2025-05-10 RX ADMIN — GABAPENTIN 100 MG: 100 CAPSULE ORAL at 16:36

## 2025-05-10 RX ADMIN — ENOXAPARIN SODIUM 30 MG: 30 INJECTION SUBCUTANEOUS at 21:17

## 2025-05-10 RX ADMIN — OXYCODONE HYDROCHLORIDE 5 MG: 5 TABLET ORAL at 14:00

## 2025-05-10 RX ADMIN — SPIRONOLACTONE 25 MG: 25 TABLET ORAL at 08:33

## 2025-05-10 RX ADMIN — ACETAMINOPHEN 975 MG: 325 TABLET, FILM COATED ORAL at 14:00

## 2025-05-10 RX ADMIN — PANTOPRAZOLE SODIUM 40 MG: 40 TABLET, DELAYED RELEASE ORAL at 05:27

## 2025-05-10 RX ADMIN — Medication 1 CAPSULE: at 08:32

## 2025-05-10 RX ADMIN — TORSEMIDE 10 MG: 10 TABLET ORAL at 12:16

## 2025-05-10 RX ADMIN — Medication 1 CAPSULE: at 21:21

## 2025-05-10 RX ADMIN — B-COMPLEX W/ C & FOLIC ACID TAB 1 TABLET: TAB at 08:32

## 2025-05-10 RX ADMIN — Medication 1000 UNITS: at 08:32

## 2025-05-10 RX ADMIN — ASPIRIN 325 MG ORAL TABLET 325 MG: 325 PILL ORAL at 08:32

## 2025-05-10 RX ADMIN — GABAPENTIN 100 MG: 100 CAPSULE ORAL at 21:18

## 2025-05-10 RX ADMIN — SACUBITRIL AND VALSARTAN 1 TABLET: 97; 103 TABLET, FILM COATED ORAL at 21:17

## 2025-05-10 RX ADMIN — MAGNESIUM OXIDE TAB 400 MG (241.3 MG ELEMENTAL MG) 400 MG: 400 (241.3 MG) TAB at 11:47

## 2025-05-10 RX ADMIN — ENOXAPARIN SODIUM 30 MG: 30 INJECTION SUBCUTANEOUS at 08:32

## 2025-05-10 RX ADMIN — INSULIN LISPRO 3 UNITS: 100 INJECTION, SOLUTION INTRAVENOUS; SUBCUTANEOUS at 08:32

## 2025-05-10 RX ADMIN — ACETAMINOPHEN 975 MG: 325 TABLET, FILM COATED ORAL at 05:27

## 2025-05-10 RX ADMIN — MAGNESIUM OXIDE TAB 400 MG (241.3 MG ELEMENTAL MG) 400 MG: 400 (241.3 MG) TAB at 16:36

## 2025-05-10 RX ADMIN — SACUBITRIL AND VALSARTAN 1 TABLET: 97; 103 TABLET, FILM COATED ORAL at 08:33

## 2025-05-10 RX ADMIN — METHOCARBAMOL 500 MG: 500 TABLET ORAL at 21:19

## 2025-05-10 RX ADMIN — Medication 30 ML: at 06:17

## 2025-05-10 NOTE — ASSESSMENT & PLAN NOTE
"Presented to [facility] on 4/24/25 with left hip pain following a mechanical fall  Noted to have a comminuted intertrochanteric fracture of the left femur   S/p ORIF/IMN 4/24   Weightbearing as tolerated  PT/OT, pain control per PMR  Is having muscle spasms that Robaxin 500 mg q6hrs prn is helping.    Per Ortho note 4/28: \"DVT ppx: recommend lovenox while in house, d/c on aspirin 81mg BID x 6 weeks\".   However she is already on 325mg qd as at home anyway  Two week followup was 5/8/25 = xray stable and staples removed  "

## 2025-05-10 NOTE — ASSESSMENT & PLAN NOTE
Level on 5/10 was 1.6  Likely due to getting Torsemide  Getting MagOx 400 mg BID x 4 doses = LD will be tonight 5/11/25

## 2025-05-10 NOTE — QUICK NOTE
Patient chart reviewed, not seen during rounds:  24-hour glucose log reviewed-with fasting and postprandial hypoglycemia.    Recommendations:  Increase metformin to 1000 mg twice a day  Continue correctional insulin via algorithm 3 before meals and with algorithm 1 at bedtime  Fingersticks 4 times daily  Once ready for discharge, can be limited DC on oral agent without insulin.  Outpatient follow-up in clinic.

## 2025-05-10 NOTE — ASSESSMENT & PLAN NOTE
HbA1C 7.8 on 4/29/25  Has a glucometer at home  Home: Metformin  mg daily with dinner  Here:  Metformin XR 1000 mg daily dinner/Lispro 3U TID  Continue QID Accuchecks/SSI (Algo 3/1) and DM diet  Restarted Metformin  mg with dinner 4/30/25, stopped the Lantus; kept the Lispro at 3U TID with meals  5/3/25: Increased Metformin 1000mg daily with dinner.   5/7/25: Kept Metformin same and stopped Lispro 3U WM but BSs are still high.  She was reluctant to add more/increase meds but I emphasized needs good BS control to avoid wound infection  Endo saw and want the Metformin XR to 1000 mg BID --> I placed the order today 5/11/25.  They will see her in the office as an OP

## 2025-05-10 NOTE — PROGRESS NOTES
05/10/25 1000   Pain Assessment   Pain Assessment Tool 0-10   Pain Score 10 - Worst Possible Pain   Pain Location/Orientation Orientation: Left;Location: Hip;Location: Leg   Restrictions/Precautions   Precautions Fall Risk;Pain   LLE Weight Bearing Per Order WBAT   General   Change In Medical/Functional Status (S)  Pt c/o CORI martinezae bursing on her LEFT upp thigh ? and requested ace wrapping this AM session   Subjective   Subjective pt agreeable to perform skilled PT and c/o 10/10 Left leg and high area as above   Roll Left and Right   Type of Assistance Needed Set-up / clean-up   Comment with handrails   Roll Left and Right CARE Score 5   Sit to Lying   Type of Assistance Needed Set-up / clean-up;Adaptive equipment   Comment with use of leg , bed rails and elevation features of hospital bed   Sit to Lying CARE Score 5   Lying to Sitting on Side of Bed   Type of Assistance Needed Set-up / clean-up;Adaptive equipment   Comment with use of leg , bed rails and elevation features of hospital bed   Lying to Sitting on Side of Bed CARE Score 5   Sit to Stand   Type of Assistance Needed Independent;Adaptive equipment   Comment RW   Sit to Stand CARE Score 6   Bed-Chair Transfer   Type of Assistance Needed Independent;Adaptive equipment   Comment RW   Chair/Bed-to-Chair Transfer CARE Score 6   Transfer Bed/Chair/Wheelchair   Limitations Noted In Pain Management;LE Strength;Endurance;Confidence   Adaptive Equipment Roller Walker   Walk 10 Feet   Type of Assistance Needed Independent;Adaptive equipment   Comment RW   Walk 10 Feet CARE Score 6   Walk 50 Feet with Two Turns   Type of Assistance Needed Independent;Adaptive equipment   Comment RW   Walk 50 Feet with Two Turns CARE Score 6   Walk 150 Feet   Type of Assistance Needed Supervision;Adaptive equipment   Comment RW   Walk 150 Feet CARE Score 4   Walking 10 Feet on Uneven Surfaces   Type of Assistance Needed Incidental touching;Adaptive equipment    Comment Ramp on ground level about 20 feet with RW   Walking 10 Feet on Uneven Surfaces CARE Score 4   Ambulation   Primary Mode of Locomotion Prior to Admission Walk   Distance Walked (feet) 150 ft  (2)   Assist Device Roller Walker   Does the patient walk? 2. Yes   Wheel 50 Feet with Two Turns   Reason if not Attempted Activity not applicable   Wheel 50 Feet with Two Turns CARE Score 9   Wheel 150 Feet   Reason if not Attempted Activity not applicable   Wheel 150 Feet CARE Score 9   Curb or Single Stair   Style negotiated Curb   Type of Assistance Needed Supervision;Incidental touching   Comment CS/CGA with RW, ascend 6 inch step backwards, descend forwards. VCs to lead with RLE ascending/descend with LLE   1 Step (Curb) CARE Score 4   4 Steps   Type of Assistance Needed Incidental touching;Adaptive equipment   Comment CGA to ascend/descend 4 steps with bilateral handrails with step to pattern, ascend forward/descend backwards   4 Steps CARE Score 4   12 Steps   Comment limited by pain/fatigue   Picking Up Object   Type of Assistance Needed Independent;Adaptive equipment   Comment reacher RW   Picking Up Object CARE Score 6   Toilet Transfer   Type of Assistance Needed Independent;Adaptive equipment   Comment RW   Toilet Transfer CARE Score 6   Therapeutic Interventions   Strengthening seated LAQ AP marching ball hip add/ 10-30 reps   Flexibility manual stretch LE   Balance standing balance   Equipment Use   NuStep lvl 1 for 10 min   Assessment   Treatment Assessment pt engaged in skilled PT focus on increase functional mobility with RW .Pt perform downstairs ramp CgA with RW progressing nicely. Pt will cont to perfor all care scores tomorrow and cont with ramp to enter home and pt getting hospital bed at home on Monday before DC to home. Cont POC and all care tomorrow for DC on Monday   Barriers to Discharge Inaccessible home environment   Plan   Progress Progressing toward goals   Discharge Recommendation    Rehab Resource Intensity Level, PT   (HH PT)   PT Therapy Minutes   PT Time In 1000   PT Time Out 1130   PT Total Time (minutes) 90   PT Mode of treatment - Individual (minutes) 90   PT Mode of treatment - Concurrent (minutes) 0   PT Mode of treatment - Group (minutes) 0   PT Mode of treatment - Co-treat (minutes) 0   PT Mode of Treatment - Total time(minutes) 90 minutes   PT Cumulative Minutes 1058   Therapy Time missed   Time missed? No

## 2025-05-10 NOTE — ASSESSMENT & PLAN NOTE
Hx of CHF which may be contributing- see above  Duplex negative for DVT  Gave Torsemide 10mg PO x 1 on 5/5, 5/7, 5/8, 5/9, 5/10   The edema is mostly in left leg which is her operative leg so some may remain for a while but has improved with the Torsemide.  Will continue ACE wraps for now

## 2025-05-10 NOTE — PROGRESS NOTES
"Progress Note - Hospitalist   Name: Kamini Martines 75 y.o. female I MRN: [id number]  Unit/Bed#: -01 I Date of Admission: 4/28/2025   Date of Service: 5/11/2025 I Hospital Day: 13    Assessment & Plan  Closed left hip fracture, initial encounter ([facility])  Presented to [facility] on 4/24/25 with left hip pain following a mechanical fall  Noted to have a comminuted intertrochanteric fracture of the left femur   S/p ORIF/IMN 4/24   Weightbearing as tolerated  PT/OT, pain control per PMR  Is having muscle spasms that Robaxin 500 mg q6hrs prn is helping.    Per Ortho note 4/28: \"DVT ppx: recommend lovenox while in house, d/c on aspirin 81mg BID x 6 weeks\".   However she is already on 325mg qd as at home anyway  Two week followup was 5/8/25 = xray stable and staples removed  HFrEF (heart failure with reduced ejection fraction) ([facility])  NICM  EF 41% on cardiac MRI (2019)/ECHO 40-45% 2/4/25  Follows with Dr Worley as an OP  Home: Entresto  mg BID/Aldactone 25mg qd/Torsemide 10mg qd prn edema  Here:  Entresto  mg BID/Aldactone as below  Does not tolerate BB = has tried Coreg and Metoprolol in past per Dr Worley's office note  Volume status stable  Restarted Aldactone at 12.5mg qd on 4/29 and increased back up to 25mg qd to start 5/2/25  BMP 5/1/25 = stable  Was having significant bilateral LE edema, duplex negative for DVT. Takes Torsemide 10mg PRN at home.   Torsemide 10 mg x 1 given 5/5/25, 5/7/25, 5/8/25, 5/9/25, 5/10/25  BMP 5/8/25 = stable.  BMP 5/10/25 = stable  For BMP 5/12/25  Type 2 diabetes mellitus without complication, without long-term current use of insulin ([facility])  HbA1C 7.8 on 4/29/25  Has a glucometer at home  Home: Metformin  mg daily with dinner  Here:  Metformin XR 1000 mg daily dinner/Lispro 3U TID  Continue QID Accuchecks/SSI (Algo 3/1) and DM diet  Restarted Metformin  mg with dinner 4/30/25, stopped the Lantus; kept the Lispro at 3U TID with meals  5/3/25: Increased " "Metformin 1000mg daily with dinner.   5/7/25: Kept Metformin same and stopped Lispro 3U WM but BSs are still high.  She was reluctant to add more/increase meds but I emphasized needs good BS control to avoid wound infection  Endo saw and want the Metformin XR to 1000 mg BID --> I placed the order today 5/11/25.  They will see her in the office as an OP  CVA (cerebral vascular accident) (HCC)  Continue  mg qd as at home  Not on statins at home because she didn't want to take in past  Didn't followup with Neuro after stroke  LUCIA (obstructive sleep apnea)  Stopped using CPAP few months ago as strap was causing neck pain  Outpatient sleep f/u  Mild intermittent asthma without complication  No exacerbation  Continue Albuterol prn  Class 2 obesity in adult  BMI 36.87  Affects all aspects of care  Lifestyle modification  GERD (gastroesophageal reflux disease)  Takes Prilosec 40mg qd at home  Currently on Protonix 40mg qd and Pepcid qd here  Started weaning Pepcid and reduced to 10mg qd 4/30/25 -->  stopped 5/5/25  CAD (coronary artery disease)  Nonobstructive coronary artery disease on cardiac cath  On  mg daily for hx CVA  Urinary retention  Adams was placed on 4/25 and removed on 4/28  She takes Vesicare at home   Per PMR  Thyroid nodule  Found on CT cervical spine  \"1.1 cm right thyroid nodule. Incidental discovery of one or more thyroid nodule(s) measuring less than 1.5 cm and without suspicious features is noted in this patient who is above 35 years old; according to guidelines published in the February 2015 white paper on incidental thyroid nodules in the Journal of the American College of Radiology (JACR), no further evaluation is recommended.\"  Buttock wound, right, sequela  POA  Per PMR  Anemia  Post op  stable  Leukocytosis  Mild  Likely reactive  No fever  Will watch  Bilateral leg edema  Hx of CHF which may be contributing- see above  Duplex negative for DVT  Gave Torsemide 10mg PO x 1 on 5/5, " 5/7, 5/8, 5/9, 5/10   The edema is mostly in left leg which is her operative leg so some may remain for a while but has improved with the Torsemide.  Will continue ACE wraps for now  Osteoporosis with current pathological fracture  Obtain secondary workup.   For OP follow-up for consideration of DEXA scan and pharmacologic therapy.   Osteoporosis concern in the setting of fragility fracture per Endo.   Adrenal nodule (HCC)  Outpatient follow-up with Endocrine to consider further workup such as hyperfunctioning workup.   Hypomagnesemia  Level on 5/10 was 1.6  Likely due to getting Torsemide  Getting MagOx 400 mg BID x 4 doses = LD will be tonight 5/11/25    The above assessment and plan was reviewed and updated as determined by my evaluation of the patient on 5/11/2025.    History of Present Illness   Patient seen and examined. Patients overnight issues or events were reviewed with nursing staff. New or overnight issues include the following:   No new or overnight issues.  Offers no complaints    Review of Systems   All other systems reviewed and are negative.      Objective :  Temp:  [97.3 °F (36.3 °C)-98.1 °F (36.7 °C)] 97.3 °F (36.3 °C)  HR:  [74-86] 86  BP: (131-139)/(61-63) 131/61  Resp:  [16-18] 18  SpO2:  [94 %-98 %] 94 %  O2 Device: None (Room air)    Invasive Devices       None                   Physical Exam  General Appearance: no distress, non toxic appearing  HEENT: PERRLA, conjuctiva normal; oropharynx clear; mucous membranes moist   Neck:  Supple, normal ROM  Lungs: CTA, normal respiratory effort, no retractions, expiratory effort normal  CV: regular rate and rhythm; no rubs/murmurs/gallops, PMI normal   ABD: soft; ND/NT; +BS  EXT: + LLE edema but improved  Skin: normal turgor, normal texture  Psych: affect normal, mood normal  Neuro: AAO        The above physical exam was reviewed and updated as determined by my evaluation of the patient on 5/11/2025.      Lab Results: I have reviewed the following  results:  Results from last 7 days   Lab Units 05/08/25  0619 05/05/25  0549   WBC Thousand/uL 8.31 9.33   HEMOGLOBIN g/dL 9.3* 8.9*   HEMATOCRIT % 30.9* 29.1*   PLATELETS Thousands/uL 578* 510*     Results from last 7 days   Lab Units 05/10/25  0544 05/08/25  0619   SODIUM mmol/L 138 139   POTASSIUM mmol/L 4.6 4.8   CHLORIDE mmol/L 105 103   CO2 mmol/L 26 29   BUN mg/dL 20 23   CREATININE mg/dL 0.51* 0.64   CALCIUM mg/dL 10.0 9.9             Results from last 7 days   Lab Units 05/11/25  0707 05/10/25  2109 05/10/25  1604   POC GLUCOSE mg/dl 150* 174* 142*       Imaging Results Review: No pertinent imaging studies reviewed.  Other Study Results Review: No additional pertinent studies reviewed.    Review of Scheduled Meds: Medications  reviewed and reconciled as needed  Current Facility-Administered Medications   Medication Dose Route Frequency Provider Last Rate    acetaminophen  975 mg Oral Q8H [facility] Tammy Pradhan MD      albuterol  2 puff Inhalation Q6H PRN Tammy Pradhan MD      aluminum-magnesium hydroxide-simethicone  30 mL Oral Q4H PRN Tammy Pradhan MD      aspirin  325 mg Oral Daily Tammy Pradhan MD      Cholecalciferol  1,000 Units Oral Daily Tammy Pradhan MD      enoxaparin  30 mg Subcutaneous Q12H Tammy Pradhan MD      gabapentin  100 mg Oral TID Gladys Carvalho DO      insulin lispro  1-5 Units Subcutaneous HS Tammy Pradhan MD      insulin lispro  1-6 Units Subcutaneous TID AC Tammy Pradhan MD      loperamide  2 mg Oral Q4H PRN Liliana Flores MD      magnesium Oxide  400 mg Oral BID STEFFANY Nelson      metFORMIN  1,000 mg Oral BID With Meals STEFFANY Nelson      methocarbamol  500 mg Oral Q6H PRN Tammy Pradhan MD      multivitamin stress formula  1 tablet Oral Daily Tammy Pradhan MD      naloxone  0.04 mg Intravenous Q1MIN PRN Tammy Pradhan MD      oxyCODONE  2.5 mg Oral Q4H PRN Tammy Pradhan MD      Or    oxyCODONE  5 mg Oral Q4H PRN  Tammy Pradhan MD      pantoprazole  40 mg Oral Early Morning Tammy Pradhan MD      polyethylene glycol  17 g Oral Daily PRN STEFFANY Nelson      PreserVision AREDS 2  1 capsule Oral BID STEFFANY Nelson      sacubitril-valsartan  1 tablet Oral BID Tammy Pradhan MD      spironolactone  25 mg Oral Daily STEFFANY Nelson         VTE Pharmacologic Prophylaxis: Lovenox  Code Status: Level 1 - Full Code  Current Length of Stay: 13 day(s)    Administrative Statements     ** Please Note:  voice to text software may have been used in the creation of this document. Although proof errors in transcription or interpretation are a potential of such software**

## 2025-05-10 NOTE — ASSESSMENT & PLAN NOTE
NICM  EF 41% on cardiac MRI (2019)/ECHO 40-45% 2/4/25  Follows with Dr Worley as an OP  Home: Entresto  mg BID/Aldactone 25mg qd/Torsemide 10mg qd prn edema  Here:  Entresto  mg BID/Aldactone as below  Does not tolerate BB = has tried Coreg and Metoprolol in past per Dr Worley's office note  Volume status stable  Restarted Aldactone at 12.5mg qd on 4/29 and increased back up to 25mg qd to start 5/2/25  BMP 5/1/25 = stable  Was having significant bilateral LE edema, duplex negative for DVT. Takes Torsemide 10mg PRN at home.   Torsemide 10 mg x 1 given 5/5/25, 5/7/25, 5/8/25, 5/9/25, 5/10/25  BMP 5/8/25 = stable.  BMP 5/10/25 = stable  For BMP 5/12/25

## 2025-05-11 LAB
GLUCOSE SERPL-MCNC: 147 MG/DL (ref 65–140)
GLUCOSE SERPL-MCNC: 148 MG/DL (ref 65–140)
GLUCOSE SERPL-MCNC: 150 MG/DL (ref 65–140)
GLUCOSE SERPL-MCNC: 233 MG/DL (ref 65–140)

## 2025-05-11 PROCEDURE — 97535 SELF CARE MNGMENT TRAINING: CPT

## 2025-05-11 PROCEDURE — 99232 SBSQ HOSP IP/OBS MODERATE 35: CPT | Performed by: NURSE PRACTITIONER

## 2025-05-11 PROCEDURE — 82948 REAGENT STRIP/BLOOD GLUCOSE: CPT

## 2025-05-11 RX ORDER — SACUBITRIL AND VALSARTAN 97; 103 MG/1; MG/1
1 TABLET, FILM COATED ORAL 2 TIMES DAILY
Start: 2025-05-11

## 2025-05-11 RX ORDER — ALBUTEROL SULFATE 90 UG/1
2 INHALANT RESPIRATORY (INHALATION) EVERY 6 HOURS PRN
Start: 2025-05-11

## 2025-05-11 RX ORDER — MULTIVIT WITH MINERALS/LUTEIN
400 TABLET ORAL DAILY
Start: 2025-05-11

## 2025-05-11 RX ORDER — METFORMIN HYDROCHLORIDE 500 MG/1
1000 TABLET, EXTENDED RELEASE ORAL 2 TIMES DAILY WITH MEALS
Status: DISCONTINUED | OUTPATIENT
Start: 2025-05-11 | End: 2025-05-12 | Stop reason: HOSPADM

## 2025-05-11 RX ORDER — SPIRONOLACTONE 25 MG/1
25 TABLET ORAL DAILY
Start: 2025-05-11

## 2025-05-11 RX ORDER — VITAMIN B COMPLEX
1 CAPSULE ORAL DAILY
Start: 2025-05-11

## 2025-05-11 RX ORDER — METFORMIN HYDROCHLORIDE 500 MG/1
1000 TABLET, EXTENDED RELEASE ORAL 2 TIMES DAILY WITH MEALS
Qty: 120 TABLET | Refills: 0 | Status: SHIPPED | OUTPATIENT
Start: 2025-05-11

## 2025-05-11 RX ORDER — CHOLECALCIFEROL (VITAMIN D3) 25 MCG
5000 CAPSULE ORAL DAILY
Status: CANCELLED
Start: 2025-05-11

## 2025-05-11 RX ORDER — OMEPRAZOLE 40 MG/1
40 CAPSULE, DELAYED RELEASE ORAL DAILY
Start: 2025-05-11 | End: 2025-05-19

## 2025-05-11 RX ORDER — MAGNESIUM 30 MG
30 TABLET ORAL DAILY
Start: 2025-05-11

## 2025-05-11 RX ORDER — MV-MIN/FA/VIT K/LUTEIN/ZEAXANT 200MCG-5MG
1 CAPSULE ORAL 2 TIMES DAILY
Start: 2025-05-11

## 2025-05-11 RX ORDER — ASPIRIN 325 MG
325 TABLET ORAL DAILY
Start: 2025-05-12

## 2025-05-11 RX ORDER — IPRATROPIUM BROMIDE 21 UG/1
2 SPRAY, METERED NASAL 3 TIMES DAILY PRN
Start: 2025-05-11 | End: 2025-05-19 | Stop reason: SDUPTHER

## 2025-05-11 RX ADMIN — PANTOPRAZOLE SODIUM 40 MG: 40 TABLET, DELAYED RELEASE ORAL at 05:42

## 2025-05-11 RX ADMIN — Medication 1 CAPSULE: at 08:36

## 2025-05-11 RX ADMIN — INSULIN LISPRO 1 UNITS: 100 INJECTION, SOLUTION INTRAVENOUS; SUBCUTANEOUS at 08:25

## 2025-05-11 RX ADMIN — Medication 1 CAPSULE: at 21:17

## 2025-05-11 RX ADMIN — ACETAMINOPHEN 975 MG: 325 TABLET, FILM COATED ORAL at 05:42

## 2025-05-11 RX ADMIN — SPIRONOLACTONE 25 MG: 25 TABLET ORAL at 08:36

## 2025-05-11 RX ADMIN — MAGNESIUM OXIDE TAB 400 MG (241.3 MG ELEMENTAL MG) 400 MG: 400 (241.3 MG) TAB at 16:24

## 2025-05-11 RX ADMIN — METFORMIN ER 500 MG 1000 MG: 500 TABLET ORAL at 16:25

## 2025-05-11 RX ADMIN — OXYCODONE HYDROCHLORIDE 5 MG: 5 TABLET ORAL at 21:18

## 2025-05-11 RX ADMIN — B-COMPLEX W/ C & FOLIC ACID TAB 1 TABLET: TAB at 08:36

## 2025-05-11 RX ADMIN — GABAPENTIN 100 MG: 100 CAPSULE ORAL at 08:36

## 2025-05-11 RX ADMIN — GABAPENTIN 100 MG: 100 CAPSULE ORAL at 21:17

## 2025-05-11 RX ADMIN — ENOXAPARIN SODIUM 30 MG: 30 INJECTION SUBCUTANEOUS at 21:17

## 2025-05-11 RX ADMIN — SACUBITRIL AND VALSARTAN 1 TABLET: 97; 103 TABLET, FILM COATED ORAL at 21:17

## 2025-05-11 RX ADMIN — ENOXAPARIN SODIUM 30 MG: 30 INJECTION SUBCUTANEOUS at 08:36

## 2025-05-11 RX ADMIN — ASPIRIN 325 MG ORAL TABLET 325 MG: 325 PILL ORAL at 08:36

## 2025-05-11 RX ADMIN — OXYCODONE HYDROCHLORIDE 5 MG: 5 TABLET ORAL at 05:43

## 2025-05-11 RX ADMIN — GABAPENTIN 100 MG: 100 CAPSULE ORAL at 16:24

## 2025-05-11 RX ADMIN — ACETAMINOPHEN 975 MG: 325 TABLET, FILM COATED ORAL at 14:56

## 2025-05-11 RX ADMIN — Medication 1000 UNITS: at 08:36

## 2025-05-11 RX ADMIN — METFORMIN ER 500 MG 1000 MG: 500 TABLET ORAL at 11:14

## 2025-05-11 RX ADMIN — Medication 30 ML: at 21:46

## 2025-05-11 RX ADMIN — MAGNESIUM OXIDE TAB 400 MG (241.3 MG ELEMENTAL MG) 400 MG: 400 (241.3 MG) TAB at 08:36

## 2025-05-11 RX ADMIN — OXYCODONE HYDROCHLORIDE 5 MG: 5 TABLET ORAL at 15:00

## 2025-05-11 RX ADMIN — SACUBITRIL AND VALSARTAN 1 TABLET: 97; 103 TABLET, FILM COATED ORAL at 08:36

## 2025-05-11 RX ADMIN — INSULIN LISPRO 3 UNITS: 100 INJECTION, SOLUTION INTRAVENOUS; SUBCUTANEOUS at 11:14

## 2025-05-11 RX ADMIN — METHOCARBAMOL 500 MG: 500 TABLET ORAL at 21:18

## 2025-05-11 RX ADMIN — ACETAMINOPHEN 975 MG: 325 TABLET, FILM COATED ORAL at 21:17

## 2025-05-11 NOTE — PROGRESS NOTES
"OT daily tx note     05/11/25 0700   Pain Assessment   Pain Assessment Tool 0-10   Pain Score 5   Pain Location/Orientation Orientation: Left;Location: Leg   Hospital Pain Intervention(s) Emotional support;Rest   Restrictions/Precautions   Precautions Fall Risk;Pain   Weight Bearing Restrictions Yes   LLE Weight Bearing Per Order WBAT   Braces or Orthoses Other (Comment)  (BLE ace wrap edema mgmt)   Lifestyle   Autonomy \"I was initially nervous but now I really feel ready to go home\"   Eating   Type of Assistance Needed Independent   Physical Assistance Level No physical assistance   Comment seated   Eating CARE Score 6   Oral Hygiene   Type of Assistance Needed Independent   Physical Assistance Level No physical assistance   Comment completed seated but can complete in stance as needed   Oral Hygiene CARE Score 6   Shower/Bathe Self   Type of Assistance Needed Independent   Physical Assistance Level No physical assistance   Comment completed shower and able to utilize TTB t/o session and will have access to chair following DC; pt able to wash 10/10 body parts w/ some SOB noted but able to initiate rest breaks as needed   Shower/Bathe Self CARE Score 6   Upper Body Dressing   Type of Assistance Needed Independent   Physical Assistance Level No physical assistance   Comment seated   Upper Body Dressing CARE Score 6   Lower Body Dressing   Type of Assistance Needed Independent   Physical Assistance Level No physical assistance   Comment seated and using LHR for threading underwear and no AE for pants; already owns LHR and ordered other AE for home to be deivered tmrw   Lower Body Dressing CARE Score 6   Putting On/Taking Off Footwear   Type of Assistance Needed Physical assistance   Physical Assistance Level 25% or less   Comment ind w/ use of flexible sock aid and owns at home; TA needed for ace wraps and pt reprots  can A at home   Putting On/Taking Off Footwear CARE Score 3   Sit to Stand   Type of " Assistance Needed Independent   Physical Assistance Level No physical assistance   Comment w/ RW   Sit to Stand CARE Score 6   Bed-Chair Transfer   Type of Assistance Needed Independent   Physical Assistance Level No physical assistance   Comment w/ RW   Chair/Bed-to-Chair Transfer CARE Score 6   Toileting Hygiene   Type of Assistance Needed Independent   Physical Assistance Level No physical assistance   Comment A needed for rear hygiene but reports using bidet at home   Toileting Hygiene CARE Score 6   Toilet Transfer   Type of Assistance Needed Independent   Physical Assistance Level No physical assistance   Comment w/ RW > BR w/ BSC over toilet   Toilet Transfer CARE Score 6   Cognition   Overall Cognitive Status WFL   Arousal/Participation Alert;Cooperative   Attention Attends with cues to redirect   Orientation Level Oriented X4   Memory Within functional limits   Following Commands Follows one step commands without difficulty   Activity Tolerance   Activity Tolerance Patient tolerated treatment well   Assessment   Treatment Assessment Pt engaged in skilled OT session with focus on ADL Retraining , LB Dressing, UB dressing,  LHAE education/training, Functional Transfers, Standing tolerance, Standing balance , Energy conservation training/education, healthy coping education, Leisure and social pursuits, and community re-integration. Pt has met all LTG with the highest level of independence. OT services are not warranted at this time at [facility] and pt will continue with  OT/PT following DC.   OT Therapy Minutes   OT Time In 0700   OT Time Out 0830   OT Total Time (minutes) 90   OT Mode of treatment - Individual (minutes) 90   OT Mode of treatment - Concurrent (minutes) 0   OT Mode of treatment - Group (minutes) 0   OT Mode of treatment - Co-treat (minutes) 0   OT Mode of Treatment - Total time(minutes) 90 minutes   OT Cumulative Minutes 990   Therapy Time missed   Time missed? No

## 2025-05-11 NOTE — PLAN OF CARE
Problem: PAIN - ADULT  Goal: Verbalizes/displays adequate comfort level or baseline comfort level  Description: Interventions:- Encourage patient to monitor pain and request assistance- Assess pain using appropriate pain scale- Administer analgesics based on type and severity of pain and evaluate response- Implement non-pharmacological measures as appropriate and evaluate response- Consider cultural and social influences on pain and pain management- Notify physician/advanced practitioner if interventions unsuccessful or patient reports new pain  Outcome: Progressing     Problem: INFECTION - ADULT  Goal: Absence or prevention of progression during hospitalization  Description: INTERVENTIONS:- Assess and monitor for signs and symptoms of infection- Monitor lab/diagnostic results- Monitor all insertion sites, i.e. indwelling lines, tubes, and drains- Monitor endotracheal if appropriate and nasal secretions for changes in amount and color- [place] appropriate cooling/warming therapies per order- Administer medications as ordered- Instruct and encourage patient and family to use good hand hygiene technique- Identify and instruct in appropriate isolation precautions for identified infection/condition  Outcome: Progressing     Problem: Prexisting or High Potential for Compromised Skin Integrity  Goal: Skin integrity is maintained or improved  Description: INTERVENTIONS:- Identify patients at risk for skin breakdown- Assess and monitor skin integrity- Assess and monitor nutrition and hydration status- Monitor labs - Assess for incontinence - Turn and reposition patient- Assist with mobility/ambulation- Relieve pressure over bony prominences- Avoid friction and shearing- Provide appropriate hygiene as needed including keeping skin clean and dry- Evaluate need for skin moisturizer/barrier cream- Collaborate with interdisciplinary team - Patient/family teaching- Consider wound care consult   Outcome: Progressing

## 2025-05-12 ENCOUNTER — TELEPHONE (OUTPATIENT)
Age: 75
End: 2025-05-12

## 2025-05-12 ENCOUNTER — PATIENT OUTREACH (OUTPATIENT)
Dept: CASE MANAGEMENT | Facility: OTHER | Age: 75
End: 2025-05-12

## 2025-05-12 VITALS
OXYGEN SATURATION: 98 % | HEIGHT: 65 IN | DIASTOLIC BLOOD PRESSURE: 61 MMHG | SYSTOLIC BLOOD PRESSURE: 118 MMHG | BODY MASS INDEX: 36.73 KG/M2 | TEMPERATURE: 97.8 F | WEIGHT: 220.46 LBS | HEART RATE: 92 BPM | RESPIRATION RATE: 18 BRPM

## 2025-05-12 LAB
ANION GAP SERPL CALCULATED.3IONS-SCNC: 7 MMOL/L (ref 4–13)
BASOPHILS # BLD AUTO: 0.05 THOUSANDS/ÂΜL (ref 0–0.1)
BASOPHILS NFR BLD AUTO: 1 % (ref 0–1)
BUN SERPL-MCNC: 27 MG/DL (ref 5–25)
CALCIUM SERPL-MCNC: 9.7 MG/DL (ref 8.4–10.2)
CHLORIDE SERPL-SCNC: 103 MMOL/L (ref 96–108)
CO2 SERPL-SCNC: 29 MMOL/L (ref 21–32)
CREAT SERPL-MCNC: 0.58 MG/DL (ref 0.6–1.3)
DME PARACHUTE DELIVERY DATE ACTUAL: NORMAL
DME PARACHUTE DELIVERY DATE EXPECTED: NORMAL
DME PARACHUTE DELIVERY DATE REQUESTED: NORMAL
DME PARACHUTE DELIVERY NOTE: NORMAL
DME PARACHUTE ITEM DESCRIPTION: NORMAL
DME PARACHUTE ORDER STATUS: NORMAL
DME PARACHUTE SUPPLIER NAME: NORMAL
DME PARACHUTE SUPPLIER PHONE: NORMAL
EOSINOPHIL # BLD AUTO: 0.31 THOUSAND/ÂΜL (ref 0–0.61)
EOSINOPHIL NFR BLD AUTO: 4 % (ref 0–6)
ERYTHROCYTE [DISTWIDTH] IN BLOOD BY AUTOMATED COUNT: 14.4 % (ref 11.6–15.1)
GFR SERPL CREATININE-BSD FRML MDRD: 90 ML/MIN/1.73SQ M
GLUCOSE P FAST SERPL-MCNC: 171 MG/DL (ref 65–99)
GLUCOSE SERPL-MCNC: 152 MG/DL (ref 65–140)
GLUCOSE SERPL-MCNC: 170 MG/DL (ref 65–140)
GLUCOSE SERPL-MCNC: 171 MG/DL (ref 65–140)
HCT VFR BLD AUTO: 30.7 % (ref 34.8–46.1)
HGB BLD-MCNC: 9.2 G/DL (ref 11.5–15.4)
IMM GRANULOCYTES # BLD AUTO: 0.03 THOUSAND/UL (ref 0–0.2)
IMM GRANULOCYTES NFR BLD AUTO: 0 % (ref 0–2)
LYMPHOCYTES # BLD AUTO: 2.09 THOUSANDS/ÂΜL (ref 0.6–4.47)
LYMPHOCYTES NFR BLD AUTO: 28 % (ref 14–44)
MAGNESIUM SERPL-MCNC: 1.9 MG/DL (ref 1.9–2.7)
MCH RBC QN AUTO: 28.9 PG (ref 26.8–34.3)
MCHC RBC AUTO-ENTMCNC: 30 G/DL (ref 31.4–37.4)
MCV RBC AUTO: 97 FL (ref 82–98)
MONOCYTES # BLD AUTO: 0.7 THOUSAND/ÂΜL (ref 0.17–1.22)
MONOCYTES NFR BLD AUTO: 9 % (ref 4–12)
NEUTROPHILS # BLD AUTO: 4.3 THOUSANDS/ÂΜL (ref 1.85–7.62)
NEUTS SEG NFR BLD AUTO: 58 % (ref 43–75)
NRBC BLD AUTO-RTO: 0 /100 WBCS
PLATELET # BLD AUTO: 527 THOUSANDS/UL (ref 149–390)
PMV BLD AUTO: 10.3 FL (ref 8.9–12.7)
POTASSIUM SERPL-SCNC: 4.4 MMOL/L (ref 3.5–5.3)
RBC # BLD AUTO: 3.18 MILLION/UL (ref 3.81–5.12)
SODIUM SERPL-SCNC: 139 MMOL/L (ref 135–147)
WBC # BLD AUTO: 7.48 THOUSAND/UL (ref 4.31–10.16)

## 2025-05-12 PROCEDURE — 83735 ASSAY OF MAGNESIUM: CPT | Performed by: NURSE PRACTITIONER

## 2025-05-12 PROCEDURE — 82948 REAGENT STRIP/BLOOD GLUCOSE: CPT

## 2025-05-12 PROCEDURE — 80048 BASIC METABOLIC PNL TOTAL CA: CPT | Performed by: NURSE PRACTITIONER

## 2025-05-12 PROCEDURE — 97110 THERAPEUTIC EXERCISES: CPT

## 2025-05-12 PROCEDURE — 99233 SBSQ HOSP IP/OBS HIGH 50: CPT | Performed by: STUDENT IN AN ORGANIZED HEALTH CARE EDUCATION/TRAINING PROGRAM

## 2025-05-12 PROCEDURE — 99239 HOSP IP/OBS DSCHRG MGMT >30: CPT | Performed by: NURSE PRACTITIONER

## 2025-05-12 PROCEDURE — 97530 THERAPEUTIC ACTIVITIES: CPT

## 2025-05-12 PROCEDURE — 85025 COMPLETE CBC W/AUTO DIFF WBC: CPT | Performed by: NURSE PRACTITIONER

## 2025-05-12 RX ORDER — POTASSIUM CITRATE 540 MG/1
5 TABLET, EXTENDED RELEASE ORAL
Start: 2025-05-12

## 2025-05-12 RX ADMIN — Medication 1 CAPSULE: at 08:25

## 2025-05-12 RX ADMIN — INSULIN LISPRO 1 UNITS: 100 INJECTION, SOLUTION INTRAVENOUS; SUBCUTANEOUS at 08:23

## 2025-05-12 RX ADMIN — Medication 1000 UNITS: at 08:20

## 2025-05-12 RX ADMIN — ASPIRIN 325 MG ORAL TABLET 325 MG: 325 PILL ORAL at 08:21

## 2025-05-12 RX ADMIN — OXYCODONE HYDROCHLORIDE 5 MG: 5 TABLET ORAL at 06:02

## 2025-05-12 RX ADMIN — METFORMIN ER 500 MG 1000 MG: 500 TABLET ORAL at 08:24

## 2025-05-12 RX ADMIN — SACUBITRIL AND VALSARTAN 1 TABLET: 97; 103 TABLET, FILM COATED ORAL at 08:22

## 2025-05-12 RX ADMIN — SPIRONOLACTONE 25 MG: 25 TABLET ORAL at 08:27

## 2025-05-12 RX ADMIN — ACETAMINOPHEN 975 MG: 325 TABLET, FILM COATED ORAL at 06:01

## 2025-05-12 RX ADMIN — PANTOPRAZOLE SODIUM 40 MG: 40 TABLET, DELAYED RELEASE ORAL at 06:01

## 2025-05-12 RX ADMIN — B-COMPLEX W/ C & FOLIC ACID TAB 1 TABLET: TAB at 08:20

## 2025-05-12 RX ADMIN — GABAPENTIN 100 MG: 100 CAPSULE ORAL at 08:21

## 2025-05-12 RX ADMIN — ENOXAPARIN SODIUM 30 MG: 30 INJECTION SUBCUTANEOUS at 08:24

## 2025-05-12 NOTE — PLAN OF CARE
Problem: PAIN - ADULT  Goal: Verbalizes/displays adequate comfort level or baseline comfort level  Description: Interventions:- Encourage patient to monitor pain and request assistance- Assess pain using appropriate pain scale- Administer analgesics based on type and severity of pain and evaluate response- Implement non-pharmacological measures as appropriate and evaluate response- Consider cultural and social influences on pain and pain management- Notify physician/advanced practitioner if interventions unsuccessful or patient reports new pain  5/12/2025 1211 by Sophy Salgado RN  Outcome: Completed  5/12/2025 1211 by Sophy Salgado RN  Outcome: Adequate for Discharge     Problem: INFECTION - ADULT  Goal: Absence or prevention of progression during hospitalization  Description: INTERVENTIONS:- Assess and monitor for signs and symptoms of infection- Monitor lab/diagnostic results- Monitor all insertion sites, i.e. indwelling lines, tubes, and drains- Monitor endotracheal if appropriate and nasal secretions for changes in amount and color- [place] appropriate cooling/warming therapies per order- Administer medications as ordered- Instruct and encourage patient and family to use good hand hygiene technique- Identify and instruct in appropriate isolation precautions for identified infection/condition  5/12/2025 1211 by Sophy Salgado RN  Outcome: Completed  5/12/2025 1211 by Sophy Salgado RN  Outcome: Adequate for Discharge     Problem: SAFETY ADULT  Goal: Patient will remain free of falls  Description: INTERVENTIONS:- Educate patient/family on patient safety including physical limitations- Instruct patient to call for assistance with activity - Consult OT/PT to assist with strengthening/mobility - Keep Call bell within reach- Keep bed low and locked with side rails adjusted as appropriate- Keep care items and personal belongings within reach- Initiate and maintain comfort rounds- Make Fall Risk Sign visible to  staff- Offer Toileting every 2 Hours, in advance of need- Initiate/Maintain bed alarm- Obtain necessary fall risk management equipment:  - Apply yellow socks and bracelet for high fall risk patients- Consider moving patient to room near nurses station  5/12/2025 1211 by Sophy Salgado RN  Outcome: Completed  5/12/2025 1211 by Sophy Salgado RN  Outcome: Adequate for Discharge  Goal: Maintain or return to baseline ADL function  Description: INTERVENTIONS:-  Assess patient's ability to carry out ADLs; assess patient's baseline for ADL function and identify physical deficits which impact ability to perform ADLs (bathing, care of mouth/teeth, toileting, grooming, dressing, etc.)- Assess/evaluate cause of self-care deficits - Assess range of motion- Assess patient's mobility; develop plan if impaired- Assess patient's need for assistive devices and provide as appropriate- Encourage maximum independence but intervene and supervise when necessary- Involve family in performance of ADLs- Assess for home care needs following discharge - Consider OT consult to assist with ADL evaluation and planning for discharge- Provide patient education as appropriate  5/12/2025 1211 by Sophy Salgado RN  Outcome: Completed  5/12/2025 1211 by Sophy Salgado RN  Outcome: Adequate for Discharge  Goal: Maintains/Returns to pre admission functional level  Description: INTERVENTIONS:- Perform AM-PAC 6 Click Basic Mobility/ Daily Activity assessment daily.- Set and communicate daily mobility goal to care team and patient/family/caregiver. - Collaborate with rehabilitation services on mobility goals if consulted- Perform Range of Motion 3 times a day.- Reposition patient every 2 hours.- Dangle patient 3 times a day- Stand patient 3 times a day- Ambulate patient 3 times a day- Out of bed to chair for meals  5/12/2025 1211 by Sophy Salgado RN  Outcome: Completed  5/12/2025 1211 by Sophy Salgado RN  Outcome: Adequate for Discharge     Problem:  DISCHARGE PLANNING  Goal: Discharge to home or other facility with appropriate resources  Description: INTERVENTIONS:- Identify barriers to discharge w/patient and caregiver- Arrange for needed discharge resources and transportation as appropriate- Identify discharge learning needs (meds, wound care, etc.)- Arrange for interpretive services to assist at discharge as needed- Refer to Case Management Department for coordinating discharge planning if the patient needs post-hospital services based on physician/advanced practitioner order or complex needs related to functional status, cognitive ability, or social support system  5/12/2025 1211 by Sophy Salgado RN  Outcome: Completed  5/12/2025 1211 by Sophy Salgado RN  Outcome: Adequate for Discharge     Problem: Prexisting or High Potential for Compromised Skin Integrity  Goal: Skin integrity is maintained or improved  Description: INTERVENTIONS:- Identify patients at risk for skin breakdown- Assess and monitor skin integrity- Assess and monitor nutrition and hydration status- Monitor labs - Assess for incontinence - Turn and reposition patient- Assist with mobility/ambulation- Relieve pressure over bony prominences- Avoid friction and shearing- Provide appropriate hygiene as needed including keeping skin clean and dry- Evaluate need for skin moisturizer/barrier cream- Collaborate with interdisciplinary team - Patient/family teaching- Consider wound care consult   5/12/2025 1211 by Sophy Salgado RN  Outcome: Completed  5/12/2025 1211 by Sophy Salgado RN  Outcome: Adequate for Discharge     Problem: Nutrition/Hydration-ADULT  Goal: Nutrient/Hydration intake appropriate for improving, restoring or maintaining nutritional needs  Description: Monitor and assess patient's nutrition/hydration status for malnutrition. Collaborate with interdisciplinary team and initiate plan and interventions as ordered.  Monitor patient's weight and dietary intake as ordered or per  policy. Utilize nutrition screening tool and intervene as necessary. Determine patient's food preferences and provide high-protein, high-caloric foods as appropriate. INTERVENTIONS:- Monitor oral intake, urinary output, labs, and treatment plans- Assess nutrition and hydration status and recommend course of action- Evaluate amount of meals eaten- Assist patient with eating if necessary - Allow adequate time for meals- Recommend/ encourage appropriate diets, oral nutritional supplements, and vitamin/mineral supplements- Order, calculate, and assess calorie counts as needed- Recommend, monitor, and adjust tube feedings and TPN/PPN based on assessed needs- Assess need for intravenous fluids- Provide specific nutrition/hydration education as appropriate- Include patient/family/caregiver in decisions related to nutrition  5/12/2025 1211 by Sophy Salgado RN  Outcome: Completed  5/12/2025 1211 by Sophy Salgado RN  Outcome: Adequate for Discharge

## 2025-05-12 NOTE — ASSESSMENT & PLAN NOTE
"-- DO NOT REPLY / DO NOT REPLY ALL --  -- Message is from the [employer]--    Patient is requesting a medication refill - medication is on active medication list    Patient is currently OUT of the requested medication. Was Medication Pended? No.     Rx Name and Dose:  HUMALOG MIX 75/25 KWIKPEN (75-25) 100 UNIT/ML pen-injector    Duration: 30 days    Pharmacy  [facility] [address] [facility], [address] [place] [address] Rd    Patient confirmed the above pharmacy as correct? Yes    Caller Information       Type Contact Phone    07/20/2020 12:21 PM Phone (Incoming) [address] [id number] - [address] [facility] [address] AT [facility] (Pharmacy) [phone]          Alternative phone number: none    Turnaround time given to caller: ""This message will be sent to [facility] Provider's name]. The clinical team will fulfill your request as soon as they review your message. \""  " NICM  EF 41% on cardiac MRI (2019)/ECHO 40-45% 2/4/25  Follows with Dr Worley as an OP  Home: Entresto  mg BID/Aldactone 25mg qd/Torsemide 10mg qd prn edema  Here:  Entresto  mg BID/Aldactone as below  Does not tolerate BB = has tried Coreg and Metoprolol in past per Dr Worley's office note  Restarted Aldactone at 12.5mg qd on 4/29 and increased back up to 25mg qd to start 5/2/25  Was having significant bilateral LE edema, duplex negative for DVT on 5/3/25. Takes Torsemide 10mg PRN at home.   Torsemide 10 mg x 1 given 5/5/25, 5/7/25, 5/8/25, 5/9/25, 5/10/25  BMP 5/8/25 = stable.  BMP 5/10/25 = stable  BMP 5/12/25 = stable  No edema now RLE but some still operative leg (left).  See below

## 2025-05-12 NOTE — PROGRESS NOTES
Update obtained from PCC the patient Discharged 5/12/25 to Home with [facility]. I updated the care coordination note, removed myself as the responsible staff, added the appropriate responsible staff.

## 2025-05-12 NOTE — CASE MANAGEMENT
Team dc summary - pt made good progress and returned home w/spouse with contd hhc through [facility] for pt and ot services. Pt rented a hospital bed through [employer]. Spouse present daily and aware of pts abilities. Spouse present for dc instructions and provided transport home.

## 2025-05-12 NOTE — ASSESSMENT & PLAN NOTE
"Presented to [facility] on 4/24/25 with left hip pain following a mechanical fall  Noted to have a comminuted intertrochanteric fracture of the left femur   S/p ORIF/IMN 4/24   Weightbearing as tolerated  PT/OT, pain control per PMR  Is having muscle spasms that Robaxin 500 mg q6hrs prn is helping.    Per Ortho note 4/28: \"DVT ppx: recommend lovenox while in house, d/c on aspirin 81mg BID x 6 weeks\".   However she is already on 325mg qd as at home anyway  Two week followup was 5/8/25 = xray stable and staples removed  "

## 2025-05-12 NOTE — ASSESSMENT & PLAN NOTE
Lab Results   Component Value Date    HGBA1C 7.8 (H) 04/29/2025       Recent Labs     05/11/25  1052 05/11/25  1602 05/11/25  2115 05/12/25  0622   POCGLU 233* 148* 147* 170*     Currently on regimen of Lantus 8 units at bedtime +3 units of lispro with meals as well as sliding scale insulin, Accu-Cheks 4 times daily  Continue monitoring blood glucose levels and adjust per recommendations by internal medicine  Continue carb controlled diet level 2

## 2025-05-12 NOTE — PLAN OF CARE
Problem: PAIN - ADULT  Goal: Verbalizes/displays adequate comfort level or baseline comfort level  Description: Interventions:- Encourage patient to monitor pain and request assistance- Assess pain using appropriate pain scale- Administer analgesics based on type and severity of pain and evaluate response- Implement non-pharmacological measures as appropriate and evaluate response- Consider cultural and social influences on pain and pain management- Notify physician/advanced practitioner if interventions unsuccessful or patient reports new pain  Outcome: Adequate for Discharge     Problem: INFECTION - ADULT  Goal: Absence or prevention of progression during hospitalization  Description: INTERVENTIONS:- Assess and monitor for signs and symptoms of infection- Monitor lab/diagnostic results- Monitor all insertion sites, i.e. indwelling lines, tubes, and drains- Monitor endotracheal if appropriate and nasal secretions for changes in amount and color- [place] appropriate cooling/warming therapies per order- Administer medications as ordered- Instruct and encourage patient and family to use good hand hygiene technique- Identify and instruct in appropriate isolation precautions for identified infection/condition  Outcome: Adequate for Discharge     Problem: SAFETY ADULT  Goal: Patient will remain free of falls  Description: INTERVENTIONS:- Educate patient/family on patient safety including physical limitations- Instruct patient to call for assistance with activity - Consult OT/PT to assist with strengthening/mobility - Keep Call bell within reach- Keep bed low and locked with side rails adjusted as appropriate- Keep care items and personal belongings within reach- Initiate and maintain comfort rounds- Make Fall Risk Sign visible to staff- Offer Toileting every 2 Hours, in advance of need- Initiate/Maintain bed alarm- Obtain necessary fall risk management equipment:  - Apply yellow socks and bracelet for high fall risk  patients- Consider moving patient to room near nurses station  Outcome: Adequate for Discharge  Goal: Maintain or return to baseline ADL function  Description: INTERVENTIONS:-  Assess patient's ability to carry out ADLs; assess patient's baseline for ADL function and identify physical deficits which impact ability to perform ADLs (bathing, care of mouth/teeth, toileting, grooming, dressing, etc.)- Assess/evaluate cause of self-care deficits - Assess range of motion- Assess patient's mobility; develop plan if impaired- Assess patient's need for assistive devices and provide as appropriate- Encourage maximum independence but intervene and supervise when necessary- Involve family in performance of ADLs- Assess for home care needs following discharge - Consider OT consult to assist with ADL evaluation and planning for discharge- Provide patient education as appropriate  Outcome: Adequate for Discharge  Goal: Maintains/Returns to pre admission functional level  Description: INTERVENTIONS:- Perform AM-PAC 6 Click Basic Mobility/ Daily Activity assessment daily.- Set and communicate daily mobility goal to care team and patient/family/caregiver. - Collaborate with rehabilitation services on mobility goals if consulted- Perform Range of Motion 3 times a day.- Reposition patient every 2 hours.- Dangle patient 3 times a day- Stand patient 3 times a day- Ambulate patient 3 times a day- Out of bed to chair for meals  Outcome: Adequate for Discharge     Problem: DISCHARGE PLANNING  Goal: Discharge to home or other facility with appropriate resources  Description: INTERVENTIONS:- Identify barriers to discharge w/patient and caregiver- Arrange for needed discharge resources and transportation as appropriate- Identify discharge learning needs (meds, wound care, etc.)- Arrange for interpretive services to assist at discharge as needed- Refer to Case Management Department for coordinating discharge planning if the patient needs  post-hospital services based on physician/advanced practitioner order or complex needs related to functional status, cognitive ability, or social support system  Outcome: Adequate for Discharge     Problem: Prexisting or High Potential for Compromised Skin Integrity  Goal: Skin integrity is maintained or improved  Description: INTERVENTIONS:- Identify patients at risk for skin breakdown- Assess and monitor skin integrity- Assess and monitor nutrition and hydration status- Monitor labs - Assess for incontinence - Turn and reposition patient- Assist with mobility/ambulation- Relieve pressure over bony prominences- Avoid friction and shearing- Provide appropriate hygiene as needed including keeping skin clean and dry- Evaluate need for skin moisturizer/barrier cream- Collaborate with interdisciplinary team - Patient/family teaching- Consider wound care consult   Outcome: Adequate for Discharge     Problem: Nutrition/Hydration-ADULT  Goal: Nutrient/Hydration intake appropriate for improving, restoring or maintaining nutritional needs  Description: Monitor and assess patient's nutrition/hydration status for malnutrition. Collaborate with interdisciplinary team and initiate plan and interventions as ordered.  Monitor patient's weight and dietary intake as ordered or per policy. Utilize nutrition screening tool and intervene as necessary. Determine patient's food preferences and provide high-protein, high-caloric foods as appropriate. INTERVENTIONS:- Monitor oral intake, urinary output, labs, and treatment plans- Assess nutrition and hydration status and recommend course of action- Evaluate amount of meals eaten- Assist patient with eating if necessary - Allow adequate time for meals- Recommend/ encourage appropriate diets, oral nutritional supplements, and vitamin/mineral supplements- Order, calculate, and assess calorie counts as needed- Recommend, monitor, and adjust tube feedings and TPN/PPN based on assessed needs-  Assess need for intravenous fluids- Provide specific nutrition/hydration education as appropriate- Include patient/family/caregiver in decisions related to nutrition  Outcome: Adequate for Discharge

## 2025-05-12 NOTE — PROGRESS NOTES
Progress Note - PMR   Name: Kamini Martines 75 y.o. female I MRN: [id number]  Unit/Bed#: -01 I Date of Admission: 4/28/2025   Date of Service: 5/12/2025 I Hospital Day: 14     Assessment & Plan  Closed left hip fracture, initial encounter (HCC)  Patient presents on 4/24 to [facility] with left hip pain after a fall  And have a comminuted intertrochanteric fracture of the left femur  Status post ORIF with IM nailing by Dr. Parry on 4/24  2 week post-op XR ordered for 5/8 and completed - will consult ortho  Ortho consult completed and sutures removed x-rays reviewed follow-up in 4 more weeks    Weightbearing as tolerated to the left lower extremity  Physical and Occupational Therapy  Pain control with Tylenol, gabapentin, Robaxin and as needed oxycodone > Increasing gabapentin from 100 mg BID to TID for better pain control on 5/1  DVT prophylaxis.  Is recommended for 81 mg aspirin twice daily at the time of discharge to complete the 28 days however is on aspirin 325 mg daily currently  Impaired mobility and activities of daily living  Patient was evaluated by the rehabilitation team MD and an appropriate candidate for acute inpatient rehabilitation program at this time.  The patient will tolerate 3 hours/day 5 to 7 days/week of intensive physical, occupational therapy in order to obtain goals for community discharge  Due to the patient's functional Compared to their baseline level of function in addition to their ongoing medical needs, the patient would benefit from daily supervision from a rehabilitation physician as well as rehabilitation nursing to implement and adjust the medical as well as functional plan of care in order to meet the patient's goals.    HTN (hypertension)  Currently on a regimen of Entresto and spironolactone for the heart failure  Was on as needed Demadex at home in the past  Monitor pressures during therapy as well as during routine vitals  CVA (cerebral vascular accident)  (Formerly [facility])  History of stroke in the past and was placed on aspirin 325 mg daily  Is also not on a statin at home  After history of stroke did not follow-up with neurology and refused follow-up with Dr. Jones.  Is not on the appropriate regimen for secondary stroke prophylaxis.  Was on 81 mg previously but stated her cardiologist increased it to 325 mg.  She endorsed that she did not start her statin.  Mild intermittent asthma without complication  Currently on a as needed albuterol inhaler  Continue monitor on physical examination as well as during routine vitals, therapy vitals  LUCIA (obstructive sleep apnea)  Was noncompliant with the CPAP due to overall comfort with strap and discontinued its use  Follow-up with sleep medicine as an outpatient  Class 2 obesity in adult  BMI of 36.87 on arrival  Continue to recommend lifestyle modification, dietary changes, weight loss counseling especially in the setting of recent fracture.  Type 2 diabetes mellitus without complication, without long-term current use of insulin (Formerly [facility])  Lab Results   Component Value Date    HGBA1C 7.8 (H) 04/29/2025       Recent Labs     05/11/25  1052 05/11/25  1602 05/11/25  2115 05/12/25  0622   POCGLU 233* 148* 147* 170*     Currently on regimen of Lantus 8 units at bedtime +3 units of lispro with meals as well as sliding scale insulin, Accu-Cheks 4 times daily  Continue monitoring blood glucose levels and adjust per recommendations by internal medicine  Continue carb controlled diet level 2  Urinary retention  Had indwelling Adams catheter placed 4/25 which was removed on 4/28 and did require initial straight catheterization  5/1- PVRs x3 low. Now voiding and continent  HFrEF (heart failure with reduced ejection fraction) (Formerly [facility])  Wt Readings from Last 3 Encounters:   05/07/25 100 kg (220 lb 7.4 oz)   04/24/25 99.3 kg (219 lb)   02/20/25 99.3 kg (219 lb)     Echocardiogram most recently with a cardiac MRI showing EF of 41%  Nonischemic  cardiomyopathy on Entresto twice daily at home as well as Aldactone.  Aldactone is being restarted today per internal medicine  Does not tolerate beta-blockers in the past  Monitor intake and output as well as weights  Education on diet and activity  GERD (gastroesophageal reflux disease)  Continue Pepcid and Protonix and as needed Maalox  CAD (coronary artery disease)  Currently on aspirin  Noted to have nonobstructive CAD on cardiac catheterization  Follow-up with outpatient cardiology  Thyroid nodule    Buttock wound, right, sequela  Noted on admission  Consult wound care  Anemia  Preoperative hemoglobin of 12.1 now currently 9.1 qualifying for acute blood loss anemia  5/8- Hgb stable at 9.3    Continue to monitor hemoglobin on bio weekly CBC or sooner if quickly indicated  Leukocytosis  Most recent WBC count of 12.95 which is down from postoperative 18.0  5/8- WBC downtrending to 8.3    Continue to monitor on biweekly CBC or sooner if indicated, monitor for signs of infection at the surgical site  Left leg swelling  Wrapped the leg  Bilateral leg edema  Wrap the bilateral lower extremities  Osteoporosis with current pathological fracture    Adrenal nodule (HCC)    Hypomagnesemia      Subjective   75-year-old female with history of chronic heart failure, nonischemic cardiomyopathy, CAD, hypertension, LUCIA noncompliant with CPAP, lumbar spinal stenosis, arthritis presenting 4/24 for left hip pain after a fall and a very comminuted intertrochanteric fracture of the left femur underwent IM nailing with Dr. Parry on 4/24. Postoperative course complicated by urinary retention requiring a Adams cathete     Chief Complaint: f/u fracture and f/u ambulatory dysfunction    Interval: Patient seen and evaluated in therapy gym.  Overall has been doing well and is in a very good mood today.  She has been good progress from a functional standpoint initially requiring multiple people's for assist just for sit to stand to  ambulating fairly independently with a rolling walker.  Patient denies fever, chills, nausea, emesis, cough, shortness of breath, diarrhea, or constipation. Sleep was fine, mood stable. Pain is controlled.  Labs obtained and reviewed today on 5/12 and mostly within normal limits with a stable hemoglobin at 9.2.  There is a slight rise in the BUN from 28 up to 27 but with a normal creatinine    Discharge medications reviewed as well as follow-ups and discharge instructions completed and all questions answered.    Objective :  Temp:  [97.8 °F (36.6 °C)-98.2 °F (36.8 °C)] 97.8 °F (36.6 °C)  HR:  [80-92] 92  BP: (118-139)/(59-68) 118/61  Resp:  [18] 18  SpO2:  [95 %-98 %] 98 %  O2 Device: None (Room air)    Functional Update:  Physical Therapy Occupational Therapy Speech Therapy   Weight Bearing Status: Weight Bearing as Tolerated  Transfers: Independent  Bed Mobility: Moderate Assistance  Amulation Distance (ft): 55 feet  Ambulation: Independent, Supervision  Assistive Device for Ambulation: Roller Walker  Wheelchair Mobility Distance: 0 ft  Number of Stairs: 2  Assistive Device for Stairs: Walker (RW for curb step, bilateral handrail for stairs)  Stair Assistance: Moderate Assistance (Min/ModA for curb step, Min/ModA for stairs)  Discharge Recommendations: Home with:  DC Home with:: Family Support, First Floor Setup, Home Physical Therapy   Eating: Independent  Grooming: Supervision  Bathing: Minimal Assistance  Bathing: Minimal Assistance  Upper Body Dressing: Supervision  Lower Body Dressing: Minimal Assistance  Toileting: Supervision  Toilet Transfer: Supervision  Cognition: Within Defined Limits  Orientation: Person, Place, Time, Situation                 Physical Exam  Vitals reviewed.   Constitutional:       General: She is not in acute distress.     Appearance: She is obese.   HENT:      Head: Normocephalic and atraumatic.      Right Ear: External ear normal.      Left Ear: External ear normal.      Nose: Nose  normal. No rhinorrhea.      Mouth/Throat:      Mouth: Mucous membranes are moist.      Pharynx: Oropharynx is clear.   Eyes:      General: No scleral icterus.  Cardiovascular:      Rate and Rhythm: Normal rate.   Pulmonary:      Effort: Pulmonary effort is normal. No respiratory distress.   Abdominal:      General: There is no distension.      Palpations: Abdomen is soft.   Skin:     General: Skin is warm and dry.      Comments: Sutures have been removed Steri-Strips in place over the left thigh   Neurological:      Mental Status: She is alert and oriented to person, place, and time.   Psychiatric:         Mood and Affect: Mood normal.         Behavior: Behavior normal.           Scheduled Meds:  Current Facility-Administered Medications   Medication Dose Route Frequency Provider Last Rate    acetaminophen  975 mg Oral Q8H MAHSA Tammy Pradhan MD      albuterol  2 puff Inhalation Q6H PRN Tammy Pradhan MD      aluminum-magnesium hydroxide-simethicone  30 mL Oral Q4H PRN Tammy Pradhan MD      aspirin  325 mg Oral Daily Tammy Pradhan MD      Cholecalciferol  1,000 Units Oral Daily Tammy Pradhan MD      enoxaparin  30 mg Subcutaneous Q12H Tammy Pradhan MD      gabapentin  100 mg Oral TID Gladyscarla Carvalho DO      insulin lispro  1-5 Units Subcutaneous HS Tammy Pradhan MD      insulin lispro  1-6 Units Subcutaneous TID AC Tammy Pradhan MD      loperamide  2 mg Oral Q4H PRN Liliana Flores MD      metFORMIN  1,000 mg Oral BID With Meals STEFFANY Nelson      methocarbamol  500 mg Oral Q6H PRN Tammy Pradhan MD      multivitamin stress formula  1 tablet Oral Daily Tammy Pradhan MD      naloxone  0.04 mg Intravenous Q1MIN PRN Tammy Pradhan MD      oxyCODONE  2.5 mg Oral Q4H PRN Tammy Pradhan MD      Or    oxyCODONE  5 mg Oral Q4H PRN Tammy Pradhan MD      pantoprazole  40 mg Oral Early Morning Tammy Pradhan MD      polyethylene glycol  17 g Oral Daily PRN Jen  STEFFANY Hogan      PreserVision AREDS 2  1 capsule Oral BID STEFFANY Nelson      sacubitril-valsartan  1 tablet Oral BID Tammy Pradhan MD      spironolactone  25 mg Oral Daily STEFFANY Nelson           Lab Results: I have reviewed the following results:  Results from last 7 days   Lab Units 05/12/25  0521 05/08/25  0619   HEMOGLOBIN g/dL 9.2* 9.3*   HEMATOCRIT % 30.7* 30.9*   WBC Thousand/uL 7.48 8.31   PLATELETS Thousands/uL 527* 578*     Results from last 7 days   Lab Units 05/12/25  0521 05/10/25  0544 05/08/25  0619   BUN mg/dL 27* 20 23   SODIUM mmol/L 139 138 139   POTASSIUM mmol/L 4.4 4.6 4.8   CHLORIDE mmol/L 103 105 103   CREATININE mg/dL 0.58* 0.51* 0.64                Lexa Bonilla,   Physical Medicine and Rehabilitation  [facility]    I have spent a total time of 50 minutes in caring for this patient on the day of the visit/encounter including Instructions for management, Importance of tx compliance, Risk factor reductions, Counseling / Coordination of care, Documenting in the medical record, Reviewing/placing orders in the medical record (including tests, medications, and/or procedures), and Communicating with other healthcare professionals .

## 2025-05-12 NOTE — ASSESSMENT & PLAN NOTE
Patient presents on 4/24 to [facility] with left hip pain after a fall  And have a comminuted intertrochanteric fracture of the left femur  Status post ORIF with IM nailing by Dr. Parry on 4/24  2 week post-op XR ordered for 5/8 and completed - will consult ortho  Ortho consult completed and sutures removed x-rays reviewed follow-up in 4 more weeks    Weightbearing as tolerated to the left lower extremity  Physical and Occupational Therapy  Pain control with Tylenol, gabapentin, Robaxin and as needed oxycodone > Increasing gabapentin from 100 mg BID to TID for better pain control on 5/1  DVT prophylaxis.  Is recommended for 81 mg aspirin twice daily at the time of discharge to complete the 28 days however is on aspirin 325 mg daily currently

## 2025-05-12 NOTE — DISCHARGE SUMMARY
"Discharge Summary - Hospitalist   Name: Kamini Martines 75 y.o. female I MRN: [id number]  Unit/Bed#: -01 I Date of Admission: 4/28/2025   Date of Service: 5/12/2025 I Hospital Day: 14     Assessment & Plan  Closed left hip fracture, initial encounter ([facility])  Presented to [facility] on 4/24/25 with left hip pain following a mechanical fall  Noted to have a comminuted intertrochanteric fracture of the left femur   S/p ORIF/IMN 4/24   Weightbearing as tolerated  PT/OT, pain control per PMR  Is having muscle spasms that Robaxin 500 mg q6hrs prn is helping.    Per Ortho note 4/28: \"DVT ppx: recommend lovenox while in house, d/c on aspirin 81mg BID x 6 weeks\".   However she is already on 325mg qd as at home anyway  Two week followup was 5/8/25 = xray stable and staples removed  HFrEF (heart failure with reduced ejection fraction) ([facility])  NICM  EF 41% on cardiac MRI (2019)/ECHO 40-45% 2/4/25  Follows with Dr Worley as an OP  Home: Entresto  mg BID/Aldactone 25mg qd/Torsemide 10mg qd prn edema  Here:  Entresto  mg BID/Aldactone as below  Does not tolerate BB = has tried Coreg and Metoprolol in past per Dr Worley's office note  Restarted Aldactone at 12.5mg qd on 4/29 and increased back up to 25mg qd to start 5/2/25  Was having significant bilateral LE edema, duplex negative for DVT on 5/3/25. Takes Torsemide 10mg PRN at home.   Torsemide 10 mg x 1 given 5/5/25, 5/7/25, 5/8/25, 5/9/25, 5/10/25  BMP 5/8/25 = stable.  BMP 5/10/25 = stable  BMP 5/12/25 = stable  No edema now RLE but some still operative leg (left).  See below  Type 2 diabetes mellitus without complication, without long-term current use of insulin ([facility])  HbA1C 7.8 on 4/29/25  Has a glucometer at home  Home: Metformin  mg daily with dinner  Here:  Metformin XR 1000 mg BID  Endo saw and want the Metformin XR to 1000 mg BID --> I placed the order on 5/11/25.  They will see her in the office as an OP  CVA (cerebral vascular accident) " "(HCC)  Continue  mg qd as at home  Not on statins at home because she didn't want to take in past  Didn't followup with Neuro after stroke  LUCIA (obstructive sleep apnea)  Stopped using CPAP few months ago as strap was causing neck pain  Outpatient sleep f/u  Mild intermittent asthma without complication  No exacerbation  Continue Albuterol prn  Class 2 obesity in adult  BMI 36.87  Affects all aspects of care  Lifestyle modification  GERD (gastroesophageal reflux disease)  Takes Prilosec 40mg qd at home  Was on Protonix 40mg qd and Pepcid qd here  Started weaning Pepcid and reduced to 10mg qd 4/30/25 -->  stopped 5/5/25  She can restart her Prilosec when she goes home  CAD (coronary artery disease)  Nonobstructive coronary artery disease on cardiac cath 2019  On  mg daily for hx CVA  Urinary retention  Adams was placed on 4/25 and removed on 4/28  She takes Vesicare at home   Per PMR  Thyroid nodule  Found on CT cervical spine  \"1.1 cm right thyroid nodule. Incidental discovery of one or more thyroid nodule(s) measuring less than 1.5 cm and without suspicious features is noted in this patient who is above 35 years old; according to guidelines published in the February 2015 white paper on incidental thyroid nodules in the Journal of the American College of Radiology (JACR), no further evaluation is recommended.\"  Buttock wound, right, sequela  POA  Per PMR  Anemia  Post op  stable  Leukocytosis  Mild  Likely reactive  No fever  Will watch  Bilateral leg edema  Hx of CHF which may be contributing- see above  Duplex negative for DVT on 5/3/25  Gave Torsemide 10mg PO x 1 on 5/5, 5/7, 5/8, 5/9, 5/10   The edema is mostly in left leg which is her operative leg so some may remain for a while but has improved with the Torsemide.  Will continue ACE wraps for now  No steady dose of Torsemide for home although she has some that she had been using prn.  I have already give her 5 doses of 10mg.  We discussed having " her see her PCP and then see about taking more Torsemide so that someone is watching labs.  Edema should improve more with ACE wrap and elevation of the leg  Osteoporosis with current pathological fracture  Obtain secondary workup.   For OP follow-up for consideration of DEXA scan and pharmacologic therapy.   Osteoporosis concern in the setting of fragility fracture per Endo.   Adrenal nodule (HCC)  Outpatient follow-up with Endocrine to consider further workup such as hyperfunctioning workup.   Hypomagnesemia  Level on 5/10 was 1.6  Likely due to getting Torsemide  Got MagOx 400 mg BID x 4 doses = LD was 5/11/25  Mg level this AM 5/12/25 was 1.9     Discharge Summary   Kamini Martines 75 y.o. female MRN: [id number]  Unit/Bed#: [facility] 970-01 Encounter: [id number]  Admission Date: 4/28/2025     Discharge Date: 5/12/25    Discharging Provider: Jen Rangel    PCP:  [phone]      HPI: Refer to previous hospitalization for complete record    Summary of [facility] Course:     During the course of the patient's stay in [facility], she remained hemodynamically stable on her Entresto and Aldactone.  Chest remained clear.  She was given Torsemide 10 mg qd x 5 for LE edema.  Edema of the right leg had tr edema in foot.  She still has some edema in her operative leg which is to be expected although that has improved with diuresis.  She sits up in the chair all day.  Encouraged to elevate legs as much as possible.  She will be ACE wrapping the leg foot to knee, on AM/off PM.  She will be seeing her PCP this week and then decide of further doses of Torsemide needed.  Magnesium was 1.6 on 5/10 and was repleted PO.  Repeat today 5/12 is 1.9.  she was told to restart her home Magnesium.  Endocrine saw her in consult and addressed the following issues:  uncontrolled BSs on Metformin, osteoporosis/fracture, adrenal nodule.  We had increased her Metformin from 500mg daily at dinner to 1000 mg and then Endocrine increased to 1000 mg BID.   "They will follow up with her in the office to address these issues.    The patient was discharged in stable condition with family on 5/12/25.    Discharge Physical Examination:  /61 (BP Location: Left arm)   Pulse 92   Temp 97.8 °F (36.6 °C) (Oral)   Resp 18   Ht 5' 5\" (1.651 m)   Wt 100 kg (220 lb 7.4 oz)   SpO2 98%   BMI 36.69 kg/m²     General Appearance: no distress, nontoxic appearing  HEENT:  External ear normal.  Nose normal w/o drainage. Mucous membranes are moist. Oropharynx is clear. Conjunctiva clear w/o icterus or redness.  Neck:  Supple, normal ROM  Lungs: BBS without crackles/wheeze/rhonchi; respirations unlabored with normal inspiratory/expiratory effort.  No retractions noted.  On RA  CV: regular rate and rhythm; no rubs/murmurs/gallops, PMI normal   ABD: Abdomen is soft.  Bowel sounds all quadrants.  Nontender with no distention.    EXT: + LLE edema but improved  Skin: normal turgor, normal texture  Psych: affect normal, mood normal  Neuro: AAO       Significant Findings, Care, Treatment and Services Provided: Acute comprehensive interdisciplinary inpatient rehabilitation including PT, OT, RN, CM    Physiatry Additions/Comorbidities:    Closed left hip fracture, initial encounter ([facility])  Patient presents on 4/24 to [facility] with left hip pain after a fall  And have a comminuted intertrochanteric fracture of the left femur  Status post ORIF with IM nailing by Dr. Parry on 4/24  2 week post-op XR ordered for 5/8 and completed - will consult ortho  Ortho consult completed and sutures removed x-rays reviewed follow-up in 4 more weeks     Weightbearing as tolerated to the left lower extremity  Physical and Occupational Therapy  Pain control with Tylenol, gabapentin, Robaxin and as needed oxycodone > Increasing gabapentin from 100 mg BID to TID for better pain control on 5/1  DVT prophylaxis.  Is recommended for 81 mg aspirin twice daily at the time of discharge to complete the " 28 days however is on aspirin 325 mg daily currently    Class 2 obesity in adult  BMI of 36.87 on arrival  Continue to recommend lifestyle modification, dietary changes, weight loss counseling especially in the setting of recent fracture.    Urinary retention  Had indwelling Adams catheter placed  which was removed on  and did require initial straight catheterization  - PVRs x3 low. Now voiding and continent    Buttock wound, right, sequela  Noted on admission  Consult wound care  Healing well at discharge    Bilateral leg edema  Wrap the bilateral lower [place] [facility] Course: Patient participated in a comprehensive interdisciplinary inpatient rehabilitation program which included involvment of Rehab MD, therapies (PT, OT), RN, CM    Etiologic/Rehabilitation Diagnosis: Impairment of mobility, safety and Activities of Daily Living (ADLs) due to Orthopedic Disorders:    Unilateral Hip Fracture    Functional Status Upon Admission to [facility]:  Self Care:  Eatin: Independent  Oral hygiene: 04: Supervision or touching  assistance  Shower/bathing self: 02: Substantial/maximal assistance  Upper body dressin: Partial/moderate assistance  Lower body dressin: Substantial/maximal assistance  Putting on/taking off footwear: 02: Substantial/maximal assistance  Toilet hygiene: 02: Substantial/maximal assistance   Transfers:  Roll left and right: 03: Partial/moderate assistance  Sit to lyin: Partial/moderate assistance   Lying to sitting on side of bed: 03: Partial/moderate assistance   Sit to stand: 03: Partial/moderate assistance   Chair/bed to chair transfer: 03: Partial/moderate assistance   Toilet transfer: 09: Not applicable  Mobility: ( Min A with RW 4')  Walk 10 ft: 88: Not attempted due to medical conditions or safety concerns  Walk 50 ft with two turns: 88: Not attempted due to medical conditions or safety concerns  Walk 150ft: 88: Not attempted due to medical  conditions or safety concerns    Functional Status Upon Discharge from [facility]:   Physical Therapy Occupational Therapy Speech Therapy   Weight Bearing Status: Weight Bearing as Tolerated  Transfers: Independent  Bed Mobility: Moderate Assistance  Amulation Distance (ft): 55 feet  Ambulation: Independent, Supervision  Assistive Device for Ambulation: Roller Walker  Wheelchair Mobility Distance: 0 ft  Number of Stairs: 2  Assistive Device for Stairs: Walker (RW for curb step, bilateral handrail for stairs)  Stair Assistance: Moderate Assistance (Min/ModA for curb step, Min/ModA for stairs)  Discharge Recommendations: Home with:  DC Home with:: Family Support, First Floor Setup, Home Physical Therapy   Eating: Independent  Grooming: Supervision  Bathing: Minimal Assistance  Bathing: Minimal Assistance  Upper Body Dressing: Supervision  Lower Body Dressing: Minimal Assistance  Toileting: Supervision  Toilet Transfer: Supervision  Cognition: Within Defined Limits  Orientation: Person, Place, Time, Situation                   Condition at Discharge: stable     Discharge instructions/Information to patient and family:   See after visit summary for information provided to patient and family.      Provisions for Follow-Up Care:  See after visit summary for information related to follow-up care and any pertinent home health orders.      Future Appointments   Date Time Provider Department Center   7/21/2025  1:00 PM DO B[facility]   7/31/2025  2:45 PM Brionna Chatman MD [facility]   9/16/2025  1:00 PM [facility]           Disposition: Home    Planned Readmission: No    Discharge Statement   I spent 60 minutes or more discharging the patient.  I had direct contact with the patient on the day of discharge. Greater than 50% of the total time was spent examining patient, answering all patient questions, arranging and discussing plan of care with patient and care team  as well as directly providing post-discharge instructions. Additional time then spent on discharge activities.    Discharge Medications:  See after visit summary for reconciled discharge medications provided to patient and family.

## 2025-05-12 NOTE — ASSESSMENT & PLAN NOTE
Hx of CHF which may be contributing- see above  Duplex negative for DVT on 5/3/25  Gave Torsemide 10mg PO x 1 on 5/5, 5/7, 5/8, 5/9, 5/10   The edema is mostly in left leg which is her operative leg so some may remain for a while but has improved with the Torsemide.  Will continue ACE wraps for now  No steady dose of Torsemide for home although she has some that she had been using prn.  I have already give her 5 doses of 10mg.  We discussed having her see her PCP and then see about taking more Torsemide so that someone is watching labs.  Edema should improve more with ACE wrap and elevation of the leg

## 2025-05-12 NOTE — ASSESSMENT & PLAN NOTE
Level on 5/10 was 1.6  Likely due to getting Torsemide  Got MagOx 400 mg BID x 4 doses = LD was 5/11/25  Mg level this AM 5/12/25 was 1.9

## 2025-05-12 NOTE — ASSESSMENT & PLAN NOTE
HbA1C 7.8 on 4/29/25  Has a glucometer at home  Home: Metformin  mg daily with dinner  Here:  Metformin XR 1000 mg BID  Endo saw and want the Metformin XR to 1000 mg BID --> I placed the order on 5/11/25.  They will see her in the office as an OP

## 2025-05-12 NOTE — PROGRESS NOTES
05/12/25 0830   Pain Assessment   Pain Assessment Tool 0-10   Pain Score 6   Pain Location/Orientation Orientation: Left;Location: Leg   Restrictions/Precautions   Precautions Fall Risk;Pain   Weight Bearing Restrictions Yes   LLE Weight Bearing Per Order WBAT   Roll Left and Right   Type of Assistance Needed Set-up / clean-up   Roll Left and Right CARE Score 5   Sit to Lying   Type of Assistance Needed Set-up / clean-up   Sit to Lying CARE Score 5   Lying to Sitting on Side of Bed   Type of Assistance Needed Set-up / clean-up   Lying to Sitting on Side of Bed CARE Score 5   Sit to Stand   Type of Assistance Needed Adaptive equipment;Independent   Comment RW   Sit to Stand CARE Score 6   Bed-Chair Transfer   Type of Assistance Needed Adaptive equipment;Independent   Comment    Chair/Bed-to-Chair Transfer CARE Score 6   Car Transfer   Type of Assistance Needed Incidental touching   Comment CGA with RW   Car Transfer CARE Score 4   Walk 10 Feet   Type of Assistance Needed Independent;Adaptive equipment   Comment RW   Walk 10 Feet CARE Score 6   Walk 50 Feet with Two Turns   Type of Assistance Needed Independent;Adaptive equipment   Comment RW   Walk 50 Feet with Two Turns CARE Score 6   Walk 150 Feet   Comment limited by hip pain this session   Walking 10 Feet on Uneven Surfaces   Type of Assistance Needed Supervision;Adaptive equipment   Comment over mat surface with RW   Walking 10 Feet on Uneven Surfaces CARE Score 4   Ambulation   Primary Mode of Locomotion Prior to Admission Walk   Distance Walked (feet) 55 ft  (50 ft)   Assist Device Roller Walker   Gait Pattern Antalgic;Decreased L stance;Improper weight shift;Step to   Does the patient walk? 2. Yes   Curb or Single Stair   Style negotiated Curb   Type of Assistance Needed Supervision;Adaptive equipment   Comment CS with RW to ascend 6 inch curb step backwards and descend forwards, VCs for proper LE management   1 Step (Curb) CARE Score 4   4 Steps   Type  of Assistance Needed Incidental touching   Comment not a barrier to patient returning home as she will be having first floor set up   4 Steps CARE Score 4   12 Steps   Comment limited by pain/fatigue   Reason if not Attempted Safety concerns   12 Steps CARE Score 88   Picking Up Object   Type of Assistance Needed Independent;Adaptive equipment   Comment RW + reacher   Picking Up Object CARE Score 6   Therapeutic Interventions   Strengthening Access Code: [email or web address]  URL: [email or web address]  Date: 05/12/2025  Prepared by: Leonela Naik    Exercises  - Supine Quadricep Sets  - 1 x daily - 7 x weekly - 3 sets - 10 reps  - Supine Gluteal Sets  - 1 x daily - 7 x weekly - 3 sets - 10 reps  - Supine Hip Abduction  - 1 x daily - 7 x weekly - 3 sets - 10 reps  - Supine Heel Slide  - 1 x daily - 7 x weekly - 3 sets - 10 reps  - Supine Knee Extension Strengthening  - 1 x daily - 7 x weekly - 3 sets - 10 reps  - Seated Long Arc Quad  - 1 x daily - 7 x weekly - 3 sets - 10 reps  - Seated March  - 1 x daily - 7 x weekly - 3 sets - 10 reps  - Seated Hip Adduction Isometrics with Ball  - 1 x daily - 7 x weekly - 3 sets - 10 reps  - Seated Hip Abduction with Resistance  - 1 x daily - 7 x weekly - 3 sets - 10 reps  - Seated Heel Raise  - 1 x daily - 7 x weekly - 3 sets - 10 reps  (Verbally reviewed supine exercises. Pt performed the seated therex)   Equipment Use   NuStep Level 1 x 10 minutes   Assessment   Treatment Assessment Patient participated in 54 minute skilled physical therapy session. Patient is independent with transfers and ambulation with RW. Pt requires Supervision for stair management which  can provide at discharge. Pt has first floor set up as completing full flight of stairs is too difficult for patient at this time due to pain and fatigue. Pt did have a ramp installed outside to eliminate several stairs. Patient is discharging home today with supportive  and PT services in the home    Problem List Decreased strength;Decreased range of motion;Decreased mobility;Pain;Obesity;Decreased endurance   Barriers to Discharge None   Plan   Treatment/Interventions LE strengthening/ROM;Therapeutic exercise;Endurance training;Bed mobility;Gait training   Progress Progressing toward goals   PT Therapy Minutes   PT Time In 0830   PT Time Out 0954   PT Total Time (minutes) 84   PT Mode of treatment - Individual (minutes) 76   PT Mode of treatment - Concurrent (minutes) 8   PT Mode of treatment - Group (minutes) 0   PT Mode of treatment - Co-treat (minutes) 0   PT Mode of Treatment - Total time(minutes) 84 minutes   PT Cumulative Minutes 1142     Leonela Naik, PT, DPT

## 2025-05-12 NOTE — TELEPHONE ENCOUNTER
Charis called to schedule a Transitional Care Management appointment after been discharged from the hospital . We try to conned the called

## 2025-05-12 NOTE — ASSESSMENT & PLAN NOTE
Takes Prilosec 40mg qd at home  Was on Protonix 40mg qd and Pepcid qd here  Started weaning Pepcid and reduced to 10mg qd 4/30/25 -->  stopped 5/5/25  She can restart her Prilosec when she goes home

## 2025-05-13 ENCOUNTER — PATIENT OUTREACH (OUTPATIENT)
Dept: CASE MANAGEMENT | Facility: OTHER | Age: 75
End: 2025-05-13

## 2025-05-13 NOTE — PROGRESS NOTES
Pt referred for care transitions for ELI pathway. Chart reviewed. Pt admitted to [facility] after fall in her kitchen 4/24-4/28 and fx left femur. ORIF procedure performed 4/24. Discharged 4/28 to [facility].  [facility] visiting pt.   Call placed to Charis and she states PT and OT are coming out this week. Charis is using a walker , received a hospital bed and her  has been assisting her as needed. He picked up her meds and she did have questions about the increased dosage of the Metformin as well as swelling in her leg.She did state that she is tolerating her Metformin at this time.  She would like to see PCP and offered to contact office for her,however, pt will do so herself through her active My Chart as she is awaiting a call to schedule appt.   Charis does not want to be contacted ongoing for care management.Will follow through chart review until ELI pathway ends 5/26    Post-Care Instructions: I reviewed with the patient in detail post-care instructions. Patient is to avoid sunlight for the next 2 days, and wear sun protection. Patients may expect sunburn like redness, discomfort and scabbing.

## 2025-05-15 ENCOUNTER — TELEPHONE (OUTPATIENT)
Age: 75
End: 2025-05-15

## 2025-05-15 NOTE — TELEPHONE ENCOUNTER
Caller: Tim from [facility]    Doctor: Dr. Parry    Reason for call: Tim calling to notify Dr. Parry the patient was seen for Homecare PT yesterday.  He is calling to confirm WB status.  He can be reached at number below.     Call back#: [phone]

## 2025-05-15 NOTE — TELEPHONE ENCOUNTER
Called and spoke with Tim and relayed SUKH Johnson's message, he stated understanding, no further questions.

## 2025-05-19 ENCOUNTER — OFFICE VISIT (OUTPATIENT)
Dept: FAMILY MEDICINE CLINIC | Facility: CLINIC | Age: 75
End: 2025-05-19
Payer: MEDICARE

## 2025-05-19 VITALS
WEIGHT: 220 LBS | BODY MASS INDEX: 36.65 KG/M2 | OXYGEN SATURATION: 98 % | DIASTOLIC BLOOD PRESSURE: 80 MMHG | TEMPERATURE: 98.4 F | SYSTOLIC BLOOD PRESSURE: 120 MMHG | HEART RATE: 71 BPM | HEIGHT: 65 IN | RESPIRATION RATE: 16 BRPM

## 2025-05-19 DIAGNOSIS — R26.2 AMBULATORY DYSFUNCTION: ICD-10-CM

## 2025-05-19 DIAGNOSIS — E11.9 TYPE 2 DIABETES MELLITUS WITHOUT COMPLICATION, WITHOUT LONG-TERM CURRENT USE OF INSULIN (HCC): ICD-10-CM

## 2025-05-19 DIAGNOSIS — J31.0 NONALLERGIC RHINITIS: ICD-10-CM

## 2025-05-19 DIAGNOSIS — I10 PRIMARY HYPERTENSION: ICD-10-CM

## 2025-05-19 DIAGNOSIS — I63.9 CEREBROVASCULAR ACCIDENT (CVA), UNSPECIFIED MECHANISM (HCC): ICD-10-CM

## 2025-05-19 DIAGNOSIS — Z99.89 USES WALKER: ICD-10-CM

## 2025-05-19 DIAGNOSIS — S72.002D CLOSED FRACTURE OF LEFT HIP WITH ROUTINE HEALING: Primary | ICD-10-CM

## 2025-05-19 PROBLEM — M70.62 TROCHANTERIC BURSITIS OF LEFT HIP: Status: RESOLVED | Noted: 2024-08-23 | Resolved: 2025-05-19

## 2025-05-19 PROCEDURE — G2211 COMPLEX E/M VISIT ADD ON: HCPCS | Performed by: INTERNAL MEDICINE

## 2025-05-19 PROCEDURE — 99214 OFFICE O/P EST MOD 30 MIN: CPT | Performed by: INTERNAL MEDICINE

## 2025-05-19 RX ORDER — BLOOD-GLUCOSE METER
EACH MISCELLANEOUS 2 TIMES DAILY
Qty: 1 KIT | Refills: 0 | Status: SHIPPED | OUTPATIENT
Start: 2025-05-19

## 2025-05-19 RX ORDER — BLOOD SUGAR DIAGNOSTIC
1 STRIP MISCELLANEOUS 2 TIMES DAILY
Qty: 200 EACH | Refills: 3 | Status: SHIPPED | OUTPATIENT
Start: 2025-05-19

## 2025-05-19 RX ORDER — BENZONATATE 200 MG/1
200 CAPSULE ORAL 3 TIMES DAILY PRN
Qty: 20 CAPSULE | Refills: 0 | Status: SHIPPED | OUTPATIENT
Start: 2025-05-19

## 2025-05-19 RX ORDER — IPRATROPIUM BROMIDE 21 UG/1
2 SPRAY, METERED NASAL 3 TIMES DAILY PRN
Qty: 30 ML | Refills: 5 | Status: SHIPPED | OUTPATIENT
Start: 2025-05-19

## 2025-05-19 NOTE — ASSESSMENT & PLAN NOTE
Not well controlled and will be seeing endocrinology for care.  Continue metformin 1000 mg bid.   Lab Results   Component Value Date    HGBA1C 7.8 (H) 04/29/2025       Orders:  •  Blood Glucose Monitoring Suppl (Accu-Chek Guide) w/Device KIT; Use in the morning and before bedtime.  •  glucose blood (Accu-Chek Guide Test) test strip; Use 1 each in the morning and 1 each before bedtime. Test bid.

## 2025-05-19 NOTE — ASSESSMENT & PLAN NOTE
Care per ortho. Continue PT.  Encouraged patient to do her exercises as recommended between sessions.

## 2025-05-19 NOTE — PROGRESS NOTES
Name: Kamini Martines      : 1950      MRN: [id number]  Encounter Provider: Brionna Chatman MD  Encounter Date: 2025   Encounter department: [facility]  :  Assessment & Plan  Closed fracture of left hip with routine healing  Care per ortho. Continue PT.  Encouraged patient to do her exercises as recommended between sessions.       Ambulatory dysfunction  As above.       Type 2 diabetes mellitus without complication, without long-term current use of insulin (HCC)  Not well controlled and will be seeing endocrinology for care.  Continue metformin 1000 mg bid.   Lab Results   Component Value Date    HGBA1C 7.8 (H) 2025       Orders:  •  Blood Glucose Monitoring Suppl (Accu-Chek Guide) w/Device KIT; Use in the morning and before bedtime.  •  glucose blood (Accu-Chek Guide Test) test strip; Use 1 each in the morning and 1 each before bedtime. Test bid.    Uses walker  Continue PT.       Cerebrovascular accident (CVA), unspecified mechanism (HCC)  By history, refuses statin. Denies new neurologic symptoms.        Primary hypertension  Well controlled and will continue current medications as ordered.        Nonallergic rhinitis    Orders:  •  ipratropium (ATROVENT) 0.03 % nasal spray; 2 sprays into each nostril 3 (three) times a day as needed for rhinitis  •  benzonatate (TESSALON) 200 MG capsule; Take 1 capsule (200 mg total) by mouth 3 (three) times a day as needed for cough           History of Present Illness   Here for follow up after rehab stay from fractured L femur.  She fell on  and sustained a left femur fracture, had this surgically repaired.  Was sent to rehab and is now home.  She is still taking 4 tramadol a day.  Has a lot of pain in back and neck, from using her walker.  The hip is not as bad.  Has PT coming in once a week.  She is angry that it is not more.  Had a skin ulcer on right buttock but  thinks this is improving. She is trying to change position  "frequently.    There is no chest pain or dyspnea.  No hypoglycemia.  Endocrinology upped her metformin to 1000 mg bid and she is going to see them in follow up.    She refuses dxa       Review of Systems   Constitutional: Negative.  Negative for chills and fever.   HENT:  Negative for ear pain and sore throat.    Eyes:  Negative for pain and visual disturbance.   Respiratory: Negative.  Negative for cough and shortness of breath.    Cardiovascular: Negative.  Negative for chest pain and palpitations.   Gastrointestinal:  Negative for abdominal pain and vomiting.   Genitourinary:  Negative for dysuria and hematuria.   Musculoskeletal:  Positive for arthralgias, back pain and gait problem.   Skin:  Positive for wound. Negative for color change and rash.   Neurological:  Negative for seizures and syncope.   All other systems reviewed and are negative.      Objective   /80   Pulse 71   Temp 98.4 °F (36.9 °C)   Resp 16   Ht 5' 5\" (1.651 m)   Wt 99.8 kg (220 lb)   SpO2 98%   BMI 36.61 kg/m²      Physical Exam  Constitutional:       General: She is not in acute distress.     Appearance: She is well-developed. She is not diaphoretic.   HENT:      Head: Normocephalic and atraumatic.      Right Ear: External ear normal.      Left Ear: External ear normal.      Nose: Nose normal.     Eyes:      Pupils: Pupils are equal, round, and reactive to light.     Neck:      Thyroid: No thyromegaly.      Vascular: No JVD.     Cardiovascular:      Rate and Rhythm: Regular rhythm.      Heart sounds: No murmur heard.     No friction rub. No gallop.   Pulmonary:      Effort: Pulmonary effort is normal.      Breath sounds: Normal breath sounds. No wheezing or rales.   Abdominal:      General: Bowel sounds are normal. There is no distension.      Palpations: Abdomen is soft.      Tenderness: There is no abdominal tenderness.     Musculoskeletal:         General: Normal range of motion.      Cervical back: Normal range of motion " and neck supple.      Comments: Using a walker  L hip wound healed, no erythema     Skin:     General: Skin is warm and dry.      Findings: No rash.      Comments: 3 cm healed ulceration with intact skin right buttock     Neurological:      Mental Status: She is alert and oriented to person, place, and time.      Cranial Nerves: No cranial nerve deficit.      Sensory: No sensory deficit.      Motor: No abnormal muscle tone.      Coordination: Coordination normal.      Deep Tendon Reflexes: Reflexes normal.     Psychiatric:         Behavior: Behavior normal.         Thought Content: Thought content normal.         Judgment: Judgment normal.

## 2025-05-20 ENCOUNTER — PATIENT OUTREACH (OUTPATIENT)
Dept: CASE MANAGEMENT | Facility: OTHER | Age: 75
End: 2025-05-20

## 2025-05-20 ENCOUNTER — TELEPHONE (OUTPATIENT)
Dept: ADMINISTRATIVE | Facility: OTHER | Age: 75
End: 2025-05-20

## 2025-05-20 NOTE — LETTER
Diabetic Eye Exam Form    Date Requested: 25  Patient: Kamini Martines  Patient : 1950   Referring Provider: Brionna Chatman MD      DIABETIC Eye Exam Date _______________________________      Type of Exam MUST be documented for Diabetic Eye Exams. Please CHECK ONE.     Retinal Exam       Dilated Retinal Exam       OCT       Optomap-Iris Exam      Fundus Photography       Left Eye - Please check Retinopathy or No Retinopathy        Exam did show retinopathy    Exam did not show retinopathy       Right Eye - Please check Retinopathy or No Retinopathy       Exam did show retinopathy    Exam did not show retinopathy       Comments __________________________________________________________    Practice Providing Exam ______________________________________________    Exam Performed By (print name) _______________________________________      Provider Signature ___________________________________________________      These reports are needed for  compliance.    Please fax this completed form and a copy of the Diabetic Eye Exam report to the [facility] Based Department as soon as possible via Fax [phone], attention Cole: Phone [phone]. Our office is located at [address] [place] [address].     We thank you for your assistance in treating our mutual patient.

## 2025-05-20 NOTE — TELEPHONE ENCOUNTER
Upon review of the In Basket request and the patient's chart, initial outreach has been made via fax to facility. Please see Contacts section for details.     Thank you  Cole Kevin MA

## 2025-05-20 NOTE — PROGRESS NOTES
Weekly chart review on [facility] pt . Pt had PCP appt yesterday.Pt to see endo for HBA1C of 7.8 and test glucose bid.Continue PT with [facility] care.Recent weight 220 lbs.   Pt sleeping in hospital bed as stated in conversation from last week. Will review again on 5/26 and close

## 2025-05-20 NOTE — TELEPHONE ENCOUNTER
----- Message from Brionna Chatman MD sent at 5/19/2025  4:38 PM EDT -----  05/19/25 4:38 PMHello, our patient No patient name on file. has had Diabetic Eye Exam completed/performed. Please assist in updating the patient chart by making an External outreach to [facility] facility located in [place], [place]. The date of service is 2025.Thank you,[facility]

## 2025-05-20 NOTE — LETTER
Diabetic Eye Exam Form    Date Requested: 25  Patient: Kamini Martines  Patient : 1950   Referring Provider: Brionna Chatman MD      DIABETIC Eye Exam Date _______________________________      Type of Exam MUST be documented for Diabetic Eye Exams. Please CHECK ONE.     Retinal Exam       Dilated Retinal Exam       OCT       Optomap-Iris Exam      Fundus Photography       Left Eye - Please check Retinopathy or No Retinopathy        Exam did show retinopathy    Exam did not show retinopathy       Right Eye - Please check Retinopathy or No Retinopathy       Exam did show retinopathy    Exam did not show retinopathy       Comments __________________________________________________________    Practice Providing Exam ______________________________________________    Exam Performed By (print name) _______________________________________      Provider Signature ___________________________________________________      These reports are needed for  compliance.    Please fax this completed form and a copy of the Diabetic Eye Exam report to the [facility] Based Department as soon as possible via Fax [phone], attention Cole: Phone [phone]. Our office is located at [address] [place] [address].     We thank you for your assistance in treating our mutual patient.

## 2025-05-21 ENCOUNTER — TELEPHONE (OUTPATIENT)
Dept: OBGYN CLINIC | Facility: CLINIC | Age: 75
End: 2025-05-21

## 2025-05-21 NOTE — TELEPHONE ENCOUNTER
Called and spoke with pt about scheduling a PO apt with Dr. Parry.  Scheduled pt 6/3 at 2:15, confirmed location with pt.

## 2025-05-22 DIAGNOSIS — M47.816 LUMBAR SPONDYLOSIS: Primary | ICD-10-CM

## 2025-05-22 DIAGNOSIS — M51.26 LUMBAR HERNIATED DISC: ICD-10-CM

## 2025-05-22 DIAGNOSIS — M51.16 INTERVERTEBRAL DISC DISORDER WITH RADICULOPATHY OF LUMBAR REGION: ICD-10-CM

## 2025-05-22 RX ORDER — TRAMADOL HYDROCHLORIDE 50 MG/1
50 TABLET ORAL EVERY 6 HOURS PRN
Qty: 120 TABLET | Refills: 0 | Status: SHIPPED | OUTPATIENT
Start: 2025-05-26

## 2025-05-22 NOTE — PHYSICAL THERAPY NOTE
PT [facility] DISCHARGE SUMMARY    Kamini Martines presented to the [facility] following hospitalization for L hip fracture resulting in significant decline in functional mobility. Physical therapy plan of care focused on bed mobility, transfer training, gait training, stair navigation, standing balance training , LE strengthening , activities to improve endurance/activity tolerance, pain management , and family training . Patient made good progress during their stay. Goals were partially met. Barriers to met goals include: ongoing hip pain limiting ambulation distance and ability to complete full flight of stairs. On discharge, patient required set up assistance for bed mobility with use of leg , independent with transfers and short distance ambulation with RW (10-50ft), Supervision for 150 ft and outdoor mobility, and Supervision/CGA for stair/curb management. Patient was discharged home with supportive  and HH PT Services.

## 2025-05-23 NOTE — TELEPHONE ENCOUNTER
As a follow-up, a second attempt has been made for outreach via fax to facility. Please see Contacts section for details.    Thank you  Cole Kevin MA

## 2025-05-25 DIAGNOSIS — I50.22 CHRONIC SYSTOLIC (CONGESTIVE) HEART FAILURE (HCC): ICD-10-CM

## 2025-05-27 ENCOUNTER — PATIENT OUTREACH (OUTPATIENT)
Dept: CASE MANAGEMENT | Facility: OTHER | Age: 75
End: 2025-05-27

## 2025-05-27 RX ORDER — SPIRONOLACTONE 25 MG/1
25 TABLET ORAL DAILY
Qty: 90 TABLET | Refills: 1 | Status: SHIPPED | OUTPATIENT
Start: 2025-05-27

## 2025-05-27 NOTE — TELEPHONE ENCOUNTER
Upon review of the In Basket request we were able to locate, review, and update the patient chart as requested for Diabetic Eye Exam.    Any additional questions or concerns should be emailed to the Practice Liaisons via the appropriate education email address, please do not reply via In Basket.    Thank you  Cole Kevin MA   [facility]

## 2025-06-02 ENCOUNTER — TELEPHONE (OUTPATIENT)
Age: 75
End: 2025-06-02

## 2025-06-02 NOTE — TELEPHONE ENCOUNTER
[facility] therapist called to report that she had a phone conversation with the patient and she report that she had a fall yesterday

## 2025-06-02 NOTE — TELEPHONE ENCOUNTER
Called pt. And she stated that she fell in the shower but no injuries , and she is going to follow up with her psmichael montejo    PAST SURGICAL HISTORY:  No significant past surgical history

## 2025-06-09 ENCOUNTER — APPOINTMENT (OUTPATIENT)
Dept: RADIOLOGY | Facility: AMBULARY SURGERY CENTER | Age: 75
End: 2025-06-09
Attending: STUDENT IN AN ORGANIZED HEALTH CARE EDUCATION/TRAINING PROGRAM
Payer: MEDICARE

## 2025-06-09 ENCOUNTER — OFFICE VISIT (OUTPATIENT)
Dept: OBGYN CLINIC | Facility: CLINIC | Age: 75
End: 2025-06-09

## 2025-06-09 VITALS — HEIGHT: 65 IN | WEIGHT: 220 LBS | BODY MASS INDEX: 36.65 KG/M2

## 2025-06-09 DIAGNOSIS — S72.002A CLOSED FRACTURE OF LEFT HIP, INITIAL ENCOUNTER (HCC): Primary | ICD-10-CM

## 2025-06-09 DIAGNOSIS — S72.002A CLOSED FRACTURE OF LEFT HIP, INITIAL ENCOUNTER (HCC): ICD-10-CM

## 2025-06-09 PROCEDURE — 99024 POSTOP FOLLOW-UP VISIT: CPT | Performed by: STUDENT IN AN ORGANIZED HEALTH CARE EDUCATION/TRAINING PROGRAM

## 2025-06-09 PROCEDURE — 73502 X-RAY EXAM HIP UNI 2-3 VIEWS: CPT

## 2025-06-09 RX ORDER — METHOCARBAMOL 500 MG/1
500 TABLET, FILM COATED ORAL 4 TIMES DAILY
Qty: 20 TABLET | Refills: 0 | Status: SHIPPED | OUTPATIENT
Start: 2025-06-09
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
## 2025-06-11 ENCOUNTER — OFFICE VISIT (OUTPATIENT)
Dept: PAIN MEDICINE | Facility: CLINIC | Age: 75
End: 2025-06-11
Payer: MEDICARE

## 2025-06-11 VITALS — WEIGHT: 220 LBS | HEIGHT: 65 IN | BODY MASS INDEX: 36.65 KG/M2

## 2025-06-11 DIAGNOSIS — M47.816 LUMBAR SPONDYLOSIS: ICD-10-CM

## 2025-06-11 DIAGNOSIS — M51.16 INTERVERTEBRAL DISC DISORDER WITH RADICULOPATHY OF LUMBAR REGION: ICD-10-CM

## 2025-06-11 DIAGNOSIS — M51.26 LUMBAR HERNIATED DISC: ICD-10-CM

## 2025-06-11 DIAGNOSIS — M47.812 CERVICAL SPONDYLOSIS: Primary | ICD-10-CM

## 2025-06-11 PROCEDURE — G2211 COMPLEX E/M VISIT ADD ON: HCPCS | Performed by: NURSE PRACTITIONER

## 2025-06-11 PROCEDURE — 99214 OFFICE O/P EST MOD 30 MIN: CPT | Performed by: NURSE PRACTITIONER

## 2025-06-11 RX ORDER — TRAMADOL HYDROCHLORIDE 50 MG/1
50 TABLET ORAL EVERY 6 HOURS PRN
Qty: 120 TABLET | Refills: 2 | Status: SHIPPED | OUTPATIENT
Start: 2025-06-19

## 2025-06-11 NOTE — ASSESSMENT & PLAN NOTE
Patient pain is being controlled with current medication regimen.  She had a recent fall with femur fracture on the left that is now healed.  She still has not returned to full mobility and continues to do physical therapy.  The patient's opioid script was sent to their pharmacy electronically and was given a 3 month supply of prescriptions with a Do Not Fill date(s) of 6/19/2025.      Orders:    traMADol (Ultram) 50 mg tablet; Take 1 tablet (50 mg total) by mouth every 6 (six) hours as needed for moderate pain Do not start before June 19, 2025.

## 2025-06-11 NOTE — PROGRESS NOTES
Name: Kamini Martines      : 1950      MRN: [id number]  Encounter Provider: STEFFANY Garnica  Encounter Date: 2025   Encounter department: [facility]  :  Assessment & Plan  Lumbar spondylosis  Lumbar herniated disc  Intervertebral disc disorder with radiculopathy of lumbar region  Patient pain is being controlled with current medication regimen.  She had a recent fall with femur fracture on the left that is now healed.  She still has not returned to full mobility and continues to do physical therapy.  The patient's opioid script was sent to their pharmacy electronically and was given a 3 month supply of prescriptions with a Do Not Fill date(s) of 2025.      Orders:    traMADol (Ultram) 50 mg tablet; Take 1 tablet (50 mg total) by mouth every 6 (six) hours as needed for moderate pain Do not start before 2025.    Cervical spondylosis  Patient with neck pain and had injection on 2025 with initial improvement in her neck pain.  Patient then had a fall and reaggravated her neck.  I did offer to repeat her neck injection but she wants to hold off at this time.  She will call for follow-up appointment if she wants to schedule.           There are risks associated with opioid medications, including dependence, addiction and tolerance. The patient understands and agrees to use these medications only as prescribed. Potential side effects of the medications include, but are not limited to, constipation, drowsiness, addiction, impaired judgment and risk of fatal overdose if not taken as prescribed. The patient was warned against driving while taking sedation medications.  Sharing medications is a felony. At this point in time, the patient is showing no signs of addiction, abuse, diversion or suicidal ideation.  [place] Prescription Drug Monitoring Program report was reviewed and was appropriate      My impressions and treatment recommendations were discussed  "in detail with the patient who verbalized understanding and had no further questions.  Discharge instructions were provided. I personally saw and examined the patient and I agree with the above discussed plan of care.    Follow-up is planned in 3 months or sooner as warranted. The patient was advised to contact the office should their symptoms worsen in the interim.     History of Present Illness     Kamini Martines is a 75 y.o. female who presents to [facility] for interval re-evaluation in regards to pain in the Neck and Low back. The patients last office visit was 2/25/2025. Patient presents today with pain in neck and low back that does not radiate. Pain is described as Constant, Dull-aching, and Throbbing. Symptoms are worse walking and standing, turning head. Alleviating factors identifiable by the patient are rest. On the numeric pain scale of 1-10, the pain typically increases to max of 8 out of 10, which is currently impacting their quality of life.    Current pain meds include: Methocarbamol, Gabapentin, and Tramadol    Conservative therapy: PT; ongoing for recent left leg fracture  Previous treatments: [facility]  Date: 4/16/2025;  50% relief for 4 weeks     Review of Systems   Respiratory:  Negative for shortness of breath.    Cardiovascular:  Negative for chest pain.   Gastrointestinal:  Negative for constipation, diarrhea, nausea and vomiting.   Musculoskeletal:  Positive for neck pain. Negative for arthralgias, gait problem, joint swelling and myalgias.   Skin:  Negative for rash.   Neurological:  Negative for dizziness, seizures and weakness.   All other systems reviewed and are negative.      Medical History Reviewed by provider this encounter:     .       Objective   Ht 5' 5\" (1.651 m)   Wt 99.8 kg (220 lb)   BMI 36.61 kg/m²      Pain Score:   8  Physical Exam  Constitutional:normal, well developed, well nourished, alert, in no distress and non-toxic and no overt pain behavior. " and overweight  Eyes:anicteric  HEENT:grossly intact  Neck:supple, symmetric, trachea midline and no masses   Pulmonary:even and unlabored  Cardiovascular:No edema or pitting edema present  Skin:Normal without rashes or lesions and well hydrated  Psychiatric:Mood and affect appropriate  Neurologic:Cranial Nerves II-XII grossly intact  Musculoskeletal: antalgic gait and in wheelchair

## 2025-06-11 NOTE — ASSESSMENT & PLAN NOTE
Patient with neck pain and had injection on 4/16/2025 with initial improvement in her neck pain.  Patient then had a fall and reaggravated her neck.  I did offer to repeat her neck injection but she wants to hold off at this time.  She will call for follow-up appointment if she wants to schedule.

## 2025-06-12 DIAGNOSIS — E11.9 TYPE 2 DIABETES MELLITUS WITHOUT COMPLICATION, WITHOUT LONG-TERM CURRENT USE OF INSULIN (HCC): ICD-10-CM

## 2025-06-12 RX ORDER — METFORMIN HYDROCHLORIDE 500 MG/1
1000 TABLET, EXTENDED RELEASE ORAL 2 TIMES DAILY WITH MEALS
Qty: 120 TABLET | Refills: 5 | Status: SHIPPED | OUTPATIENT
Start: 2025-06-12

## 2025-06-16 ENCOUNTER — TELEPHONE (OUTPATIENT)
Age: 75
End: 2025-06-16

## 2025-06-16 NOTE — TELEPHONE ENCOUNTER
"Caller: Y[facility]- [facility]    Doctor: Dr. Parry/Marc    Reason for call: [facility] can no longer provide Home Physical Therapy, due to Medicare. They would like to get her set up with [facility]. Stating if a new script can be sent to them for Home care they can fax to [facility].      Please put on the Home PT Script, \"Follow up PT with [facility]\"    Fax# [phone]    Call back#: [phone]   "

## 2025-06-17 DIAGNOSIS — S72.002A CLOSED FRACTURE OF LEFT HIP, INITIAL ENCOUNTER (HCC): Primary | ICD-10-CM

## 2025-06-23 LAB
LEFT EYE DIABETIC RETINOPATHY: NORMAL
RIGHT EYE DIABETIC RETINOPATHY: NORMAL

## 2025-06-26 ENCOUNTER — TELEPHONE (OUTPATIENT)
Dept: ADMINISTRATIVE | Facility: OTHER | Age: 75
End: 2025-06-26

## 2025-06-26 NOTE — TELEPHONE ENCOUNTER
----- Message from Brionna Chatman MD sent at 6/25/2025  9:35 AM EDT -----  06/25/25 9:35 AM    Hello, our patient No patient name on file. has had Diabetic Eye Exam completed/performed. Please assist in updating the patient chart by making an External outreach to Dr. Leblanc facility located in [place], [place]. The date of service is 6/25.    Thank you,  Brionna Chatman  [facility]

## 2025-06-26 NOTE — LETTER
Diabetic Eye Exam Form    Date Requested: 25  Patient: Kamini Martines  Patient : 1950   Referring Provider: Brionna Chatman MD      DIABETIC Eye Exam Date _______________________________      Type of Exam MUST be documented for Diabetic Eye Exams. Please CHECK ONE.     Retinal Exam       Dilated Retinal Exam       OCT       Optomap-Iris Exam      Fundus Photography       Left Eye - Please check Retinopathy or No Retinopathy        Exam did show retinopathy    Exam did not show retinopathy       Right Eye - Please check Retinopathy or No Retinopathy       Exam did show retinopathy    Exam did not show retinopathy       Comments __________________________________________________________    Practice Providing Exam ______________________________________________    Exam Performed By (print name) _______________________________________      Provider Signature ___________________________________________________      These reports are needed for  compliance.    Please fax this completed form and a copy of the Diabetic Eye Exam report to the [facility] Department as soon as possible via Fax [phone], attention Ajay: Phone [phone]. Our office is located at [address] [place] [address].     We thank you for your assistance in treating our mutual patient.

## 2025-06-27 NOTE — TELEPHONE ENCOUNTER
Upon review of the In Basket request we were able to locate, review, and update the patient chart as requested for Diabetic Eye Exam.    Any additional questions or concerns should be emailed to the Practice Liaisons via the appropriate education email address, please do not reply via In Basket.    Thank you  Ajay Montoya MA   [facility]

## 2025-06-27 NOTE — TELEPHONE ENCOUNTER
Upon review of the In Basket request and the patient's chart, initial outreach has been made via fax to facility. Please see Contacts section for details.     Thank you  Ajay Montoya MA

## 2025-07-05 DIAGNOSIS — E11.9 TYPE 2 DIABETES MELLITUS WITHOUT COMPLICATION, WITHOUT LONG-TERM CURRENT USE OF INSULIN (HCC): ICD-10-CM

## 2025-07-06 RX ORDER — METFORMIN HYDROCHLORIDE 500 MG/1
1000 TABLET, EXTENDED RELEASE ORAL 2 TIMES DAILY WITH MEALS
Qty: 360 TABLET | Refills: 1 | Status: SHIPPED | OUTPATIENT
Start: 2025-07-06 | End: 2025-07-17 | Stop reason: ALTCHOICE

## 2025-07-14 DIAGNOSIS — M80.00XA OSTEOPOROSIS WITH CURRENT PATHOLOGICAL FRACTURE, UNSPECIFIED OSTEOPOROSIS TYPE, INITIAL ENCOUNTER: ICD-10-CM

## 2025-07-14 DIAGNOSIS — S72.002A CLOSED LEFT HIP FRACTURE, INITIAL ENCOUNTER (HCC): ICD-10-CM

## 2025-07-14 NOTE — TELEPHONE ENCOUNTER
"Javier Pastrana (70 y.o. Female)       Date of Birth   1954    Social Security Number       Address   [address] [place] [address]    Home Phone   [phone]    MRN   [id number]       [employer]ars[employer]ism   [facility]    Marital Status                               Admission Date   7/3/2025    Admission Type   Emergency    Admitting Provider   Sanchez Lopez DO    Attending Provider   Sanchez Lopez DO    Department, Room/Bed   [facility] 4B, 412/1       Discharge Date       Discharge Disposition       Discharge Destination                                 Attending Provider: Sanchez Lopez DO    Allergies: No Known Allergies    Isolation: None   Infection: None   Code Status: CPR    Ht: 165.1 cm (65\")   Wt: 98.5 kg (217 lb 3.2 oz)    Admission Cmt: None   Principal Problem: DAISY (acute kidney injury) [N17.9]                   Active Insurance as of 7/3/2025       Primary Coverage       Payor Plan Insurance Group Employer/Plan Group    UNITED HEALTHC[employer] MEDICARE REPLACEMENT UHC MEDICARE ADVANTAGE [facility] HMO NON PAR [id number]       Payor Plan Address Payor Plan Phone Number Payor Plan Fax Number Effective Dates    [address] [address]   7/1/2025 - None Entered    [facility] [id number]         Subscriber Name Subscriber Birth Date Member ID       JAVIER PASTRANA 1954 [id number]               Secondary Coverage       Payor Plan Insurance Group Employer/Plan Group    [id number]        Payor Plan Address Payor Plan Phone Number Payor Plan Fax Number Effective Dates    [address]   5/17/2023 - None Entered    [place] [address]         Subscriber Name Subscriber Birth Date Member ID       JAVIER PASTRANA 1954 [id number]                     Emergency Contacts        (Rel.) Home Phone Work Phone Mobile Phone    Rodger Marrufo (Friend) [phone] -- --    Klever Stone (Friend) -- -- [phone]    Laura Phelan -- -- [phone]               " Wicho Leonardo from [place] called the office to verify orders    History & Physical        Barbara Renteria APRN at 07/03/25 2016       Attestation signed by Shade Alberts MD at 07/14/25 0618      I have reviewed this documentation and agree.                          [facility] Intensivist Services  HISTORY AND PHYSICAL    Date of Admission: 7/3/2025  Primary Care Physician: Provider, No Known    Subjective   Primary Historian: Patient, ER physician    Chief Complaint: Severe electrolyte abnormality, hypokalemia, hypomagnesia, hypocalcemia, nausea, vomiting and diarrhea, dehydration    History of Present Illness  Very pleasant 70-year-old female patient past medical history type 2 diabetes, hypertension, hyperlipidemia, arthritis, GERD, chronic diarrhea.  Recent endoscopy/colonoscopy in May 2023 which were unremarkable, exploratory lap in August 2028 with complications patient endorses colectomy at that time secondary to complications that she had performed at [facility] presents to the emergency department today with generally not feeling well.    Patient endorses ongoing nausea, vomiting and diarrhea for the last 5 days along with abdominal pain with diarrhea.  She describes the diarrhea as none hematochezia or melena.  She denies any systemic symptoms such as fever chills or rigors.  She does endorse a mechanical fall this morning while in the bathroom that she feels was a direct result of her generalized weakness.  She does endorse left shoulder pain.  She denies hitting her head.  She called EMS for abdominal pain, nausea vomiting and diarrhea for the last week and generalized pain.    ER workup revealed critically low electrolytes.  Potassium 1.7, magnesium 0.8, calcium 5.6 leukocytosis 25.  With a shift no bandemia.  Stable lactate at 1.7.  CRP elevated at 11.05 BUN 44, creatinine 5 gap 23 CT of the abdomen and pelvis shows mild enteritis or diarrheal type illness there is no bowel wall thickening or obstruction.  Postoperative  changes including previous partial right colectomy, new small stones in the left renal pelvis no urinary tract obstruction identified.    Patient was given 2 g of magnesium, gram of calcium and 20 mill equivalents of potassium was ordered however not infused in the ED, along with IV fluid r resuscitation with 1 L of NS.  Patient is alert oriented cognitively intact.  She is hemodynamically stable surprisingly.    ICU was consulted for ongoing management of electrolytes in the setting of acute vomiting and diarrhea.    I have seen and evaluated the patient immediately upon arriving to ICU room 5.  Patient is alert oriented cognitively intact.  Vital signs stable.  She does have difficulty with raising her left shoulder above her head.  She is able to flex and extend from the elbow.  She has pain with passive range of motion and full extension of the left shoulder.    She does endorse that she sustained a ground-level fall and she feels that she hit her shoulder.  Once we increase her potassium and stabilize her we will obtain x-rays of the left shoulder.  She denies hitting her head she denies any neck pain.  She has some cramping noted to her feet and right hand I suspect this could be carpopedal spasms secondary to hypocalcemia with hypomagnesia and hypokalemia.    No systemic symptoms however in the setting of leukocytosis elevated CRP and shift with gastritis we will implement ceftriaxone and Flagyl after obtaining blood cultures.  Still pending a GI panel.  She denies being on any recent antibiotics as I considered C. difficile.    Will continue with electrolyte replacement and ongoing hydration.        Review of Systems   Otherwise complete ROS reviewed and negative except as mentioned in the HPI.    Past Medical History:   Past Medical History:   Diagnosis Date    Acid reflux     Arthritis     High cholesterol     Hypertension     Neuropathy     Swollen lymph nodes     LEFT CERVICAL    Type 2 diabetes mellitus  "     Past Surgical History:  Past Surgical History:   Procedure Laterality Date    CARPAL TUNNEL RELEASE Bilateral     CERVICAL LYMPH NODE BIOPSY/EXCISION Left 2017    Procedure: LEFT NECK EXPLORATION WITH INCISIONAL BIOPSY/CULTURE;  Surgeon: Blue Sanchez MD;  Location: [facility] OR;  Service:      SECTION      COLON RESECTION      COLONOSCOPY N/A 2023    Procedure: COLONOSCOPY WITH ANESTHESIA;  Surgeon: Tamy Smith MD;  Location: [facility] ENDOSCOPY;  Service: Gastroenterology;  Laterality: N/A;  preop; abnormal GI scan   postop ; poor prep   PCP Ruth Noble MD    ENDOSCOPY N/A 2023    Procedure: ESOPHAGOGASTRODUODENOSCOPY WITH ANESTHESIA;  Surgeon: Tamy Smith MD;  Location: [facility] ENDOSCOPY;  Service: Gastroenterology;  Laterality: N/A;  preop; abnormal GI scan   postop esophagitis ; hiatal hernia  PCP Ruth Noble MD    EXCISION LESION      From back    REPLACEMENT TOTAL KNEE Left     TONSILLECTOMY       Social History:  reports that she has never smoked. She has never used smokeless tobacco. She reports that she does not drink alcohol and does not use drugs.    Family History: family history includes Colon cancer in her father; No Known Problems in her mother.       Allergies:  No Known Allergies    Medications:  Prior to Admission medications    Medication Sig Start Date End Date Taking? Authorizing Provider   ibuprofen (ADVIL,MOTRIN) 200 MG tablet Take 1 tablet by mouth Every 6 (Six) Hours As Needed for Mild Pain. Pt reports taking \"5 at a time only when needed\"    Provider, MD Priyanka   ondansetron ODT (ZOFRAN-ODT) 4 MG disintegrating tablet Place 1 tablet on the tongue Every 4 (Four) Hours As Needed for Nausea or Vomiting. 25   Nishant Hdz Jr., MD     I have utilized all available immediate resources to obtain, update, or review the patient's current medications (including all prescriptions, over-the-counter products, " "herbals, cannabis/cannabidiol products, and vitamin/mineral/dietary (nutritional) supplements).    Objective     Vital Signs: /56   Pulse 91   Temp 97.8 °F (36.6 °C) (Oral)   Resp 17   Ht 165.1 cm (65\")   Wt 97.1 kg (214 lb 1.6 oz)   SpO2 94%   BMI 35.63 kg/m²   Physical Exam  Vitals and nursing note reviewed. Exam conducted with a chaperone present.   Constitutional:       General: She is not in acute distress.  HENT:      Head: Normocephalic and atraumatic.      Nose: Nose normal.      Mouth/Throat:      Mouth: Mucous membranes are dry.      Comments: Extremely dry mucosa.  Patient endorses she has not had anything to eat or drink in at least 24 to 48 hours now  Eyes:      Extraocular Movements: Extraocular movements intact.      Pupils: Pupils are equal, round, and reactive to light.   Cardiovascular:      Rate and Rhythm: Normal rate and regular rhythm.   Pulmonary:      Effort: Pulmonary effort is normal.      Breath sounds: Normal breath sounds.   Abdominal:      General: There is no distension.      Palpations: Abdomen is soft.      Tenderness: There is no guarding.      Comments: Well-healed vertical incisional site.  No rebound tenderness or concerns for peritonitis on exam.  Hyperactive bowel sounds   Musculoskeletal:         General: No deformity.      Cervical back: Normal range of motion.      Right lower leg: No edema.      Left lower leg: No edema.      Comments: Cramping noted to right hand and feet bilaterally.  Decreased range of motion noted to the left upper extremity.  Patient is able to flex and extend from the elbow however she is not able to independently lift the left arm above her head.  Radial pulse palpable and strong.  She has neurovascularly intact.   Skin:     General: Skin is warm and dry.      Capillary Refill: Capillary refill takes 2 to 3 seconds.      Findings: No bruising.   Neurological:      General: No focal deficit present.      Mental Status: She is alert and " oriented to person, place, and time.            Results Reviewed:  Lab Results (last 24 hours)       Procedure Component Value Units Date/Time    Comprehensive Metabolic Panel [[id number]]  (Abnormal) Collected: 07/03/25 1706    Specimen: Blood Updated: 07/03/25 1739     Glucose 161 mg/dL      BUN 44.5 mg/dL      Creatinine 5.04 mg/dL      Sodium 135 mmol/L      Potassium 1.7 mmol/L      Chloride 100 mmol/L      CO2 12.0 mmol/L      Calcium 5.6 mg/dL      Total Protein 5.4 g/dL      Albumin 2.9 g/dL      ALT (SGPT) 15 U/L      AST (SGOT) 41 U/L      Alkaline Phosphatase 82 U/L      Total Bilirubin 0.3 mg/dL      Globulin 2.5 gm/dL      A/G Ratio 1.2 g/dL      BUN/Creatinine Ratio 8.8     Anion Gap 23.0 mmol/L      eGFR 8.7 mL/min/1.73     Narrative:      GFR Categories in Chronic Kidney Disease (CKD)              GFR Category          GFR (mL/min/1.73)    Interpretation  G1                    90 or greater        Normal or high (1)  G2                    60-89                Mild decrease (1)  G3a                   45-59                Mild to moderate decrease  G3b                   30-44                Moderate to severe decrease  G4                    15-29                Severe decrease  G5                    14 or less           Kidney failure    (1)In the absence of evidence of kidney disease, neither GFR category G1 or G2 fulfill the criteria for CKD.    eGFR calculation 2021 CKD-EPI creatinine equation, which does not include race as a factor    Magnesium [[id number]]  (Abnormal) Collected: 07/03/25 1706    Specimen: Blood Updated: 07/03/25 1738     Magnesium 0.8 mg/dL     C-reactive Protein [[id number]]  (Abnormal) Collected: 07/03/25 1706    Specimen: Blood Updated: 07/03/25 1730     C-Reactive Protein 11.05 mg/dL     Urinalysis With Culture If Indicated - Urine, Catheter [[id number]]  (Abnormal) Collected: 07/03/25 1647    Specimen: Urine, Catheter Updated: 07/03/25 1708     Color, UA Yellow     Appearance,  UA Clear     pH, UA 5.5     Specific Gravity, UA 1.011     Glucose, UA Negative     Ketones, UA Trace     Bilirubin, UA Negative     Blood, UA Large (3+)     Protein,  mg/dL (2+)     Leuk Esterase, UA Negative     Nitrite, UA Negative     Urobilinogen, UA 0.2 E.U./dL    Narrative:      In absence of clinical symptoms, the presence of pyuria, bacteria, and/or nitrites on the urinalysis result does not correlate with infection.    Urinalysis, Microscopic Only - Urine, Catheter [[id number]]  (Abnormal) Collected: 07/03/25 1647    Specimen: Urine, Catheter Updated: 07/03/25 1708     RBC, UA 0-2 /HPF      WBC, UA 0-2 /HPF      Comment: Urine culture not indicated.        Bacteria, UA None Seen /HPF      Squamous Epithelial Cells, UA 3-6 /HPF      Hyaline Casts, UA 0-2 /LPF      Amorphous Crystals, UA Small/1+ /HPF      Methodology Manual Light Microscopy    Amylase [[id number]]  (Abnormal) Collected: 07/03/25 1628    Specimen: Blood Updated: 07/03/25 1658     Amylase 19 U/L     Lactic Acid, Plasma [[id number]]  (Normal) Collected: 07/03/25 1628    Specimen: Blood Updated: 07/03/25 1657     Lactate 1.7 mmol/L     Lipase [[id number]]  (Normal) Collected: 07/03/25 1628    Specimen: Blood Updated: 07/03/25 1656     Lipase 21 U/L     CBC & Differential [[id number]]  (Abnormal) Collected: 07/03/25 1628    Specimen: Blood Updated: 07/03/25 1635    Narrative:      The following orders were created for panel order CBC & Differential.  Procedure                               Abnormality         Status                     ---------                               -----------         ------                     CBC Auto Differential[[id number]]        Abnormal            Final result                 Please view results for these tests on the individual orders.    CBC Auto Differential [[id number]]  (Abnormal) Collected: 07/03/25 1628    Specimen: Blood Updated: 07/03/25 1635     WBC 25.12 10*3/mm3      RBC 3.83 10*6/mm3      Hemoglobin  12.6 g/dL      Hematocrit 36.2 %      MCV 94.5 fL      MCH 32.9 pg      MCHC 34.8 g/dL      RDW 18.5 %      RDW-SD 64.1 fl      MPV 10.6 fL      Platelets 233 10*3/mm3      Neutrophil % 89.0 %      Lymphocyte % 4.9 %      Monocyte % 5.2 %      Eosinophil % 0.1 %      Basophil % 0.2 %      Immature Grans % 0.6 %      Neutrophils, Absolute 22.36 10*3/mm3      Lymphocytes, Absolute 1.24 10*3/mm3      Monocytes, Absolute 1.31 10*3/mm3      Eosinophils, Absolute 0.02 10*3/mm3      Basophils, Absolute 0.04 10*3/mm3      Immature Grans, Absolute 0.15 10*3/mm3      nRBC 0.0 /100 WBC              CT Abdomen Pelvis Without Contrast  Result Date: 7/3/2025  1. Fluid retention within the small intestine and portions of the colon compatible with the history of diarrhea, no evidence of bowel obstruction, no bowel wall thickening. Correlate clinically for mild enteritis or diarrheal type illness. 2. New small stone in the left renal pelvis, measuring 7 mm. No urinary tract obstruction is identified. 3. Postoperative changes, including previous partial right colectomy, appendectomy and there is stable dense material in the region of the raine hepatis which may be related to previous surgical intervention. The gallbladder is not visualized. 4. Stable accessory spleen measuring up to 4 cm.   This report was signed and finalized on 7/3/2025 6:19 PM by Dr. Venkata Muniz MD.            Result Review:  I have personally reviewed the results from the time of this admission to 7/3/2025 20:17 CDT and agree with these findings:  [x]  Laboratory list / accordion  []  Microbiology  [x]  Radiology  [x]  EKG/Telemetry   []  Cardiology/Vascular   [x]  Pathology  [x]  Old records-  []  Other:  Most notable findings include: Potassium 1.7, magnesium 0.8, calcium 5.6, leukocytosis 25 with a left shift, elevated CRP at 11      I have personally reviewed and interpreted the radiology studies and ECG obtained at time of admission.     Assessment /  Plan   Assessment:   Active Hospital Problems    Diagnosis     **Enteritis        Treatment Plan  The patient will be admitted to my service here at [facility].  The patient to ICU/CCU under intensivist services this patient is currently requiring ICU services for electrolyte management    70-year-old female patient past medical history acute on chronic diarrhea presented to the emergency department via EMS for a 5-day history of nausea, vomiting diarrhea and generalized bodyaches along with abdominal pain.  Critically low potassium 1.8, magnesium 0.8 and calcium 5.6.  EG changes prolonged QTc greater than 700.  CT concerning for enteritis otherwise no acute findings.    Hypokalemia  Hypomagnesia  Hypocalcemia  Enteritis  Leukocytosis  DAISY  Dehydration  8.   Past medical history type 2 diabetes  9.   Prolonged Qtc    Plan:  Continue with electrolyte replacement.  Will initiate oral potassium as well as IV.  Reassess electrolytes after replacement.  Continue with IV fluid hydration in the setting of acute dehydration and DAISY.  Reviewed previous labs from 2/24 2025 kidney function, creatinine 2, her decreased oral intake dehydration's probably worsening kidney function today.  Renally dose all medications.  Avoid any nephrotoxin agents.  Avoid any IV contrast  Reassess BMP in the AM.  Sitter nephrology consult if warranted  Leukocytosis, no concerns for sepsis on admission.  Leukocytosis felt to be secondary to enteritis.  Obtain GI panel.  Initiate Rocephin 2 g and Flagyl 500 every 8 after blood cultures.  De-escalate antibiotics based on cultures and GI panel.  Obtain a chest x-ray to rule out any acute process.  Urinalysis unremarkable for any acute infectious process.  Sliding scale insulin during this hospitalization  Avoid any QTc prolongation medications in the setting of prolonged QTc.    VTE: Heparin SQ  GI: Protonix , hx of Gerd  NTN: Diet  ABX: Flagyl 500 mg every 8 Rocephin 2 g  daily  Lines/Tubes: Peripheral  CODE Status: Full code          60 minutes of critical care provided. This time excludes other billable procedures. Time does include preparation of documents, medical consultations, review of old records, and direct bedside care. Patient is at high risk for life-threatening deterioration due to patient is a high risk for cardiac arrhythmia secondary to critically low electrolytes.  .       Medical Decision Making  Number and Complexity of problems: 7  Differential Diagnosis: Electrolyte abnormalities, acute on chronic.  Patient does have a history of chronic diarrhea.  Her previous labs from April of this year revealed critically low potassium of 2.7.  I question if she has been taking potassium and magnesium supplements in the setting of chronic diarrhea.  Acute on chronic kidney insufficiency.  I feel her acute dehydration is contributing to her worsening kidney function.  I considered sepsis in the setting of leukocytosis however no acute process other than enteritis.  Will search for additional sources.    Conditions and Status        Condition is unchanged.     [facility] Data  External documents reviewed: N/A I did review previous endoscopy, colonoscopy by Dr. Smith in April 2023 biopsies unremarkable.    Cardiac tracing (EKG, telemetry) interpretation: Reviewed admission EKG  Radiology interpretation: Reviewed admission CT abdomen pelvis imaging  Labs reviewed: Reviewed admission labs from ER  Any tests that were considered but not ordered: Chest x-ray, left shoulder x-ray     Decision rules/scores evaluated (example [id number], Wells, etc):      Discussed with: Patient.  Patient is decision-maker.  She endorses she is  however her  is in the nursing home.  She does have a close friend that lives with her and helps take care of her however she does not have any children.     Care Planning  Shared decision making: Patient  Code status and discussions: Full  code    Disposition  Social Determinants of Health that impact treatment or disposition: NA  Estimated length of stay is 2 to 5 days.     I confirmed that the patient's advanced care plan is present, code status is documented, and a surrogate decision maker is listed in the patient's medical record.     The patient's surrogate decision maker is patient.     The patient was seen and examined by me on 7/3/20/2025 at 1930 immediately upon arriving to ICU room 3.    I admitted the patient overnight to the intensivist services.  Case will be discussed with Dr. Alberts, In the a.m.  Final treatment plan and recommendations will be at his discretion.     Electronically signed by NATTY Major on 7/3/2025 at 20:17 CDT               Electronically signed by Shade Alberts MD at 07/14/25 0618          Physician Progress Notes (last 24 hours)        Javier Patterson MD at 07/13/25 1734          Nephrology ([facility]) Progress Note      Patient:  Lindsay Laughlin  YOB: 1954  Date of Service: 7/13/2025  MRN: [id number]   Acct: [id number]   Primary Care Physician: Provider, No Known  Advance Directive:   Code Status and Medical Interventions: CPR (Attempt to Resuscitate); Full Support; Discussed CODE STATUS with Mr. Laughlin.  Patient is ,  is in a nursing home.  She does not have any children.   Ordered at: 07/03/25 2000     Code Status (Patient has no pulse and is not breathing):    CPR (Attempt to Resuscitate)     Medical Interventions (Patient has pulse or is breathing):    Full Support     Comments:    Discussed CODE STATUS with Mr. Laughlin.  Patient is ,  is in a nursing home.  She does not have any children.     Level Of Support Discussed With:    Patient     Admit Date: 7/3/2025       Hospital Day: 10  Referring Provider: Shade Alberts MD      Patient personally seen and examined.  Complete chart including Consults, Notes,  Operative Reports, Labs, Cardiology, and Radiology studies reviewed as able.        Subjective:  Lindsay Laughlin is a 70 y.o. female for whom we were consulted for evaluation and treatment of acute kidney injury.  She has a history of baseline chronic kidney disease stage 3.  She did not follow with nephrology previously.  She has a history of type 2 diabetes, hypertension, GERD, prior colectomy done at [facility] for unknown reason.  She presented to ER with several days of nausea, vomiting and diarrhea.  She was unable to maintain PO intake.  She has also been using NSAIDs frequently for arthritis pain. Labs on admission showed creatinine 5, hypokalemia, hypomagnesemia and metabolic acidosis. Hospital course remarkable for minimal improvement of renal function despite IV fluid resuscitation.      Today, no overnight events.  Tolerated dialysis without issue yesterday.  Asking about discharge plans.  Denied current chest pain, shortness of air at rest, nausea or vomiting.  Seen with nursing and patient requesting help to use the restroom          Allergies:  Patient has no known allergies.    Home Meds:  Medications Prior to Admission   Medication Sig Dispense Refill Last Dose/Taking    albuterol sulfate  (90 Base) MCG/ACT inhaler Inhale 2 puffs Every 4 (Four) Hours As Needed for Wheezing.       ibuprofen (ADVIL,MOTRIN) 200 MG tablet Take 5 tablets by mouth Every 6 (Six) Hours As Needed for Mild Pain.       ondansetron ODT (ZOFRAN-ODT) 4 MG disintegrating tablet Place 1 tablet on the tongue Every 4 (Four) Hours As Needed for Nausea or Vomiting. 10 tablet 0        Medicines:  Current Facility-Administered Medications   Medication Dose Route Frequency Provider Last Rate Last Admin    acetaminophen (TYLENOL) tablet 650 mg  650 mg Oral Q4H PRN Sterling Smith DO   650 mg at 07/03/25 3314    Or    acetaminophen (TYLENOL) suppository 650 mg  650 mg Rectal Q4H PRN Sterling Smith DO         aluminum-magnesium hydroxide-simethicone (MAALOX MAX) 400-400-40 MG/5ML suspension 15 mL  15 mL Oral Q6H PRN Sterling Smith DO   15 mL at 07/05/25 1331    amiodarone (PACERONE) tablet 400 mg  400 mg Oral Q12H Torres Irizarry MD   400 mg at 07/13/25 0849    apixaban (ELIQUIS) tablet 5 mg  5 mg Oral Q12H Juan J Garcia MD   5 mg at 07/13/25 0850    sennosides-docusate (PERICOLACE) 8.6-50 MG per tablet 2 tablet  2 tablet Oral BID Sterling Smith DO   2 tablet at 07/11/25 2237    And    polyethylene glycol (MIRALAX) packet 17 g  17 g Oral Daily PRN Sterling Smith DO        And    bisacodyl (DULCOLAX) EC tablet 5 mg  5 mg Oral Daily PRN BicSterling chiu DO        And    bisacodyl (DULCOLAX) suppository 10 mg  10 mg Rectal Daily PRN Sterling Smith DO        calcium carbonate (TUMS) chewable tablet 500 mg (200 mg elemental)  2 tablet Oral TID PRN BicSterling chiu DO   2 tablet at 07/04/25 2208    Calcium Replacement - Follow Nurse / BPA Driven Protocol   Not Applicable PRN BickingSterling DO        cholecalciferol (VITAMIN D3) tablet 5,000 Units  5,000 Units Oral Daily BickingSterling DO   5,000 Units at 07/13/25 0849    dextrose (D50W) (25 g/50 mL) IV injection 25 g  25 g Intravenous Q15 Min PRN BicSterling chiu DO        dextrose (GLUTOSE) oral gel 15 g  15 g Oral Q15 Min PRN BicSterling chiu DO        gabapentin (NEURONTIN) capsule 100 mg  100 mg Oral Nightly Juan J Garcia MD        glucagon (GLUCAGEN) injection 1 mg  1 mg Intramuscular Q15 Min PRN BicSterling chiu DO        heparin (porcine) injection 3,600 Units  3,600 Units Intracatheter PRN Javier Patterson MD   3,600 Units at 07/12/25 1219    HYDROcodone-acetaminophen (NORCO) 5-325 MG per tablet 1 tablet  1 tablet Oral Q8H PRN Juan J Garcia MD   1 tablet at 07/13/25 1114    insulin glargine (LANTUS, SEMGLEE) injection 10 Units  10 Units Subcutaneous Daily Juan J Garcia MD   10 Units at 07/13/25  0848    insulin regular (humuLIN R,novoLIN R) injection 2-7 Units  2-7 Units Subcutaneous 4x Daily AC & at Bedtime Juan J Garcia MD   2 Units at 07/13/25 1657    Magnesium Standard Dose Replacement - Follow Nurse / BPA Driven Protocol   Not Applicable PRN Sterling Smith K, DO        metoprolol tartrate (LOPRESSOR) tablet 25 mg  25 mg Oral Q12H Harpal Proctor MD   25 mg at 07/13/25 0850    nitroglycerin (NITROSTAT) SL tablet 0.4 mg  0.4 mg Sublingual Q5 Min PRN Sterling Smith, DO        ondansetron ODT (ZOFRAN-ODT) disintegrating tablet 4 mg  4 mg Oral Q6H PRN Sterling Smith,         Or    ondansetron (ZOFRAN) injection 4 mg  4 mg Intravenous Q6H PRN Sterling Smith, DO        pantoprazole (PROTONIX) EC tablet 40 mg  40 mg Oral BID AC BickingSterling, DO   40 mg at 07/13/25 1658    Phosphorus Replacement - Follow Nurse / BPA Driven Protocol   Not Applicable PRN Juan J Garcia MD        Potassium Replacement - Follow Nurse / BPA Driven Protocol   Not Applicable PRN Sterling Smith, DO        sodium bicarbonate tablet 650 mg  650 mg Oral TID Sudhir Erwin, NATTY   650 mg at 07/13/25 1658    sodium chloride 0.9 % flush 10 mL  10 mL Intravenous PRN Sterling Smith, DO        sodium chloride 0.9 % flush 10 mL  10 mL Intravenous Q12H BicSterling chiu, DO   10 mL at 07/13/25 0850    sodium chloride 0.9 % infusion 40 mL  40 mL Intravenous PRN Sterling Smith, DO           Past Medical History:  Past Medical History:   Diagnosis Date    Acid reflux     Arthritis     High cholesterol     Hypertension     Neuropathy     Swollen lymph nodes     LEFT CERVICAL    Type 2 diabetes mellitus        Past Surgical History:  Past Surgical History:   Procedure Laterality Date    CARPAL TUNNEL RELEASE Bilateral     CERVICAL LYMPH NODE BIOPSY/EXCISION Left 08/04/2017    Procedure: LEFT NECK EXPLORATION WITH INCISIONAL BIOPSY/CULTURE;  Surgeon: Blue Sanchez MD;  Location: [facility] OR;   Service:      SECTION      COLON RESECTION      COLONOSCOPY N/A 2023    Procedure: COLONOSCOPY WITH ANESTHESIA;  Surgeon: Tamy Smith MD;  Location: [facility] ENDOSCOPY;  Service: Gastroenterology;  Laterality: N/A;  preop; abnormal GI scan   postop ; poor prep   PCP Ruth Noble MD    ENDOSCOPY N/A 2023    Procedure: ESOPHAGOGASTRODUODENOSCOPY WITH ANESTHESIA;  Surgeon: Tamy Smith MD;  Location: [facility] ENDOSCOPY;  Service: Gastroenterology;  Laterality: N/A;  preop; abnormal GI scan   postop esophagitis ; hiatal hernia  PCP Ruth Noble MD    EXCISION LESION      From back    INSERTION HEMODIALYSIS CATHETER Right 2025    Procedure: OR HEMODIALYSIS CATHETER INSERTION;  Surgeon: Sterling Smith DO;  Location: [facility] HYBRID OR;  Service: Vascular;  Laterality: Right;    REPLACEMENT TOTAL KNEE Left     TONSILLECTOMY         Family History  Family History   Problem Relation Age of Onset    No Known Problems Mother     Colon cancer Father         < 60 years old    Breast cancer Neg Hx        Social History  Social History     Socioeconomic History    Marital status:    Tobacco Use    Smoking status: Never    Smokeless tobacco: Never   Vaping Use    Vaping status: Never Used   Substance and Sexual Activity    Alcohol use: No    Drug use: No    Sexual activity: Defer       Review of Systems:  History obtained from chart review and the patient  General ROS: No fever or chills  Respiratory ROS: No cough, shortness of breath, wheezing  Cardiovascular ROS: No chest pain or palpitations  Gastrointestinal ROS: No abdominal pain or melena  Genito-Urinary ROS: No dysuria or hematuria  Psych ROS: No anxiety and depression  14 point ROS reviewed with the patient and negative except as noted above and in the HPI unless unable to obtain.    Objective:  Patient Vitals for the past 24 hrs:   BP Temp Temp src Pulse Resp SpO2 Weight   25 1548 107/44 97.9 °F  "(36.6 °C) Oral 54 18 92 % --   07/13/25 1227 116/50 97.7 °F (36.5 °C) Oral 68 18 100 % --   07/13/25 0916 121/40 97.5 °F (36.4 °C) Axillary 74 18 92 % --   07/13/25 0245 109/42 97.2 °F (36.2 °C) Oral 70 18 97 % 97 kg (213 lb 12.8 oz)   07/12/25 2031 102/88 97.5 °F (36.4 °C) Oral 84 18 98 % --       Intake/Output Summary (Last 24 hours) at 7/13/2025 1734  Last data filed at 7/12/2025 2212  Gross per 24 hour   Intake 350 ml   Output --   Net 350 ml     General: awake/alert   Chest:  clear to auscultation bilaterally without respiratory distress  CVS: regular rate and rhythm  Abdominal: soft, nontender, positive bowel sounds  Extremities: no cyanosis or edema  Skin: warm and dry without rash      Labs:  Results from last 7 days   Lab Units 07/13/25  0413 07/12/25  0349 07/11/25  0431   WBC 10*3/mm3 9.96 12.75* 10.47   HEMOGLOBIN g/dL 9.2* 9.9* 9.9*   HEMATOCRIT % 28.4* 30.7* 30.8*   PLATELETS 10*3/mm3 154 180 152         Results from last 7 days   Lab Units 07/13/25  0413 07/12/25  0349 07/11/25  0431 07/10/25  0346 07/09/25  1231 07/07/25  2125 07/07/25  1053   SODIUM mmol/L 137 136 136   < > 139   < > 137   POTASSIUM mmol/L 4.4 4.1 4.3   < > 3.7   < > 3.5   CHLORIDE mmol/L 103 99 100   < > 100   < > 108*   CO2 mmol/L 25.0 24.0 26.0   < > 26.0   < > 16.0*   BUN mg/dL 16.6 18.7 10.1   < > 9.6   < > 36.5*   CREATININE mg/dL 1.90* 2.31* 1.45*   < > 1.51*   < > 4.19*   CALCIUM mg/dL 8.1* 8.0* 7.9*   < > 7.8*   < > 6.8*   EGFR mL/min/1.73 28.1* 22.2* 38.9*   < > 37.0*   < > 10.9*   BILIRUBIN mg/dL  --   --   --   --  0.6  --  0.2   ALK PHOS U/L  --   --   --   --  126*  --  88   ALT (SGPT) U/L  --   --   --   --  6  --  28   AST (SGOT) U/L  --   --   --   --  20  --  28   GLUCOSE mg/dL 173* 233* 298*   < > 113*   < > 193*    < > = values in this interval not displayed.       Radiology:   Imaging Results (Last 72 Hours)       ** No results found for the last 72 hours. **            Culture:  No results found for: \"BLOODCX\", " "\"URINECX\", \"WOUNDCX\", \"MRSACX\", \"RESPCX\", \"STOOLCX\"      Assessment   Acute kidney injury/ATN  Chronic kidney disease  Diabetes type 2  Hypertension on NSAID usage  Hypokalemia  Hypomagnesemia  Anemia with iron deficiency  Metabolic acidosis  Gastroenteritis     Plan:  Discussed with patient, nursing, Hospitalist  Workup reviewed today  Monitor labs  Vascular evaluation reviewed as current  Dialysis initiation began , planned treatment  then  going forward to match outpatient scheduling          Javier Patterson MD  2025  17:34 CDT        Electronically signed by Javier Patterson MD at 25 6541          Physical Therapy Notes (last 72 hours)        Frances Garcia, PTA at 25 0928  Version 1 of 1         Acute Care - Physical Therapy Treatment Note  [facility]     Patient Name: Lindsay Laughlin  : 1954  MRN: [id number]  Today's Date: 2025      Visit Dx:     ICD-10-CM ICD-9-CM   1. Enteritis  K52.9 558.9   2. Hypomagnesemia  E83.42 275.2   3. Hypokalemia  E87.6 276.8   4. Hypocalcemia  E83.51 275.41   5. Acute kidney injury  N17.9 584.9   6. DAISY (acute kidney injury)  N17.9 584.9   7. Impaired mobility [Z74.09]  Z74.09 799.89   8. 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     Pharyngoesophageal dysphagia    Gastroesophageal reflux disease    Heartburn    Acute bowel infarction    Acute renal failure (ARF)    DAISY (acute kidney injury)    Anuria    Anxiety    Chronic back pain    Hypertension    Neuropathy    Obesity, Class I, BMI 30.0-34.9 (see actual BMI)    Osteoarthritis    Type 2 diabetes mellitus    Type 2 DM with CKD stage 3 and hypertension    Gastroesophageal reflux disease with esophagitis and hemorrhage    Abnormal " finding on GI tract imaging    Weight loss    Diarrhea    Generalized abdominal pain    Nausea & vomiting    Family history of colon cancer    Enteritis     Past Medical History:   Diagnosis Date    Acid reflux     Arthritis     High cholesterol     Hypertension     Neuropathy     Swollen lymph nodes     LEFT CERVICAL    Type 2 diabetes mellitus      Past Surgical History:   Procedure Laterality Date    CARPAL TUNNEL RELEASE Bilateral     CERVICAL LYMPH NODE BIOPSY/EXCISION Left 2017    Procedure: LEFT NECK EXPLORATION WITH INCISIONAL BIOPSY/CULTURE;  Surgeon: Blue Sanchez MD;  Location: [facility] OR;  Service:      SECTION      COLON RESECTION      COLONOSCOPY N/A 2023    Procedure: COLONOSCOPY WITH ANESTHESIA;  Surgeon: Tamy Smith MD;  Location: [facility] ENDOSCOPY;  Service: Gastroenterology;  Laterality: N/A;  preop; abnormal GI scan   postop ; poor prep   PCP Ruth Noble MD    ENDOSCOPY N/A 2023    Procedure: ESOPHAGOGASTRODUODENOSCOPY WITH ANESTHESIA;  Surgeon: Tamy Smith MD;  Location: [facility] ENDOSCOPY;  Service: Gastroenterology;  Laterality: N/A;  preop; abnormal GI scan   postop esophagitis ; hiatal hernia  PCP Ruth Noble MD    EXCISION LESION      From back    INSERTION HEMODIALYSIS CATHETER Right 2025    Procedure: OR HEMODIALYSIS CATHETER INSERTION;  Surgeon: Sterling Smith DO;  Location: [facility] HYBRID OR;  Service: Vascular;  Laterality: Right;    REPLACEMENT TOTAL KNEE Left     TONSILLECTOMY       PT Assessment (Last 12 Hours)       PT Evaluation and Treatment       Row Name 25 0823          Physical Therapy Time and Intention    Subjective Information complains of;weakness;fatigue  -LY     Document Type therapy note (daily note)  -LY     Mode of Treatment physical therapy  -LY       Row Name 25 0823          General Information    Existing Precautions/Restrictions fall  -LY       Row Name 25 08           Pain    Pretreatment Pain Rating 0/10 - no pain  -LY     Posttreatment Pain Rating 0/10 - no pain  -LY       Row Name 07/12/25 0823          Bed Mobility    Supine-Sit [place] (Bed Mobility) verbal cues;minimum assist (75% patient effort)  -LY     Sit-Supine [place] (Bed Mobility) verbal cues;minimum assist (75% patient effort)  -LY     Assistive Device (Bed Mobility) bed rails;head of bed elevated  -LY       Row Name 07/12/25 0823          Sit-Stand Transfer    Sit-Stand [place] (Transfers) verbal cues;minimum assist (75% patient effort)  -LY     Assistive Device (Sit-Stand Transfers) walker, front-wheeled  -LY       Row Name 07/12/25 0823          Stand-Sit Transfer    Stand-Sit [place] (Transfers) verbal cues;minimum assist (75% patient effort)  -LY     Assistive Device (Stand-Sit Transfers) walker, front-wheeled  -LY       Row Name 07/12/25 0823          Gait/Stairs (Locomotion)    [place] Level (Gait) verbal cues;minimum assist (75% patient effort)  -LY     Assistive Device (Gait) walker, front-wheeled  -LY     Distance in Feet (Gait) 20  x2 with seated rest break and chair follow  -LY     Deviations/Abnormal Patterns (Gait) gait speed decreased;stride length decreased  -LY     Bilateral Gait Deviations forward flexed posture;heel strike decreased  -LY     Comment, (Gait/Stairs) fatigues quickly, forward posture increases as she fatigues  -LY       Row Name 07/12/25 0823          Motor Skills    Comments, Therapeutic Exercise BLE AROM seated  -LY       Row Name             Wound 07/03/25 2001 megan    Wound - Properties Group Placement Date: 07/03/25 -KB Placement Time: 2001 -KB Location: megan WINN    Retired Wound - Properties Group Placement Date: 07/03/25 -KB Placement Time: 2001 -KB Location: megan  -ADEEL    Retired Wound - Properties Group Placement Date: 07/03/25 -KB Placement Time: 2001 -KB Location: megan  -ADEEL    Retired Wound - Properties Group Date first  assessed: 07/03/25  -KB Time first assessed: 2001 -KB Location: coccyx  -KB      Row Name             Wound 07/06/25 1023 Right posterior heel Pressure Injury    Wound - Properties Group Placement Date: 07/06/25  -EY Placement Time: 1023  -EY Side: Right  -EY Orientation: posterior  -EY Location: heel  -EY Primary Wound Type: Pressure Inj  -EY    Retired Wound - Properties Group Placement Date: 07/06/25  -EY Placement Time: 1023  -EY Side: Right  -EY Orientation: posterior  -EY Location: heel  -EY    Retired Wound - Properties Group Placement Date: 07/06/25  -EY Placement Time: 1023  -EY Side: Right  -EY Orientation: posterior  -EY Location: heel  -EY    Retired Wound - Properties Group Date first assessed: 07/06/25  -EY Time first assessed: 1023  -EY Side: Right  -EY Location: heel  -EY      Row Name             Wound 07/06/25 1023 Left posterior heel Pressure Injury    Wound - Properties Group Placement Date: 07/06/25  -EY Placement Time: 1023  -EY Side: Left  -EY Orientation: posterior  -EY Location: heel  -EY Primary Wound Type: Pressure Inj  -EY    Retired Wound - Properties Group Placement Date: 07/06/25  -EY Placement Time: 1023  -EY Side: Left  -EY Orientation: posterior  -EY Location: heel  -EY    Retired Wound - Properties Group Placement Date: 07/06/25  -EY Placement Time: 1023  -EY Side: Left  -EY Orientation: posterior  -EY Location: heel  -EY    Retired Wound - Properties Group Date first assessed: 07/06/25  -EY Time first assessed: 1023  -EY Side: Left  -EY Location: heel  -EY      Row Name             Wound 07/06/25 1047 Bilateral anterior abdomen    Wound - Properties Group Placement Date: 07/06/25  -EY Placement Time: 1047  -EY Side: Bilateral  -EY Orientation: anterior  -EY Location: abdomen  -EY    Retired Wound - Properties Group Placement Date: 07/06/25  -EY Placement Time: 1047  -EY Side: Bilateral  -EY Orientation: anterior  -EY Location: abdomen  -EY    Retired Wound - Properties Group  Placement Date: 07/06/25 -EY Placement Time: 1047 -EY Side: Bilateral  -EY Orientation: anterior  -EY Location: abdomen  -EY    Retired Wound - Properties Group Date first assessed: 07/06/25 -EY Time first assessed: 1047 -EY Side: Bilateral  -EY Location: abdomen  -EY      Row Name 07/12/25 0823          Plan of Care Review    Plan of Care Reviewed With patient  -LY     Progress improving  -LY       Row Name 07/12/25 0823          Positioning and Restraints    Pre-Treatment Position in bed  -LY     Post Treatment Position bed  -LY     In Bed fowlers;call light within reach;encouraged to call for assist  -LY               User Key  (r) = Recorded By, (t) = Taken By, (c) = Cosigned By      Initials Name Provider Type    Brianna Marrero, RN Registered Nurse    Frances Valenzuela, MARU Physical Therapist Assistant    Elsa Hurtado RN Registered Nurse                    Physical Therapy Education       Title: PT OT SLP Therapies (Done)       Topic: Physical Therapy (Done)       Point: Mobility training (Done)       Learning Progress Summary            Patient Acceptance, E,TB, VU,DU,NR by  at 7/8/2025 1503    Comment: benefits of PT and mobility                      Point: Home exercise program (Done)       Learning Progress Summary            Patient Acceptance, E,TB, VU,DU,NR by  at 7/8/2025 1503    Comment: benefits of PT and mobility                      Point: Body mechanics (Done)       Learning Progress Summary            Patient Acceptance, E,TB, VU,DU,NR by  at 7/8/2025 1503    Comment: benefits of PT and mobility                      Point: Precautions (Done)       Learning Progress Summary            Patient Acceptance, E,TB, VU,DU,NR by  at 7/8/2025 1503    Comment: benefits of PT and mobility                                      User Key       Initials Effective Dates Name Provider Type [facility] 04/21/25 -  Estela Núñez, PT Student PT Student PT                  PT  Recommendation and Plan  Anticipated Discharge Disposition (PT): skilled nursing facility  Plan of Care Reviewed With: patient  Progress: improving  Outcome Evaluation: PT tx completed. Pt agreeable to therapy, cont to repor increase fear of falling. Min x1 and increased time for supine to sit. Able to work through BLE AROM while seated with cues for correct technique. Stood x4 reps with min x1 and cues for hand placement. CGA for static standing balance with use of RWX. Able to take 2 steps forward/backward x3 sets and seated rest breaks. Pt then able to take steps to chair with min x1 and RWX. Will cont to follow.       Time Calculation:    PT Charges       Row Name 07/12/25 0927             Time Calculation    Start Time 0823  -LY      Stop Time 0901  -LY      Time Calculation (min) 38 min  -LY      PT Received On 07/12/25  -LY         Time Calculation- PT    Total Timed Code Minutes- PT 38 minute(s)  -LY         Timed Charges    49361 - PT Therapeutic Exercise Minutes 15  -LY      42927 - Gait Training Minutes  23  -LY         Total Minutes    Timed Charges Total Minutes 38  -LY       Total Minutes 38  -LY                User Key  (r) = Recorded By, (t) = Taken By, (c) = Cosigned By      Initials Name Provider Type    LY Frances Garcia PTA Physical Therapist Assistant                  Therapy Charges for Today       Code Description Service Date Service Provider Modifiers Qty    05315377234 HC PT THER PROC EA 15 MIN 7/11/2025 Frances Garcia, MARU GP 1    48712655615 HC PT THERAPEUTIC ACT EA 15 MIN 7/11/2025 Frances Garcia, MARU GP 2    74232095243 HC PT THER PROC EA 15 MIN 7/12/2025 Frances Garcia, MARU GP 1    45928509945 HC GAIT TRAINING EA 15 MIN 7/12/2025 Frances Garcia, MARU GP 2            PT G-Codes  Outcome Measure Options: AM-PAC 6 Clicks Basic Mobility (PT)  AM-PAC 6 Clicks Score (PT): 19    Frances Garcia PTA  7/12/2025      Electronically signed by Frances Garcia PTA at 07/12/25 0928       Occupational  Therapy Notes (last 72 hours)  Notes from 25 0931 through 25 0931   No notes exist for this encounter.     Estela Núñez, PT Student   PT Student  Physical Therapy     Therapy Evaluation      Attested     Date of Service: 25 1503  Creation Time: 25     Attested           Attestation signed by James Younger PT DPT at 25 1626     I reviewed the documentation and agree.                Expand All Collapse All    Patient Name: Lindsay Laughlin             : 1954                        MRN: [id number]                              Today's Date: 2025                                     Admit Date: 7/3/2025               Visit Dx:   Visit Diagnosis       ICD-10-CM ICD-9-CM   1. Enteritis  K52.9 558.9   2. Hypomagnesemia  E83.42 275.2   3. Hypokalemia  E87.6 276.8   4. Hypocalcemia  E83.51 275.41   5. Acute kidney injury  N17.9 584.9   6. 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     Pharyngoesophageal dysphagia    Gastroesophageal reflux disease    Heartburn    Acute bowel infarction    Acute renal failure (ARF)    DAISY (acute kidney injury)    Anuria    Anxiety    Chronic back pain    Hypertension    Neuropathy    Obesity, Class I, BMI 30.0-34.9 (see actual BMI)    Osteoarthritis    Type 2 diabetes mellitus    Type 2 DM with CKD stage 3 and hypertension    Gastroesophageal reflux disease with esophagitis and hemorrhage    Abnormal finding on GI tract imaging    Weight loss    Diarrhea    Generalized abdominal pain    Nausea & vomiting    Family history of colon cancer    Enteritis         Medical History        Past Medical History:   Diagnosis Date    Acid reflux      Arthritis      High cholesterol      Hypertension      Neuropathy       Swollen lymph nodes       LEFT CERVICAL    Type 2 diabetes mellitus           Surgical History         Past Surgical History:   Procedure Laterality Date    CARPAL TUNNEL RELEASE Bilateral      CERVICAL LYMPH NODE BIOPSY/EXCISION Left 2017     Procedure: LEFT NECK EXPLORATION WITH INCISIONAL BIOPSY/CULTURE;  Surgeon: Blue Sanchez MD;  Location: [facility] OR;  Service:      SECTION        COLON RESECTION        COLONOSCOPY N/A 2023     Procedure: COLONOSCOPY WITH ANESTHESIA;  Surgeon: Tamy Smith MD;  Location: [facility] ENDOSCOPY;  Service: Gastroenterology;  Laterality: N/A;  preop; abnormal GI scan   postop ; poor prep   PCP Ruth Noble MD    ENDOSCOPY N/A 2023     Procedure: ESOPHAGOGASTRODUODENOSCOPY WITH ANESTHESIA;  Surgeon: Tamy Smith MD;  Location: [facility] ENDOSCOPY;  Service: Gastroenterology;  Laterality: N/A;  preop; abnormal GI scan   postop esophagitis ; hiatal hernia  PCP Ruth Noble MD    EXCISION LESION         From back    INSERTION HEMODIALYSIS CATHETER Right 2025     Procedure: OR HEMODIALYSIS CATHETER INSERTION;  Surgeon: Sterling Smith DO;  Location: [facility] HYBRID OR;  Service: Vascular;  Laterality: Right;    REPLACEMENT TOTAL KNEE Left      TONSILLECTOMY               General Information         Row Name 25 1503                 Physical Therapy Time and Intention     Document Type evaluation  Pt presents with n/v, diarrhea, abdominal pain, weakness, and stage 3 CKD. Hx: T2DM, HTN, arthritis, colectomy. Dx: enteritis, DAISY, electrolyte imbalance. Pt had perm cath placement  and started dialysis today, .  -MARK ANTHONY (r) GM (t) MARK ANTHONY (c)       Mode of Treatment physical therapy  -MARK ANTHONY (r) GM (t) MARK ANTHONY (c)          Row Name 25 1508                 General Information     Patient Profile Reviewed yes  -MARK ANTHONY (r) GM (t) MARK ANTHONY (c)       Prior Level of Function independent:;gait;ADL's;all household mobility;mod  assist:;cooking;cleaning;dependent:;driving  -MARK ANTHONY (r) GM (t) MARK ANTHONY (c)       Existing Precautions/Restrictions fall  -MARK ANTHONY (r) GM (t) MARK ANTHONY (c)       Barriers to Rehab medically complex  -MARK ANTHONY          Row Name 07/08/25 1503                 Living Environment     Current Living Arrangements home  -MARK ANTHONY (r) GM (t) MARK ANTHONY (c)       People in Home alone  -MARK ANTHONY (r) GM (t) MARK ANTHONY (c)          Row Name 07/08/25 1503                 Home Main Entrance     Number of Stairs, Main Entrance other (see comments)  ramp  -MARK ANTHONY (r) GM (t) MARK ANTHONY (c)       Stair Railings, Main Entrance none  -MARK ANTHONY (r) GM (t) MARK ANTHONY (c)          Row Name 07/08/25 1503                 Stairs Within Home, Primary     Number of Stairs, Within Home, Primary none  -MARK ANTHONY (r) GM (t) MARK ANTHONY (c)          Row Name 07/08/25 1503                 Cognition     Orientation Status (Cognition) oriented x 4  -MARK ANTHONY (r) GM (t) MARK ANTHONY (c)          Row Name 07/08/25 1503                 Safety Issues/Impairments Affecting Functional Mobility     Safety Issues Affecting Function (Mobility) at risk behavior observed;awareness of need for assistance;friction/shear risk;insight into deficits/self-awareness;judgment;safety precaution awareness  -MARK ANTHONY (r) GM (t) MARK ANTHONY (c)       Impairments Affecting Function (Mobility) balance;endurance/activity tolerance;pain;postural/trunk control;range of motion (ROM);strength  -MARK ANTHONY (r) GM (t) MARK ANTHONY (c)                       User Key  (r) = Recorded By, (t) = Taken By, (c) = Cosigned By        Initials Name Provider Type     James Townsend, PT DPT Physical Therapist     Estela Veloz, PT Student PT Student                            Mobility         Row Name 07/08/25 1503                 Bed Mobility     Bed Mobility supine-sit;sit-supine;scooting/bridging  -MARK ANTHONY (r) GM (t) MARK ANTHONY (c)       Scooting/Bridging [place] (Bed Mobility) maximum assist (25% patient effort);1 person assist;verbal cues  -MARK ANTHONY (r) GM (t) MARK ANTHONY (c)       Supine-Sit [place] (Bed Mobility) moderate assist (50%  patient effort);1 person assist;verbal cues  -MARK ANTHONY (r) GM (t) MARK ANTHONY (c)       Sit-Supine [place] (Bed Mobility) moderate assist (50% patient effort);1 person assist;verbal cues  -MARK ANTHONY (r) GM (t) MARK ANTHONY (c)       Assistive Device (Bed Mobility) bed rails;head of bed elevated  -MARK ANTHONY (r) GM (t) MARK ANTHONY (c)                       User Key  (r) = Recorded By, (t) = Taken By, (c) = Cosigned By        Initials Name Provider Type     James Townsend PT DPT Physical Therapist     Estela Veloz, PT Student PT Student                            Obj/Interventions         Row Name 07/08/25 1503                 Range of Motion Comprehensive     General Range of Motion bilateral lower extremity ROM WFL;bilateral upper extremity ROM WFL  -MARK ANTHONY (r) GM (t) MARK ANTHONY (c)          Row Name 07/08/25 1503                 Strength Comprehensive (MMT)     General Manual Muscle Testing (MMT) Assessment lower extremity strength deficits identified  -MARK ANTHONY (r) GM (t) MARK ANTHNOY (c)       Comment, General Manual Muscle Testing (MMT) Assessment BLE: knee, ankle 4/5, hip flx 3+/5.  -MARK ANTHONY (r) GM (t) MARK ANTHONY (c)          Row Name 07/08/25 1503                 Balance     Balance Assessment sitting static balance;sitting dynamic balance  -MARK ANTHONY (r) GM (t) MARK ANTHONY (c)       Static Sitting Balance contact guard  -MARK ANTHONY (r) GM (t) MARK ANTHONY (c)       Dynamic Sitting Balance minimal assist;1-person assist  -MARK ANTHONY (r) GM (t) MARK ANTHONY (c)       Position, Sitting Balance unsupported;sitting edge of bed  -MARK ANTHONY (r) GM (t) MARK ANTHONY (c)          Row Name 07/08/25 1503                 Sensory Assessment (Somatosensory)     Sensory Assessment (Somatosensory) sensation intact  -MARK ANTHONY (r) GM (t) MARK ANTHONY (c)                       User Key  (r) = Recorded By, (t) = Taken By, (c) = Cosigned By        Initials Name Provider Type     James Townsend, PT DPT Physical Therapist     Estela Veloz, PT Student PT Student                            Goals/Plan         Row Name 07/08/25 1503                 Bed Mobility Goal 1 (PT)      Activity/Assistive Device (Bed Mobility Goal 1, PT) scooting;sit to supine/supine to sit  -MARK ANTHONY (r) GM (t) MARK ANTHONY (c)       [place] Level/Cues Needed (Bed Mobility Goal 1, PT) standby assist  -MARK ANTHONY (r) GM (t) MARK ANTHONY (c)       Time Frame (Bed Mobility Goal 1, PT) long term goal (LTG);10 days  -MARK ANTHONY (r) GM (t) MARK ANTHONY (c)       Progress/Outcomes (Bed Mobility Goal 1, PT) new goal  -MARK ANTHONY (r) GM (t) MARK ANTHONY (c)          Row Name 07/08/25 1507                 Transfer Goal 1 (PT)     Activity/Assistive Device (Transfer Goal 1, PT) sit-to-stand/stand-to-sit;bed-to-chair/chair-to-bed  -MARK ANTHONY (r) GM (t) MARK ANTHONY (c)       [place] Level/Cues Needed (Transfer Goal 1, PT) minimum assist (75% or more patient effort)  -MARK ANTHONY (r) GM (t) MARK ANTHONY (c)       Time Frame (Transfer Goal 1, PT) long term goal (LTG);10 days  -MARK ANTHONY (r) GM (t) MARK ANTHONY (c)       Progress/Outcome (Transfer Goal 1, PT) new goal  -MARK ANTHONY (r) GM (t) MARK ANTHONY (c)          Row Name 07/08/25 1506                 Gait Training Goal 1 (PT)     Activity/Assistive Device (Gait Training Goal 1, PT) gait (walking locomotion)  -MARK ANTHONY (r) GM (t) MARK ANTHONY (c)       [place] Level (Gait Training Goal 1, PT) minimum assist (75% or more patient effort)  -MARK ANTHONY (r) GM (t) MARK ANTHONY (c)       Distance (Gait Training Goal 1, PT) 50  -MARK ANTHONY (r) GM (t) MARK ANTHONY (c)       Time Frame (Gait Training Goal 1, PT) long term goal (LTG);10 days  -MARK ANTHONY (r) GM (t) MARK ANTHONY (c)       Progress/Outcome (Gait Training Goal 1, PT) new goal  -MARK ANTHONY (r) GM (t) MARK ANTHONY (c)          Row Name 07/08/25 9618                 Therapy Assessment/Plan (PT)     Planned Therapy Interventions (PT) balance training;bed mobility training;gait training;home exercise program;patient/family education;postural re-education;ROM (range of motion);strengthening;transfer training  -MARK ANTHONY (r) GM (t) MARK ANTHONY (c)                    User Key  (r) = Recorded By, (t) = Taken By, (c) = Cosigned By        Initials Name Provider Type     James Townsend, PT DPT Physical Therapist     Estela Veloz, PT Student PT  Student                         Clinical Impression         Row Name 07/08/25 1503                 Pain     Pain Side/Orientation generalized  -MARK ANTHONY       Pain Management Interventions exercise or physical activity utilized  -MARK ANTHONY       Additional Documentation Pain Scale: FACES Pre/Post-Treatment (Group)  -MARK ANTHONY (r) GM (t) MARK ANTHONY (c)          Row Name 07/08/25 1503                 Pain Scale: FACES Pre/Post-Treatment     Pain: FACES Scale, Pretreatment 6-->hurts even more  -MARK ANTHONY (r) GM (t) MARK ANTHONY (c)          Row Name 07/08/25 1503                 Plan of Care Review     Plan of Care Reviewed With patient  -MARK ANTHONY (r) GM (t) MARK ANTHONY (c)       Progress no change  -MARK ANTHONY (r) GM (t) MARK ANTHONY (c)       Outcome Evaluation PT eval completed. Pt presents in bed and A&O x4 with slow responses. Pt was tearful on arrival and remained emotional throughout the session, but she was still agreeable to therapy evaluation. Pt reports that prior to hospitalization, she was living alone and was independent with functional mobility, most ADLs, and ambulating with walker. She said she needed some assistance with cooking and cleaning and total assistance with driving. Currently, pt requires mod A with supine<>sit and max A with scooting up in bed with verbal cues. Pt fluctuated between CGA and min A with sitting balance. Pt was fearful of standing without support from at least two people, so sit<>stand was deferred today. Pt's BLE strength ranged from 3+/5 to 4/5. Pt would benefit from continued PT services due to the following impairments: weakness, decreased balance, decreased functional mobility, impaired activity tolerance, and pain. Recommend dc to SNF.  -MARK ANTHONY (r) GM (t) MARK ANTHONY (c)          Row Name 07/08/25 1507                 Therapy Assessment/Plan (PT)     Patient/Family Therapy Goals Statement (PT) not stated  -MARK ANTHONY (r) GM (t) MARK ANTHONY (c)       Rehab Potential (PT) fair  -MARK ANTHONY (r) GM (t) MARK ANTHONY (c)       Criteria for Skilled Interventions Met (PT) yes;skilled treatment is  necessary;meets criteria  -MARK ANTHONY (r) GM (t) MARK ANTHONY (c)       Therapy Frequency (PT) 2 times/day  -MARK ANTHONY (r) GM (t) MARK ANTHONY (c)       Predicted Duration of Therapy Intervention (PT) until dc  -MARK ANTHONY (r) GM (t) MARK ANTHONY (c)          Row Name 07/08/25 1503                 Positioning and Restraints     Pre-Treatment Position in bed  -MARK ANTHONY (r) GM (t) MARK ANTHONY (c)       Post Treatment Position bed  -MARK ANTHONY (r) GM (t) MARK ANTHONY (c)       In Bed fowlers;call light within reach;encouraged to call for assist;exit alarm on;side rails up x3;notified nsg  -MARK ANTHONY (r) GM (t) MARK ANTHONY (c)                       User Key  (r) = Recorded By, (t) = Taken By, (c) = Cosigned By        Initials Name Provider Type     James Townsend, PT DPT Physical Therapist     Estela Veloz, PT Student PT Student                            Outcome Measures         Row Name 07/08/25 1503 07/08/25 0859            How much help from another person do you currently need...     Turning from your back to your side while in flat bed without using bedrails? 3  -MARK ANTHONY (r) GM (t) MARK ANTHONY (c) 3  -EY     Moving from lying on back to sitting on the side of a flat bed without bedrails? 2  -MARK ANTHONY (r) GM (t) MARK ANTHONY (c) 3  -EY     Moving to and from a bed to a chair (including a wheelchair)? 2  -MARK ANTHONY (r) GM (t) MARK ANTHONY (c) 2  -EY     Standing up from a chair using your arms (e.g., wheelchair, bedside chair)? 2  -MARK ANTHONY (r) GM (t) MARK ANTHONY (c) 3  -EY     Climbing 3-5 steps with a railing? 1  -MARK ANTHONY (r) GM (t) MARK ANTHONY (c) 1  -EY     To walk in hospital room? 2  -MARK ANTHONY (r) GM (t) MARK ANTHONY (c) 2  -EY     AM-PAC 6 Clicks Score (PT) 12  -MARK ANTHONY (r) GM (t) 14  -EY     Highest Level of Mobility Goal Move to Chair/Commode-4  -MARK ANTHONY (r) GM (t) Move to Chair/Commode-4  -EY        Row Name 07/08/25 1503                 Functional Assessment     Outcome Measure Options AM-PAC 6 Clicks Basic Mobility (PT)  -MARK ANTHONY                       User Key  (r) = Recorded By, (t) = Taken By, (c) = Cosigned By        Initials Name Provider Type     James Townsend, PT DPT Physical  Therapist     Elsa Hurtado, RN Registered Nurse      Estela Núñez, PT Student PT Student                          Physical Therapy Education            Title: PT OT SLP Therapies (Done)         Topic: Physical Therapy (Done)         Point: Mobility training (Done)         Learning Progress Summary             Patient Acceptance, E,TB, VU,DU,NR by  at 7/8/2025 1503     Comment: benefits of PT and mobility                            Point: Home exercise program (Done)         Learning Progress Summary             Patient Acceptance, E,TB, VU,DU,NR by  at 7/8/2025 1503     Comment: benefits of PT and mobility                            Point: Body mechanics (Done)         Learning Progress Summary             Patient Acceptance, E,TB, VU,DU,NR by  at 7/8/2025 1503     Comment: benefits of PT and mobility                            Point: Precautions (Done)         Learning Progress Summary             Patient Acceptance, E,TB, VU,DU,NR by  at 7/8/2025 1503     Comment: benefits of PT and mobility                                                User Key         Initials Effective Dates Name Provider Type Discipline      04/21/25 -  Estela Núñez, PT Student PT Student PT                          PT Recommendation and Plan  Planned Therapy Interventions (PT): balance training, bed mobility training, gait training, home exercise program, patient/family education, postural re-education, ROM (range of motion), strengthening, transfer training  Progress: no change  Outcome Evaluation: PT eval completed. Pt presents in bed and A&O x4 with slow responses. Pt was tearful on arrival and remained emotional throughout the session, but she was still agreeable to therapy evaluation. Pt reports that prior to hospitalization, she was living alone and was independent with functional mobility, most ADLs, and ambulating with walker. She said she needed some assistance with cooking and cleaning and total  assistance with driving. Currently, pt requires mod A with supine<>sit and max A with scooting up in bed with verbal cues. Pt fluctuated between CGA and min A with sitting balance. Pt was fearful of standing without support from at least two people, so sit<>stand was deferred today. Pt's BLE strength ranged from 3+/5 to 4/5. Pt would benefit from continued PT services due to the following impairments: weakness, decreased balance, decreased functional mobility, impaired activity tolerance, and pain. Recommend dc to SNF.      Time Calculation:        PT Charges         Row Name 07/08/25 1503                       Time Calculation     Start Time 1503  -MARK ANTHONY (r) GM (t) MARK ANTHONY (c)         Stop Time 1551  -MARK ANTHONY (r) GM (t) MARK ANTHONY (c)         Time Calculation (min) 48 min  -MARK ANTHONY (r) GM (t)         PT Received On 07/08/25  -MARK ANTHONY (r) GM (t) MARK ANTHONY (c)         PT Goal Re-Cert Due Date 07/18/25  -MARK ANTHONY (r) GM (t) MARK ANTHONY (c)                 Untimed Charges     PT Eval/Re-eval Minutes 48  -MARK ANTHONY (r) GM (t) MARK ANTHONY (c)                 Total Minutes     Untimed Charges Total Minutes 48  -MARK ANTHONY (r) GM (t)          Total Minutes 48  -MARK ANTHONY (r) GM (t)                      User Key  (r) = Recorded By, (t) = Taken By, (c) = Cosigned By        Initials Name Provider Type     James Townsend, PT DPT Physical Therapist     Estela Veloz PT Student PT Student                             PT G-Codes  Outcome Measure Options: AM-PAC 6 Clicks Basic Mobility (PT)  AM-PAC 6 Clicks Score (PT): 12  PT Discharge Summary  Anticipated Discharge Disposition (PT): skilled nursing facility     Estela Núñez PT Student              7/8/2025                                  Cosigned by: James Younger, PT DPT at 07/08/25 0586     Revision History

## 2025-07-15 DIAGNOSIS — J45.20 MILD INTERMITTENT ASTHMA WITHOUT COMPLICATION: ICD-10-CM

## 2025-07-16 RX ORDER — ALBUTEROL SULFATE 90 UG/1
2 INHALANT RESPIRATORY (INHALATION) EVERY 6 HOURS PRN
Qty: 18 G | Refills: 2 | Status: SHIPPED | OUTPATIENT
Start: 2025-07-16

## 2025-07-16 RX ORDER — GABAPENTIN 100 MG/1
100 CAPSULE ORAL 3 TIMES DAILY
Qty: 90 CAPSULE | Refills: 0 | Status: SHIPPED | OUTPATIENT
Start: 2025-07-16 | End: 2025-07-17 | Stop reason: ALTCHOICE

## 2025-07-16 NOTE — TELEPHONE ENCOUNTER
Patient called to request a refill for their Albuterol Inhaler  advised a refill was requested on 7/15/25  and is pending approval. Patient verbalized understanding and is in agreement.     Does the patient have enough for 3 days?   [] Yes   [x] No - Send as HP to POD

## 2025-07-21 ENCOUNTER — OFFICE VISIT (OUTPATIENT)
Dept: CARDIOLOGY CLINIC | Facility: CLINIC | Age: 75
End: 2025-07-21
Payer: MEDICARE

## 2025-07-21 VITALS
BODY MASS INDEX: 33.82 KG/M2 | SYSTOLIC BLOOD PRESSURE: 130 MMHG | WEIGHT: 203 LBS | HEIGHT: 65 IN | OXYGEN SATURATION: 95 % | HEART RATE: 74 BPM | DIASTOLIC BLOOD PRESSURE: 80 MMHG

## 2025-07-21 DIAGNOSIS — E11.9 TYPE 2 DIABETES MELLITUS WITHOUT COMPLICATION, WITHOUT LONG-TERM CURRENT USE OF INSULIN (HCC): ICD-10-CM

## 2025-07-21 DIAGNOSIS — I50.22 CHRONIC SYSTOLIC CONGESTIVE HEART FAILURE (HCC): Primary | ICD-10-CM

## 2025-07-21 DIAGNOSIS — E66.2 MORBID (SEVERE) OBESITY WITH ALVEOLAR HYPOVENTILATION (HCC): ICD-10-CM

## 2025-07-21 DIAGNOSIS — I10 ESSENTIAL HYPERTENSION: ICD-10-CM

## 2025-07-21 PROCEDURE — 93000 ELECTROCARDIOGRAM COMPLETE: CPT | Performed by: INTERNAL MEDICINE

## 2025-07-21 PROCEDURE — 99214 OFFICE O/P EST MOD 30 MIN: CPT | Performed by: INTERNAL MEDICINE

## 2025-07-21 NOTE — PROGRESS NOTES
Cardiology Followup    Kamini Martines  [id number]  1950  [facility] [address] [address]    Consult for: CHF    HPI: Kamini Martines is a 75 y.o. year old female who is here for followup of CHF.   Since her last visit, she had a fall and fractured her left hip.  She required surgical repair.  Afterwards, was at rehab facility and developed lower extremity edema that resolved after returning home.  She is still extremely limited due to hip pain also has large amount of back pain as well.  She has lost weight due to poor appetite.  She also developed diarrhea from metformin.  Denies any chest pain or shortness of breath.  Blood pressure has been mildly elevated but overall controlled.  She is not using NSAIDs.    Past Cardiac History:   In August 2019, She had a cardiac MRI done for the evaluation of cardiomyopathy.  MRI showed EF of 41% with a thin strip of intramyocardial fibrosis consistent with non-ischemic cardiomyopathy.        In January 2019, she had an echocardiogram done during hospitalization for influenza. Echocardiogram showed EF of 45% with small pericardial effusion and mild pulmonary hypertension.  She has no history of CHF or CAD.  History of CVA in 2014 with left arm weakness.  Was treated with TPA.  2 years prior to that she had workup by Dr. Valera including catheterization which was normal.  Patient had been on amiodarone since her CVA but denies history of atrial fibrillation.  Amiodarone was stopped in 2019.   Repeat 2D echocardiogram was done in June which showed an ejection fraction of 35%.      She has a family history of CHF with 2 brothers who have pacemakers (one after a MI) both parents had CHF as well.  Cardiac catheterization showed nonobstructive CAD.  EF was 30-35%.  Holter monitor was done which showed 5 episodes of VT with longest lasting 12 beats at 111 bpm.  She had no  symptoms at the time.     The following portions of the patient's history were reviewed and updated as appropriate: allergies, current medications, past family history, past medical history, past social history, past surgical history and problem list.       Current Outpatient Medications:     acetaminophen (TYLENOL) 325 mg tablet, Take 2 tablets (650 mg total) by mouth every 6 (six) hours as needed for mild pain, Disp: , Rfl:     albuterol (Ventolin HFA) 90 mcg/act inhaler, Inhale 2 puffs every 6 (six) hours as needed for wheezing, Disp: 18 g, Rfl: 2    aspirin 325 mg tablet, Take 1 tablet (325 mg total) by mouth daily, Disp: , Rfl:     b complex vitamins capsule, Take 1 capsule by mouth daily, Disp: , Rfl:     benzonatate (TESSALON) 200 MG capsule, Take 1 capsule (200 mg total) by mouth 3 (three) times a day as needed for cough, Disp: 20 capsule, Rfl: 0    Blood Glucose Monitoring Suppl (Accu-Chek Guide) w/Device KIT, Use in the morning and before bedtime., Disp: 1 kit, Rfl: 0    Cholecalciferol (VITAMIN D3) 1,000 units tablet, Take 1 tablet (1,000 Units total) by mouth daily, Disp: , Rfl:     glucose blood (Accu-Chek Guide Test) test strip, Use 1 each in the morning and 1 each before bedtime. Test bid., Disp: 200 each, Rfl: 3    ipratropium (ATROVENT) 0.03 % nasal spray, 2 sprays into each nostril 3 (three) times a day as needed for rhinitis, Disp: 30 mL, Rfl: 5    magnesium 30 MG tablet, Take 1 tablet (30 mg total) by mouth daily Unsure of the dosage, Disp: , Rfl:     methocarbamol (ROBAXIN) 500 mg tablet, Take 1 tablet (500 mg total) by mouth 3 (three) times a day as needed for muscle spasms, Disp: 90 tablet, Rfl: 0    Multiple Vitamins-Minerals (PreserVision AREDS 2+Multi Vit) CAPS, Take 1 capsule by mouth 2 (two) times a day, Disp: , Rfl:     potassium citrate (UROCIT-K) 5 MEQ (540 MG), Take 1 tablet (5 mEq total) by mouth 3 (three) times a day with meals 2 tabs, Disp: , Rfl:     sacubitril-valsartan  "(Entresto)  MG TABS, Take 1 tablet by mouth 2 (two) times a day, Disp: , Rfl:     solifenacin (VESICARE) 10 MG tablet, , Disp: , Rfl:     spironolactone (ALDACTONE) 25 mg tablet, TAKE 1 TABLET BY MOUTH DAILY, Disp: 90 tablet, Rfl: 1    traMADol (Ultram) 50 mg tablet, Take 1 tablet (50 mg total) by mouth every 6 (six) hours as needed for moderate pain Do not start before June 19, 2025., Disp: 120 tablet, Rfl: 2    vitamin E, tocopherol, 400 units capsule, Take 1 capsule (400 Units total) by mouth daily, Disp: , Rfl:   Allergies   Allergen Reactions    Breo Ellipta [Fluticasone Furoate-Vilanterol] Anaphylaxis    Carvedilol Chest Pain and Hypertension    Lisinopril Other (See Comments)     Dizziness, cough    Olmesartan Medoxomil-Hctz Other (See Comments)     Category: Adverse Reaction; Annotation - [id number]: dizzy    Metformin GI Intolerance     Diarrhea    Doxycycline Rash         Review of Systems:  Review of Systems   Respiratory:  Negative for shortness of breath.    Cardiovascular:  Negative for chest pain, palpitations and leg swelling.   Musculoskeletal:  Positive for arthralgias, back pain and gait problem.   All other systems reviewed and are negative.      Physical Exam:  Vitals:    07/21/25 1254   BP: 130/80   BP Location: Left arm   Patient Position: Sitting   Cuff Size: Standard   Pulse: 74   SpO2: 95%   Weight: 92.1 kg (203 lb)   Height: 5' 5\" (1.651 m)     Physical Exam  Constitutional:       General: She is not in acute distress.     Appearance: Normal appearance. She is well-developed. She is not diaphoretic.   HENT:      Head: Normocephalic and atraumatic.     Eyes:      General: No scleral icterus.     Conjunctiva/sclera: Conjunctivae normal.      Pupils: Pupils are equal, round, and reactive to light.     Neck:      Thyroid: No thyromegaly.      Vascular: No JVD.     Cardiovascular:      Rate and Rhythm: Normal rate and regular rhythm.      Heart sounds: Normal heart sounds. No murmur " heard.     No friction rub. No gallop.   Pulmonary:      Effort: Pulmonary effort is normal.      Breath sounds: Normal breath sounds.     Musculoskeletal:      Cervical back: Neck supple.      Right lower leg: No edema.      Left lower leg: No edema.     Skin:     General: Skin is warm and dry.      Findings: No erythema or rash.     Neurological:      General: No focal deficit present.      Mental Status: She is alert and oriented to person, place, and time. Mental status is at baseline.     Psychiatric:         Mood and Affect: Mood normal.         Behavior: Behavior normal.         Thought Content: Thought content normal.         Judgment: Judgment normal.         Labs:  Lab Results   Component Value Date    K 4.4 05/12/2025     05/12/2025    CO2 29 05/12/2025    BUN 27 (H) 05/12/2025    CREATININE 0.58 (L) 05/12/2025    CALCIUM 9.7 05/12/2025     Lab Results   Component Value Date    WBC 7.48 05/12/2025    HGB 9.2 (L) 05/12/2025    HCT 30.7 (L) 05/12/2025    MCV 97 05/12/2025     (H) 05/12/2025     Lab Results   Component Value Date    TRIG 287 (H) 01/27/2025    HDL 43 (L) 01/27/2025     EKG (independently reviewed): NSR with LVH and nonspecific ST abnormalities    Discussion/Summary:  Assessment & Plan  Chronic systolic congestive heart failure (HCC)  Wt Readings from Last 3 Encounters:   07/21/25 92.1 kg (203 lb)   06/11/25 99.8 kg (220 lb)   06/09/25 99.8 kg (220 lb)     - patient's EF was 41% on cardiac MRI with similar EF on most recent echocardiogram.  Study consistent with a nonischemic cardiomyopathy.  She had nonobstructive CAD on cath.  Holter monitor did not show frequent PVCs but episodes of VT was seen.  TSH was normal  She does not drink    - Continue Entresto and spironolactone.  She cannot tolerate beta blocker (has tried metoprolol and carvedilol).  Tolerating Entresto   mg daily.  - Torsemide as needed  Essential hypertension  - BP controlled. Home log reviewed.  - Continue  Entresto  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)  - Lost weight due to recent illness  Type 2 diabetes mellitus without complication, without long-term current use of insulin (HCC)    Lab Results   Component Value Date    HGBA1C 7.8 (H) 04/29/2025

## 2025-07-21 NOTE — ASSESSMENT & PLAN NOTE
Wt Readings from Last 3 Encounters:   07/21/25 92.1 kg (203 lb)   06/11/25 99.8 kg (220 lb)   06/09/25 99.8 kg (220 lb)     - patient's EF was 41% on cardiac MRI with similar EF on most recent echocardiogram.  Study consistent with a nonischemic cardiomyopathy.  She had nonobstructive CAD on cath.  Holter monitor did not show frequent PVCs but episodes of VT was seen.  TSH was normal  She does not drink    - Continue Entresto and spironolactone.  She cannot tolerate beta blocker (has tried metoprolol and carvedilol).  Tolerating Entresto   mg daily.  - Torsemide as needed

## 2025-07-22 ENCOUNTER — APPOINTMENT (OUTPATIENT)
Dept: RADIOLOGY | Facility: AMBULARY SURGERY CENTER | Age: 75
End: 2025-07-22
Attending: STUDENT IN AN ORGANIZED HEALTH CARE EDUCATION/TRAINING PROGRAM
Payer: MEDICARE

## 2025-07-22 ENCOUNTER — OFFICE VISIT (OUTPATIENT)
Dept: OBGYN CLINIC | Facility: CLINIC | Age: 75
End: 2025-07-22

## 2025-07-22 VITALS — HEIGHT: 65 IN | BODY MASS INDEX: 33.82 KG/M2 | WEIGHT: 203 LBS

## 2025-07-22 DIAGNOSIS — S72.002A CLOSED FRACTURE OF LEFT HIP, INITIAL ENCOUNTER (HCC): ICD-10-CM

## 2025-07-22 PROCEDURE — 73502 X-RAY EXAM HIP UNI 2-3 VIEWS: CPT

## 2025-07-22 PROCEDURE — 99024 POSTOP FOLLOW-UP VISIT: CPT | Performed by: STUDENT IN AN ORGANIZED HEALTH CARE EDUCATION/TRAINING PROGRAM

## 2025-07-22 RX ORDER — METHOCARBAMOL 500 MG/1
500 TABLET, FILM COATED ORAL 3 TIMES DAILY PRN
Qty: 90 TABLET | Refills: 0 | Status: SHIPPED | OUTPATIENT
Start: 2025-07-22
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
## 2025-07-25 ENCOUNTER — APPOINTMENT (OUTPATIENT)
Dept: LAB | Facility: CLINIC | Age: 75
End: 2025-07-25

## 2025-07-28 ENCOUNTER — APPOINTMENT (OUTPATIENT)
Dept: LAB | Facility: CLINIC | Age: 75
End: 2025-07-28
Payer: MEDICARE

## 2025-07-28 DIAGNOSIS — E11.9 TYPE 2 DIABETES MELLITUS WITHOUT COMPLICATION, WITHOUT LONG-TERM CURRENT USE OF INSULIN (HCC): ICD-10-CM

## 2025-07-28 DIAGNOSIS — I10 ESSENTIAL HYPERTENSION: ICD-10-CM

## 2025-07-28 LAB
ALBUMIN SERPL BCG-MCNC: 3.9 G/DL (ref 3.5–5)
ALP SERPL-CCNC: 83 U/L (ref 34–104)
ALT SERPL W P-5'-P-CCNC: 12 U/L (ref 7–52)
ANION GAP SERPL CALCULATED.3IONS-SCNC: 8 MMOL/L (ref 4–13)
AST SERPL W P-5'-P-CCNC: 12 U/L (ref 13–39)
BILIRUB SERPL-MCNC: 0.28 MG/DL (ref 0.2–1)
BUN SERPL-MCNC: 20 MG/DL (ref 5–25)
CALCIUM SERPL-MCNC: 10.3 MG/DL (ref 8.4–10.2)
CHLORIDE SERPL-SCNC: 104 MMOL/L (ref 96–108)
CHOLEST SERPL-MCNC: 177 MG/DL (ref ?–200)
CO2 SERPL-SCNC: 28 MMOL/L (ref 21–32)
CREAT SERPL-MCNC: 0.55 MG/DL (ref 0.6–1.3)
ERYTHROCYTE [DISTWIDTH] IN BLOOD BY AUTOMATED COUNT: 13.8 % (ref 11.6–15.1)
EST. AVERAGE GLUCOSE BLD GHB EST-MCNC: 177 MG/DL
GFR SERPL CREATININE-BSD FRML MDRD: 92 ML/MIN/1.73SQ M
GLUCOSE P FAST SERPL-MCNC: 184 MG/DL (ref 65–99)
HBA1C MFR BLD: 7.8 %
HCT VFR BLD AUTO: 37.4 % (ref 34.8–46.1)
HDLC SERPL-MCNC: 42 MG/DL
HGB BLD-MCNC: 11.7 G/DL (ref 11.5–15.4)
LDLC SERPL CALC-MCNC: 71 MG/DL (ref 0–100)
MCH RBC QN AUTO: 28.3 PG (ref 26.8–34.3)
MCHC RBC AUTO-ENTMCNC: 31.3 G/DL (ref 31.4–37.4)
MCV RBC AUTO: 91 FL (ref 82–98)
PLATELET # BLD AUTO: 350 THOUSANDS/UL (ref 149–390)
PMV BLD AUTO: 11 FL (ref 8.9–12.7)
POTASSIUM SERPL-SCNC: 4.6 MMOL/L (ref 3.5–5.3)
PROT SERPL-MCNC: 6.5 G/DL (ref 6.4–8.4)
RBC # BLD AUTO: 4.13 MILLION/UL (ref 3.81–5.12)
SODIUM SERPL-SCNC: 140 MMOL/L (ref 135–147)
TRIGL SERPL-MCNC: 320 MG/DL (ref ?–150)
WBC # BLD AUTO: 9.15 THOUSAND/UL (ref 4.31–10.16)

## 2025-07-28 PROCEDURE — 80061 LIPID PANEL: CPT

## 2025-07-28 PROCEDURE — 83036 HEMOGLOBIN GLYCOSYLATED A1C: CPT

## 2025-07-28 PROCEDURE — 85027 COMPLETE CBC AUTOMATED: CPT

## 2025-07-28 PROCEDURE — 36415 COLL VENOUS BLD VENIPUNCTURE: CPT

## 2025-07-28 PROCEDURE — 80053 COMPREHEN METABOLIC PANEL: CPT

## 2025-07-31 ENCOUNTER — OFFICE VISIT (OUTPATIENT)
Dept: FAMILY MEDICINE CLINIC | Facility: CLINIC | Age: 75
End: 2025-07-31
Payer: MEDICARE

## 2025-07-31 VITALS
RESPIRATION RATE: 17 BRPM | TEMPERATURE: 97.3 F | OXYGEN SATURATION: 97 % | SYSTOLIC BLOOD PRESSURE: 118 MMHG | WEIGHT: 207 LBS | HEIGHT: 64 IN | DIASTOLIC BLOOD PRESSURE: 58 MMHG | HEART RATE: 75 BPM | BODY MASS INDEX: 35.34 KG/M2

## 2025-07-31 DIAGNOSIS — E11.9 TYPE 2 DIABETES MELLITUS WITHOUT COMPLICATION, WITHOUT LONG-TERM CURRENT USE OF INSULIN (HCC): ICD-10-CM

## 2025-07-31 DIAGNOSIS — I10 PRIMARY HYPERTENSION: ICD-10-CM

## 2025-07-31 DIAGNOSIS — Z99.89 USES WALKER: ICD-10-CM

## 2025-07-31 DIAGNOSIS — I50.22 CHRONIC SYSTOLIC (CONGESTIVE) HEART FAILURE (HCC): ICD-10-CM

## 2025-07-31 DIAGNOSIS — Z00.00 MEDICARE ANNUAL WELLNESS VISIT, SUBSEQUENT: Primary | ICD-10-CM

## 2025-07-31 DIAGNOSIS — N20.0 KIDNEY STONES: ICD-10-CM
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
## 2025-07-31 PROBLEM — Z91.89 AT RISK FOR DELIRIUM: Status: RESOLVED | Noted: 2025-04-24 | Resolved: 2025-07-31

## 2025-07-31 PROBLEM — D72.829 LEUKOCYTOSIS: Status: RESOLVED | Noted: 2025-04-29 | Resolved: 2025-07-31

## 2025-07-31 PROBLEM — M54.50 CHRONIC RIGHT-SIDED LOW BACK PAIN WITHOUT SCIATICA: Status: RESOLVED | Noted: 2019-12-11 | Resolved: 2025-07-31

## 2025-07-31 PROBLEM — G89.29 CHRONIC RIGHT-SIDED LOW BACK PAIN WITHOUT SCIATICA: Status: RESOLVED | Noted: 2019-12-11 | Resolved: 2025-07-31

## 2025-07-31 PROBLEM — M79.89 LEFT LEG SWELLING: Status: RESOLVED | Noted: 2025-05-03 | Resolved: 2025-07-31

## 2025-07-31 PROBLEM — S31.819S BUTTOCK WOUND, RIGHT, SEQUELA: Status: RESOLVED | Noted: 2025-04-29 | Resolved: 2025-07-31

## 2025-07-31 PROBLEM — R60.0 BILATERAL LEG EDEMA: Status: RESOLVED | Noted: 2025-05-05 | Resolved: 2025-07-31

## 2025-07-31 PROBLEM — S72.002D CLOSED FRACTURE OF LEFT HIP WITH ROUTINE HEALING: Status: RESOLVED | Noted: 2025-04-24 | Resolved: 2025-07-31

## 2025-07-31 PROBLEM — D64.9 ANEMIA: Status: RESOLVED | Noted: 2025-04-29 | Resolved: 2025-07-31

## 2025-07-31 PROBLEM — Z74.09 IMPAIRED MOBILITY AND ACTIVITIES OF DAILY LIVING: Status: RESOLVED | Noted: 2025-04-24 | Resolved: 2025-07-31

## 2025-07-31 PROBLEM — Z78.9 IMPAIRED MOBILITY AND ACTIVITIES OF DAILY LIVING: Status: RESOLVED | Noted: 2025-04-24 | Resolved: 2025-07-31

## 2025-07-31 PROBLEM — R54 FRAILTY: Status: RESOLVED | Noted: 2025-04-24 | Resolved: 2025-07-31

## 2025-07-31 PROCEDURE — 99214 OFFICE O/P EST MOD 30 MIN: CPT | Performed by: INTERNAL MEDICINE

## 2025-07-31 PROCEDURE — G0439 PPPS, SUBSEQ VISIT: HCPCS | Performed by: INTERNAL MEDICINE

## 2025-07-31 PROCEDURE — G2211 COMPLEX E/M VISIT ADD ON: HCPCS | Performed by: INTERNAL MEDICINE

## 2025-07-31 RX ORDER — SPIRONOLACTONE 25 MG/1
25 TABLET ORAL DAILY
Qty: 90 TABLET | Refills: 3 | Status: SHIPPED | OUTPATIENT
Start: 2025-07-31

## 2025-07-31 RX ORDER — POTASSIUM CITRATE 540 MG/1
5 TABLET, EXTENDED RELEASE ORAL
Qty: 270 TABLET | Refills: 3 | Status: SHIPPED | OUTPATIENT
Start: 2025-07-31 | End: 2025-08-04 | Stop reason: SDUPTHER

## 2025-08-01 ENCOUNTER — OFFICE VISIT (OUTPATIENT)
Dept: OBGYN CLINIC | Facility: CLINIC | Age: 75
End: 2025-08-01
Payer: MEDICARE

## 2025-08-01 VITALS — BODY MASS INDEX: 34.49 KG/M2 | HEIGHT: 65 IN | WEIGHT: 207 LBS

## 2025-08-01 DIAGNOSIS — M17.11 PRIMARY OSTEOARTHRITIS OF RIGHT KNEE: ICD-10-CM

## 2025-08-01 DIAGNOSIS — M17.12 PRIMARY LOCALIZED OSTEOARTHRITIS OF LEFT KNEE: Primary | ICD-10-CM

## 2025-08-01 PROCEDURE — 20610 DRAIN/INJ JOINT/BURSA W/O US: CPT | Performed by: ORTHOPAEDIC SURGERY

## 2025-08-01 PROCEDURE — 99213 OFFICE O/P EST LOW 20 MIN: CPT | Performed by: ORTHOPAEDIC SURGERY

## 2025-08-01 RX ORDER — DEXAMETHASONE SODIUM PHOSPHATE 10 MG/ML
40 INJECTION, SOLUTION INTRAMUSCULAR; INTRAVENOUS
Status: COMPLETED | OUTPATIENT
Start: 2025-08-01 | End: 2025-08-01

## 2025-08-01 RX ORDER — LIDOCAINE HYDROCHLORIDE 10 MG/ML
4 INJECTION, SOLUTION INFILTRATION; PERINEURAL
Status: COMPLETED | OUTPATIENT
Start: 2025-08-01 | End: 2025-08-01

## 2025-08-01 RX ADMIN — DEXAMETHASONE SODIUM PHOSPHATE 40 MG: 10 INJECTION, SOLUTION INTRAMUSCULAR; INTRAVENOUS at 09:30

## 2025-08-01 RX ADMIN — LIDOCAINE HYDROCHLORIDE 4 ML: 10 INJECTION, SOLUTION INFILTRATION; PERINEURAL at 09:30

## 2025-08-04 ENCOUNTER — TELEPHONE (OUTPATIENT)
Age: 75
End: 2025-08-04

## 2025-08-04 DIAGNOSIS — N20.0 KIDNEY STONES: ICD-10-CM

## 2025-08-04 RX ORDER — POTASSIUM CITRATE 540 MG/1
5 TABLET, EXTENDED RELEASE ORAL
Qty: 270 TABLET | Refills: 3 | Status: SHIPPED | OUTPATIENT
Start: 2025-08-04

## 2025-08-05 ENCOUNTER — PROCEDURE VISIT (OUTPATIENT)
Dept: OBGYN CLINIC | Facility: CLINIC | Age: 75
End: 2025-08-05
Payer: MEDICARE

## 2025-08-05 DIAGNOSIS — M19.011 OSTEOARTHRITIS OF RIGHT AC (ACROMIOCLAVICULAR) JOINT: Primary | ICD-10-CM

## 2025-08-05 DIAGNOSIS — M19.012 OSTEOARTHRITIS OF LEFT ACROMIOCLAVICULAR JOINT: ICD-10-CM

## 2025-08-05 PROCEDURE — 20606 DRAIN/INJ JOINT/BURSA W/US: CPT | Performed by: ORTHOPAEDIC SURGERY

## 2025-08-05 PROCEDURE — 99213 OFFICE O/P EST LOW 20 MIN: CPT | Performed by: ORTHOPAEDIC SURGERY

## 2025-08-05 RX ORDER — LIDOCAINE HYDROCHLORIDE 10 MG/ML
1 INJECTION, SOLUTION INFILTRATION; PERINEURAL
Status: COMPLETED | OUTPATIENT
Start: 2025-08-05 | End: 2025-08-05

## 2025-08-05 RX ORDER — TRIAMCINOLONE ACETONIDE 40 MG/ML
40 INJECTION, SUSPENSION INTRA-ARTICULAR; INTRAMUSCULAR
Status: COMPLETED | OUTPATIENT
Start: 2025-08-05 | End: 2025-08-05

## 2025-08-05 RX ADMIN — TRIAMCINOLONE ACETONIDE 40 MG: 40 INJECTION, SUSPENSION INTRA-ARTICULAR; INTRAMUSCULAR at 16:30

## 2025-08-05 RX ADMIN — LIDOCAINE HYDROCHLORIDE 1 ML: 10 INJECTION, SOLUTION INFILTRATION; PERINEURAL at 16:30

## (undated) DEVICE — GLOVE SRG BIOGEL 8

## (undated) DEVICE — INTREPID® TRANSFORMER IA HP: Brand: INTREPID®

## (undated) DEVICE — AIR INJECT CANNULA 27GA: Brand: OPHTHALMIC CANNULA

## (undated) DEVICE — SOLUTION BOWL: Brand: KENDALL

## (undated) DEVICE — 3M™ DURAPORE™ SURGICAL TAPE 1538-3, 3 INCH X 10 YARD (7,5CM X 9,1M), 4 ROLLS/BOX: Brand: 3M™ DURAPORE™

## (undated) DEVICE — ACE WRAP 6 IN UNSTERILE

## (undated) DEVICE — TOWEL SET X-RAY

## (undated) DEVICE — MAT ABSORBANT ARTHROSCOPY FLOOR 46 X 40 IN

## (undated) DEVICE — RADIOLOGY STERILE LABELS: Brand: CENTURION

## (undated) DEVICE — MICROSURGICAL INSTRUMENT IRR. CYSTITOME 25GA STRAIGHT-REVERSE CUTTING: Brand: ALCON

## (undated) DEVICE — PAD CAST 4 IN COTTON NON STERILE

## (undated) DEVICE — SYRINGE 5ML LL

## (undated) DEVICE — THE MONARCH® "D" CARTRIDGE IS A SINGLE-USE POLYPROPYLENE CARTRIDGE FOR POSTERIOR CHAMBER IOL DELIVERY: Brand: MONARCH® III

## (undated) DEVICE — GLOVE INDICATOR PI UNDERGLOVE SZ 8 BLUE

## (undated) DEVICE — EYE PACK CUSTOM -FINNEGAN

## (undated) DEVICE — Device

## (undated) DEVICE — DRAPE C-ARMOUR

## (undated) DEVICE — INTENDED FOR TISSUE SEPARATION, AND OTHER PROCEDURES THAT REQUIRE A SHARP SURGICAL BLADE TO PUNCTURE OR CUT.: Brand: BARD-PARKER ® CARBON RIB-BACK BLADES

## (undated) DEVICE — NEEDLE SPINAL 22G X 3.5 IN PLST HUB

## (undated) DEVICE — CLEARCUT® SLIT KNIFE INTREPID MICRO-COAXIAL SYSTEM 2.4 SB: Brand: CLEARCUT®; INTREPID

## (undated) DEVICE — CYSTOSCOPY PACK: Brand: CONVERTORS

## (undated) DEVICE — SYRINGE 10ML LL

## (undated) DEVICE — PAD GROUNDING ADULT

## (undated) DEVICE — TRAY EPIDURAL PERIFIX 20GA X 3.5IN TUOHY 8ML

## (undated) DEVICE — STERILE BETHLEHEM ORIF HIP PK: Brand: CARDINAL HEALTH

## (undated) DEVICE — PLASTIC ADHESIVE BANDAGE: Brand: CURITY

## (undated) DEVICE — WIPES BABY PAMPERS SENSITIVE 36/PK

## (undated) DEVICE — URETERAL CATHETER 5 FR POLLACK

## (undated) DEVICE — NGAGE, NITINOL STONE EXTRACTOR MODIFIED BASKET: Brand: NGAGE

## (undated) DEVICE — CHLORAPREP APPLICATOR TINTED 10.5ML ONE-STEP

## (undated) DEVICE — NEEDLE BLUNT 18 G X 1 1/2 W FILTER

## (undated) DEVICE — IV SET EXT SM BORE CARESITE 8IN

## (undated) DEVICE — 4.2MM THREE-FLUTED DRILL BIT QC/NEEDLE POINT/145MM

## (undated) DEVICE — TRAY PAIN SUPPORT

## (undated) DEVICE — ACTIVE FMS W/ INTREPID* ULTRA SLEEVES, 0.9MM 45° ABS* INTREPID* BALANCED TIP: Brand: ALCON

## (undated) DEVICE — CVD CANNULA

## (undated) DEVICE — SMALL NEEDLE COUNTER NEST

## (undated) DEVICE — GLOVE SRG LF STRL BGL SKNSNS 8 PF

## (undated) DEVICE — FABRIC REINFORCED, SURGICAL GOWN, XL: Brand: CONVERTORS

## (undated) DEVICE — GLOVE SRG BIOGEL 7.5

## (undated) DEVICE — 3.2MM GUIDE WIRE 400MM

## (undated) DEVICE — DRESSING MEPILEX AG BORDER 4 X 4 IN

## (undated) DEVICE — PAD GROUNDING IONIC RF DISP

## (undated) DEVICE — DRAPE SHEET X-LG

## (undated) DEVICE — SPONGE STICK WITH PVP-I: Brand: KENDALL

## (undated) DEVICE — NIMBUS MT RF ELECTRODE 100MM

## (undated) DEVICE — NEEDLE SPINAL 25G X 3.5 IN QUINCKE

## (undated) DEVICE — PAD GROUNDING SLF ADHESIVE

## (undated) DEVICE — LUBRICANT JELLY SURGILUBE TUBE 4OZ FLIP TOP

## (undated) DEVICE — CYSTO TUBING TUR Y IRRIGATION

## (undated) DEVICE — SUT MONOCRYL 2-0 CT-1 27 IN Y339H

## (undated) DEVICE — 6617 IOBAN II PATIENT ISOLATION DRAPE 5/BX,4BX/CS: Brand: STERI-DRAPE™ IOBAN™ 2

## (undated) DEVICE — DISPOSABLE EQUIPMENT COVER: Brand: SMALL TOWEL DRAPE

## (undated) DEVICE — SYRINGE EPI 8ML LUER SLIP LOSS OF RESISTANCE PLASTIC PERFIX

## (undated) DEVICE — DRAPE SHEET THREE QUARTER

## (undated) DEVICE — SPECIMEN CONTAINER STERILE PEEL PACK

## (undated) DEVICE — SPONGE 4 X 4 XRAY 16 PLY STRL LF RFD

## (undated) DEVICE — CHLORAPREP HI-LITE 26ML ORANGE

## (undated) DEVICE — DISPOSABLE OR TOWEL: Brand: CARDINAL HEALTH

## (undated) DEVICE — B-H IRRIGATING CAN 19GA FLAT ANGLED 8MM: Brand: OPHTHALMIC CANNULA

## (undated) DEVICE — FIBER STD QUANTA 272 MICRON

## (undated) DEVICE — POUCH URO CATCHER II STERILE

## (undated) DEVICE — Device: Brand: PORTEX

## (undated) DEVICE — PREP PAD BNS: Brand: CONVERTORS

## (undated) DEVICE — SKIN MARKER DUAL TIP WITH RULER CAP, FLEXIBLE RULER AND LABELS: Brand: DEVON

## (undated) DEVICE — GLOVE SRG LF STRL BGL SKNSNS 7.5 PF

## (undated) DEVICE — SYRINGE 3ML LL

## (undated) DEVICE — SUT PDS II 0 CT-1 27 IN Z340H

## (undated) DEVICE — ELECTRODE RF 15CM

## (undated) DEVICE — NEEDLE TUOHY 20G X 6 IN